# Patient Record
Sex: FEMALE | Race: WHITE | Employment: OTHER | URBAN - METROPOLITAN AREA
[De-identification: names, ages, dates, MRNs, and addresses within clinical notes are randomized per-mention and may not be internally consistent; named-entity substitution may affect disease eponyms.]

---

## 2017-01-03 RX ORDER — ESOMEPRAZOLE MAGNESIUM 40 MG/1
40 CAPSULE, DELAYED RELEASE ORAL DAILY
Qty: 90 CAPSULE | Refills: 1 | Status: SHIPPED | OUTPATIENT
Start: 2017-01-03 | End: 2017-08-01 | Stop reason: SDUPTHER

## 2017-01-04 ENCOUNTER — OFFICE VISIT (OUTPATIENT)
Dept: INTERNAL MEDICINE CLINIC | Age: 77
End: 2017-01-04

## 2017-01-04 VITALS
RESPIRATION RATE: 16 BRPM | BODY MASS INDEX: 20.18 KG/M2 | HEART RATE: 76 BPM | SYSTOLIC BLOOD PRESSURE: 145 MMHG | DIASTOLIC BLOOD PRESSURE: 86 MMHG | WEIGHT: 125 LBS

## 2017-01-04 DIAGNOSIS — J44.1 COPD EXACERBATION (HCC): ICD-10-CM

## 2017-01-04 DIAGNOSIS — F07.81 POST CONCUSSIVE SYNDROME: Primary | ICD-10-CM

## 2017-01-04 DIAGNOSIS — J44.9 COPD WITH ASTHMA (HCC): ICD-10-CM

## 2017-01-04 DIAGNOSIS — G89.29 CHRONIC LEFT-SIDED LOW BACK PAIN WITH LEFT-SIDED SCIATICA: ICD-10-CM

## 2017-01-04 DIAGNOSIS — M54.42 CHRONIC LEFT-SIDED LOW BACK PAIN WITH LEFT-SIDED SCIATICA: ICD-10-CM

## 2017-01-04 PROCEDURE — 99214 OFFICE O/P EST MOD 30 MIN: CPT | Performed by: INTERNAL MEDICINE

## 2017-01-12 RX ORDER — IPRATROPIUM BROMIDE 42 UG/1
SPRAY, METERED NASAL
Qty: 15 ML | Refills: 2 | Status: SHIPPED | OUTPATIENT
Start: 2017-01-12 | End: 2017-10-06 | Stop reason: DRUGHIGH

## 2017-01-23 RX ORDER — LEVOFLOXACIN 500 MG/1
500 TABLET, FILM COATED ORAL DAILY
Qty: 7 TABLET | Refills: 0 | Status: SHIPPED | OUTPATIENT
Start: 2017-01-23 | End: 2017-01-30

## 2017-01-24 ENCOUNTER — OFFICE VISIT (OUTPATIENT)
Dept: INTERNAL MEDICINE CLINIC | Age: 77
End: 2017-01-24

## 2017-01-24 VITALS
BODY MASS INDEX: 20.01 KG/M2 | RESPIRATION RATE: 16 BRPM | DIASTOLIC BLOOD PRESSURE: 75 MMHG | SYSTOLIC BLOOD PRESSURE: 146 MMHG | HEART RATE: 85 BPM | WEIGHT: 124 LBS

## 2017-01-24 DIAGNOSIS — J44.9 COPD WITH ASTHMA (HCC): ICD-10-CM

## 2017-01-24 DIAGNOSIS — J44.1 CHRONIC OBSTRUCTIVE PULMONARY DISEASE WITH ACUTE EXACERBATION (HCC): Primary | ICD-10-CM

## 2017-01-24 PROCEDURE — 99214 OFFICE O/P EST MOD 30 MIN: CPT | Performed by: INTERNAL MEDICINE

## 2017-01-24 RX ORDER — PREDNISONE 20 MG/1
TABLET ORAL
Qty: 5 TABLET | Refills: 0 | Status: SHIPPED | OUTPATIENT
Start: 2017-01-24 | End: 2017-02-23 | Stop reason: ALTCHOICE

## 2017-02-14 RX ORDER — AMLODIPINE BESYLATE 10 MG/1
10 TABLET ORAL DAILY
Qty: 90 TABLET | Refills: 1 | Status: SHIPPED | OUTPATIENT
Start: 2017-02-14 | End: 2017-07-20 | Stop reason: SDUPTHER

## 2017-02-23 ENCOUNTER — OFFICE VISIT (OUTPATIENT)
Dept: INTERNAL MEDICINE CLINIC | Age: 77
End: 2017-02-23

## 2017-02-23 VITALS
WEIGHT: 126.4 LBS | SYSTOLIC BLOOD PRESSURE: 122 MMHG | BODY MASS INDEX: 20.4 KG/M2 | DIASTOLIC BLOOD PRESSURE: 70 MMHG | RESPIRATION RATE: 16 BRPM | HEART RATE: 68 BPM

## 2017-02-23 DIAGNOSIS — M54.50 CHRONIC MIDLINE LOW BACK PAIN WITHOUT SCIATICA: Primary | ICD-10-CM

## 2017-02-23 DIAGNOSIS — E87.6 HYPOKALEMIA: ICD-10-CM

## 2017-02-23 DIAGNOSIS — I10 ESSENTIAL HYPERTENSION: ICD-10-CM

## 2017-02-23 DIAGNOSIS — G89.29 CHRONIC MIDLINE LOW BACK PAIN WITHOUT SCIATICA: Primary | ICD-10-CM

## 2017-02-23 PROCEDURE — 99213 OFFICE O/P EST LOW 20 MIN: CPT | Performed by: INTERNAL MEDICINE

## 2017-02-23 RX ORDER — OXYCODONE HYDROCHLORIDE AND ACETAMINOPHEN 5; 325 MG/1; MG/1
1 TABLET ORAL EVERY 4 HOURS PRN
Status: ON HOLD | COMMUNITY
End: 2017-05-15

## 2017-02-23 RX ORDER — LEFLUNOMIDE 20 MG/1
20 TABLET ORAL DAILY
Qty: 90 TABLET | Refills: 1 | Status: ON HOLD | OUTPATIENT
Start: 2017-02-23 | End: 2017-05-15

## 2017-02-27 RX ORDER — ATORVASTATIN CALCIUM 10 MG/1
10 TABLET, FILM COATED ORAL EVERY EVENING
Qty: 90 TABLET | Refills: 1 | Status: SHIPPED | OUTPATIENT
Start: 2017-02-27 | End: 2017-09-11 | Stop reason: SDUPTHER

## 2017-03-09 RX ORDER — TRIAMTERENE AND HYDROCHLOROTHIAZIDE 37.5; 25 MG/1; MG/1
TABLET ORAL
Qty: 90 TABLET | Refills: 1 | Status: ON HOLD | OUTPATIENT
Start: 2017-03-09 | End: 2017-06-11 | Stop reason: HOSPADM

## 2017-03-13 ENCOUNTER — OFFICE VISIT (OUTPATIENT)
Dept: PULMONOLOGY | Age: 77
End: 2017-03-13

## 2017-03-13 VITALS
DIASTOLIC BLOOD PRESSURE: 84 MMHG | BODY MASS INDEX: 20.25 KG/M2 | OXYGEN SATURATION: 93 % | HEART RATE: 95 BPM | SYSTOLIC BLOOD PRESSURE: 142 MMHG | WEIGHT: 126 LBS | RESPIRATION RATE: 20 BRPM | HEIGHT: 66 IN

## 2017-03-13 DIAGNOSIS — Z72.0 TOBACCO ABUSE: ICD-10-CM

## 2017-03-13 DIAGNOSIS — J42 CHRONIC BRONCHITIS, UNSPECIFIED CHRONIC BRONCHITIS TYPE (HCC): ICD-10-CM

## 2017-03-13 DIAGNOSIS — J44.1 ACUTE EXACERBATION OF CHRONIC OBSTRUCTIVE PULMONARY DISEASE (COPD) (HCC): Primary | ICD-10-CM

## 2017-03-13 DIAGNOSIS — J43.2 CENTRILOBULAR EMPHYSEMA (HCC): ICD-10-CM

## 2017-03-13 PROCEDURE — 99213 OFFICE O/P EST LOW 20 MIN: CPT | Performed by: INTERNAL MEDICINE

## 2017-03-13 RX ORDER — PREDNISONE 1 MG/1
TABLET ORAL
Qty: 38 TABLET | Refills: 0 | Status: SHIPPED | OUTPATIENT
Start: 2017-03-13 | End: 2017-03-24 | Stop reason: ALTCHOICE

## 2017-03-13 RX ORDER — CIPROFLOXACIN 500 MG/1
500 TABLET, FILM COATED ORAL 2 TIMES DAILY
Qty: 14 TABLET | Refills: 0 | Status: SHIPPED | OUTPATIENT
Start: 2017-03-13 | End: 2017-03-24 | Stop reason: ALTCHOICE

## 2017-03-24 ENCOUNTER — OFFICE VISIT (OUTPATIENT)
Dept: INTERNAL MEDICINE CLINIC | Age: 77
End: 2017-03-24

## 2017-03-24 VITALS
SYSTOLIC BLOOD PRESSURE: 140 MMHG | BODY MASS INDEX: 20.34 KG/M2 | WEIGHT: 126 LBS | DIASTOLIC BLOOD PRESSURE: 72 MMHG | HEART RATE: 84 BPM | RESPIRATION RATE: 16 BRPM

## 2017-03-24 DIAGNOSIS — M81.0 OSTEOPOROSIS: Primary | ICD-10-CM

## 2017-03-24 DIAGNOSIS — I10 ESSENTIAL HYPERTENSION: ICD-10-CM

## 2017-03-24 DIAGNOSIS — G89.4 CHRONIC PAIN SYNDROME: ICD-10-CM

## 2017-03-24 DIAGNOSIS — J44.1 COPD EXACERBATION (HCC): ICD-10-CM

## 2017-03-24 PROCEDURE — 99214 OFFICE O/P EST MOD 30 MIN: CPT | Performed by: INTERNAL MEDICINE

## 2017-03-24 RX ORDER — PREGABALIN 50 MG/1
50 CAPSULE ORAL 4 TIMES DAILY
Status: ON HOLD | COMMUNITY
End: 2017-05-15 | Stop reason: ALTCHOICE

## 2017-03-24 RX ORDER — ZOLEDRONIC ACID 5 MG/100ML
5 INJECTION, SOLUTION INTRAVENOUS ONCE
Qty: 100 ML | Refills: 0 | Status: SHIPPED | OUTPATIENT
Start: 2017-03-24 | End: 2017-05-12 | Stop reason: SDUPTHER

## 2017-03-27 ENCOUNTER — OFFICE VISIT (OUTPATIENT)
Dept: PULMONOLOGY | Age: 77
End: 2017-03-27

## 2017-03-27 VITALS
HEIGHT: 66 IN | OXYGEN SATURATION: 92 % | HEART RATE: 78 BPM | WEIGHT: 122 LBS | BODY MASS INDEX: 19.61 KG/M2 | RESPIRATION RATE: 18 BRPM | SYSTOLIC BLOOD PRESSURE: 130 MMHG | DIASTOLIC BLOOD PRESSURE: 68 MMHG

## 2017-03-27 DIAGNOSIS — J42 CHRONIC BRONCHITIS, UNSPECIFIED CHRONIC BRONCHITIS TYPE (HCC): Primary | ICD-10-CM

## 2017-03-27 PROCEDURE — 99213 OFFICE O/P EST LOW 20 MIN: CPT | Performed by: INTERNAL MEDICINE

## 2017-04-04 ENCOUNTER — HOSPITAL ENCOUNTER (OUTPATIENT)
Dept: GENERAL RADIOLOGY | Age: 77
Discharge: HOME | End: 2017-04-04
Attending: INTERNAL MEDICINE | Admitting: INTERNAL MEDICINE

## 2017-04-04 ENCOUNTER — OFFICE VISIT (OUTPATIENT)
Dept: PULMONOLOGY | Age: 77
End: 2017-04-04

## 2017-04-04 VITALS
DIASTOLIC BLOOD PRESSURE: 76 MMHG | BODY MASS INDEX: 20.09 KG/M2 | HEART RATE: 86 BPM | HEIGHT: 66 IN | WEIGHT: 125 LBS | OXYGEN SATURATION: 90 % | SYSTOLIC BLOOD PRESSURE: 136 MMHG | RESPIRATION RATE: 20 BRPM

## 2017-04-04 DIAGNOSIS — J42 CHRONIC BRONCHITIS, UNSPECIFIED CHRONIC BRONCHITIS TYPE (HCC): ICD-10-CM

## 2017-04-04 DIAGNOSIS — J44.1 ACUTE EXACERBATION OF CHRONIC OBSTRUCTIVE PULMONARY DISEASE (COPD) (HCC): Primary | ICD-10-CM

## 2017-04-04 DIAGNOSIS — J44.1 ACUTE EXACERBATION OF CHRONIC OBSTRUCTIVE PULMONARY DISEASE (COPD) (HCC): ICD-10-CM

## 2017-04-04 LAB
HCT VFR BLD CALC: 36.4 % (ref 37–47)
HEMOGLOBIN: 11.5 GM/DL (ref 12.5–16)
MCH RBC QN AUTO: 26.7 PG (ref 27–31)
MCHC RBC AUTO-ENTMCNC: 31.6 % (ref 32–36)
MCV RBC AUTO: 84.7 FL (ref 78–100)
PDW BLD-RTO: 17.1 % (ref 11.7–14.9)
PLATELET # BLD: 309 K/CU MM (ref 140–440)
PMV BLD AUTO: 10.7 FL (ref 7.5–11.1)
RAPID INFLUENZA  B AGN: NEGATIVE
RAPID INFLUENZA A AGN: NEGATIVE
RBC # BLD: 4.3 M/CU MM (ref 4.2–5.4)
WBC # BLD: 7.1 K/CU MM (ref 4–10.5)

## 2017-04-04 PROCEDURE — 99213 OFFICE O/P EST LOW 20 MIN: CPT | Performed by: INTERNAL MEDICINE

## 2017-04-04 RX ORDER — LEVOFLOXACIN 750 MG/1
750 TABLET ORAL DAILY
Qty: 5 TABLET | Refills: 0 | Status: SHIPPED | OUTPATIENT
Start: 2017-04-04 | End: 2017-04-09

## 2017-04-04 RX ORDER — PREDNISONE 1 MG/1
TABLET ORAL
Qty: 38 TABLET | Refills: 0 | Status: ON HOLD | OUTPATIENT
Start: 2017-04-04 | End: 2017-05-15

## 2017-04-07 ENCOUNTER — TELEPHONE (OUTPATIENT)
Dept: PULMONOLOGY | Age: 77
End: 2017-04-07

## 2017-04-08 LAB
MYCOPLASMA PNEUMONIAE IGG: 0.29
MYCOPLASMA PNEUMONIAE IGM: 0.06

## 2017-05-01 ENCOUNTER — NURSE ONLY (OUTPATIENT)
Dept: PULMONOLOGY | Age: 77
End: 2017-05-01

## 2017-05-01 DIAGNOSIS — J42 CHRONIC BRONCHITIS, UNSPECIFIED CHRONIC BRONCHITIS TYPE (HCC): ICD-10-CM

## 2017-05-01 LAB
DLCO %PRED: NORMAL
DLCO PRE: NORMAL
FEF 25-75%-POST: 0.38
FEF 25-75%-PRE: 0.33
FEV1-POST: 0.8
FEV1-PRE: 0.7
FEV1/FVC-POST: 55
FEV1/FVC-PRE: 56.2
FVC-POST: 1.46
FVC-PRE: 1.25
MEP: NORMAL
MIP: NORMAL
TLC %PRED: NORMAL
TLC PRE: NORMAL

## 2017-05-01 PROCEDURE — 94060 EVALUATION OF WHEEZING: CPT | Performed by: INTERNAL MEDICINE

## 2017-05-01 ASSESSMENT — PULMONARY FUNCTION TESTS
FEV1_POST: 0.80
FEV1_PRE: 0.70
FVC_POST: 1.46
FEV1/FVC_PRE: 56.2
FVC_PRE: 1.25
FEV1/FVC_POST: 55.0

## 2017-05-09 RX ORDER — POTASSIUM CHLORIDE 750 MG/1
10 TABLET, FILM COATED, EXTENDED RELEASE ORAL 2 TIMES DAILY
Qty: 60 TABLET | Refills: 5 | Status: SHIPPED | OUTPATIENT
Start: 2017-05-09 | End: 2017-08-14 | Stop reason: DRUGHIGH

## 2017-05-12 ENCOUNTER — TELEPHONE (OUTPATIENT)
Dept: INTERNAL MEDICINE CLINIC | Age: 77
End: 2017-05-12

## 2017-05-12 RX ORDER — ZOLEDRONIC ACID 5 MG/100ML
5 INJECTION, SOLUTION INTRAVENOUS ONCE
Qty: 100 ML | Refills: 0 | Status: ON HOLD | OUTPATIENT
Start: 2017-05-12 | End: 2017-05-30 | Stop reason: HOSPADM

## 2017-05-15 PROBLEM — I21.4 NSTEMI (NON-ST ELEVATED MYOCARDIAL INFARCTION) (HCC): Status: ACTIVE | Noted: 2017-05-15

## 2017-05-18 ENCOUNTER — TELEPHONE (OUTPATIENT)
Dept: PHARMACY | Facility: CLINIC | Age: 77
End: 2017-05-18

## 2017-05-18 ENCOUNTER — CARE COORDINATION (OUTPATIENT)
Dept: CASE MANAGEMENT | Age: 77
End: 2017-05-18

## 2017-05-18 DIAGNOSIS — I21.4 NSTEMI (NON-ST ELEVATED MYOCARDIAL INFARCTION) (HCC): Primary | ICD-10-CM

## 2017-05-25 ENCOUNTER — TELEPHONE (OUTPATIENT)
Dept: INTERNAL MEDICINE CLINIC | Age: 77
End: 2017-05-25

## 2017-05-25 ENCOUNTER — HOSPITAL ENCOUNTER (OUTPATIENT)
Dept: INFUSION THERAPY | Age: 77
Discharge: HOME | End: 2017-05-25
Attending: INTERNAL MEDICINE | Admitting: INTERNAL MEDICINE

## 2017-05-25 VITALS
SYSTOLIC BLOOD PRESSURE: 155 MMHG | HEART RATE: 78 BPM | BODY MASS INDEX: 20.09 KG/M2 | HEIGHT: 66 IN | RESPIRATION RATE: 16 BRPM | WEIGHT: 125 LBS | DIASTOLIC BLOOD PRESSURE: 66 MMHG | TEMPERATURE: 98.2 F

## 2017-05-25 RX ORDER — SODIUM CHLORIDE 0.9 % (FLUSH) 0.9 %
10 SYRINGE (ML) INJECTION PRN
Status: ACTIVE | OUTPATIENT
Start: 2017-05-25 | End: 2017-05-25

## 2017-05-25 RX ORDER — ZOLEDRONIC ACID 5 MG/100ML
5 INJECTION, SOLUTION INTRAVENOUS ONCE
Status: COMPLETED | OUTPATIENT
Start: 2017-05-25 | End: 2017-05-25

## 2017-05-25 RX ADMIN — ZOLEDRONIC ACID 5 MG: 5 INJECTION, SOLUTION INTRAVENOUS at 14:50

## 2017-05-25 RX ADMIN — Medication 10 ML: at 14:50

## 2017-05-25 RX ADMIN — Medication 10 ML: at 15:20

## 2017-05-26 ENCOUNTER — OFFICE VISIT (OUTPATIENT)
Dept: INTERNAL MEDICINE CLINIC | Age: 77
End: 2017-05-26

## 2017-05-26 VITALS
SYSTOLIC BLOOD PRESSURE: 130 MMHG | HEIGHT: 66 IN | HEART RATE: 78 BPM | DIASTOLIC BLOOD PRESSURE: 70 MMHG | RESPIRATION RATE: 16 BRPM | BODY MASS INDEX: 20.86 KG/M2 | WEIGHT: 129.8 LBS

## 2017-05-26 DIAGNOSIS — I51.81 TAKOTSUBO SYNDROME: ICD-10-CM

## 2017-05-26 DIAGNOSIS — M81.0 OSTEOPOROSIS: ICD-10-CM

## 2017-05-26 DIAGNOSIS — F17.200 SMOKER: ICD-10-CM

## 2017-05-26 DIAGNOSIS — I51.81 BROKEN HEART SYNDROME: Primary | ICD-10-CM

## 2017-05-26 DIAGNOSIS — F32.9 REACTIVE DEPRESSION: ICD-10-CM

## 2017-05-26 PROBLEM — Z72.0 TOBACCO ABUSE: Status: RESOLVED | Noted: 2017-03-13 | Resolved: 2017-05-26

## 2017-05-26 PROCEDURE — 99214 OFFICE O/P EST MOD 30 MIN: CPT | Performed by: INTERNAL MEDICINE

## 2017-05-26 RX ORDER — ARIPIPRAZOLE 2 MG/1
2 TABLET ORAL DAILY
Qty: 30 TABLET | Refills: 3 | Status: SHIPPED | OUTPATIENT
Start: 2017-05-26 | End: 2017-10-19 | Stop reason: SDUPTHER

## 2017-05-31 ENCOUNTER — TELEPHONE (OUTPATIENT)
Dept: PHARMACY | Facility: CLINIC | Age: 77
End: 2017-05-31

## 2017-05-31 ENCOUNTER — CARE COORDINATION (OUTPATIENT)
Dept: CASE MANAGEMENT | Age: 77
End: 2017-05-31

## 2017-05-31 DIAGNOSIS — F41.0 PANIC ATTACK: Primary | ICD-10-CM

## 2017-06-01 ENCOUNTER — HOSPITAL ENCOUNTER (OUTPATIENT)
Dept: OTHER | Age: 77
Discharge: HOME | End: 2017-06-01
Attending: FAMILY MEDICINE | Admitting: FAMILY MEDICINE

## 2017-06-05 ENCOUNTER — OFFICE VISIT (OUTPATIENT)
Dept: FAMILY MEDICINE CLINIC | Age: 77
End: 2017-06-05

## 2017-06-05 ENCOUNTER — TELEPHONE (OUTPATIENT)
Dept: INTERNAL MEDICINE CLINIC | Age: 77
End: 2017-06-05

## 2017-06-05 ENCOUNTER — TELEPHONE (OUTPATIENT)
Dept: FAMILY MEDICINE CLINIC | Age: 77
End: 2017-06-05

## 2017-06-05 VITALS
DIASTOLIC BLOOD PRESSURE: 70 MMHG | SYSTOLIC BLOOD PRESSURE: 104 MMHG | OXYGEN SATURATION: 98 % | BODY MASS INDEX: 20.11 KG/M2 | WEIGHT: 124.6 LBS | HEART RATE: 90 BPM

## 2017-06-05 DIAGNOSIS — F41.0 PANIC ATTACK: ICD-10-CM

## 2017-06-05 DIAGNOSIS — R60.9 PERIPHERAL EDEMA: ICD-10-CM

## 2017-06-05 DIAGNOSIS — Z91.81 AT HIGH RISK FOR FALLS: ICD-10-CM

## 2017-06-05 DIAGNOSIS — J42 CHRONIC BRONCHITIS, UNSPECIFIED CHRONIC BRONCHITIS TYPE (HCC): Primary | ICD-10-CM

## 2017-06-05 RX ORDER — FENTANYL 12 UG/H
PATCH TRANSDERMAL
Refills: 0 | Status: ON HOLD | COMMUNITY
Start: 2017-05-31 | End: 2017-06-11

## 2017-06-05 RX ORDER — FUROSEMIDE 20 MG/1
20 TABLET ORAL 2 TIMES DAILY
Qty: 60 TABLET | Refills: 0 | Status: ON HOLD | OUTPATIENT
Start: 2017-06-05 | End: 2017-06-11 | Stop reason: HOSPADM

## 2017-06-05 ASSESSMENT — PATIENT HEALTH QUESTIONNAIRE - PHQ9
1. LITTLE INTEREST OR PLEASURE IN DOING THINGS: 0
2. FEELING DOWN, DEPRESSED OR HOPELESS: 0
SUM OF ALL RESPONSES TO PHQ QUESTIONS 1-9: 0
SUM OF ALL RESPONSES TO PHQ9 QUESTIONS 1 & 2: 0

## 2017-06-12 ENCOUNTER — CARE COORDINATION (OUTPATIENT)
Dept: OTHER | Facility: CLINIC | Age: 77
End: 2017-06-12

## 2017-06-12 LAB
ALBUMIN SERPL-MCNC: 3 GM/DL (ref 3.4–5)
ALP BLD-CCNC: 94 IU/L (ref 40–128)
ALT SERPL-CCNC: 7 U/L (ref 10–40)
ANION GAP SERPL CALCULATED.3IONS-SCNC: 15 MMOL/L (ref 4–16)
AST SERPL-CCNC: 20 IU/L (ref 15–37)
BILIRUB SERPL-MCNC: 0.5 MG/DL (ref 0–1)
BUN BLDV-MCNC: 12 MG/DL (ref 6–23)
CALCIUM SERPL-MCNC: 8.3 MG/DL (ref 8.3–10.6)
CHLORIDE BLD-SCNC: 88 MMOL/L (ref 99–110)
CO2: 25 MMOL/L (ref 21–32)
CREAT SERPL-MCNC: 0.5 MG/DL (ref 0.6–1.1)
GFR AFRICAN AMERICAN: >60 ML/MIN/1.73M2
GFR NON-AFRICAN AMERICAN: >60 ML/MIN/1.73M2
GLUCOSE BLD-MCNC: 76 MG/DL (ref 70–140)
HCT VFR BLD CALC: 31.8 % (ref 37–47)
HEMOGLOBIN: 9.2 GM/DL (ref 12.5–16)
MAGNESIUM: 2.1 MG/DL (ref 1.8–2.4)
MCH RBC QN AUTO: 24.3 PG (ref 27–31)
MCHC RBC AUTO-ENTMCNC: 28.9 % (ref 32–36)
MCV RBC AUTO: 83.9 FL (ref 78–100)
PDW BLD-RTO: 18.6 % (ref 11.7–14.9)
PLATELET # BLD: 389 K/CU MM (ref 140–440)
PMV BLD AUTO: 9.3 FL (ref 7.5–11.1)
POTASSIUM SERPL-SCNC: 4.5 MMOL/L (ref 3.5–5.1)
RBC # BLD: 3.79 M/CU MM (ref 4.2–5.4)
SODIUM BLD-SCNC: 128 MMOL/L (ref 135–145)
TOTAL PROTEIN: 5.1 GM/DL (ref 6.4–8.2)
WBC # BLD: 11.3 K/CU MM (ref 4–10.5)

## 2017-06-15 LAB
ANION GAP SERPL CALCULATED.3IONS-SCNC: 12 MMOL/L (ref 4–16)
BUN BLDV-MCNC: 9 MG/DL (ref 6–23)
CALCIUM SERPL-MCNC: 8.3 MG/DL (ref 8.3–10.6)
CHLORIDE BLD-SCNC: 96 MMOL/L (ref 99–110)
CO2: 26 MMOL/L (ref 21–32)
CREAT SERPL-MCNC: 0.5 MG/DL (ref 0.6–1.1)
GFR AFRICAN AMERICAN: >60 ML/MIN/1.73M2
GFR NON-AFRICAN AMERICAN: >60 ML/MIN/1.73M2
GLUCOSE BLD-MCNC: 85 MG/DL (ref 70–140)
HCT VFR BLD CALC: 27.9 % (ref 37–47)
HEMOGLOBIN: 8.3 GM/DL (ref 12.5–16)
MCH RBC QN AUTO: 23.9 PG (ref 27–31)
MCHC RBC AUTO-ENTMCNC: 29.7 % (ref 32–36)
MCV RBC AUTO: 80.2 FL (ref 78–100)
PDW BLD-RTO: 18.4 % (ref 11.7–14.9)
PLATELET # BLD: 475 K/CU MM (ref 140–440)
PMV BLD AUTO: 9.5 FL (ref 7.5–11.1)
POTASSIUM SERPL-SCNC: 5 MMOL/L (ref 3.5–5.1)
RBC # BLD: 3.48 M/CU MM (ref 4.2–5.4)
SODIUM BLD-SCNC: 134 MMOL/L (ref 135–145)
WBC # BLD: 6.4 K/CU MM (ref 4–10.5)

## 2017-06-22 ENCOUNTER — TELEPHONE (OUTPATIENT)
Dept: INTERNAL MEDICINE CLINIC | Age: 77
End: 2017-06-22

## 2017-06-22 RX ORDER — METOPROLOL TARTRATE 50 MG/1
50 TABLET, FILM COATED ORAL 2 TIMES DAILY
Qty: 120 TABLET | Refills: 1 | Status: SHIPPED | OUTPATIENT
Start: 2017-06-22 | End: 2017-12-01 | Stop reason: SDUPTHER

## 2017-06-22 RX ORDER — METOPROLOL TARTRATE 50 MG/1
50 TABLET, FILM COATED ORAL 2 TIMES DAILY
COMMUNITY
End: 2017-06-22 | Stop reason: SDUPTHER

## 2017-06-23 ENCOUNTER — OFFICE VISIT (OUTPATIENT)
Dept: INTERNAL MEDICINE CLINIC | Age: 77
End: 2017-06-23

## 2017-06-23 VITALS
BODY MASS INDEX: 20.24 KG/M2 | HEART RATE: 80 BPM | DIASTOLIC BLOOD PRESSURE: 76 MMHG | SYSTOLIC BLOOD PRESSURE: 118 MMHG | WEIGHT: 125.4 LBS

## 2017-06-23 DIAGNOSIS — D50.0 BLOOD LOSS ANEMIA: ICD-10-CM

## 2017-06-23 DIAGNOSIS — Z12.11 COLON CANCER SCREENING: ICD-10-CM

## 2017-06-23 DIAGNOSIS — S22.42XA CLOSED FRACTURE OF MULTIPLE RIBS OF LEFT SIDE, INITIAL ENCOUNTER: ICD-10-CM

## 2017-06-23 DIAGNOSIS — E87.1 HYPONATREMIA: Primary | ICD-10-CM
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
## 2017-06-23 DIAGNOSIS — G89.4 CHRONIC PAIN SYNDROME: ICD-10-CM

## 2017-06-23 DIAGNOSIS — Z91.81 RISK FOR FALLS: ICD-10-CM

## 2017-06-23 PROBLEM — J44.1 ACUTE EXACERBATION OF CHRONIC OBSTRUCTIVE PULMONARY DISEASE (COPD) (HCC): Status: RESOLVED | Noted: 2017-03-13 | Resolved: 2017-06-23

## 2017-06-23 PROCEDURE — 99215 OFFICE O/P EST HI 40 MIN: CPT | Performed by: INTERNAL MEDICINE

## 2017-06-23 RX ORDER — FERROUS SULFATE 325(65) MG
325 TABLET ORAL
COMMUNITY
End: 2017-10-27 | Stop reason: ALTCHOICE

## 2017-06-23 RX ORDER — FUROSEMIDE 20 MG/1
20 TABLET ORAL 2 TIMES DAILY
COMMUNITY
End: 2017-07-17 | Stop reason: SDUPTHER

## 2017-06-23 ASSESSMENT — ENCOUNTER SYMPTOMS
BLOOD IN STOOL: 0
ABDOMINAL PAIN: 0
DOUBLE VISION: 0
SPUTUM PRODUCTION: 0
COUGH: 1
NAUSEA: 0
BACK PAIN: 1
SHORTNESS OF BREATH: 0
DIARRHEA: 0
VOMITING: 0
HEARTBURN: 0
SORE THROAT: 0
BLURRED VISION: 0

## 2017-06-26 ENCOUNTER — CARE COORDINATION (OUTPATIENT)
Dept: CASE MANAGEMENT | Age: 77
End: 2017-06-26

## 2017-06-27 ENCOUNTER — TELEPHONE (OUTPATIENT)
Dept: INTERNAL MEDICINE CLINIC | Age: 77
End: 2017-06-27

## 2017-06-28 PROCEDURE — 99496 TRANSJ CARE MGMT HIGH F2F 7D: CPT | Performed by: FAMILY MEDICINE

## 2017-06-30 ENCOUNTER — TELEPHONE (OUTPATIENT)
Dept: INTERNAL MEDICINE CLINIC | Age: 77
End: 2017-06-30

## 2017-06-30 DIAGNOSIS — Z12.11 COLON CANCER SCREENING: Primary | ICD-10-CM

## 2017-07-06 ENCOUNTER — TELEPHONE (OUTPATIENT)
Dept: INTERNAL MEDICINE CLINIC | Age: 77
End: 2017-07-06

## 2017-07-10 ENCOUNTER — CARE COORDINATION (OUTPATIENT)
Dept: CASE MANAGEMENT | Age: 77
End: 2017-07-10

## 2017-07-11 ENCOUNTER — TELEPHONE (OUTPATIENT)
Dept: INTERNAL MEDICINE CLINIC | Age: 77
End: 2017-07-11

## 2017-07-12 PROBLEM — E87.1 HYPONATREMIA: Status: ACTIVE | Noted: 2017-07-12

## 2017-07-14 ENCOUNTER — CARE COORDINATOR VISIT (OUTPATIENT)
Dept: CARE COORDINATION | Age: 77
End: 2017-07-14

## 2017-07-18 RX ORDER — FUROSEMIDE 20 MG/1
20 TABLET ORAL 2 TIMES DAILY
Qty: 60 TABLET | Refills: 5 | Status: SHIPPED | OUTPATIENT
Start: 2017-07-18 | End: 2017-08-14 | Stop reason: DRUGHIGH

## 2017-07-20 RX ORDER — AMLODIPINE BESYLATE 10 MG/1
10 TABLET ORAL DAILY
Qty: 90 TABLET | Refills: 1 | Status: SHIPPED | OUTPATIENT
Start: 2017-07-20 | End: 2017-08-01 | Stop reason: ALTCHOICE

## 2017-07-21 ENCOUNTER — TELEPHONE (OUTPATIENT)
Dept: INTERNAL MEDICINE CLINIC | Age: 77
End: 2017-07-21

## 2017-07-21 RX ORDER — LEVOFLOXACIN 500 MG/1
500 TABLET, FILM COATED ORAL DAILY
Qty: 10 TABLET | Refills: 0 | Status: SHIPPED | OUTPATIENT
Start: 2017-07-21 | End: 2017-07-31

## 2017-07-25 ENCOUNTER — TELEPHONE (OUTPATIENT)
Dept: FAMILY MEDICINE CLINIC | Age: 77
End: 2017-07-25

## 2017-07-26 ENCOUNTER — CARE COORDINATION (OUTPATIENT)
Dept: CARE COORDINATION | Age: 77
End: 2017-07-26

## 2017-08-01 ENCOUNTER — OFFICE VISIT (OUTPATIENT)
Dept: INTERNAL MEDICINE CLINIC | Age: 77
End: 2017-08-01

## 2017-08-01 ENCOUNTER — CARE COORDINATION (OUTPATIENT)
Dept: CARE COORDINATION | Age: 77
End: 2017-08-01

## 2017-08-01 VITALS
HEART RATE: 78 BPM | RESPIRATION RATE: 16 BRPM | BODY MASS INDEX: 20.56 KG/M2 | SYSTOLIC BLOOD PRESSURE: 140 MMHG | DIASTOLIC BLOOD PRESSURE: 88 MMHG | WEIGHT: 127.4 LBS

## 2017-08-01 DIAGNOSIS — I73.9 PAD (PERIPHERAL ARTERY DISEASE) (HCC): ICD-10-CM

## 2017-08-01 DIAGNOSIS — E87.8 ELECTROLYTE IMBALANCE: ICD-10-CM

## 2017-08-01 DIAGNOSIS — R60.0 BILATERAL EDEMA OF LOWER EXTREMITY: Primary | ICD-10-CM

## 2017-08-01 DIAGNOSIS — I10 ESSENTIAL HYPERTENSION: ICD-10-CM

## 2017-08-01 DIAGNOSIS — D50.0 BLOOD LOSS ANEMIA: ICD-10-CM

## 2017-08-01 DIAGNOSIS — D50.9 IRON DEFICIENCY ANEMIA, UNSPECIFIED IRON DEFICIENCY ANEMIA TYPE: ICD-10-CM

## 2017-08-01 DIAGNOSIS — I82.441 ACUTE DVT OF RIGHT TIBIAL VEIN (HCC): ICD-10-CM

## 2017-08-01 DIAGNOSIS — Z12.39 BREAST CANCER SCREENING: ICD-10-CM

## 2017-08-01 PROBLEM — E87.1 HYPONATREMIA: Status: RESOLVED | Noted: 2017-07-12 | Resolved: 2017-08-01

## 2017-08-01 PROBLEM — I21.4 NSTEMI (NON-ST ELEVATED MYOCARDIAL INFARCTION) (HCC): Status: RESOLVED | Noted: 2017-05-15 | Resolved: 2017-08-01

## 2017-08-01 PROCEDURE — 99215 OFFICE O/P EST HI 40 MIN: CPT | Performed by: INTERNAL MEDICINE

## 2017-08-01 RX ORDER — LOSARTAN POTASSIUM 50 MG/1
50 TABLET ORAL DAILY
Qty: 30 TABLET | Refills: 3 | Status: SHIPPED | OUTPATIENT
Start: 2017-08-01 | End: 2017-10-23 | Stop reason: SDUPTHER

## 2017-08-01 RX ORDER — ESOMEPRAZOLE MAGNESIUM 40 MG/1
40 CAPSULE, DELAYED RELEASE ORAL DAILY
Qty: 90 CAPSULE | Refills: 1 | Status: SHIPPED | OUTPATIENT
Start: 2017-08-01 | End: 2017-10-27 | Stop reason: DRUGHIGH

## 2017-08-01 ASSESSMENT — ENCOUNTER SYMPTOMS
BLURRED VISION: 0
NAUSEA: 0
SORE THROAT: 0
BACK PAIN: 1
SPUTUM PRODUCTION: 0
DIARRHEA: 0
VOMITING: 0
HEARTBURN: 0
SHORTNESS OF BREATH: 0
COUGH: 0
ABDOMINAL PAIN: 0
DOUBLE VISION: 0
WHEEZING: 0

## 2017-08-03 ENCOUNTER — HOSPITAL ENCOUNTER (OUTPATIENT)
Dept: GENERAL RADIOLOGY | Age: 77
Discharge: HOME | End: 2017-08-03
Attending: INTERNAL MEDICINE | Admitting: INTERNAL MEDICINE

## 2017-08-03 LAB
ANION GAP SERPL CALCULATED.3IONS-SCNC: 11 MMOL/L (ref 4–16)
BUN BLDV-MCNC: 11 MG/DL (ref 6–23)
CALCIUM SERPL-MCNC: 9 MG/DL (ref 8.3–10.6)
CHLORIDE BLD-SCNC: 97 MMOL/L (ref 99–110)
CO2: 27 MMOL/L (ref 21–32)
CREAT SERPL-MCNC: 0.6 MG/DL (ref 0.6–1.1)
GFR AFRICAN AMERICAN: >60 ML/MIN/1.73M2
GFR NON-AFRICAN AMERICAN: >60 ML/MIN/1.73M2
GLUCOSE BLD-MCNC: 97 MG/DL (ref 70–140)
HCT VFR BLD CALC: 29.1 % (ref 37–47)
HEMOGLOBIN: 8.4 GM/DL (ref 12.5–16)
MCH RBC QN AUTO: 23.5 PG (ref 27–31)
MCHC RBC AUTO-ENTMCNC: 28.9 % (ref 32–36)
MCV RBC AUTO: 81.3 FL (ref 78–100)
PDW BLD-RTO: 21.4 % (ref 11.7–14.9)
PLATELET # BLD: 343 K/CU MM (ref 140–440)
PMV BLD AUTO: 10.6 FL (ref 7.5–11.1)
POTASSIUM SERPL-SCNC: 4.1 MMOL/L (ref 3.5–5.1)
RBC # BLD: 3.58 M/CU MM (ref 4.2–5.4)
SODIUM BLD-SCNC: 135 MMOL/L (ref 135–145)
WBC # BLD: 6.4 K/CU MM (ref 4–10.5)

## 2017-08-09 ENCOUNTER — HOSPITAL ENCOUNTER (OUTPATIENT)
Dept: WOMENS IMAGING | Age: 77
Discharge: HOME | End: 2017-08-09
Attending: INTERNAL MEDICINE | Admitting: INTERNAL MEDICINE

## 2017-08-09 ENCOUNTER — CARE COORDINATION (OUTPATIENT)
Dept: CARE COORDINATION | Age: 77
End: 2017-08-09

## 2017-08-09 DIAGNOSIS — Z12.39 BREAST CANCER SCREENING: ICD-10-CM

## 2017-08-11 ENCOUNTER — HOSPITAL ENCOUNTER (OUTPATIENT)
Dept: GENERAL RADIOLOGY | Age: 77
Discharge: HOME | End: 2017-08-11
Attending: INTERNAL MEDICINE | Admitting: INTERNAL MEDICINE

## 2017-08-11 LAB
ANION GAP SERPL CALCULATED.3IONS-SCNC: 14 MMOL/L (ref 4–16)
BUN BLDV-MCNC: 14 MG/DL (ref 6–23)
CALCIUM SERPL-MCNC: 9 MG/DL (ref 8.3–10.6)
CHLORIDE BLD-SCNC: 96 MMOL/L (ref 99–110)
CO2: 23 MMOL/L (ref 21–32)
CREAT SERPL-MCNC: 0.6 MG/DL (ref 0.6–1.1)
GFR AFRICAN AMERICAN: >60 ML/MIN/1.73M2
GFR NON-AFRICAN AMERICAN: >60 ML/MIN/1.73M2
GLUCOSE BLD-MCNC: 106 MG/DL (ref 70–140)
HCT VFR BLD CALC: 32.7 % (ref 37–47)
HEMOGLOBIN: 9.4 GM/DL (ref 12.5–16)
MCH RBC QN AUTO: 25.3 PG (ref 27–31)
MCHC RBC AUTO-ENTMCNC: 28.7 % (ref 32–36)
MCV RBC AUTO: 87.9 FL (ref 78–100)
PDW BLD-RTO: 27.1 % (ref 11.7–14.9)
PLATELET # BLD: 331 K/CU MM (ref 140–440)
PMV BLD AUTO: 11.6 FL (ref 7.5–11.1)
POTASSIUM SERPL-SCNC: 3.9 MMOL/L (ref 3.5–5.1)
RBC # BLD: 3.72 M/CU MM (ref 4.2–5.4)
SODIUM BLD-SCNC: 133 MMOL/L (ref 135–145)
WBC # BLD: 7.1 K/CU MM (ref 4–10.5)

## 2017-08-14 ENCOUNTER — HOSPITAL ENCOUNTER (OUTPATIENT)
Dept: GENERAL RADIOLOGY | Age: 77
Discharge: HOME | End: 2017-08-14
Attending: INTERNAL MEDICINE | Admitting: INTERNAL MEDICINE

## 2017-08-14 ENCOUNTER — OFFICE VISIT (OUTPATIENT)
Dept: INTERNAL MEDICINE CLINIC | Age: 77
End: 2017-08-14

## 2017-08-14 VITALS
RESPIRATION RATE: 16 BRPM | SYSTOLIC BLOOD PRESSURE: 144 MMHG | WEIGHT: 134 LBS | DIASTOLIC BLOOD PRESSURE: 72 MMHG | HEART RATE: 80 BPM | BODY MASS INDEX: 21.63 KG/M2

## 2017-08-14 DIAGNOSIS — R60.0 BILATERAL EDEMA OF LOWER EXTREMITY: ICD-10-CM

## 2017-08-14 DIAGNOSIS — D50.0 BLOOD LOSS ANEMIA: ICD-10-CM

## 2017-08-14 DIAGNOSIS — I82.541 CHRONIC DEEP VEIN THROMBOSIS (DVT) OF TIBIAL VEIN OF RIGHT LOWER EXTREMITY (HCC): ICD-10-CM

## 2017-08-14 DIAGNOSIS — I50.31 ACUTE DIASTOLIC CONGESTIVE HEART FAILURE (HCC): Primary | ICD-10-CM

## 2017-08-14 DIAGNOSIS — R06.02 BREATH SHORTNESS: ICD-10-CM

## 2017-08-14 DIAGNOSIS — R06.02 SHORTNESS OF BREATH: ICD-10-CM

## 2017-08-14 PROCEDURE — 99215 OFFICE O/P EST HI 40 MIN: CPT | Performed by: INTERNAL MEDICINE

## 2017-08-14 RX ORDER — RANITIDINE 150 MG/1
150 TABLET ORAL NIGHTLY
COMMUNITY
End: 2017-09-19 | Stop reason: ALTCHOICE

## 2017-08-14 RX ORDER — POTASSIUM CHLORIDE 750 MG/1
10 TABLET, EXTENDED RELEASE ORAL 2 TIMES DAILY
COMMUNITY
End: 2017-12-01 | Stop reason: DRUGHIGH

## 2017-08-14 RX ORDER — FUROSEMIDE 20 MG/1
TABLET ORAL
COMMUNITY
End: 2017-08-18 | Stop reason: SDUPTHER

## 2017-08-14 ASSESSMENT — ENCOUNTER SYMPTOMS
BLOOD IN STOOL: 0
ORTHOPNEA: 1
HEARTBURN: 1
WHEEZING: 0
SHORTNESS OF BREATH: 1
BACK PAIN: 1
COUGH: 1
ABDOMINAL PAIN: 0
SORE THROAT: 0
BLURRED VISION: 0
DIARRHEA: 1
SPUTUM PRODUCTION: 0
VOMITING: 0
NAUSEA: 1
DOUBLE VISION: 0

## 2017-08-17 ENCOUNTER — TELEPHONE (OUTPATIENT)
Dept: INTERNAL MEDICINE CLINIC | Age: 77
End: 2017-08-17

## 2017-08-17 ENCOUNTER — TELEPHONE (OUTPATIENT)
Dept: PHARMACY | Facility: CLINIC | Age: 77
End: 2017-08-17

## 2017-08-17 ENCOUNTER — CARE COORDINATION (OUTPATIENT)
Dept: CASE MANAGEMENT | Age: 77
End: 2017-08-17

## 2017-08-18 RX ORDER — FUROSEMIDE 20 MG/1
20 TABLET ORAL 2 TIMES DAILY
Qty: 60 TABLET | Refills: 2 | Status: SHIPPED | OUTPATIENT
Start: 2017-08-18 | End: 2017-08-22 | Stop reason: SDUPTHER

## 2017-08-21 ENCOUNTER — HOSPITAL ENCOUNTER (OUTPATIENT)
Dept: GENERAL RADIOLOGY | Age: 77
Discharge: HOME | End: 2017-08-21
Attending: INTERNAL MEDICINE | Admitting: INTERNAL MEDICINE

## 2017-08-21 ENCOUNTER — CARE COORDINATION (OUTPATIENT)
Dept: CASE MANAGEMENT | Age: 77
End: 2017-08-21

## 2017-08-21 LAB
ANION GAP SERPL CALCULATED.3IONS-SCNC: 12 MMOL/L (ref 4–16)
BASOPHILS ABSOLUTE: 0 K/CU MM
BASOPHILS RELATIVE PERCENT: 0.5 % (ref 0–1)
BUN BLDV-MCNC: 16 MG/DL (ref 6–23)
CALCIUM SERPL-MCNC: 8.9 MG/DL (ref 8.3–10.6)
CHLORIDE BLD-SCNC: 93 MMOL/L (ref 99–110)
CO2: 28 MMOL/L (ref 21–32)
CREAT SERPL-MCNC: 0.7 MG/DL (ref 0.6–1.1)
DIFFERENTIAL TYPE: ABNORMAL
EOSINOPHILS ABSOLUTE: 0.2 K/CU MM
EOSINOPHILS RELATIVE PERCENT: 2.6 % (ref 0–3)
GFR AFRICAN AMERICAN: >60 ML/MIN/1.73M2
GFR NON-AFRICAN AMERICAN: >60 ML/MIN/1.73M2
GLUCOSE BLD-MCNC: 91 MG/DL (ref 70–140)
HCT VFR BLD CALC: 28.8 % (ref 37–47)
HEMOGLOBIN: 8.5 GM/DL (ref 12.5–16)
IMMATURE NEUTROPHIL %: 0.4 % (ref 0–0.43)
LYMPHOCYTES ABSOLUTE: 2.3 K/CU MM
LYMPHOCYTES RELATIVE PERCENT: 29.2 % (ref 24–44)
MCH RBC QN AUTO: 25.8 PG (ref 27–31)
MCHC RBC AUTO-ENTMCNC: 29.5 % (ref 32–36)
MCV RBC AUTO: 87.5 FL (ref 78–100)
MONOCYTES ABSOLUTE: 1.1 K/CU MM
MONOCYTES RELATIVE PERCENT: 14.4 % (ref 0–4)
NUCLEATED RBC %: 0 %
PDW BLD-RTO: 24.3 % (ref 11.7–14.9)
PLATELET # BLD: 374 K/CU MM (ref 140–440)
PMV BLD AUTO: 10.6 FL (ref 7.5–11.1)
POTASSIUM SERPL-SCNC: 3.7 MMOL/L (ref 3.5–5.1)
RBC # BLD: 3.29 M/CU MM (ref 4.2–5.4)
SEGMENTED NEUTROPHILS ABSOLUTE COUNT: 4.1 K/CU MM
SEGMENTED NEUTROPHILS RELATIVE PERCENT: 52.9 % (ref 36–66)
SODIUM BLD-SCNC: 133 MMOL/L (ref 135–145)
TOTAL IMMATURE NEUTOROPHIL: 0.03 K/CU MM
TOTAL NUCLEATED RBC: 0 K/CU MM
WBC # BLD: 7.7 K/CU MM (ref 4–10.5)

## 2017-08-22 ENCOUNTER — OFFICE VISIT (OUTPATIENT)
Dept: INTERNAL MEDICINE CLINIC | Age: 77
End: 2017-08-22

## 2017-08-22 ENCOUNTER — CARE COORDINATOR VISIT (OUTPATIENT)
Dept: CARE COORDINATION | Age: 77
End: 2017-08-22

## 2017-08-22 VITALS
WEIGHT: 128 LBS | BODY MASS INDEX: 20.66 KG/M2 | RESPIRATION RATE: 16 BRPM | HEART RATE: 80 BPM | DIASTOLIC BLOOD PRESSURE: 72 MMHG | SYSTOLIC BLOOD PRESSURE: 134 MMHG

## 2017-08-22 DIAGNOSIS — I50.32 CHRONIC DIASTOLIC CONGESTIVE HEART FAILURE (HCC): ICD-10-CM

## 2017-08-22 DIAGNOSIS — D50.0 BLOOD LOSS ANEMIA: ICD-10-CM

## 2017-08-22 DIAGNOSIS — F17.200 SMOKER: ICD-10-CM

## 2017-08-22 DIAGNOSIS — I82.4Z1 DVT, LOWER EXTREMITY, DISTAL, ACUTE, RIGHT (HCC): ICD-10-CM

## 2017-08-22 DIAGNOSIS — Z09 HOSPITAL DISCHARGE FOLLOW-UP: Primary | ICD-10-CM

## 2017-08-22 PROCEDURE — 99496 TRANSJ CARE MGMT HIGH F2F 7D: CPT | Performed by: INTERNAL MEDICINE

## 2017-08-22 RX ORDER — FUROSEMIDE 40 MG/1
40 TABLET ORAL 2 TIMES DAILY
Qty: 60 TABLET | Refills: 5 | Status: SHIPPED | OUTPATIENT
Start: 2017-08-22 | End: 2018-03-21 | Stop reason: SDUPTHER

## 2017-08-24 ENCOUNTER — HOSPITAL ENCOUNTER (OUTPATIENT)
Dept: GENERAL RADIOLOGY | Age: 77
Discharge: HOME | End: 2017-08-24
Attending: INTERNAL MEDICINE | Admitting: INTERNAL MEDICINE

## 2017-08-24 LAB
ANION GAP SERPL CALCULATED.3IONS-SCNC: 12 MMOL/L (ref 4–16)
BASOPHILS ABSOLUTE: 0.1 K/CU MM
BASOPHILS RELATIVE PERCENT: 0.9 % (ref 0–1)
BUN BLDV-MCNC: 17 MG/DL (ref 6–23)
CALCIUM SERPL-MCNC: 8.8 MG/DL (ref 8.3–10.6)
CHLORIDE BLD-SCNC: 95 MMOL/L (ref 99–110)
CO2: 28 MMOL/L (ref 21–32)
CREAT SERPL-MCNC: 0.8 MG/DL (ref 0.6–1.1)
DIFFERENTIAL TYPE: ABNORMAL
EOSINOPHILS ABSOLUTE: 0.2 K/CU MM
EOSINOPHILS RELATIVE PERCENT: 2.6 % (ref 0–3)
GFR AFRICAN AMERICAN: >60 ML/MIN/1.73M2
GFR NON-AFRICAN AMERICAN: >60 ML/MIN/1.73M2
GLUCOSE BLD-MCNC: 96 MG/DL (ref 70–140)
HCT VFR BLD CALC: 27.7 % (ref 37–47)
HEMOGLOBIN: 8.3 GM/DL (ref 12.5–16)
IMMATURE NEUTROPHIL %: 0.3 % (ref 0–0.43)
LYMPHOCYTES ABSOLUTE: 2.3 K/CU MM
LYMPHOCYTES RELATIVE PERCENT: 32.4 % (ref 24–44)
MCH RBC QN AUTO: 26.3 PG (ref 27–31)
MCHC RBC AUTO-ENTMCNC: 30 % (ref 32–36)
MCV RBC AUTO: 87.7 FL (ref 78–100)
MONOCYTES ABSOLUTE: 0.9 K/CU MM
MONOCYTES RELATIVE PERCENT: 13.2 % (ref 0–4)
NUCLEATED RBC %: 0 %
PDW BLD-RTO: 23.5 % (ref 11.7–14.9)
PLATELET # BLD: 395 K/CU MM (ref 140–440)
PMV BLD AUTO: 10 FL (ref 7.5–11.1)
POTASSIUM SERPL-SCNC: 4.2 MMOL/L (ref 3.5–5.1)
RBC # BLD: 3.16 M/CU MM (ref 4.2–5.4)
SEGMENTED NEUTROPHILS ABSOLUTE COUNT: 3.6 K/CU MM
SEGMENTED NEUTROPHILS RELATIVE PERCENT: 50.6 % (ref 36–66)
SODIUM BLD-SCNC: 135 MMOL/L (ref 135–145)
TOTAL IMMATURE NEUTOROPHIL: 0.02 K/CU MM
TOTAL NUCLEATED RBC: 0 K/CU MM
WBC # BLD: 7 K/CU MM (ref 4–10.5)

## 2017-08-28 ENCOUNTER — HOSPITAL ENCOUNTER (OUTPATIENT)
Dept: GENERAL RADIOLOGY | Age: 77
Discharge: HOME | End: 2017-08-28
Attending: INTERNAL MEDICINE | Admitting: INTERNAL MEDICINE

## 2017-08-28 LAB
ANION GAP SERPL CALCULATED.3IONS-SCNC: 14 MMOL/L (ref 4–16)
BUN BLDV-MCNC: 15 MG/DL (ref 6–23)
CALCIUM SERPL-MCNC: 8.9 MG/DL (ref 8.3–10.6)
CHLORIDE BLD-SCNC: 93 MMOL/L (ref 99–110)
CO2: 29 MMOL/L (ref 21–32)
CREAT SERPL-MCNC: 0.7 MG/DL (ref 0.6–1.1)
GFR AFRICAN AMERICAN: >60 ML/MIN/1.73M2
GFR NON-AFRICAN AMERICAN: >60 ML/MIN/1.73M2
GLUCOSE BLD-MCNC: 105 MG/DL (ref 70–140)
HCT VFR BLD CALC: 31.6 % (ref 37–47)
HEMOGLOBIN: 9.3 GM/DL (ref 12.5–16)
MCH RBC QN AUTO: 25.8 PG (ref 27–31)
MCHC RBC AUTO-ENTMCNC: 29.4 % (ref 32–36)
MCV RBC AUTO: 87.8 FL (ref 78–100)
PDW BLD-RTO: 22.7 % (ref 11.7–14.9)
PLATELET # BLD: 450 K/CU MM (ref 140–440)
PMV BLD AUTO: 10.8 FL (ref 7.5–11.1)
POTASSIUM SERPL-SCNC: 3.6 MMOL/L (ref 3.5–5.1)
RBC # BLD: 3.6 M/CU MM (ref 4.2–5.4)
SODIUM BLD-SCNC: 136 MMOL/L (ref 135–145)
WBC # BLD: 7.1 K/CU MM (ref 4–10.5)

## 2017-08-29 ENCOUNTER — HOSPITAL ENCOUNTER (OUTPATIENT)
Dept: OTHER | Age: 77
Discharge: HOME | End: 2017-08-29
Attending: INTERNAL MEDICINE | Admitting: INTERNAL MEDICINE

## 2017-08-29 ENCOUNTER — TELEPHONE (OUTPATIENT)
Dept: INTERNAL MEDICINE CLINIC | Age: 77
End: 2017-08-29

## 2017-08-29 LAB
ALBUMIN SERPL-MCNC: 3.6 GM/DL (ref 3.4–5)
ALP BLD-CCNC: 111 IU/L (ref 40–129)
ALT SERPL-CCNC: 25 U/L (ref 10–40)
ANION GAP SERPL CALCULATED.3IONS-SCNC: 12 MMOL/L (ref 4–16)
AST SERPL-CCNC: 45 IU/L (ref 15–37)
BILIRUB SERPL-MCNC: 0.2 MG/DL (ref 0–1)
BUN BLDV-MCNC: 17 MG/DL (ref 6–23)
CALCIUM SERPL-MCNC: 9.1 MG/DL (ref 8.3–10.6)
CHLORIDE BLD-SCNC: 95 MMOL/L (ref 99–110)
CO2: 28 MMOL/L (ref 21–32)
CREAT SERPL-MCNC: 0.7 MG/DL (ref 0.6–1.1)
FERRITIN: 38 NG/ML (ref 15–150)
GFR AFRICAN AMERICAN: >60 ML/MIN/1.73M2
GFR NON-AFRICAN AMERICAN: >60 ML/MIN/1.73M2
GLUCOSE BLD-MCNC: 89 MG/DL (ref 70–140)
IRON: 16 UG/DL (ref 37–145)
POTASSIUM SERPL-SCNC: 4.2 MMOL/L (ref 3.5–5.1)
SODIUM BLD-SCNC: 135 MMOL/L (ref 135–145)
TOTAL PROTEIN: 5.8 GM/DL (ref 6.4–8.2)

## 2017-08-30 ENCOUNTER — OFFICE VISIT (OUTPATIENT)
Dept: INTERNAL MEDICINE CLINIC | Age: 77
End: 2017-08-30

## 2017-08-30 VITALS
BODY MASS INDEX: 20.34 KG/M2 | RESPIRATION RATE: 16 BRPM | DIASTOLIC BLOOD PRESSURE: 58 MMHG | SYSTOLIC BLOOD PRESSURE: 132 MMHG | HEART RATE: 78 BPM | WEIGHT: 126 LBS

## 2017-08-30 DIAGNOSIS — F17.200 SMOKER: ICD-10-CM

## 2017-08-30 DIAGNOSIS — D50.0 IRON DEFICIENCY ANEMIA DUE TO CHRONIC BLOOD LOSS: Primary | ICD-10-CM

## 2017-08-30 DIAGNOSIS — J42 CHRONIC BRONCHITIS, UNSPECIFIED CHRONIC BRONCHITIS TYPE (HCC): ICD-10-CM

## 2017-08-30 DIAGNOSIS — M48.061 LUMBAR SPINAL STENOSIS: ICD-10-CM

## 2017-08-30 DIAGNOSIS — I51.81 TAKOTSUBO SYNDROME: ICD-10-CM

## 2017-08-30 DIAGNOSIS — F41.0 PANIC ATTACK: ICD-10-CM

## 2017-08-30 DIAGNOSIS — I82.441 ACUTE DVT OF RIGHT TIBIAL VEIN (HCC): ICD-10-CM

## 2017-08-30 LAB
IRON: 16 UG/DL (ref 37–145)
PCT TRANSFERRIN: 5 % (ref 10–44)
TOTAL IRON BINDING CAPACITY: 314 UG/DL (ref 250–450)
UNSATURATED IRON BINDING CAPACITY: 298 UG/DL (ref 110–370)

## 2017-08-30 PROCEDURE — 99214 OFFICE O/P EST MOD 30 MIN: CPT | Performed by: INTERNAL MEDICINE

## 2017-08-31 ENCOUNTER — HOSPITAL ENCOUNTER (OUTPATIENT)
Dept: GENERAL RADIOLOGY | Age: 77
Discharge: HOME | End: 2017-08-31
Attending: INTERNAL MEDICINE | Admitting: INTERNAL MEDICINE

## 2017-08-31 LAB
ANION GAP SERPL CALCULATED.3IONS-SCNC: 10 MMOL/L (ref 4–16)
BUN BLDV-MCNC: 16 MG/DL (ref 6–23)
CALCIUM SERPL-MCNC: 8.8 MG/DL (ref 8.3–10.6)
CHLORIDE BLD-SCNC: 92 MMOL/L (ref 99–110)
CO2: 31 MMOL/L (ref 21–32)
CREAT SERPL-MCNC: 0.7 MG/DL (ref 0.6–1.1)
GFR AFRICAN AMERICAN: >60 ML/MIN/1.73M2
GFR NON-AFRICAN AMERICAN: >60 ML/MIN/1.73M2
GLUCOSE BLD-MCNC: 95 MG/DL (ref 70–140)
HCT VFR BLD CALC: 28.4 % (ref 37–47)
HEMOGLOBIN: 8.4 GM/DL (ref 12.5–16)
MCH RBC QN AUTO: 26.1 PG (ref 27–31)
MCHC RBC AUTO-ENTMCNC: 29.6 % (ref 32–36)
MCV RBC AUTO: 88.2 FL (ref 78–100)
PDW BLD-RTO: 22.1 % (ref 11.7–14.9)
PLATELET # BLD: 411 K/CU MM (ref 140–440)
PMV BLD AUTO: 10.6 FL (ref 7.5–11.1)
POTASSIUM SERPL-SCNC: 3.7 MMOL/L (ref 3.5–5.1)
RBC # BLD: 3.22 M/CU MM (ref 4.2–5.4)
SODIUM BLD-SCNC: 133 MMOL/L (ref 135–145)
WBC # BLD: 8.3 K/CU MM (ref 4–10.5)

## 2017-09-05 ENCOUNTER — HOSPITAL ENCOUNTER (OUTPATIENT)
Dept: GENERAL RADIOLOGY | Age: 77
Discharge: HOME | End: 2017-09-05
Attending: INTERNAL MEDICINE | Admitting: INTERNAL MEDICINE

## 2017-09-05 ENCOUNTER — CARE COORDINATION (OUTPATIENT)
Dept: CASE MANAGEMENT | Age: 77
End: 2017-09-05

## 2017-09-05 LAB
HCT VFR BLD CALC: 31.2 % (ref 37–47)
HEMOGLOBIN: 9.3 GM/DL (ref 12.5–16)
MCH RBC QN AUTO: 26.6 PG (ref 27–31)
MCHC RBC AUTO-ENTMCNC: 29.8 % (ref 32–36)
MCV RBC AUTO: 89.1 FL (ref 78–100)
PDW BLD-RTO: 21.2 % (ref 11.7–14.9)
PLATELET # BLD: 437 K/CU MM (ref 140–440)
PMV BLD AUTO: 10.9 FL (ref 7.5–11.1)
RBC # BLD: 3.5 M/CU MM (ref 4.2–5.4)
WBC # BLD: 7.5 K/CU MM (ref 4–10.5)

## 2017-09-08 ENCOUNTER — HOSPITAL ENCOUNTER (OUTPATIENT)
Dept: PULMONOLOGY | Age: 77
Discharge: HOME | End: 2017-09-08
Attending: INTERNAL MEDICINE | Admitting: INTERNAL MEDICINE

## 2017-09-08 DIAGNOSIS — R06.02 SHORTNESS OF BREATH: ICD-10-CM

## 2017-09-11 ENCOUNTER — OFFICE VISIT (OUTPATIENT)
Dept: INTERNAL MEDICINE CLINIC | Age: 77
End: 2017-09-11

## 2017-09-11 VITALS
DIASTOLIC BLOOD PRESSURE: 70 MMHG | RESPIRATION RATE: 16 BRPM | SYSTOLIC BLOOD PRESSURE: 112 MMHG | HEART RATE: 80 BPM | WEIGHT: 125 LBS | BODY MASS INDEX: 20.18 KG/M2

## 2017-09-11 DIAGNOSIS — F33.41 RECURRENT MAJOR DEPRESSIVE DISORDER, IN PARTIAL REMISSION (HCC): ICD-10-CM

## 2017-09-11 DIAGNOSIS — D50.9 IRON DEFICIENCY ANEMIA, UNSPECIFIED IRON DEFICIENCY ANEMIA TYPE: ICD-10-CM

## 2017-09-11 DIAGNOSIS — F17.200 SMOKER: ICD-10-CM

## 2017-09-11 DIAGNOSIS — I82.441 ACUTE DVT OF RIGHT TIBIAL VEIN (HCC): Primary | ICD-10-CM

## 2017-09-11 DIAGNOSIS — R60.0 BILATERAL EDEMA OF LOWER EXTREMITY: ICD-10-CM

## 2017-09-11 PROCEDURE — 99214 OFFICE O/P EST MOD 30 MIN: CPT | Performed by: INTERNAL MEDICINE

## 2017-09-11 RX ORDER — ATORVASTATIN CALCIUM 10 MG/1
10 TABLET, FILM COATED ORAL EVERY EVENING
Qty: 90 TABLET | Refills: 1 | Status: SHIPPED | OUTPATIENT
Start: 2017-09-11 | End: 2018-04-09 | Stop reason: SDUPTHER

## 2017-09-14 ENCOUNTER — HOSPITAL ENCOUNTER (OUTPATIENT)
Dept: GENERAL RADIOLOGY | Age: 77
Discharge: HOME | End: 2017-09-14
Attending: INTERNAL MEDICINE | Admitting: INTERNAL MEDICINE

## 2017-09-14 LAB
ALBUMIN SERPL-MCNC: 3.5 GM/DL (ref 3.4–5)
ANION GAP SERPL CALCULATED.3IONS-SCNC: 11 MMOL/L (ref 4–16)
BACTERIA: NEGATIVE /HPF
BILIRUBIN URINE: NEGATIVE MG/DL
BLOOD, URINE: NEGATIVE
BUN BLDV-MCNC: 12 MG/DL (ref 6–23)
CALCIUM SERPL-MCNC: 9 MG/DL (ref 8.3–10.6)
CHLORIDE BLD-SCNC: 95 MMOL/L (ref 99–110)
CLARITY: CLEAR
CO2: 30 MMOL/L (ref 21–32)
COLOR: COLORLESS
CREAT SERPL-MCNC: 0.8 MG/DL (ref 0.6–1.1)
CREATININE URINE: 11.8 MG/DL (ref 28–217)
GFR AFRICAN AMERICAN: >60 ML/MIN/1.73M2
GFR NON-AFRICAN AMERICAN: >60 ML/MIN/1.73M2
GLUCOSE BLD-MCNC: 88 MG/DL (ref 70–140)
GLUCOSE, URINE: NEGATIVE MG/DL
KETONES, URINE: NEGATIVE MG/DL
LEUKOCYTE ESTERASE, URINE: NEGATIVE
MUCUS: ABNORMAL HPF
NITRITE URINE, QUANTITATIVE: NEGATIVE
PH, URINE: 8 (ref 5–8)
PHOSPHORUS: 3.5 MG/DL (ref 2.5–4.9)
POTASSIUM SERPL-SCNC: 4.1 MMOL/L (ref 3.5–5.1)
PROT/CREAT RATIO, UR: 0.3
PROTEIN UA: NEGATIVE MG/DL
RBC URINE: ABNORMAL /HPF (ref 0–6)
SODIUM BLD-SCNC: 136 MMOL/L (ref 135–145)
SPECIFIC GRAVITY UA: 1 (ref 1–1.03)
TRICHOMONAS: ABNORMAL /HPF
URINE TOTAL PROTEIN: 4 MG/DL
UROBILINOGEN, URINE: NORMAL MG/DL (ref 0.2–1)
WBC UA: <1 /HPF (ref 0–5)

## 2017-09-19 ENCOUNTER — CARE COORDINATOR VISIT (OUTPATIENT)
Dept: CARE COORDINATION | Age: 77
End: 2017-09-19

## 2017-09-19 ENCOUNTER — OFFICE VISIT (OUTPATIENT)
Dept: INTERNAL MEDICINE CLINIC | Age: 77
End: 2017-09-19

## 2017-09-19 VITALS — SYSTOLIC BLOOD PRESSURE: 140 MMHG | DIASTOLIC BLOOD PRESSURE: 82 MMHG | RESPIRATION RATE: 16 BRPM | HEART RATE: 79 BPM

## 2017-09-19 DIAGNOSIS — A09 DIARRHEA OF INFECTIOUS ORIGIN: ICD-10-CM

## 2017-09-19 DIAGNOSIS — R10.13 EPIGASTRIC PAIN: Primary | ICD-10-CM

## 2017-09-19 DIAGNOSIS — D50.0 IRON DEFICIENCY ANEMIA DUE TO CHRONIC BLOOD LOSS: ICD-10-CM

## 2017-09-19 DIAGNOSIS — F17.200 SMOKER: ICD-10-CM

## 2017-09-19 PROCEDURE — 99214 OFFICE O/P EST MOD 30 MIN: CPT | Performed by: INTERNAL MEDICINE

## 2017-09-19 RX ORDER — RANITIDINE 150 MG/1
150 TABLET ORAL NIGHTLY
COMMUNITY
End: 2017-10-05

## 2017-09-19 RX ORDER — SUCRALFATE ORAL 1 G/10ML
1 SUSPENSION ORAL 4 TIMES DAILY
Qty: 1200 ML | Refills: 3 | Status: SHIPPED | OUTPATIENT
Start: 2017-09-19 | End: 2017-10-06

## 2017-09-20 ENCOUNTER — HOSPITAL ENCOUNTER (OUTPATIENT)
Dept: GENERAL RADIOLOGY | Age: 77
Discharge: HOME | End: 2017-09-20
Attending: INTERNAL MEDICINE | Admitting: INTERNAL MEDICINE

## 2017-09-20 LAB
BASOPHILS ABSOLUTE: 0.1 K/CU MM
BASOPHILS RELATIVE PERCENT: 1 % (ref 0–1)
CLOSTRIDIUM DIFFICILE, PCR: NORMAL
DIFFERENTIAL TYPE: ABNORMAL
EOSINOPHILS ABSOLUTE: 0.3 K/CU MM
EOSINOPHILS RELATIVE PERCENT: 3.7 % (ref 0–3)
HCT VFR BLD CALC: 39.7 % (ref 37–47)
HEMOGLOBIN: 11.8 GM/DL (ref 12.5–16)
IMMATURE NEUTROPHIL %: 0.1 % (ref 0–0.43)
LYMPHOCYTES ABSOLUTE: 3 K/CU MM
LYMPHOCYTES RELATIVE PERCENT: 44 % (ref 24–44)
MCH RBC QN AUTO: 26.6 PG (ref 27–31)
MCHC RBC AUTO-ENTMCNC: 29.7 % (ref 32–36)
MCV RBC AUTO: 89.6 FL (ref 78–100)
MONOCYTES ABSOLUTE: 1.2 K/CU MM
MONOCYTES RELATIVE PERCENT: 18.4 % (ref 0–4)
NUCLEATED RBC %: 0 %
PDW BLD-RTO: 18 % (ref 11.7–14.9)
PLATELET # BLD: 386 K/CU MM (ref 140–440)
PMV BLD AUTO: 11.8 FL (ref 7.5–11.1)
RBC # BLD: 4.43 M/CU MM (ref 4.2–5.4)
SEGMENTED NEUTROPHILS ABSOLUTE COUNT: 2.2 K/CU MM
SEGMENTED NEUTROPHILS RELATIVE PERCENT: 32.8 % (ref 36–66)
TOTAL IMMATURE NEUTOROPHIL: 0.01 K/CU MM
TOTAL NUCLEATED RBC: 0 K/CU MM
WBC # BLD: 6.7 K/CU MM (ref 4–10.5)

## 2017-09-21 ENCOUNTER — TELEPHONE (OUTPATIENT)
Dept: INTERNAL MEDICINE CLINIC | Age: 77
End: 2017-09-21

## 2017-09-21 PROBLEM — A04.72 C. DIFFICILE COLITIS: Status: ACTIVE | Noted: 2017-09-21

## 2017-09-21 PROBLEM — E87.1 HYPONATREMIA: Status: ACTIVE | Noted: 2017-09-21

## 2017-09-21 RX ORDER — METRONIDAZOLE 500 MG/1
500 TABLET ORAL 3 TIMES DAILY
Qty: 30 TABLET | Refills: 0 | Status: ON HOLD | OUTPATIENT
Start: 2017-09-21 | End: 2017-10-01 | Stop reason: HOSPADM

## 2017-09-22 ENCOUNTER — CARE COORDINATOR VISIT (OUTPATIENT)
Dept: CARE COORDINATION | Age: 77
End: 2017-09-22

## 2017-09-22 ENCOUNTER — OFFICE VISIT (OUTPATIENT)
Dept: INTERNAL MEDICINE CLINIC | Age: 77
End: 2017-09-22

## 2017-09-22 VITALS
WEIGHT: 124 LBS | SYSTOLIC BLOOD PRESSURE: 152 MMHG | BODY MASS INDEX: 20.01 KG/M2 | RESPIRATION RATE: 16 BRPM | HEART RATE: 72 BPM | DIASTOLIC BLOOD PRESSURE: 79 MMHG

## 2017-09-22 DIAGNOSIS — A04.72 CLOSTRIDIUM DIFFICILE COLITIS: Primary | ICD-10-CM

## 2017-09-22 DIAGNOSIS — I82.441 ACUTE DVT OF RIGHT TIBIAL VEIN (HCC): ICD-10-CM

## 2017-09-22 DIAGNOSIS — D50.0 BLOOD LOSS ANEMIA: ICD-10-CM

## 2017-09-22 PROCEDURE — 99214 OFFICE O/P EST MOD 30 MIN: CPT | Performed by: INTERNAL MEDICINE

## 2017-09-22 RX ORDER — UREA 10 %
LOTION (ML) TOPICAL
Qty: 90 TABLET | Refills: 1 | Status: ON HOLD | OUTPATIENT
Start: 2017-09-22 | End: 2017-10-01 | Stop reason: HOSPADM

## 2017-09-23 LAB
CULTURE: NORMAL
REPORT STATUS: NORMAL
REQUEST PROBLEM: NORMAL
SPECIMEN: NORMAL

## 2017-09-28 PROBLEM — J44.1 COPD EXACERBATION (HCC): Status: ACTIVE | Noted: 2017-09-28

## 2017-09-28 PROBLEM — J96.01 ACUTE RESPIRATORY FAILURE WITH HYPOXIA AND HYPERCAPNIA (HCC): Status: ACTIVE | Noted: 2017-09-28

## 2017-09-28 PROBLEM — J96.02 ACUTE RESPIRATORY FAILURE WITH HYPOXIA AND HYPERCAPNIA (HCC): Status: ACTIVE | Noted: 2017-09-28

## 2017-10-02 ENCOUNTER — TELEPHONE (OUTPATIENT)
Dept: PHARMACY | Facility: CLINIC | Age: 77
End: 2017-10-02

## 2017-10-02 ENCOUNTER — CARE COORDINATION (OUTPATIENT)
Dept: CASE MANAGEMENT | Age: 77
End: 2017-10-02

## 2017-10-02 NOTE — CARE COORDINATION
[facility] Transitions Initial Follow Up Call    Call within 2 business days of discharge: Yes    Patient: Te Abdullahi Patient : 1940   MRN: [id number]  Reason for Admission:   Discharge Date: 10/1/17 RARS: [facility] Risk Score: 24.75     Spoke with: Deedee Cruz. States she is feeling much better today, was a little weak this morning but after being up awhile feels stronger. States she is using her walker in the house for safety. Complete med rec done, no discrepancies. States she is just getting over c-diff and wonders if she should keep taking the lactobaccillus. Encouraged to discuss with Dr. Jayden Farooq. Reviewed that she has an appt with Dr. Jayden Farooq on 10/23 but really needs to be seen sooner. Offered to make appt but states she needs to find out when her  can take her. States she will call today to get scheduled sooner. Has my contact information for future needs. CT will continue to follow. Facility: DEBBI Wheeler, RN  Care Transition Coordinator  [phone]  Dandy Davis, RN

## 2017-10-03 NOTE — TELEPHONE ENCOUNTER
CLINICAL PHARMACY NOTE  Post-Discharge Transitions of Care (PADMINI)    Non-face-to-face services provided:  Assessment and support for treatment adherence and medication management-medication reconciliation    - There are no outstanding medication issues at this time. Subjective/Objective:  Cassie Mendoza is a 68 y.o. female. Patient was discharged from [facility] on 10/1/17 with a diagnosis of COPD exacerbation. Patient outreach to review discharge medications and provide medication review and management. Spoke with patient    Allergies   Allergen Reactions    Ativan [Lorazepam] Other (See Comments)     Violent, thrashing and combative. She has lacerations and bruising, had to be tied down.  Etanercept Other (See Comments)     No response    Humira [Adalimumab]     Prochlorperazine Edisylate Other (See Comments)     \"Compazine\"   Involuntary Muscle Spasms     Remicade [Infliximab Injection]     Doxycycline Other (See Comments)     Stomach pains       Discharge Medications (as per discharging medication list found): There are NEW medications for you.  START taking them after you leave the hospital   ALPRAZolam (XANAX) 0.25 MG tablet Take 1 tablet by mouth every 6 hours as needed for Anxiety    fluticasone (FLONASE) 50 MCG/ACT nasal spray 1 spray by Nasal route daily    predniSONE (DELTASONE) 20 MG tablet Take 2 tablets by mouth daily for 4 days     These are medications you told us you were taking at home, CONTINUE taking them after you leave the hospital   Medication Sig    ranitidine (ZANTAC) 150 MG tablet Take 150 mg by mouth nightly    sucralfate (CARAFATE) 1 GM/10ML suspension Take 10 mLs by mouth 4 times daily    atorvastatin (LIPITOR) 10 MG tablet Take 1 tablet by mouth every evening    furosemide (LASIX) 40 MG tablet Take 1 tablet by mouth 2 times daily    potassium chloride (KLOR-CON M) 10 MEQ extended release tablet Take 10 mEq by mouth 2 times daily    esomeprazole (NEXIUM) 40 MG delayed release capsule Take 1 capsule by mouth daily    losartan (COZAAR) 50 MG tablet Take 1 tablet by mouth daily    ferrous sulfate 325 (65 Fe) MG tablet Take 325 mg by mouth daily (with breakfast)     metoprolol tartrate (LOPRESSOR) 50 MG tablet Take 1 tablet by mouth 2 times daily    oxyCODONE-acetaminophen (PERCOCET) 7.5-325 MG per tablet Take 1 tablet by mouth every 6 hours as needed for Pain .  busPIRone (BUSPAR) 10 MG tablet Take 1 tablet by mouth 3 times daily    ARIPiprazole (ABILIFY) 2 MG tablet Take 1 tablet by mouth daily    mometasone-formoterol (DULERA) 200-5 MCG/ACT inhaler  · Reviewed inhaler technique with patient. Inhale 2 puffs into the lungs every 12 hours    Umeclidinium Bromide (INCRUSE ELLIPTA) 62.5 MCG/INH AEPB Inhale 1 applicator into the lungs daily    ipratropium (ATROVENT) 0.06 % nasal spray INHALE 2 SPRAYS INTO EACH NOSTRIL THREE TIMES A DAY    PROAIR  (90 BASE) MCG/ACT inhaler INHALE 2 PUFFS BY ORAL INHALATION EVERY 4 TO 6 HOURS AS NEEDED    vitamin B-12 (CYANOCOBALAMIN) 1000 MCG tablet Take 1,000 mcg by mouth daily.  DULoxetine (CYMBALTA) 60 MG capsule Take 60 mg by mouth daily.  Calcium Citrate-Vitamin D (CITRACAL + D PO) Take 600 mg by mouth 2 times daily. These are the medications you have told us you were taking at home, STOP taking them after you leave the hospital   · Lactobacillus and metronidazole - patient has stopped these medications. Questioned resuming lactobacillus, but wishes to speak with PCP about restarting. Meds marked as reviewed. Meds to Beds:No    Estimated Creatinine Clearance: 64 mL/min (based on Cr of 0.7). Assessment/Plan:  - Medication reconciliation completed. Number of medications reviewed: 22    - Pt is taking medications as directed by discharging physician.    Number of discrepancies: 0.     - [facility] active medication list updated:  · Medications Added (0)  · Medications Removed (0)  · Medications Changed (0)    - Identified Potential Medication Interactions: No clinically significant interactions identified via [employer] Interaction Analysis as category D or higher.    - Renal Dosing: No renal adjustments necessary.    - Follow up appointment date (7 days for more severe illness, 14 days for others):    · Patient reminded of upcoming appointment with PCP on 10/6/17.      Thank you,    Katie Maria, PharmD  [address]  Phone: [phone]      CLINICAL PHARMACY NOTE   POST-DISCHARGE [address] [facility] Patient?: Yes  Total # of Interventions Recommended: 0  Total # Interventions Accepted: 0  Intervention Severity:   - Level 1 Intervention Present?: No   - Level 2 #: 0   - Level 3 #: 0  Outreach Status: Review Complete  Care Coordinator Outreach to Patient?: Yes  Provider Contacted?: No  Time Spent (min): 20    Additional Documentation:

## 2017-10-04 NOTE — ANESTHESIA PRE-OP
Department of Anesthesiology  Preprocedure Note       Name:  Fan Real   Age:  68 y.o.  :  1940                                          MRN:  [id number]         Date:  10/4/2017      Surgeon:    Procedure:    Medications prior to admission:   Prior to Admission medications    Medication Sig Start Date End Date Taking? Authorizing Provider   ALPRAZolam Alina Beckman) 0.25 MG tablet Take 1 tablet by mouth every 6 hours as needed for Anxiety 10/1/17 10/11/17 Yes Layton Lackey MD   fluticasone (FLONASE) 50 MCG/ACT nasal spray 1 spray by Nasal route daily 10/1/17  Yes Layton Lackey MD   predniSONE (DELTASONE) 20 MG tablet Take 2 tablets by mouth daily for 4 days 10/1/17 10/5/17 Yes Layton Lackey MD   sucralfate (CARAFATE) 1 GM/10ML suspension Take 10 mLs by mouth 4 times daily 17  Yes Kyara Escobedo MD   atorvastatin (LIPITOR) 10 MG tablet Take 1 tablet by mouth every evening 17  Yes Kyara Escobedo MD   furosemide (LASIX) 40 MG tablet Take 1 tablet by mouth 2 times daily 17  Yes Kyara Escobedo MD   potassium chloride (KLOR-CON M) 10 MEQ extended release tablet Take 10 mEq by mouth 2 times daily   Yes Historical Provider, MD   esomeprazole (NEXIUM) 40 MG delayed release capsule Take 1 capsule by mouth daily  Patient taking differently: Take 40 mg by mouth 2 times daily  17  Yes Kyara Escobedo MD   losartan (COZAAR) 50 MG tablet Take 1 tablet by mouth daily 17  Yes Kyara Escobedo MD   ferrous sulfate 325 (65 Fe) MG tablet Take 325 mg by mouth daily (with breakfast)    Yes Historical Provider, MD   metoprolol tartrate (LOPRESSOR) 50 MG tablet Take 1 tablet by mouth 2 times daily 17  Yes Kayra Escobedo MD   oxyCODONE-acetaminophen (PERCOCET) 7.5-325 MG per tablet Take 1 tablet by mouth every 6 hours as needed for Pain .  17  Yes Yasmeen Dave MD   busPIRone (BUSPAR) 10 MG tablet Take 1 tablet by mouth 3 times daily 17  Yes Reji Michaud MD   ARIPiprazole (ABILIFY) 2 MG 50 MG tablet Take 1 tablet by mouth daily 30 tablet 3    ferrous sulfate 325 (65 Fe) MG tablet Take 325 mg by mouth daily (with breakfast)       metoprolol tartrate (LOPRESSOR) 50 MG tablet Take 1 tablet by mouth 2 times daily 120 tablet 1    oxyCODONE-acetaminophen (PERCOCET) 7.5-325 MG per tablet Take 1 tablet by mouth every 6 hours as needed for Pain . 30 tablet 0    busPIRone (BUSPAR) 10 MG tablet Take 1 tablet by mouth 3 times daily 90 tablet 3    ARIPiprazole (ABILIFY) 2 MG tablet Take 1 tablet by mouth daily 30 tablet 3    mometasone-formoterol (DULERA) 200-5 MCG/ACT inhaler Inhale 2 puffs into the lungs every 12 hours 1 Inhaler 12    Umeclidinium Bromide (INCRUSE ELLIPTA) 62.5 MCG/INH AEPB Inhale 1 applicator into the lungs daily 1 each 11    ipratropium (ATROVENT) 0.06 % nasal spray INHALE 2 SPRAYS INTO EACH NOSTRIL THREE TIMES A DAY 15 mL 2    PROAIR  (90 BASE) MCG/ACT inhaler INHALE 2 PUFFS BY ORAL INHALATION EVERY 4 TO 6 HOURS AS NEEDED 8.5 g 10    vitamin B-12 (CYANOCOBALAMIN) 1000 MCG tablet Take 1,000 mcg by mouth daily.  DULoxetine (CYMBALTA) 60 MG capsule Take 60 mg by mouth daily.  Calcium Citrate-Vitamin D (CITRACAL + D PO) Take 600 mg by mouth 2 times daily.  ranitidine (ZANTAC) 150 MG tablet Take 150 mg by mouth nightly       No current facility-administered medications for this encounter. Allergies: Allergies   Allergen Reactions    Ativan [Lorazepam] Other (See Comments)     Violent, thrashing and combative. She has lacerations and bruising, had to be tied down.      Etanercept Other (See Comments)     No response    Humira [Adalimumab]     Prochlorperazine Edisylate Other (See Comments)     \"Compazine\"   Involuntary Muscle Spasms     Remicade [Infliximab Injection]     Doxycycline Other (See Comments)     Stomach pains       Problem List:    Patient Active Problem List   Diagnosis Code    Rheumatoid Arthritis M19.90    Hypertension I10    Hyperlipidemia E78.5    Fibromyalgia M79.7    Migraine G43.909    Chronic pain syndrome G89.4    Breast cancer screening Z12.39    Colon cancer screening Z12.11    Depression F32.9    COPD (chronic obstructive pulmonary disease) ([facility]) J44.9    Osteoporosis M81.0    Vitamin B12 deficiency E53.8    Lumbar spinal stenosis M48.06    Panic attack F41.0    Takotsubo syndrome I51.81    Smoker F17.200    Claudication ([facility]) I73.9    Rheumatoid arthritis involving multiple joints ([facility]) M06.9    Acute DVT of right tibial vein ([facility]) I82.441    Blood loss anemia D50.0    C. difficile colitis A04.72    Hyponatremia E87.1    Clostridium difficile colitis A04.72    COPD exacerbation ([facility]) J44.1    Acute respiratory failure with hypoxia and hypercapnia ([facility]) J96.01, J96.02       Past Medical History:        Diagnosis Date    Acute DVT of right tibial vein ([address] 07/2017    ER imaging: distal right tibial vein DVT; no anticoag for bleeding/ anemia    Acute exacerbation of chronic obstructive pulmonary disease (COPD) ([facility]) 3/13/2017    Chronic pain syndrome     Low back pain; lumbar disc disease    Clostridium difficile colitis 09/20/2017    COPD (chronic obstructive pulmonary disease) ([address] 8/2011    Moderate with mild decrease in Diffusing Capacity    Depression     Fibromyalgia     Former smoker     Herniated cervical disc 5-1998    Herpes zoster ophthalmicus 3/2012    Hyperlipidemia     Hypertension     L3 vertebral fracture ([address] 2010    vertebroplasty    Lumbar spinal stenosis 2015    moderately severe by MRI    Migraine     Osteoporosis 2010    Fragility Fx L3    Rheumatoid arthritis ([address] 1976    Dr Connor Griffin    S/P cardiac catheterization 05/2017    patent coronaries;  Takotsubo; humberto Bonilla    Smoker 03/13/2017    Takotsubo syndrome 05/2017    TMJ dysfunction     Vitamin B12 deficiency 12/2014    B12 level 199       Past Surgical History:        Procedure Laterality Date    neuromuscular disease:, headaches:, psychiatric history:   GI/Hepatic/Renal:   (+) bowel prep        Endo/Other:          Abdominal:                    Anesthesia Plan    ASA 3   (Chart review only)            Edil Gambino CRNA   10/4/2017

## 2017-10-05 ENCOUNTER — TELEPHONE (OUTPATIENT)
Dept: INTERNAL MEDICINE CLINIC | Age: 77
End: 2017-10-05

## 2017-10-05 ENCOUNTER — HOSPITAL ENCOUNTER (OUTPATIENT)
Dept: SURGERY | Age: 77
Discharge: HOME | End: 2017-10-05
Attending: INTERNAL MEDICINE | Admitting: INTERNAL MEDICINE

## 2017-10-05 VITALS
RESPIRATION RATE: 16 BRPM | WEIGHT: 135 LBS | HEIGHT: 66 IN | HEART RATE: 73 BPM | OXYGEN SATURATION: 98 % | DIASTOLIC BLOOD PRESSURE: 82 MMHG | TEMPERATURE: 98.1 F | SYSTOLIC BLOOD PRESSURE: 144 MMHG | BODY MASS INDEX: 21.69 KG/M2

## 2017-10-05 RX ORDER — IPRATROPIUM BROMIDE AND ALBUTEROL SULFATE 2.5; .5 MG/3ML; MG/3ML
1 SOLUTION RESPIRATORY (INHALATION)
Status: CANCELLED | OUTPATIENT
Start: 2017-10-05

## 2017-10-05 RX ORDER — SODIUM CHLORIDE, SODIUM LACTATE, POTASSIUM CHLORIDE, CALCIUM CHLORIDE 600; 310; 30; 20 MG/100ML; MG/100ML; MG/100ML; MG/100ML
INJECTION, SOLUTION INTRAVENOUS CONTINUOUS
Status: DISCONTINUED | OUTPATIENT
Start: 2017-10-05 | End: 2017-10-06 | Stop reason: HOSPADM

## 2017-10-05 RX ADMIN — SODIUM CHLORIDE, SODIUM LACTATE, POTASSIUM CHLORIDE, CALCIUM CHLORIDE: 600; 310; 30; 20 INJECTION, SOLUTION INTRAVENOUS at 08:47

## 2017-10-05 ASSESSMENT — PAIN SCALES - GENERAL
PAINLEVEL_OUTOF10: 0
PAINLEVEL_OUTOF10: 0

## 2017-10-05 ASSESSMENT — PAIN - FUNCTIONAL ASSESSMENT: PAIN_FUNCTIONAL_ASSESSMENT: 0-10

## 2017-10-05 NOTE — IP AVS SNAPSHOT
After Visit Summary  (Discharge Instructions)    Medication List for Home    Based on the information you provided to us as well as any changes during this visit, the following is your updated medication list.  Compare this with your prescription bottles at home. If you have any questions or concerns, contact your primary care physician's office. Daily Medication List (This medication list can be shared with any healthcare provider who is helping you manage your medications)      ASK your doctor about these medications if you have questions        Last Dose    Next Dose Due AM NOON PM NIGHT    ALPRAZolam 0.25 MG tablet   Commonly known as:  XANAX   Take 1 tablet by mouth every 6 hours as needed for Anxiety                  As needed                       ARIPiprazole 2 MG tablet   Commonly known as:  ABILIFY   Take 1 tablet by mouth daily                         10/5                   atorvastatin 10 MG tablet   Commonly known as:  LIPITOR   Take 1 tablet by mouth every evening                                 10/5           busPIRone 10 MG tablet   Commonly known as:  BUSPAR   Take 1 tablet by mouth 3 times daily                         10/5                   CITRACAL + D PO   Take 600 mg by mouth 2 times daily. 10/5                   DULoxetine 60 MG extended release capsule   Commonly known as:  CYMBALTA   Take 60 mg by mouth daily.                          10/5                   esomeprazole 40 MG delayed release capsule   Commonly known as:  NEXIUM   Take 1 capsule by mouth daily                         10/5                   ferrous sulfate 325 (65 Fe) MG tablet   Take 325 mg by mouth daily (with breakfast)                         10/5                   fluticasone 50 MCG/ACT nasal spray   Commonly known as:  FLONASE   1 spray by Nasal route daily                         10/6                   furosemide 40 MG tablet   Commonly known as:  LASIX Violent, thrashing and combative. She has lacerations and bruising, had to be tied down. Etanercept Other (See Comments)    No response    Humira [Adalimumab]     Prochlorperazine Edisylate Other (See Comments)    \"Compazine\"   Involuntary Muscle Spasms     Remicade [Infliximab Injection]     Doxycycline Other (See Comments)    Stomach pains      Immunizations as of 10/5/2017     Name Date Dose VIS Date Route    Influenza Virus Vaccine 10/12/2016 -- -- --    Influenza Virus Vaccine 10/14/2015 -- -- --    Influenza Virus Vaccine 10/15/2014 -- -- --    Pneumococcal 13-valent Conjugate (Eewjamj66) 2014 -- -- --    Pneumococcal Polysaccharide (Cyendsese49) 2008 -- -- --    Td 2016 0.5 mL 2015 Intramuscular      Last Vitals          Most Recent Value    Temp  97.4 °F (36.3 °C)    Pulse  74    Resp  16    BP  (!)  146/76         After Visit Summary    This summary was created for you. Thank you for entrusting your care to us. The following information includes details about your hospital/visit stay along with steps you should take to help with your recovery once you leave the hospital.  In this packet, you will find information about the topics listed below:    · Instructions about your medications including a list of your home medications  · A summary of your hospital visit  · Follow-up appointments once you have left the hospital  · Your care plan at home      You may receive a survey regarding the care you received during your stay. Your input is valuable to us. We encourage you to complete and return your survey in the envelope provided. We hope you will choose us in the future for your healthcare needs.           Patient Information     Patient Name MANJEET Ashton  1940      Care Provided at:     Name Address Phone       [address] [facility] [address] [facility] [address] [facility] [phone]            Your Visit · Do not sign legal documents or make major decisions for 24 hours. The anesthesia can make it hard for you to fully understand what you are agreeing to. · HANDOUTS GIVEN________________________________________________    Follow-up care is a key part of your treatment and safety. Be sure to make and go to all appointments, and call your doctor if you are having problems. It's also a good idea to know your test results and keep a list of the medicines you take. When should you call for help? [address] [facility] [address] [phone]  Call 911 anytime you think you may need emergency care. For example, call if:  · You passed out (lost consciousness). · You cough up blood. · You vomit blood or what looks like coffee grounds. · You pass maroon or very bloody stools. Call your doctor now or seek immediate medical care if:  · You have trouble swallowing. · You have belly pain. · Your stools are black and tarlike or have streaks of blood. · You are sick to your stomach or cannot keep fluids down. Watch closely for changes in your health, and be sure to contact your doctor if:  · Your throat still hurts after a day or two. · You do not get better as expected. Where can you learn more? Go to [email or web address] and sign in to your [employer] account. Enter H152 in the [facility] box to learn more about Upper GI Endoscopy: What to Expect at Home.     If you do not have an account, please click on the Sign Up Now link. © 3770-0647 Healthwise, Incorporated. Care instructions adapted under license by [facility] ([facility]). This care instruction is for use with your licensed healthcare professional. If you have questions about a medical condition or this instruction, always ask your healthcare professional. [address] any warranty or liability for your use of this information. Content Version: 24.2.879077; Current as of: November 20, 2015              Important information for a smoker       SMOKING: QUIT SMOKING. THIS IS THE MOST IMPORTANT ACTION YOU CAN TAKE TO IMPROVE YOUR CURRENT AND FUTURE HEALTH. Call the [address] [place] [address] at [facility] ([phone])    Smoking harms nonsmokers. When nonsmokers are around people who smoke, they absorb nicotine, carbon monoxide, and other ingredients of tobacco smoke. DO NOT SMOKE AROUND CHILDREN     Children exposed to secondhand smoke are at an increased risk of:  Sudden Infant Death Syndrome (SIDS), acute respiratory infections, inflammation of the middle ear, and severe asthma. Over a longer time, it causes heart disease and lung cancer. There is no safe level of exposure to secondhand smoke. [employer] Signup     Our records indicate that you have an active [employer] account. You can view your After Visit Summary by going to [email or web address] and logging in with your [employer] username and password. If you don't have a [employer] username and password but a parent or guardian has access to your record, the parent or guardian should login with their own [employer] username and password and access your record to view the After Visit Summary. Additional Information  If you have questions, please contact the physician practice where you receive care. Remember, [employer] is NOT to be used for urgent needs. For medical emergencies, dial 911. For questions regarding your [employer] account call [phone]. If you have a clinical question, please call your doctor's office. View your information online  ? Review your current list of  medications, immunization, and allergies. ? Review your future test results online . ?  Review your discharge instructions provided by your caregivers at discharge    Certain functionality such as prescription refills, scheduling appointments or sending messages to your provider are not activated if your provider does not use [facility] in his/her office    For questions regarding your MyChart account call [phone]. If you have a clinical question, please call your doctor's office. The information on all pages of the After Visit Summary has been reviewed with me, the patient and/or responsible adult, by my health care provider(s). I had the opportunity to ask questions regarding this information. I understand I should dispose of my armband safely at home to protect my health information. A complete copy of the After Visit Summary has been given to me, the patient and/or responsible adult.            Patient Signature/Responsible Adult:____________________    Clinician Signature:_____________________    Date:_____________________    Time:_____________________

## 2017-10-05 NOTE — PROGRESS NOTES
0919-To Pacu, sleepy - responds to verbal, denies any pain, nausea or SOB, family @ bedside, updated on plan of care, call light in reach.   Dr. Vanessa Pizano in to update on procedure, family @ bedside  0925-Increased awareness noted, repositioned to semi-fowlers, PO fluids given  0943-Tolerating fluids well, instructions reviewed with pt and family, understanding verbalized  0954-To car per w/c with written instructions,  present

## 2017-10-05 NOTE — ANESTHESIA PRE-OP
tablet Take 1 tablet by mouth daily 5/26/17  Yes Leon Regan MD   mometasone-formoterol [facility]) 200-5 MCG/ACT inhaler Inhale 2 puffs into the lungs every 12 hours 5/12/17  Yes Jossy Cárdenas MD   Umeclidinium Bromide (INCRUSE ELLIPTA) 62.5 MCG/INH AEPB Inhale 1 applicator into the lungs daily 5/12/17  Yes Jossy Cárdenas MD   ipratropium (ATROVENT) 0.06 % nasal spray INHALE 2 SPRAYS INTO EACH NOSTRIL THREE TIMES A DAY 1/12/17  Yes Leon Regan MD   PROAIR  (90 BASE) MCG/ACT inhaler INHALE 2 PUFFS BY ORAL INHALATION EVERY 4 TO 6 HOURS AS NEEDED 9/8/16  Yes Jossy Cárdenas MD   vitamin B-12 (CYANOCOBALAMIN) 1000 MCG tablet Take 1,000 mcg by mouth daily. Yes Historical Provider, MD   DULoxetine (CYMBALTA) 60 MG capsule Take 60 mg by mouth daily. Yes Historical Provider, MD   Calcium Citrate-Vitamin D (CITRACAL + D PO) Take 600 mg by mouth 2 times daily.    Yes Historical Provider, MD       Current medications:    Current Outpatient Prescriptions   Medication Sig Dispense Refill    ALPRAZolam (XANAX) 0.25 MG tablet Take 1 tablet by mouth every 6 hours as needed for Anxiety 40 tablet 0    fluticasone (FLONASE) 50 MCG/ACT nasal spray 1 spray by Nasal route daily 1 Bottle 3    predniSONE (DELTASONE) 20 MG tablet Take 2 tablets by mouth daily for 4 days 8 tablet 0    sucralfate (CARAFATE) 1 GM/10ML suspension Take 10 mLs by mouth 4 times daily 1200 mL 3    atorvastatin (LIPITOR) 10 MG tablet Take 1 tablet by mouth every evening 90 tablet 1    furosemide (LASIX) 40 MG tablet Take 1 tablet by mouth 2 times daily 60 tablet 5    potassium chloride (KLOR-CON M) 10 MEQ extended release tablet Take 10 mEq by mouth 2 times daily      esomeprazole (NEXIUM) 40 MG delayed release capsule Take 1 capsule by mouth daily (Patient taking differently: Take 40 mg by mouth 2 times daily ) 90 capsule 1    losartan (COZAAR) 50 MG tablet Take 1 tablet by mouth daily 30 tablet 3    ferrous sulfate 325 (65 Fe) MG

## 2017-10-05 NOTE — ANESTHESIA POST-OP
Anesthesia Post-op Note    Patient: Marcela Raphael  MRN: [id number]  YOB: 1940  Date of evaluation: 10/5/2017  Time:  11:16 AM     Procedure(s) Performed: EGD    Last Vitals: BP (!) 144/82  Pulse 73  Temp 98.1 °F (36.7 °C) (Temporal)   Resp 16  Ht 5' 6\" (1.676 m)  Wt 135 lb (61.2 kg)  SpO2 98%  BMI 21.79 kg/m2      Anesthesia Post Evaluation    Final anesthesia type: MAC  Patient location during evaluation: PACU  Level of consciousness: awake  Airway patency: patent  Nausea & Vomiting: no nausea and no vomiting  Complications: no  Cardiovascular status: hemodynamically stable  Respiratory status: acceptable  Hydration status: euvolemic        Roberto Avelar MD  11:16 AM

## 2017-10-05 NOTE — H&P
I have examined the patient immediately before the procedure and there is no change in the previous history or physical exam.There is no history of sleep apnea, snoring, or stridor. There has been no  previous adverse experience with sedation/anesthesia.   ASA Class: 3  AIRWAY Class: 3

## 2017-10-06 ENCOUNTER — CARE COORDINATOR VISIT (OUTPATIENT)
Dept: CARE COORDINATION | Age: 77
End: 2017-10-06

## 2017-10-06 ENCOUNTER — OFFICE VISIT (OUTPATIENT)
Dept: INTERNAL MEDICINE CLINIC | Age: 77
End: 2017-10-06

## 2017-10-06 VITALS
RESPIRATION RATE: 16 BRPM | WEIGHT: 134 LBS | HEART RATE: 68 BPM | BODY MASS INDEX: 21.63 KG/M2 | SYSTOLIC BLOOD PRESSURE: 128 MMHG | DIASTOLIC BLOOD PRESSURE: 70 MMHG

## 2017-10-06 DIAGNOSIS — A04.72 CLOSTRIDIUM DIFFICILE COLITIS: ICD-10-CM

## 2017-10-06 DIAGNOSIS — Z09 HOSPITAL DISCHARGE FOLLOW-UP: Primary | ICD-10-CM

## 2017-10-06 DIAGNOSIS — J44.9 CHRONIC OBSTRUCTIVE PULMONARY DISEASE, UNSPECIFIED COPD TYPE (HCC): ICD-10-CM

## 2017-10-06 DIAGNOSIS — I50.32 CHRONIC DIASTOLIC (CONGESTIVE) HEART FAILURE (HCC): ICD-10-CM

## 2017-10-06 DIAGNOSIS — D50.0 BLOOD LOSS ANEMIA: ICD-10-CM

## 2017-10-06 PROCEDURE — 99496 TRANSJ CARE MGMT HIGH F2F 7D: CPT | Performed by: INTERNAL MEDICINE

## 2017-10-06 ASSESSMENT — ENCOUNTER SYMPTOMS
BACK PAIN: 0
NAUSEA: 0
SPUTUM PRODUCTION: 0
BLURRED VISION: 0
HEARTBURN: 0
BLOOD IN STOOL: 0
VOMITING: 0
SHORTNESS OF BREATH: 0
DIARRHEA: 0
DOUBLE VISION: 0
WHEEZING: 0
COUGH: 0
SORE THROAT: 0
ABDOMINAL PAIN: 0

## 2017-10-06 NOTE — MR AVS SNAPSHOT
After Visit Summary             Devang Johns   10/6/2017 2:00 PM   Office Visit    Description:  Female : 1940   Provider:  Nevin Duke MD   Department:  [facility]              Your Follow-Up and Future Appointments         Below is a list of your follow-up and future appointments. This may not be a complete list as you may have made appointments directly with providers that we are not aware of or your providers may have made some for you. Please call your providers to confirm appointments. It is important to keep your appointments. Please bring your current insurance card, photo ID, co-pay, and all medication bottles to your appointment. If self-pay, payment is expected at the time of service. Your To-Do List     Future Appointments Provider Department Dept Phone    2017 11:00 AM Marylin Zheng MD [facility]  [phone]    Please arrive 15 minutes prior to appointment time, bring insurance card and photo ID. Future Orders Complete By Expires    CBC Auto Differential [[id number] Custom]  10/6/2017 (Approximate) 10/6/2018    Comprehensive Metabolic Panel [ILY27 Custom]  10/6/2017 (Approximate) 10/6/2018    Follow-Up    Return in about 3 weeks (around 10/27/2017). Information from Your Visit        Department     Name Address Phone Fax    [facility] [address] [phone]      You Were Seen for:         Toi 38 discharge follow-up   [[id number]]         Vital Signs     Blood Pressure Pulse Respirations Weight Body Mass Index Smoking Status    128/70 68 16 134 lb (60.8 kg) 21.63 kg/m2 Former Smoker         Today's Medication Changes          These changes are accurate as of: 10/6/17  2:32 PM.  If you have any questions, ask your nurse or doctor.                STOP taking these medications           sucralfate 1 GM/10ML suspension

## 2017-10-06 NOTE — CARE COORDINATION
Met with pt and spouse at pcp ov. Pt currently being followed by care transitions. Provided ed on daily wts and s/s of fluid retention/chf/copd exac. Pt is still smoke free, pt praised for this accomplishment. Benefits of smoking cessation discussed. Reviewed inhalers and usage, Is using spacer with dulera inhaler. Is alternating between cane and walker, encouraged to use ambulation assistance device and pt reports she \"always uses something\". Not receiving home care and denies needs, managing meds w/o difficulty per pt. Pt to have labs in 1-2 weeks and rtc in 3 wks.

## 2017-10-06 NOTE — PROGRESS NOTES
Low back pain; lumbar disc disease    Clostridium difficile colitis 09/20/2017    COPD (chronic obstructive pulmonary disease) [facility] 8/2011    Moderate with mild decrease in Diffusing Capacity    Depression     Fibromyalgia     Former smoker     Herniated cervical disc 5-1998    Herpes zoster ophthalmicus 3/2012    Hyperlipidemia     Hypertension     L3 vertebral fracture ([facility] 2010    vertebroplasty    Lumbar spinal stenosis 2015    moderately severe by MRI    Migraine     Osteoporosis 2010    Fragility Fx L3    Rheumatoid arthritis [facility]) 1976    Dr Ramin Lantigua S/P cardiac catheterization 05/2017    patent coronaries; Takotsubo; najeeb Ahmed    Smoker 03/13/2017    Takotsubo syndrome 05/2017    TMJ dysfunction     Vitamin B12 deficiency 12/2014    B12 level 199      Past Surgical History:   Procedure Laterality Date    APPENDECTOMY  1966    CARDIAC CATHETERIZATION  05/21/2017    CHOLECYSTECTOMY  1966    w/ appendectomy    ELBOW SURGERY Right     FOOT SURGERY  1986    PRAVEENA AND BSO  1991    TOE SURGERY Left 4/25/2013    Deboo;     VERTEBROPLASTY  10/2010    L3    WRIST FUSION Left 2000         Social History   Substance Use Topics    Smoking status: Former Smoker     Packs/day: 0.75     Years: 55.00     Types: Cigarettes     Start date: 5/26/1962     Quit date: 9/11/2017    Smokeless tobacco: Never Used      Comment: Stopped smoking for approx 20 years, restarted back up for a year    Alcohol use No      Review of Systems   Constitutional: Positive for malaise/fatigue. Negative for chills, fever and weight loss. HENT: Negative for congestion and sore throat. Eyes: Negative for blurred vision and double vision. Respiratory: Negative for cough, sputum production, shortness of breath and wheezing. Cardiovascular: Negative for chest pain, palpitations, leg swelling and PND.    Gastrointestinal: Negative for abdominal pain, blood in stool, diarrhea, heartburn, nausea and vomiting. Genitourinary: Negative for flank pain, frequency and urgency. Musculoskeletal: Negative for back pain, joint pain and neck pain. Skin: Negative for itching and rash. Neurological: Positive for weakness and headaches. Negative for dizziness, tremors, focal weakness and seizures. Psychiatric/Behavioral: Positive for depression. Negative for memory loss. The patient does not have insomnia. Objective:      /70  Pulse 68  Resp 16  Wt 134 lb (60.8 kg)  BMI 21.63 kg/m2   Physical Exam   Constitutional: She is oriented to person, place, and time. She appears well-developed and well-nourished. No distress. HENT:   Head: Normocephalic and atraumatic. Mouth/Throat: No oropharyngeal exudate. Eyes: Conjunctivae are normal. No scleral icterus. Neck: No JVD present. No thyromegaly present. Cardiovascular: Normal rate, regular rhythm and normal heart sounds. No murmur heard. Pulmonary/Chest: Effort normal and breath sounds normal. No respiratory distress. She has no wheezes. She has no rales. Abdominal: Soft. She exhibits no distension. There is no tenderness. Musculoskeletal: Normal range of motion. She exhibits no edema. Lymphadenopathy:     She has no cervical adenopathy. Neurological: She is alert and oriented to person, place, and time. No cranial nerve deficit. Coordination abnormal.   Skin: Skin is warm and dry. No rash noted. She is not diaphoretic. No erythema. Psychiatric: She has a normal mood and affect. Her behavior is normal.     rev'd CXR ; H and P, discharge summary; ER note, caridology consult and GI consult  rev'd inpatient lab    Has high risk of adverse events  Medical complexity high       Assessment / Plan:      1. Hospital discharge follow-up    2. Blood loss anemia    3. Chronic diastolic (congestive) heart failure ([address]    4.  Chronic obstructive pulmonary disease, unspecified COPD type ([address]    5. Clostridium difficile colitis            Plan

## 2017-10-09 ENCOUNTER — HOSPITAL ENCOUNTER (OUTPATIENT)
Dept: OTHER | Age: 77
Discharge: HOME | End: 2017-10-09
Attending: INTERNAL MEDICINE | Admitting: INTERNAL MEDICINE

## 2017-10-09 LAB — FERRITIN: 53 NG/ML (ref 15–150)

## 2017-10-10 ENCOUNTER — CARE COORDINATION (OUTPATIENT)
Dept: CASE MANAGEMENT | Age: 77
End: 2017-10-10

## 2017-10-10 NOTE — CARE COORDINATION
[facility] Transitions Follow Up Call    10/10/2017    Patient: Mayur Kumar  Patient : 1940   MRN: [id number]  Reason for Admission:   Discharge Date: 10/1/17 RARS: Risk Score: 24.75       CTC attempted to reach patient , left message with contact information requesting call back. Care Transition will continue to follow. Inpatient Assessment  Care Transitions Subsequent and Final Call    Subsequent and Final Calls  Care Transitions Interventions  Other Interventions:            Future Appointments  Date Time Provider Ijeoma Hannai   10/27/2017 3:00 PM Jeremie Dallas MD [address] [place] [address]   2017 11:00 AM Kaitlynn Small MD [facility] Elidia Leon, NURYSN, RN  Care Transition Coordinator  [phone]  Julio César Murray RN

## 2017-10-17 ENCOUNTER — CARE COORDINATION (OUTPATIENT)
Dept: CASE MANAGEMENT | Age: 77
End: 2017-10-17

## 2017-10-19 RX ORDER — BUSPIRONE HYDROCHLORIDE 10 MG/1
10 TABLET ORAL 3 TIMES DAILY
Qty: 90 TABLET | Refills: 3 | Status: SHIPPED | OUTPATIENT
Start: 2017-10-19 | End: 2017-12-01 | Stop reason: ALTCHOICE

## 2017-10-19 RX ORDER — ARIPIPRAZOLE 2 MG/1
2 TABLET ORAL DAILY
Qty: 30 TABLET | Refills: 3 | Status: SHIPPED | OUTPATIENT
Start: 2017-10-19 | End: 2017-12-01 | Stop reason: ALTCHOICE

## 2017-10-20 ENCOUNTER — CARE COORDINATION (OUTPATIENT)
Dept: CARE COORDINATION | Age: 77
End: 2017-10-20

## 2017-10-20 NOTE — CARE COORDINATION
Received message on voicemail from pt. Reports she \"is doing really well, getting around better, having less problems of any kind, and I been feeling pretty good\". \"Just to let you know I am achieving many of my things, doing laundry all by myself\" and sharing other chores with . States \"It works out really well. Wanted to let you know I am doing so much better and have no need for anything\". Denies any needs and declines need for cc to call back at this time. Will f/u with pt next week.

## 2017-10-23 ENCOUNTER — CARE COORDINATION (OUTPATIENT)
Dept: CARE COORDINATION | Age: 77
End: 2017-10-23

## 2017-10-23 RX ORDER — LOSARTAN POTASSIUM 50 MG/1
50 TABLET ORAL DAILY
Qty: 90 TABLET | Refills: 1 | Status: SHIPPED | OUTPATIENT
Start: 2017-10-23 | End: 2018-04-02 | Stop reason: DRUGHIGH

## 2017-10-24 ENCOUNTER — OFFICE VISIT (OUTPATIENT)
Dept: PULMONOLOGY | Age: 77
End: 2017-10-24

## 2017-10-24 VITALS
HEIGHT: 66 IN | BODY MASS INDEX: 20.89 KG/M2 | SYSTOLIC BLOOD PRESSURE: 150 MMHG | WEIGHT: 130 LBS | DIASTOLIC BLOOD PRESSURE: 86 MMHG | RESPIRATION RATE: 20 BRPM | OXYGEN SATURATION: 94 % | HEART RATE: 79 BPM

## 2017-10-24 DIAGNOSIS — J44.9 COPD, SEVERE (HCC): ICD-10-CM

## 2017-10-24 PROBLEM — J44.1 COPD EXACERBATION (HCC): Status: RESOLVED | Noted: 2017-09-28 | Resolved: 2017-10-24

## 2017-10-24 PROCEDURE — 99213 OFFICE O/P EST LOW 20 MIN: CPT | Performed by: INTERNAL MEDICINE

## 2017-10-24 NOTE — PROGRESS NOTES
SUBJECTIVE:   Chief complaintsevere COPD with recent repeated episodes of flash pulmonary edema and GERD with chest pain  Evelia Mendes is been hospitalized on several occasions since I last saw her. She is undergone a complete cardiac and GI workup. She was most recently at [facility] 2 days ago with chest pain and apparently improved after a GI cocktail with lidocaine and Maalox. She is undergone EGD and is scheduled back to see GI in the near future. It also sounds as though she's had several small exacerbations of her COPD but did have acute hypoxemia thought to be secondary to flash pulmonary edema. She does have a known history of cardiomyopathy. She states she quit smoking 6 weeks ago. She is not been expectorating purulent sputum or having hemoptysis. She also denies pleuritic chest pain and most of her chest discomfort has been in the lower chest and in the epigastric area. She continues on Dulera,Incruse and albuterol to control her severe COPD  She has had repeated CTA of the chest ×4 over the past several months all negative for pulmonary embolus  She does carry history of blood loss anemia, DVT in the foot and is not on anticoagulants  OBJECTIVE:  BP (!) 150/86   Pulse 79   Resp 20   Ht 5' 6\" (1.676 m)   Wt 130 lb (59 kg)   SpO2 94%   BMI 20.98 kg/m²      Physical Exam:  Constitutional:  She appears well developed and well-nourished but thin and chronically ill  Neck:  Supple, No palpable lymphadenopathy, No JVD  Cardiovascular:  S1, S2 Normal, Regular rhythm, no murmurs or gallops, No pericardial  rubs. Pulmonary:  Breath sounds are diminished throughout all areas with a few basilar rales. No wheezing is noted  Abdomen: No epigastric pain, No distention. No organomegaly   Extremities: no edema, No DVT  Neurologic:  Awake and Alert, No focal deficits    Radiology: CTA chest 10/23/17  Impression   1. No evidence of pulmonary embolism.    2. Pulmonary findings suggestive of early interstitial pulmonary edema.   3. Healing bilateral rib fractures.  Stable scarring in the right lower lobe     PFT: Office spirometry 5/1/17 demonstrated a moderate to severe obstructive defect with a significant response to bronchodilators in her small and large airways        ASSESSMENT:    1. COPD, severe ([address]          PLAN:  Certainly sounds like she's been having repeated episodes of esophagitis. I'm not going to change her current bronchodilator therapy. I will continue to follow her  Return in about 3 months (around 1/24/2018) for Recheck for COPD, Recheck for chest pain, Recheck for Shortness of Breath. This dictation was performed with a verbal recognition program and it was checked for errors. It is possible that there are still dictated errors within this office note. Any errors should be brought immediately to my attention for correction. All efforts were made to ensure that this office note is accurate.

## 2017-10-25 ENCOUNTER — HOSPITAL ENCOUNTER (OUTPATIENT)
Dept: GENERAL RADIOLOGY | Age: 77
Discharge: HOME | End: 2017-10-25
Attending: INTERNAL MEDICINE | Admitting: INTERNAL MEDICINE

## 2017-10-25 LAB
ALBUMIN SERPL-MCNC: 3.8 GM/DL (ref 3.4–5)
ALP BLD-CCNC: 105 IU/L (ref 40–128)
ALT SERPL-CCNC: 8 U/L (ref 10–40)
ANION GAP SERPL CALCULATED.3IONS-SCNC: 13 MMOL/L (ref 4–16)
ANISOCYTOSIS: ABNORMAL
AST SERPL-CCNC: 19 IU/L (ref 15–37)
ATYPICAL MONOCYTES: ABNORMAL
BASOPHILS ABSOLUTE: 0 K/CU MM
BASOPHILS RELATIVE PERCENT: 0.8 % (ref 0–1)
BILIRUB SERPL-MCNC: 0.2 MG/DL (ref 0–1)
BUN BLDV-MCNC: 12 MG/DL (ref 6–23)
CALCIUM SERPL-MCNC: 9.1 MG/DL (ref 8.3–10.6)
CHLORIDE BLD-SCNC: 97 MMOL/L (ref 99–110)
CO2: 28 MMOL/L (ref 21–32)
CREAT SERPL-MCNC: 0.7 MG/DL (ref 0.6–1.1)
DIFFERENTIAL TYPE: ABNORMAL
EOSINOPHILS ABSOLUTE: 0.3 K/CU MM
EOSINOPHILS RELATIVE PERCENT: 5.2 % (ref 0–3)
GFR AFRICAN AMERICAN: >60 ML/MIN/1.73M2
GFR NON-AFRICAN AMERICAN: >60 ML/MIN/1.73M2
GLUCOSE FASTING: 104 MG/DL (ref 70–99)
HCT VFR BLD CALC: 38.5 % (ref 37–47)
HEMOGLOBIN: 11.8 GM/DL (ref 12.5–16)
IMMATURE NEUTROPHIL %: 0.2 % (ref 0–0.43)
LYMPHOCYTES ABSOLUTE: 1.9 K/CU MM
LYMPHOCYTES RELATIVE PERCENT: 36.1 % (ref 24–44)
MCH RBC QN AUTO: 26.5 PG (ref 27–31)
MCHC RBC AUTO-ENTMCNC: 30.6 % (ref 32–36)
MCV RBC AUTO: 86.5 FL (ref 78–100)
MONOCYTES ABSOLUTE: 1.1 K/CU MM
MONOCYTES RELATIVE PERCENT: 20.3 % (ref 0–4)
PDW BLD-RTO: 16.4 % (ref 11.7–14.9)
PLATELET # BLD: 466 K/CU MM (ref 140–440)
PMV BLD AUTO: 10.8 FL (ref 7.5–11.1)
POTASSIUM SERPL-SCNC: 3.7 MMOL/L (ref 3.5–5.1)
RBC # BLD: 4.45 M/CU MM (ref 4.2–5.4)
SEGMENTED NEUTROPHILS ABSOLUTE COUNT: 1.9 K/CU MM
SEGMENTED NEUTROPHILS RELATIVE PERCENT: 37.4 % (ref 36–66)
SODIUM BLD-SCNC: 138 MMOL/L (ref 135–145)
TOTAL IMMATURE NEUTOROPHIL: 0.01 K/CU MM
TOTAL PROTEIN: 6.2 GM/DL (ref 6.4–8.2)
WBC # BLD: 5.2 K/CU MM (ref 4–10.5)

## 2017-10-26 ENCOUNTER — HOSPITAL ENCOUNTER (OUTPATIENT)
Dept: GENERAL RADIOLOGY | Age: 77
Discharge: HOME | End: 2017-10-26
Attending: INTERNAL MEDICINE | Admitting: INTERNAL MEDICINE

## 2017-10-26 ENCOUNTER — TELEPHONE (OUTPATIENT)
Dept: INTERNAL MEDICINE CLINIC | Age: 77
End: 2017-10-26

## 2017-10-26 DIAGNOSIS — R19.7 DIARRHEA, UNSPECIFIED TYPE: Primary | ICD-10-CM

## 2017-10-26 LAB
ANION GAP SERPL CALCULATED.3IONS-SCNC: 14 MMOL/L (ref 4–16)
BUN BLDV-MCNC: 14 MG/DL (ref 6–23)
CALCIUM SERPL-MCNC: 9.4 MG/DL (ref 8.3–10.6)
CHLORIDE BLD-SCNC: 94 MMOL/L (ref 99–110)
CO2: 28 MMOL/L (ref 21–32)
CREAT SERPL-MCNC: 0.7 MG/DL (ref 0.6–1.1)
GFR AFRICAN AMERICAN: >60 ML/MIN/1.73M2
GFR NON-AFRICAN AMERICAN: >60 ML/MIN/1.73M2
GLUCOSE BLD-MCNC: 73 MG/DL (ref 70–140)
HCT VFR BLD CALC: 40.2 % (ref 37–47)
HEMOGLOBIN: 12.4 GM/DL (ref 12.5–16)
MCH RBC QN AUTO: 26.8 PG (ref 27–31)
MCHC RBC AUTO-ENTMCNC: 30.8 % (ref 32–36)
MCV RBC AUTO: 86.8 FL (ref 78–100)
PDW BLD-RTO: 16.4 % (ref 11.7–14.9)
PLATELET # BLD: 473 K/CU MM (ref 140–440)
PMV BLD AUTO: 10.5 FL (ref 7.5–11.1)
POTASSIUM SERPL-SCNC: 4 MMOL/L (ref 3.5–5.1)
RBC # BLD: 4.63 M/CU MM (ref 4.2–5.4)
SODIUM BLD-SCNC: 136 MMOL/L (ref 135–145)
WBC # BLD: 5.7 K/CU MM (ref 4–10.5)

## 2017-10-26 NOTE — TELEPHONE ENCOUNTER
Patient called states that she still having diarrhea 2-3 times a day with upper abdominal pain. Patient would like to check for c diff. Please advise.

## 2017-10-27 ENCOUNTER — OFFICE VISIT (OUTPATIENT)
Dept: INTERNAL MEDICINE CLINIC | Age: 77
End: 2017-10-27

## 2017-10-27 VITALS
RESPIRATION RATE: 16 BRPM | BODY MASS INDEX: 20.66 KG/M2 | DIASTOLIC BLOOD PRESSURE: 88 MMHG | SYSTOLIC BLOOD PRESSURE: 138 MMHG | HEART RATE: 60 BPM | WEIGHT: 128 LBS

## 2017-10-27 DIAGNOSIS — R10.13 DYSPEPSIA: Primary | ICD-10-CM

## 2017-10-27 DIAGNOSIS — J44.9 COPD, SEVERE (HCC): ICD-10-CM

## 2017-10-27 DIAGNOSIS — R10.13 EPIGASTRIC PAIN: ICD-10-CM

## 2017-10-27 DIAGNOSIS — Z86.2 HISTORY OF ANEMIA: ICD-10-CM

## 2017-10-27 PROCEDURE — 99215 OFFICE O/P EST HI 40 MIN: CPT | Performed by: INTERNAL MEDICINE

## 2017-10-27 RX ORDER — ESOMEPRAZOLE MAGNESIUM 40 MG/1
40 CAPSULE, DELAYED RELEASE ORAL
COMMUNITY
End: 2019-03-11 | Stop reason: DRUGHIGH

## 2017-10-27 RX ORDER — OXYCODONE AND ACETAMINOPHEN 7.5; 325 MG/1; MG/1
TABLET ORAL
COMMUNITY
End: 2018-02-01 | Stop reason: DRUGHIGH

## 2017-10-27 RX ORDER — RANITIDINE 300 MG/1
300 TABLET ORAL NIGHTLY
Qty: 30 TABLET | Refills: 3 | Status: SHIPPED | OUTPATIENT
Start: 2017-10-27 | End: 2018-10-01

## 2017-10-27 RX ORDER — SUCRALFATE ORAL 1 G/10ML
1 SUSPENSION ORAL 3 TIMES DAILY
COMMUNITY
End: 2017-11-13 | Stop reason: ALTCHOICE

## 2017-10-27 NOTE — PATIENT INSTRUCTIONS
STOP IRON    CUT PERCOCET TO ONE HALF THREE TIMES DAIILY IF ABLE    ZANTAC AT NIGHT    CARAFATE 3 TIMES/D

## 2017-10-27 NOTE — PROGRESS NOTES
esomeprazole (NEXIUM) 40 MG delayed release capsule Take 40 mg by mouth every morning (before breakfast)      oxyCODONE-acetaminophen (PERCOCET) 7.5-325 MG per tablet One half tablet three times daily prn .  sucralfate (CARAFATE) 1 GM/10ML suspension Take 1 g by mouth 3 times daily      ranitidine (ZANTAC) 300 MG tablet Take 1 tablet by mouth nightly 30 tablet 3    losartan (COZAAR) 50 MG tablet Take 1 tablet by mouth daily 90 tablet 1    ARIPiprazole (ABILIFY) 2 MG tablet Take 1 tablet by mouth daily 30 tablet 3    busPIRone (BUSPAR) 10 MG tablet Take 1 tablet by mouth 3 times daily 90 tablet 3    fluticasone (FLONASE) 50 MCG/ACT nasal spray 1 spray by Nasal route daily 1 Bottle 3    atorvastatin (LIPITOR) 10 MG tablet Take 1 tablet by mouth every evening 90 tablet 1    furosemide (LASIX) 40 MG tablet Take 1 tablet by mouth 2 times daily 60 tablet 5    potassium chloride (KLOR-CON M) 10 MEQ extended release tablet Take 10 mEq by mouth 2 times daily      metoprolol tartrate (LOPRESSOR) 50 MG tablet Take 1 tablet by mouth 2 times daily 120 tablet 1    mometasone-formoterol (DULERA) 200-5 MCG/ACT inhaler Inhale 2 puffs into the lungs every 12 hours 1 Inhaler 12    Umeclidinium Bromide (INCRUSE ELLIPTA) 62.5 MCG/INH AEPB Inhale 1 applicator into the lungs daily 1 each 11    vitamin B-12 (CYANOCOBALAMIN) 1000 MCG tablet Take 1,000 mcg by mouth daily.  DULoxetine (CYMBALTA) 60 MG capsule Take 60 mg by mouth daily.  Calcium Citrate-Vitamin D (CITRACAL + D PO) Take 600 mg by mouth 2 times daily.  PROAIR  (90 Base) MCG/ACT inhaler INHALE 2 PUFFS BY ORAL INHALATION EVERY 4 TO 6 HOURS AS NEEDED 8.5 g 10     No current facility-administered medications for this visit.         Past Medical History:   Diagnosis Date    Acute DVT of right tibial vein ([address] 07/2017    ER imaging: distal right tibial vein DVT; no anticoag for bleeding/ anemia    Chronic pain syndrome     Low back pain; frequency and urgency. Musculoskeletal: Positive for back pain, joint pain and neck pain. Skin: Negative for itching and rash. Neurological: Positive for weakness and headaches. Negative for dizziness, focal weakness, seizures and loss of consciousness. Psychiatric/Behavioral: Positive for depression. Negative for memory loss. The patient is nervous/anxious. The patient does not have insomnia. Objective:      /88   Pulse 60   Resp 16   Wt 128 lb (58.1 kg)   BMI 20.66 kg/m²    Physical Exam   Constitutional: She is oriented to person, place, and time. She appears well-developed and well-nourished. No distress. HENT:   Head: Normocephalic and atraumatic. Mouth/Throat: Oropharynx is clear and moist. No oropharyngeal exudate. Eyes: Conjunctivae are normal. No scleral icterus. Neck: No JVD present. No thyromegaly present. Cardiovascular: Normal rate, regular rhythm and normal heart sounds. Pulmonary/Chest: Effort normal. No respiratory distress. She has no wheezes. She has no rales. Prolonged exp phase with some decreased ventilation bilaterally   Abdominal: Soft. Bowel sounds are normal. She exhibits no distension and no mass. There is tenderness. There is no rebound and no guarding. Mild epigastric tenderness     Musculoskeletal: Normal range of motion. She exhibits no edema. Lymphadenopathy:     She has no cervical adenopathy. Neurological: She is alert and oriented to person, place, and time. No cranial nerve deficit. She exhibits normal muscle tone. Coordination normal.   Skin: Skin is warm and dry. No rash noted. She is not diaphoretic. No erythema. No pallor. Psychiatric: Her behavior is normal. Thought content normal.   Appears worried , anxious, depressed. ER notes and labs and imaging rev'd  Prior notes rev'd  Called micro and C diff testing not  Done for formed stool submitted      hgb 12.4 on 10/26    Assessment / Plan:      1. Dyspepsia    2.  Epigastric pain    3. COPD, severe ([address]    4.  History of anemia            Plan    I feel that her dyspepsia is due to meds, since EGD and CT and lab are normal:    STOP IRON    CUT PERCOCET TO ONE HALF THREE TIMES DAIILY IF ABLE    ZANTAC AT NIGHT    CARAFATE 3 TIMES/D    F/u DOC pain mgt Monday and Mady next week  RTC here 2 wk

## 2017-10-27 NOTE — LETTER
[address] [facility]  [address] [place] [address]  [place], [address]  Phone: [phone]  Fax [phone]    10/27/17    Patient name: Karol Hernandez  : 1940    To Whom It May Concern:    Karol Hernandez would benefit from a handicapped parking permit for two years due to effects of medical condition. If you have any question or concerns, please don't hesitate to call.       Sincerely:        Dr. Jassi Blanchard MD.

## 2017-10-29 ASSESSMENT — ENCOUNTER SYMPTOMS
VOMITING: 0
SHORTNESS OF BREATH: 1
NAUSEA: 1
DIARRHEA: 1
COUGH: 1
BLURRED VISION: 0
HEARTBURN: 1
BLOOD IN STOOL: 0
PHOTOPHOBIA: 0
SORE THROAT: 0
DOUBLE VISION: 0
SPUTUM PRODUCTION: 0
ABDOMINAL PAIN: 1
BACK PAIN: 1

## 2017-11-13 ENCOUNTER — OFFICE VISIT (OUTPATIENT)
Dept: INTERNAL MEDICINE CLINIC | Age: 77
End: 2017-11-13

## 2017-11-13 ENCOUNTER — CARE COORDINATOR VISIT (OUTPATIENT)
Dept: CARE COORDINATION | Age: 77
End: 2017-11-13

## 2017-11-13 VITALS
HEART RATE: 80 BPM | WEIGHT: 130 LBS | DIASTOLIC BLOOD PRESSURE: 80 MMHG | SYSTOLIC BLOOD PRESSURE: 142 MMHG | BODY MASS INDEX: 20.98 KG/M2 | RESPIRATION RATE: 16 BRPM

## 2017-11-13 DIAGNOSIS — J42 CHRONIC BRONCHITIS, UNSPECIFIED CHRONIC BRONCHITIS TYPE (HCC): Chronic | ICD-10-CM

## 2017-11-13 DIAGNOSIS — I10 ESSENTIAL HYPERTENSION: Chronic | ICD-10-CM

## 2017-11-13 DIAGNOSIS — Z86.2 HISTORY OF ANEMIA: ICD-10-CM

## 2017-11-13 DIAGNOSIS — K29.70 GASTRITIS WITHOUT BLEEDING, UNSPECIFIED CHRONICITY, UNSPECIFIED GASTRITIS TYPE: Primary | ICD-10-CM

## 2017-11-13 PROCEDURE — 99214 OFFICE O/P EST MOD 30 MIN: CPT | Performed by: INTERNAL MEDICINE

## 2017-11-13 NOTE — PROGRESS NOTES
increased phlegm, wheezing or hemoptysis. bp fine    Current Outpatient Prescriptions   Medication Sig Dispense Refill    PROAIR  (90 Base) MCG/ACT inhaler INHALE 2 PUFFS BY ORAL INHALATION EVERY 4 TO 6 HOURS AS NEEDED 8.5 g 10    esomeprazole (NEXIUM) 40 MG delayed release capsule Take 40 mg by mouth every morning (before breakfast)      oxyCODONE-acetaminophen (PERCOCET) 7.5-325 MG per tablet One half tablet three times daily prn .  ranitidine (ZANTAC) 300 MG tablet Take 1 tablet by mouth nightly 30 tablet 3    losartan (COZAAR) 50 MG tablet Take 1 tablet by mouth daily 90 tablet 1    ARIPiprazole (ABILIFY) 2 MG tablet Take 1 tablet by mouth daily 30 tablet 3    busPIRone (BUSPAR) 10 MG tablet Take 1 tablet by mouth 3 times daily 90 tablet 3    fluticasone (FLONASE) 50 MCG/ACT nasal spray 1 spray by Nasal route daily 1 Bottle 3    atorvastatin (LIPITOR) 10 MG tablet Take 1 tablet by mouth every evening 90 tablet 1    furosemide (LASIX) 40 MG tablet Take 1 tablet by mouth 2 times daily 60 tablet 5    potassium chloride (KLOR-CON M) 10 MEQ extended release tablet Take 10 mEq by mouth 2 times daily      metoprolol tartrate (LOPRESSOR) 50 MG tablet Take 1 tablet by mouth 2 times daily 120 tablet 1    mometasone-formoterol (DULERA) 200-5 MCG/ACT inhaler Inhale 2 puffs into the lungs every 12 hours 1 Inhaler 12    Umeclidinium Bromide (INCRUSE ELLIPTA) 62.5 MCG/INH AEPB Inhale 1 applicator into the lungs daily 1 each 11    vitamin B-12 (CYANOCOBALAMIN) 1000 MCG tablet Take 1,000 mcg by mouth daily.  DULoxetine (CYMBALTA) 60 MG capsule Take 60 mg by mouth daily.  Calcium Citrate-Vitamin D (CITRACAL + D PO) Take 600 mg by mouth 2 times daily. No current facility-administered medications for this visit.         Past Medical History:   Diagnosis Date    Acute DVT of right tibial vein ([address] 07/2017    ER imaging: distal right tibial vein DVT; no anticoag for bleeding/ anemia    Chronic pain syndrome     Low back pain; lumbar disc disease    Clostridium difficile colitis 09/20/2017    COPD, severe ([address] 10/24/2017    Depression     Fibromyalgia     Former smoker     Herniated cervical disc 5-1998    Herpes zoster ophthalmicus 3/2012    Hyperlipidemia     Hypertension     L3 vertebral fracture ([address] 2010    vertebroplasty    Lumbar spinal stenosis 2015    moderately severe by MRI    Migraine     Osteoporosis 2010    Fragility Fx L3    Rheumatoid arthritis(714.0) 1976    Dr Marilyn Jimenez S/P cardiac catheterization 05/2017    patent coronaries; Takotsubo; najeeb Ahmed    Smoker 03/13/2017    Takotsubo syndrome 05/2017    TMJ dysfunction     Vitamin B12 deficiency 12/2014    B12 level 199        Social History   Substance Use Topics    Smoking status: Former Smoker     Packs/day: 0.75     Years: 55.00     Types: Cigarettes     Start date: 5/26/1962     Quit date: 9/11/2017    Smokeless tobacco: Never Used      Comment: Stopped smoking for approx 20 years, restarted back up for a year    Alcohol use No        ROS: The patient has had no headache, sore throat, fever or chills, cough, dyspnea, chest pain, nausea, vomiting or diarrhea, or edema. Objective:      BP (!) 142/80   Pulse 80   Resp 16   Wt 130 lb (59 kg)   BMI 20.98 kg/m²    General: in no apparent distress   The patient's neck is free of nodes. Lungs are clear. Heart is normal in rate and regular in rhythm. Legs are free of edema. No rash or erythema. Oct lab rev'd  Assessment / Plan:      1. Gastritis without bleeding, unspecified chronicity, unspecified gastritis type    2. History of anemia    3. Chronic bronchitis, unspecified chronic bronchitis type ([address]    4.  Essential hypertension            Plan   Stop carafate when able  Stay off iron   Monitor cbcb  F/u Jefferson, arthur, pain mgt  RTC 4 wk, lab first  Orders Placed This Encounter   Procedures    CBC Auto Differential    Basic

## 2017-11-22 ENCOUNTER — TELEPHONE (OUTPATIENT)
Dept: INTERNAL MEDICINE CLINIC | Age: 77
End: 2017-11-22

## 2017-11-22 NOTE — TELEPHONE ENCOUNTER
Patient LM stating that she is not able to sleep and having panic attacks.  She is asking for the anxiety medication that she was given while in the hospital. Called patient back and let her know that Dr. Nichols Brought out of the office for the holiday and referred her to the [facility]

## 2017-11-22 NOTE — TELEPHONE ENCOUNTER
I think it is appropriate for her to see Dr. Ferrel Cowden as scheduled. She could also be set up with Dr. Juan M Cooper for an earlier appt.

## 2017-11-22 NOTE — TELEPHONE ENCOUNTER
Patient called the office back and was insistent that she be seen. States she spoke with her PCP's office and said she was told that they wanted her to be seen by our office. Patient kept stating that she was in pain, she couldn't sleep and she needed to be seen for \"think it's panic attacks. \" Patient stated that they told her that the medication that she was requesting (Xanax) would be an additional medication. I advised the patient that it is at the provider's discretion what is written. She then asked if it would be something better than what she is already taking. I told her again that it was up to the provider what she would prescribe. Patient was scheduled for an appointment on Friday 11/24/2017 with Dr. Val Travis.

## 2017-11-24 ENCOUNTER — OFFICE VISIT (OUTPATIENT)
Dept: FAMILY MEDICINE CLINIC | Age: 77
End: 2017-11-24

## 2017-11-24 ENCOUNTER — HOSPITAL ENCOUNTER (OUTPATIENT)
Dept: GENERAL RADIOLOGY | Age: 77
Discharge: HOME | End: 2017-11-24
Attending: INTERNAL MEDICINE | Admitting: INTERNAL MEDICINE

## 2017-11-24 VITALS
SYSTOLIC BLOOD PRESSURE: 124 MMHG | DIASTOLIC BLOOD PRESSURE: 76 MMHG | HEART RATE: 76 BPM | TEMPERATURE: 97.9 F | HEIGHT: 66 IN | OXYGEN SATURATION: 94 % | BODY MASS INDEX: 20.9 KG/M2 | WEIGHT: 130.07 LBS

## 2017-11-24 DIAGNOSIS — F41.0 PANIC ATTACK: Primary | ICD-10-CM

## 2017-11-24 DIAGNOSIS — F51.05 INSOMNIA DUE TO OTHER MENTAL DISORDER: ICD-10-CM

## 2017-11-24 DIAGNOSIS — F99 INSOMNIA DUE TO OTHER MENTAL DISORDER: ICD-10-CM

## 2017-11-24 LAB
ALBUMIN SERPL-MCNC: 4.1 GM/DL (ref 3.4–5)
ANION GAP SERPL CALCULATED.3IONS-SCNC: 12 MMOL/L (ref 4–16)
BACTERIA: NEGATIVE /HPF
BASOPHILS ABSOLUTE: 0.1 K/CU MM
BASOPHILS RELATIVE PERCENT: 0.7 % (ref 0–1)
BILIRUBIN URINE: NEGATIVE MG/DL
BLOOD, URINE: NEGATIVE
BUN BLDV-MCNC: 15 MG/DL (ref 6–23)
CALCIUM SERPL-MCNC: 9.4 MG/DL (ref 8.3–10.6)
CHLORIDE BLD-SCNC: 95 MMOL/L (ref 99–110)
CLARITY: CLEAR
CO2: 29 MMOL/L (ref 21–32)
COLOR: COLORLESS
CREAT SERPL-MCNC: 0.8 MG/DL (ref 0.6–1.1)
CREATININE URINE: 6.7 MG/DL (ref 28–217)
DIFFERENTIAL TYPE: ABNORMAL
EOSINOPHILS ABSOLUTE: 0.2 K/CU MM
EOSINOPHILS RELATIVE PERCENT: 2.8 % (ref 0–3)
GFR AFRICAN AMERICAN: >60 ML/MIN/1.73M2
GFR NON-AFRICAN AMERICAN: >60 ML/MIN/1.73M2
GLUCOSE BLD-MCNC: 97 MG/DL (ref 70–99)
GLUCOSE, URINE: NEGATIVE MG/DL
HCT VFR BLD CALC: 38.8 % (ref 37–47)
HEMOGLOBIN: 11.6 GM/DL (ref 12.5–16)
IMMATURE NEUTROPHIL %: 0.1 % (ref 0–0.43)
KETONES, URINE: NEGATIVE MG/DL
LEUKOCYTE ESTERASE, URINE: NEGATIVE
LYMPHOCYTES ABSOLUTE: 1.9 K/CU MM
LYMPHOCYTES RELATIVE PERCENT: 22.9 % (ref 24–44)
MCH RBC QN AUTO: 24.8 PG (ref 27–31)
MCHC RBC AUTO-ENTMCNC: 29.9 % (ref 32–36)
MCV RBC AUTO: 82.9 FL (ref 78–100)
MONOCYTES ABSOLUTE: 1.3 K/CU MM
MONOCYTES RELATIVE PERCENT: 15.6 % (ref 0–4)
NITRITE URINE, QUANTITATIVE: NEGATIVE
NUCLEATED RBC %: 0 %
PDW BLD-RTO: 15.2 % (ref 11.7–14.9)
PH, URINE: 6 (ref 5–8)
PHOSPHORUS: 4.2 MG/DL (ref 2.5–4.9)
PLATELET # BLD: 327 K/CU MM (ref 140–440)
PMV BLD AUTO: 11.5 FL (ref 7.5–11.1)
POTASSIUM SERPL-SCNC: 3.4 MMOL/L (ref 3.5–5.1)
PROT/CREAT RATIO, UR: 0.6
PROTEIN UA: NEGATIVE MG/DL
RBC # BLD: 4.68 M/CU MM (ref 4.2–5.4)
RBC URINE: ABNORMAL /HPF (ref 0–6)
SEGMENTED NEUTROPHILS ABSOLUTE COUNT: 4.7 K/CU MM
SEGMENTED NEUTROPHILS RELATIVE PERCENT: 57.9 % (ref 36–66)
SODIUM BLD-SCNC: 136 MMOL/L (ref 135–145)
SPECIFIC GRAVITY UA: 1 (ref 1–1.03)
TOTAL IMMATURE NEUTOROPHIL: 0.01 K/CU MM
TOTAL NUCLEATED RBC: 0 K/CU MM
TRICHOMONAS: ABNORMAL /HPF
URINE TOTAL PROTEIN: 4 MG/DL
UROBILINOGEN, URINE: NORMAL MG/DL (ref 0.2–1)
WBC # BLD: 8.2 K/CU MM (ref 4–10.5)
WBC UA: <1 /HPF (ref 0–5)

## 2017-11-24 PROCEDURE — 99214 OFFICE O/P EST MOD 30 MIN: CPT | Performed by: FAMILY MEDICINE

## 2017-11-24 RX ORDER — TRAZODONE HYDROCHLORIDE 50 MG/1
50 TABLET ORAL NIGHTLY
Qty: 30 TABLET | Refills: 0 | Status: SHIPPED | OUTPATIENT
Start: 2017-11-24 | End: 2017-12-21 | Stop reason: SDUPTHER

## 2017-11-24 ASSESSMENT — ENCOUNTER SYMPTOMS
EYE DISCHARGE: 0
SINUS PRESSURE: 0
SORE THROAT: 0
SHORTNESS OF BREATH: 0
ABDOMINAL PAIN: 1
DIARRHEA: 1
BACK PAIN: 0
COUGH: 0
NAUSEA: 0
VOMITING: 0

## 2017-11-24 NOTE — PATIENT INSTRUCTIONS
do anything that might cause an accident if you are not fully alert. Never play a relaxation tape while driving a car. When should you call for help? Call 911 anytime you think you may need emergency care. For example, call if:  · You feel you cannot stop from hurting yourself or someone else. Watch closely for changes in your health, and be sure to contact your doctor if:  · Your panic attacks get worse. · You have new or different anxiety. · You are not getting better as expected. Where can you learn more? Go to https://[employer]. org and sign in to your [employer] account. Enter H601 in the [employer] box to learn more about \"Panic Attacks: Care Instructions. \"     If you do not have an account, please click on the \"Sign Up Now\" link. Current as of: July 26, 2016  Content Version: 11.3  © 6244-6286 [employer], [employer]. Care instructions adapted under license by [facility] ([facility]). If you have questions about a medical condition or this instruction, always ask your healthcare professional. [address] any warranty or liability for your use of this information. We are committed to providing you the best care possible. If you receive a survey after visiting one of our offices, please take time to share your experience concerning your physician office visit. These surveys are confidential and no health information about you is shared. We are eager to improve for you and we are counting on your feedback to help make that happen.

## 2017-11-24 NOTE — PROGRESS NOTES
behavior is normal. Judgment and thought content normal.         ASSESSMENT/ PLAN:    1. Panic attack  Patient reports feeling better today since getting through the holiday. Will continue with Abilify and Buspar for now. 2. Insomnia due to other mental disorder  Patient had been on Trazodone in the past and it has worked well. Will follow up with Dr. Ally Fairbanks in a couple of weeks. Will call early next week to see how she is doing.   - traZODone (DESYREL) 50 MG tablet; Take 1 tablet by mouth nightly  Dispense: 30 tablet; Refill: 0        No orders of the defined types were placed in this encounter. No Follow-up on file.

## 2017-11-28 PROBLEM — R60.1 GENERALIZED EDEMA: Status: ACTIVE | Noted: 2017-11-28

## 2017-12-01 ENCOUNTER — OFFICE VISIT (OUTPATIENT)
Dept: INTERNAL MEDICINE CLINIC | Age: 77
End: 2017-12-01

## 2017-12-01 ENCOUNTER — CARE COORDINATOR VISIT (OUTPATIENT)
Dept: CARE COORDINATION | Age: 77
End: 2017-12-01

## 2017-12-01 VITALS
WEIGHT: 139.2 LBS | RESPIRATION RATE: 16 BRPM | SYSTOLIC BLOOD PRESSURE: 122 MMHG | TEMPERATURE: 98 F | HEART RATE: 78 BPM | DIASTOLIC BLOOD PRESSURE: 62 MMHG | BODY MASS INDEX: 23.16 KG/M2

## 2017-12-01 DIAGNOSIS — F41.9 ANXIETY AND DEPRESSION: ICD-10-CM

## 2017-12-01 DIAGNOSIS — E87.6 HYPOKALEMIA: ICD-10-CM

## 2017-12-01 DIAGNOSIS — G89.4 CHRONIC PAIN SYNDROME: ICD-10-CM

## 2017-12-01 DIAGNOSIS — J44.1 COPD EXACERBATION (HCC): Primary | ICD-10-CM

## 2017-12-01 DIAGNOSIS — F32.A ANXIETY AND DEPRESSION: ICD-10-CM

## 2017-12-01 PROCEDURE — 99214 OFFICE O/P EST MOD 30 MIN: CPT | Performed by: INTERNAL MEDICINE

## 2017-12-01 RX ORDER — POTASSIUM CHLORIDE 750 MG/1
TABLET, EXTENDED RELEASE ORAL
Qty: 90 TABLET | Refills: 3 | Status: SHIPPED | OUTPATIENT
Start: 2017-12-01 | End: 2018-04-10 | Stop reason: SDUPTHER

## 2017-12-01 RX ORDER — POTASSIUM CHLORIDE 750 MG/1
TABLET, EXTENDED RELEASE ORAL
Qty: 10 TABLET | Refills: 3 | Status: SHIPPED | OUTPATIENT
Start: 2017-12-01 | End: 2017-12-01 | Stop reason: SDUPTHER

## 2017-12-01 RX ORDER — AZITHROMYCIN 250 MG/1
TABLET, FILM COATED ORAL
Qty: 1 PACKET | Refills: 0 | Status: SHIPPED | OUTPATIENT
Start: 2017-12-01 | End: 2017-12-11

## 2017-12-01 RX ORDER — METOPROLOL TARTRATE 50 MG/1
50 TABLET, FILM COATED ORAL 2 TIMES DAILY
Qty: 120 TABLET | Refills: 1 | Status: SHIPPED | OUTPATIENT
Start: 2017-12-01 | End: 2018-04-02 | Stop reason: SDUPTHER

## 2017-12-01 NOTE — CARE COORDINATION
Ambulatory Care Coordination Note  CM Risk Score: 15  Sandoval Mortality Risk Score: 21.62    ACC: Deniz Hyman RN    Summary Note:   Met with pt and spouse at pcp ov. Pt has had mild sore throat and is \"coighing up yellow suff\" and made apptmt to be seen by pcp. Sleeping a lot (pt reports 16 hrs/day), denies weakness. Pt continues to be smoke free. Will continue to follow to support and educate. Care Coordination Interventions    Program Enrollment:  Complex Care  Referral from Primary Care Provider:  No  Suggested Interventions and Community Resources  Fall Risk Prevention: In Process  Smoking Cessation: In Process  Zone Management Tools: In Process           Prior to Admission medications    Medication Sig Start Date End Date Taking? Authorizing Provider   metoprolol tartrate (LOPRESSOR) 50 MG tablet Take 1 tablet by mouth 2 times daily 12/1/17   Viktoria Holland MD   potassium chloride (KLOR-CON M) 10 MEQ extended release tablet 2 tablets in AM and one In PM 12/1/17   Viktoria Holland MD   traZODone (DESYREL) 50 MG tablet Take 1 tablet by mouth nightly 11/24/17   Triny Morton MD   PROAIR  (90 Base) MCG/ACT inhaler INHALE 2 PUFFS BY ORAL INHALATION EVERY 4 TO 6 HOURS AS NEEDED 10/27/17   Sherry Ramirez MD   esomeprazole (NEXIUM) 40 MG delayed release capsule Take 40 mg by mouth every morning (before breakfast)    Historical Provider, MD   oxyCODONE-acetaminophen (PERCOCET) 7.5-325 MG per tablet One half tablet three times daily prn .     Historical Provider, MD   ranitidine (ZANTAC) 300 MG tablet Take 1 tablet by mouth nightly 10/27/17   Viktoria Holland MD   losartan (COZAAR) 50 MG tablet Take 1 tablet by mouth daily 10/23/17   Viktoria Holland MD   fluticasone [facility]) 50 MCG/ACT nasal spray 1 spray by Nasal route daily 10/1/17   Rocio Faustin MD   atorvastatin (LIPITOR) 10 MG tablet Take 1 tablet by mouth every evening 9/11/17   Viktoria Holland MD   furosemide (LASIX) 40 MG tablet Take 1 tablet by mouth 2 times daily 8/22/17   Patsy Powers MD   mometasone-formoterol [facility]) 200-5 MCG/ACT inhaler Inhale 2 puffs into the lungs every 12 hours 5/12/17   Jovany Lacy MD   Umeclidinium Bromide (INCRUSE ELLIPTA) 62.5 MCG/INH AEPB Inhale 1 applicator into the lungs daily 5/12/17   Jovany Lacy MD   vitamin B-12 (CYANOCOBALAMIN) 1000 MCG tablet Take 1,000 mcg by mouth daily. Historical Provider, MD   DULoxetine (CYMBALTA) 60 MG capsule Take 60 mg by mouth daily. Historical Provider, MD   Calcium Citrate-Vitamin D (CITRACAL + D PO) Take 600 mg by mouth 2 times daily.     Historical Provider, MD       Future Appointments  Date Time Provider Ijeoma Suzette   1/30/2018 1:00 PM Jovany Lacy MD [facility]   2/1/2018 1:00 PM [facility] Powers [facility]

## 2017-12-01 NOTE — PROGRESS NOTES
INHALE 2 PUFFS BY ORAL INHALATION EVERY 4 TO 6 HOURS AS NEEDED 8.5 g 10    esomeprazole (NEXIUM) 40 MG delayed release capsule Take 40 mg by mouth every morning (before breakfast)      oxyCODONE-acetaminophen (PERCOCET) 7.5-325 MG per tablet One half tablet three times daily prn .  ranitidine (ZANTAC) 300 MG tablet Take 1 tablet by mouth nightly 30 tablet 3    losartan (COZAAR) 50 MG tablet Take 1 tablet by mouth daily 90 tablet 1    fluticasone (FLONASE) 50 MCG/ACT nasal spray 1 spray by Nasal route daily 1 Bottle 3    atorvastatin (LIPITOR) 10 MG tablet Take 1 tablet by mouth every evening 90 tablet 1    furosemide (LASIX) 40 MG tablet Take 1 tablet by mouth 2 times daily 60 tablet 5    metoprolol tartrate (LOPRESSOR) 50 MG tablet Take 1 tablet by mouth 2 times daily 120 tablet 1    mometasone-formoterol (DULERA) 200-5 MCG/ACT inhaler Inhale 2 puffs into the lungs every 12 hours 1 Inhaler 12    Umeclidinium Bromide (INCRUSE ELLIPTA) 62.5 MCG/INH AEPB Inhale 1 applicator into the lungs daily 1 each 11    vitamin B-12 (CYANOCOBALAMIN) 1000 MCG tablet Take 1,000 mcg by mouth daily.  DULoxetine (CYMBALTA) 60 MG capsule Take 60 mg by mouth daily.  Calcium Citrate-Vitamin D (CITRACAL + D PO) Take 600 mg by mouth 2 times daily. No current facility-administered medications for this visit.         Past Medical History:   Diagnosis Date    Acute DVT of right tibial vein ([address] 07/2017    ER imaging: distal right tibial vein DVT; no anticoag for bleeding/ anemia    Chronic pain syndrome     Low back pain; lumbar disc disease    Clostridium difficile colitis 09/20/2017    COPD, severe ([address] 10/24/2017    Depression     Fibromyalgia     Former smoker     Herniated cervical disc 5-1998    Herpes zoster ophthalmicus 3/2012    Hyperlipidemia     Hypertension     L3 vertebral fracture ([address] 2010    vertebroplasty    Lumbar spinal stenosis 2015    moderately severe by MRI    Migraine     Osteoporosis 2010    Fragility Fx L3    Rheumatoid arthritis(714.0) 1976    Dr Elan Figueroa S/P cardiac catheterization 05/2017    patent coronaries; Takotsubo; humberto [facility]    Smoker 03/13/2017    Takotsubo syndrome 05/2017    TMJ dysfunction     Vitamin B12 deficiency 12/2014    B12 level 199        Social History   Substance Use Topics    Smoking status: Former Smoker     Packs/day: 0.75     Years: 55.00     Types: Cigarettes     Start date: 5/26/1962     Quit date: 9/11/2017    Smokeless tobacco: Never Used      Comment: Stopped smoking for approx 20 years, restarted back up for a year    Alcohol use No        ROS: The patient has had no headache, sore throat, fever or chills, dysuria, sensory change, dyspnea, chest pain, nausea, vomiting or diarrhea, or edema. Objective:      /62   Pulse 78   Temp 98 °F (36.7 °C)   Resp 16   Wt 139 lb 3.2 oz (63.1 kg)   BMI 23.16 kg/m²    General: in no apparent distress   The patient's neck is free of nodes. Lungs : mild variable exp rhonchi; .  Heart is normal in rate and regular in rhythm. Legs are free of edema. No rash or erythema. Labs rev'd     Assessment / Plan:      1. COPD exacerbation ([address]    2. Hypokalemia    3. Anxiety and depression    4.  Chronic pain syndrome            Plan   zpak   increas kdur to 3/d  Stop abilify and tapere and stop buspar over a month  F/u with DOC pain t, sandhya, et al  RTC 2 mo    She remains free of cigs since sept

## 2017-12-06 ENCOUNTER — CARE COORDINATION (OUTPATIENT)
Dept: CARE COORDINATION | Age: 77
End: 2017-12-06

## 2017-12-21 DIAGNOSIS — F99 INSOMNIA DUE TO OTHER MENTAL DISORDER: ICD-10-CM

## 2017-12-21 DIAGNOSIS — F51.05 INSOMNIA DUE TO OTHER MENTAL DISORDER: ICD-10-CM

## 2017-12-21 RX ORDER — TRAZODONE HYDROCHLORIDE 50 MG/1
50 TABLET ORAL NIGHTLY
Qty: 30 TABLET | Refills: 0 | Status: SHIPPED | OUTPATIENT
Start: 2017-12-21 | End: 2018-01-18 | Stop reason: SDUPTHER

## 2018-01-18 DIAGNOSIS — F51.05 INSOMNIA DUE TO OTHER MENTAL DISORDER: ICD-10-CM

## 2018-01-18 DIAGNOSIS — F99 INSOMNIA DUE TO OTHER MENTAL DISORDER: ICD-10-CM

## 2018-01-18 RX ORDER — TRAZODONE HYDROCHLORIDE 50 MG/1
50 TABLET ORAL NIGHTLY
Qty: 30 TABLET | Refills: 0 | Status: SHIPPED | OUTPATIENT
Start: 2018-01-18 | End: 2018-02-21 | Stop reason: SDUPTHER

## 2018-01-29 ENCOUNTER — HOSPITAL ENCOUNTER (OUTPATIENT)
Dept: GENERAL RADIOLOGY | Age: 78
Discharge: HOME | End: 2018-01-29
Attending: INTERNAL MEDICINE | Admitting: INTERNAL MEDICINE

## 2018-01-29 LAB
ANION GAP SERPL CALCULATED.3IONS-SCNC: 16 MMOL/L (ref 4–16)
BASOPHILS ABSOLUTE: 0.1 K/CU MM
BASOPHILS RELATIVE PERCENT: 0.8 % (ref 0–1)
BUN BLDV-MCNC: 11 MG/DL (ref 6–23)
CALCIUM SERPL-MCNC: 9.4 MG/DL (ref 8.3–10.6)
CHLORIDE BLD-SCNC: 92 MMOL/L (ref 99–110)
CO2: 29 MMOL/L (ref 21–32)
CREAT SERPL-MCNC: 0.7 MG/DL (ref 0.6–1.1)
DIFFERENTIAL TYPE: ABNORMAL
EOSINOPHILS ABSOLUTE: 0.2 K/CU MM
EOSINOPHILS RELATIVE PERCENT: 2.5 % (ref 0–3)
GFR AFRICAN AMERICAN: >60 ML/MIN/1.73M2
GFR NON-AFRICAN AMERICAN: >60 ML/MIN/1.73M2
GLUCOSE BLD-MCNC: 90 MG/DL (ref 70–99)
HCT VFR BLD CALC: 36.6 % (ref 37–47)
HEMOGLOBIN: 11.2 GM/DL (ref 12.5–16)
IMMATURE NEUTROPHIL %: 0.3 % (ref 0–0.43)
LYMPHOCYTES ABSOLUTE: 2.2 K/CU MM
LYMPHOCYTES RELATIVE PERCENT: 27 % (ref 24–44)
MCH RBC QN AUTO: 24.3 PG (ref 27–31)
MCHC RBC AUTO-ENTMCNC: 30.6 % (ref 32–36)
MCV RBC AUTO: 79.6 FL (ref 78–100)
MONOCYTES ABSOLUTE: 1 K/CU MM
MONOCYTES RELATIVE PERCENT: 12.3 % (ref 0–4)
NUCLEATED RBC %: 0 %
PDW BLD-RTO: 17 % (ref 11.7–14.9)
PLATELET # BLD: 337 K/CU MM (ref 140–440)
PMV BLD AUTO: 11.6 FL (ref 7.5–11.1)
POTASSIUM SERPL-SCNC: 4.6 MMOL/L (ref 3.5–5.1)
RBC # BLD: 4.6 M/CU MM (ref 4.2–5.4)
SEGMENTED NEUTROPHILS ABSOLUTE COUNT: 4.6 K/CU MM
SEGMENTED NEUTROPHILS RELATIVE PERCENT: 57.1 % (ref 36–66)
SODIUM BLD-SCNC: 137 MMOL/L (ref 135–145)
TOTAL IMMATURE NEUTOROPHIL: 0.02 K/CU MM
TOTAL NUCLEATED RBC: 0 K/CU MM
WBC # BLD: 8 K/CU MM (ref 4–10.5)

## 2018-02-01 ENCOUNTER — OFFICE VISIT (OUTPATIENT)
Dept: INTERNAL MEDICINE CLINIC | Age: 78
End: 2018-02-01

## 2018-02-01 VITALS
WEIGHT: 138 LBS | DIASTOLIC BLOOD PRESSURE: 80 MMHG | RESPIRATION RATE: 16 BRPM | HEART RATE: 60 BPM | BODY MASS INDEX: 22.96 KG/M2 | SYSTOLIC BLOOD PRESSURE: 142 MMHG

## 2018-02-01 DIAGNOSIS — J42 CHRONIC BRONCHITIS, UNSPECIFIED CHRONIC BRONCHITIS TYPE (HCC): Chronic | ICD-10-CM

## 2018-02-01 DIAGNOSIS — F33.42 RECURRENT MAJOR DEPRESSIVE DISORDER, IN FULL REMISSION (HCC): Primary | ICD-10-CM

## 2018-02-01 DIAGNOSIS — M06.9 RHEUMATOID ARTHRITIS INVOLVING MULTIPLE JOINTS (HCC): ICD-10-CM

## 2018-02-01 DIAGNOSIS — M79.7 FIBROMYALGIA: ICD-10-CM

## 2018-02-01 PROBLEM — E87.1 HYPONATREMIA: Status: RESOLVED | Noted: 2017-09-21 | Resolved: 2018-02-01

## 2018-02-01 PROBLEM — A04.72 CLOSTRIDIUM DIFFICILE COLITIS: Status: RESOLVED | Noted: 2017-09-20 | Resolved: 2018-02-01

## 2018-02-01 PROBLEM — Z87.891 FORMER SMOKER: Status: ACTIVE | Noted: 2017-03-13

## 2018-02-01 PROBLEM — J96.02 ACUTE RESPIRATORY FAILURE WITH HYPOXIA AND HYPERCAPNIA (HCC): Status: RESOLVED | Noted: 2017-09-28 | Resolved: 2018-02-01

## 2018-02-01 PROBLEM — J96.01 ACUTE RESPIRATORY FAILURE WITH HYPOXIA AND HYPERCAPNIA (HCC): Status: RESOLVED | Noted: 2017-09-28 | Resolved: 2018-02-01

## 2018-02-01 PROBLEM — R60.1 GENERALIZED EDEMA: Status: RESOLVED | Noted: 2017-11-28 | Resolved: 2018-02-01

## 2018-02-01 PROBLEM — A04.72 C. DIFFICILE COLITIS: Status: RESOLVED | Noted: 2017-09-21 | Resolved: 2018-02-01

## 2018-02-01 PROCEDURE — 99214 OFFICE O/P EST MOD 30 MIN: CPT | Performed by: INTERNAL MEDICINE

## 2018-02-01 RX ORDER — OXYCODONE AND ACETAMINOPHEN 7.5; 325 MG/1; MG/1
1 TABLET ORAL 4 TIMES DAILY
COMMUNITY
End: 2018-06-18 | Stop reason: DRUGHIGH

## 2018-02-01 RX ORDER — LEFLUNOMIDE 20 MG/1
20 TABLET ORAL DAILY
COMMUNITY
End: 2018-06-18 | Stop reason: ALTCHOICE

## 2018-02-01 NOTE — PROGRESS NOTES
Fragility Fx L3    Rheumatoid arthritis(714.0) 1976    Dr John Mcgill S/P cardiac catheterization 05/2017    patent coronaries; Takotsubo; humberto Ahmed    Smoker 03/13/2017    Takotsubo syndrome 05/2017    TMJ dysfunction     Vitamin B12 deficiency 12/2014    B12 level 199        Social History   Substance Use Topics    Smoking status: Former Smoker     Packs/day: 0.75     Years: 55.00     Types: Cigarettes     Start date: 5/26/1962     Quit date: 9/11/2017    Smokeless tobacco: Never Used      Comment: Stopped smoking for approx 20 years, restarted back up for a year    Alcohol use No        ROS: The patient has had no headache, sore throat, fever or chills, cough, dyspnea, chest pain, nausea, vomiting or diarrhea, or edema. Objective:      BP (!) 142/80   Pulse 60   Resp 16   Wt 138 lb (62.6 kg)   BMI 22.96 kg/m²    General: in no apparent distress   The patient's neck is free of nodes. Lungs are clear. Heart is normal in rate and regular in rhythm. Legs are free of edema. No rash or erythema. Odin lab is satisfacotory       Assessment / Plan:      1. Recurrent major depressive disorder, in full remission ([address]    2. Rheumatoid arthritis involving multiple joints (HCC)    3. Chronic bronchitis, unspecified chronic bronchitis type ([address]    4.  Fibromyalgia            Plan   F/u Monjot and pain mgt and Radha Crockett  Try voltaren gel bid to hands  RTC 2 mo  Due for reclast in May    More bp med if bp up

## 2018-02-06 ENCOUNTER — OFFICE VISIT (OUTPATIENT)
Dept: PULMONOLOGY | Age: 78
End: 2018-02-06

## 2018-02-06 VITALS
RESPIRATION RATE: 20 BRPM | OXYGEN SATURATION: 96 % | HEART RATE: 74 BPM | BODY MASS INDEX: 22.02 KG/M2 | HEIGHT: 66 IN | WEIGHT: 137 LBS | SYSTOLIC BLOOD PRESSURE: 132 MMHG | DIASTOLIC BLOOD PRESSURE: 80 MMHG

## 2018-02-06 DIAGNOSIS — J44.9 COPD, SEVERE (HCC): Primary | ICD-10-CM

## 2018-02-06 PROCEDURE — 99213 OFFICE O/P EST LOW 20 MIN: CPT | Performed by: INTERNAL MEDICINE

## 2018-02-06 PROCEDURE — G8399 PT W/DXA RESULTS DOCUMENT: HCPCS | Performed by: INTERNAL MEDICINE

## 2018-02-06 PROCEDURE — G8420 CALC BMI NORM PARAMETERS: HCPCS | Performed by: INTERNAL MEDICINE

## 2018-02-06 PROCEDURE — 1090F PRES/ABSN URINE INCON ASSESS: CPT | Performed by: INTERNAL MEDICINE

## 2018-02-06 PROCEDURE — 4040F PNEUMOC VAC/ADMIN/RCVD: CPT | Performed by: INTERNAL MEDICINE

## 2018-02-06 PROCEDURE — 1123F ACP DISCUSS/DSCN MKR DOCD: CPT | Performed by: INTERNAL MEDICINE

## 2018-02-06 PROCEDURE — 3023F SPIROM DOC REV: CPT | Performed by: INTERNAL MEDICINE

## 2018-02-06 PROCEDURE — G8427 DOCREV CUR MEDS BY ELIG CLIN: HCPCS | Performed by: INTERNAL MEDICINE

## 2018-02-06 PROCEDURE — 1036F TOBACCO NON-USER: CPT | Performed by: INTERNAL MEDICINE

## 2018-02-06 PROCEDURE — G8926 SPIRO NO PERF OR DOC: HCPCS | Performed by: INTERNAL MEDICINE

## 2018-02-06 PROCEDURE — G8482 FLU IMMUNIZE ORDER/ADMIN: HCPCS | Performed by: INTERNAL MEDICINE

## 2018-02-06 RX ORDER — FLUTICASONE FUROATE AND VILANTEROL 100; 25 UG/1; UG/1
1 POWDER RESPIRATORY (INHALATION) DAILY
Qty: 1 EACH | Refills: 11 | Status: SHIPPED | OUTPATIENT
Start: 2018-02-06 | End: 2019-02-02 | Stop reason: SDUPTHER

## 2018-02-09 ENCOUNTER — CARE COORDINATION (OUTPATIENT)
Dept: INTERNAL MEDICINE CLINIC | Age: 78
End: 2018-02-09

## 2018-02-15 ENCOUNTER — CARE COORDINATION (OUTPATIENT)
Dept: CARE COORDINATION | Age: 78
End: 2018-02-15

## 2018-02-21 DIAGNOSIS — F99 INSOMNIA DUE TO OTHER MENTAL DISORDER: ICD-10-CM

## 2018-02-21 DIAGNOSIS — F51.05 INSOMNIA DUE TO OTHER MENTAL DISORDER: ICD-10-CM

## 2018-02-21 RX ORDER — TRAZODONE HYDROCHLORIDE 50 MG/1
50 TABLET ORAL NIGHTLY
Qty: 30 TABLET | Refills: 5 | Status: SHIPPED | OUTPATIENT
Start: 2018-02-21 | End: 2018-08-16 | Stop reason: SDUPTHER

## 2018-03-21 RX ORDER — FUROSEMIDE 40 MG/1
40 TABLET ORAL 2 TIMES DAILY
Qty: 60 TABLET | Refills: 5 | Status: SHIPPED | OUTPATIENT
Start: 2018-03-21 | End: 2018-09-19 | Stop reason: SDUPTHER

## 2018-04-02 ENCOUNTER — OFFICE VISIT (OUTPATIENT)
Dept: INTERNAL MEDICINE CLINIC | Age: 78
End: 2018-04-02

## 2018-04-02 VITALS
RESPIRATION RATE: 16 BRPM | WEIGHT: 140 LBS | HEART RATE: 60 BPM | BODY MASS INDEX: 22.6 KG/M2 | SYSTOLIC BLOOD PRESSURE: 142 MMHG | DIASTOLIC BLOOD PRESSURE: 80 MMHG

## 2018-04-02 DIAGNOSIS — M54.16 CHRONIC LUMBAR RADICULOPATHY: ICD-10-CM

## 2018-04-02 DIAGNOSIS — M06.9 RHEUMATOID ARTHRITIS, INVOLVING UNSPECIFIED SITE, UNSPECIFIED RHEUMATOID FACTOR PRESENCE: ICD-10-CM

## 2018-04-02 DIAGNOSIS — M79.7 FIBROMYALGIA: ICD-10-CM

## 2018-04-02 DIAGNOSIS — I10 ESSENTIAL HYPERTENSION: Primary | ICD-10-CM

## 2018-04-02 PROCEDURE — G8427 DOCREV CUR MEDS BY ELIG CLIN: HCPCS | Performed by: INTERNAL MEDICINE

## 2018-04-02 PROCEDURE — 1036F TOBACCO NON-USER: CPT | Performed by: INTERNAL MEDICINE

## 2018-04-02 PROCEDURE — G8399 PT W/DXA RESULTS DOCUMENT: HCPCS | Performed by: INTERNAL MEDICINE

## 2018-04-02 PROCEDURE — G8420 CALC BMI NORM PARAMETERS: HCPCS | Performed by: INTERNAL MEDICINE

## 2018-04-02 PROCEDURE — 1123F ACP DISCUSS/DSCN MKR DOCD: CPT | Performed by: INTERNAL MEDICINE

## 2018-04-02 PROCEDURE — 1090F PRES/ABSN URINE INCON ASSESS: CPT | Performed by: INTERNAL MEDICINE

## 2018-04-02 PROCEDURE — 4040F PNEUMOC VAC/ADMIN/RCVD: CPT | Performed by: INTERNAL MEDICINE

## 2018-04-02 PROCEDURE — 99213 OFFICE O/P EST LOW 20 MIN: CPT | Performed by: INTERNAL MEDICINE

## 2018-04-02 RX ORDER — METOPROLOL TARTRATE 50 MG/1
50 TABLET, FILM COATED ORAL 2 TIMES DAILY
Qty: 120 TABLET | Refills: 11 | Status: SHIPPED | OUTPATIENT
Start: 2018-04-02 | End: 2019-05-28 | Stop reason: SDUPTHER

## 2018-04-02 RX ORDER — LOSARTAN POTASSIUM 100 MG/1
100 TABLET ORAL DAILY
Qty: 30 TABLET | Refills: 5 | Status: SHIPPED | OUTPATIENT
Start: 2018-04-02 | End: 2018-09-19 | Stop reason: SDUPTHER

## 2018-04-09 RX ORDER — ATORVASTATIN CALCIUM 10 MG/1
10 TABLET, FILM COATED ORAL EVERY EVENING
Qty: 90 TABLET | Refills: 1 | Status: SHIPPED | OUTPATIENT
Start: 2018-04-09 | End: 2018-07-09 | Stop reason: SDUPTHER

## 2018-04-10 RX ORDER — POTASSIUM CHLORIDE 750 MG/1
TABLET, FILM COATED, EXTENDED RELEASE ORAL
Qty: 90 TABLET | Refills: 3 | Status: SHIPPED | OUTPATIENT
Start: 2018-04-10 | End: 2018-07-09 | Stop reason: SDUPTHER

## 2018-04-13 ENCOUNTER — CARE COORDINATION (OUTPATIENT)
Dept: CARE COORDINATION | Age: 78
End: 2018-04-13

## 2018-05-15 ENCOUNTER — NURSE ONLY (OUTPATIENT)
Dept: PULMONOLOGY | Age: 78
End: 2018-05-15

## 2018-05-15 DIAGNOSIS — J44.9 COPD, SEVERE (HCC): ICD-10-CM

## 2018-05-15 LAB
DLCO %PRED: NORMAL
DLCO PRE: NORMAL
FEF 25-75%-POST: 0.62
FEF 25-75%-PRE: 0.55
FEV1-POST: 1.23
FEV1-PRE: 1.23
FEV1/FVC-POST: 65.9
FEV1/FVC-PRE: 63
FVC-POST: 1.86
FVC-PRE: 1.96
MEP: NORMAL
MIP: NORMAL
TLC %PRED: NORMAL
TLC PRE: NORMAL

## 2018-05-15 PROCEDURE — 94060 EVALUATION OF WHEEZING: CPT | Performed by: INTERNAL MEDICINE

## 2018-05-15 ASSESSMENT — PULMONARY FUNCTION TESTS
FVC_POST: 1.86
FEV1/FVC_PRE: 63.0
FEV1_PRE: 1.23
FEV1/FVC_POST: 65.9
FVC_PRE: 1.96
FEV1_POST: 1.23

## 2018-06-05 RX ORDER — UMECLIDINIUM 62.5 UG/1
AEROSOL, POWDER ORAL
Qty: 1 EACH | Refills: 11 | Status: ON HOLD | OUTPATIENT
Start: 2018-06-05 | End: 2021-01-06

## 2018-06-11 ENCOUNTER — OFFICE VISIT (OUTPATIENT)
Dept: PULMONOLOGY | Age: 78
End: 2018-06-11

## 2018-06-11 VITALS
WEIGHT: 145.8 LBS | BODY MASS INDEX: 23.43 KG/M2 | DIASTOLIC BLOOD PRESSURE: 66 MMHG | HEART RATE: 78 BPM | SYSTOLIC BLOOD PRESSURE: 124 MMHG | HEIGHT: 66 IN | OXYGEN SATURATION: 96 %

## 2018-06-11 DIAGNOSIS — Z87.891 FORMER SMOKER: ICD-10-CM

## 2018-06-11 DIAGNOSIS — J42 CHRONIC BRONCHITIS, UNSPECIFIED CHRONIC BRONCHITIS TYPE (HCC): Primary | Chronic | ICD-10-CM

## 2018-06-11 PROCEDURE — 3023F SPIROM DOC REV: CPT | Performed by: INTERNAL MEDICINE

## 2018-06-11 PROCEDURE — 99213 OFFICE O/P EST LOW 20 MIN: CPT | Performed by: INTERNAL MEDICINE

## 2018-06-11 PROCEDURE — G8427 DOCREV CUR MEDS BY ELIG CLIN: HCPCS | Performed by: INTERNAL MEDICINE

## 2018-06-11 PROCEDURE — 4040F PNEUMOC VAC/ADMIN/RCVD: CPT | Performed by: INTERNAL MEDICINE

## 2018-06-11 PROCEDURE — G8420 CALC BMI NORM PARAMETERS: HCPCS | Performed by: INTERNAL MEDICINE

## 2018-06-11 PROCEDURE — G8926 SPIRO NO PERF OR DOC: HCPCS | Performed by: INTERNAL MEDICINE

## 2018-06-11 PROCEDURE — 1123F ACP DISCUSS/DSCN MKR DOCD: CPT | Performed by: INTERNAL MEDICINE

## 2018-06-11 PROCEDURE — G8399 PT W/DXA RESULTS DOCUMENT: HCPCS | Performed by: INTERNAL MEDICINE

## 2018-06-11 PROCEDURE — 1090F PRES/ABSN URINE INCON ASSESS: CPT | Performed by: INTERNAL MEDICINE

## 2018-06-11 PROCEDURE — 1036F TOBACCO NON-USER: CPT | Performed by: INTERNAL MEDICINE

## 2018-06-12 ENCOUNTER — HOSPITAL ENCOUNTER (OUTPATIENT)
Dept: GENERAL RADIOLOGY | Age: 78
Discharge: HOME | End: 2018-06-12
Attending: INTERNAL MEDICINE | Admitting: INTERNAL MEDICINE

## 2018-06-12 LAB
ANION GAP SERPL CALCULATED.3IONS-SCNC: 12 MMOL/L (ref 4–16)
BUN BLDV-MCNC: 13 MG/DL (ref 6–23)
CALCIUM SERPL-MCNC: 9.5 MG/DL (ref 8.3–10.6)
CHLORIDE BLD-SCNC: 92 MMOL/L (ref 99–110)
CO2: 32 MMOL/L (ref 21–32)
CREAT SERPL-MCNC: 0.9 MG/DL (ref 0.6–1.1)
GFR AFRICAN AMERICAN: >60 ML/MIN/1.73M2
GFR NON-AFRICAN AMERICAN: >60 ML/MIN/1.73M2
GLUCOSE BLD-MCNC: 121 MG/DL (ref 70–99)
POTASSIUM SERPL-SCNC: 4.2 MMOL/L (ref 3.5–5.1)
SODIUM BLD-SCNC: 136 MMOL/L (ref 135–145)

## 2018-06-15 ENCOUNTER — CARE COORDINATION (OUTPATIENT)
Dept: CARE COORDINATION | Age: 78
End: 2018-06-15

## 2018-06-18 ENCOUNTER — OFFICE VISIT (OUTPATIENT)
Dept: INTERNAL MEDICINE CLINIC | Age: 78
End: 2018-06-18

## 2018-06-18 VITALS
WEIGHT: 145 LBS | HEART RATE: 60 BPM | DIASTOLIC BLOOD PRESSURE: 70 MMHG | RESPIRATION RATE: 16 BRPM | BODY MASS INDEX: 23.41 KG/M2 | SYSTOLIC BLOOD PRESSURE: 128 MMHG

## 2018-06-18 DIAGNOSIS — Z86.2 HISTORY OF ANEMIA: ICD-10-CM

## 2018-06-18 DIAGNOSIS — Z87.891 FORMER SMOKER: ICD-10-CM

## 2018-06-18 DIAGNOSIS — M81.0 OSTEOPOROSIS, UNSPECIFIED OSTEOPOROSIS TYPE, UNSPECIFIED PATHOLOGICAL FRACTURE PRESENCE: ICD-10-CM

## 2018-06-18 DIAGNOSIS — I10 ESSENTIAL HYPERTENSION: ICD-10-CM

## 2018-06-18 DIAGNOSIS — E78.2 MIXED HYPERLIPIDEMIA: ICD-10-CM

## 2018-06-18 DIAGNOSIS — J44.9 COPD, SEVERE (HCC): Primary | ICD-10-CM

## 2018-06-18 DIAGNOSIS — E53.8 VITAMIN B12 DEFICIENCY: ICD-10-CM

## 2018-06-18 PROCEDURE — 1123F ACP DISCUSS/DSCN MKR DOCD: CPT | Performed by: INTERNAL MEDICINE

## 2018-06-18 PROCEDURE — G8427 DOCREV CUR MEDS BY ELIG CLIN: HCPCS | Performed by: INTERNAL MEDICINE

## 2018-06-18 PROCEDURE — 1090F PRES/ABSN URINE INCON ASSESS: CPT | Performed by: INTERNAL MEDICINE

## 2018-06-18 PROCEDURE — 1036F TOBACCO NON-USER: CPT | Performed by: INTERNAL MEDICINE

## 2018-06-18 PROCEDURE — G8420 CALC BMI NORM PARAMETERS: HCPCS | Performed by: INTERNAL MEDICINE

## 2018-06-18 PROCEDURE — 3023F SPIROM DOC REV: CPT | Performed by: INTERNAL MEDICINE

## 2018-06-18 PROCEDURE — 4040F PNEUMOC VAC/ADMIN/RCVD: CPT | Performed by: INTERNAL MEDICINE

## 2018-06-18 PROCEDURE — 99214 OFFICE O/P EST MOD 30 MIN: CPT | Performed by: INTERNAL MEDICINE

## 2018-06-18 PROCEDURE — G8399 PT W/DXA RESULTS DOCUMENT: HCPCS | Performed by: INTERNAL MEDICINE

## 2018-06-18 PROCEDURE — G8926 SPIRO NO PERF OR DOC: HCPCS | Performed by: INTERNAL MEDICINE

## 2018-06-18 RX ORDER — PREDNISONE 10 MG/1
10 TABLET ORAL DAILY
COMMUNITY
End: 2019-04-01

## 2018-06-18 RX ORDER — OXYCODONE AND ACETAMINOPHEN 7.5; 325 MG/1; MG/1
1 TABLET ORAL 2 TIMES DAILY
COMMUNITY
End: 2018-09-17 | Stop reason: DRUGHIGH

## 2018-06-20 ENCOUNTER — CARE COORDINATION (OUTPATIENT)
Dept: CARE COORDINATION | Age: 78
End: 2018-06-20

## 2018-06-22 ENCOUNTER — HOSPITAL ENCOUNTER (OUTPATIENT)
Dept: OTHER | Age: 78
Discharge: HOME | End: 2018-08-25
Attending: INTERNAL MEDICINE | Admitting: INTERNAL MEDICINE

## 2018-07-09 RX ORDER — ATORVASTATIN CALCIUM 10 MG/1
10 TABLET, FILM COATED ORAL EVERY EVENING
Qty: 90 TABLET | Refills: 1 | Status: SHIPPED | OUTPATIENT
Start: 2018-07-09 | End: 2019-02-08 | Stop reason: SDUPTHER

## 2018-07-09 RX ORDER — POTASSIUM CHLORIDE 750 MG/1
TABLET, FILM COATED, EXTENDED RELEASE ORAL
Qty: 90 TABLET | Refills: 3 | Status: SHIPPED | OUTPATIENT
Start: 2018-07-09 | End: 2018-12-11 | Stop reason: SDUPTHER

## 2018-07-20 ENCOUNTER — CARE COORDINATION (OUTPATIENT)
Dept: CARE COORDINATION | Age: 78
End: 2018-07-20

## 2018-07-20 NOTE — CARE COORDINATION
ACC called pt and introduced self and role. ACC inquired if appointment with Pulmonary rehab and pt reports that they have been in contact, however pt has requested to wait to start the rehab for 6 to 8 weeks. Pt states that her spouse has been recently diagnosed w/ Liver CA and he is Going to start treatment in [place] so she wants to wait to see how he is doing. Pt states her breathing is at her baseline and she has no complaints at this time. Informed pt ACC will call pt to check on her in 1 to 2 weeks and pt is agreeable.

## 2018-08-02 ENCOUNTER — TELEPHONE (OUTPATIENT)
Dept: INTERNAL MEDICINE CLINIC | Age: 78
End: 2018-08-02

## 2018-08-02 DIAGNOSIS — Z12.39 BREAST CANCER SCREENING: ICD-10-CM

## 2018-08-02 DIAGNOSIS — M81.0 AGE-RELATED OSTEOPOROSIS WITHOUT CURRENT PATHOLOGICAL FRACTURE: Primary | ICD-10-CM

## 2018-08-02 RX ORDER — ZOLEDRONIC ACID 5 MG/100ML
5 INJECTION, SOLUTION INTRAVENOUS ONCE
Qty: 100 ML | Refills: 0 | Status: ON HOLD | OUTPATIENT
Start: 2018-08-02 | End: 2019-04-13 | Stop reason: HOSPADM

## 2018-08-02 RX ORDER — ZOLEDRONIC ACID 5 MG/100ML
5 INJECTION, SOLUTION INTRAVENOUS ONCE
Qty: 100 ML | Refills: 0 | Status: SHIPPED | OUTPATIENT
Start: 2018-08-02 | End: 2018-08-02 | Stop reason: SDUPTHER

## 2018-08-13 ENCOUNTER — HOSPITAL ENCOUNTER (OUTPATIENT)
Dept: WOMENS IMAGING | Age: 78
Discharge: HOME | End: 2018-08-13
Attending: INTERNAL MEDICINE | Admitting: INTERNAL MEDICINE

## 2018-08-13 DIAGNOSIS — Z12.39 BREAST CANCER SCREENING: ICD-10-CM

## 2018-08-15 ENCOUNTER — HOSPITAL ENCOUNTER (OUTPATIENT)
Dept: INFUSION THERAPY | Age: 78
Discharge: HOME | End: 2018-08-15
Attending: INTERNAL MEDICINE | Admitting: INTERNAL MEDICINE

## 2018-08-15 VITALS
WEIGHT: 145 LBS | DIASTOLIC BLOOD PRESSURE: 67 MMHG | HEIGHT: 65 IN | TEMPERATURE: 98.8 F | SYSTOLIC BLOOD PRESSURE: 149 MMHG | RESPIRATION RATE: 18 BRPM | OXYGEN SATURATION: 98 % | HEART RATE: 78 BPM | BODY MASS INDEX: 24.16 KG/M2

## 2018-08-15 RX ORDER — SODIUM CHLORIDE 0.9 % (FLUSH) 0.9 %
10 SYRINGE (ML) INJECTION PRN
Status: ACTIVE | OUTPATIENT
Start: 2018-08-15 | End: 2018-08-15

## 2018-08-15 RX ORDER — ZOLEDRONIC ACID 5 MG/100ML
5 INJECTION, SOLUTION INTRAVENOUS ONCE
Status: DISCONTINUED | OUTPATIENT
Start: 2018-08-15 | End: 2018-08-15

## 2018-08-15 RX ORDER — ZOLEDRONIC ACID 5 MG/100ML
5 INJECTION, SOLUTION INTRAVENOUS ONCE
Status: COMPLETED | OUTPATIENT
Start: 2018-08-15 | End: 2018-08-15

## 2018-08-15 RX ADMIN — ZOLEDRONIC ACID 5 MG: 5 INJECTION, SOLUTION INTRAVENOUS at 11:19

## 2018-08-15 ASSESSMENT — PAIN SCALES - GENERAL: PAINLEVEL_OUTOF10: 0

## 2018-08-16 DIAGNOSIS — F51.05 INSOMNIA DUE TO OTHER MENTAL DISORDER: ICD-10-CM

## 2018-08-16 DIAGNOSIS — F99 INSOMNIA DUE TO OTHER MENTAL DISORDER: ICD-10-CM

## 2018-08-16 RX ORDER — TRAZODONE HYDROCHLORIDE 50 MG/1
50 TABLET ORAL NIGHTLY
Qty: 30 TABLET | Refills: 6 | Status: SHIPPED | OUTPATIENT
Start: 2018-08-16 | End: 2019-03-15 | Stop reason: SDUPTHER

## 2018-09-06 ENCOUNTER — CARE COORDINATION (OUTPATIENT)
Dept: CARE COORDINATION | Age: 78
End: 2018-09-06

## 2018-09-11 ENCOUNTER — TELEPHONE (OUTPATIENT)
Dept: INTERNAL MEDICINE CLINIC | Age: 78
End: 2018-09-11

## 2018-09-11 NOTE — TELEPHONE ENCOUNTER
Patient called in stating that a nurse called her about possible colon cancer and she would like a call back to know what the next step needs to be please.

## 2018-09-11 NOTE — TELEPHONE ENCOUNTER
LILY for patient letting her know that she will be referred to Dr. Villalobos Reveal with the test results and their office should be calling her.

## 2018-09-12 ENCOUNTER — HOSPITAL ENCOUNTER (OUTPATIENT)
Dept: GENERAL RADIOLOGY | Age: 78
Discharge: HOME | End: 2018-09-12
Attending: INTERNAL MEDICINE | Admitting: INTERNAL MEDICINE

## 2018-09-12 LAB
ALBUMIN SERPL-MCNC: 4.2 GM/DL (ref 3.4–5)
ALP BLD-CCNC: 60 IU/L (ref 40–128)
ALT SERPL-CCNC: 10 U/L (ref 10–40)
ANION GAP SERPL CALCULATED.3IONS-SCNC: 15 MMOL/L (ref 4–16)
AST SERPL-CCNC: 17 IU/L (ref 15–37)
BASOPHILS ABSOLUTE: 0.1 K/CU MM
BASOPHILS RELATIVE PERCENT: 0.6 % (ref 0–1)
BILIRUB SERPL-MCNC: 0.5 MG/DL (ref 0–1)
BUN BLDV-MCNC: 10 MG/DL (ref 6–23)
CALCIUM SERPL-MCNC: 9.9 MG/DL (ref 8.3–10.6)
CHLORIDE BLD-SCNC: 93 MMOL/L (ref 99–110)
CHOLESTEROL: 216 MG/DL
CO2: 30 MMOL/L (ref 21–32)
CREAT SERPL-MCNC: 1 MG/DL (ref 0.6–1.1)
DIFFERENTIAL TYPE: ABNORMAL
DIFFERENTIAL TYPE: ABNORMAL
EOSINOPHILS ABSOLUTE: 0 K/CU MM
EOSINOPHILS RELATIVE PERCENT: 0.4 % (ref 0–3)
GFR AFRICAN AMERICAN: >60 ML/MIN/1.73M2
GFR NON-AFRICAN AMERICAN: 54 ML/MIN/1.73M2
GLUCOSE FASTING: 108 MG/DL (ref 70–99)
HCT VFR BLD CALC: 46.3 % (ref 37–47)
HDLC SERPL-MCNC: 47 MG/DL
HEMOGLOBIN: 13.1 GM/DL (ref 12.5–16)
IMMATURE NEUTROPHIL %: 0.4 % (ref 0–0.43)
LDL CHOLESTEROL DIRECT: 79 MG/DL
LYMPHOCYTES ABSOLUTE: 1.2 K/CU MM
LYMPHOCYTES RELATIVE PERCENT: 12.2 % (ref 24–44)
MCH RBC QN AUTO: 26.1 PG (ref 27–31)
MCHC RBC AUTO-ENTMCNC: 28.3 % (ref 32–36)
MCV RBC AUTO: 92.4 FL (ref 78–100)
MONOCYTES ABSOLUTE: 0.4 K/CU MM
MONOCYTES RELATIVE PERCENT: 4.1 % (ref 0–4)
NUCLEATED RBC %: 0 %
PLATELET # BLD: 325 K/CU MM (ref 140–440)
PMV BLD AUTO: 11.1 FL (ref 7.5–11.1)
POTASSIUM SERPL-SCNC: 3.7 MMOL/L (ref 3.5–5.1)
RBC # BLD: 5.01 M/CU MM (ref 4.2–5.4)
RBC # BLD: SLIGHT 10*6/UL
SEGMENTED NEUTROPHILS ABSOLUTE COUNT: 8.5 K/CU MM
SEGMENTED NEUTROPHILS ABSOLUTE COUNT: ABNORMAL
SEGMENTED NEUTROPHILS RELATIVE PERCENT: 82.3 % (ref 36–66)
SODIUM BLD-SCNC: 138 MMOL/L (ref 135–145)
TOTAL IMMATURE NEUTOROPHIL: 0.04 K/CU MM
TOTAL NUCLEATED RBC: 0 K/CU MM
TOTAL PROTEIN: 6.2 GM/DL (ref 6.4–8.2)
TRIGL SERPL-MCNC: 635 MG/DL
VITAMIN B-12: 866.2 PG/ML (ref 211–911)
VITAMIN D 25-HYDROXY: 33.63 NG/ML
WBC # BLD: 10.2 K/CU MM (ref 4–10.5)

## 2018-09-17 ENCOUNTER — OFFICE VISIT (OUTPATIENT)
Dept: INTERNAL MEDICINE CLINIC | Age: 78
End: 2018-09-17

## 2018-09-17 ENCOUNTER — CARE COORDINATION (OUTPATIENT)
Dept: CARE COORDINATION | Age: 78
End: 2018-09-17

## 2018-09-17 VITALS
RESPIRATION RATE: 16 BRPM | OXYGEN SATURATION: 95 % | SYSTOLIC BLOOD PRESSURE: 128 MMHG | WEIGHT: 150 LBS | BODY MASS INDEX: 24.96 KG/M2 | HEART RATE: 75 BPM | DIASTOLIC BLOOD PRESSURE: 76 MMHG

## 2018-09-17 DIAGNOSIS — I10 ESSENTIAL HYPERTENSION: Chronic | ICD-10-CM

## 2018-09-17 DIAGNOSIS — Z23 NEED FOR INFLUENZA VACCINATION: ICD-10-CM

## 2018-09-17 DIAGNOSIS — M81.0 AGE-RELATED OSTEOPOROSIS WITHOUT CURRENT PATHOLOGICAL FRACTURE: ICD-10-CM

## 2018-09-17 DIAGNOSIS — F17.200 SMOKER: ICD-10-CM

## 2018-09-17 DIAGNOSIS — M06.9 RHEUMATOID ARTHRITIS INVOLVING MULTIPLE JOINTS (HCC): ICD-10-CM

## 2018-09-17 DIAGNOSIS — J44.9 COPD, SEVERE (HCC): Primary | ICD-10-CM

## 2018-09-17 DIAGNOSIS — Z86.2 HISTORY OF IRON DEFICIENCY ANEMIA: ICD-10-CM

## 2018-09-17 PROCEDURE — 1123F ACP DISCUSS/DSCN MKR DOCD: CPT | Performed by: INTERNAL MEDICINE

## 2018-09-17 PROCEDURE — 1101F PT FALLS ASSESS-DOCD LE1/YR: CPT | Performed by: INTERNAL MEDICINE

## 2018-09-17 PROCEDURE — G8420 CALC BMI NORM PARAMETERS: HCPCS | Performed by: INTERNAL MEDICINE

## 2018-09-17 PROCEDURE — 90662 IIV NO PRSV INCREASED AG IM: CPT | Performed by: INTERNAL MEDICINE

## 2018-09-17 PROCEDURE — G0008 ADMIN INFLUENZA VIRUS VAC: HCPCS | Performed by: INTERNAL MEDICINE

## 2018-09-17 PROCEDURE — G8427 DOCREV CUR MEDS BY ELIG CLIN: HCPCS | Performed by: INTERNAL MEDICINE

## 2018-09-17 PROCEDURE — 4040F PNEUMOC VAC/ADMIN/RCVD: CPT | Performed by: INTERNAL MEDICINE

## 2018-09-17 PROCEDURE — G8399 PT W/DXA RESULTS DOCUMENT: HCPCS | Performed by: INTERNAL MEDICINE

## 2018-09-17 PROCEDURE — 1036F TOBACCO NON-USER: CPT | Performed by: INTERNAL MEDICINE

## 2018-09-17 PROCEDURE — G8926 SPIRO NO PERF OR DOC: HCPCS | Performed by: INTERNAL MEDICINE

## 2018-09-17 PROCEDURE — 3023F SPIROM DOC REV: CPT | Performed by: INTERNAL MEDICINE

## 2018-09-17 PROCEDURE — 99214 OFFICE O/P EST MOD 30 MIN: CPT | Performed by: INTERNAL MEDICINE

## 2018-09-17 PROCEDURE — 1090F PRES/ABSN URINE INCON ASSESS: CPT | Performed by: INTERNAL MEDICINE

## 2018-09-17 RX ORDER — OXYCODONE AND ACETAMINOPHEN 7.5; 325 MG/1; MG/1
1 TABLET ORAL 4 TIMES DAILY
Status: ON HOLD | COMMUNITY
End: 2020-02-16 | Stop reason: HOSPADM

## 2018-09-19 RX ORDER — FUROSEMIDE 40 MG/1
TABLET ORAL
Qty: 60 TABLET | Refills: 5 | Status: SHIPPED | OUTPATIENT
Start: 2018-09-19 | End: 2019-02-08 | Stop reason: SDUPTHER

## 2018-09-19 RX ORDER — LOSARTAN POTASSIUM 100 MG/1
TABLET ORAL
Qty: 30 TABLET | Refills: 5 | Status: SHIPPED | OUTPATIENT
Start: 2018-09-19 | End: 2019-03-13 | Stop reason: SDUPTHER

## 2018-10-01 ENCOUNTER — ANESTHESIA EVENT (OUTPATIENT)
Dept: OPERATING ROOM | Age: 78
End: 2018-10-01
Payer: MEDICARE

## 2018-10-01 ASSESSMENT — COPD QUESTIONNAIRES: CAT_SEVERITY: SEVERE

## 2018-10-01 ASSESSMENT — LIFESTYLE VARIABLES: SMOKING_STATUS: 1

## 2018-10-02 ENCOUNTER — ANESTHESIA (OUTPATIENT)
Dept: OPERATING ROOM | Age: 78
End: 2018-10-02
Payer: MEDICARE

## 2018-10-02 ENCOUNTER — HOSPITAL ENCOUNTER (OUTPATIENT)
Age: 78
Setting detail: OUTPATIENT SURGERY
Discharge: HOME | End: 2018-10-02
Attending: INTERNAL MEDICINE | Admitting: INTERNAL MEDICINE
Payer: MEDICARE

## 2018-10-02 VITALS
BODY MASS INDEX: 24.11 KG/M2 | SYSTOLIC BLOOD PRESSURE: 152 MMHG | WEIGHT: 150 LBS | TEMPERATURE: 96.9 F | OXYGEN SATURATION: 98 % | HEIGHT: 66 IN | RESPIRATION RATE: 16 BRPM | HEART RATE: 65 BPM | DIASTOLIC BLOOD PRESSURE: 64 MMHG

## 2018-10-02 VITALS
OXYGEN SATURATION: 97 % | DIASTOLIC BLOOD PRESSURE: 39 MMHG | RESPIRATION RATE: 20 BRPM | SYSTOLIC BLOOD PRESSURE: 128 MMHG

## 2018-10-02 PROCEDURE — 3700000000 HC ANESTHESIA ATTENDED CARE

## 2018-10-02 PROCEDURE — 3700000001 HC ADD 15 MINUTES (ANESTHESIA)

## 2018-10-02 PROCEDURE — 7100000011 HC PHASE II RECOVERY - ADDTL 15 MIN

## 2018-10-02 PROCEDURE — 3609009500 HC COLONOSCOPY DIAGNOSTIC OR SCREENING

## 2018-10-02 PROCEDURE — 2580000003 HC RX 258: Performed by: ANESTHESIOLOGY

## 2018-10-02 PROCEDURE — 6360000002 HC RX W HCPCS: Performed by: NURSE ANESTHETIST, CERTIFIED REGISTERED

## 2018-10-02 PROCEDURE — 7100000010 HC PHASE II RECOVERY - FIRST 15 MIN

## 2018-10-02 PROCEDURE — 2500000003 HC RX 250 WO HCPCS: Performed by: NURSE ANESTHETIST, CERTIFIED REGISTERED

## 2018-10-02 RX ORDER — SODIUM CHLORIDE, SODIUM LACTATE, POTASSIUM CHLORIDE, CALCIUM CHLORIDE 600; 310; 30; 20 MG/100ML; MG/100ML; MG/100ML; MG/100ML
INJECTION, SOLUTION INTRAVENOUS CONTINUOUS
Status: DISCONTINUED | OUTPATIENT
Start: 2018-10-02 | End: 2018-10-02 | Stop reason: HOSPADM

## 2018-10-02 RX ORDER — SODIUM CHLORIDE 0.9 % (FLUSH) 0.9 %
10 SYRINGE (ML) INJECTION PRN
Status: DISCONTINUED | OUTPATIENT
Start: 2018-10-02 | End: 2018-10-02 | Stop reason: HOSPADM

## 2018-10-02 RX ORDER — PROPOFOL 10 MG/ML
INJECTION, EMULSION INTRAVENOUS PRN
Status: DISCONTINUED | OUTPATIENT
Start: 2018-10-02 | End: 2018-10-02 | Stop reason: SDUPTHER

## 2018-10-02 RX ORDER — LIDOCAINE HYDROCHLORIDE 20 MG/ML
INJECTION, SOLUTION EPIDURAL; INFILTRATION; INTRACAUDAL; PERINEURAL PRN
Status: DISCONTINUED | OUTPATIENT
Start: 2018-10-02 | End: 2018-10-02 | Stop reason: SDUPTHER

## 2018-10-02 RX ORDER — SODIUM CHLORIDE 0.9 % (FLUSH) 0.9 %
10 SYRINGE (ML) INJECTION EVERY 12 HOURS SCHEDULED
Status: DISCONTINUED | OUTPATIENT
Start: 2018-10-02 | End: 2018-10-02 | Stop reason: HOSPADM

## 2018-10-02 RX ADMIN — PROPOFOL 60 MG: 10 INJECTION, EMULSION INTRAVENOUS at 08:52

## 2018-10-02 RX ADMIN — LIDOCAINE HYDROCHLORIDE 100 MG: 20 INJECTION, SOLUTION EPIDURAL; INFILTRATION; INTRACAUDAL; PERINEURAL at 08:52

## 2018-10-02 RX ADMIN — PROPOFOL 40 MG: 10 INJECTION, EMULSION INTRAVENOUS at 08:54

## 2018-10-02 RX ADMIN — PROPOFOL 25 MG: 10 INJECTION, EMULSION INTRAVENOUS at 09:07

## 2018-10-02 RX ADMIN — SODIUM CHLORIDE, POTASSIUM CHLORIDE, SODIUM LACTATE AND CALCIUM CHLORIDE: 600; 310; 30; 20 INJECTION, SOLUTION INTRAVENOUS at 08:05

## 2018-10-02 RX ADMIN — PROPOFOL 25 MG: 10 INJECTION, EMULSION INTRAVENOUS at 09:05

## 2018-10-02 RX ADMIN — PROPOFOL 25 MG: 10 INJECTION, EMULSION INTRAVENOUS at 09:04

## 2018-10-02 RX ADMIN — PROPOFOL 25 MG: 10 INJECTION, EMULSION INTRAVENOUS at 09:00

## 2018-10-02 RX ADMIN — PROPOFOL 30 MG: 10 INJECTION, EMULSION INTRAVENOUS at 09:03

## 2018-10-02 RX ADMIN — PROPOFOL 25 MG: 10 INJECTION, EMULSION INTRAVENOUS at 08:58

## 2018-10-02 RX ADMIN — PROPOFOL 25 MG: 10 INJECTION, EMULSION INTRAVENOUS at 08:56

## 2018-10-02 ASSESSMENT — PAIN - FUNCTIONAL ASSESSMENT: PAIN_FUNCTIONAL_ASSESSMENT: 0-10

## 2018-10-02 ASSESSMENT — PAIN SCALES - GENERAL
PAINLEVEL_OUTOF10: 7
PAINLEVEL_OUTOF10: 7

## 2018-11-16 ENCOUNTER — CARE COORDINATION (OUTPATIENT)
Dept: CARE COORDINATION | Age: 78
End: 2018-11-16

## 2018-11-18 ENCOUNTER — HOSPITAL ENCOUNTER (EMERGENCY)
Age: 78
Discharge: HOME | End: 2018-11-19
Attending: EMERGENCY MEDICINE
Payer: MEDICARE

## 2018-11-18 ENCOUNTER — APPOINTMENT (OUTPATIENT)
Dept: GENERAL RADIOLOGY | Age: 78
End: 2018-11-18
Payer: MEDICARE

## 2018-11-18 DIAGNOSIS — J44.1 COPD EXACERBATION (HCC): Primary | ICD-10-CM

## 2018-11-18 PROCEDURE — 80053 COMPREHEN METABOLIC PANEL: CPT

## 2018-11-18 PROCEDURE — 84484 ASSAY OF TROPONIN QUANT: CPT

## 2018-11-18 PROCEDURE — 99285 EMERGENCY DEPT VISIT HI MDM: CPT

## 2018-11-18 PROCEDURE — 71046 X-RAY EXAM CHEST 2 VIEWS: CPT

## 2018-11-18 PROCEDURE — 83880 ASSAY OF NATRIURETIC PEPTIDE: CPT

## 2018-11-18 PROCEDURE — 82805 BLOOD GASES W/O2 SATURATION: CPT

## 2018-11-18 PROCEDURE — 85025 COMPLETE CBC W/AUTO DIFF WBC: CPT

## 2018-11-18 ASSESSMENT — PAIN SCALES - GENERAL: PAINLEVEL_OUTOF10: 6

## 2018-11-18 ASSESSMENT — PAIN DESCRIPTION - LOCATION: LOCATION: HEAD;NECK

## 2018-11-18 ASSESSMENT — PAIN DESCRIPTION - PAIN TYPE: TYPE: ACUTE PAIN

## 2018-11-19 VITALS
HEART RATE: 85 BPM | TEMPERATURE: 98 F | HEIGHT: 66 IN | OXYGEN SATURATION: 94 % | RESPIRATION RATE: 18 BRPM | DIASTOLIC BLOOD PRESSURE: 88 MMHG | SYSTOLIC BLOOD PRESSURE: 165 MMHG | WEIGHT: 150 LBS | BODY MASS INDEX: 24.11 KG/M2

## 2018-11-19 LAB
ALBUMIN SERPL-MCNC: 3.8 GM/DL (ref 3.4–5)
ALP BLD-CCNC: 45 IU/L (ref 40–128)
ALT SERPL-CCNC: 6 U/L (ref 10–40)
ANION GAP SERPL CALCULATED.3IONS-SCNC: 13 MMOL/L (ref 4–16)
AST SERPL-CCNC: 15 IU/L (ref 15–37)
BASE EXCESS MIXED: 6.4 (ref 0–2.3)
BASOPHILS ABSOLUTE: 0 K/CU MM
BASOPHILS RELATIVE PERCENT: 0.1 % (ref 0–1)
BILIRUB SERPL-MCNC: 0.3 MG/DL (ref 0–1)
BUN BLDV-MCNC: 13 MG/DL (ref 6–23)
CALCIUM SERPL-MCNC: 9.3 MG/DL (ref 8.3–10.6)
CHLORIDE BLD-SCNC: 96 MMOL/L (ref 99–110)
CO2: 26 MMOL/L (ref 21–32)
CREAT SERPL-MCNC: 0.7 MG/DL (ref 0.6–1.1)
DIFFERENTIAL TYPE: ABNORMAL
EOSINOPHILS ABSOLUTE: 0 K/CU MM
EOSINOPHILS RELATIVE PERCENT: 0 % (ref 0–3)
GFR AFRICAN AMERICAN: >60 ML/MIN/1.73M2
GFR NON-AFRICAN AMERICAN: >60 ML/MIN/1.73M2
GLUCOSE BLD-MCNC: 111 MG/DL (ref 70–99)
HCO3 VENOUS: 31.3 MMOL/L (ref 19–25)
HCT VFR BLD CALC: 43.3 % (ref 37–47)
HEMOGLOBIN: 13.1 GM/DL (ref 12.5–16)
IMMATURE NEUTROPHIL %: 0.1 % (ref 0–0.43)
LYMPHOCYTES ABSOLUTE: 0.7 K/CU MM
LYMPHOCYTES RELATIVE PERCENT: 10.4 % (ref 24–44)
MCH RBC QN AUTO: 27.6 PG (ref 27–31)
MCHC RBC AUTO-ENTMCNC: 30.3 % (ref 32–36)
MCV RBC AUTO: 91.2 FL (ref 78–100)
MONOCYTES ABSOLUTE: 0.6 K/CU MM
MONOCYTES RELATIVE PERCENT: 9 % (ref 0–4)
NUCLEATED RBC %: 0 %
O2 SAT, VEN: 90.7 % (ref 50–70)
PCO2, VEN: 45 MMHG (ref 38–52)
PDW BLD-RTO: 16.5 % (ref 11.7–14.9)
PH VENOUS: 7.45 (ref 7.32–7.42)
PLATELET # BLD: 269 K/CU MM (ref 140–440)
PMV BLD AUTO: 10.2 FL (ref 7.5–11.1)
PO2, VEN: 68 MMHG (ref 28–48)
POTASSIUM SERPL-SCNC: 4.1 MMOL/L (ref 3.5–5.1)
PRO-BNP: 5306 PG/ML
RAPID INFLUENZA  B AGN: NEGATIVE
RAPID INFLUENZA A AGN: NEGATIVE
RBC # BLD: 4.75 M/CU MM (ref 4.2–5.4)
SEGMENTED NEUTROPHILS ABSOLUTE COUNT: 5.4 K/CU MM
SEGMENTED NEUTROPHILS RELATIVE PERCENT: 80.4 % (ref 36–66)
SODIUM BLD-SCNC: 135 MMOL/L (ref 135–145)
TOTAL IMMATURE NEUTOROPHIL: 0.01 K/CU MM
TOTAL NUCLEATED RBC: 0 K/CU MM
TOTAL PROTEIN: 6.7 GM/DL (ref 6.4–8.2)
TROPONIN T: <0.01 NG/ML
WBC # BLD: 6.8 K/CU MM (ref 4–10.5)

## 2018-11-19 PROCEDURE — 94761 N-INVAS EAR/PLS OXIMETRY MLT: CPT

## 2018-11-19 PROCEDURE — 94640 AIRWAY INHALATION TREATMENT: CPT

## 2018-11-19 PROCEDURE — 93005 ELECTROCARDIOGRAM TRACING: CPT | Performed by: EMERGENCY MEDICINE

## 2018-11-19 PROCEDURE — 87804 INFLUENZA ASSAY W/OPTIC: CPT

## 2018-11-19 PROCEDURE — 6370000000 HC RX 637 (ALT 250 FOR IP): Performed by: EMERGENCY MEDICINE

## 2018-11-19 PROCEDURE — 94664 DEMO&/EVAL PT USE INHALER: CPT

## 2018-11-19 RX ORDER — ACETAMINOPHEN 500 MG
1000 TABLET ORAL ONCE
Status: DISCONTINUED | OUTPATIENT
Start: 2018-11-19 | End: 2018-11-19 | Stop reason: HOSPADM

## 2018-11-19 RX ORDER — IPRATROPIUM BROMIDE AND ALBUTEROL SULFATE 2.5; .5 MG/3ML; MG/3ML
1 SOLUTION RESPIRATORY (INHALATION) ONCE
Status: COMPLETED | OUTPATIENT
Start: 2018-11-19 | End: 2018-11-19

## 2018-11-19 RX ORDER — CODEINE PHOSPHATE AND GUAIFENESIN 10; 100 MG/5ML; MG/5ML
5 SOLUTION ORAL EVERY 4 HOURS PRN
Status: DISCONTINUED | OUTPATIENT
Start: 2018-11-19 | End: 2018-11-19 | Stop reason: HOSPADM

## 2018-11-19 RX ORDER — PREDNISONE 10 MG/1
20 TABLET ORAL DAILY
Qty: 8 TABLET | Refills: 0 | Status: SHIPPED | OUTPATIENT
Start: 2018-11-19 | End: 2018-11-23

## 2018-11-19 RX ORDER — GUAIFENESIN AND CODEINE PHOSPHATE 100; 10 MG/5ML; MG/5ML
5 SOLUTION ORAL 3 TIMES DAILY PRN
Qty: 1 BOTTLE | Refills: 0 | Status: SHIPPED | OUTPATIENT
Start: 2018-11-19 | End: 2018-11-26

## 2018-11-19 RX ORDER — AZITHROMYCIN 250 MG/1
TABLET, FILM COATED ORAL
Qty: 1 PACKET | Refills: 0 | Status: SHIPPED | OUTPATIENT
Start: 2018-11-19 | End: 2018-11-23

## 2018-11-19 RX ORDER — AZITHROMYCIN 250 MG/1
500 TABLET, FILM COATED ORAL ONCE
Status: COMPLETED | OUTPATIENT
Start: 2018-11-19 | End: 2018-11-19

## 2018-11-19 RX ADMIN — IPRATROPIUM BROMIDE AND ALBUTEROL SULFATE 1 AMPULE: .5; 3 SOLUTION RESPIRATORY (INHALATION) at 00:13

## 2018-11-19 RX ADMIN — PREDNISONE 30 MG: 20 TABLET ORAL at 00:40

## 2018-11-19 RX ADMIN — AZITHROMYCIN 500 MG: 250 TABLET, FILM COATED ORAL at 02:06

## 2018-11-19 RX ADMIN — GUAIFENESIN AND CODEINE PHOSPHATE 5 ML: 10; 100 LIQUID ORAL at 00:40

## 2018-11-19 NOTE — ED PROVIDER NOTES
Triage Chief Complaint:   Shortness of Breath; Cough; and Headache    [place]:  Lopez Mcgovern is a 66 y.o. female that presents with cough, shortness of breath and headache. Patient reports that she still smoking and thinks she is getting sick. Increased wheezing and shortness of breath over the last several days. No fevers. Patient is on steroids already for her rheumatoid arthritis of 10 mg 3 times a day. Cough is productive of a phlegm occasionally. No chest pain. Additionally, patient with a headache. Headache does radiate from the neck and patient has chronic neck pain secondary to her rheumatoid arthritis. Additionally, patient with a headache disorder/migraine disorder. Headache is not worse headache of her life. No head trauma. No numbness or weakness. No vision changes.         ROS:  General:  No fevers, no chills, no weakness  Eyes:  No recent vison changes, no discharge  ENT:  No sore throat, no nasal congestion, no hearing changes  Cardiovascular:  No chest pain, no palpitations  Respiratory:  + shortness of breath, + cough, + wheezing  Gastrointestinal:  No pain, no nausea, no vomiting, no diarrhea  Musculoskeletal:  No muscle pain, no joint pain  Skin:  No rash, no pruritis, no easy bruising  Neurologic:  No speech problems, + headache, no extremity numbness, no extremity tingling, no extremity weakness  Psychiatric:  No anxiety  Genitourinary:  No dysuria, no hematuria  Endocrine:  No unexpected weight gain, no unexpected weight loss  Extremities:  no edema, no pain    Past Medical History:   Diagnosis Date    Acute DVT of right tibial vein ([address] 07/2017    ER imaging: distal right tibial vein DVT; no anticoag for bleeding/ anemia    Arthritis     Chronic pain syndrome     Low back pain; lumbar disc disease    Clostridium difficile colitis 09/20/2017    COPD, severe ([address] 10/24/2017    Depression     Fibromyalgia     Herniated cervical disc 5-1998    Herpes zoster ophthalmicus 3/2012    Hx of blood clots     Hyperlipidemia     Hypertension     L3 vertebral fracture ([address]     vertebroplasty    Lumbar spinal stenosis     moderately severe by MRI    Migraine     Osteoporosis 2010    Fragility Fx L3    Rheumatoid arthritis(714.0)     Dr Jaimie Jacinto S/P cardiac catheterization 2017    patent coronaries;  Takotsubo; najeeb Ahmed    Smoker     Takotsubo syndrome 2017    TMJ dysfunction     Vitamin B12 deficiency 2014    B12 level 199     Past Surgical History:   Procedure Laterality Date    APPENDECTOMY  1966    CARDIAC CATHETERIZATION  2017    CHOLECYSTECTOMY  1966    w/ appendectomy    ELBOW SURGERY Right     FOOT SURGERY      AR COLONOSCOPY FLX DX W/COLLJ SPEC WHEN PFRMD N/A 10/2/2018    COLONOSCOPY DIAGNOSTIC OR SCREENING performed by Ranjit Cai MD at [address] [place] [address] TOE SURGERY Left 2013    Deboo;     VERTEBROPLASTY  10/2010    L3    WRIST FUSION Left      Family History   Problem Relation Age of Onset    Heart Disease Father          FROM [place]   [place] Bence Emphysema Father     Arthritis Mother     Stroke Mother     Heart Disease Other         family history    Cancer Other         family history -- larynx    Kidney Disease Son      Social History     Social History    Marital status:      Spouse name: Jose Francisco Moore Number of children: 2    Years of education: 15     Occupational History    retired      Social History Main Topics    Smoking status: Current Every Day Smoker     Packs/day: 0.75     Years: 55.00     Types: Cigarettes     Start date: 1962     Last attempt to quit: 2017    Smokeless tobacco: Never Used      Comment: Stopped smoking for approx 20 years, restarted back up for a year    Alcohol use No    Drug use: No    Sexual activity: Not on file     Other Topics Concern    Not on file     Social History Narrative    No narrative on file     Current Facility-Administered bilaterally. Moving good air. Speaking clearly in full sentences. No respiratory distress. Abdominal:  Normal bowel sounds. Soft. Nontender. Non distended. Back:  No CVA tenderness to palpation     Neurological:  Alert and oriented times 3. No focal neuro deficits.              Psychiatric:  Appropriate    I have reviewed and interpreted all of the currently available lab results from this visit (if applicable):  Results for orders placed or performed during the hospital encounter of 11/18/18   Rapid Flu Swab   Result Value Ref Range    Rapid Influenza A Ag NEGATIVE NEG    Rapid Influenza B Ag NEGATIVE NEG   CBC Auto Differential   Result Value Ref Range    WBC 6.8 4.0 - 10.5 K/CU MM    RBC 4.75 4.2 - 5.4 M/CU MM    Hemoglobin 13.1 12.5 - 16.0 GM/DL    Hematocrit 43.3 37 - 47 %    MCV 91.2 78 - 100 FL    MCH 27.6 27 - 31 PG    MCHC 30.3 (L) 32.0 - 36.0 %    RDW 16.5 (H) 11.7 - 14.9 %    Platelets 649 974 - 281 K/CU MM    MPV 10.2 7.5 - 11.1 FL    Differential Type AUTOMATED DIFFERENTIAL     Segs Relative 80.4 (H) 36 - 66 %    Lymphocytes % 10.4 (L) 24 - 44 %    Monocytes % 9.0 (H) 0 - 4 %    Eosinophils % 0.0 0 - 3 %    Basophils % 0.1 0 - 1 %    Segs Absolute 5.4 K/CU MM    Lymphocytes # 0.7 K/CU MM    Monocytes # 0.6 K/CU MM    Eosinophils # 0.0 K/CU MM    Basophils # 0.0 K/CU MM    Nucleated RBC % 0.0 %    Total Nucleated RBC 0.0 K/CU MM    Total Immature Neutrophil 0.01 K/CU MM    Immature Neutrophil % 0.1 0 - 0.43 %   Comprehensive Metabolic Panel   Result Value Ref Range    Sodium 135 135 - 145 MMOL/L    Potassium 4.1 3.5 - 5.1 MMOL/L    Chloride 96 (L) 99 - 110 mMol/L    CO2 26 21 - 32 MMOL/L    BUN 13 6 - 23 MG/DL    CREATININE 0.7 0.6 - 1.1 MG/DL    Glucose 111 (H) 70 - 99 MG/DL    Calcium 9.3 8.3 - 10.6 MG/DL    Alb 3.8 3.4 - 5.0 GM/DL    Total Protein 6.7 6.4 - 8.2 GM/DL    Total Bilirubin 0.3 0.0 - 1.0 MG/DL    ALT 6 (L) 10 - 40 U/L    AST 15 15 - 37 IU/L    Alkaline Phosphatase 45 40 - 128 IU/L GFR Non-African American >60 >60 mL/min/1.73m2    GFR African American >60 >60 mL/min/1.73m2    Anion Gap 13 4 - 16   Blood Gas, Venous   Result Value Ref Range    pH, [place] 7.45 (H) 7.32 - 7.42    pCO2, [place] 45 38 - 52 mmHG    pO2, [place] 68 (H) 28 - 48 mmHG    Base Exc, Mixed 6.4 (H) 0 - 2.3    HCO3, Venous 31.3 (H) 19 - 25 MMOL/L    O2 Sat, [place] 90.7 (H) 50 - 70 %   Brain Natriuretic Peptide   Result Value Ref Range    Pro-BNP 5,306 (H) <300 PG/ML   Troponin   Result Value Ref Range    Troponin T <0.010 <0.01 NG/ML   EKG 12 Lead   Result Value Ref Range    Ventricular Rate 80 BPM    Atrial Rate 80 BPM    P-R Interval 162 ms    QRS Duration 142 ms    Q-T Interval 434 ms    QTc Calculation (Bazett) 500 ms    P Axis 70 degrees    R Axis 119 degrees    T Axis -12 degrees    Diagnosis       Normal sinus rhythm  Possible Left atrial enlargement  Nonspecific intraventricular block  T wave abnormality, consider inferior ischemia  Abnormal ECG  When compared with ECG of 22-OCT-2017 21:00,  Inverted T waves have replaced nonspecific T wave abnormality in Inferior leads  Nonspecific T wave abnormality has replaced inverted T waves in Anterior leads        Radiographs (if obtained):  [] The following radiograph was interpreted by myself in the absence of a radiologist:   [x] Radiologist's Report Reviewed:  XR CHEST STANDARD (2 VW)   Final Result   1. No active pulmonary disease. EKG (if obtained): (All EKG's are interpreted by myself in the absence of a cardiologist)  12 lead EKG per my interpretation:  Normal Sinus Rhythm at 80  [place] is   Right axis deviation  QTc is  slightly prolonged  There is specific T wave changes appreciated; TWI in inferior leads. There is no specific ST wave changes appreciated. No STEMI    Prior EKG to compare with was available and no clinically significant change in overall morphology.       Chart review shows recent radiographs:  Xr Chest Standard (2 Vw)    Result Date: 11/18/2018  EXAMINATION: TWO VIEWS OF THE CHEST 11/18/2018 11:03 pm COMPARISON: 10/22/2017. HISTORY: ORDERING SYSTEM PROVIDED HISTORY: shortness of breath TECHNOLOGIST PROVIDED HISTORY: Reason for exam:->shortness of breath Ordering Physician Provided Reason for Exam: cough Acuity: Acute Type of Exam: Initial FINDINGS: The heart size is within normal limits. The pulmonary vasculature is also within normal limits. No acute infiltrates are seen. The costophrenic angles are sharp bilaterally. No pneumothoraces are noted. There are calcified granulomas noted. There are old healed left rib fractures. 1. No active pulmonary disease. MDM:  Pt presents as above. Emergent conditions considered. Presentation prompted initial labs, EKG and imaging. EKG with normal sinus rhythm as above. Chest x-ray negative for acute cardio bony processes. CBC and CMP without clinically significant derangement. Troponin negative. BNP is at baseline. VBG without significant acidemia. Patient treated symptomatically with Tylenol, DuoNeb times, cough syrup and prednisone. Additionally azithromycin given. On reevaluation, patient is resting comfortably in bedside chair. Patient is requesting discharge home. Respirations are even and unlabored with some improvement of wheezing. Patient was placed on a short burst of prednisone in addition to her chronic steroids. Additionally, azithromycin course and symptomatically treatment for home. Patient already has access to a albuterol inhaler at home as well as her reticulocyte inhalers. Patient with following up with her pulmonologist and primary care provider. Discussed specific signs and symptoms with patient and  on when to return to the emergency department. Discharged home. Questions sought and answered with the patient and . They voice understanding and agree with plan. Instructed to return for any worsening or worrisome concerns. Clinical Impression:  1.

## 2018-11-20 LAB
EKG ATRIAL RATE: 80 BPM
EKG DIAGNOSIS: NORMAL
EKG P AXIS: 70 DEGREES
EKG P-R INTERVAL: 162 MS
EKG Q-T INTERVAL: 434 MS
EKG QRS DURATION: 142 MS
EKG QTC CALCULATION (BAZETT): 500 MS
EKG R AXIS: 119 DEGREES
EKG T AXIS: -12 DEGREES
EKG VENTRICULAR RATE: 80 BPM

## 2018-12-11 RX ORDER — POTASSIUM CHLORIDE 750 MG/1
TABLET, FILM COATED, EXTENDED RELEASE ORAL
Qty: 90 TABLET | Refills: 3 | Status: SHIPPED | OUTPATIENT
Start: 2018-12-11 | End: 2019-08-14 | Stop reason: SDUPTHER

## 2018-12-13 ENCOUNTER — OFFICE VISIT (OUTPATIENT)
Dept: PULMONOLOGY | Age: 78
End: 2018-12-13
Payer: MEDICARE

## 2018-12-13 VITALS
WEIGHT: 155 LBS | OXYGEN SATURATION: 94 % | HEART RATE: 84 BPM | BODY MASS INDEX: 24.91 KG/M2 | DIASTOLIC BLOOD PRESSURE: 60 MMHG | SYSTOLIC BLOOD PRESSURE: 112 MMHG | HEIGHT: 66 IN

## 2018-12-13 DIAGNOSIS — J44.9 COPD, SEVERE (HCC): ICD-10-CM

## 2018-12-13 DIAGNOSIS — J44.1 ACUTE EXACERBATION OF CHRONIC OBSTRUCTIVE PULMONARY DISEASE (COPD) (HCC): Primary | ICD-10-CM

## 2018-12-13 DIAGNOSIS — Z72.0 TOBACCO ABUSE: ICD-10-CM

## 2018-12-13 PROCEDURE — 1123F ACP DISCUSS/DSCN MKR DOCD: CPT | Performed by: INTERNAL MEDICINE

## 2018-12-13 PROCEDURE — 3023F SPIROM DOC REV: CPT | Performed by: INTERNAL MEDICINE

## 2018-12-13 PROCEDURE — G8482 FLU IMMUNIZE ORDER/ADMIN: HCPCS | Performed by: INTERNAL MEDICINE

## 2018-12-13 PROCEDURE — 1101F PT FALLS ASSESS-DOCD LE1/YR: CPT | Performed by: INTERNAL MEDICINE

## 2018-12-13 PROCEDURE — 4040F PNEUMOC VAC/ADMIN/RCVD: CPT | Performed by: INTERNAL MEDICINE

## 2018-12-13 PROCEDURE — G8399 PT W/DXA RESULTS DOCUMENT: HCPCS | Performed by: INTERNAL MEDICINE

## 2018-12-13 PROCEDURE — G8419 CALC BMI OUT NRM PARAM NOF/U: HCPCS | Performed by: INTERNAL MEDICINE

## 2018-12-13 PROCEDURE — G8427 DOCREV CUR MEDS BY ELIG CLIN: HCPCS | Performed by: INTERNAL MEDICINE

## 2018-12-13 PROCEDURE — 99213 OFFICE O/P EST LOW 20 MIN: CPT | Performed by: INTERNAL MEDICINE

## 2018-12-13 PROCEDURE — G8926 SPIRO NO PERF OR DOC: HCPCS | Performed by: INTERNAL MEDICINE

## 2018-12-13 PROCEDURE — 4004F PT TOBACCO SCREEN RCVD TLK: CPT | Performed by: INTERNAL MEDICINE

## 2018-12-13 PROCEDURE — 1090F PRES/ABSN URINE INCON ASSESS: CPT | Performed by: INTERNAL MEDICINE

## 2018-12-13 RX ORDER — LEVOFLOXACIN 500 MG/1
500 TABLET, FILM COATED ORAL DAILY
Qty: 5 TABLET | Refills: 0 | Status: SHIPPED | OUTPATIENT
Start: 2018-12-13 | End: 2018-12-18

## 2018-12-13 RX ORDER — PREDNISONE 1 MG/1
TABLET ORAL
Qty: 38 TABLET | Refills: 0 | Status: SHIPPED | OUTPATIENT
Start: 2018-12-13 | End: 2019-03-05 | Stop reason: ALTCHOICE

## 2018-12-17 ENCOUNTER — CARE COORDINATION (OUTPATIENT)
Dept: CARE COORDINATION | Age: 78
End: 2018-12-17

## 2018-12-17 ENCOUNTER — OFFICE VISIT (OUTPATIENT)
Dept: INTERNAL MEDICINE CLINIC | Age: 78
End: 2018-12-17
Payer: MEDICARE

## 2018-12-17 VITALS
BODY MASS INDEX: 24.4 KG/M2 | WEIGHT: 151.2 LBS | SYSTOLIC BLOOD PRESSURE: 118 MMHG | DIASTOLIC BLOOD PRESSURE: 76 MMHG | HEART RATE: 79 BPM | OXYGEN SATURATION: 94 %

## 2018-12-17 DIAGNOSIS — I10 ESSENTIAL HYPERTENSION: Chronic | ICD-10-CM

## 2018-12-17 DIAGNOSIS — F17.200 SMOKER: Primary | ICD-10-CM

## 2018-12-17 DIAGNOSIS — G89.4 CHRONIC PAIN SYNDROME: ICD-10-CM

## 2018-12-17 DIAGNOSIS — J44.9 COPD, SEVERE (HCC): ICD-10-CM

## 2018-12-17 PROBLEM — J44.1 ACUTE EXACERBATION OF CHRONIC OBSTRUCTIVE PULMONARY DISEASE (COPD) (HCC): Status: RESOLVED | Noted: 2018-12-13 | Resolved: 2018-12-17

## 2018-12-17 PROCEDURE — 1090F PRES/ABSN URINE INCON ASSESS: CPT | Performed by: INTERNAL MEDICINE

## 2018-12-17 PROCEDURE — G8420 CALC BMI NORM PARAMETERS: HCPCS | Performed by: INTERNAL MEDICINE

## 2018-12-17 PROCEDURE — G8399 PT W/DXA RESULTS DOCUMENT: HCPCS | Performed by: INTERNAL MEDICINE

## 2018-12-17 PROCEDURE — G8427 DOCREV CUR MEDS BY ELIG CLIN: HCPCS | Performed by: INTERNAL MEDICINE

## 2018-12-17 PROCEDURE — 99213 OFFICE O/P EST LOW 20 MIN: CPT | Performed by: INTERNAL MEDICINE

## 2018-12-17 PROCEDURE — G8482 FLU IMMUNIZE ORDER/ADMIN: HCPCS | Performed by: INTERNAL MEDICINE

## 2018-12-17 PROCEDURE — G8926 SPIRO NO PERF OR DOC: HCPCS | Performed by: INTERNAL MEDICINE

## 2018-12-17 PROCEDURE — 4004F PT TOBACCO SCREEN RCVD TLK: CPT | Performed by: INTERNAL MEDICINE

## 2018-12-17 PROCEDURE — 4040F PNEUMOC VAC/ADMIN/RCVD: CPT | Performed by: INTERNAL MEDICINE

## 2018-12-17 PROCEDURE — 1123F ACP DISCUSS/DSCN MKR DOCD: CPT | Performed by: INTERNAL MEDICINE

## 2018-12-17 PROCEDURE — 3023F SPIROM DOC REV: CPT | Performed by: INTERNAL MEDICINE

## 2018-12-17 PROCEDURE — 1101F PT FALLS ASSESS-DOCD LE1/YR: CPT | Performed by: INTERNAL MEDICINE

## 2018-12-17 RX ORDER — VARENICLINE TARTRATE 0.5 MG/1
.5-1 TABLET, FILM COATED ORAL SEE ADMIN INSTRUCTIONS
Qty: 57 TABLET | Refills: 3 | Status: SHIPPED | OUTPATIENT
Start: 2018-12-17 | End: 2019-03-11 | Stop reason: ALTCHOICE

## 2018-12-17 ASSESSMENT — ENCOUNTER SYMPTOMS: DYSPNEA ASSOCIATED WITH: MINIMAL EXERTION

## 2018-12-22 ENCOUNTER — HOSPITAL ENCOUNTER (EMERGENCY)
Age: 78
Discharge: HOME | End: 2018-12-23
Attending: EMERGENCY MEDICINE
Payer: MEDICARE

## 2018-12-22 DIAGNOSIS — G89.29 CHRONIC RIGHT SHOULDER PAIN: Primary | ICD-10-CM

## 2018-12-22 DIAGNOSIS — M25.511 CHRONIC RIGHT SHOULDER PAIN: Primary | ICD-10-CM

## 2018-12-22 PROCEDURE — 99284 EMERGENCY DEPT VISIT MOD MDM: CPT

## 2018-12-23 ENCOUNTER — APPOINTMENT (OUTPATIENT)
Dept: GENERAL RADIOLOGY | Age: 78
End: 2018-12-23
Payer: MEDICARE

## 2018-12-23 VITALS
BODY MASS INDEX: 24.27 KG/M2 | TEMPERATURE: 98.1 F | DIASTOLIC BLOOD PRESSURE: 65 MMHG | RESPIRATION RATE: 18 BRPM | WEIGHT: 151 LBS | HEART RATE: 71 BPM | SYSTOLIC BLOOD PRESSURE: 158 MMHG | HEIGHT: 66 IN

## 2018-12-23 LAB
ALBUMIN SERPL-MCNC: 3.4 GM/DL (ref 3.4–5)
ALP BLD-CCNC: 70 IU/L (ref 40–128)
ALT SERPL-CCNC: 11 U/L (ref 10–40)
ANION GAP SERPL CALCULATED.3IONS-SCNC: 11 MMOL/L (ref 4–16)
AST SERPL-CCNC: 12 IU/L (ref 15–37)
BASOPHILS ABSOLUTE: 0 K/CU MM
BASOPHILS RELATIVE PERCENT: 0.1 % (ref 0–1)
BILIRUB SERPL-MCNC: 0.3 MG/DL (ref 0–1)
BUN BLDV-MCNC: 18 MG/DL (ref 6–23)
CALCIUM SERPL-MCNC: 9.3 MG/DL (ref 8.3–10.6)
CHLORIDE BLD-SCNC: 92 MMOL/L (ref 99–110)
CO2: 31 MMOL/L (ref 21–32)
CREAT SERPL-MCNC: 0.8 MG/DL (ref 0.6–1.1)
DIFFERENTIAL TYPE: ABNORMAL
EOSINOPHILS ABSOLUTE: 0 K/CU MM
EOSINOPHILS RELATIVE PERCENT: 0.1 % (ref 0–3)
GFR AFRICAN AMERICAN: >60 ML/MIN/1.73M2
GFR NON-AFRICAN AMERICAN: >60 ML/MIN/1.73M2
GLUCOSE BLD-MCNC: 129 MG/DL (ref 70–99)
HCT VFR BLD CALC: 43 % (ref 37–47)
HEMOGLOBIN: 13.2 GM/DL (ref 12.5–16)
IMMATURE NEUTROPHIL %: 1 % (ref 0–0.43)
LYMPHOCYTES ABSOLUTE: 1 K/CU MM
LYMPHOCYTES RELATIVE PERCENT: 5.7 % (ref 24–44)
MCH RBC QN AUTO: 27.2 PG (ref 27–31)
MCHC RBC AUTO-ENTMCNC: 30.7 % (ref 32–36)
MCV RBC AUTO: 88.5 FL (ref 78–100)
MONOCYTES ABSOLUTE: 1.3 K/CU MM
MONOCYTES RELATIVE PERCENT: 7.3 % (ref 0–4)
NUCLEATED RBC %: 0 %
PDW BLD-RTO: 15.9 % (ref 11.7–14.9)
PLATELET # BLD: 410 K/CU MM (ref 140–440)
PMV BLD AUTO: 10.6 FL (ref 7.5–11.1)
POTASSIUM SERPL-SCNC: 3.7 MMOL/L (ref 3.5–5.1)
RBC # BLD: 4.86 M/CU MM (ref 4.2–5.4)
SEGMENTED NEUTROPHILS ABSOLUTE COUNT: 15.2 K/CU MM
SEGMENTED NEUTROPHILS RELATIVE PERCENT: 85.8 % (ref 36–66)
SODIUM BLD-SCNC: 134 MMOL/L (ref 135–145)
TOTAL IMMATURE NEUTOROPHIL: 0.17 K/CU MM
TOTAL NUCLEATED RBC: 0 K/CU MM
TOTAL PROTEIN: 6.4 GM/DL (ref 6.4–8.2)
TROPONIN T: <0.01 NG/ML
WBC # BLD: 17.8 K/CU MM (ref 4–10.5)

## 2018-12-23 PROCEDURE — 93010 ELECTROCARDIOGRAM REPORT: CPT | Performed by: INTERNAL MEDICINE

## 2018-12-23 PROCEDURE — 6370000000 HC RX 637 (ALT 250 FOR IP): Performed by: EMERGENCY MEDICINE

## 2018-12-23 PROCEDURE — 6360000002 HC RX W HCPCS: Performed by: EMERGENCY MEDICINE

## 2018-12-23 PROCEDURE — 96374 THER/PROPH/DIAG INJ IV PUSH: CPT

## 2018-12-23 PROCEDURE — 73030 X-RAY EXAM OF SHOULDER: CPT

## 2018-12-23 PROCEDURE — 80053 COMPREHEN METABOLIC PANEL: CPT

## 2018-12-23 PROCEDURE — 85025 COMPLETE CBC W/AUTO DIFF WBC: CPT

## 2018-12-23 PROCEDURE — 96376 TX/PRO/DX INJ SAME DRUG ADON: CPT

## 2018-12-23 PROCEDURE — 93005 ELECTROCARDIOGRAM TRACING: CPT | Performed by: EMERGENCY MEDICINE

## 2018-12-23 PROCEDURE — 84484 ASSAY OF TROPONIN QUANT: CPT

## 2018-12-23 RX ORDER — LIDOCAINE 4 G/G
1 PATCH TOPICAL ONCE
Status: DISCONTINUED | OUTPATIENT
Start: 2018-12-23 | End: 2018-12-23 | Stop reason: HOSPADM

## 2018-12-23 RX ORDER — HYDROMORPHONE HCL 110MG/55ML
0.5 PATIENT CONTROLLED ANALGESIA SYRINGE INTRAVENOUS ONCE
Status: COMPLETED | OUTPATIENT
Start: 2018-12-23 | End: 2018-12-23

## 2018-12-23 RX ORDER — METHOCARBAMOL 500 MG/1
1000 TABLET, FILM COATED ORAL ONCE
Status: COMPLETED | OUTPATIENT
Start: 2018-12-23 | End: 2018-12-23

## 2018-12-23 RX ADMIN — HYDROMORPHONE HYDROCHLORIDE 0.5 MG: 2 INJECTION INTRAMUSCULAR; INTRAVENOUS; SUBCUTANEOUS at 00:39

## 2018-12-23 RX ADMIN — HYDROMORPHONE HYDROCHLORIDE 0.5 MG: 2 INJECTION INTRAMUSCULAR; INTRAVENOUS; SUBCUTANEOUS at 01:57

## 2018-12-23 RX ADMIN — METHOCARBAMOL TABLETS 1000 MG: 500 TABLET, COATED ORAL at 00:39

## 2018-12-23 ASSESSMENT — PAIN DESCRIPTION - ORIENTATION: ORIENTATION: RIGHT

## 2018-12-23 ASSESSMENT — PAIN SCALES - GENERAL
PAINLEVEL_OUTOF10: 10
PAINLEVEL_OUTOF10: 5
PAINLEVEL_OUTOF10: 8

## 2018-12-23 ASSESSMENT — PAIN DESCRIPTION - PAIN TYPE: TYPE: CHRONIC PAIN

## 2018-12-23 ASSESSMENT — PAIN DESCRIPTION - LOCATION: LOCATION: SHOULDER

## 2018-12-28 LAB
EKG ATRIAL RATE: 68 BPM
EKG DIAGNOSIS: NORMAL
EKG P AXIS: 65 DEGREES
EKG P-R INTERVAL: 150 MS
EKG Q-T INTERVAL: 484 MS
EKG QRS DURATION: 140 MS
EKG QTC CALCULATION (BAZETT): 514 MS
EKG R AXIS: 109 DEGREES
EKG T AXIS: 56 DEGREES
EKG VENTRICULAR RATE: 68 BPM

## 2019-02-04 RX ORDER — FLUTICASONE FUROATE AND VILANTEROL TRIFENATATE 100; 25 UG/1; UG/1
1 POWDER RESPIRATORY (INHALATION) DAILY
Qty: 1 EACH | Refills: 11 | Status: ON HOLD | OUTPATIENT
Start: 2019-02-04 | End: 2021-01-06 | Stop reason: ALTCHOICE

## 2019-02-08 RX ORDER — FUROSEMIDE 40 MG/1
TABLET ORAL
Qty: 60 TABLET | Refills: 5 | Status: SHIPPED | OUTPATIENT
Start: 2019-02-08 | End: 2019-04-26 | Stop reason: SDUPTHER

## 2019-02-08 RX ORDER — ATORVASTATIN CALCIUM 10 MG/1
10 TABLET, FILM COATED ORAL EVERY EVENING
Qty: 90 TABLET | Refills: 1 | Status: SHIPPED | OUTPATIENT
Start: 2019-02-08 | End: 2019-11-04 | Stop reason: SDUPTHER

## 2019-02-13 ENCOUNTER — CARE COORDINATION (OUTPATIENT)
Dept: CARE COORDINATION | Age: 79
End: 2019-02-13

## 2019-02-13 ASSESSMENT — ENCOUNTER SYMPTOMS: DYSPNEA ASSOCIATED WITH: MINIMAL EXERTION

## 2019-03-05 ENCOUNTER — OFFICE VISIT (OUTPATIENT)
Dept: INTERNAL MEDICINE CLINIC | Age: 79
End: 2019-03-05
Payer: MEDICARE

## 2019-03-05 VITALS
HEART RATE: 64 BPM | RESPIRATION RATE: 15 BRPM | SYSTOLIC BLOOD PRESSURE: 154 MMHG | WEIGHT: 149 LBS | DIASTOLIC BLOOD PRESSURE: 82 MMHG | BODY MASS INDEX: 24.05 KG/M2

## 2019-03-05 DIAGNOSIS — B35.3 TINEA PEDIS OF LEFT FOOT: Primary | ICD-10-CM

## 2019-03-05 DIAGNOSIS — Z87.891 FORMER SMOKER: ICD-10-CM

## 2019-03-05 DIAGNOSIS — L03.032 CELLULITIS OF TOE OF LEFT FOOT: ICD-10-CM

## 2019-03-05 PROCEDURE — 1101F PT FALLS ASSESS-DOCD LE1/YR: CPT | Performed by: INTERNAL MEDICINE

## 2019-03-05 PROCEDURE — G8427 DOCREV CUR MEDS BY ELIG CLIN: HCPCS | Performed by: INTERNAL MEDICINE

## 2019-03-05 PROCEDURE — G8399 PT W/DXA RESULTS DOCUMENT: HCPCS | Performed by: INTERNAL MEDICINE

## 2019-03-05 PROCEDURE — 1036F TOBACCO NON-USER: CPT | Performed by: INTERNAL MEDICINE

## 2019-03-05 PROCEDURE — 99214 OFFICE O/P EST MOD 30 MIN: CPT | Performed by: INTERNAL MEDICINE

## 2019-03-05 PROCEDURE — 1090F PRES/ABSN URINE INCON ASSESS: CPT | Performed by: INTERNAL MEDICINE

## 2019-03-05 PROCEDURE — G8420 CALC BMI NORM PARAMETERS: HCPCS | Performed by: INTERNAL MEDICINE

## 2019-03-05 PROCEDURE — G8482 FLU IMMUNIZE ORDER/ADMIN: HCPCS | Performed by: INTERNAL MEDICINE

## 2019-03-05 PROCEDURE — 4040F PNEUMOC VAC/ADMIN/RCVD: CPT | Performed by: INTERNAL MEDICINE

## 2019-03-05 PROCEDURE — 1123F ACP DISCUSS/DSCN MKR DOCD: CPT | Performed by: INTERNAL MEDICINE

## 2019-03-05 RX ORDER — CEPHALEXIN 500 MG/1
500 CAPSULE ORAL 3 TIMES DAILY
Qty: 21 CAPSULE | Refills: 0 | Status: SHIPPED | OUTPATIENT
Start: 2019-03-05 | End: 2019-03-27 | Stop reason: ALTCHOICE

## 2019-03-11 ENCOUNTER — OFFICE VISIT (OUTPATIENT)
Dept: INTERNAL MEDICINE CLINIC | Age: 79
End: 2019-03-11
Payer: MEDICARE

## 2019-03-11 ENCOUNTER — CARE COORDINATION (OUTPATIENT)
Dept: CARE COORDINATION | Age: 79
End: 2019-03-11
Payer: MEDICARE

## 2019-03-11 VITALS
WEIGHT: 149.4 LBS | SYSTOLIC BLOOD PRESSURE: 142 MMHG | HEART RATE: 75 BPM | BODY MASS INDEX: 24.11 KG/M2 | RESPIRATION RATE: 14 BRPM | DIASTOLIC BLOOD PRESSURE: 84 MMHG | OXYGEN SATURATION: 98 %

## 2019-03-11 DIAGNOSIS — K30 INDIGESTION: ICD-10-CM

## 2019-03-11 DIAGNOSIS — B35.3 TINEA PEDIS OF LEFT FOOT: Primary | ICD-10-CM

## 2019-03-11 PROCEDURE — G0444 DEPRESSION SCREEN ANNUAL: HCPCS | Performed by: INTERNAL MEDICINE

## 2019-03-11 PROCEDURE — G8427 DOCREV CUR MEDS BY ELIG CLIN: HCPCS | Performed by: INTERNAL MEDICINE

## 2019-03-11 PROCEDURE — 1036F TOBACCO NON-USER: CPT | Performed by: INTERNAL MEDICINE

## 2019-03-11 PROCEDURE — 1090F PRES/ABSN URINE INCON ASSESS: CPT | Performed by: INTERNAL MEDICINE

## 2019-03-11 PROCEDURE — 4040F PNEUMOC VAC/ADMIN/RCVD: CPT | Performed by: INTERNAL MEDICINE

## 2019-03-11 PROCEDURE — 1123F ACP DISCUSS/DSCN MKR DOCD: CPT | Performed by: INTERNAL MEDICINE

## 2019-03-11 PROCEDURE — 1101F PT FALLS ASSESS-DOCD LE1/YR: CPT | Performed by: INTERNAL MEDICINE

## 2019-03-11 PROCEDURE — G8482 FLU IMMUNIZE ORDER/ADMIN: HCPCS | Performed by: INTERNAL MEDICINE

## 2019-03-11 PROCEDURE — G8420 CALC BMI NORM PARAMETERS: HCPCS | Performed by: INTERNAL MEDICINE

## 2019-03-11 PROCEDURE — G8399 PT W/DXA RESULTS DOCUMENT: HCPCS | Performed by: INTERNAL MEDICINE

## 2019-03-11 PROCEDURE — 99213 OFFICE O/P EST LOW 20 MIN: CPT | Performed by: INTERNAL MEDICINE

## 2019-03-11 RX ORDER — ESOMEPRAZOLE MAGNESIUM 40 MG/1
40 CAPSULE, DELAYED RELEASE ORAL
Status: ON HOLD | COMMUNITY
End: 2019-04-09 | Stop reason: ALTCHOICE

## 2019-03-11 ASSESSMENT — PATIENT HEALTH QUESTIONNAIRE - PHQ9: SUM OF ALL RESPONSES TO PHQ QUESTIONS 1-9: 2

## 2019-03-13 RX ORDER — LOSARTAN POTASSIUM 100 MG/1
TABLET ORAL
Qty: 90 TABLET | Refills: 1 | Status: SHIPPED | OUTPATIENT
Start: 2019-03-13 | End: 2019-09-23 | Stop reason: SDUPTHER

## 2019-03-15 DIAGNOSIS — F99 INSOMNIA DUE TO OTHER MENTAL DISORDER: ICD-10-CM

## 2019-03-15 DIAGNOSIS — F51.05 INSOMNIA DUE TO OTHER MENTAL DISORDER: ICD-10-CM

## 2019-03-15 RX ORDER — TRAZODONE HYDROCHLORIDE 50 MG/1
50 TABLET ORAL NIGHTLY
Qty: 30 TABLET | Refills: 6 | Status: SHIPPED | OUTPATIENT
Start: 2019-03-15 | End: 2019-10-22 | Stop reason: SDUPTHER

## 2019-03-23 ENCOUNTER — HOSPITAL ENCOUNTER (EMERGENCY)
Age: 79
Discharge: HOME | End: 2019-03-23
Attending: EMERGENCY MEDICINE
Payer: MEDICARE

## 2019-03-23 ENCOUNTER — APPOINTMENT (OUTPATIENT)
Dept: GENERAL RADIOLOGY | Age: 79
End: 2019-03-23
Payer: MEDICARE

## 2019-03-23 ENCOUNTER — APPOINTMENT (OUTPATIENT)
Dept: CT IMAGING | Age: 79
End: 2019-03-23
Payer: MEDICARE

## 2019-03-23 VITALS
OXYGEN SATURATION: 96 % | HEIGHT: 66 IN | RESPIRATION RATE: 18 BRPM | BODY MASS INDEX: 24.27 KG/M2 | SYSTOLIC BLOOD PRESSURE: 154 MMHG | WEIGHT: 151 LBS | HEART RATE: 80 BPM | DIASTOLIC BLOOD PRESSURE: 76 MMHG | TEMPERATURE: 98.7 F

## 2019-03-23 DIAGNOSIS — R07.81 PLEURITIC CHEST PAIN: ICD-10-CM

## 2019-03-23 DIAGNOSIS — J20.9 ACUTE BRONCHITIS, UNSPECIFIED ORGANISM: Primary | ICD-10-CM

## 2019-03-23 LAB
ALBUMIN SERPL-MCNC: 3.5 GM/DL (ref 3.4–5)
ALP BLD-CCNC: 63 IU/L (ref 40–129)
ALT SERPL-CCNC: 10 U/L (ref 10–40)
ANION GAP SERPL CALCULATED.3IONS-SCNC: 11 MMOL/L (ref 4–16)
AST SERPL-CCNC: 19 IU/L (ref 15–37)
BASOPHILS ABSOLUTE: 0 K/CU MM
BASOPHILS RELATIVE PERCENT: 0.6 % (ref 0–1)
BILIRUB SERPL-MCNC: 0.3 MG/DL (ref 0–1)
BUN BLDV-MCNC: 13 MG/DL (ref 6–23)
CALCIUM SERPL-MCNC: 8.5 MG/DL (ref 8.3–10.6)
CHLORIDE BLD-SCNC: 97 MMOL/L (ref 99–110)
CO2: 28 MMOL/L (ref 21–32)
CREAT SERPL-MCNC: 0.8 MG/DL (ref 0.6–1.1)
D DIMER: 809 NG/ML(DDU)
DIFFERENTIAL TYPE: ABNORMAL
EOSINOPHILS ABSOLUTE: 0.1 K/CU MM
EOSINOPHILS RELATIVE PERCENT: 1 % (ref 0–3)
GFR AFRICAN AMERICAN: >60 ML/MIN/1.73M2
GFR NON-AFRICAN AMERICAN: >60 ML/MIN/1.73M2
GLUCOSE BLD-MCNC: 98 MG/DL (ref 70–99)
HCT VFR BLD CALC: 38.5 % (ref 37–47)
HEMOGLOBIN: 11.7 GM/DL (ref 12.5–16)
IMMATURE NEUTROPHIL %: 0.4 % (ref 0–0.43)
LYMPHOCYTES ABSOLUTE: 1.3 K/CU MM
LYMPHOCYTES RELATIVE PERCENT: 25.5 % (ref 24–44)
MCH RBC QN AUTO: 26.1 PG (ref 27–31)
MCHC RBC AUTO-ENTMCNC: 30.4 % (ref 32–36)
MCV RBC AUTO: 85.9 FL (ref 78–100)
MONOCYTES ABSOLUTE: 1.1 K/CU MM
MONOCYTES RELATIVE PERCENT: 21.3 % (ref 0–4)
NUCLEATED RBC %: 0 %
PDW BLD-RTO: 15.1 % (ref 11.7–14.9)
PLATELET # BLD: 255 K/CU MM (ref 140–440)
PMV BLD AUTO: 10.3 FL (ref 7.5–11.1)
POTASSIUM SERPL-SCNC: 3.8 MMOL/L (ref 3.5–5.1)
RAPID INFLUENZA  B AGN: NEGATIVE
RAPID INFLUENZA A AGN: NEGATIVE
RBC # BLD: 4.48 M/CU MM (ref 4.2–5.4)
SEGMENTED NEUTROPHILS ABSOLUTE COUNT: 2.7 K/CU MM
SEGMENTED NEUTROPHILS RELATIVE PERCENT: 51.2 % (ref 36–66)
SODIUM BLD-SCNC: 136 MMOL/L (ref 135–145)
TOTAL IMMATURE NEUTOROPHIL: 0.02 K/CU MM
TOTAL NUCLEATED RBC: 0 K/CU MM
TOTAL PROTEIN: 6.6 GM/DL (ref 6.4–8.2)
TROPONIN T: <0.01 NG/ML
TROPONIN T: <0.01 NG/ML
WBC # BLD: 5.2 K/CU MM (ref 4–10.5)

## 2019-03-23 PROCEDURE — 87804 INFLUENZA ASSAY W/OPTIC: CPT

## 2019-03-23 PROCEDURE — 80053 COMPREHEN METABOLIC PANEL: CPT

## 2019-03-23 PROCEDURE — 71046 X-RAY EXAM CHEST 2 VIEWS: CPT

## 2019-03-23 PROCEDURE — 85025 COMPLETE CBC W/AUTO DIFF WBC: CPT

## 2019-03-23 PROCEDURE — 2580000003 HC RX 258: Performed by: EMERGENCY MEDICINE

## 2019-03-23 PROCEDURE — 6370000000 HC RX 637 (ALT 250 FOR IP): Performed by: EMERGENCY MEDICINE

## 2019-03-23 PROCEDURE — 93005 ELECTROCARDIOGRAM TRACING: CPT | Performed by: EMERGENCY MEDICINE

## 2019-03-23 PROCEDURE — 84484 ASSAY OF TROPONIN QUANT: CPT

## 2019-03-23 PROCEDURE — 36415 COLL VENOUS BLD VENIPUNCTURE: CPT

## 2019-03-23 PROCEDURE — 85379 FIBRIN DEGRADATION QUANT: CPT

## 2019-03-23 PROCEDURE — 6360000004 HC RX CONTRAST MEDICATION: Performed by: EMERGENCY MEDICINE

## 2019-03-23 PROCEDURE — 99284 EMERGENCY DEPT VISIT MOD MDM: CPT

## 2019-03-23 PROCEDURE — 71275 CT ANGIOGRAPHY CHEST: CPT

## 2019-03-23 RX ORDER — ASPIRIN 81 MG/1
324 TABLET, CHEWABLE ORAL ONCE
Status: COMPLETED | OUTPATIENT
Start: 2019-03-23 | End: 2019-03-23

## 2019-03-23 RX ORDER — OXYCODONE HYDROCHLORIDE 5 MG/1
10 TABLET ORAL ONCE
Status: COMPLETED | OUTPATIENT
Start: 2019-03-23 | End: 2019-03-23

## 2019-03-23 RX ORDER — 0.9 % SODIUM CHLORIDE 0.9 %
10 VIAL (ML) INJECTION
Status: COMPLETED | OUTPATIENT
Start: 2019-03-23 | End: 2019-03-23

## 2019-03-23 RX ORDER — PREDNISONE 10 MG/1
TABLET ORAL
Qty: 45 TABLET | Refills: 0 | Status: ON HOLD | OUTPATIENT
Start: 2019-03-23 | End: 2019-04-09 | Stop reason: ALTCHOICE

## 2019-03-23 RX ORDER — PREDNISONE 20 MG/1
40 TABLET ORAL ONCE
Status: COMPLETED | OUTPATIENT
Start: 2019-03-23 | End: 2019-03-23

## 2019-03-23 RX ADMIN — OXYCODONE HYDROCHLORIDE 10 MG: 5 TABLET ORAL at 19:11

## 2019-03-23 RX ADMIN — IOPAMIDOL 60 ML: 755 INJECTION, SOLUTION INTRAVENOUS at 18:50

## 2019-03-23 RX ADMIN — SODIUM CHLORIDE 10 ML: 9 INJECTION, SOLUTION INTRAMUSCULAR; INTRAVENOUS; SUBCUTANEOUS at 18:50

## 2019-03-23 RX ADMIN — PREDNISONE 40 MG: 20 TABLET ORAL at 21:43

## 2019-03-23 RX ADMIN — ASPIRIN 81 MG 324 MG: 81 TABLET ORAL at 16:05

## 2019-03-23 ASSESSMENT — PAIN SCALES - GENERAL: PAINLEVEL_OUTOF10: 8

## 2019-03-23 ASSESSMENT — PAIN DESCRIPTION - ORIENTATION: ORIENTATION: RIGHT

## 2019-03-23 ASSESSMENT — PAIN DESCRIPTION - LOCATION: LOCATION: CHEST

## 2019-03-23 ASSESSMENT — PAIN DESCRIPTION - PAIN TYPE: TYPE: ACUTE PAIN

## 2019-03-23 ASSESSMENT — PAIN DESCRIPTION - DESCRIPTORS: DESCRIPTORS: SHARP

## 2019-03-24 PROCEDURE — 93010 ELECTROCARDIOGRAM REPORT: CPT | Performed by: INTERNAL MEDICINE

## 2019-03-25 ENCOUNTER — CARE COORDINATION (OUTPATIENT)
Dept: CARE COORDINATION | Age: 79
End: 2019-03-25

## 2019-03-25 LAB
EKG ATRIAL RATE: 76 BPM
EKG DIAGNOSIS: NORMAL
EKG P AXIS: 69 DEGREES
EKG P-R INTERVAL: 176 MS
EKG Q-T INTERVAL: 438 MS
EKG QRS DURATION: 136 MS
EKG QTC CALCULATION (BAZETT): 492 MS
EKG R AXIS: 115 DEGREES
EKG T AXIS: 33 DEGREES
EKG VENTRICULAR RATE: 76 BPM

## 2019-03-25 ASSESSMENT — ENCOUNTER SYMPTOMS: DYSPNEA ASSOCIATED WITH: MINIMAL EXERTION

## 2019-03-27 ENCOUNTER — OFFICE VISIT (OUTPATIENT)
Dept: INTERNAL MEDICINE CLINIC | Age: 79
End: 2019-03-27
Payer: MEDICARE

## 2019-03-27 VITALS
OXYGEN SATURATION: 93 % | TEMPERATURE: 96.9 F | HEART RATE: 60 BPM | DIASTOLIC BLOOD PRESSURE: 60 MMHG | WEIGHT: 150.4 LBS | BODY MASS INDEX: 24.28 KG/M2 | SYSTOLIC BLOOD PRESSURE: 128 MMHG

## 2019-03-27 DIAGNOSIS — J44.1 COPD EXACERBATION (HCC): Primary | ICD-10-CM

## 2019-03-27 DIAGNOSIS — I73.9 CLAUDICATION (HCC): ICD-10-CM

## 2019-03-27 DIAGNOSIS — J44.9 COPD, SEVERE (HCC): ICD-10-CM

## 2019-03-27 PROCEDURE — 1036F TOBACCO NON-USER: CPT | Performed by: INTERNAL MEDICINE

## 2019-03-27 PROCEDURE — G8420 CALC BMI NORM PARAMETERS: HCPCS | Performed by: INTERNAL MEDICINE

## 2019-03-27 PROCEDURE — 4040F PNEUMOC VAC/ADMIN/RCVD: CPT | Performed by: INTERNAL MEDICINE

## 2019-03-27 PROCEDURE — 3023F SPIROM DOC REV: CPT | Performed by: INTERNAL MEDICINE

## 2019-03-27 PROCEDURE — 99214 OFFICE O/P EST MOD 30 MIN: CPT | Performed by: INTERNAL MEDICINE

## 2019-03-27 PROCEDURE — 1090F PRES/ABSN URINE INCON ASSESS: CPT | Performed by: INTERNAL MEDICINE

## 2019-03-27 PROCEDURE — G8427 DOCREV CUR MEDS BY ELIG CLIN: HCPCS | Performed by: INTERNAL MEDICINE

## 2019-03-27 PROCEDURE — 1123F ACP DISCUSS/DSCN MKR DOCD: CPT | Performed by: INTERNAL MEDICINE

## 2019-03-27 PROCEDURE — G8482 FLU IMMUNIZE ORDER/ADMIN: HCPCS | Performed by: INTERNAL MEDICINE

## 2019-03-27 PROCEDURE — G8926 SPIRO NO PERF OR DOC: HCPCS | Performed by: INTERNAL MEDICINE

## 2019-03-27 PROCEDURE — G8399 PT W/DXA RESULTS DOCUMENT: HCPCS | Performed by: INTERNAL MEDICINE

## 2019-04-01 ENCOUNTER — HOSPITAL ENCOUNTER (OUTPATIENT)
Dept: PULMONOLOGY | Age: 79
Discharge: HOME | End: 2019-04-01
Payer: MEDICARE

## 2019-04-01 ENCOUNTER — OFFICE VISIT (OUTPATIENT)
Dept: INTERNAL MEDICINE CLINIC | Age: 79
End: 2019-04-01
Payer: MEDICARE

## 2019-04-01 VITALS
DIASTOLIC BLOOD PRESSURE: 80 MMHG | HEART RATE: 67 BPM | RESPIRATION RATE: 16 BRPM | OXYGEN SATURATION: 95 % | SYSTOLIC BLOOD PRESSURE: 138 MMHG | WEIGHT: 148 LBS | BODY MASS INDEX: 23.89 KG/M2

## 2019-04-01 DIAGNOSIS — J44.1 COPD EXACERBATION (HCC): Primary | ICD-10-CM

## 2019-04-01 DIAGNOSIS — R10.13 DYSPEPSIA: ICD-10-CM

## 2019-04-01 PROCEDURE — 1090F PRES/ABSN URINE INCON ASSESS: CPT | Performed by: INTERNAL MEDICINE

## 2019-04-01 PROCEDURE — G8427 DOCREV CUR MEDS BY ELIG CLIN: HCPCS | Performed by: INTERNAL MEDICINE

## 2019-04-01 PROCEDURE — G8399 PT W/DXA RESULTS DOCUMENT: HCPCS | Performed by: INTERNAL MEDICINE

## 2019-04-01 PROCEDURE — G8420 CALC BMI NORM PARAMETERS: HCPCS | Performed by: INTERNAL MEDICINE

## 2019-04-01 PROCEDURE — 94680 O2 UPTK RST&XERS DIR SIMPLE: CPT

## 2019-04-01 PROCEDURE — 3023F SPIROM DOC REV: CPT | Performed by: INTERNAL MEDICINE

## 2019-04-01 PROCEDURE — 99213 OFFICE O/P EST LOW 20 MIN: CPT | Performed by: INTERNAL MEDICINE

## 2019-04-01 PROCEDURE — 1036F TOBACCO NON-USER: CPT | Performed by: INTERNAL MEDICINE

## 2019-04-01 PROCEDURE — 1123F ACP DISCUSS/DSCN MKR DOCD: CPT | Performed by: INTERNAL MEDICINE

## 2019-04-01 PROCEDURE — G8926 SPIRO NO PERF OR DOC: HCPCS | Performed by: INTERNAL MEDICINE

## 2019-04-01 PROCEDURE — 94762 N-INVAS EAR/PLS OXIMTRY CONT: CPT

## 2019-04-01 PROCEDURE — 94761 N-INVAS EAR/PLS OXIMETRY MLT: CPT

## 2019-04-01 PROCEDURE — 4040F PNEUMOC VAC/ADMIN/RCVD: CPT | Performed by: INTERNAL MEDICINE

## 2019-04-01 NOTE — PROGRESS NOTES
Subjective:      Lavon Choi is a 66 y.o. female who presents today for follow up on her chronic medical conditions as noted below. Patient Active Problem List:     Rheumatoid Arthritis     Hypertension     Hyperlipidemia     Fibromyalgia     Migraine     Chronic pain syndrome     Breast cancer screening     Colon cancer screening     Depression     Osteoporosis     Vitamin B12 deficiency     Lumbar spinal stenosis     Panic attack     Takotsubo syndrome     Rheumatoid arthritis involving multiple joints (HCC)     Acute DVT of right tibial vein (HCC)     Blood loss anemia     COPD, severe (HCC)     Former smoker     She was here last week with [facility] cop[facility] and was given levaquin and steroid taper  levaquin completed and taper has a little to go    She c/o dyspepsia  Has seen Mady and is taking nexium bid  She declined carafate and will add mylanta  Should improve when steroids complete    No emesis or diarrhea    I was concerned about PAD and tinea left foot  Using loprox  Her ANGELIC is scheduled next Monday and oxygen eval is this afternoon    Cough is better ; using inhaler 3-4 x /d    She is still taking percocet up to 4 times/ d and this likely aggravates gastritis sx    Current Outpatient Medications   Medication Sig Dispense Refill    PROAIR  (90 Base) MCG/ACT inhaler INHALE 2 PUFFS BY ORAL INHALATION EVERY 4 TO 6 HOURS AS NEEDED 8.5 g 10    predniSONE (DELTASONE) 10 MG tablet Take 4 tablets PO Qday X 3, then 3 tablets PO Qday X 3, then 2 tablets PO Qday X 3, then 1 tablets PO Qday X 3, then stop. 45 tablet 0    traZODone (DESYREL) 50 MG tablet Take 1 tablet by mouth nightly 30 tablet 6    losartan (COZAAR) 100 MG tablet TAKE ONE TABLET BY MOUTH DAILY 90 tablet 1    esomeprazole (NEXIUM) 40 MG delayed release capsule Take 40 mg by mouth 2 times daily (before meals)      ciclopirox (LOPROX) 0.77 % cream Apply topically 2 times daily.  1 Tube 1    furosemide (LASIX) 40 MG tablet TAKE 1 TABLET BY MOUTH TWICE A DAY 60 tablet 5    atorvastatin (LIPITOR) 10 MG tablet TAKE 1 TABLET BY MOUTH EVERY EVENING 90 tablet 1    BREO ELLIPTA 100-25 MCG/INH AEPB inhaler INHALE 1 PUFF INTO THE LUNGS DAILY AFTER INHALATION THEN GARGLE 1 each 11    potassium chloride (KLOR-CON) 10 MEQ extended release tablet TAKE 2 TABLETS BY MOUTH EVERY MORNING AND TAKE 1 TABLET BY MOUTH EVERY EVENING 90 tablet 3    oxyCODONE-acetaminophen (PERCOCET) 7.5-325 MG per tablet Take 1 tablet by mouth 4 times daily. Amalia Colon Adalimumab (HUMIRA) 40 MG/0.4ML PSKT Inject into the skin      INCRUSE ELLIPTA 62.5 MCG/INH AEPB INHALE 1 PUFF VIA ORAL INHALATION ONCE DAILY 1 each 11    metoprolol tartrate (LOPRESSOR) 50 MG tablet TAKE 1 TABLET BY MOUTH 2 TIMES DAILY 120 tablet 11    diclofenac sodium (VOLTAREN) 1 % GEL Apply 2 g topically 2 times daily 1 Tube 3    vitamin B-12 (CYANOCOBALAMIN) 1000 MCG tablet Take 1,000 mcg by mouth daily.  DULoxetine (CYMBALTA) 60 MG capsule Take 60 mg by mouth daily.  Calcium Citrate-Vitamin D (CITRACAL + D PO) Take 600 mg by mouth daily       zoledronic acid (RECLAST) 5 MG/100ML SOLN Infuse 100 mLs intravenously once for 1 dose 100 mL 0     No current facility-administered medications for this visit.             Past Medical History:   Diagnosis Date    Acute DVT of right tibial vein ([address] 07/2017    ER imaging: distal right tibial vein DVT; no anticoag for bleeding/ anemia    Acute exacerbation of chronic obstructive pulmonary disease (COPD) ([address] 12/13/2018    Arthritis     Chronic pain syndrome     Low back pain; lumbar disc disease    Clostridium difficile colitis 09/20/2017    COPD, severe ([address] 10/24/2017    Depression     Fibromyalgia     Former smoker     Quit 1/2019    Herniated cervical disc 5-1998    Herpes zoster ophthalmicus 3/2012    Hx of blood clots     Hyperlipidemia     Hypertension     L3 vertebral fracture ([address] 2010    vertebroplasty    Lumbar spinal stenosis 2015 moderately severe by MRI    Migraine     Osteoporosis 2010    Fragility Fx L3    Rheumatoid arthritis(714.0)     Dr Ewelina Rodriguez S/P cardiac catheterization 2017    patent coronaries; Takotsubo; humberto [facility]    Takotsubo syndrome 2017    TMJ dysfunction     Tobacco abuse 2018    Vitamin B12 deficiency 2014    B12 level 199        Social History     Tobacco Use    Smoking status: Former Smoker     Packs/day: 0.75     Years: 55.00     Pack years: 41.25     Types: Cigarettes     Start date: 1962     Last attempt to quit: 2019     Years since quittin.2    Smokeless tobacco: Never Used    Tobacco comment: 19- Pt reports that her quit date was 19   Substance Use Topics    Alcohol use: No     Alcohol/week: 0.0 oz        ROS: The patient has had no headache, sore throat, fever or chills, cough, dyspnea, chest pain, nausea, vomiting or diarrhea, or edema. Objective:      /80 (Site: Right Upper Arm, Position: Sitting, Cuff Size: Medium Adult)   Pulse 67   Resp 16   Wt 148 lb (67.1 kg)   SpO2 95%   BMI 23.89 kg/m²    General: in no apparent distress   The patient's neck is free of nodes. Lungs are clear. Heart is normal in rate and regular in rhythm. Legs are free of edema. No rash or erythema. Abd: minimal epigastric tenderness    Labs rev'd from march     Assessment / Plan:      1. COPD exacerbation ([address]    2.  Dyspepsia            Plan   Complete steroids  mylanta prn  Continue rescue tid or so  Get home oxygen eval and ANGELIC in 7 d  RTC

## 2019-04-01 NOTE — PROGRESS NOTES
4/1/2019 1:18 PM  Patient Room #: Room/bed info not found  Patient Name: Sonya Coyne    (Step 1 Done by RN if possible otherwise call Pulmonary Diagnostics)  1. Place patient on room air at rest for at least 30 minutes. If patient falls below 88% before 30 minutes then you can record the level and stop. Record room air saturation level _93_ %. If patient is at 88% or below, they will qualify for home oxygen and you can stop. If level does not fall below 88%, fill in level above. If indicated continue to Step 2. Signature:Semaj Borjas  RRT Date: _04/01/2019  (Step 2&3 Done by [facility])  2. Ambulate patient on room air until saturation falls below 89%. Record level of room air saturation with ambulation_93_ %. Next, place patient back on ______lpm oxygen and ambulate, record level _____%. (Note:  this level must show improvement from room air level done with ambulation.)  If patients saturation on room air with ambulation is 88% or below AND patient shows improvement with oxygen during ambulation, they will qualify for home oxygen and you can stop. If patient does not drop below 89%, then patient should have an overnight oximetry trending on room air to see if level falls below 88%. Complete level in Step 3 below. 3. Room air overnight oximetry level 88 % for_1:26:50_  cumulative minutes. If patients room air oxygen level is < 89% for at least 5 cumulative minutes, patient will qualify for home oxygen and you can stop. (Attach Night Trending Report)  Complete order below: Diagnosis:______COPD_______________  Home oxygen at:  Length of Need: X Lifetime ?  3 Months     __2__lpm or _____%   via  [x] nasal cannula  []mask  [] other:________________         []continuous []  with activity  [x]  Nocturnal   [x] Portable Tanks [x]  Concentrator        Therapist Signature:  Kymberly Rasheed RRT     Date:  _04/02/2019    Physician Signature:  ___________________________    Date: ________________    Physician Printed Name:  __________________________   NPI # ________________    [x] Patient Qualifies      [] Patient Does NOT qualify

## 2019-04-08 ENCOUNTER — PROCEDURE VISIT (OUTPATIENT)
Dept: CARDIOLOGY CLINIC | Age: 79
End: 2019-04-08
Payer: MEDICARE

## 2019-04-08 DIAGNOSIS — I73.9 CLAUDICATION (HCC): ICD-10-CM

## 2019-04-08 PROCEDURE — 93922 UPR/L XTREMITY ART 2 LEVELS: CPT | Performed by: INTERNAL MEDICINE

## 2019-04-09 ENCOUNTER — HOSPITAL ENCOUNTER (INPATIENT)
Age: 79
LOS: 4 days | Discharge: HOME | DRG: 193 | End: 2019-04-13
Attending: EMERGENCY MEDICINE | Admitting: FAMILY MEDICINE
Payer: MEDICARE

## 2019-04-09 ENCOUNTER — APPOINTMENT (OUTPATIENT)
Dept: GENERAL RADIOLOGY | Age: 79
DRG: 193 | End: 2019-04-09
Payer: MEDICARE

## 2019-04-09 DIAGNOSIS — R09.02 HYPOXIA: ICD-10-CM

## 2019-04-09 DIAGNOSIS — J44.1 COPD EXACERBATION (HCC): Primary | ICD-10-CM

## 2019-04-09 DIAGNOSIS — L50.9 URTICARIA: ICD-10-CM

## 2019-04-09 LAB
ALBUMIN SERPL-MCNC: 3 GM/DL (ref 3.4–5)
ALP BLD-CCNC: 56 IU/L (ref 40–129)
ALT SERPL-CCNC: 6 U/L (ref 10–40)
ANION GAP SERPL CALCULATED.3IONS-SCNC: 9 MMOL/L (ref 4–16)
AST SERPL-CCNC: 14 IU/L (ref 15–37)
BASOPHILS ABSOLUTE: 0 K/CU MM
BASOPHILS RELATIVE PERCENT: 0.2 % (ref 0–1)
BILIRUB SERPL-MCNC: 0.7 MG/DL (ref 0–1)
BUN BLDV-MCNC: 12 MG/DL (ref 6–23)
CALCIUM SERPL-MCNC: 9.2 MG/DL (ref 8.3–10.6)
CHLORIDE BLD-SCNC: 90 MMOL/L (ref 99–110)
CO2: 33 MMOL/L (ref 21–32)
CREAT SERPL-MCNC: 0.9 MG/DL (ref 0.6–1.1)
DIFFERENTIAL TYPE: ABNORMAL
EOSINOPHILS ABSOLUTE: 0 K/CU MM
EOSINOPHILS RELATIVE PERCENT: 0.2 % (ref 0–3)
GFR AFRICAN AMERICAN: >60 ML/MIN/1.73M2
GFR NON-AFRICAN AMERICAN: >60 ML/MIN/1.73M2
GLUCOSE BLD-MCNC: 117 MG/DL (ref 70–99)
HCT VFR BLD CALC: 40.9 % (ref 37–47)
HEMOGLOBIN: 12.2 GM/DL (ref 12.5–16)
IMMATURE NEUTROPHIL %: 0.5 % (ref 0–0.43)
LYMPHOCYTES ABSOLUTE: 1.8 K/CU MM
LYMPHOCYTES RELATIVE PERCENT: 18.2 % (ref 24–44)
MCH RBC QN AUTO: 25.8 PG (ref 27–31)
MCHC RBC AUTO-ENTMCNC: 29.8 % (ref 32–36)
MCV RBC AUTO: 86.7 FL (ref 78–100)
MONOCYTES ABSOLUTE: 1 K/CU MM
MONOCYTES RELATIVE PERCENT: 10 % (ref 0–4)
NUCLEATED RBC %: 0 %
PDW BLD-RTO: 15.9 % (ref 11.7–14.9)
PLATELET # BLD: 268 K/CU MM (ref 140–440)
PMV BLD AUTO: 10.7 FL (ref 7.5–11.1)
POTASSIUM SERPL-SCNC: 3.5 MMOL/L (ref 3.5–5.1)
PRO-BNP: 1217 PG/ML
RBC # BLD: 4.72 M/CU MM (ref 4.2–5.4)
SEGMENTED NEUTROPHILS ABSOLUTE COUNT: 7.2 K/CU MM
SEGMENTED NEUTROPHILS RELATIVE PERCENT: 70.9 % (ref 36–66)
SODIUM BLD-SCNC: 132 MMOL/L (ref 135–145)
TOTAL IMMATURE NEUTOROPHIL: 0.05 K/CU MM
TOTAL NUCLEATED RBC: 0 K/CU MM
TOTAL PROTEIN: 6.3 GM/DL (ref 6.4–8.2)
WBC # BLD: 10.1 K/CU MM (ref 4–10.5)

## 2019-04-09 PROCEDURE — 93005 ELECTROCARDIOGRAM TRACING: CPT | Performed by: PHYSICIAN ASSISTANT

## 2019-04-09 PROCEDURE — 6360000002 HC RX W HCPCS: Performed by: PHYSICIAN ASSISTANT

## 2019-04-09 PROCEDURE — 1200000000 HC SEMI PRIVATE

## 2019-04-09 PROCEDURE — 96374 THER/PROPH/DIAG INJ IV PUSH: CPT

## 2019-04-09 PROCEDURE — 85025 COMPLETE CBC W/AUTO DIFF WBC: CPT

## 2019-04-09 PROCEDURE — 6370000000 HC RX 637 (ALT 250 FOR IP): Performed by: PHYSICIAN ASSISTANT

## 2019-04-09 PROCEDURE — 94640 AIRWAY INHALATION TREATMENT: CPT

## 2019-04-09 PROCEDURE — 71045 X-RAY EXAM CHEST 1 VIEW: CPT

## 2019-04-09 PROCEDURE — 87449 NOS EACH ORGANISM AG IA: CPT

## 2019-04-09 PROCEDURE — 6370000000 HC RX 637 (ALT 250 FOR IP): Performed by: FAMILY MEDICINE

## 2019-04-09 PROCEDURE — 87804 INFLUENZA ASSAY W/OPTIC: CPT

## 2019-04-09 PROCEDURE — 2500000003 HC RX 250 WO HCPCS: Performed by: PHYSICIAN ASSISTANT

## 2019-04-09 PROCEDURE — 36415 COLL VENOUS BLD VENIPUNCTURE: CPT

## 2019-04-09 PROCEDURE — 87899 AGENT NOS ASSAY W/OPTIC: CPT

## 2019-04-09 PROCEDURE — 80053 COMPREHEN METABOLIC PANEL: CPT

## 2019-04-09 PROCEDURE — 99285 EMERGENCY DEPT VISIT HI MDM: CPT

## 2019-04-09 PROCEDURE — 83880 ASSAY OF NATRIURETIC PEPTIDE: CPT

## 2019-04-09 PROCEDURE — 96375 TX/PRO/DX INJ NEW DRUG ADDON: CPT

## 2019-04-09 RX ORDER — PROMETHAZINE HYDROCHLORIDE 25 MG/1
12.5 TABLET ORAL EVERY 6 HOURS PRN
Status: DISCONTINUED | OUTPATIENT
Start: 2019-04-09 | End: 2019-04-13 | Stop reason: HOSPADM

## 2019-04-09 RX ORDER — POLYETHYLENE GLYCOL 3350 17 G/17G
17 POWDER, FOR SOLUTION ORAL DAILY PRN
Status: DISCONTINUED | OUTPATIENT
Start: 2019-04-09 | End: 2019-04-13 | Stop reason: HOSPADM

## 2019-04-09 RX ORDER — PANTOPRAZOLE SODIUM 40 MG/1
40 TABLET, DELAYED RELEASE ORAL
Status: DISCONTINUED | OUTPATIENT
Start: 2019-04-10 | End: 2019-04-13 | Stop reason: HOSPADM

## 2019-04-09 RX ORDER — ONDANSETRON 4 MG/1
4 TABLET, ORALLY DISINTEGRATING ORAL PRN
Status: ON HOLD | COMMUNITY
End: 2020-02-16 | Stop reason: HOSPADM

## 2019-04-09 RX ORDER — DIPHENHYDRAMINE HCL 25 MG
25 TABLET ORAL ONCE
Status: COMPLETED | OUTPATIENT
Start: 2019-04-09 | End: 2019-04-09

## 2019-04-09 RX ORDER — ACETAMINOPHEN 325 MG/1
650 TABLET ORAL EVERY 4 HOURS PRN
Status: DISCONTINUED | OUTPATIENT
Start: 2019-04-09 | End: 2019-04-13 | Stop reason: HOSPADM

## 2019-04-09 RX ORDER — ALBUTEROL SULFATE 2.5 MG/3ML
2.5 SOLUTION RESPIRATORY (INHALATION)
Status: DISCONTINUED | OUTPATIENT
Start: 2019-04-10 | End: 2019-04-10

## 2019-04-09 RX ORDER — IPRATROPIUM BROMIDE AND ALBUTEROL SULFATE 2.5; .5 MG/3ML; MG/3ML
1 SOLUTION RESPIRATORY (INHALATION)
Status: DISCONTINUED | OUTPATIENT
Start: 2019-04-10 | End: 2019-04-13 | Stop reason: HOSPADM

## 2019-04-09 RX ORDER — GUAIFENESIN 600 MG/1
600 TABLET, EXTENDED RELEASE ORAL 2 TIMES DAILY
Status: DISCONTINUED | OUTPATIENT
Start: 2019-04-10 | End: 2019-04-13 | Stop reason: HOSPADM

## 2019-04-09 RX ORDER — LOSARTAN POTASSIUM 100 MG/1
100 TABLET ORAL DAILY
Status: DISCONTINUED | OUTPATIENT
Start: 2019-04-10 | End: 2019-04-13 | Stop reason: HOSPADM

## 2019-04-09 RX ORDER — CALCIUM CARBONATE 200(500)MG
500 TABLET,CHEWABLE ORAL 3 TIMES DAILY PRN
Status: DISCONTINUED | OUTPATIENT
Start: 2019-04-09 | End: 2019-04-13 | Stop reason: HOSPADM

## 2019-04-09 RX ORDER — TRAZODONE HYDROCHLORIDE 50 MG/1
50 TABLET ORAL NIGHTLY
Status: DISCONTINUED | OUTPATIENT
Start: 2019-04-10 | End: 2019-04-13 | Stop reason: HOSPADM

## 2019-04-09 RX ORDER — OXYCODONE AND ACETAMINOPHEN 7.5; 325 MG/1; MG/1
1 TABLET ORAL 4 TIMES DAILY
Status: DISCONTINUED | OUTPATIENT
Start: 2019-04-10 | End: 2019-04-11

## 2019-04-09 RX ORDER — PREDNISONE 20 MG/1
40 TABLET ORAL DAILY
Status: DISCONTINUED | OUTPATIENT
Start: 2019-04-12 | End: 2019-04-10

## 2019-04-09 RX ORDER — IPRATROPIUM BROMIDE AND ALBUTEROL SULFATE 2.5; .5 MG/3ML; MG/3ML
1 SOLUTION RESPIRATORY (INHALATION) ONCE
Status: COMPLETED | OUTPATIENT
Start: 2019-04-09 | End: 2019-04-09

## 2019-04-09 RX ORDER — FUROSEMIDE 40 MG/1
40 TABLET ORAL 2 TIMES DAILY
Status: DISCONTINUED | OUTPATIENT
Start: 2019-04-10 | End: 2019-04-13 | Stop reason: HOSPADM

## 2019-04-09 RX ORDER — DULOXETIN HYDROCHLORIDE 30 MG/1
60 CAPSULE, DELAYED RELEASE ORAL DAILY
Status: DISCONTINUED | OUTPATIENT
Start: 2019-04-10 | End: 2019-04-13 | Stop reason: HOSPADM

## 2019-04-09 RX ORDER — SODIUM CHLORIDE 0.9 % (FLUSH) 0.9 %
10 SYRINGE (ML) INJECTION EVERY 12 HOURS SCHEDULED
Status: DISCONTINUED | OUTPATIENT
Start: 2019-04-10 | End: 2019-04-13 | Stop reason: HOSPADM

## 2019-04-09 RX ORDER — METHYLPREDNISOLONE SODIUM SUCCINATE 40 MG/ML
40 INJECTION, POWDER, LYOPHILIZED, FOR SOLUTION INTRAMUSCULAR; INTRAVENOUS EVERY 12 HOURS
Status: DISCONTINUED | OUTPATIENT
Start: 2019-04-09 | End: 2019-04-10

## 2019-04-09 RX ORDER — ALBUTEROL SULFATE 90 UG/1
2 AEROSOL, METERED RESPIRATORY (INHALATION) EVERY 6 HOURS PRN
Status: DISCONTINUED | OUTPATIENT
Start: 2019-04-09 | End: 2019-04-13 | Stop reason: HOSPADM

## 2019-04-09 RX ORDER — METOPROLOL TARTRATE 50 MG/1
50 TABLET, FILM COATED ORAL 2 TIMES DAILY
Status: DISCONTINUED | OUTPATIENT
Start: 2019-04-10 | End: 2019-04-13 | Stop reason: HOSPADM

## 2019-04-09 RX ORDER — SODIUM CHLORIDE 0.9 % (FLUSH) 0.9 %
10 SYRINGE (ML) INJECTION PRN
Status: DISCONTINUED | OUTPATIENT
Start: 2019-04-09 | End: 2019-04-13 | Stop reason: HOSPADM

## 2019-04-09 RX ORDER — FAMOTIDINE 20 MG/1
20 TABLET, FILM COATED ORAL 2 TIMES DAILY
Status: CANCELLED | OUTPATIENT
Start: 2019-04-09

## 2019-04-09 RX ORDER — FLUTICASONE FUROATE AND VILANTEROL 100; 25 UG/1; UG/1
1 POWDER RESPIRATORY (INHALATION) DAILY
Status: DISCONTINUED | OUTPATIENT
Start: 2019-04-10 | End: 2019-04-10 | Stop reason: CLARIF

## 2019-04-09 RX ORDER — METHYLPREDNISOLONE SODIUM SUCCINATE 125 MG/2ML
125 INJECTION, POWDER, LYOPHILIZED, FOR SOLUTION INTRAMUSCULAR; INTRAVENOUS ONCE
Status: COMPLETED | OUTPATIENT
Start: 2019-04-09 | End: 2019-04-09

## 2019-04-09 RX ORDER — DIPHENHYDRAMINE HCL 25 MG
25 TABLET ORAL EVERY 4 HOURS PRN
Status: DISCONTINUED | OUTPATIENT
Start: 2019-04-09 | End: 2019-04-13 | Stop reason: HOSPADM

## 2019-04-09 RX ADMIN — IPRATROPIUM BROMIDE AND ALBUTEROL SULFATE 1 AMPULE: .5; 3 SOLUTION RESPIRATORY (INHALATION) at 19:30

## 2019-04-09 RX ADMIN — DIPHENHYDRAMINE HCL 25 MG: 25 TABLET ORAL at 22:49

## 2019-04-09 RX ADMIN — FAMOTIDINE 20 MG: 10 INJECTION, SOLUTION INTRAVENOUS at 20:38

## 2019-04-09 RX ADMIN — METHYLPREDNISOLONE SODIUM SUCCINATE 125 MG: 125 INJECTION, POWDER, LYOPHILIZED, FOR SOLUTION INTRAMUSCULAR; INTRAVENOUS at 20:38

## 2019-04-09 RX ADMIN — DIPHENHYDRAMINE HCL 25 MG: 25 TABLET ORAL at 20:38

## 2019-04-09 ASSESSMENT — PAIN SCALES - GENERAL
PAINLEVEL_OUTOF10: 2
PAINLEVEL_OUTOF10: 6

## 2019-04-09 ASSESSMENT — PAIN DESCRIPTION - ONSET: ONSET: ON-GOING

## 2019-04-09 ASSESSMENT — PAIN DESCRIPTION - PAIN TYPE
TYPE: ACUTE PAIN
TYPE: CHRONIC PAIN

## 2019-04-09 ASSESSMENT — PAIN DESCRIPTION - DESCRIPTORS: DESCRIPTORS: ACHING

## 2019-04-09 ASSESSMENT — PAIN DESCRIPTION - LOCATION: LOCATION: GENERALIZED

## 2019-04-09 ASSESSMENT — PAIN DESCRIPTION - FREQUENCY: FREQUENCY: CONTINUOUS

## 2019-04-09 ASSESSMENT — PAIN - FUNCTIONAL ASSESSMENT: PAIN_FUNCTIONAL_ASSESSMENT: PREVENTS OR INTERFERES SOME ACTIVE ACTIVITIES AND ADLS

## 2019-04-09 NOTE — ED NOTES
Pt placed on 3L NC at this time. Pt improves to 94%. Pt reports recent dx of bronchitis. States finished her ATB.       Hai Luong RN  04/09/19 [id number]

## 2019-04-09 NOTE — ED PROVIDER NOTES
eMERGENCY dEPARTMENT eNCOUnter      PCP: Ynes Rueda MD    [address] [facility]    Chief Complaint   Patient presents with    Urticaria     chest, back, bilateral legs; denies swelling of throat       HPI    Mihaela Mathis is a 66 y.o. female who presents with 2 complaints. Patient reports that she woke up with hives this morning and gradually progressed with a course of the day today. Areas involved include her perineal region, but ox, legs and back. She states that she langley pains but has worn these before without rash. She has no other new foods medications exposures. She denies any sensation of tongue or lip or throat swelling. She denies chest pain or fevers. Patient does have history of COPD and recently been following with her PCP for bronchitis. She did finish a course of antibiotics several days ago. She also had outpatient respiratory testing done to determine need for oxygen. There is a note from a respiratory therapist stating that patient is a candidate for oxygen. Patient reports that she has had worsening breathing over the last couple days and worsening cough today. Again denies chest pain fevers. REVIEW OF SYSTEMS    Constitutional:  Denies fever, chills, weight loss or weakness   HENT:  Denies sore throat or ear pain   Cardiovascular:  Denies chest pain, palpitations   Respiratory:  See hpi   GI:  Denies abdominal pain, nausea, vomiting, or diarrhea  :  Denies any urinary symptoms.    Musculoskeletal:  Denies back pain,   Skin:  urticaria  Neurologic:  Denies headache, focal weakness or sensory changes   Endocrine:  Denies polyuria or polydypsia   Lymphatic:  Denies swollen glands     All other review of systems are negative  See HPI and nursing notes for additional information     PAST MEDICAL AND SURGICAL HISTORY    Past Medical History:   Diagnosis Date    Acute DVT of right tibial vein ([facility] [address] 07/2017    ER imaging: distal right tibial vein DVT; no anticoag for bleeding/ anemia    Acute exacerbation of chronic obstructive pulmonary disease (COPD) ([facility] [address] 12/13/2018    Arthritis     Chronic pain syndrome     Low back pain; lumbar disc disease    Clostridium difficile colitis 09/20/2017    COPD, severe ([facility] [address] 10/24/2017    Depression     Fibromyalgia     Former smoker     Quit 1/2019    Herniated cervical disc 5-1998    Herpes zoster ophthalmicus 3/2012    Hx of blood clots     Hyperlipidemia     Hypertension     L3 vertebral fracture ([facility] [address] 2010    vertebroplasty    Lumbar spinal stenosis 2015    moderately severe by MRI    Migraine     Osteoporosis 2010    Fragility Fx L3    Rheumatoid arthritis(714.0) 1976    Dr Angie Bland S/P cardiac catheterization 05/2017    patent coronaries; Takotsubo; najeeb Ahmed    Takotsubo syndrome 05/2017    TMJ dysfunction     Tobacco abuse 12/13/2018    Vitamin B12 deficiency 12/2014    B12 level 199     Past Surgical History:   Procedure Laterality Date    APPENDECTOMY  1966    CARDIAC CATHETERIZATION  05/21/2017    CHOLECYSTECTOMY  1966    w/ appendectomy    ELBOW SURGERY Right     FOOT SURGERY  1986    IL COLONOSCOPY FLX DX W/COLLJ SPEC WHEN PFRMD N/A 10/2/2018    COLONOSCOPY DIAGNOSTIC OR SCREENING performed by Haider Pablo MD at [address] [place] [address]    TOE SURGERY Left 4/25/2013    Deboo;     VERTEBROPLASTY  10/2010    L3    WRIST FUSION Left 2000       CURRENT MEDICATIONS    Current Outpatient Rx   Medication Sig Dispense Refill    PROAIR  (90 Base) MCG/ACT inhaler INHALE 2 PUFFS BY ORAL INHALATION EVERY 4 TO 6 HOURS AS NEEDED 8.5 g 10    predniSONE (DELTASONE) 10 MG tablet Take 4 tablets PO Qday X 3, then 3 tablets PO Qday X 3, then 2 tablets PO Qday X 3, then 1 tablets PO Qday X 3, then stop.  45 tablet 0    traZODone (DESYREL) 50 MG tablet Take 1 tablet by mouth nightly 30 tablet 6    losartan (COZAAR) 100 MG tablet TAKE ONE TABLET BY MOUTH DAILY 90 tablet 1    esomeprazole (NEXIUM) 40 MG delayed release capsule Take 40 mg by mouth 2 times daily (before meals)      ciclopirox (LOPROX) 0.77 % cream Apply topically 2 times daily. 1 Tube 1    furosemide (LASIX) 40 MG tablet TAKE 1 TABLET BY MOUTH TWICE A DAY 60 tablet 5    atorvastatin (LIPITOR) 10 MG tablet TAKE 1 TABLET BY MOUTH EVERY EVENING 90 tablet 1    BREO ELLIPTA 100-25 MCG/INH AEPB inhaler INHALE 1 PUFF INTO THE LUNGS DAILY AFTER INHALATION THEN GARGLE 1 each 11    potassium chloride (KLOR-CON) 10 MEQ extended release tablet TAKE 2 TABLETS BY MOUTH EVERY MORNING AND TAKE 1 TABLET BY MOUTH EVERY EVENING 90 tablet 3    oxyCODONE-acetaminophen (PERCOCET) 7.5-325 MG per tablet Take 1 tablet by mouth 4 times daily. Saumya Justin Adalimumab (HUMIRA) 40 MG/0.4ML PSKT Inject into the skin      zoledronic acid (RECLAST) 5 MG/100ML SOLN Infuse 100 mLs intravenously once for 1 dose 100 mL 0    INCRUSE ELLIPTA 62.5 MCG/INH AEPB INHALE 1 PUFF VIA ORAL INHALATION ONCE DAILY 1 each 11    metoprolol tartrate (LOPRESSOR) 50 MG tablet TAKE 1 TABLET BY MOUTH 2 TIMES DAILY 120 tablet 11    diclofenac sodium (VOLTAREN) 1 % GEL Apply 2 g topically 2 times daily 1 Tube 3    vitamin B-12 (CYANOCOBALAMIN) 1000 MCG tablet Take 1,000 mcg by mouth daily.  DULoxetine (CYMBALTA) 60 MG capsule Take 60 mg by mouth daily.  Calcium Citrate-Vitamin D (CITRACAL + D PO) Take 600 mg by mouth daily          ALLERGIES    Allergies   Allergen Reactions    Ativan [Lorazepam] Other (See Comments)     Violent, thrashing and combative. She has lacerations and bruising, had to be tied down.      Etanercept Other (See Comments)     No response    Humira [Adalimumab]     Prochlorperazine Edisylate Other (See Comments)     \"Compazine\"   Involuntary Muscle Spasms     Remicade [Infliximab Injection]     Doxycycline Other (See Comments)     Stomach pains       SOCIAL AND FAMILY HISTORY    Social History     Socioeconomic History    Marital status:  Spouse name: Shanti Ricketts Number of children: 2    Years of education: 15    Highest education level: None   Occupational History    Occupation: retired   Social Needs    Financial resource strain: None    Food insecurity:     Worry: None     Inability: None    Transportation needs:     Medical: None     Non-medical: None   Tobacco Use    Smoking status: Former Smoker     Packs/day: 0.75     Years: 55.00     Pack years: 41.25     Types: Cigarettes     Start date: 1962     Last attempt to quit: 2019     Years since quittin.2    Smokeless tobacco: Never Used    Tobacco comment: 19- Pt reports that her quit date was 19   Substance and Sexual Activity    Alcohol use: No     Alcohol/week: 0.0 oz    Drug use: No    Sexual activity: None   Lifestyle    Physical activity:     Days per week: None     Minutes per session: None    Stress: None   Relationships    Social connections:     Talks on phone: None     Gets together: None     Attends [employer] service: None     Active member of club or organization: None     Attends meetings of clubs or organizations: None     Relationship status: None    Intimate partner violence:     Fear of current or ex partner: None     Emotionally abused: None     Physically abused: None     Forced sexual activity: None   Other Topics Concern    None   Social History Narrative    None     Family History   Problem Relation Age of Onset    Heart Disease Father          FROM MI    Emphysema Father     Arthritis Mother     Stroke Mother     Heart Disease Other         family history    Cancer Other         family history -- larynx    Kidney Disease Son          PHYSICAL EXAM    VITAL SIGNS: BP (!) 111/55   Pulse 98   Temp 98.5 °F (36.9 °C) (Oral)   Resp 18   Ht 5' 7\" (1.702 m)   Wt 144 lb (65.3 kg)   SpO2 (!) 88%   BMI 22.55 kg/m²    Constitutional:  Well developed, Well nourished  HENT:  Normocephalic, Atraumatic, PERRL. EOMI.   Sclera clear.Conjunctiva normal, No discharge. Neck/Lymphatics: supple, no JVD, no swollen nodes  Cardiovascular:  Normal heart rate, Normal rhythm, No murmurs  Respiratory:  Nonlabored breathing. Decreased breath sounds to bases with minimal expiratory wheezes  Abdomen: Bowel sounds normal, Soft, No tenderness, no masses. Musculoskeletal: No edema, No tenderness, No cyanosis  Integument:  Warm, Dry  Multiple raised erythematous wheels to back, perineal region. Various sizes ranges. No focal fluid collections or crepitus. Neurologic:  Alert & oriented , No focal deficits noted. Cranial nerves II through XII grossly intact. Psychiatric:  Affect normal, Mood normal.       Labs:  Results for orders placed or performed during the hospital encounter of 04/09/19   Legionella antigen, urine   Result Value Ref Range    Legionella Urinary Ag NEGATIVE NEGATIVE   Strep Pneumoniae Antigen   Result Value Ref Range    Strep pneumo Ag URINE NEGATIVE  Presumptive negative for        Strep pneumo Ag pneumococcal pneumonia,     Strep pneumo Ag suggesting no current or recent     Strep pneumo Ag pneumococcal infection. Infection     Strep pneumo Ag due to S pneumonia cannot be ruled     Strep pneumo Ag out if the antigen present in the     Strep pneumo Ag sample is below the detection     Strep pneumo Ag limit of the test.     Strep pneumo Ag (NOTE)  REFERENCE RANGE: NEGATIVE      Sputum culture   Result Value Ref Range    Specimen SPUTUM     Special Requests NONE     Gram Smear MODERATE  NECROTIC POLYS       Gram Smear RARE  EPITHELIAL CELLS NOTED       Gram Smear RARE  GRAM POSITIVE COCCI       Gram Smear FEW  HAEMOPHILUS-LIKE GRAM NEGATIVE BACILLI       Gram Smear MODERATE  MUCOUS       Gram Smear       (NOTE)  [facility] CALL[facility] N1:NAZ BALDERRAMA 4/11 1055      Culture CULTURE IN PROGRESS    RESP Disease Panel PCR   Result Value Ref Range    Adenovirus Detection by PCR NOT DETECTED NOT DETECTED    Coronavirus 229E PCR NOT 23 MG/DL    CREATININE 0.9 0.6 - 1.1 MG/DL    Glucose 117 (H) 70 - 99 MG/DL    Calcium 9.2 8.3 - 10.6 MG/DL    Alb 3.0 (L) 3.4 - 5.0 GM/DL    Total Protein 6.3 (L) 6.4 - 8.2 GM/DL    Total Bilirubin 0.7 0.0 - 1.0 MG/DL    ALT 6 (L) 10 - 40 U/L    AST 14 (L) 15 - 37 IU/L    Alkaline Phosphatase 56 40 - 129 IU/L    GFR Non-African American >60 >60 mL/min/1.73m2    GFR African American >60 >60 mL/min/1.73m2    Anion Gap 9 4 - 16   Brain Natriuretic Peptide   Result Value Ref Range    Pro-BNP 1,217 (H) <300 PG/ML   Basic Metabolic Panel w/ Reflex to MG   Result Value Ref Range    Sodium 132 (L) 135 - 145 MMOL/L    Potassium 4.1 3.5 - 5.1 MMOL/L    Chloride 91 (L) 99 - 110 mMol/L    CO2 29 21 - 32 MMOL/L    Anion Gap 12 4 - 16    BUN 13 6 - 23 MG/DL    CREATININE 0.7 0.6 - 1.1 MG/DL    Glucose 135 (H) 70 - 99 MG/DL    Calcium 10.2 8.3 - 10.6 MG/DL    GFR Non-African American >60 >60 mL/min/1.73m2    GFR African American >60 >60 mL/min/1.73m2   CBC auto differential   Result Value Ref Range    WBC 10.3 4.0 - 10.5 K/CU MM    RBC 5.05 4.2 - 5.4 M/CU MM    Hemoglobin 13.3 12.5 - 16.0 GM/DL    Hematocrit 44.4 37 - 47 %    MCV 87.9 78 - 100 FL    MCH 26.3 (L) 27 - 31 PG    MCHC 30.0 (L) 32.0 - 36.0 %    RDW 15.9 (H) 11.7 - 14.9 %    Platelets 494 107 - 815 K/CU MM    MPV 11.5 (H) 7.5 - 11.1 FL    Differential Type AUTOMATED DIFFERENTIAL     Segs Relative 86.5 (H) 36 - 66 %    Lymphocytes % 10.0 (L) 24 - 44 %    Monocytes % 2.9 0 - 4 %    Eosinophils % 0.0 0 - 3 %    Basophils % 0.1 0 - 1 %    Segs Absolute 8.9 K/CU MM    Lymphocytes # 1.0 K/CU MM    Monocytes # 0.3 K/CU MM    Eosinophils # 0.0 K/CU MM    Basophils # 0.0 K/CU MM    Nucleated RBC % 0.0 %    Total Nucleated RBC 0.0 K/CU MM    Total Immature Neutrophil 0.05 K/CU MM    Immature Neutrophil % 0.5 (H) 0 - 0.43 %   Magnesium   Result Value Ref Range    Magnesium 1.8 1.8 - 2.4 mg/dl   Phosphorus   Result Value Ref Range    Phosphorus 4.5 2.5 - 4.9 MG/DL   Troponin Result Value Ref Range    Troponin T <0.010 <0.01 NG/ML   EKG 12 Lead   Result Value Ref Range    Ventricular Rate 85 BPM    Atrial Rate 85 BPM    P-R Interval 164 ms    QRS Duration 142 ms    Q-T Interval 434 ms    QTc Calculation (Bazett) 516 ms    P Axis 68 degrees    R Axis 114 degrees    T Axis -9 degrees    Diagnosis       Normal sinus rhythm  Possible Left atrial enlargement  Nonspecific intraventricular block  T wave abnormality, consider inferior ischemia  Abnormal ECG  When compared with ECG of 23-MAR-2019 15:42,  Nonspecific intraventricular block has replaced Right bundle branch block  Confirmed by [facility] Quique SHEIKH ([id number]) on 4/10/2019 12:25:20 PM             EKG    See attending note    RADIOLOGY         XR CHEST PORTABLE (Final result)   Result time 04/09/19 20:12:36   Final result by Loree Ordonez MD (04/09/19 20:12:36)                Impression:    No acute cardiopulmonary abnormality. Narrative:    EXAMINATION:  SINGLE XRAY VIEW OF THE CHEST    4/9/2019 7:34 pm    COMPARISON:  03/23/2019    HISTORY:  ORDERING SYSTEM PROVIDED HISTORY: sob  TECHNOLOGIST PROVIDED HISTORY:  Reason for exam:->sob  Ordering Physician Provided Reason for Exam: sob  Acuity: Acute  Type of Exam: Initial  Additional signs and symptoms: na  Relevant Medical/Surgical History: copd, hypertension    FINDINGS:  The lungs are clear.  The cardiac and mediastinal contours are normal.  There  is no pleural effusion or pneumothorax. No acute osseous abnormality is  identified. Chronic posttraumatic deformity and sclerosis of the right  anterior rib is re- demonstrated.  There are multiple old healed left rib  fracture deformities. ED COURSE & MEDICAL DECISION MAKING       Vital signs and nursing notes reviewed during ED course. Patient care and presentation staffed with supervising MD.  Patient seen by supervising MD today. All pertinent Lab data and radiographic results reviewed with patient at bedside. The patient and/or the family were informed of the results of any tests/labs/imaging, the treatment plan, and time was allotted to answer questions. Clinical  IMPRESSION    1. COPD exacerbation ([address]    2. Hypoxia    3. Urticaria        Triage nurse did alert me the patient's oxygen saturation on room air was 86-88%. Patient was placed on 3 L. As soon as I walk into the room to perform my evaluation I did turn off of oxygen. I was in the room for around 3-5 minutes and her oxygen saturation dropped to 84%. Patient placed back on 2 L. Will treat urticaria here and initiate workup for sob. Pt without cp. No ekg changes. Has notes from outpatient oxygen studies stating she would benefit from oxygen. Plan to admit for management COPD and to arrange home o2. Pt in agreement. Spoke to hospitalist service who graciously agreed to admit. Comment: Please note this report has been produced using speech recognition software and may contain errors related to that system including errors in grammar, punctuation, and spelling, as well as words and phrases that may be inappropriate. If there are any questions or concerns please feel free to contact the dictating provider for clarification.         Artie Grimes PA-C  04/12/19 [address]unaway PA-C  04/12/19 [id number]

## 2019-04-10 ENCOUNTER — APPOINTMENT (OUTPATIENT)
Dept: CT IMAGING | Age: 79
DRG: 193 | End: 2019-04-10
Payer: MEDICARE

## 2019-04-10 LAB
ADENOVIRUS DETECTION BY PCR: NOT DETECTED
ANION GAP SERPL CALCULATED.3IONS-SCNC: 12 MMOL/L (ref 4–16)
BASOPHILS ABSOLUTE: 0 K/CU MM
BASOPHILS RELATIVE PERCENT: 0.1 % (ref 0–1)
BORDETELLA PERTUSSIS PCR: NOT DETECTED
BUN BLDV-MCNC: 13 MG/DL (ref 6–23)
CALCIUM SERPL-MCNC: 10.2 MG/DL (ref 8.3–10.6)
CHLAMYDOPHILA PNEUMONIA PCR: NOT DETECTED
CHLORIDE BLD-SCNC: 91 MMOL/L (ref 99–110)
CO2: 29 MMOL/L (ref 21–32)
CORONAVIRUS 229E PCR: NOT DETECTED
CORONAVIRUS HKU1 PCR: NOT DETECTED
CORONAVIRUS NL63 PCR: NOT DETECTED
CORONAVIRUS OC43 PCR: NOT DETECTED
CREAT SERPL-MCNC: 0.7 MG/DL (ref 0.6–1.1)
DIFFERENTIAL TYPE: ABNORMAL
EOSINOPHILS ABSOLUTE: 0 K/CU MM
EOSINOPHILS RELATIVE PERCENT: 0 % (ref 0–3)
GFR AFRICAN AMERICAN: >60 ML/MIN/1.73M2
GFR NON-AFRICAN AMERICAN: >60 ML/MIN/1.73M2
GLUCOSE BLD-MCNC: 135 MG/DL (ref 70–99)
HCT VFR BLD CALC: 44.4 % (ref 37–47)
HEMOGLOBIN: 13.3 GM/DL (ref 12.5–16)
HUMAN METAPNEUMOVIRUS PCR: NOT DETECTED
IMMATURE NEUTROPHIL %: 0.5 % (ref 0–0.43)
INFLUENZA A BY PCR: NOT DETECTED
INFLUENZA A H1 (2009) PCR: NOT DETECTED
INFLUENZA A H1 PANDEMIC PCR: NOT DETECTED
INFLUENZA A H3 PCR: NOT DETECTED
INFLUENZA B BY PCR: NOT DETECTED
LEGIONELLA URINARY AG: NEGATIVE
LYMPHOCYTES ABSOLUTE: 1 K/CU MM
LYMPHOCYTES RELATIVE PERCENT: 10 % (ref 24–44)
MAGNESIUM: 1.8 MG/DL (ref 1.8–2.4)
MCH RBC QN AUTO: 26.3 PG (ref 27–31)
MCHC RBC AUTO-ENTMCNC: 30 % (ref 32–36)
MCV RBC AUTO: 87.9 FL (ref 78–100)
MONOCYTES ABSOLUTE: 0.3 K/CU MM
MONOCYTES RELATIVE PERCENT: 2.9 % (ref 0–4)
MYCOPLASMA PNEUMONIAE PCR: NOT DETECTED
NUCLEATED RBC %: 0 %
PARAINFLUENZA 1 PCR: NOT DETECTED
PARAINFLUENZA 2 PCR: NOT DETECTED
PARAINFLUENZA 3 PCR: NOT DETECTED
PARAINFLUENZA 4 PCR: NOT DETECTED
PDW BLD-RTO: 15.9 % (ref 11.7–14.9)
PHOSPHORUS: 4.5 MG/DL (ref 2.5–4.9)
PLATELET # BLD: 313 K/CU MM (ref 140–440)
PMV BLD AUTO: 11.5 FL (ref 7.5–11.1)
POTASSIUM SERPL-SCNC: 4.1 MMOL/L (ref 3.5–5.1)
RBC # BLD: 5.05 M/CU MM (ref 4.2–5.4)
RHINOVIRUS ENTEROVIRUS PCR: NOT DETECTED
RSV PCR: NOT DETECTED
SEGMENTED NEUTROPHILS ABSOLUTE COUNT: 8.9 K/CU MM
SEGMENTED NEUTROPHILS RELATIVE PERCENT: 86.5 % (ref 36–66)
SODIUM BLD-SCNC: 132 MMOL/L (ref 135–145)
STREP PNEUMONIAE ANTIGEN: NORMAL
TOTAL IMMATURE NEUTOROPHIL: 0.05 K/CU MM
TOTAL NUCLEATED RBC: 0 K/CU MM
TROPONIN T: <0.01 NG/ML
WBC # BLD: 10.3 K/CU MM (ref 4–10.5)

## 2019-04-10 PROCEDURE — 85025 COMPLETE CBC W/AUTO DIFF WBC: CPT

## 2019-04-10 PROCEDURE — 93010 ELECTROCARDIOGRAM REPORT: CPT | Performed by: INTERNAL MEDICINE

## 2019-04-10 PROCEDURE — 71250 CT THORAX DX C-: CPT

## 2019-04-10 PROCEDURE — 94760 N-INVAS EAR/PLS OXIMETRY 1: CPT

## 2019-04-10 PROCEDURE — 87185 SC STD ENZYME DETCJ PER NZM: CPT

## 2019-04-10 PROCEDURE — 87070 CULTURE OTHR SPECIMN AEROBIC: CPT

## 2019-04-10 PROCEDURE — 6370000000 HC RX 637 (ALT 250 FOR IP): Performed by: FAMILY MEDICINE

## 2019-04-10 PROCEDURE — 6360000002 HC RX W HCPCS: Performed by: INTERNAL MEDICINE

## 2019-04-10 PROCEDURE — 2700000000 HC OXYGEN THERAPY PER DAY

## 2019-04-10 PROCEDURE — 80048 BASIC METABOLIC PNL TOTAL CA: CPT

## 2019-04-10 PROCEDURE — 87581 M.PNEUMON DNA AMP PROBE: CPT

## 2019-04-10 PROCEDURE — 84484 ASSAY OF TROPONIN QUANT: CPT

## 2019-04-10 PROCEDURE — 87798 DETECT AGENT NOS DNA AMP: CPT

## 2019-04-10 PROCEDURE — 1200000000 HC SEMI PRIVATE

## 2019-04-10 PROCEDURE — 2580000003 HC RX 258: Performed by: INTERNAL MEDICINE

## 2019-04-10 PROCEDURE — 6360000002 HC RX W HCPCS: Performed by: FAMILY MEDICINE

## 2019-04-10 PROCEDURE — 87040 BLOOD CULTURE FOR BACTERIA: CPT

## 2019-04-10 PROCEDURE — 83735 ASSAY OF MAGNESIUM: CPT

## 2019-04-10 PROCEDURE — 84100 ASSAY OF PHOSPHORUS: CPT

## 2019-04-10 PROCEDURE — 36415 COLL VENOUS BLD VENIPUNCTURE: CPT

## 2019-04-10 PROCEDURE — 94640 AIRWAY INHALATION TREATMENT: CPT

## 2019-04-10 PROCEDURE — 87205 SMEAR GRAM STAIN: CPT

## 2019-04-10 PROCEDURE — 99223 1ST HOSP IP/OBS HIGH 75: CPT | Performed by: INTERNAL MEDICINE

## 2019-04-10 PROCEDURE — 87486 CHLMYD PNEUM DNA AMP PROBE: CPT

## 2019-04-10 PROCEDURE — 2580000003 HC RX 258: Performed by: FAMILY MEDICINE

## 2019-04-10 RX ORDER — LEFLUNOMIDE 10 MG/1
TABLET ORAL
Refills: 2 | Status: ON HOLD | COMMUNITY
Start: 2019-03-27 | End: 2019-04-10

## 2019-04-10 RX ORDER — METHYLPREDNISOLONE SODIUM SUCCINATE 40 MG/ML
40 INJECTION, POWDER, LYOPHILIZED, FOR SOLUTION INTRAMUSCULAR; INTRAVENOUS EVERY 12 HOURS
Status: DISCONTINUED | OUTPATIENT
Start: 2019-04-10 | End: 2019-04-10

## 2019-04-10 RX ORDER — METHYLPREDNISOLONE SODIUM SUCCINATE 40 MG/ML
40 INJECTION, POWDER, LYOPHILIZED, FOR SOLUTION INTRAMUSCULAR; INTRAVENOUS EVERY 8 HOURS
Status: DISCONTINUED | OUTPATIENT
Start: 2019-04-10 | End: 2019-04-11

## 2019-04-10 RX ORDER — PREDNISONE 20 MG/1
40 TABLET ORAL DAILY
Status: DISCONTINUED | OUTPATIENT
Start: 2019-04-12 | End: 2019-04-10

## 2019-04-10 RX ADMIN — Medication 2 PUFF: at 20:49

## 2019-04-10 RX ADMIN — GUAIFENESIN 600 MG: 600 TABLET, EXTENDED RELEASE ORAL at 10:07

## 2019-04-10 RX ADMIN — METHYLPREDNISOLONE SODIUM SUCCINATE 40 MG: 40 INJECTION, POWDER, LYOPHILIZED, FOR SOLUTION INTRAMUSCULAR; INTRAVENOUS at 10:07

## 2019-04-10 RX ADMIN — IPRATROPIUM BROMIDE AND ALBUTEROL SULFATE 1 AMPULE: .5; 3 SOLUTION RESPIRATORY (INHALATION) at 20:49

## 2019-04-10 RX ADMIN — AZITHROMYCIN MONOHYDRATE 500 MG: 500 INJECTION, POWDER, LYOPHILIZED, FOR SOLUTION INTRAVENOUS at 10:06

## 2019-04-10 RX ADMIN — SODIUM CHLORIDE, PRESERVATIVE FREE 10 ML: 5 INJECTION INTRAVENOUS at 23:36

## 2019-04-10 RX ADMIN — LOSARTAN POTASSIUM 100 MG: 100 TABLET, FILM COATED ORAL at 10:07

## 2019-04-10 RX ADMIN — SODIUM CHLORIDE, PRESERVATIVE FREE 10 ML: 5 INJECTION INTRAVENOUS at 10:06

## 2019-04-10 RX ADMIN — IPRATROPIUM BROMIDE AND ALBUTEROL SULFATE 1 AMPULE: .5; 3 SOLUTION RESPIRATORY (INHALATION) at 15:23

## 2019-04-10 RX ADMIN — FUROSEMIDE 40 MG: 40 TABLET ORAL at 20:15

## 2019-04-10 RX ADMIN — TRAZODONE HYDROCHLORIDE 50 MG: 50 TABLET ORAL at 20:15

## 2019-04-10 RX ADMIN — OXYCODONE HYDROCHLORIDE AND ACETAMINOPHEN 1 TABLET: 7.5; 325 TABLET ORAL at 16:56

## 2019-04-10 RX ADMIN — GUAIFENESIN 600 MG: 600 TABLET, EXTENDED RELEASE ORAL at 00:41

## 2019-04-10 RX ADMIN — METOPROLOL TARTRATE 50 MG: 50 TABLET ORAL at 20:16

## 2019-04-10 RX ADMIN — IPRATROPIUM BROMIDE AND ALBUTEROL SULFATE 1 AMPULE: .5; 3 SOLUTION RESPIRATORY (INHALATION) at 07:40

## 2019-04-10 RX ADMIN — GUAIFENESIN 600 MG: 600 TABLET, EXTENDED RELEASE ORAL at 20:16

## 2019-04-10 RX ADMIN — FUROSEMIDE 40 MG: 40 TABLET ORAL at 10:07

## 2019-04-10 RX ADMIN — ENOXAPARIN SODIUM 40 MG: 40 INJECTION SUBCUTANEOUS at 10:06

## 2019-04-10 RX ADMIN — CEFTRIAXONE 1 G: 1 INJECTION, POWDER, FOR SOLUTION INTRAMUSCULAR; INTRAVENOUS at 00:41

## 2019-04-10 RX ADMIN — DULOXETINE HYDROCHLORIDE 60 MG: 30 CAPSULE, DELAYED RELEASE ORAL at 10:07

## 2019-04-10 RX ADMIN — PANTOPRAZOLE SODIUM 40 MG: 40 TABLET, DELAYED RELEASE ORAL at 06:45

## 2019-04-10 RX ADMIN — OXYCODONE HYDROCHLORIDE AND ACETAMINOPHEN 1 TABLET: 7.5; 325 TABLET ORAL at 10:07

## 2019-04-10 RX ADMIN — CEFTRIAXONE 1 G: 1 INJECTION, POWDER, FOR SOLUTION INTRAMUSCULAR; INTRAVENOUS at 23:35

## 2019-04-10 RX ADMIN — METHYLPREDNISOLONE SODIUM SUCCINATE 40 MG: 40 INJECTION, POWDER, LYOPHILIZED, FOR SOLUTION INTRAMUSCULAR; INTRAVENOUS at 16:56

## 2019-04-10 RX ADMIN — METOPROLOL TARTRATE 50 MG: 50 TABLET ORAL at 10:07

## 2019-04-10 RX ADMIN — OXYCODONE HYDROCHLORIDE AND ACETAMINOPHEN 1 TABLET: 7.5; 325 TABLET ORAL at 21:48

## 2019-04-10 ASSESSMENT — PAIN DESCRIPTION - LOCATION: LOCATION: BACK

## 2019-04-10 ASSESSMENT — PAIN SCALES - GENERAL
PAINLEVEL_OUTOF10: 0
PAINLEVEL_OUTOF10: 4
PAINLEVEL_OUTOF10: 0
PAINLEVEL_OUTOF10: 0
PAINLEVEL_OUTOF10: 2
PAINLEVEL_OUTOF10: 1
PAINLEVEL_OUTOF10: 2
PAINLEVEL_OUTOF10: 3

## 2019-04-10 ASSESSMENT — PAIN DESCRIPTION - PAIN TYPE: TYPE: CHRONIC PAIN

## 2019-04-10 ASSESSMENT — PAIN DESCRIPTION - FREQUENCY: FREQUENCY: CONTINUOUS

## 2019-04-10 ASSESSMENT — PAIN DESCRIPTION - DESCRIPTORS: DESCRIPTORS: ACHING

## 2019-04-10 ASSESSMENT — PAIN - FUNCTIONAL ASSESSMENT: PAIN_FUNCTIONAL_ASSESSMENT: PREVENTS OR INTERFERES SOME ACTIVE ACTIVITIES AND ADLS

## 2019-04-10 ASSESSMENT — PAIN DESCRIPTION - ONSET: ONSET: ON-GOING

## 2019-04-10 NOTE — PROGRESS NOTES
Demetra Unger is a 66 y.o. female patient shortness of breath better    Current Facility-Administered Medications   Medication Dose Route Frequency Provider Last Rate Last Dose    mometasone-formoterol (DULERA) 200-5 MCG/ACT inhaler 2 puff  2 puff Inhalation BID Renetta Ram MD        methylPREDNISolone sodium (SOLU-MEDROL) injection 40 mg  40 mg Intravenous Q12H Renetta Ram MD        Followed by   Ede Ceja ON 4/12/2019] predniSONE (DELTASONE) tablet 40 mg  40 mg Oral Daily Renetta Ram MD        DULoxetine (CYMBALTA) extended release capsule 60 mg  60 mg Oral Daily Renetta Ram MD        pantoprazole (PROTONIX) tablet 40 mg  40 mg Oral QAM AC Renetta Ram MD   40 mg at 04/10/19 0645    furosemide (LASIX) tablet 40 mg  40 mg Oral BID Renetta Ram MD        losartan (COZAAR) tablet 100 mg  100 mg Oral Daily Renetta Ram MD        metoprolol tartrate (LOPRESSOR) tablet 50 mg  50 mg Oral BID Renetta Ram MD        oxyCODONE-acetaminophen (PERCOCET) 7.5-325 MG per tablet 1 tablet  1 tablet Oral 4x Daily Renetta Ram MD        traZODone (DESYREL) tablet 50 mg  50 mg Oral Nightly Renetta Ram MD        sodium chloride flush 0.9 % injection 10 mL  10 mL Intravenous 2 times per day Renetta Ram MD        sodium chloride flush 0.9 % injection 10 mL  10 mL Intravenous PRN Renetta Ram MD        magnesium hydroxide (MILK OF MAGNESIA) 400 MG/5ML suspension 30 mL  30 mL Oral Daily PRN Renetta Ram MD        enoxaparin (LOVENOX) injection 40 mg  40 mg Subcutaneous Daily Renetta Ram MD        polyethylene glycol (GLYCOLAX) packet 17 g  17 g Oral Daily PRN Renetta Ram MD        ipratropium-albuterol (DUONEB) nebulizer solution 1 ampule  1 ampule Inhalation Q4H WA Renetta Ram MD   1 ampule at 04/10/19 0740    acetaminophen (TYLENOL) tablet 650 mg  650 mg Oral Q4H PRN Renetta Ram MD        cefTRIAXone (ROCEPHIN) 1 g in dextrose 5 % 50 mL IVPB  1 g Intravenous Q24H Jaycee Engle MD   Stopped at 04/10/19 0118    diphenhydrAMINE (BENADRYL) tablet 25 mg  25 mg Oral Q4H PRN Jaycee Engle MD        albuterol sulfate  (90 Base) MCG/ACT inhaler 2 puff  2 puff Inhalation Q6H PRN Jaycee Engle MD        calcium carbonate (TUMS) chewable tablet 500 mg  500 mg Oral TID PRN Jaycee Engle MD        promethazine (PHENERGAN) tablet 12.5 mg  12.5 mg Oral Q6H PRN Jaycee Engle MD        guaiFENesin [facility] [place]) extended release tablet 600 mg  600 mg Oral BID Jaycee Engle MD   600 mg at 04/10/19 0041     Allergies   Allergen Reactions    Ativan [Lorazepam] Other (See Comments)     Violent, thrashing and combative. She has lacerations and bruising, had to be tied down.  Etanercept Other (See Comments)     No response    Humira [Adalimumab]     Prochlorperazine Edisylate Other (See Comments)     \"Compazine\"   Involuntary Muscle Spasms     Remicade [Infliximab Injection]     Doxycycline Other (See Comments)     Stomach pains     Active Problems:    COPD exacerbation (HCC)  Resolved Problems:    * No resolved hospital problems. *    Blood pressure 125/64, pulse 72, temperature 97.9 °F (36.6 °C), temperature source Oral, resp. rate 18, height 5' 7\" (1.702 m), weight 143 lb 11.2 oz (65.2 kg), SpO2 93 %, not currently breastfeeding. Subjective:  Symptoms:  Stable. Diet:  Adequate intake. Pain:  She reports no pain. Objective:  General Appearance:  Comfortable. Vital signs: (most recent): Blood pressure 128/61, pulse 93, temperature 98.3 °F (36.8 °C), temperature source Oral, resp. rate 18, height 5' 7\" (1.702 m), weight 143 lb 11.2 oz (65.2 kg), SpO2 93 %, not currently breastfeeding. Vital signs are normal.    HEENT: Normal HEENT exam.    Lungs:  Normal effort. There are wheezes. Heart: Normal rate. Abdomen: Abdomen is soft. Bowel sounds are normal.     Extremities: Normal range of motion. Neurological: Patient is alert.       Assessment & Plan  Acute hypoxic resp failure with COPD exacerbation  -CXR neg  -bld cx, resp PCR neg, strep neg, legionella neg, sputum cx  -CT chest pending  -zithro and rocephin  -solumedrol, duoneb  RA  -humear  HTN  -ARB, BB  Urticaria  -due to zofran  -benadryl prn  Chronic pain syndrom  -percocet  Hyponatremia  -on lasix   -repeat BMP  Mod PCM  DVT prophylaxis  -lovenox      Awaiting CT chest. Discharge tomorrow ? If stable and as per pulmonary recs.    Kaylen Johnston MD  4/10/2019

## 2019-04-10 NOTE — H&P
History and Physical      Name:  Lavon Choi /Age/Sex: 1940  (66 y.o. female)   MRN & CSN:  [id number] & [id number] Admission Date/Time: 2019  7:04 PM   Location:  ED16/ED-16 PCP: Katarina Mcgee MD         CC: SOB   Lavon Choi is a 66 y.o.  female  who presents with to the ED with a rash and  SOB . She states that  she has had SOB since  and it is associated with a productive cough . Today she reported that she has had  chills. She was seen in the ED on  3/23 due to cough and SOB and was given prednisone . She also was placed on Levaquin for 7 days by her PCP on 3/24 . She states reports that she has finished  the course of antibiotics. She reports SOB on exertion . Patient is currently being evaluated for oxygen therapy at home . Patient also has a rash that is located on pelvic area in the folds as well  as on her back and buttocks. She states that she  took Zofran last night and  woke up with a rash . She states that she has had not had a reaction to Zofran in the past . She denies swelling of tongue or throat. In the ED patient was hypoxic with oxygen level of 86 % in triage. She was placed on 2L of oxygen and it increased to >90% . Upon removal of oxygen within 3 min patient  Was at 84 % . In the ED CXR revealed no pneumonia, Trop was neg and BNP was 1217          Assessment and Plan:       1. Acute Hypoxic Respiratory failure : 2/2 to COPD exacerbation . In ED oxygen  was 86 % . Patient failed outpatient therapy with Levaquin Admit inpatient Blood cul , resp panel  IV Solumedrol Flu swab Placed on IV Ceftriaxone  . Patient is currently being evaluated for oxygen therapy . Consulted her pulmonologist Dr. Phil Marvin . Oxygen therapy  >88% . Will likely need home oxygen on discharge    2. Elevated BNP : age appropriate , elevation due to COPD exacerbation       2. Rheumatoid Arthritis : Humira     3. Primary Hypertension :Losartan 100 mg daily , Lopressor 50 mg twice daily     4. Urticaria : Patient has hives located in pelvic area back and buttocks. Patient states that's he took Zofran the night before and woke thsi am to see rash. Zofran eros be a likely cause. Benadryl PRN influenza ED she received  Solumedrol Benadryl and Pepcid      5. Chronic Pain syndrome : Percocet 7.5/325 4 times daily     Chronic  Conditions ; Takotsubo syndrome   Fibromyalgia       Diet DIET GENERAL;   DVT Prophylaxis [x] Lovenox, []  Heparin, [] SCDs, [] Ambulation   GI Prophylaxis [x] PPI,  [] H2 Blocker,  [] Carafate,  [] Diet/Tube Feeds   Code Status Full Code   Disposition Patient requires continued admission due to need for further evaluation and management of COPD and hypoxia    MDM [] Low, [x] Moderate,[]  High  Patient's risk as above due to  complexity of medical data reviewed and treatment options available                    Medications:   Medications:    diphenhydrAMINE  25 mg Oral Once      Infusions:   PRN Meds:      Current Facility-Administered Medications:     diphenhydrAMINE (BENADRYL) tablet 25 mg, 25 mg, Oral, Once, Moe Bahena PA-C    Current Outpatient Medications:     PROAIR  (90 Base) MCG/ACT inhaler, INHALE 2 PUFFS BY ORAL INHALATION EVERY 4 TO 6 HOURS AS NEEDED, Disp: 8.5 g, Rfl: 10    predniSONE (DELTASONE) 10 MG tablet, Take 4 tablets PO Qday X 3, then 3 tablets PO Qday X 3, then 2 tablets PO Qday X 3, then 1 tablets PO Qday X 3, then stop., Disp: 45 tablet, Rfl: 0    traZODone (DESYREL) 50 MG tablet, Take 1 tablet by mouth nightly, Disp: 30 tablet, Rfl: 6    losartan (COZAAR) 100 MG tablet, TAKE ONE TABLET BY MOUTH DAILY, Disp: 90 tablet, Rfl: 1    esomeprazole (NEXIUM) 40 MG delayed release capsule, Take 40 mg by mouth 2 times daily (before meals), Disp: , Rfl:     ciclopirox (LOPROX) 0.77 % cream, Apply topically 2 times daily. , Disp: 1 Tube, Rfl: 1    furosemide (LASIX) 40 MG tablet, TAKE 1 TABLET BY MOUTH TWICE A DAY, Disp: 60 tablet, Rfl: 5   therapy at home . Patient also has a rash that is located on pelvic area in the folds as well  As on her back and buttocks. She states that she  took Zofran last night and  woke up with a rash . She states that she has had not had a reaction to Zofran in the past . She denies swelling of tongue or throat. In the ED patient was hypoxic with oxygen level of 86 % in triage. She was placed on 2L of oxygen and it increased to >90% . Upon removal of oxygen within 3 min patient  Was at 84 % . In the ED CXR revealed no pneumonia, Trop was neg and BNP was 1217                 Review of Systems   Ten point ROS reviewed and negative, unless as noted below. GENERAL:  Denies fever,+++ chills, night sweats, or changes in weight. EYES:  Denies recent visual changes. ENT:  Denies ear pain, hearing loss or tinnitus  RESP:  ++++ cough, ++++dyspnea, +++ wheezing. CV:  Denies any chest pain with exertion or at rest, palpitations, syncope, or edema. GI:  Denies any dysphagia, nausea, vomiting, abdominal pain, heartburn, changes in bowel habit, melena or rectal bleeding  MUSCULOSKELETAL:  Denies any joint swelling, joint pain, or loss of range of motion. NEURO:  Denies any headaches, tremors, dizziness, vertigo, memory loss, confusion, weakness, numbness or tingling. PSYCH:  Denies any sleeping problems, history of abuse, marital discord. HEME/LYMPHATIC/IMMUNO:  Denies , bruising, bleeding abnormalities   ENDO:  Denies any heat or cold intolerance, panemiaolyuria or polydipsia.   Skin ; +++rash       Objective:   No intake or output data in the 24 hours ending 04/09/19 2229   Vitals:   Vitals:    04/09/19 2130   BP: (!) 143/65   Pulse: 77   Resp: 18   Temp:    SpO2: 96%     Physical Exam:   Gen:  awake, alert, cooperative, no apparent distress  EYES:Lids and lashes normal, pupils equal, round ,extra ocular muscles intact, sclera clear, conjunctiva normal  ENT:  Normocephalic, oral pharynx with moist mucus membranes  NECK: when pfrmd (N/A, 10/2/2018). Allergies: Allergies   Allergen Reactions    Ativan [Lorazepam] Other (See Comments)     Violent, thrashing and combative. She has lacerations and bruising, had to be tied down.  Etanercept Other (See Comments)     No response    Humira [Adalimumab]     Prochlorperazine Edisylate Other (See Comments)     \"Compazine\"   Involuntary Muscle Spasms     Remicade [Infliximab Injection]     Doxycycline Other (See Comments)     Stomach pains       FAM HX: family history includes Arthritis in her mother; Cancer in an other family member; Emphysema in her father; Heart Disease in her father and another family member; Kidney Disease in her son; Stroke in her mother. Family history reviewed and is essentially negative unless as stated above.      Soc HX:   Social History     Socioeconomic History    Marital status:      Spouse name: Victoriano Toussaint Number of children: 2    Years of education: 15    Highest education level: None   Occupational History    Occupation: retired   Social Needs    Financial resource strain: None    Food insecurity:     Worry: None     Inability: None    Transportation needs:     Medical: None     Non-medical: None   Tobacco Use    Smoking status: Former Smoker     Packs/day: 0.75     Years: 55.00     Pack years: 41.25     Types: Cigarettes     Start date: 1962     Last attempt to quit: 2019     Years since quittin.2    Smokeless tobacco: Never Used    Tobacco comment: 19- Pt reports that her quit date was 19   Substance and Sexual Activity    Alcohol use: No     Alcohol/week: 0.0 oz    Drug use: No    Sexual activity: None   Lifestyle    Physical activity:     Days per week: None     Minutes per session: None    Stress: None   Relationships    Social connections:     Talks on phone: None     Gets together: None     Attends Druze service: None     Active member of club or organization: None     Attends meetings of clubs or organizations: None     Relationship status: None    Intimate partner violence:     Fear of current or ex partner: None     Emotionally abused: None     Physically abused: None     Forced sexual activity: None   Other Topics Concern    None   Social History Narrative    None   Results for Abigail Dexter (MRN [id number]) as of 4/9/2019 22:32   Ref. Range 4/9/2019 20:12   Sodium Latest Ref Range: 135 - 145 MMOL/L 132 (L)   Potassium Latest Ref Range: 3.5 - 5.1 MMOL/L 3.5   Chloride Latest Ref Range: 99 - 110 mMol/L 90 (L)   CO2 Latest Ref Range: 21 - 32 MMOL/L 33 (H)   BUN Latest Ref Range: 6 - 23 MG/DL 12   Creatinine Latest Ref Range: 0.6 - 1.1 MG/DL 0.9   Anion Gap Latest Ref Range: 4 - 16  9   GFR Non- Latest Ref Range: >60 mL/min/1.73m2 >60   GFR African American Latest Ref Range: >60 mL/min/1.73m2 >60   Glucose Latest Ref Range: 70 - 99 MG/ (H)   Calcium Latest Ref Range: 8.3 - 10.6 MG/DL 9.2   Total Protein Latest Ref Range: 6.4 - 8.2 GM/DL 6.3 (L)   Pro-BNP Latest Ref Range: <300 PG/ML 1,217 (H)     Results for Abigail Dexter (MRN [id number]) as of 4/9/2019 22:32   Ref. Range 3/23/2019 19:50   Troponin T Latest Ref Range: <0.01 NG/ML <0.010   Results for Abigail Dexter (MRN [id number]) as of 4/9/2019 22:32   Ref. Range 4/9/2019 20:12   Albumin Latest Ref Range: 3.4 - 5.0 GM/DL 3.0 (L)   Alk Phos Latest Ref Range: 40 - 129 IU/L 56   ALT Latest Ref Range: 10 - 40 U/L 6 (L)   AST Latest Ref Range: 15 - 37 IU/L 14 (L)   Bilirubin Latest Ref Range: 0.0 - 1.0 MG/DL 0.7   Total Protein Latest Ref Range: 6.4 - 8.2 GM/DL 6.3 (L)   Glucose Latest Ref Range: 70 - 99 MG/ (H)   Results for Abigail Dexter (MRN [id number]) as of 4/9/2019 22:32   Ref.  Range 4/9/2019 20:12   WBC Latest Ref Range: 4.0 - 10.5 K/CU MM 10.1   RBC Latest Ref Range: 4.2 - 5.4 M/CU MM 4.72   Hemoglobin Quant Latest Ref Range: 12.5 - 16.0 GM/DL 12.2 (L)   Hematocrit Latest Ref Range: 37 - 47 % 40.9   MCV

## 2019-04-10 NOTE — ED NOTES
21:57 Dr Emily Reyna returned call -- parked call @ [phone] -- notified Josh PA Marylene Slot [facility]  04/09/19 8034

## 2019-04-10 NOTE — CONSULTS
[address], [facility]                                  CONSULTATION    PATIENT NAME: Daniela Jeffries                     :        1940  MED REC NO:   [id number]                          ROOM:       6573  ACCOUNT NO:   [de-identified]                           ADMIT DATE: 2019  PROVIDER:     Rodrigue Caicedo MD    CONSULT DATE:  2019    This is a prior patient of Dr. Mamta Gentile. CHIEF COMPLAINT:  Shortness of breath with chest congestion, urticarial  rash. HISTORY OF PRESENT ILLNESS:  The patient is a 66-year-old female who  presents to emergency room complaining of worsening shortness of breath  and chest congestion along with chills. She denies fever. She also has  developed a diffuse rash over her chest, back and bilateral legs  following the use of Zofran. She states that almost a month ago she  noted the onset of increasing chest and cough and was seen as an  outpatient and given a course of antibiotics and oral steroids using  Levaquin. She failed to improve and symptoms persisted. She denied any  fever but states that she was having chills. She states that she has  had some productive cough but recently it has been mostly nonproductive. She denies hemoptysis or pleuritic chest pain. She does carry a long  history of rheumatoid arthritis, but has not been diagnosed with  rheumatoid lung. She also carries a long history of COPD and was a  smoker up until 2019. PAST MEDICAL HISTORY:  Includes a long history of fibromyalgia,  depression and rheumatoid arthritis. She has had DVT in the past,  chronic heart disease and colitis. SOCIAL HISTORY:  She was less than a pack a day smoker for 55 years and  just recently quit as mentioned. She does not use alcohol or illegal  drugs.     FAMILY HISTORY:  Does include a history of arthritis in her mother,  emphysema in her father, heart disease in [place] x[place]. No focal neurologic deficits noted. LABORATORY DATA:  Serum sodium 132, potassium 3.5, creatinine is 0.9  with a BUN of 12. ProBNP is 1217. White blood count is 10,100 with  hemoglobin of 12.2 gm. There is a left shift in differential.  Blood  and sputum cultures are pending. Viral antigen profile is negative. Chest x-ray on admission shows no acute cardiopulmonary abnormality. IMPRESSION:  1. Acute hypoxemic respiratory failure. 2.  Acute exacerbation of COPD. 3.  Moderate COPD. 4.  Rheumatoid arthritis. 5.  Former smoker. PLAN:  She will be continued on aerosolized bronchodilators, IV  antibiotics, corticosteroids and aggressive bronchopulmonary toilet. I  will continue to follow her.         Janice Rocha MD    D: 04/10/2019 8:53:52       T: 04/10/2019 8:56:04     [email or web address]  Job#: [id number]     Doc#: [id number]    CC:  Elbert Liu MD

## 2019-04-10 NOTE — FLOWSHEET NOTE
Lab called re: venous blood gas ordered from the ED that has not resulted. Lab person also states it was not done and she will add it on to the morning labs.

## 2019-04-10 NOTE — ED PROVIDER NOTES
I independently examined and evaluated Brodie Barba. In brief, pt presents with a copd exacerbation and urticaria. Patient states that she woke up this morning and was having itching in her low back bottom and lower abdomen and noticed an urticaria rash. No fevers or chills. No new lotions or creams. She never had anything like this before. She didn't try putting hydrocortisone cream on it with no improvement in her symptoms. Patient is also been having cough and shortness of breath consistent with her COPD. No chest pain. Focused exam revealed diffuse urticarial rash over her lower abdomen mons pubis and bilateral gluteus and upper back. Patient has no mucosal involvement in her rectum or her vagina. No blisters or ulcerations, negative the close sign. ED course: Brodie Barba is a 66 y.o. female presents with a COPD exacerbation and allergic reaction to an unknown agent. Patient was treated for both of these with steroids and given Benadryl for the allergic reaction. No evidence of dangerous causes for her rash at this time and no evidence of anaphylaxis. Plan for admission for her new oxygen requirement with her COPD and management of her allergic reaction. All diagnostic, treatment, and disposition decisions were made by myself in conjunction with the advanced practice provider. For all further details of the patient's emergency department visit, please see the advanced practice provider's documentation. Comment: Please note this report has been produced using speech recognition software and may contain errors related to that system including errors in grammar, punctuation, and spelling, as well as words and phrases that may be inappropriate. If there are any questions or concerns please feel free to contact the dictating provider for clarification.      12 lead EKG per my interpretation:  Normal Sinus Rhythm 85  Axis is   Normal  QTc is  516  There is specific T wave changes appreciated. There is specific ST wave changes appreciated.   ST depression into 3 aVF, QT prolongation, unchanged from previous  Prior EKG to compare with was available        Gilma Uribe MD  04/09/19 4207

## 2019-04-11 PROCEDURE — 1200000000 HC SEMI PRIVATE

## 2019-04-11 PROCEDURE — 6370000000 HC RX 637 (ALT 250 FOR IP): Performed by: FAMILY MEDICINE

## 2019-04-11 PROCEDURE — 94761 N-INVAS EAR/PLS OXIMETRY MLT: CPT

## 2019-04-11 PROCEDURE — 99232 SBSQ HOSP IP/OBS MODERATE 35: CPT | Performed by: INTERNAL MEDICINE

## 2019-04-11 PROCEDURE — 94640 AIRWAY INHALATION TREATMENT: CPT

## 2019-04-11 PROCEDURE — 2700000000 HC OXYGEN THERAPY PER DAY

## 2019-04-11 PROCEDURE — 94762 N-INVAS EAR/PLS OXIMTRY CONT: CPT

## 2019-04-11 PROCEDURE — 6360000002 HC RX W HCPCS: Performed by: INTERNAL MEDICINE

## 2019-04-11 PROCEDURE — 6360000002 HC RX W HCPCS: Performed by: FAMILY MEDICINE

## 2019-04-11 PROCEDURE — 6370000000 HC RX 637 (ALT 250 FOR IP): Performed by: INTERNAL MEDICINE

## 2019-04-11 PROCEDURE — 2580000003 HC RX 258: Performed by: INTERNAL MEDICINE

## 2019-04-11 PROCEDURE — 2580000003 HC RX 258: Performed by: FAMILY MEDICINE

## 2019-04-11 PROCEDURE — 94680 O2 UPTK RST&XERS DIR SIMPLE: CPT

## 2019-04-11 RX ORDER — OXYCODONE AND ACETAMINOPHEN 7.5; 325 MG/1; MG/1
1 TABLET ORAL EVERY 6 HOURS
Status: DISCONTINUED | OUTPATIENT
Start: 2019-04-11 | End: 2019-04-13 | Stop reason: HOSPADM

## 2019-04-11 RX ORDER — METHYLPREDNISOLONE SODIUM SUCCINATE 40 MG/ML
40 INJECTION, POWDER, LYOPHILIZED, FOR SOLUTION INTRAMUSCULAR; INTRAVENOUS EVERY 12 HOURS
Status: DISCONTINUED | OUTPATIENT
Start: 2019-04-11 | End: 2019-04-13 | Stop reason: HOSPADM

## 2019-04-11 RX ADMIN — LOSARTAN POTASSIUM 100 MG: 100 TABLET, FILM COATED ORAL at 10:48

## 2019-04-11 RX ADMIN — GUAIFENESIN 600 MG: 600 TABLET, EXTENDED RELEASE ORAL at 20:54

## 2019-04-11 RX ADMIN — Medication 2 PUFF: at 20:24

## 2019-04-11 RX ADMIN — METOPROLOL TARTRATE 50 MG: 50 TABLET ORAL at 20:54

## 2019-04-11 RX ADMIN — SODIUM CHLORIDE, PRESERVATIVE FREE 10 ML: 5 INJECTION INTRAVENOUS at 20:54

## 2019-04-11 RX ADMIN — Medication 2 PUFF: at 07:59

## 2019-04-11 RX ADMIN — TRAZODONE HYDROCHLORIDE 50 MG: 50 TABLET ORAL at 20:53

## 2019-04-11 RX ADMIN — PANTOPRAZOLE SODIUM 40 MG: 40 TABLET, DELAYED RELEASE ORAL at 05:48

## 2019-04-11 RX ADMIN — IPRATROPIUM BROMIDE AND ALBUTEROL SULFATE 1 AMPULE: .5; 3 SOLUTION RESPIRATORY (INHALATION) at 20:23

## 2019-04-11 RX ADMIN — DULOXETINE HYDROCHLORIDE 60 MG: 30 CAPSULE, DELAYED RELEASE ORAL at 10:48

## 2019-04-11 RX ADMIN — IPRATROPIUM BROMIDE AND ALBUTEROL SULFATE 1 AMPULE: .5; 3 SOLUTION RESPIRATORY (INHALATION) at 07:59

## 2019-04-11 RX ADMIN — SODIUM CHLORIDE, PRESERVATIVE FREE 10 ML: 5 INJECTION INTRAVENOUS at 10:48

## 2019-04-11 RX ADMIN — OXYCODONE HYDROCHLORIDE AND ACETAMINOPHEN 1 TABLET: 7.5; 325 TABLET ORAL at 18:04

## 2019-04-11 RX ADMIN — METOPROLOL TARTRATE 50 MG: 50 TABLET ORAL at 10:48

## 2019-04-11 RX ADMIN — METHYLPREDNISOLONE SODIUM SUCCINATE 40 MG: 125 INJECTION, POWDER, LYOPHILIZED, FOR SOLUTION INTRAMUSCULAR; INTRAVENOUS at 13:19

## 2019-04-11 RX ADMIN — AZITHROMYCIN MONOHYDRATE 250 MG: 500 INJECTION, POWDER, LYOPHILIZED, FOR SOLUTION INTRAVENOUS at 12:11

## 2019-04-11 RX ADMIN — GUAIFENESIN 600 MG: 600 TABLET, EXTENDED RELEASE ORAL at 10:48

## 2019-04-11 RX ADMIN — IPRATROPIUM BROMIDE AND ALBUTEROL SULFATE 1 AMPULE: .5; 3 SOLUTION RESPIRATORY (INHALATION) at 11:41

## 2019-04-11 RX ADMIN — IPRATROPIUM BROMIDE AND ALBUTEROL SULFATE 1 AMPULE: .5; 3 SOLUTION RESPIRATORY (INHALATION) at 15:43

## 2019-04-11 RX ADMIN — FUROSEMIDE 40 MG: 40 TABLET ORAL at 10:48

## 2019-04-11 RX ADMIN — OXYCODONE HYDROCHLORIDE AND ACETAMINOPHEN 1 TABLET: 7.5; 325 TABLET ORAL at 12:11

## 2019-04-11 RX ADMIN — OXYCODONE HYDROCHLORIDE AND ACETAMINOPHEN 1 TABLET: 7.5; 325 TABLET ORAL at 06:17

## 2019-04-11 RX ADMIN — FUROSEMIDE 40 MG: 40 TABLET ORAL at 20:54

## 2019-04-11 RX ADMIN — METHYLPREDNISOLONE SODIUM SUCCINATE 40 MG: 40 INJECTION, POWDER, LYOPHILIZED, FOR SOLUTION INTRAMUSCULAR; INTRAVENOUS at 01:35

## 2019-04-11 RX ADMIN — ENOXAPARIN SODIUM 40 MG: 40 INJECTION SUBCUTANEOUS at 10:47

## 2019-04-11 ASSESSMENT — PAIN DESCRIPTION - FREQUENCY
FREQUENCY: CONTINUOUS
FREQUENCY: CONTINUOUS

## 2019-04-11 ASSESSMENT — PAIN DESCRIPTION - ONSET
ONSET: ON-GOING
ONSET: ON-GOING

## 2019-04-11 ASSESSMENT — PAIN SCALES - GENERAL
PAINLEVEL_OUTOF10: 0
PAINLEVEL_OUTOF10: 1
PAINLEVEL_OUTOF10: 1
PAINLEVEL_OUTOF10: 4
PAINLEVEL_OUTOF10: 0
PAINLEVEL_OUTOF10: 2

## 2019-04-11 ASSESSMENT — PAIN DESCRIPTION - LOCATION
LOCATION: GENERALIZED

## 2019-04-11 ASSESSMENT — PAIN DESCRIPTION - PAIN TYPE
TYPE: CHRONIC PAIN

## 2019-04-11 ASSESSMENT — ENCOUNTER SYMPTOMS
COUGH: 1
SHORTNESS OF BREATH: 1

## 2019-04-11 ASSESSMENT — PAIN DESCRIPTION - DESCRIPTORS: DESCRIPTORS: ACHING

## 2019-04-11 NOTE — PROGRESS NOTES
adjacent to bone hypertrophy related to an old fracture of the   lateral right 5th rib. Assessment:     Patient Active Problem List   Diagnosis    Rheumatoid Arthritis    Hypertension    Hyperlipidemia    Fibromyalgia    Migraine    Chronic pain syndrome    Depression    Osteoporosis    Vitamin B12 deficiency    Lumbar spinal stenosis    Panic attack    Takotsubo syndrome    Rheumatoid arthritis involving multiple joints (HCC)    Acute DVT of right tibial vein (HCC)    Blood loss anemia    COPD, severe ([address]    Former smoker    COPD exacerbation ([address]       Plan:   1. continue present treatment . 2. Home oxygen evaluation. 3. Lower nasal oxygen to 2 L/m  4. Taper IV steroids. 5. Continue aggressive bronchopulmonary toilet and consider discharge tomorrow if stable.  Line will need repeat CT chest in 3-6 months    Reuben Katz MD  4/11/2019  9:28 AM

## 2019-04-11 NOTE — CARE COORDINATION
Aury was discharged. She did not qualify for home O2. She did get a DME order for a nebulizer machine. Zeinab Dickey 04/17/19    Went to talk to Aury. She was sleeping. Will follow.   Lor Adame Gtnssc35/11/19    COPD CHECKLIST    Was the COPD Order set used      Yes  Pneumonia/COPD [employer] Education   No  Blood Cultures drawn before 1st antibiotic given   Yes  Antibiotic given within 24 hours    Yes  O2 Saturation on admission     86% RA    Consults:             Smoking Cessation      N/A Quit 1-1-19  Pulmonary       Yes  Med Assist Consult      No  Home Health Care Consult     No  Palliative Care Consult     No  Respiratory Consult      Yes  (including bedside instructions on nebulizers, inhalers, and assessment of oxygen and equipment needs at home)    Discharge:             Pneumococcal Vaccine given before discharge  UTD  Flu Vaccine given before discharge    UTD  Inhalers       Yes  Spacer        No  Steroids       Yes  Follow-up appointment scheduled within 5-7 days  Yes  Cardio/Pulmonary Wellness program consult  No  Home Oxygen       No  Nebulizer, bi-pap, c-pap at home    Yes  Home air quality assessment ([facility]) No    Where do you live     Primary Physician:               [x] Home     [x] Dr. Jessica Clnacy               [] [facility] [address]               [] Assisted Living    [] [facility]               [] [address]       [] Homeless/Homeless Shelter              Pulmonary Physician:    Consulted:  [] Niko Borne     [x] Yes   [x] [facility]     [] No  [] [facility]  [] Jaky Alvarez  [] Other:     Pharmacy:      Meds to Beds:  [] [facility]                               [] Yes   [x] [facility]       [x] No   [] [facility]   [] [employer]      [] [facility]    [] [facility]   [] [facility]   [] [facility]   [x] Other: [address]:       SNF:  [] Yes, Name:                  [] Yes, Name:  [x] No                     [x] No     Do you have DME Company:  [] Home O2      [] [facility]    [] BIPAP/CPAP          [] [employer]  [] Nebulizer      [] [place]  [x] None of the above     [] Other    Home COPD Medications:  Inhalers:      yes              Nebulizers:     Yes-new               Other: N/A    Do you have all your home medications? Can you pay for medication?   [x] Yes         [x] Yes   []  No [] No     What time do you prefer your appointments:  Do you have transportation:    [] AM                                                                          [x] Yes  [] PM                                                                          [] No  [] [employer]

## 2019-04-11 NOTE — PROGRESS NOTES
Cira Hawthorne is a 66 y.o. female patient shortness of breath better    Current Facility-Administered Medications   Medication Dose Route Frequency Provider Last Rate Last Dose    oxyCODONE-acetaminophen (PERCOCET) 7.5-325 MG per tablet 1 tablet  1 tablet Oral Q6H Marina Chiu MD   1 tablet at 04/11/19 1211    methylPREDNISolone sodium (SOLU-MEDROL) injection 40 mg  40 mg Intravenous Q12H Ana Amaya MD   40 mg at 04/11/19 1319    azithromycin (ZITHROMAX) 250 mg in dextrose 5 % 250 mL IVPB  250 mg Intravenous Q24H Ana Amaya MD   Stopped at 04/11/19 1319    mometasone-formoterol (DULERA) 200-5 MCG/ACT inhaler 2 puff  2 puff Inhalation BID Mariana Fox MD   2 puff at 04/11/19 0759    DULoxetine (CYMBALTA) extended release capsule 60 mg  60 mg Oral Daily Mariana Fox MD   60 mg at 04/11/19 1048    pantoprazole (PROTONIX) tablet 40 mg  40 mg Oral QAM AC Mariana Fox MD   40 mg at 04/11/19 0548    furosemide (LASIX) tablet 40 mg  40 mg Oral BID Mariana Fox MD   40 mg at 04/11/19 1048    losartan (COZAAR) tablet 100 mg  100 mg Oral Daily Mariana Fox MD   100 mg at 04/11/19 1048    metoprolol tartrate (LOPRESSOR) tablet 50 mg  50 mg Oral BID Mariana Fox MD   50 mg at 04/11/19 1048    traZODone (DESYREL) tablet 50 mg  50 mg Oral Nightly Mariana Fox MD   50 mg at 04/10/19 2015    sodium chloride flush 0.9 % injection 10 mL  10 mL Intravenous 2 times per day Mariana Fox MD   10 mL at 04/11/19 1048    sodium chloride flush 0.9 % injection 10 mL  10 mL Intravenous PRN Mariana Fox MD        magnesium hydroxide (MILK OF MAGNESIA) 400 MG/5ML suspension 30 mL  30 mL Oral Daily PRN Mariana Fox MD        enoxaparin (LOVENOX) injection 40 mg  40 mg Subcutaneous Daily Mariana Fox MD   40 mg at 04/11/19 1047    polyethylene glycol (GLYCOLAX) packet 17 g  17 g Oral Daily PRN Mariana Fox MD        ipratropium-albuterol (DUONEB) nebulizer solution 1 ampule  1 ampule Inhalation Q4H WA Emeka Escobar MD   1 ampule at 04/11/19 1543    acetaminophen (TYLENOL) tablet 650 mg  650 mg Oral Q4H PRN Emeka Escobar MD        cefTRIAXone (ROCEPHIN) 1 g in dextrose 5 % 50 mL IVPB  1 g Intravenous Q24H Emeka Escobar MD   Stopped at 04/11/19 0010    diphenhydrAMINE (BENADRYL) tablet 25 mg  25 mg Oral Q4H PRN Emeka Escobar MD        albuterol sulfate  (90 Base) MCG/ACT inhaler 2 puff  2 puff Inhalation Q6H PRN Emeka Escobar MD        calcium carbonate (TUMS) chewable tablet 500 mg  500 mg Oral TID PRN Emeka Escobar MD        promethazine (PHENERGAN) tablet 12.5 mg  12.5 mg Oral Q6H PRN Emeka Escobar MD        guaiFENesin [facility]) extended release tablet 600 mg  600 mg Oral BID Emeka Escobar MD   600 mg at 04/11/19 1048     Allergies   Allergen Reactions    Ativan [Lorazepam] Other (See Comments)     Violent, thrashing and combative. She has lacerations and bruising, had to be tied down.  Etanercept Other (See Comments)     No response    Humira [Adalimumab]     Prochlorperazine Edisylate Other (See Comments)     \"Compazine\"   Involuntary Muscle Spasms     Remicade [Infliximab Injection]     Doxycycline Other (See Comments)     Stomach pains     Active Problems:    COPD exacerbation (HCC)  Resolved Problems:    * No resolved hospital problems. *    Blood pressure (!) 124/58, pulse 92, temperature 97.9 °F (36.6 °C), temperature source Oral, resp. rate 18, height 5' 7\" (1.702 m), weight 143 lb 11.2 oz (65.2 kg), SpO2 90 %, not currently breastfeeding. Subjective:  Symptoms:  Stable. She reports shortness of breath and cough. Diet:  Adequate intake. Pain:  She reports no pain. Objective:  General Appearance:  Comfortable. Vital signs: (most recent): Blood pressure (!) 124/58, pulse 92, temperature 97.9 °F (36.6 °C), temperature source Oral, resp.  rate 18, height 5' 7\" (1.702 m), weight 143 lb 11.2 oz (65.2 kg), SpO2 90 %, not currently breastfeeding. Vital signs are normal.    HEENT: Normal HEENT exam.    Lungs:  Normal effort. She is not in respiratory distress. There are wheezes. Heart: Normal rate. Abdomen: Abdomen is soft. Bowel sounds are normal.     Extremities: Normal range of motion. Neurological: Patient is alert. Labs reviewed. Lab Results   Component Value Date    WBC 10.3 04/10/2019    HGB 13.3 04/10/2019    HCT 44.4 04/10/2019    MCV 87.9 04/10/2019     04/10/2019     Lab Results   Component Value Date     04/10/2019    K 4.1 04/10/2019    CL 91 04/10/2019    CO2 29 04/10/2019    BUN 13 04/10/2019    CREATININE 0.7 04/10/2019    GLUCOSE 135 04/10/2019    CALCIUM 10.2 04/10/2019          Imagings below were Personally reviewed. Ct Chest Wo Contrast    Result Date: 4/10/2019  EXAMINATION: CT OF THE CHEST WITHOUT CONTRAST 4/10/2019 11:24 am TECHNIQUE: CT of the chest was performed without the administration of intravenous contrast. Multiplanar reformatted images are provided for review. Dose modulation, iterative reconstruction, and/or weight based adjustment of the mA/kV was utilized to reduce the radiation dose to as low as reasonably achievable. COMPARISON: Chest x-ray 4/9/2019. CTA chest 3/23/2019. HISTORY: ORDERING SYSTEM PROVIDED HISTORY: ACUTE RESP ILLNESS, >36YEARS OLD TECHNOLOGIST PROVIDED HISTORY: Ordering Physician Provided Reason for Exam: PRODUCTIVE COUGH   PT WAS ON ANTIBIOTICS SINCE APRIL 1 Acuity: Acute Type of Exam: Initial FINDINGS: Mediastinum: Heart is upper limits of normal in size. No pericardial effusion. Atherosclerotic changes to the thoracic aorta. No evidence for thoracic aortic aneurysm. No mediastinal or hilar lymphadenopathy noted on this noncontrast exam.  Calcified mediastinal and right hilar lymph nodes likely relate to prior granulomatous disease.  Lungs/pleura: Tree-in-bud type opacities to bilateral lower lobes and to a lesser extent right middle lobe and lingula of left upper lobe. Superimposed mild linear likely scarring versus atelectasis to the bilateral lower lobes. There also stable chronic pleural/parenchymal opacity and possible bronchiectatic change laterally to right lower lobe in close approximation to chronic right 5th rib deformity, stable from more remote exams as far back as at least 5/12/2015, felt to be compatible with benign etiology, possibly on the basis of posttraumatic change or chronic postinflammatory change. No pleural effusions or pneumothoraces. No endobronchial lesions. Upper Abdomen: Limited images to the upper abdomen demonstrate no acute or suspicious abnormalities. Soft Tissues/Bones: No acute or suspicious bony or soft tissue abnormalities. Stable chronic right rib deformities which may be on the basis of prior remote injury. Mild tree-in-bud type opacities to bilateral lower lobes and to a lesser extent right middle lobe and lingula of left upper lobe, which may reflect infectious or inflammatory process. Suggest continued follow-up with repeat exam in 2-3 months. Xr Chest Portable    Result Date: 4/9/2019  EXAMINATION: SINGLE XRAY VIEW OF THE CHEST 4/9/2019 7:34 pm COMPARISON: 03/23/2019 HISTORY: ORDERING SYSTEM PROVIDED HISTORY: sob TECHNOLOGIST PROVIDED HISTORY: Reason for exam:->sob Ordering Physician Provided Reason for Exam: sob Acuity: Acute Type of Exam: Initial Additional signs and symptoms: na Relevant Medical/Surgical History: copd, hypertension FINDINGS: The lungs are clear. The cardiac and mediastinal contours are normal.  There is no pleural effusion or pneumothorax. No acute osseous abnormality is identified. Chronic posttraumatic deformity and sclerosis of the right anterior rib is re- demonstrated. There are multiple old healed left rib fracture deformities. No acute cardiopulmonary abnormality.          Assessment & Plan  Acute hypoxic resp failure with COPD exacerbation  Pneumonia  -CXR neg but CT showed nodular infiltrate.    -bld cx, resp PCR neg, strep neg, legionella neg, sputum cx  -zithro and rocephin  -solumedrol, duoneb  -pulm consult note reviewed. Potential d/c tomorrow. RA  -humira at home.      HTN  -ARB, BB    Urticaria  -due to zofran  -benadryl prn    Chronic pain syndrom  -percocet    Hyponatremia  -on lasix   -repeat BMP    Mod PCM    DVT prophylaxis  -lovenox    Froy Bonds MD  4/11/2019

## 2019-04-12 ENCOUNTER — APPOINTMENT (OUTPATIENT)
Dept: GENERAL RADIOLOGY | Age: 79
DRG: 193 | End: 2019-04-12
Payer: MEDICARE

## 2019-04-12 LAB
CULTURE: ABNORMAL
GRAM SMEAR: ABNORMAL
Lab: ABNORMAL
SPECIMEN: ABNORMAL

## 2019-04-12 PROCEDURE — 2580000003 HC RX 258: Performed by: INTERNAL MEDICINE

## 2019-04-12 PROCEDURE — 2580000003 HC RX 258: Performed by: FAMILY MEDICINE

## 2019-04-12 PROCEDURE — 6360000002 HC RX W HCPCS: Performed by: FAMILY MEDICINE

## 2019-04-12 PROCEDURE — 6370000000 HC RX 637 (ALT 250 FOR IP): Performed by: FAMILY MEDICINE

## 2019-04-12 PROCEDURE — 94640 AIRWAY INHALATION TREATMENT: CPT

## 2019-04-12 PROCEDURE — 94761 N-INVAS EAR/PLS OXIMETRY MLT: CPT

## 2019-04-12 PROCEDURE — 6370000000 HC RX 637 (ALT 250 FOR IP): Performed by: INTERNAL MEDICINE

## 2019-04-12 PROCEDURE — 6360000002 HC RX W HCPCS: Performed by: INTERNAL MEDICINE

## 2019-04-12 PROCEDURE — 71046 X-RAY EXAM CHEST 2 VIEWS: CPT

## 2019-04-12 PROCEDURE — 99232 SBSQ HOSP IP/OBS MODERATE 35: CPT | Performed by: INTERNAL MEDICINE

## 2019-04-12 PROCEDURE — 1200000000 HC SEMI PRIVATE

## 2019-04-12 RX ADMIN — IPRATROPIUM BROMIDE AND ALBUTEROL SULFATE 1 AMPULE: .5; 3 SOLUTION RESPIRATORY (INHALATION) at 11:55

## 2019-04-12 RX ADMIN — DULOXETINE HYDROCHLORIDE 60 MG: 30 CAPSULE, DELAYED RELEASE ORAL at 08:14

## 2019-04-12 RX ADMIN — METHYLPREDNISOLONE SODIUM SUCCINATE 40 MG: 125 INJECTION, POWDER, LYOPHILIZED, FOR SOLUTION INTRAMUSCULAR; INTRAVENOUS at 12:10

## 2019-04-12 RX ADMIN — IPRATROPIUM BROMIDE AND ALBUTEROL SULFATE 1 AMPULE: .5; 3 SOLUTION RESPIRATORY (INHALATION) at 07:32

## 2019-04-12 RX ADMIN — SODIUM CHLORIDE, PRESERVATIVE FREE 10 ML: 5 INJECTION INTRAVENOUS at 21:01

## 2019-04-12 RX ADMIN — METOPROLOL TARTRATE 50 MG: 50 TABLET ORAL at 21:00

## 2019-04-12 RX ADMIN — PANTOPRAZOLE SODIUM 40 MG: 40 TABLET, DELAYED RELEASE ORAL at 06:13

## 2019-04-12 RX ADMIN — FUROSEMIDE 40 MG: 40 TABLET ORAL at 21:00

## 2019-04-12 RX ADMIN — OXYCODONE HYDROCHLORIDE AND ACETAMINOPHEN 1 TABLET: 7.5; 325 TABLET ORAL at 12:10

## 2019-04-12 RX ADMIN — CEFTRIAXONE 1 G: 1 INJECTION, POWDER, FOR SOLUTION INTRAMUSCULAR; INTRAVENOUS at 00:45

## 2019-04-12 RX ADMIN — TRAZODONE HYDROCHLORIDE 50 MG: 50 TABLET ORAL at 21:00

## 2019-04-12 RX ADMIN — Medication 2 PUFF: at 20:22

## 2019-04-12 RX ADMIN — GUAIFENESIN 600 MG: 600 TABLET, EXTENDED RELEASE ORAL at 08:14

## 2019-04-12 RX ADMIN — SODIUM CHLORIDE, PRESERVATIVE FREE 10 ML: 5 INJECTION INTRAVENOUS at 23:54

## 2019-04-12 RX ADMIN — OXYCODONE HYDROCHLORIDE AND ACETAMINOPHEN 1 TABLET: 7.5; 325 TABLET ORAL at 06:13

## 2019-04-12 RX ADMIN — Medication 2 PUFF: at 07:33

## 2019-04-12 RX ADMIN — GUAIFENESIN 600 MG: 600 TABLET, EXTENDED RELEASE ORAL at 21:01

## 2019-04-12 RX ADMIN — AZITHROMYCIN MONOHYDRATE 250 MG: 500 INJECTION, POWDER, LYOPHILIZED, FOR SOLUTION INTRAVENOUS at 12:10

## 2019-04-12 RX ADMIN — CEFTRIAXONE 1 G: 1 INJECTION, POWDER, FOR SOLUTION INTRAMUSCULAR; INTRAVENOUS at 23:54

## 2019-04-12 RX ADMIN — OXYCODONE HYDROCHLORIDE AND ACETAMINOPHEN 1 TABLET: 7.5; 325 TABLET ORAL at 23:54

## 2019-04-12 RX ADMIN — LOSARTAN POTASSIUM 100 MG: 100 TABLET, FILM COATED ORAL at 08:14

## 2019-04-12 RX ADMIN — METOPROLOL TARTRATE 50 MG: 50 TABLET ORAL at 08:14

## 2019-04-12 RX ADMIN — FUROSEMIDE 40 MG: 40 TABLET ORAL at 08:14

## 2019-04-12 RX ADMIN — IPRATROPIUM BROMIDE AND ALBUTEROL SULFATE 1 AMPULE: .5; 3 SOLUTION RESPIRATORY (INHALATION) at 20:13

## 2019-04-12 RX ADMIN — ENOXAPARIN SODIUM 40 MG: 40 INJECTION SUBCUTANEOUS at 08:14

## 2019-04-12 RX ADMIN — IPRATROPIUM BROMIDE AND ALBUTEROL SULFATE 1 AMPULE: .5; 3 SOLUTION RESPIRATORY (INHALATION) at 15:03

## 2019-04-12 RX ADMIN — METHYLPREDNISOLONE SODIUM SUCCINATE 40 MG: 125 INJECTION, POWDER, LYOPHILIZED, FOR SOLUTION INTRAMUSCULAR; INTRAVENOUS at 02:49

## 2019-04-12 RX ADMIN — SODIUM CHLORIDE, PRESERVATIVE FREE 10 ML: 5 INJECTION INTRAVENOUS at 08:16

## 2019-04-12 RX ADMIN — OXYCODONE HYDROCHLORIDE AND ACETAMINOPHEN 1 TABLET: 7.5; 325 TABLET ORAL at 17:10

## 2019-04-12 RX ADMIN — OXYCODONE HYDROCHLORIDE AND ACETAMINOPHEN 1 TABLET: 7.5; 325 TABLET ORAL at 00:45

## 2019-04-12 ASSESSMENT — PAIN DESCRIPTION - LOCATION
LOCATION: GENERALIZED
LOCATION: BACK
LOCATION: GENERALIZED

## 2019-04-12 ASSESSMENT — PAIN DESCRIPTION - PAIN TYPE
TYPE: CHRONIC PAIN

## 2019-04-12 ASSESSMENT — PAIN SCALES - GENERAL
PAINLEVEL_OUTOF10: 1
PAINLEVEL_OUTOF10: 0
PAINLEVEL_OUTOF10: 2
PAINLEVEL_OUTOF10: 3
PAINLEVEL_OUTOF10: 7

## 2019-04-12 ASSESSMENT — PAIN DESCRIPTION - FREQUENCY
FREQUENCY: CONTINUOUS
FREQUENCY: CONTINUOUS

## 2019-04-12 ASSESSMENT — PAIN DESCRIPTION - ONSET: ONSET: ON-GOING

## 2019-04-12 ASSESSMENT — ENCOUNTER SYMPTOMS
COUGH: 1
SHORTNESS OF BREATH: 1

## 2019-04-12 NOTE — PROGRESS NOTES
Night trending report:  Time with spo2<88:  0:08:28,  2.0%  Pt qualifies for home O2 at this time.  Copy faxed to pulmonary diagnostics

## 2019-04-12 NOTE — PROGRESS NOTES
Noel Patino is a 66 y.o. female patient shortness of breath better    Current Facility-Administered Medications   Medication Dose Route Frequency Provider Last Rate Last Dose    oxyCODONE-acetaminophen (PERCOCET) 7.5-325 MG per tablet 1 tablet  1 tablet Oral Q6H Gabby Dickinson MD   1 tablet at 04/12/19 1210    methylPREDNISolone sodium (SOLU-MEDROL) injection 40 mg  40 mg Intravenous Q12H Reuben Katz MD   40 mg at 04/12/19 1210    azithromycin (ZITHROMAX) 250 mg in dextrose 5 % 250 mL IVPB  250 mg Intravenous Q24H Reuben Katz MD   Stopped at 04/12/19 1315    mometasone-formoterol (DULERA) 200-5 MCG/ACT inhaler 2 puff  2 puff Inhalation BID Johnjacqui Hermosillo MD   2 puff at 04/12/19 0733    DULoxetine (CYMBALTA) extended release capsule 60 mg  60 mg Oral Daily John MD Bay   60 mg at 04/12/19 0814    pantoprazole (PROTONIX) tablet 40 mg  40 mg Oral QAM AC John MD Bay   40 mg at 04/12/19 4642    furosemide (LASIX) tablet 40 mg  40 mg Oral BID John MD [place]   40 mg at 04/12/19 7308    losartan (COZAAR) tablet 100 mg  100 mg Oral Daily John Carrier, MD   100 mg at 04/12/19 0814    metoprolol tartrate (LOPRESSOR) tablet 50 mg  50 mg Oral BID John MD Bay   50 mg at 04/12/19 0814    traZODone (DESYREL) tablet 50 mg  50 mg Oral Nightly John Carrier, MD   50 mg at 04/11/19 2053    sodium chloride flush 0.9 % injection 10 mL  10 mL Intravenous 2 times per day John MD Bay   10 mL at 04/12/19 0816    sodium chloride flush 0.9 % injection 10 mL  10 mL Intravenous PRN John MD [place]        magnesium hydroxide (MILK OF MAGNESIA) 400 MG/5ML suspension 30 mL  30 mL Oral Daily PRN John MD Bay        enoxaparin (LOVENOX) injection 40 mg  40 mg Subcutaneous Daily John Hermosillo MD   40 mg at 04/12/19 0814    polyethylene glycol (GLYCOLAX) packet 17 g  17 g Oral Daily PRN John Carrier, MD        ipratropium-albuterol (DUONEB) nebulizer solution 1 ampule  1 ampule Inhalation Q4H WA Sophie Kim MD   1 ampule at 04/12/19 1503    acetaminophen (TYLENOL) tablet 650 mg  650 mg Oral Q4H PRN Sophie Kim MD        cefTRIAXone (ROCEPHIN) 1 g in dextrose 5 % 50 mL IVPB  1 g Intravenous Q24H Sophie Kim MD   Stopped at 04/12/19 0115    diphenhydrAMINE (BENADRYL) tablet 25 mg  25 mg Oral Q4H PRN Sophie Kim MD        albuterol sulfate  (90 Base) MCG/ACT inhaler 2 puff  2 puff Inhalation Q6H PRN Sophie Kim MD        calcium carbonate (TUMS) chewable tablet 500 mg  500 mg Oral TID PRN Sophie Kim MD        promethazine (PHENERGAN) tablet 12.5 mg  12.5 mg Oral Q6H PRN Sophie Kim MD        guaiFENesin [facility]) extended release tablet 600 mg  600 mg Oral BID Sophie Kim MD   600 mg at 04/12/19 5504     Allergies   Allergen Reactions    Ativan [Lorazepam] Other (See Comments)     Violent, thrashing and combative. She has lacerations and bruising, had to be tied down.  Etanercept Other (See Comments)     No response    Humira [Adalimumab]     Prochlorperazine Edisylate Other (See Comments)     \"Compazine\"   Involuntary Muscle Spasms     Remicade [Infliximab Injection]     Doxycycline Other (See Comments)     Stomach pains     Active Problems:    COPD exacerbation (HCC)  Resolved Problems:    * No resolved hospital problems. *    Blood pressure (!) 149/78, pulse 82, temperature 97.9 °F (36.6 °C), temperature source Oral, resp. rate 16, height 5' 7\" (1.702 m), weight 151 lb 9.6 oz (68.8 kg), SpO2 96 %, not currently breastfeeding. Subjective:  Symptoms:  Stable. She reports shortness of breath and cough. Diet:  Adequate intake. Pain:  She reports no pain. Patient states she still feels quite congested in her chest although she does not require oxygen at this time. She said that  Dr. Loc Law, pulmonary  Recommended her to stay another day for treatment. Objective:  General Appearance:  Comfortable.     Vital signs: (most recent): Blood pressure (!) 149/78, pulse 82, temperature 97.9 °F (36.6 °C), temperature source Oral, resp. rate 16, height 5' 7\" (1.702 m), weight 151 lb 9.6 oz (68.8 kg), SpO2 96 %, not currently breastfeeding. Vital signs are normal.    HEENT: Normal HEENT exam.    Lungs:  Normal effort. She is not in respiratory distress. There are wheezes and rhonchi. Heart: Normal rate. Abdomen: Abdomen is soft. Bowel sounds are normal.     Extremities: Normal range of motion. Neurological: Patient is alert. Labs reviewed. Lab Results   Component Value Date    WBC 10.3 04/10/2019    HGB 13.3 04/10/2019    HCT 44.4 04/10/2019    MCV 87.9 04/10/2019     04/10/2019     Lab Results   Component Value Date     04/10/2019    K 4.1 04/10/2019    CL 91 04/10/2019    CO2 29 04/10/2019    BUN 13 04/10/2019    CREATININE 0.7 04/10/2019    GLUCOSE 135 04/10/2019    CALCIUM 10.2 04/10/2019          Imagings below were Personally reviewed. Ct Chest Wo Contrast    Result Date: 4/10/2019  EXAMINATION: CT OF THE CHEST WITHOUT CONTRAST 4/10/2019 11:24 am TECHNIQUE: CT of the chest was performed without the administration of intravenous contrast. Multiplanar reformatted images are provided for review. Dose modulation, iterative reconstruction, and/or weight based adjustment of the mA/kV was utilized to reduce the radiation dose to as low as reasonably achievable. COMPARISON: Chest x-ray 4/9/2019. CTA chest 3/23/2019. HISTORY: ORDERING SYSTEM PROVIDED HISTORY: ACUTE RESP ILLNESS, >36YEARS OLD TECHNOLOGIST PROVIDED HISTORY: Ordering Physician Provided Reason for Exam: PRODUCTIVE COUGH   PT WAS ON ANTIBIOTICS SINCE APRIL 1 Acuity: Acute Type of Exam: Initial FINDINGS: Mediastinum: Heart is upper limits of normal in size. No pericardial effusion. Atherosclerotic changes to the thoracic aorta. No evidence for thoracic aortic aneurysm.   No mediastinal or hilar lymphadenopathy noted on this noncontrast exam. There are multiple old healed left rib fracture deformities. No acute cardiopulmonary abnormality. Assessment & Plan  Acute hypoxic resp failure with COPD exacerbation  Pneumonia  -CXR neg but CT showed nodular infiltrate.    -bld cx, resp PCR neg, strep neg, legionella neg, sputum cx  -zithro and rocephin  -solumedrol, duoneb  -pulm consult note reviewed. Continue to treat in hospital for another 24 hours. RA  -humira at home.      HTN  -ARB, BB    Urticaria  -due to zofran  -benadryl prn    Chronic pain syndrom  -percocet    Hyponatremia  -on lasix   -repeat BMP    Mod PCM    DVT prophylaxis  -lovenox    Ana Coronel MD  4/12/2019

## 2019-04-12 NOTE — PROGRESS NOTES
Pulmonary and Critical Care  Progress Note      VITALS:  /63   Pulse 74   Temp 97.7 °F (36.5 °C) (Oral)   Resp 17   Ht 5' 7\" (1.702 m)   Wt 151 lb 9.6 oz (68.8 kg)   SpO2 95%   BMI 23.74 kg/m²     Subjective:   Chief complaint:  Acute hypoxemic respiratory failure, acute exacerbation COPD, history of moderate to severe COPD, bibasilar nodular infiltrate suggesting bronchopneumonia  Mild resp distress-slightly worse overnight. Chest congestion with cough and mildly productive sputum  Patient awake and alert  Qualified for nighttime oxygen-did not qualify with ambulation or for daytime use  Objective:   PHYSICAL EXAM:    LUNGS: Scattered rhonchi and congested wheezes bilaterally which are more prominent than yesterday's exam  No new lab    DATA:    CBC:  Recent Labs     04/09/19  2012 04/10/19  0816   WBC 10.1 10.3   RBC 4.72 5.05   HGB 12.2* 13.3   HCT 40.9 44.4    313   MCV 86.7 87.9   MCH 25.8* 26.3*   MCHC 29.8* 30.0*   RDW 15.9* 15.9*   SEGSPCT 70.9* 86.5*      BMP:  Recent Labs     04/09/19  2012 04/10/19  0816   * 132*   K 3.5 4.1   CL 90* 91*   CO2 33* 29   BUN 12 13   CREATININE 0.9 0.7   CALCIUM 9.2 10.2   GLUCOSE 117* 135*      ABG:  No results for input(s): PH, PO2ART, XAA9FWE, HCO3, BEART, O2SAT in the last 72 hours. BNP  Lab Results   Component Value Date    BNP 54 11/25/2012      D-Dimer:  Lab Results   Component Value Date    DDIMER 809 (H) 03/23/2019      1. Radiology: No chest x-ray this a.m.     Assessment:     Patient Active Problem List   Diagnosis    Rheumatoid Arthritis    Hypertension    Hyperlipidemia    Fibromyalgia    Migraine    Chronic pain syndrome    Depression    Osteoporosis    Vitamin B12 deficiency    Lumbar spinal stenosis    Panic attack    Takotsubo syndrome    Rheumatoid arthritis involving multiple joints (HCC)    Acute DVT of right tibial vein (HCC)    Blood loss anemia    COPD, severe ([address]    Former smoker    COPD exacerbation [facility])       Plan:   1. continue present treatment . 2. Repeat PA and lateral chest x-ray. 3. Would recommend at least 24 more hours of aggressive bronchopulmonary toilet, IV antibiotics and IV steroids with aerosolized bronchodilators.   4. Patient will benefit from nocturnal oxygen therapy for treatment of her severe COPD    Day Lees MD  4/12/2019  8:26 AM

## 2019-04-13 VITALS
TEMPERATURE: 97.9 F | BODY MASS INDEX: 22.94 KG/M2 | HEART RATE: 86 BPM | HEIGHT: 67 IN | WEIGHT: 146.19 LBS | RESPIRATION RATE: 20 BRPM | SYSTOLIC BLOOD PRESSURE: 164 MMHG | DIASTOLIC BLOOD PRESSURE: 75 MMHG | OXYGEN SATURATION: 95 %

## 2019-04-13 PROCEDURE — 6360000002 HC RX W HCPCS: Performed by: INTERNAL MEDICINE

## 2019-04-13 PROCEDURE — 6360000002 HC RX W HCPCS: Performed by: FAMILY MEDICINE

## 2019-04-13 PROCEDURE — 6370000000 HC RX 637 (ALT 250 FOR IP): Performed by: FAMILY MEDICINE

## 2019-04-13 PROCEDURE — 6370000000 HC RX 637 (ALT 250 FOR IP): Performed by: INTERNAL MEDICINE

## 2019-04-13 PROCEDURE — 2580000003 HC RX 258: Performed by: INTERNAL MEDICINE

## 2019-04-13 PROCEDURE — 94761 N-INVAS EAR/PLS OXIMETRY MLT: CPT

## 2019-04-13 PROCEDURE — 2580000003 HC RX 258: Performed by: FAMILY MEDICINE

## 2019-04-13 PROCEDURE — 94640 AIRWAY INHALATION TREATMENT: CPT

## 2019-04-13 RX ORDER — IPRATROPIUM BROMIDE AND ALBUTEROL SULFATE 2.5; .5 MG/3ML; MG/3ML
3 SOLUTION RESPIRATORY (INHALATION)
Qty: 360 ML | Refills: 2 | Status: ON HOLD | OUTPATIENT
Start: 2019-04-13 | End: 2020-01-04 | Stop reason: SDUPTHER

## 2019-04-13 RX ORDER — GUAIFENESIN 600 MG/1
600 TABLET, EXTENDED RELEASE ORAL 2 TIMES DAILY
Qty: 20 TABLET | Refills: 0 | Status: SHIPPED | OUTPATIENT
Start: 2019-04-13 | End: 2019-06-07

## 2019-04-13 RX ORDER — AMOXICILLIN AND CLAVULANATE POTASSIUM 875; 125 MG/1; MG/1
1 TABLET, FILM COATED ORAL 2 TIMES DAILY
Qty: 6 TABLET | Refills: 0 | Status: SHIPPED | OUTPATIENT
Start: 2019-04-14 | End: 2019-04-17

## 2019-04-13 RX ORDER — PREDNISONE 10 MG/1
TABLET ORAL
Qty: 20 TABLET | Refills: 0 | Status: SHIPPED | OUTPATIENT
Start: 2019-04-13 | End: 2019-04-25 | Stop reason: ALTCHOICE

## 2019-04-13 RX ADMIN — LOSARTAN POTASSIUM 100 MG: 100 TABLET, FILM COATED ORAL at 09:30

## 2019-04-13 RX ADMIN — DULOXETINE HYDROCHLORIDE 60 MG: 30 CAPSULE, DELAYED RELEASE ORAL at 09:29

## 2019-04-13 RX ADMIN — IPRATROPIUM BROMIDE AND ALBUTEROL SULFATE 1 AMPULE: .5; 3 SOLUTION RESPIRATORY (INHALATION) at 15:09

## 2019-04-13 RX ADMIN — IPRATROPIUM BROMIDE AND ALBUTEROL SULFATE 1 AMPULE: .5; 3 SOLUTION RESPIRATORY (INHALATION) at 07:17

## 2019-04-13 RX ADMIN — OXYCODONE HYDROCHLORIDE AND ACETAMINOPHEN 1 TABLET: 7.5; 325 TABLET ORAL at 11:59

## 2019-04-13 RX ADMIN — GUAIFENESIN 600 MG: 600 TABLET, EXTENDED RELEASE ORAL at 09:30

## 2019-04-13 RX ADMIN — PANTOPRAZOLE SODIUM 40 MG: 40 TABLET, DELAYED RELEASE ORAL at 06:43

## 2019-04-13 RX ADMIN — METOPROLOL TARTRATE 50 MG: 50 TABLET ORAL at 09:29

## 2019-04-13 RX ADMIN — SODIUM CHLORIDE, PRESERVATIVE FREE 10 ML: 5 INJECTION INTRAVENOUS at 09:30

## 2019-04-13 RX ADMIN — FUROSEMIDE 40 MG: 40 TABLET ORAL at 09:29

## 2019-04-13 RX ADMIN — SODIUM CHLORIDE, PRESERVATIVE FREE 10 ML: 5 INJECTION INTRAVENOUS at 03:12

## 2019-04-13 RX ADMIN — OXYCODONE HYDROCHLORIDE AND ACETAMINOPHEN 1 TABLET: 7.5; 325 TABLET ORAL at 06:43

## 2019-04-13 RX ADMIN — ENOXAPARIN SODIUM 40 MG: 40 INJECTION SUBCUTANEOUS at 09:30

## 2019-04-13 RX ADMIN — AZITHROMYCIN MONOHYDRATE 250 MG: 500 INJECTION, POWDER, LYOPHILIZED, FOR SOLUTION INTRAVENOUS at 11:17

## 2019-04-13 RX ADMIN — METHYLPREDNISOLONE SODIUM SUCCINATE 40 MG: 125 INJECTION, POWDER, LYOPHILIZED, FOR SOLUTION INTRAMUSCULAR; INTRAVENOUS at 03:11

## 2019-04-13 RX ADMIN — METHYLPREDNISOLONE SODIUM SUCCINATE 40 MG: 125 INJECTION, POWDER, LYOPHILIZED, FOR SOLUTION INTRAMUSCULAR; INTRAVENOUS at 14:03

## 2019-04-13 RX ADMIN — IPRATROPIUM BROMIDE AND ALBUTEROL SULFATE 1 AMPULE: .5; 3 SOLUTION RESPIRATORY (INHALATION) at 11:07

## 2019-04-13 RX ADMIN — Medication 2 PUFF: at 07:18

## 2019-04-13 ASSESSMENT — PAIN SCALES - GENERAL
PAINLEVEL_OUTOF10: 6
PAINLEVEL_OUTOF10: 5

## 2019-04-13 NOTE — PROGRESS NOTES
Neftali Saranya was evaluated today and a DME order was entered for a nebulizer compressor in order to administer Duoneb due to the diagnosis of COPD. The need for this equipment and treatment was discussed with the patient and she understands and is in agreement.

## 2019-04-13 NOTE — CARE COORDINATION
Received call from Staff RN for assistance with Med Neb machine. Contacted [address]; they have [employer] available and will fill script. Contacted RN with instructions to send DME statement and script with patient to take to [facility] [address] upon discharge.  Bereket Scott RN

## 2019-04-13 NOTE — DISCHARGE INSTR - DIET

## 2019-04-14 NOTE — DISCHARGE SUMMARY
posttraumatic change or chronic postinflammatory change. No pleural effusions or pneumothoraces. No endobronchial lesions. Upper Abdomen: Limited images to the upper abdomen demonstrate no acute or suspicious abnormalities. Soft Tissues/Bones: No acute or suspicious bony or soft tissue abnormalities. Stable chronic right rib deformities which may be on the basis of prior remote injury. Mild tree-in-bud type opacities to bilateral lower lobes and to a lesser extent right middle lobe and lingula of left upper lobe, which may reflect infectious or inflammatory process. Suggest continued follow-up with repeat exam in 2-3 months. Xr Chest Portable    Result Date: 4/9/2019  EXAMINATION: SINGLE XRAY VIEW OF THE CHEST 4/9/2019 7:34 pm COMPARISON: 03/23/2019 HISTORY: ORDERING SYSTEM PROVIDED HISTORY: sob TECHNOLOGIST PROVIDED HISTORY: Reason for exam:->sob Ordering Physician Provided Reason for Exam: sob Acuity: Acute Type of Exam: Initial Additional signs and symptoms: na Relevant Medical/Surgical History: copd, hypertension FINDINGS: The lungs are clear. The cardiac and mediastinal contours are normal.  There is no pleural effusion or pneumothorax. No acute osseous abnormality is identified. Chronic posttraumatic deformity and sclerosis of the right anterior rib is re- demonstrated. There are multiple old healed left rib fracture deformities. No acute cardiopulmonary abnormality.      Vl Christie Bilateral Limited 1-2 Levels    Result Date: 4/8/2019  Vascular Lower Arterial Plethysmography Procedure Demographics   Patient Name       [address] [place] [id number]     Date of study       04/08/2019   Date of Birth      1940         Gender              Female   Age                66                 Race                   Patient Number     [id number]           Room Number   Visit Number       [id number]          Height   Corporate ID       [id number]           Weight   Accession Number   [id number]   Ordering Physician MD Milton Ramsey,                                                            T                                         Interpreting        Erik Cox                                        Physician           MD  Procedure Type of Study:   Extremities Arteries:Lower Arterial Plethysmography, VL ANKLE ART BRACHIAL  INDICES EXTREMITY BILATERAL. Indications for Study:Claudication. Conclusions   Summary   Normal ankle brachial index of bilateral lower extremities. Triphasic flow in bilateral PTA and Dorsalis pedis , suggestive of normal  bilateral lower extremity flow   Signature   ------------------------------------------------------------------  Electronically signed by Erik Cox MD (Interpreting  physician) on 04/08/2019 at 01:45 PM  ------------------------------------------------------------------  Impressions Right Impression Triphasic flow in the right distal PTA and Dorsalis Pedis arteries. No significant plaquing noted. Normal ANGELIC of the right leg. Left Impression Triphasic flow in the left distal PTA and Dorsalis Pedis arteries. No significant plaquing noted. Normal ANGELIC of the left leg. Study Location:[facility]. Technical Quality:Adequate visualization. Velocities are measured in cm/s ; Diameters are measured in cm Pressures +--------++--------+-----+----+--------+-----+ ! ! !Right   ! ! Left!        !     ! +--------++--------+-----+----+--------+-----+ ! Location! !Pressure! Ratio! !Pressure! Ratio! +--------++--------+-----+----+--------+-----+ ! Ankle PT!!150     !1.39 !    !125     ! 1.16 ! +--------++--------+-----+----+--------+-----+ ! Ankle AT!!130     !1.2  ! !128     !1.19 ! +--------++--------+-----+----+--------+-----+   - Brachial Pressure:Right: 105. Left:108.   - ANGELIC:Right: 1.39. Left: 1.19.     Cta Chest W Contrast    Result Date: 3/23/2019  EXAMINATION: CTA OF THE CHEST 3/23/2019 6:32 pm TECHNIQUE: CTA of the chest was performed after the administration of intravenous contrast.  Multiplanar reformatted images are provided for review. MIP images are provided for review. Dose modulation, iterative reconstruction, and/or weight based adjustment of the mA/kV was utilized to reduce the radiation dose to as low as reasonably achievable. COMPARISON: 10/23/2017 HISTORY: ORDERING SYSTEM PROVIDED HISTORY: CHEST PAIN, ACUTE, PULMONARY EMBOLISM SUSPECTED TECHNOLOGIST PROVIDED HISTORY: Ordering Physician Provided Reason for Exam: Chest pain, acute, pulmonary embolism suspected Acuity: Acute Type of Exam: Initial Additional signs and symptoms: rt side chest pain, cough Relevant Medical/Surgical History: Hx Cardiac cath, Ladan / Amy [place] [id number] FINDINGS: Pulmonary Arteries: Pulmonary arteries are adequately opacified for evaluation. No evidence of intraluminal filling defect to suggest pulmonary embolism. Main pulmonary artery is normal in caliber. Study is mildly motion degraded. Mediastinum: No evidence of mediastinal lymphadenopathy. The heart and pericardium demonstrate no acute abnormality. There is no acute abnormality of the thoracic aorta. Lungs/pleura: Parenchymal disease adjacent to an old right rib fracture is unchanged. No acute infiltrate, pneumothorax or pleural effusion. Upper Abdomen: Limited images of the upper abdomen are unremarkable. Soft Tissues/Bones: There is a mild pectus excavatum. No acute bone finding. There are old bilateral rib fractures. There is an ununited fracture of the posterior left 10th rib. No evidence of pulmonary embolism or acute pulmonary abnormality. There are old bilateral rib fractures. There is chronic right lower lobe disease adjacent to bone hypertrophy related to an old fracture of the lateral right 5th rib.        Time Spent on discharge is 40 minutes discussing plan of care and discharge medications with patient and nursing staff.     Please send a copy of this discharge summary to Raúl Vital MD and all consultants above    Electronically signed by Vasiliy De Los Santos MD on 4/14/2019 at 4:43 PM

## 2019-04-15 ENCOUNTER — TELEPHONE (OUTPATIENT)
Dept: PULMONOLOGY | Age: 79
End: 2019-04-15

## 2019-04-15 NOTE — TELEPHONE ENCOUNTER
Pt called stating the hospital made her go out and buy a nebulizer, she isn't sure if you want her to continue using it. Also read your discharge note stating she could benefit from nocturnal O2, was that ordered?   Thanks

## 2019-04-16 LAB
EKG ATRIAL RATE: 85 BPM
EKG DIAGNOSIS: NORMAL
EKG P AXIS: 68 DEGREES
EKG P-R INTERVAL: 164 MS
EKG Q-T INTERVAL: 434 MS
EKG QRS DURATION: 142 MS
EKG QTC CALCULATION (BAZETT): 516 MS
EKG R AXIS: 114 DEGREES
EKG T AXIS: -9 DEGREES
EKG VENTRICULAR RATE: 85 BPM

## 2019-04-19 ENCOUNTER — HOSPITAL ENCOUNTER (OUTPATIENT)
Dept: GENERAL RADIOLOGY | Age: 79
Discharge: HOME | End: 2019-04-19
Payer: MEDICARE

## 2019-04-19 ENCOUNTER — OFFICE VISIT (OUTPATIENT)
Dept: INTERNAL MEDICINE CLINIC | Age: 79
End: 2019-04-19
Payer: MEDICARE

## 2019-04-19 ENCOUNTER — HOSPITAL ENCOUNTER (OUTPATIENT)
Age: 79
Discharge: HOME | End: 2019-04-19
Payer: MEDICARE

## 2019-04-19 VITALS
RESPIRATION RATE: 16 BRPM | OXYGEN SATURATION: 98 % | SYSTOLIC BLOOD PRESSURE: 138 MMHG | WEIGHT: 152 LBS | BODY MASS INDEX: 24.43 KG/M2 | HEART RATE: 74 BPM | DIASTOLIC BLOOD PRESSURE: 86 MMHG | HEIGHT: 66 IN

## 2019-04-19 DIAGNOSIS — R15.2 INCONTINENCE OF FECES WITH FECAL URGENCY: ICD-10-CM

## 2019-04-19 DIAGNOSIS — R15.9 INCONTINENCE OF FECES WITH FECAL URGENCY: ICD-10-CM

## 2019-04-19 DIAGNOSIS — J44.1 COPD EXACERBATION (HCC): ICD-10-CM

## 2019-04-19 DIAGNOSIS — M54.40 ACUTE MIDLINE LOW BACK PAIN WITH SCIATICA, SCIATICA LATERALITY UNSPECIFIED: ICD-10-CM

## 2019-04-19 DIAGNOSIS — Z09 HOSPITAL DISCHARGE FOLLOW-UP: Primary | ICD-10-CM

## 2019-04-19 DIAGNOSIS — J18.9 PNEUMONIA DUE TO INFECTIOUS ORGANISM, UNSPECIFIED LATERALITY, UNSPECIFIED PART OF LUNG: ICD-10-CM

## 2019-04-19 PROCEDURE — 1111F DSCHRG MED/CURRENT MED MERGE: CPT | Performed by: INTERNAL MEDICINE

## 2019-04-19 PROCEDURE — 72100 X-RAY EXAM L-S SPINE 2/3 VWS: CPT

## 2019-04-19 PROCEDURE — 99496 TRANSJ CARE MGMT HIGH F2F 7D: CPT | Performed by: INTERNAL MEDICINE

## 2019-04-19 NOTE — PROGRESS NOTES
Post-Discharge Transitional Care Management Services or Hospital Follow Up      Paresh Lopez   YOB: 1940    Date of Office Visit:  4/19/2019  Date of Hospital Admission: 4/9/19  Date of Hospital Discharge: 4/13/19  Risk of hospital readmission (high >=14%. Medium >=10%) :Readmission Risk Score: 17      Care management risk score Rising risk (score 2-5) and Complex Care (Scores >=6): 3     Non face to face  following discharge, date last encounter closed (first attempt may have been earlier): *No documented post hospital discharge outreach found in the last 14 days    Call initiated 2 business days of discharge: *No response recorded in the last 14 days    Patient Active Problem List   Diagnosis    Rheumatoid Arthritis    Hypertension    Hyperlipidemia    Fibromyalgia    Migraine    Chronic pain syndrome    Depression    Osteoporosis    Vitamin B12 deficiency    Lumbar spinal stenosis    Panic attack    Takotsubo syndrome    Rheumatoid arthritis involving multiple joints (HCC)    Acute DVT of right tibial vein (HCC)    Blood loss anemia    COPD, severe ([address]    Former smoker    COPD exacerbation (HCC)       Allergies   Allergen Reactions    Ativan [Lorazepam] Other (See Comments)     Violent, thrashing and combative. She has lacerations and bruising, had to be tied down.      Etanercept Other (See Comments)     No response    Humira [Adalimumab]     Prochlorperazine Edisylate Other (See Comments)     \"Compazine\"   Involuntary Muscle Spasms     Remicade [Infliximab Injection]     Doxycycline Other (See Comments)     Stomach pains       Medications listed as ordered at the time of discharge from hospital   [facility] Medication Instructions DANA:    Printed on:04/19/19 8865   Medication Information                      Adalimumab (HUMIRA) 40 MG/0.4ML PSKT  Inject into the skin             atorvastatin (LIPITOR) 10 MG tablet  TAKE 1 TABLET BY MOUTH EVERY EVENING (DUONEB) 0.5-2.5 (3) MG/3ML SOLN nebulizer solution Inhale 3 mLs into the lungs every 4 hours (while awake) 360 mL 2    guaiFENesin (MUCINEX) 600 MG extended release tablet Take 1 tablet by mouth 2 times daily 20 tablet 0    predniSONE (DELTASONE) 10 MG tablet Take 4 tablets daily for 2 days, then take 3 tablets daily for 2 days, then take 2 tablet for 2 days, then take 1 tablet for 2 days, then stop. 20 tablet 0    ondansetron (ZOFRAN-ODT) 4 MG disintegrating tablet Take 4 mg by mouth as needed for Nausea or Vomiting      PROAIR  (90 Base) MCG/ACT inhaler INHALE 2 PUFFS BY ORAL INHALATION EVERY 4 TO 6 HOURS AS NEEDED 8.5 g 10    traZODone (DESYREL) 50 MG tablet Take 1 tablet by mouth nightly 30 tablet 6    losartan (COZAAR) 100 MG tablet TAKE ONE TABLET BY MOUTH DAILY 90 tablet 1    furosemide (LASIX) 40 MG tablet TAKE 1 TABLET BY MOUTH TWICE A DAY 60 tablet 5    atorvastatin (LIPITOR) 10 MG tablet TAKE 1 TABLET BY MOUTH EVERY EVENING 90 tablet 1    BREO ELLIPTA 100-25 MCG/INH AEPB inhaler INHALE 1 PUFF INTO THE LUNGS DAILY AFTER INHALATION THEN GARGLE 1 each 11    potassium chloride (KLOR-CON) 10 MEQ extended release tablet TAKE 2 TABLETS BY MOUTH EVERY MORNING AND TAKE 1 TABLET BY MOUTH EVERY EVENING 90 tablet 3    oxyCODONE-acetaminophen (PERCOCET) 7.5-325 MG per tablet Take 1 tablet by mouth 4 times daily. Rosemarie Martineza Adalimumab (HUMIRA) 40 MG/0.4ML PSKT Inject into the skin      INCRUSE ELLIPTA 62.5 MCG/INH AEPB INHALE 1 PUFF VIA ORAL INHALATION ONCE DAILY 1 each 11    metoprolol tartrate (LOPRESSOR) 50 MG tablet TAKE 1 TABLET BY MOUTH 2 TIMES DAILY 120 tablet 11    vitamin B-12 (CYANOCOBALAMIN) 1000 MCG tablet Take 1,000 mcg by mouth daily.  DULoxetine (CYMBALTA) 60 MG capsule Take 60 mg by mouth daily.       Calcium Citrate-Vitamin D (CITRACAL + D PO) Take 600 mg by mouth daily           Medications patient taking as of now reconciled against medications ordered at time of hospital discharge: Yes    Chief Complaint   Patient presents with    Follow-Up from Hospital    Back Pain     all over       History of Present illness - Follow up of Hospital diagnosis(es):     Diagnosis / Hospital Course: The medical problems addressed during this hospitalization include following. Acute hypoxic respiratory failure with COPD exacerbation  community-acquiredPneumonia  Rheumatoid arthritis  Hypertension  Urticaria due to Zofran       The patient received treatment of COPD with Solu-Medrol, DuoNeb, antibiotics with improvement. She was given Rocephin and azithromycin as community-acquired pneumonia since her chest CT showed small nodular infiltrates. The patient did not have fever or chills. Her sputum culture did not show pathogenic over his symptoms that seem nondiagnostic study. Respiratory PCR was negative for respiratory virus. Upon discharge, she is still have wheezing and rhonchi but she does not have respiratory distress and she has been off oxygen for several days. Patient was given oral prednisone tapering dose. Patient was given nebulizer kit and  DuoNeb solution.  The patient will be followed by pulmonologist, Dr. Lawrence Mackay as outpatient    She is using home nebulizer and likes it  Will use QID  She qualified for home oxygen ,but it wasn't arranged at discharge    I had qualified her for oxygen prior to [address] [place] [address] and I gave her a copy to get the oxygen on way home today    Her arterial testing of legs was satisfactory    The antibiotics are completed following hosp and steroids almost done    Hives were present upon admission and have not returned  She had not rec'd any new meds prior to hives    She has chronic loose BMs : 1-2 /d  This has been a problem of long standing and unchanged from prior to hosp  Sometimes no warning and soils herself  Will use imodium for now    She has chronic low back pain and sees Pain Mgt doc regularly   In hosp , she developed a sudden new mid lumbar pain  ? vert comp fx; she was standing at the time  Will get xray    Inpatient course: Discharge summary reviewed- see chart. Interval history/Current status: fair    A comprehensive review of systems was negative except for what was noted in the HPI. Vitals:    04/19/19 1047   BP: 138/86   Site: Right Upper Arm   Position: Sitting   Cuff Size: Medium Adult   Pulse: 74   Resp: 16   SpO2: 98%   Weight: 152 lb (68.9 kg)   Height: 5' 6\" (1.676 m)     Body mass index is 24.53 kg/m².    Wt Readings from Last 3 Encounters:   04/19/19 152 lb (68.9 kg)   04/13/19 146 lb 3 oz (66.3 kg)   04/01/19 148 lb (67.1 kg)     BP Readings from Last 3 Encounters:   04/19/19 138/86   04/13/19 (!) 164/75   04/01/19 138/80        Physical Exam:  General Appearance: alert and oriented to person, place and time, well developed and well- nourished, appears uncomfortable ; breathing easily ; but coughs  Skin: warm and dry, no rash or erythema  Head: normocephalic and atraumatic  Eyes: pupils equal, round, and reactive to light, extraocular eye movements intact, conjunctivae normal  ENT: tympanic membrane, external ear and ear canal normal bilaterally, nose without deformity, nasal mucosa and turbinates normal without polyps  Neck: supple and non-tender without mass, no thyromegaly or thyroid nodules, no cervical lymphadenopathy  Pulmonary/Chest: she has exp rhonchi and mild scattered wheezes; no distress  Cardiovascular: normal rate, regular rhythm, normal S1 and S2, no murmurs, rubs, clicks, or gallops, distal pulses intact, no carotid bruits  Abdomen: soft, non-tender, non-distended, normal bowel sounds, no masses or organomegaly  Extremities: no cyanosis, clubbing or edema  Musculoskeletal: normal range of motion, no joint swelling, deformity or tenderness  Neurologic: reflexes normal and symmetric, no cranial nerve deficit, gait, coordination and speech normal    Rev/d labs and ANGELIC testing and CT chest    Encounter Diagnoses   Name Primary?    [facility] discharge follow-up Yes    Acute midline low back pain with sciatica, sciatica laterality unspecified     COPD exacerbation (HCC)     Pneumonia due to infectious organism, unspecified laterality, unspecified part of lung     Incontinence of feces with fecal urgency      Get xray lumbar spine; r/o fx  F/u pain mgt and monjot , etc  Get home oxygen ; gave her testing and my prescription to stop and get on way home  Use nebulizer  Imodium prn fecal urgency  Call for 4 or more bm/d for C diff testing    Medical Decision Making: high complexity

## 2019-04-22 DIAGNOSIS — S32.040A CLOSED COMPRESSION FRACTURE OF FOURTH LUMBAR VERTEBRA, INITIAL ENCOUNTER: Primary | ICD-10-CM

## 2019-04-25 ENCOUNTER — OFFICE VISIT (OUTPATIENT)
Dept: PULMONOLOGY | Age: 79
End: 2019-04-25
Payer: MEDICARE

## 2019-04-25 VITALS
SYSTOLIC BLOOD PRESSURE: 132 MMHG | BODY MASS INDEX: 24.75 KG/M2 | HEART RATE: 72 BPM | WEIGHT: 154 LBS | DIASTOLIC BLOOD PRESSURE: 82 MMHG | OXYGEN SATURATION: 96 % | HEIGHT: 66 IN | RESPIRATION RATE: 18 BRPM

## 2019-04-25 DIAGNOSIS — Z87.891 FORMER SMOKER: ICD-10-CM

## 2019-04-25 DIAGNOSIS — R06.02 SHORTNESS OF BREATH: ICD-10-CM

## 2019-04-25 DIAGNOSIS — J44.9 COPD, SEVERE (HCC): Primary | ICD-10-CM

## 2019-04-25 DIAGNOSIS — M06.9 RHEUMATOID ARTHRITIS INVOLVING MULTIPLE JOINTS (HCC): ICD-10-CM

## 2019-04-25 PROCEDURE — 4040F PNEUMOC VAC/ADMIN/RCVD: CPT | Performed by: INTERNAL MEDICINE

## 2019-04-25 PROCEDURE — G8427 DOCREV CUR MEDS BY ELIG CLIN: HCPCS | Performed by: INTERNAL MEDICINE

## 2019-04-25 PROCEDURE — 1123F ACP DISCUSS/DSCN MKR DOCD: CPT | Performed by: INTERNAL MEDICINE

## 2019-04-25 PROCEDURE — G8399 PT W/DXA RESULTS DOCUMENT: HCPCS | Performed by: INTERNAL MEDICINE

## 2019-04-25 PROCEDURE — G8926 SPIRO NO PERF OR DOC: HCPCS | Performed by: INTERNAL MEDICINE

## 2019-04-25 PROCEDURE — G8420 CALC BMI NORM PARAMETERS: HCPCS | Performed by: INTERNAL MEDICINE

## 2019-04-25 PROCEDURE — 1111F DSCHRG MED/CURRENT MED MERGE: CPT | Performed by: INTERNAL MEDICINE

## 2019-04-25 PROCEDURE — 99213 OFFICE O/P EST LOW 20 MIN: CPT | Performed by: INTERNAL MEDICINE

## 2019-04-25 PROCEDURE — 1036F TOBACCO NON-USER: CPT | Performed by: INTERNAL MEDICINE

## 2019-04-25 PROCEDURE — 3023F SPIROM DOC REV: CPT | Performed by: INTERNAL MEDICINE

## 2019-04-25 PROCEDURE — 1090F PRES/ABSN URINE INCON ASSESS: CPT | Performed by: INTERNAL MEDICINE

## 2019-04-25 NOTE — PROGRESS NOTES
SUBJECTIVE:  Chief Complaint: Severe COPD, rheumatoid arthritis, former smoker, acute hypoxemic respiratory failure secondary to acute exacerbation COPD, vertebral fracture  Cinthya Maldonado was recently discharged from [facility] after being treated for acute exacerbation of COPD and acute respiratory failure. She is feeling much better and notices minimal cough and sputum expectoration. She continues note some shortness of breath. She developed severe back pain while hospitalized and was determined to have a vertebral fracture and is going to have kyphoplasty in the near future. She has been on long-term oral steroids but is not using them now. She also states that she quit smoking 4 months ago. She continues on Breo, Incruseand albuterol rescue. She is not on home oxygen  OBJECTIVE:  /82   Pulse 72   Resp 18   Ht 5' 6\" (1.676 m)   Wt 154 lb (69.9 kg)   SpO2 96%   BMI 24.86 kg/m²      Physical Exam:  Constitutional:  She appears well developed and well-nourished. Neck:  Supple, No palpable lymphadenopathy, No JVD  Cardiovascular:  S1, S2 Normal, Regular rhythm, no murmurs or gallops, No pericardial  rubs. Pulmonary: Moderately diminished breath sounds throughout all lung areas with a few scattered basilar rhonchi. No wheezing is noted  Abdomen: Not examined  Extremities: no edema, No DVT  Neurologic:  Awake and Alert, No focal deficits    Radiology: Chest x-ray on 4/12/19 showed no acute cardiopulmonary disease with tree-in-bud nodules described on previous CT not apparent on this chest x-ray   CT chest on 4/10/19 showed mild tree-in-bud type opacities to the bilateral lower lobes and to a lesser extent in the right middle lobe and lingula of the left upper lobe        PFT: office spirometry last done on 5/15/18 demonstrated a moderate obstructive defect with no significant response to bronchodilators  ASSESSMENT:    1. COPD, severe ([address]    2. Rheumatoid arthritis involving multiple joints (HCC)    3.  Former smoker    4. Shortness of breath          PLAN:    I will make no change in her current bronchodilator therapy. I will obtain a nighttime oxygen saturation study to see if she qualifies for home oxygen. I will continue to follow her  Return in about 3 months (around 7/25/2019) for PFT testing, Recheck for COPD, Recheck for Shortness of Breath. This dictation was performed with a verbal recognition program and it was checked for errors. It is possible that there are still dictated errors within this office note. Any errors should be brought immediately to my attention for correction. All efforts were made to ensure that this office note is accurate.

## 2019-04-26 ENCOUNTER — TELEPHONE (OUTPATIENT)
Dept: INTERNAL MEDICINE CLINIC | Age: 79
End: 2019-04-26

## 2019-04-26 DIAGNOSIS — R60.1 GENERALIZED EDEMA: Primary | ICD-10-CM

## 2019-04-26 DIAGNOSIS — I50.32 CHRONIC DIASTOLIC (CONGESTIVE) HEART FAILURE (HCC): ICD-10-CM

## 2019-04-26 DIAGNOSIS — I10 ESSENTIAL HYPERTENSION: ICD-10-CM

## 2019-04-26 RX ORDER — FUROSEMIDE 40 MG/1
TABLET ORAL
Qty: 60 TABLET | Refills: 5 | Status: SHIPPED | OUTPATIENT
Start: 2019-04-26 | End: 2019-06-07 | Stop reason: ALTCHOICE

## 2019-04-26 NOTE — TELEPHONE ENCOUNTER
Go to bed and elevated legs for 36 hr; can be up for ADLs    Increase lasix to 40 mg tabs:  2 tab each AM and 1 each PM    Same potassium    Get BMP and magnesium on Mon or Tues     Refer to heart failure clinic located in [address] [place] [address]    (there should be a referral built in to epic for this referral)    Let me know of problem

## 2019-04-26 NOTE — TELEPHONE ENCOUNTER
Pt left message stating she was told to notify PCP if she started to gain too much weight. She said she takes her Lasix twice a day and she has gained 10 lbs in less than 2 weeks and that her ankles are very swollen.  Please advise

## 2019-05-10 ENCOUNTER — OFFICE VISIT (OUTPATIENT)
Dept: INTERNAL MEDICINE CLINIC | Age: 79
End: 2019-05-10
Payer: MEDICARE

## 2019-05-10 VITALS
SYSTOLIC BLOOD PRESSURE: 140 MMHG | HEART RATE: 66 BPM | DIASTOLIC BLOOD PRESSURE: 70 MMHG | WEIGHT: 146.8 LBS | RESPIRATION RATE: 16 BRPM | OXYGEN SATURATION: 95 % | BODY MASS INDEX: 23.69 KG/M2

## 2019-05-10 DIAGNOSIS — J44.9 COPD, SEVERE (HCC): Primary | ICD-10-CM

## 2019-05-10 DIAGNOSIS — S32.040G CLOSED COMPRESSION FRACTURE OF L4 LUMBAR VERTEBRA WITH DELAYED HEALING, SUBSEQUENT ENCOUNTER: ICD-10-CM

## 2019-05-10 DIAGNOSIS — F33.41 RECURRENT MAJOR DEPRESSIVE DISORDER, IN PARTIAL REMISSION (HCC): ICD-10-CM

## 2019-05-10 PROCEDURE — G8399 PT W/DXA RESULTS DOCUMENT: HCPCS | Performed by: INTERNAL MEDICINE

## 2019-05-10 PROCEDURE — 99214 OFFICE O/P EST MOD 30 MIN: CPT | Performed by: INTERNAL MEDICINE

## 2019-05-10 PROCEDURE — G8926 SPIRO NO PERF OR DOC: HCPCS | Performed by: INTERNAL MEDICINE

## 2019-05-10 PROCEDURE — 1036F TOBACCO NON-USER: CPT | Performed by: INTERNAL MEDICINE

## 2019-05-10 PROCEDURE — 3023F SPIROM DOC REV: CPT | Performed by: INTERNAL MEDICINE

## 2019-05-10 PROCEDURE — G8420 CALC BMI NORM PARAMETERS: HCPCS | Performed by: INTERNAL MEDICINE

## 2019-05-10 PROCEDURE — 1123F ACP DISCUSS/DSCN MKR DOCD: CPT | Performed by: INTERNAL MEDICINE

## 2019-05-10 PROCEDURE — 1090F PRES/ABSN URINE INCON ASSESS: CPT | Performed by: INTERNAL MEDICINE

## 2019-05-10 PROCEDURE — 4040F PNEUMOC VAC/ADMIN/RCVD: CPT | Performed by: INTERNAL MEDICINE

## 2019-05-10 PROCEDURE — 1111F DSCHRG MED/CURRENT MED MERGE: CPT | Performed by: INTERNAL MEDICINE

## 2019-05-10 PROCEDURE — G8427 DOCREV CUR MEDS BY ELIG CLIN: HCPCS | Performed by: INTERNAL MEDICINE

## 2019-05-10 RX ORDER — ALENDRONATE SODIUM 70 MG/1
TABLET ORAL
Refills: 11 | COMMUNITY
Start: 2019-05-08 | End: 2019-08-12 | Stop reason: ALTCHOICE

## 2019-05-10 NOTE — PROGRESS NOTES
Subjective:      Neftali Beaver is a 66 y.o. female who presents today for follow up on her chronic medical conditions as noted below. Patient Active Problem List:     Rheumatoid Arthritis     Hypertension     Hyperlipidemia     Fibromyalgia     Migraine     Chronic pain syndrome     Depression     Osteoporosis     Vitamin B12 deficiency     Lumbar spinal stenosis     Panic attack     Takotsubo syndrome     Rheumatoid arthritis involving multiple joints (HCC)     Acute DVT of right tibial vein (HCC)     Blood loss anemia     COPD, severe (HCC)     Former smoker     COPD exacerbation (HCC)     Shortness of breath     She was last seen on 4/19 after hosp discharge for copd exac  I arranged home oxygen last visit  It still hasn't been obtained, and she is going to get tested again    She saw Shelby on 4/25  He is arranging oxygen now    She had a lot of low back pain and xray lumbar spine showed progressive fx:          Impression   Progression of L4 vertebral body fracture, age indeterminate.  Stable L3   fracture status post vertebral augmentation.  No other acute osseous   abnormality.       Straightened lumbar lordosis.  Multilevel degenerative disc disease and facet   arthropathy. She goes to [address] [place] [address] on [place] for pain mgt  She has appt with [facility] in 3 d in [place]    Pain is very bad  Ambulatory with walker    COPD severe,  But stable    She was taking reclast and [place] Never has given her fosamax temporarily wihile she gets approval for forteo    Mood is only fair ; dealing with pain and her husbands CA liver  Can't change meds now with all the percoet she is taking   Will recheck in a month    Current Outpatient Medications   Medication Sig Dispense Refill    alendronate (FOSAMAX) 70 MG tablet TAKE 1 TABLET ONCE A WEEK 30 MINUTES BEFORE FOOD, DRINK OR MEDS. STAY UPRIGHT.   11    furosemide (LASIX) 40 MG tablet TAKE 1 TABLET BY MOUTH TWICE A DAY 60 tablet 5    ipratropium-albuterol (DUONEB) 0.5-2.5 (3) MG/3ML SOLN nebulizer solution Inhale 3 mLs into the lungs every 4 hours (while awake) 360 mL 2    guaiFENesin (MUCINEX) 600 MG extended release tablet Take 1 tablet by mouth 2 times daily 20 tablet 0    ondansetron (ZOFRAN-ODT) 4 MG disintegrating tablet Take 4 mg by mouth as needed for Nausea or Vomiting      PROAIR  (90 Base) MCG/ACT inhaler INHALE 2 PUFFS BY ORAL INHALATION EVERY 4 TO 6 HOURS AS NEEDED 8.5 g 10    traZODone (DESYREL) 50 MG tablet Take 1 tablet by mouth nightly 30 tablet 6    losartan (COZAAR) 100 MG tablet TAKE ONE TABLET BY MOUTH DAILY 90 tablet 1    atorvastatin (LIPITOR) 10 MG tablet TAKE 1 TABLET BY MOUTH EVERY EVENING 90 tablet 1    BREO ELLIPTA 100-25 MCG/INH AEPB inhaler INHALE 1 PUFF INTO THE LUNGS DAILY AFTER INHALATION THEN GARGLE 1 each 11    potassium chloride (KLOR-CON) 10 MEQ extended release tablet TAKE 2 TABLETS BY MOUTH EVERY MORNING AND TAKE 1 TABLET BY MOUTH EVERY EVENING 90 tablet 3    oxyCODONE-acetaminophen (PERCOCET) 7.5-325 MG per tablet Take 1 tablet by mouth 4 times daily. [place] Saul Adalimumab (HUMIRA) 40 MG/0.4ML PSKT Inject into the skin      INCRUSE ELLIPTA 62.5 MCG/INH AEPB INHALE 1 PUFF VIA ORAL INHALATION ONCE DAILY 1 each 11    metoprolol tartrate (LOPRESSOR) 50 MG tablet TAKE 1 TABLET BY MOUTH 2 TIMES DAILY 120 tablet 11    vitamin B-12 (CYANOCOBALAMIN) 1000 MCG tablet Take 1,000 mcg by mouth daily.  DULoxetine (CYMBALTA) 60 MG capsule Take 60 mg by mouth daily.  Calcium Citrate-Vitamin D (CITRACAL + D PO) Take 600 mg by mouth daily        No current facility-administered medications for this visit.         Past Medical History:   Diagnosis Date    Acute DVT of right tibial vein ([address] 07/2017    ER imaging: distal right tibial vein DVT; no anticoag for bleeding/ anemia    Acute exacerbation of chronic obstructive pulmonary disease (COPD) ([facility]) 12/13/2018    Arthritis     Chronic pain syndrome     Low back pain; lumbar disc disease    Clostridium difficile colitis 2017    COPD[facility] 10/24/2017    Depression     Fibromyalgia     Former smoker     Quit 2019    Herniated cervical disc     Herpes zoster ophthalmicus 3/2012    Hx of blood clots     Hyperlipidemia     Hypertension     L3 vertebral fracture ([facility]     vertebroplasty    Lumbar spinal stenosis 2015    moderately severe by MRI    Migraine     Osteoporosis     Fragility Fx L3    Rheumatoid arthritis(714.0) 1976    Dr Sveta Rizo S/P cardiac catheterization 2017    patent coronaries; Takotsubo; najeeb Ahmed    Shortness of breath 2019    Takotsubo syndrome 2017    TMJ dysfunction     Tobacco abuse 2018    Vitamin B12 deficiency 2014    B12 level 199        Social History     Tobacco Use    Smoking status: Former Smoker     Packs/day: 0.75     Years: 55.00     Pack years: 41.25     Types: Cigarettes     Start date: 1962     Last attempt to quit: 2019     Years since quittin.3    Smokeless tobacco: Never Used    Tobacco comment: 19- Pt reports that her quit date was 19   Substance Use Topics    Alcohol use: No     Alcohol/week: 0.0 oz        ROS: The patient has had no headache, sore throat, fever or chills, cough, dyspnea, chest pain, nausea, vomiting or diarrhea, or edema. Objective:      BP (!) 140/70   Pulse 66   Resp 16   Wt 146 lb 12.8 oz (66.6 kg)   SpO2 95%   BMI 23.69 kg/m²      Physical Exam   Constitutional: She is oriented to person, place, and time. She appears well-developed and well-nourished. No distress. HENT:   Head: Normocephalic and atraumatic. Mouth/Throat: Oropharynx is clear and moist.   Eyes: Conjunctivae are normal. No scleral icterus. Neck: Neck supple. No JVD present. Cardiovascular: Normal rate, regular rhythm and normal heart sounds. No murmur heard.   Pulmonary/Chest: Effort normal and breath sounds normal. No respiratory distress. She has no wheezes. She has no rales. Abdominal: Soft. She exhibits no distension. There is no tenderness. Musculoskeletal: Normal range of motion. She exhibits no edema. Lymphadenopathy:     She has no cervical adenopathy. Neurological: She is alert and oriented to person, place, and time. Coordination normal.   Skin: Skin is warm and dry. No rash noted. No erythema. Psychiatric: She has a normal mood and affect. Her behavior is normal.   Vitals reviewed. Labs, monjot note, xray l/s spine rev'd     Assessment / Plan:      1. COPD, severe ([address]    2. Closed compression fracture of L4 lumbar vertebra with delayed healing, subsequent encounter    3.  Recurrent major depressive disorder, in partial remission ([address]            Plan   See Eric Pat  Get oxygen  RTC 4 wk  See if she can cut percoet from 6 or 8 /d  Will consider depression med change next appt     discussed forteo; will get it thru Kymberly Searing and then stop fosamax

## 2019-05-17 ENCOUNTER — HOSPITAL ENCOUNTER (OUTPATIENT)
Dept: GENERAL RADIOLOGY | Age: 79
Discharge: HOME | End: 2019-05-17
Payer: MEDICARE

## 2019-05-17 ENCOUNTER — OFFICE VISIT (OUTPATIENT)
Dept: FAMILY MEDICINE CLINIC | Age: 79
End: 2019-05-17
Payer: MEDICARE

## 2019-05-17 ENCOUNTER — HOSPITAL ENCOUNTER (OUTPATIENT)
Age: 79
Discharge: HOME | End: 2019-05-17
Payer: MEDICARE

## 2019-05-17 VITALS
BODY MASS INDEX: 24.36 KG/M2 | WEIGHT: 151.6 LBS | HEIGHT: 66 IN | DIASTOLIC BLOOD PRESSURE: 64 MMHG | TEMPERATURE: 97.6 F | HEART RATE: 79 BPM | SYSTOLIC BLOOD PRESSURE: 130 MMHG

## 2019-05-17 DIAGNOSIS — J40 BRONCHITIS: ICD-10-CM

## 2019-05-17 DIAGNOSIS — J40 BRONCHITIS: Primary | ICD-10-CM

## 2019-05-17 DIAGNOSIS — J44.9 CHRONIC OBSTRUCTIVE PULMONARY DISEASE, UNSPECIFIED COPD TYPE (HCC): ICD-10-CM

## 2019-05-17 PROCEDURE — 4040F PNEUMOC VAC/ADMIN/RCVD: CPT | Performed by: NURSE PRACTITIONER

## 2019-05-17 PROCEDURE — 1036F TOBACCO NON-USER: CPT | Performed by: NURSE PRACTITIONER

## 2019-05-17 PROCEDURE — 71046 X-RAY EXAM CHEST 2 VIEWS: CPT

## 2019-05-17 PROCEDURE — 3023F SPIROM DOC REV: CPT | Performed by: NURSE PRACTITIONER

## 2019-05-17 PROCEDURE — G8926 SPIRO NO PERF OR DOC: HCPCS | Performed by: NURSE PRACTITIONER

## 2019-05-17 PROCEDURE — 99213 OFFICE O/P EST LOW 20 MIN: CPT | Performed by: NURSE PRACTITIONER

## 2019-05-17 PROCEDURE — G8399 PT W/DXA RESULTS DOCUMENT: HCPCS | Performed by: NURSE PRACTITIONER

## 2019-05-17 PROCEDURE — G8420 CALC BMI NORM PARAMETERS: HCPCS | Performed by: NURSE PRACTITIONER

## 2019-05-17 PROCEDURE — 1123F ACP DISCUSS/DSCN MKR DOCD: CPT | Performed by: NURSE PRACTITIONER

## 2019-05-17 PROCEDURE — G8427 DOCREV CUR MEDS BY ELIG CLIN: HCPCS | Performed by: NURSE PRACTITIONER

## 2019-05-17 PROCEDURE — 1090F PRES/ABSN URINE INCON ASSESS: CPT | Performed by: NURSE PRACTITIONER

## 2019-05-17 RX ORDER — METHYLPREDNISOLONE 4 MG/1
TABLET ORAL
Qty: 1 KIT | Refills: 0 | Status: SHIPPED | OUTPATIENT
Start: 2019-05-17 | End: 2019-06-07

## 2019-05-17 RX ORDER — BENZONATATE 100 MG/1
100 CAPSULE ORAL 3 TIMES DAILY PRN
Qty: 20 CAPSULE | Refills: 0 | Status: SHIPPED | OUTPATIENT
Start: 2019-05-17 | End: 2019-06-07

## 2019-05-17 RX ORDER — AZITHROMYCIN 250 MG/1
250 TABLET, FILM COATED ORAL SEE ADMIN INSTRUCTIONS
Qty: 6 TABLET | Refills: 0 | Status: SHIPPED | OUTPATIENT
Start: 2019-05-17 | End: 2019-05-22

## 2019-05-17 RX ORDER — MORPHINE SULFATE 15 MG/1
1 TABLET, FILM COATED, EXTENDED RELEASE ORAL EVERY 12 HOURS PRN
Status: ON HOLD | COMMUNITY
End: 2019-06-13 | Stop reason: HOSPADM

## 2019-05-17 ASSESSMENT — ENCOUNTER SYMPTOMS
ABDOMINAL PAIN: 0
COUGH: 1
RHINORRHEA: 1
NAUSEA: 0
DIARRHEA: 0
EYE PAIN: 0
ABDOMINAL DISTENTION: 0
CHEST TIGHTNESS: 0
TROUBLE SWALLOWING: 0
WHEEZING: 0
SHORTNESS OF BREATH: 1

## 2019-05-17 ASSESSMENT — COPD QUESTIONNAIRES: COPD: 1

## 2019-05-17 NOTE — PROGRESS NOTES
1 tablet by mouth See Admin Instructions for 5 days 500mg on day 1 followed by 250mg on days 2 - 5 6 tablet 0    methylPREDNISolone (MEDROL, ROBERT,) 4 MG tablet Take by mouth. 1 kit 0    benzonatate (TESSALON PERLES) 100 MG capsule Take 1 capsule by mouth 3 times daily as needed for Cough 20 capsule 0    alendronate (FOSAMAX) 70 MG tablet TAKE 1 TABLET ONCE A WEEK 30 MINUTES BEFORE FOOD, DRINK OR MEDS. STAY UPRIGHT. 11    furosemide (LASIX) 40 MG tablet TAKE 1 TABLET BY MOUTH TWICE A DAY (Patient taking differently: TAKE 1 TABLET BY MOUTH THREE A DAY) 60 tablet 5    guaiFENesin (MUCINEX) 600 MG extended release tablet Take 1 tablet by mouth 2 times daily 20 tablet 0    traZODone (DESYREL) 50 MG tablet Take 1 tablet by mouth nightly 30 tablet 6    losartan (COZAAR) 100 MG tablet TAKE ONE TABLET BY MOUTH DAILY 90 tablet 1    atorvastatin (LIPITOR) 10 MG tablet TAKE 1 TABLET BY MOUTH EVERY EVENING 90 tablet 1    BREO ELLIPTA 100-25 MCG/INH AEPB inhaler INHALE 1 PUFF INTO THE LUNGS DAILY AFTER INHALATION THEN GARGLE 1 each 11    potassium chloride (KLOR-CON) 10 MEQ extended release tablet TAKE 2 TABLETS BY MOUTH EVERY MORNING AND TAKE 1 TABLET BY MOUTH EVERY EVENING 90 tablet 3    oxyCODONE-acetaminophen (PERCOCET) 7.5-325 MG per tablet Take 1 tablet by mouth 4 times daily. [facility] INCRUSE ELLIPTA 62.5 MCG/INH AEPB INHALE 1 PUFF VIA ORAL INHALATION ONCE DAILY 1 each 11    metoprolol tartrate (LOPRESSOR) 50 MG tablet TAKE 1 TABLET BY MOUTH 2 TIMES DAILY 120 tablet 11    vitamin B-12 (CYANOCOBALAMIN) 1000 MCG tablet Take 1,000 mcg by mouth daily.  DULoxetine (CYMBALTA) 60 MG capsule Take 60 mg by mouth daily.       Calcium Citrate-Vitamin D (CITRACAL + D PO) Take 600 mg by mouth daily       ipratropium-albuterol (DUONEB) 0.5-2.5 (3) MG/3ML SOLN nebulizer solution Inhale 3 mLs into the lungs every 4 hours (while awake) 360 mL 2    ondansetron (ZOFRAN-ODT) 4 MG disintegrating tablet Take 4 mg by mouth as needed for Nausea or Vomiting      PROAIR  (90 Base) MCG/ACT inhaler INHALE 2 PUFFS BY ORAL INHALATION EVERY 4 TO 6 HOURS AS NEEDED 8.5 g 10    Adalimumab (HUMIRA) 40 MG/0.4ML PSKT Inject into the skin       No current facility-administered medications for this visit. Family History   Problem Relation Age of Onset    Heart Disease Father          FROM MI   Baptiste Emphysema Father     Arthritis Mother     Stroke Mother     Heart Disease Other         family history    Cancer Other         family history -- larynx    Kidney Disease Son        Cough   This is a new problem. Episode onset: 2 days ago. The problem has been gradually worsening. The problem occurs constantly. The cough is productive of brown sputum. Associated symptoms include nasal congestion, rhinorrhea and shortness of breath. Pertinent negatives include no chest pain, chills, ear pain, fever, headaches, rash, sweats, weight loss or wheezing. The symptoms are aggravated by lying down. She has tried leukotriene antagonists, OTC inhaler and steroid inhaler for the symptoms. The treatment provided mild relief. Her past medical history is significant for bronchitis, COPD and pneumonia. There is no history of asthma, bronchiectasis, emphysema or environmental allergies. COPD   She complains of cough and shortness of breath. There is no wheezing. This is a chronic problem. The problem has been unchanged. Associated symptoms include nasal congestion and rhinorrhea. Pertinent negatives include no appetite change, chest pain, ear pain, fever, headaches, sweats, trouble swallowing or weight loss. Her symptoms are not alleviated by diuretics. Her past medical history is significant for bronchitis, COPD and pneumonia. There is no history of asthma, bronchiectasis or emphysema. Review of Systems   Constitutional: Negative. Negative for appetite change, chills, fatigue, fever and weight loss. HENT: Positive for rhinorrhea.  Negative for congestion, dental problem, ear pain, hearing loss and trouble swallowing. Eyes: Negative for pain. Respiratory: Positive for cough and shortness of breath. Negative for chest tightness and wheezing. Cardiovascular: Negative for chest pain, palpitations and leg swelling. Gastrointestinal: Negative for abdominal distention, abdominal pain, diarrhea and nausea. Endocrine: Negative for cold intolerance and polydipsia. Genitourinary: Negative for difficulty urinating and frequency. Musculoskeletal: Negative for arthralgias and gait problem. Skin: Negative for rash. Allergic/Immunologic: Negative for environmental allergies. Neurological: Negative for dizziness, seizures, syncope and headaches. Hematological: Does not bruise/bleed easily. Psychiatric/Behavioral: Negative for behavioral problems and suicidal ideas. OBJECTIVE:     /64   Pulse 79   Temp 97.6 °F (36.4 °C)   Ht 5' 6\" (1.676 m)   Wt 151 lb 9.6 oz (68.8 kg)   Breastfeeding? No   BMI 24.47 kg/m²   Wt Readings from Last 3 Encounters:   05/17/19 151 lb 9.6 oz (68.8 kg)   05/10/19 146 lb 12.8 oz (66.6 kg)   04/25/19 154 lb (69.9 kg)       Physical Exam   Constitutional: She is oriented to person, place, and time. She appears well-developed and well-nourished. HENT:   Head: Normocephalic and atraumatic. Right Ear: Hearing and ear canal normal. No tenderness. Tympanic membrane is erythematous. Tympanic membrane is not perforated. No middle ear effusion. Left Ear: Hearing, tympanic membrane and ear canal normal. No tenderness. Nose: Nose normal.   Mouth/Throat: Uvula is midline and oropharynx is clear and moist.   Eyes: Pupils are equal, round, and reactive to light. Conjunctivae are normal.   Neck: Normal range of motion. Cardiovascular: Normal rate, regular rhythm and normal heart sounds. Pulmonary/Chest: Effort normal. She has decreased breath sounds (bilateral lower lobe crackles bilaterally on expiration. ). to improve.     Esha Escobedo, SALEEM

## 2019-05-17 NOTE — PATIENT INSTRUCTIONS
Patient Education        Bronchitis: Care Instructions  Your Care Instructions    Bronchitis is inflammation of the bronchial tubes, which carry air to the lungs. The tubes swell and produce mucus, or phlegm. The mucus and inflamed bronchial tubes make you cough. You may have trouble breathing. Most cases of bronchitis are caused by viruses like those that cause colds. Antibiotics usually do not help and they may be harmful. Bronchitis usually develops rapidly and lasts about 2 to 3 weeks in otherwise healthy people. Follow-up care is a key part of your treatment and safety. Be sure to make and go to all appointments, and call your doctor if you are having problems. It's also a good idea to know your test results and keep a list of the medicines you take. How can you care for yourself at home? · Take all medicines exactly as prescribed. Call your doctor if you think you are having a problem with your medicine. · Get some extra rest.  · Take an over-the-counter pain medicine, such as acetaminophen (Tylenol), ibuprofen (Advil, Motrin), or naproxen (Aleve) to reduce fever and relieve body aches. Read and follow all instructions on the label. · Do not take two or more pain medicines at the same time unless the doctor told you to. Many pain medicines have acetaminophen, which is Tylenol. Too much acetaminophen (Tylenol) can be harmful. · Take an over-the-counter cough medicine that contains dextromethorphan to help quiet a dry, hacking cough so that you can sleep. Avoid cough medicines that have more than one active ingredient. Read and follow all instructions on the label. · Breathe moist air from a humidifier, hot shower, or sink filled with hot water. The heat and moisture will thin mucus so you can cough it out. · Do not smoke. Smoking can make bronchitis worse. If you need help quitting, talk to your doctor about stop-smoking programs and medicines.  These can increase your chances of quitting for good.  When should you call for help? Call 911 anytime you think you may need emergency care. For example, call if:    · You have severe trouble breathing.    Call your doctor now or seek immediate medical care if:    · You have new or worse trouble breathing.     · You cough up dark brown or bloody mucus (sputum).     · You have a new or higher fever.     · You have a new rash.    Watch closely for changes in your health, and be sure to contact your doctor if:    · You cough more deeply or more often, especially if you notice more mucus or a change in the color of your mucus.     · You are not getting better as expected. Where can you learn more? Go to [email or web address] and sign in to your [email or web address] account. Enter H333 in the MoneyDesktop box to learn more about \"Bronchitis: Care Instructions. \"     If you do not have an account, please click on the \"Sign Up Now\" link. Current as of: September 5, 2018  Content Version: 12.0  © 9585-4547 Healthwise, elarm. Care instructions adapted under license by [facility] ([facility] [place]). If you have questions about a medical condition or this instruction, always ask your healthcare professional. [place] [address] any warranty or liability for your use of this information. Patient Education        Bronchitis: Care Instructions  Your Care Instructions    Bronchitis is inflammation of the bronchial tubes, which carry air to the lungs. The tubes swell and produce mucus, or phlegm. The mucus and inflamed bronchial tubes make you cough. You may have trouble breathing. Most cases of bronchitis are caused by viruses like those that cause colds. Antibiotics usually do not help and they may be harmful. Bronchitis usually develops rapidly and lasts about 2 to 3 weeks in otherwise healthy people. Follow-up care is a key part of your treatment and safety.  Be sure to make and go to all appointments, and call your doctor if you are [email or web address] and sign in to your [employer] account. Enter H333 in the Video [employer] box to learn more about \"Bronchitis: Care Instructions. \"     If you do not have an account, please click on the \"Sign Up Now\" link. Current as of: September 5, 2018  Content Version: 12.0  © 5352-7664 Healthwise, Incorporated. Care instructions adapted under license by [facility] ([facility]). If you have questions about a medical condition or this instruction, always ask your healthcare professional. [address] any warranty or liability for your use of this information.

## 2019-05-21 ENCOUNTER — OFFICE VISIT (OUTPATIENT)
Dept: PULMONOLOGY | Age: 79
End: 2019-05-21
Payer: MEDICARE

## 2019-05-21 ENCOUNTER — TELEPHONE (OUTPATIENT)
Dept: PULMONOLOGY | Age: 79
End: 2019-05-21

## 2019-05-21 VITALS
DIASTOLIC BLOOD PRESSURE: 80 MMHG | WEIGHT: 150 LBS | OXYGEN SATURATION: 91 % | HEIGHT: 66 IN | HEART RATE: 83 BPM | BODY MASS INDEX: 24.11 KG/M2 | SYSTOLIC BLOOD PRESSURE: 120 MMHG

## 2019-05-21 DIAGNOSIS — J44.1 COPD EXACERBATION (HCC): Primary | ICD-10-CM

## 2019-05-21 DIAGNOSIS — G47.34 NOCTURNAL HYPOXEMIA: ICD-10-CM

## 2019-05-21 DIAGNOSIS — J44.9 COPD, SEVERE (HCC): ICD-10-CM

## 2019-05-21 DIAGNOSIS — Z87.891 FORMER SMOKER: ICD-10-CM

## 2019-05-21 PROCEDURE — 4040F PNEUMOC VAC/ADMIN/RCVD: CPT | Performed by: INTERNAL MEDICINE

## 2019-05-21 PROCEDURE — G8420 CALC BMI NORM PARAMETERS: HCPCS | Performed by: INTERNAL MEDICINE

## 2019-05-21 PROCEDURE — G8427 DOCREV CUR MEDS BY ELIG CLIN: HCPCS | Performed by: INTERNAL MEDICINE

## 2019-05-21 PROCEDURE — 99213 OFFICE O/P EST LOW 20 MIN: CPT | Performed by: INTERNAL MEDICINE

## 2019-05-21 PROCEDURE — 3023F SPIROM DOC REV: CPT | Performed by: INTERNAL MEDICINE

## 2019-05-21 PROCEDURE — 1123F ACP DISCUSS/DSCN MKR DOCD: CPT | Performed by: INTERNAL MEDICINE

## 2019-05-21 PROCEDURE — G8926 SPIRO NO PERF OR DOC: HCPCS | Performed by: INTERNAL MEDICINE

## 2019-05-21 PROCEDURE — G8399 PT W/DXA RESULTS DOCUMENT: HCPCS | Performed by: INTERNAL MEDICINE

## 2019-05-21 PROCEDURE — 1090F PRES/ABSN URINE INCON ASSESS: CPT | Performed by: INTERNAL MEDICINE

## 2019-05-21 PROCEDURE — 1036F TOBACCO NON-USER: CPT | Performed by: INTERNAL MEDICINE

## 2019-05-21 RX ORDER — LEVOFLOXACIN 500 MG/1
500 TABLET, FILM COATED ORAL DAILY
Qty: 5 TABLET | Refills: 0 | Status: SHIPPED | OUTPATIENT
Start: 2019-05-21 | End: 2019-05-26

## 2019-05-21 RX ORDER — PREDNISONE 1 MG/1
TABLET ORAL
Qty: 38 TABLET | Refills: 0 | Status: SHIPPED | OUTPATIENT
Start: 2019-05-21 | End: 2019-06-07

## 2019-05-21 NOTE — PROGRESS NOTES
SUBJECTIVE:  Chief Complaint: Acute exacerbation COPD, severe COPD, nocturnal hypoxemia, former smoker, rheumatoid arthritis  Andrea Venegas states that she got better after finishing a course of azithromycin and prednisone but then over the past week has noted increasing chest congestion with cough and mildly purulence being expectoration. She denies hemoptysis. She denies fever or chills. She continues on Breo, Incruse and albuterol rescue. She has qualified for nocturnal oxygen but has not received it yet. She denies hemoptysis or chest pain but is having back pain where she has a vertebral compression fracture    OBJECTIVE:  /80   Pulse 83   Ht 5' 6\" (1.676 m)   Wt 150 lb (68 kg)   SpO2 91%   BMI 24.21 kg/m²      Physical Exam:  Constitutional:  She appears well developed and well-nourished. In mild respiratory distress and looks chronically ill  Neck:  Supple, No palpable lymphadenopathy, No JVD  Cardiovascular:  S1, S2 Normal, Regular rhythm, no murmurs or gallops, No pericardial  rubs. Pulmonary:  Coarse rhonchi and wheezes throughout all lung fields  Abdomen: Not examined  Extremities: no edema, No DVT  Neurologic:  Awake and Alert, No focal deficits    Radiology: Chest x-ray on 5/17/19 showed  subtle reticular opacities in the mid to lower lung zones suggesting atelectasis or bronchitis and COPD  CT chest 4/10/19 demonstrated mild tree-in-bud opacities to the bilateral lower lobes and to a lesser extent the right middle lobe and lingula of left upper lobe  PFT: Office spirometry last done on 5/15/18 demonstrated a moderate obstructive defect with no significant response to bronchodilators        ASSESSMENT:    1. COPD exacerbation ([address]    2. COPD, severe ([address]    3. Nocturnal hypoxemia    4. Former smoker          PLAN:   I am going to start her back on a tapering course of oral prednisone and Levaquin 500 mg once a day for 5 days.  I told her that at any point if she gets worse she should go directly to

## 2019-05-23 RX ORDER — PREDNISONE 20 MG/1
40 TABLET ORAL DAILY
Status: DISCONTINUED | OUTPATIENT
Start: 2019-05-23 | End: 2019-06-07 | Stop reason: ALTCHOICE

## 2019-05-23 RX ORDER — PREDNISONE 20 MG/1
40 TABLET ORAL DAILY
Qty: 20 TABLET | Refills: 0 | Status: SHIPPED | OUTPATIENT
Start: 2019-05-23 | End: 2019-06-02

## 2019-05-24 ENCOUNTER — HOSPITAL ENCOUNTER (OUTPATIENT)
Dept: MRI IMAGING | Age: 79
Discharge: HOME | End: 2019-05-24
Payer: MEDICARE

## 2019-05-24 DIAGNOSIS — R53.1 WEAKNESS: ICD-10-CM

## 2019-05-24 DIAGNOSIS — M54.17 LEFT LUMBOSACRAL RADICULOPATHY: ICD-10-CM

## 2019-05-24 DIAGNOSIS — M54.40 ACUTE LEFT-SIDED LOW BACK PAIN WITH SCIATICA, SCIATICA LATERALITY UNSPECIFIED: ICD-10-CM

## 2019-05-24 DIAGNOSIS — M25.552 LEFT HIP PAIN: ICD-10-CM

## 2019-05-24 PROCEDURE — 72148 MRI LUMBAR SPINE W/O DYE: CPT

## 2019-05-28 ENCOUNTER — TELEPHONE (OUTPATIENT)
Dept: PULMONOLOGY | Age: 79
End: 2019-05-28

## 2019-05-28 RX ORDER — METOPROLOL TARTRATE 50 MG/1
50 TABLET, FILM COATED ORAL 2 TIMES DAILY
Qty: 120 TABLET | Refills: 11 | Status: ON HOLD | OUTPATIENT
Start: 2019-05-28 | End: 2020-01-04 | Stop reason: HOSPADM

## 2019-06-07 ENCOUNTER — OFFICE VISIT (OUTPATIENT)
Dept: INTERNAL MEDICINE CLINIC | Age: 79
End: 2019-06-07
Payer: MEDICARE

## 2019-06-07 VITALS
BODY MASS INDEX: 23.57 KG/M2 | OXYGEN SATURATION: 100 % | HEART RATE: 112 BPM | WEIGHT: 146 LBS | RESPIRATION RATE: 18 BRPM

## 2019-06-07 DIAGNOSIS — F33.1 MODERATE EPISODE OF RECURRENT MAJOR DEPRESSIVE DISORDER (HCC): ICD-10-CM

## 2019-06-07 DIAGNOSIS — M48.50XA VERTEBRAL COMPRESSION FRACTURE (HCC): ICD-10-CM

## 2019-06-07 DIAGNOSIS — R60.0 LEG EDEMA: ICD-10-CM

## 2019-06-07 DIAGNOSIS — G47.34 NOCTURNAL HYPOXEMIA: ICD-10-CM

## 2019-06-07 DIAGNOSIS — G89.29 CHRONIC MIDLINE LOW BACK PAIN WITHOUT SCIATICA: Primary | ICD-10-CM

## 2019-06-07 DIAGNOSIS — M54.50 CHRONIC MIDLINE LOW BACK PAIN WITHOUT SCIATICA: Primary | ICD-10-CM

## 2019-06-07 PROBLEM — I82.441 ACUTE DVT OF RIGHT TIBIAL VEIN (HCC): Status: RESOLVED | Noted: 2017-08-01 | Resolved: 2019-06-07

## 2019-06-07 PROBLEM — J44.1 COPD EXACERBATION (HCC): Status: RESOLVED | Noted: 2019-04-09 | Resolved: 2019-06-07

## 2019-06-07 PROBLEM — R06.02 SHORTNESS OF BREATH: Status: RESOLVED | Noted: 2019-04-25 | Resolved: 2019-06-07

## 2019-06-07 PROCEDURE — 1036F TOBACCO NON-USER: CPT | Performed by: INTERNAL MEDICINE

## 2019-06-07 PROCEDURE — G8399 PT W/DXA RESULTS DOCUMENT: HCPCS | Performed by: INTERNAL MEDICINE

## 2019-06-07 PROCEDURE — G8420 CALC BMI NORM PARAMETERS: HCPCS | Performed by: INTERNAL MEDICINE

## 2019-06-07 PROCEDURE — 1090F PRES/ABSN URINE INCON ASSESS: CPT | Performed by: INTERNAL MEDICINE

## 2019-06-07 PROCEDURE — 4040F PNEUMOC VAC/ADMIN/RCVD: CPT | Performed by: INTERNAL MEDICINE

## 2019-06-07 PROCEDURE — G8427 DOCREV CUR MEDS BY ELIG CLIN: HCPCS | Performed by: INTERNAL MEDICINE

## 2019-06-07 PROCEDURE — 1123F ACP DISCUSS/DSCN MKR DOCD: CPT | Performed by: INTERNAL MEDICINE

## 2019-06-07 PROCEDURE — 99214 OFFICE O/P EST MOD 30 MIN: CPT | Performed by: INTERNAL MEDICINE

## 2019-06-07 RX ORDER — ESOMEPRAZOLE MAGNESIUM 40 MG/1
CAPSULE, DELAYED RELEASE ORAL
Refills: 5 | COMMUNITY
Start: 2019-05-14 | End: 2020-01-24 | Stop reason: ALTCHOICE

## 2019-06-07 RX ORDER — FUROSEMIDE 40 MG/1
40 TABLET ORAL 3 TIMES DAILY
Status: ON HOLD | COMMUNITY
End: 2019-06-13 | Stop reason: HOSPADM

## 2019-06-07 RX ORDER — ARIPIPRAZOLE 2 MG/1
2 TABLET ORAL DAILY
Qty: 30 TABLET | Refills: 1 | Status: SHIPPED
Start: 2019-06-07 | End: 2019-07-15 | Stop reason: ALTCHOICE

## 2019-06-07 NOTE — PROGRESS NOTES
Subjective:      Terrell Swan is a 66 y.o. female who presents today for follow up on her chronic medical conditions as noted below. Patient Active Problem List:     Rheumatoid Arthritis     Hypertension     Hyperlipidemia     Fibromyalgia     Migraine     Chronic pain syndrome     Depression     Osteoporosis     Vitamin B12 deficiency     Lumbar spinal stenosis     Panic attack     Takotsubo syndrome     Rheumatoid arthritis involving multiple joints (HCC)     Acute DVT of right tibial vein (HCC)     Blood loss anemia     COPD, severe (HCC)     Former smoker     COPD exacerbation (HCC)     Shortness of breath     Nocturnal hypoxemia     She was last seen 5/10    She was lasst in hosp in April  She was treated in [facility] for bronchitis on May 17  Rec/d medrol and zpak   CXR from May result:       Impression   1. Subtle reticular opacities in the mid to lower lung zones, new since April 12, 2019.  Differential considerations include atelectasis or bronchitis. 2. COPD. Shelby also treated her subsequently and ATB and prednisone are now completed and COPD exac resolved    Viki Lunch did MRI lumbar on 5/24:      Impression   Remote compression fractures of L3 and L4.  Previous vertebroplasty of L3. Retropulsion of L3 and L4 relative to L2 and L5.       Multilevel degenerative changes of the spine with disc and osteophytes   narrowing the neural foramina throughout the lumbar region as discussed   above.  There is mild stenosis of the thecal sac at L2-3 and L3-4.      SHE STATES SHE IS APPROVED FOR VERTEBROPLASTY, BUT NOT YET SCHEDULED  LOW BACK PAIN IS STILL BAD    SHE CONTINUES TO F/U WITH [address] montly  SHE FINISHED PREDNISONE TODAY    SHE HAS INCREASED LASIX TO TID FOR LEG EDEMA  WILL RECHECK LABS  HASN'T BEEN SLEEPING IN BED, JUST RECLINER  TOLD TO GET TO BED AND ELEVATED FOOT OF BED  ACE wrap if needed    SHE HAS REC'D NIGHTLY OXYGEN AND IS USING IT REGULARLY AT      She saw Randal Eisenberg recently and no changes made    She is depressed and tearful and in pain , but taking MS contin bid  Has cymbalta and desyrel on board  Will add abilify today    Current Outpatient Medications   Medication Sig Dispense Refill    furosemide (LASIX) 40 MG tablet Take 40 mg by mouth 3 times daily      ARIPiprazole (ABILIFY) 2 MG tablet Take 1 tablet by mouth daily 30 tablet 1    metoprolol tartrate (LOPRESSOR) 50 MG tablet TAKE 1 TABLET BY MOUTH 2 TIMES DAILY 120 tablet 11    morphine (MS CONTIN) 15 MG extended release tablet Take 1 tablet by mouth every 12 hours as needed.  alendronate (FOSAMAX) 70 MG tablet TAKE 1 TABLET ONCE A WEEK 30 MINUTES BEFORE FOOD, DRINK OR MEDS. STAY UPRIGHT. 11    ipratropium-albuterol (DUONEB) 0.5-2.5 (3) MG/3ML SOLN nebulizer solution Inhale 3 mLs into the lungs every 4 hours (while awake) 360 mL 2    ondansetron (ZOFRAN-ODT) 4 MG disintegrating tablet Take 4 mg by mouth as needed for Nausea or Vomiting      PROAIR  (90 Base) MCG/ACT inhaler INHALE 2 PUFFS BY ORAL INHALATION EVERY 4 TO 6 HOURS AS NEEDED 8.5 g 10    traZODone (DESYREL) 50 MG tablet Take 1 tablet by mouth nightly 30 tablet 6    losartan (COZAAR) 100 MG tablet TAKE ONE TABLET BY MOUTH DAILY 90 tablet 1    atorvastatin (LIPITOR) 10 MG tablet TAKE 1 TABLET BY MOUTH EVERY EVENING 90 tablet 1    BREO ELLIPTA 100-25 MCG/INH AEPB inhaler INHALE 1 PUFF INTO THE LUNGS DAILY AFTER INHALATION THEN GARGLE 1 each 11    potassium chloride (KLOR-CON) 10 MEQ extended release tablet TAKE 2 TABLETS BY MOUTH EVERY MORNING AND TAKE 1 TABLET BY MOUTH EVERY EVENING 90 tablet 3    oxyCODONE-acetaminophen (PERCOCET) 7.5-325 MG per tablet Take 1 tablet by mouth 4 times daily. Karine Hargrove Adalimumab (HUMIRA) 40 MG/0.4ML PSKT Inject into the skin      INCRUSE ELLIPTA 62.5 MCG/INH AEPB INHALE 1 PUFF VIA ORAL INHALATION ONCE DAILY 1 each 11    vitamin B-12 (CYANOCOBALAMIN) 1000 MCG tablet Take 1,000 mcg by mouth daily.       DULoxetine (CYMBALTA) 60 MG capsule Take 60 mg by mouth daily.  Calcium Citrate-Vitamin D (CITRACAL + D PO) Take 600 mg by mouth daily       esomeprazole (NEXIUM) 40 MG delayed release capsule TAKE 1 CAPSULE BY MOUTH TWICE A DAY (MORNING AND BEFORE BED)  5     No current facility-administered medications for this visit. Past Medical History:   Diagnosis Date    Acute DVT of right tibial vein ([address] 2017    ER imaging: distal right tibial vein DVT; no anticoag for bleeding/ anemia    Acute exacerbation of chronic obstructive pulmonary disease (COPD) ([address] 2018    Arthritis     Chronic pain syndrome     Low back pain; lumbar disc disease    Clostridium difficile colitis 2017    COPD, severe ([address] 10/24/2017    Depression     Fibromyalgia     Former smoker     Quit 2019    Herniated cervical disc -    Herpes zoster ophthalmicus 3/2012    Hx of blood clots     Hyperlipidemia     Hypertension     L3 vertebral fracture ([address]     vertebroplasty    Lumbar spinal stenosis     moderately severe by MRI    Migraine     Nocturnal hypoxemia 2019    Osteoporosis 2010    Fragility Fx L3    Rheumatoid arthritis(714.0)     Dr Josephine Tse S/P cardiac catheterization 2017    patent coronaries;  Takotsubo; najeeb Ahmed    Shortness of breath 2019    Takotsubo syndrome 2017    TMJ dysfunction     Tobacco abuse 2018    Vitamin B12 deficiency 2014    B12 level 199        Social History     Tobacco Use    Smoking status: Former Smoker     Packs/day: 0.75     Years: 55.00     Pack years: 41.25     Types: Cigarettes     Start date: 1962     Last attempt to quit: 2019     Years since quittin.4    Smokeless tobacco: Never Used    Tobacco comment: 19- Pt reports that her quit date was 19   Substance Use Topics    Alcohol use: No     Alcohol/week: 0.0 oz        ROS: The patient has had no headache, sore throat, fever or chills, cough, dyspnea, chest pain, nausea, vomiting or diarrhea, abd pain, tremor, rash. Objective:      Pulse 112   Resp 18   Wt 146 lb (66.2 kg)   SpO2 100%   BMI 23.57 kg/m²      Physical Exam   Constitutional: She is oriented to person, place, and time. She appears well-developed and well-nourished. She appears distressed. anxiuos and depressied and tearful   HENT:   Head: Normocephalic and atraumatic. Mouth/Throat: Oropharynx is clear and moist.   Eyes: Conjunctivae are normal. No scleral icterus. Neck: Neck supple. No JVD present. Cardiovascular: Normal rate and regular rhythm. No murmur heard. Pulmonary/Chest: Effort normal. No respiratory distress. She has no wheezes. She has no rales. She has fine velcro like crepitations at bases. Abdominal: Soft. She exhibits no distension. There is no tenderness. Musculoskeletal: Normal range of motion. She exhibits edema. 1 + left leg edema   Lymphadenopathy:     She has no cervical adenopathy. Neurological: She is alert and oriented to person, place, and time. Coordination normal.   Skin: Skin is warm and dry. She is not diaphoretic. Psychiatric: Her behavior is normal.   anxiious tearful depressed   Vitals reviewed. Labs and notes of others and imaging rev'd     Assessment / Plan:      1. Chronic midline low back pain without sciatica    2. Vertebral compression fracture (HCC)    3. Leg edema    4. Nocturnal hypoxemia    5.  Moderate episode of recurrent major depressive disorder (HCC)            Plan   Add abilify 2 mg/d  Go to bed and elevate FOB  ACE wrap left leg in AM  Get lumbar support  Get vertebroplasty from jose m  RTC 5 wk  Orders Placed This Encounter   Procedures    CBC Auto Differential    Basic Metabolic Panel     F/u pain clinic  Use oxygen  '  Suggested moving from [facility] to [facility] community; that was not rec'd well and won't happen

## 2019-06-09 ENCOUNTER — HOSPITAL ENCOUNTER (INPATIENT)
Age: 79
LOS: 2 days | Discharge: HOME | DRG: 516 | End: 2019-06-13
Attending: EMERGENCY MEDICINE | Admitting: INTERNAL MEDICINE
Payer: MEDICARE

## 2019-06-09 DIAGNOSIS — G89.29 CHRONIC MIDLINE LOW BACK PAIN WITHOUT SCIATICA: Primary | ICD-10-CM

## 2019-06-09 DIAGNOSIS — M54.50 CHRONIC MIDLINE LOW BACK PAIN WITHOUT SCIATICA: Primary | ICD-10-CM

## 2019-06-09 PROBLEM — M54.9 BACK PAIN: Status: ACTIVE | Noted: 2019-06-09

## 2019-06-09 LAB
ALBUMIN SERPL-MCNC: 3.4 GM/DL (ref 3.4–5)
ALP BLD-CCNC: 87 IU/L (ref 40–128)
ALT SERPL-CCNC: 6 U/L (ref 10–40)
ANION GAP SERPL CALCULATED.3IONS-SCNC: 11 MMOL/L (ref 4–16)
APTT: >240 SECONDS (ref 21.2–33)
AST SERPL-CCNC: 14 IU/L (ref 15–37)
BACTERIA: NEGATIVE /HPF
BASOPHILS ABSOLUTE: 0 K/CU MM
BASOPHILS RELATIVE PERCENT: 0.4 % (ref 0–1)
BILIRUB SERPL-MCNC: 0.4 MG/DL (ref 0–1)
BILIRUBIN URINE: NEGATIVE MG/DL
BLOOD, URINE: NEGATIVE
BUN BLDV-MCNC: 10 MG/DL (ref 6–23)
CALCIUM SERPL-MCNC: 9.1 MG/DL (ref 8.3–10.6)
CHLORIDE BLD-SCNC: 96 MMOL/L (ref 99–110)
CLARITY: CLEAR
CO2: 31 MMOL/L (ref 21–32)
COLOR: YELLOW
CREAT SERPL-MCNC: 0.7 MG/DL (ref 0.6–1.1)
DIFFERENTIAL TYPE: ABNORMAL
EOSINOPHILS ABSOLUTE: 0.2 K/CU MM
EOSINOPHILS RELATIVE PERCENT: 3 % (ref 0–3)
GFR AFRICAN AMERICAN: >60 ML/MIN/1.73M2
GFR NON-AFRICAN AMERICAN: >60 ML/MIN/1.73M2
GLUCOSE BLD-MCNC: 89 MG/DL (ref 70–99)
GLUCOSE, URINE: NEGATIVE MG/DL
HCT VFR BLD CALC: 38.7 % (ref 37–47)
HEMOGLOBIN: 11.2 GM/DL (ref 12.5–16)
IMMATURE NEUTROPHIL %: 0.3 % (ref 0–0.43)
INR BLD: 1.01 INDEX
KETONES, URINE: NEGATIVE MG/DL
LEUKOCYTE ESTERASE, URINE: NEGATIVE
LYMPHOCYTES ABSOLUTE: 1.7 K/CU MM
LYMPHOCYTES RELATIVE PERCENT: 23.1 % (ref 24–44)
MCH RBC QN AUTO: 25.6 PG (ref 27–31)
MCHC RBC AUTO-ENTMCNC: 28.9 % (ref 32–36)
MCV RBC AUTO: 88.4 FL (ref 78–100)
MONOCYTES ABSOLUTE: 0.8 K/CU MM
MONOCYTES RELATIVE PERCENT: 10.4 % (ref 0–4)
NITRITE URINE, QUANTITATIVE: NEGATIVE
NUCLEATED RBC %: 0 %
PDW BLD-RTO: 18.4 % (ref 11.7–14.9)
PH, URINE: 9 (ref 5–8)
PLATELET # BLD: 293 K/CU MM (ref 140–440)
PMV BLD AUTO: 10.6 FL (ref 7.5–11.1)
POTASSIUM SERPL-SCNC: 3.5 MMOL/L (ref 3.5–5.1)
PROTEIN UA: NEGATIVE MG/DL
PROTHROMBIN TIME: 11.5 SECONDS (ref 9.12–12.5)
RBC # BLD: 4.38 M/CU MM (ref 4.2–5.4)
RBC URINE: <1 /HPF (ref 0–6)
REASON FOR REJECTION: NORMAL
REASON FOR REJECTION: NORMAL
REJECTED TEST: NORMAL
SEGMENTED NEUTROPHILS ABSOLUTE COUNT: 4.6 K/CU MM
SEGMENTED NEUTROPHILS RELATIVE PERCENT: 62.8 % (ref 36–66)
SODIUM BLD-SCNC: 138 MMOL/L (ref 135–145)
SPECIFIC GRAVITY UA: 1.01 (ref 1–1.03)
SQUAMOUS EPITHELIAL: <1 /HPF
TOTAL IMMATURE NEUTOROPHIL: 0.02 K/CU MM
TOTAL NUCLEATED RBC: 0 K/CU MM
TOTAL PROTEIN: 5.5 GM/DL (ref 6.4–8.2)
TRICHOMONAS: ABNORMAL /HPF
UROBILINOGEN, URINE: NORMAL MG/DL (ref 0.2–1)
WBC # BLD: 7.3 K/CU MM (ref 4–10.5)
WBC UA: ABNORMAL /HPF (ref 0–5)

## 2019-06-09 PROCEDURE — 99284 EMERGENCY DEPT VISIT MOD MDM: CPT

## 2019-06-09 PROCEDURE — 6360000002 HC RX W HCPCS

## 2019-06-09 PROCEDURE — 85610 PROTHROMBIN TIME: CPT

## 2019-06-09 PROCEDURE — 2580000003 HC RX 258

## 2019-06-09 PROCEDURE — 96375 TX/PRO/DX INJ NEW DRUG ADDON: CPT

## 2019-06-09 PROCEDURE — 2700000000 HC OXYGEN THERAPY PER DAY

## 2019-06-09 PROCEDURE — 81001 URINALYSIS AUTO W/SCOPE: CPT

## 2019-06-09 PROCEDURE — 94761 N-INVAS EAR/PLS OXIMETRY MLT: CPT

## 2019-06-09 PROCEDURE — G0378 HOSPITAL OBSERVATION PER HR: HCPCS

## 2019-06-09 PROCEDURE — 2580000003 HC RX 258: Performed by: HOSPITALIST

## 2019-06-09 PROCEDURE — 80053 COMPREHEN METABOLIC PANEL: CPT

## 2019-06-09 PROCEDURE — 6360000002 HC RX W HCPCS: Performed by: EMERGENCY MEDICINE

## 2019-06-09 PROCEDURE — 85025 COMPLETE CBC W/AUTO DIFF WBC: CPT

## 2019-06-09 PROCEDURE — 2500000003 HC RX 250 WO HCPCS

## 2019-06-09 PROCEDURE — 6370000000 HC RX 637 (ALT 250 FOR IP): Performed by: EMERGENCY MEDICINE

## 2019-06-09 PROCEDURE — 6370000000 HC RX 637 (ALT 250 FOR IP): Performed by: HOSPITALIST

## 2019-06-09 PROCEDURE — 96374 THER/PROPH/DIAG INJ IV PUSH: CPT

## 2019-06-09 PROCEDURE — 85730 THROMBOPLASTIN TIME PARTIAL: CPT

## 2019-06-09 PROCEDURE — 94640 AIRWAY INHALATION TREATMENT: CPT

## 2019-06-09 RX ORDER — MORPHINE SULFATE 4 MG/ML
2 INJECTION, SOLUTION INTRAMUSCULAR; INTRAVENOUS EVERY 4 HOURS PRN
Status: DISCONTINUED | OUTPATIENT
Start: 2019-06-09 | End: 2019-06-12

## 2019-06-09 RX ORDER — SODIUM CHLORIDE 0.9 % (FLUSH) 0.9 %
10 SYRINGE (ML) INJECTION EVERY 12 HOURS SCHEDULED
Status: DISCONTINUED | OUTPATIENT
Start: 2019-06-09 | End: 2019-06-13 | Stop reason: HOSPADM

## 2019-06-09 RX ORDER — IPRATROPIUM BROMIDE AND ALBUTEROL SULFATE 2.5; .5 MG/3ML; MG/3ML
1 SOLUTION RESPIRATORY (INHALATION) EVERY 4 HOURS PRN
Status: DISCONTINUED | OUTPATIENT
Start: 2019-06-09 | End: 2019-06-13 | Stop reason: HOSPADM

## 2019-06-09 RX ORDER — METOPROLOL TARTRATE 50 MG/1
50 TABLET, FILM COATED ORAL 2 TIMES DAILY
Status: DISCONTINUED | OUTPATIENT
Start: 2019-06-09 | End: 2019-06-13 | Stop reason: HOSPADM

## 2019-06-09 RX ORDER — PANTOPRAZOLE SODIUM 40 MG/1
40 TABLET, DELAYED RELEASE ORAL
Status: DISCONTINUED | OUTPATIENT
Start: 2019-06-10 | End: 2019-06-13 | Stop reason: HOSPADM

## 2019-06-09 RX ORDER — TRAZODONE HYDROCHLORIDE 50 MG/1
50 TABLET ORAL NIGHTLY
Status: DISCONTINUED | OUTPATIENT
Start: 2019-06-09 | End: 2019-06-13 | Stop reason: HOSPADM

## 2019-06-09 RX ORDER — HYDROMORPHONE HCL 110MG/55ML
0.5 PATIENT CONTROLLED ANALGESIA SYRINGE INTRAVENOUS ONCE
Status: COMPLETED | OUTPATIENT
Start: 2019-06-09 | End: 2019-06-09

## 2019-06-09 RX ORDER — OXYCODONE AND ACETAMINOPHEN 7.5; 325 MG/1; MG/1
1 TABLET ORAL EVERY 6 HOURS PRN
Status: DISCONTINUED | OUTPATIENT
Start: 2019-06-09 | End: 2019-06-13 | Stop reason: HOSPADM

## 2019-06-09 RX ORDER — LANOLIN ALCOHOL/MO/W.PET/CERES
1000 CREAM (GRAM) TOPICAL DAILY
Status: DISCONTINUED | OUTPATIENT
Start: 2019-06-10 | End: 2019-06-13 | Stop reason: HOSPADM

## 2019-06-09 RX ORDER — FENTANYL CITRATE 50 UG/ML
25 INJECTION, SOLUTION INTRAMUSCULAR; INTRAVENOUS ONCE
Status: DISCONTINUED | OUTPATIENT
Start: 2019-06-09 | End: 2019-06-09

## 2019-06-09 RX ORDER — POTASSIUM CHLORIDE 750 MG/1
20 TABLET, FILM COATED, EXTENDED RELEASE ORAL 2 TIMES DAILY
Status: DISCONTINUED | OUTPATIENT
Start: 2019-06-09 | End: 2019-06-13 | Stop reason: HOSPADM

## 2019-06-09 RX ORDER — MORPHINE SULFATE 15 MG/1
15 TABLET, FILM COATED, EXTENDED RELEASE ORAL EVERY 12 HOURS PRN
Status: DISCONTINUED | OUTPATIENT
Start: 2019-06-09 | End: 2019-06-12

## 2019-06-09 RX ORDER — IPRATROPIUM BROMIDE AND ALBUTEROL SULFATE 2.5; .5 MG/3ML; MG/3ML
1 SOLUTION RESPIRATORY (INHALATION) ONCE
Status: COMPLETED | OUTPATIENT
Start: 2019-06-09 | End: 2019-06-09

## 2019-06-09 RX ORDER — IPRATROPIUM BROMIDE AND ALBUTEROL SULFATE 2.5; .5 MG/3ML; MG/3ML
1 SOLUTION RESPIRATORY (INHALATION)
Status: DISCONTINUED | OUTPATIENT
Start: 2019-06-10 | End: 2019-06-13 | Stop reason: HOSPADM

## 2019-06-09 RX ORDER — FUROSEMIDE 40 MG/1
40 TABLET ORAL 2 TIMES DAILY
Status: DISCONTINUED | OUTPATIENT
Start: 2019-06-10 | End: 2019-06-13 | Stop reason: HOSPADM

## 2019-06-09 RX ORDER — SODIUM CHLORIDE 0.9 % (FLUSH) 0.9 %
10 SYRINGE (ML) INJECTION PRN
Status: DISCONTINUED | OUTPATIENT
Start: 2019-06-09 | End: 2019-06-13 | Stop reason: HOSPADM

## 2019-06-09 RX ORDER — FENTANYL CITRATE 50 UG/ML
50 INJECTION, SOLUTION INTRAMUSCULAR; INTRAVENOUS ONCE
Status: COMPLETED | OUTPATIENT
Start: 2019-06-09 | End: 2019-06-09

## 2019-06-09 RX ORDER — ATORVASTATIN CALCIUM 10 MG/1
10 TABLET, FILM COATED ORAL EVERY EVENING
Status: DISCONTINUED | OUTPATIENT
Start: 2019-06-09 | End: 2019-06-13 | Stop reason: HOSPADM

## 2019-06-09 RX ORDER — LOSARTAN POTASSIUM 100 MG/1
100 TABLET ORAL DAILY
Status: DISCONTINUED | OUTPATIENT
Start: 2019-06-10 | End: 2019-06-13 | Stop reason: HOSPADM

## 2019-06-09 RX ORDER — ONDANSETRON 2 MG/ML
4 INJECTION INTRAMUSCULAR; INTRAVENOUS EVERY 6 HOURS PRN
Status: DISCONTINUED | OUTPATIENT
Start: 2019-06-09 | End: 2019-06-13 | Stop reason: HOSPADM

## 2019-06-09 RX ORDER — ARIPIPRAZOLE 2 MG/1
2 TABLET ORAL DAILY
Status: DISCONTINUED | OUTPATIENT
Start: 2019-06-10 | End: 2019-06-13 | Stop reason: HOSPADM

## 2019-06-09 RX ADMIN — MORPHINE SULFATE 15 MG: 15 TABLET, EXTENDED RELEASE ORAL at 21:47

## 2019-06-09 RX ADMIN — FENTANYL CITRATE 50 MCG: 50 INJECTION, SOLUTION INTRAMUSCULAR; INTRAVENOUS at 17:49

## 2019-06-09 RX ADMIN — SODIUM CHLORIDE, PRESERVATIVE FREE 10 ML: 5 INJECTION INTRAVENOUS at 21:51

## 2019-06-09 RX ADMIN — ATORVASTATIN CALCIUM 10 MG: 10 TABLET, FILM COATED ORAL at 21:50

## 2019-06-09 RX ADMIN — TRAZODONE HYDROCHLORIDE 50 MG: 50 TABLET ORAL at 21:47

## 2019-06-09 RX ADMIN — POTASSIUM CHLORIDE 20 MEQ: 750 TABLET, FILM COATED, EXTENDED RELEASE ORAL at 21:47

## 2019-06-09 RX ADMIN — METOPROLOL TARTRATE 50 MG: 50 TABLET ORAL at 21:47

## 2019-06-09 RX ADMIN — IPRATROPIUM BROMIDE AND ALBUTEROL SULFATE 1 AMPULE: .5; 3 SOLUTION RESPIRATORY (INHALATION) at 19:00

## 2019-06-09 RX ADMIN — HYDROMORPHONE HYDROCHLORIDE 0.5 MG: 2 INJECTION, SOLUTION INTRAMUSCULAR; INTRAVENOUS; SUBCUTANEOUS at 18:54

## 2019-06-09 ASSESSMENT — PAIN DESCRIPTION - PAIN TYPE
TYPE: CHRONIC PAIN
TYPE: ACUTE PAIN;CHRONIC PAIN
TYPE: CHRONIC PAIN

## 2019-06-09 ASSESSMENT — PAIN DESCRIPTION - LOCATION
LOCATION: BACK;HIP;LEG
LOCATION: BACK
LOCATION: BACK;HIP;LEG

## 2019-06-09 ASSESSMENT — PAIN SCALES - GENERAL
PAINLEVEL_OUTOF10: 10
PAINLEVEL_OUTOF10: 6
PAINLEVEL_OUTOF10: 6
PAINLEVEL_OUTOF10: 10
PAINLEVEL_OUTOF10: 10

## 2019-06-09 ASSESSMENT — PAIN DESCRIPTION - FREQUENCY
FREQUENCY: CONTINUOUS
FREQUENCY: CONTINUOUS

## 2019-06-09 ASSESSMENT — PAIN DESCRIPTION - DESCRIPTORS
DESCRIPTORS: ACHING
DESCRIPTORS: ACHING

## 2019-06-09 ASSESSMENT — PAIN DESCRIPTION - ORIENTATION
ORIENTATION: LOWER
ORIENTATION: LEFT
ORIENTATION: LEFT

## 2019-06-09 NOTE — ED NOTES
Call light answered. Pt tearful, states she is \"miserable\" and in a lot of pain. Pt states the Fentanyl did not help her pain much at all and she feels worse now than she did earlier. Pt just got back to bed after being assisted to bathroom by EDT. This RN will notify MD for orders.      Marquita Brewer RN  06/09/19 1954

## 2019-06-09 NOTE — ED NOTES
Bedside report given to ERNIE Mckeon   Pt updated on plan of care and plan for admission. Pt resting in comfortable position at this time with side rails up X2. Pt states she is feeling better after being medicated.      Agatha Zimmerman RN  06/09/19 [place], RN  06/09/19 0023

## 2019-06-09 NOTE — ED PROVIDER NOTES
eMERGENCY dEPARTMENT eNCOUnter      CHIEF COMPLAINT:   Back Pain    HPI: Terrell Swan is a 66 y.o. female who presents to the emergency Department complaining of acute on chronic back pain. The patient has a history of chronic back pain for years. She states that she had increased back pain starting a few months ago and had an outpatient MRI that showed L3 and L4 compression fractures. She states that she has been approved for kyphoplasty, but that she does not have this procedure scheduled yet. She has had increased pain over the past several days. She sees pain management and is taking Percocet and oral morphine. She states that these medicines are not controlling her pain. The pain is achy in nature and constant. It is worse with movement and better with rest. She denies any recent trauma. She denies loss of bowel or bladder function. She denies saddle anesthesia. She denies any associated numbness, tingling or weakness in the legs. She denies any associated abdominal pain. She denies fevers, chills, chest pain, shortness of breath, nausea, vomiting or any other complaints. REVIEW OF SYSTEMS:   Constitutional:  Denies fever or chills  Eyes:  Denies change in visual acuity  HENT:  Denies nasal congestion or sore throat  Respiratory:  Denies cough or shortness of breath  Cardiovascular:  Denies chest pain or edema  GI:  Denies abdominal pain, nausea, vomiting, bloody stools or diarrhea  :  Denies dysuria  Musculoskeletal:  Complains of back pain  Integument:  Denies rash  Neurologic:  Denies headache, focal weakness or sensory changes  \"Remaining review of systems reviewed and negative. I have reviewed the nursing triage documentation and agree unless otherwise noted below. \"      PAST MEDICAL HISTORY:   Past Medical History:   Diagnosis Date    Acute DVT of right tibial vein ([facility] [address] 07/2017    ER imaging: distal right tibial vein DVT; no anticoag for bleeding/ anemia    Acute exacerbation of chronic obstructive pulmonary disease (COPD) ([facility] [address] 2018    Arthritis     Chronic pain syndrome     Low back pain; lumbar disc disease    Clostridium difficile colitis 2017    COPD, severe ([address] 10/24/2017    Depression     Fibromyalgia     Former smoker     Quit 2019    Herniated cervical disc -    Herpes zoster ophthalmicus 3/2012    Hx of blood clots     Hyperlipidemia     Hypertension     L3 vertebral fracture ([address]     vertebroplasty    Lumbar spinal stenosis     moderately severe by MRI    Migraine     Nocturnal hypoxemia 2019    Osteoporosis 2010    Fragility Fx L3    Rheumatoid arthritis(714.0)     Dr Toya Abdi S/P cardiac catheterization 2017    patent coronaries; Takotsubo; najeeb Ahmed    Shortness of breath 2019    Takotsubo syndrome 2017    TMJ dysfunction     Tobacco abuse 2018    Vitamin B12 deficiency 2014    B12 level 199       CURRENT MEDICATIONS:   Home medications reviewed.     SURGICAL HISTORY:   Past Surgical History:   Procedure Laterality Date    APPENDECTOMY  1966    CARDIAC CATHETERIZATION  2017    CHOLECYSTECTOMY  1966    w/ appendectomy    ELBOW SURGERY Right     FOOT SURGERY  1986    ME COLONOSCOPY FLX DX W/COLLJ SPEC WHEN PFRMD N/A 10/2/2018    COLONOSCOPY DIAGNOSTIC OR SCREENING performed by Bronson Coronado MD at [address] [facility]    TOE SURGERY Left 2013    Deboo;     VERTEBROPLASTY  10/2010    L3    WRIST FUSION Left        FAMILY HISTORY:   Family History   Problem Relation Age of Onset    Heart Disease Father          FROM MI   Daniela Branch Emphysema Father     Arthritis Mother     Stroke Mother     Heart Disease Other         family history    Cancer Other         family history -- larynx    Kidney Disease Son        SOCIAL HISTORY:   Social History     Socioeconomic History    Marital status:      Spouse name: Manuel Peters Number of children: 2    Years of education: 15    Highest education level: Not on file   Occupational History    Occupation: retired   Social Needs    Financial resource strain: Not on file    Food insecurity:     Worry: Not on file     Inability: Not on file   [employer] needs:     Medical: Not on file     Non-medical: Not on file   Tobacco Use    Smoking status: Former Smoker     Packs/day: 0.75     Years: 55.00     Pack years: 41.25     Types: Cigarettes     Start date: 1962     Last attempt to quit: 2019     Years since quittin.4    Smokeless tobacco: Never Used    Tobacco comment: 19- Pt reports that her quit date was 19   Substance and Sexual Activity    Alcohol use: No     Alcohol/week: 0.0 oz    Drug use: No    Sexual activity: Not Currently   Lifestyle    Physical activity:     Days per week: Not on file     Minutes per session: Not on file    Stress: Not on file   Relationships    Social connections:     Talks on phone: Not on file     Gets together: Not on file     Attends Voodoo service: Not on file     Active member of club or organization: Not on file     Attends meetings of clubs or organizations: Not on file     Relationship status: Not on file    Intimate partner violence:     Fear of current or ex partner: Not on file     Emotionally abused: Not on file     Physically abused: Not on file     Forced sexual activity: Not on file   Other Topics Concern    Not on file   Social History Narrative    Not on file       ALLERGIES: Ativan [lorazepam]; Etanercept; Humira [adalimumab];  Prochlorperazine edisylate; Remicade [infliximab injection]; and Doxycycline    PHYSICAL EXAM:  VITAL SIGNS:   ED Triage Vitals [19 1445]   Enc Vitals Group      BP (!) 159/83      Pulse 84      Resp 16      Temp 98.2 °F (36.8 °C)      Temp Source Oral      SpO2 96 %      Weight 150 lb (68 kg)      Height 5' 6\" (1.676 m)      Head Circumference       Peak Flow       Pain Score       Pain Loc       Pain Edu?       Excl. in [address]? Constitutional:  Non-toxic appearance  HENT: Normocephalic, Atraumatic, Bilateral external ears normal, Oropharynx moist, No oral exudates, Nose normal.  Eyes:  PERRL, EOMI, Conjunctiva normal, No discharge. Neck: Normal range of motion, No tenderness, Supple, No stridor, No lymphadenopathy  Cardiovascular:  Normal heart rate, Normal rhythm  Pulmonary/Chest:  Normal breath sounds, No respiratory distress, No wheezing  Abdomen: Bowel sounds normal, Soft, No tenderness, No masses, No pulsatile masses  Back:  No CVA tenderness to palpation, midline and paraspinal tenderness to palpation of the lumbar region  Extremities:  Normal range of motion, Intact distal pulses, No edema, No tenderness  Neurologic: Alert & oriented x 3, Speech clear, CN 2-12 intact, Normal motor function, Sensation intact to light touch throughout, Normal deep tendon reflexes, No focal deficits  Skin:  Warm, Dry, No erythema, No rash      EKG Interpretation  None    Radiology / Procedures:  Labs Reviewed   CBC WITH AUTO DIFFERENTIAL - Abnormal; Notable for the following components:       Result Value    Hemoglobin 11.2 (*)     MCH 25.6 (*)     MCHC 28.9 (*)     RDW 18.4 (*)     Lymphocytes % 23.1 (*)     Monocytes % 10.4 (*)     All other components within normal limits   URINALYSIS WITH MICROSCOPIC - Abnormal; Notable for the following components:    pH, Urine 9.0 (*)     All other components within normal limits   PROTIME/INR & PTT - Abnormal; Notable for the following components:    aPTT >240.0 (*)     All other components within normal limits   COMPREHENSIVE METABOLIC PANEL - Abnormal; Notable for the following components:    Chloride 96 (*)     Total Protein 5.5 (*)     ALT 6 (*)     AST 14 (*)     All other components within normal limits   SPECIMEN REJECTION       ED COURSE & MEDICAL DECISION MAKING:  Pertinent Labs & Imaging studies reviewed.  (See chart for details)  On exam, the patient is afebrile and

## 2019-06-09 NOTE — ED NOTES
Pt reports she took 1/2 tab of Vicodin 7.5mg at approximately 1600 today     Laverne Kat RN  06/09/19 4713

## 2019-06-10 ENCOUNTER — APPOINTMENT (OUTPATIENT)
Dept: ULTRASOUND IMAGING | Age: 79
DRG: 516 | End: 2019-06-10
Payer: MEDICARE

## 2019-06-10 PROCEDURE — G0378 HOSPITAL OBSERVATION PER HR: HCPCS

## 2019-06-10 PROCEDURE — 6370000000 HC RX 637 (ALT 250 FOR IP): Performed by: INTERNAL MEDICINE

## 2019-06-10 PROCEDURE — 94640 AIRWAY INHALATION TREATMENT: CPT

## 2019-06-10 PROCEDURE — 6360000002 HC RX W HCPCS: Performed by: HOSPITALIST

## 2019-06-10 PROCEDURE — 96372 THER/PROPH/DIAG INJ SC/IM: CPT

## 2019-06-10 PROCEDURE — 6370000000 HC RX 637 (ALT 250 FOR IP): Performed by: HOSPITALIST

## 2019-06-10 PROCEDURE — 96376 TX/PRO/DX INJ SAME DRUG ADON: CPT

## 2019-06-10 PROCEDURE — 93970 EXTREMITY STUDY: CPT

## 2019-06-10 PROCEDURE — 2580000003 HC RX 258: Performed by: HOSPITALIST

## 2019-06-10 PROCEDURE — 94761 N-INVAS EAR/PLS OXIMETRY MLT: CPT

## 2019-06-10 PROCEDURE — 2700000000 HC OXYGEN THERAPY PER DAY

## 2019-06-10 PROCEDURE — 96375 TX/PRO/DX INJ NEW DRUG ADDON: CPT

## 2019-06-10 RX ORDER — LOPERAMIDE HYDROCHLORIDE 2 MG/1
2 CAPSULE ORAL 4 TIMES DAILY PRN
Status: DISCONTINUED | OUTPATIENT
Start: 2019-06-10 | End: 2019-06-13 | Stop reason: HOSPADM

## 2019-06-10 RX ADMIN — METOPROLOL TARTRATE 50 MG: 50 TABLET ORAL at 08:26

## 2019-06-10 RX ADMIN — CYANOCOBALAMIN TAB 1000 MCG 1000 MCG: 1000 TAB at 08:26

## 2019-06-10 RX ADMIN — ENOXAPARIN SODIUM 40 MG: 100 INJECTION SUBCUTANEOUS at 08:27

## 2019-06-10 RX ADMIN — SODIUM CHLORIDE, PRESERVATIVE FREE 10 ML: 5 INJECTION INTRAVENOUS at 20:25

## 2019-06-10 RX ADMIN — POTASSIUM CHLORIDE 20 MEQ: 750 TABLET, FILM COATED, EXTENDED RELEASE ORAL at 08:27

## 2019-06-10 RX ADMIN — METOPROLOL TARTRATE 50 MG: 50 TABLET ORAL at 20:25

## 2019-06-10 RX ADMIN — POTASSIUM CHLORIDE 20 MEQ: 750 TABLET, FILM COATED, EXTENDED RELEASE ORAL at 20:25

## 2019-06-10 RX ADMIN — MORPHINE SULFATE 15 MG: 15 TABLET, EXTENDED RELEASE ORAL at 14:01

## 2019-06-10 RX ADMIN — PANTOPRAZOLE SODIUM 40 MG: 40 TABLET, DELAYED RELEASE ORAL at 05:58

## 2019-06-10 RX ADMIN — IPRATROPIUM BROMIDE AND ALBUTEROL SULFATE 1 AMPULE: .5; 3 SOLUTION RESPIRATORY (INHALATION) at 11:22

## 2019-06-10 RX ADMIN — ARIPIPRAZOLE 2 MG: 2 TABLET ORAL at 08:27

## 2019-06-10 RX ADMIN — FUROSEMIDE 40 MG: 40 TABLET ORAL at 08:26

## 2019-06-10 RX ADMIN — OXYCODONE HYDROCHLORIDE AND ACETAMINOPHEN 1 TABLET: 7.5; 325 TABLET ORAL at 10:37

## 2019-06-10 RX ADMIN — LOPERAMIDE HYDROCHLORIDE 2 MG: 2 CAPSULE ORAL at 17:21

## 2019-06-10 RX ADMIN — OXYCODONE HYDROCHLORIDE AND ACETAMINOPHEN 1 TABLET: 7.5; 325 TABLET ORAL at 04:20

## 2019-06-10 RX ADMIN — OXYCODONE HYDROCHLORIDE AND ACETAMINOPHEN 1 TABLET: 7.5; 325 TABLET ORAL at 17:48

## 2019-06-10 RX ADMIN — FUROSEMIDE 40 MG: 40 TABLET ORAL at 17:22

## 2019-06-10 RX ADMIN — MORPHINE SULFATE 2 MG: 4 INJECTION INTRAVENOUS at 20:35

## 2019-06-10 RX ADMIN — IPRATROPIUM BROMIDE AND ALBUTEROL SULFATE 1 AMPULE: .5; 3 SOLUTION RESPIRATORY (INHALATION) at 21:38

## 2019-06-10 RX ADMIN — MORPHINE SULFATE 2 MG: 4 INJECTION INTRAVENOUS at 02:14

## 2019-06-10 RX ADMIN — MORPHINE SULFATE 2 MG: 4 INJECTION INTRAVENOUS at 08:36

## 2019-06-10 RX ADMIN — IPRATROPIUM BROMIDE AND ALBUTEROL SULFATE 1 AMPULE: .5; 3 SOLUTION RESPIRATORY (INHALATION) at 07:52

## 2019-06-10 RX ADMIN — ATORVASTATIN CALCIUM 10 MG: 10 TABLET, FILM COATED ORAL at 17:22

## 2019-06-10 RX ADMIN — TRAZODONE HYDROCHLORIDE 50 MG: 50 TABLET ORAL at 20:25

## 2019-06-10 RX ADMIN — IPRATROPIUM BROMIDE AND ALBUTEROL SULFATE 1 AMPULE: .5; 3 SOLUTION RESPIRATORY (INHALATION) at 15:32

## 2019-06-10 RX ADMIN — LOSARTAN POTASSIUM 100 MG: 100 TABLET, FILM COATED ORAL at 08:26

## 2019-06-10 ASSESSMENT — PAIN DESCRIPTION - LOCATION
LOCATION: BACK

## 2019-06-10 ASSESSMENT — PAIN SCALES - GENERAL
PAINLEVEL_OUTOF10: 7
PAINLEVEL_OUTOF10: 5
PAINLEVEL_OUTOF10: 7
PAINLEVEL_OUTOF10: 6
PAINLEVEL_OUTOF10: 5
PAINLEVEL_OUTOF10: 6
PAINLEVEL_OUTOF10: 7
PAINLEVEL_OUTOF10: 6
PAINLEVEL_OUTOF10: 4
PAINLEVEL_OUTOF10: 5

## 2019-06-10 ASSESSMENT — PAIN DESCRIPTION - PROGRESSION
CLINICAL_PROGRESSION: NOT CHANGED
CLINICAL_PROGRESSION: NOT CHANGED

## 2019-06-10 ASSESSMENT — PAIN - FUNCTIONAL ASSESSMENT
PAIN_FUNCTIONAL_ASSESSMENT: PREVENTS OR INTERFERES SOME ACTIVE ACTIVITIES AND ADLS
PAIN_FUNCTIONAL_ASSESSMENT: PREVENTS OR INTERFERES SOME ACTIVE ACTIVITIES AND ADLS

## 2019-06-10 ASSESSMENT — PAIN DESCRIPTION - DESCRIPTORS
DESCRIPTORS: BURNING;DISCOMFORT;NAGGING
DESCRIPTORS: ACHING
DESCRIPTORS: CONSTANT;ACHING
DESCRIPTORS: ACHING;DISCOMFORT;NAGGING

## 2019-06-10 ASSESSMENT — PAIN DESCRIPTION - FREQUENCY
FREQUENCY: CONTINUOUS
FREQUENCY: INTERMITTENT
FREQUENCY: CONTINUOUS
FREQUENCY: CONTINUOUS

## 2019-06-10 ASSESSMENT — PAIN DESCRIPTION - PAIN TYPE
TYPE: CHRONIC PAIN

## 2019-06-10 ASSESSMENT — PAIN DESCRIPTION - ONSET
ONSET: ON-GOING
ONSET: ON-GOING

## 2019-06-10 ASSESSMENT — PAIN DESCRIPTION - ORIENTATION
ORIENTATION: LOWER
ORIENTATION: LOWER

## 2019-06-10 NOTE — PROGRESS NOTES
Patient known to me. Seen at [place] [id number] clinic on 5/29/19. Will plan to perform Kyphoplasty on Wed 6/12/19 (had orginally been sched with anesthesiology on 6/28/19 as outpt). Discussed with Dr. Che Martinez.

## 2019-06-10 NOTE — CARE COORDINATION
Met c pt to initiate discharge planning. Pt lives at home with spouse who is willing and able to provide assistance at home. Pt also has help from two sons and dghtr in law. Pt has cane/ walker/ RW/ wheelchair, shower chair, pcp/ active insurance. Pt is on Home 02 through [facility].  Pt states she disagrees with decision made by IR (Dr. Thierno Castrejon) to not do Kyphoplasty. She states her L4 fracture is NOT old but new. She is hopeful a final decision will be made that she will have the procedure. If she does not pt is worried as she cannot function with current pain level. [facility]- at this time pt is declining both as she remains hopeful after she is treated she will be able to return home at her baseline. Will wait final decision regarding treatment plan as well as therapy evals to finalize plan.

## 2019-06-10 NOTE — PLAN OF CARE
Problem: Falls - Risk of:  Goal: Will remain free from falls  Description  Will remain free from falls  6/10/2019 1135 by Anastasiia Mercado RN  Outcome: Ongoing  6/10/2019 0541 by Raquel Matthews RN  Outcome: Ongoing  Goal: Absence of physical injury  Description  Absence of physical injury  6/10/2019 1135 by Anastasiia Mercado RN  Outcome: Ongoing  6/10/2019 0541 by Raquel Matthews RN  Outcome: Ongoing     Problem: Pain:  Goal: Pain level will decrease  Description  Pain level will decrease  6/10/2019 1135 by Anastasiia Mercado RN  Outcome: Ongoing  6/10/2019 0541 by Raquel Matthews RN  Outcome: Ongoing  Goal: Control of acute pain  Description  Control of acute pain  6/10/2019 1135 by Anastasiia Mercado RN  Outcome: Ongoing  6/10/2019 0541 by Raquel Matthews RN  Outcome: Ongoing  Goal: Control of chronic pain  Description  Control of chronic pain  6/10/2019 1135 by Anastasiia Mercado RN  Outcome: Ongoing  6/10/2019 0541 by Raquel Matthews RN  Outcome: Ongoing

## 2019-06-10 NOTE — CONSULTS
IR Consult  Patients MRI reviewed and compression fractures are old and not suitable for kyphoplasty.

## 2019-06-10 NOTE — PROGRESS NOTES
Hospitalist Progress Note      Name:  Rick Jenkins /Age/Sex: 1940  (66 y.o. female)   MRN & CSN:  [id number] & [id number] Admission Date/Time: 2019  2:44 PM   [facility] PCP: Robbi Upton MD         Hospital Day: 2    Assessment and Plan:   Rick Jenkins is a 66 y.o.  female  who presents with <principal problem not specified>      Acute on chronic Back pain 2/2 compression fracture  -pain management  -morphine ER, percocet,  IV morphine  -consult IR (Dr Bing Walker)  MRI: Remote compression fractures of L3 and L4.  Previous vertebroplasty of L3. Retropulsion of L3 and L4 relative to L2 and L5  -PT/OT       LE edema  -on lasix as per PCP    No evidence of DVT in either lower extremity. RA  -on humira    COPD with chronic resp failure   -duonebs    HTN  -ARB, BB       Chronic treatment continued per home medications unless  contraindicated by above plan and assessment. The above assessment/plan has been explained to the patient, who indicated understanding. Diet DIET GENERAL;   DVT Prophylaxis [x] Lovenox, []  Heparin, [] SCDs, []No VTE prophylaxis, patient ambulating   GI Prophylaxis [x] PPI, [] H2 Blocker, [] No GI prophylaxis, patient is receiving diet/Tube Feeds   Code Status Full Code             History of Present Illness:     Chief Complaint:     The patient is a 66 y.o. female with significant past medical history of COPD on 2L at night, HTN, RA and chronic LE edema and hx of right DVT. She has chronic back pain and follows with pain clinic and had compression fractures noted by imaging ordered by her PCP. She has been  approved for verterobplasty but not scheduled and will get with Dr Landon Khan. Despite this her back pain is still causing too much pain and came to the ER for pain control. No incontinence or numbness of her legs. Has left hip pain which limits movement of her left leg. Patient was seen and examined at bedside today.  Patient sitting up comfortably in bed in NAD. Ten point ROS reviewed negative, unless as noted above    Objective: Intake/Output Summary (Last 24 hours) at 6/10/2019 0918  Last data filed at 6/10/2019 0750  Gross per 24 hour   Intake 460 ml   Output 250 ml   Net 210 ml      Vitals:   Vitals:    06/10/19 0826   BP: (!) 151/67   Pulse: 84   Resp:    Temp:    SpO2:      Physical Exam:      GEN Awake female, sitting upright in bed in no apparent distress. Appears given age. EYES Pupils are equally round. No scleral erythema, discharge, or conjunctivitis. HENT Mucous membranes are moist.   RESP Clear to auscultation, no wheezes, rales or rhonchi. CARDIO/VASC S1/S2 auscultated. No murmurs  GI Abdomen is soft without significant tenderness, masses, or guarding. MSK Mild lumbar tenderness  SKIN Normal coloration, warm, dry.   NEURO Grossly normal  PSYCH Awake, alert, oriented     Medications:   Medications:    ARIPiprazole  2 mg Oral Daily    atorvastatin  10 mg Oral QPM    ipratropium-albuterol  1 ampule Inhalation Q4H WA    vitamin B-12  1,000 mcg Oral Daily    traZODone  50 mg Oral Nightly    metoprolol tartrate  50 mg Oral BID    losartan  100 mg Oral Daily    furosemide  40 mg Oral BID    pantoprazole  40 mg Oral QAM AC    sodium chloride flush  10 mL Intravenous 2 times per day    enoxaparin  40 mg Subcutaneous Daily    potassium chloride  20 mEq Oral BID      Infusions:   PRN Meds:   ipratropium-albuterol 1 ampule Q4H PRN   morphine 15 mg Q12H PRN   sodium chloride flush 10 mL PRN   magnesium hydroxide 30 mL Daily PRN   ondansetron 4 mg Q6H PRN   morphine 2 mg Q4H PRN   oxyCODONE-acetaminophen 1 tablet Q6H PRN         Electronically signed by Aida Ramos MD on 6/10/2019 at 9:18 AM

## 2019-06-10 NOTE — H&P
Department of Internal Medicine  Hospitalist  Attending History and Physical      CHIEF COMPLAINT:  Back pain    Reason for Admission:  As above    History Obtained From:  patient    HISTORY OF PRESENT ILLNESS:      The patient is a 66 y.o. female with significant past medical history of COPD on 2L at night, HTN, RA and chronic LE edema and hx of right DVT. She has chronic back pain and follows with pain clinic and had compression fractures noted by imaging ordered by her PCP. She has been  approved for verterobplasty but not scheduled and will get with Dr José Elliott. Despite this her back pain is still causing too much pain and came to the ER for pain control. No incontinence or numbness of her legs. Has left hip pain which limits movement of her left leg. Past Medical History:        Diagnosis Date    Acute DVT of right tibial vein ([address] 07/2017    ER imaging: distal right tibial vein DVT; no anticoag for bleeding/ anemia    Acute exacerbation of chronic obstructive pulmonary disease (COPD) ([address] 12/13/2018    Arthritis     Chronic pain syndrome     Low back pain; lumbar disc disease    Clostridium difficile colitis 09/20/2017    COPD, severe ([address] 10/24/2017    Depression     Fibromyalgia     Former smoker     Quit 1/2019    Herniated cervical disc 5-1998    Herpes zoster ophthalmicus 3/2012    Hx of blood clots     Hyperlipidemia     Hypertension     L3 vertebral fracture ([address] 2010    vertebroplasty    Lumbar spinal stenosis 2015    moderately severe by MRI    Migraine     Nocturnal hypoxemia 5/21/2019    Osteoporosis 2010    Fragility Fx L3    Rheumatoid arthritis(714.0) 1976    Dr Alayna Aquino S/P cardiac catheterization 05/2017    patent coronaries;  Takotsubo; najeeb Ahmed    Shortness of breath 4/25/2019    Takotsubo syndrome 05/2017    TMJ dysfunction     Tobacco abuse 12/13/2018    Vitamin B12 deficiency 12/2014    B12 level 199     Past Surgical History:        Procedure LUNGS DAILY AFTER INHALATION THEN GARGLE 1 each 11    potassium chloride (KLOR-CON) 10 MEQ extended release tablet TAKE 2 TABLETS BY MOUTH EVERY MORNING AND TAKE 1 TABLET BY MOUTH EVERY EVENING 90 tablet 3    oxyCODONE-acetaminophen (PERCOCET) 7.5-325 MG per tablet Take 1 tablet by mouth 4 times daily. Umair Joyner Adalimumab (HUMIRA) 40 MG/0.4ML PSKT Inject into the skin      INCRUSE ELLIPTA 62.5 MCG/INH AEPB INHALE 1 PUFF VIA ORAL INHALATION ONCE DAILY 1 each 11    vitamin B-12 (CYANOCOBALAMIN) 1000 MCG tablet Take 1,000 mcg by mouth daily.  DULoxetine (CYMBALTA) 60 MG capsule Take 60 mg by mouth daily.  Calcium Citrate-Vitamin D (CITRACAL + D PO) Take 600 mg by mouth daily            Allergies:  Ativan [lorazepam]; Etanercept; Humira [adalimumab];  Prochlorperazine edisylate; Remicade [infliximab injection]; and Doxycycline    Social History:   Social History     Socioeconomic History    Marital status:      Spouse name: Dolores Cloud Number of children: 2    Years of education: 15    Highest education level: Not on file   Occupational History    Occupation: retired   Social Needs    Financial resource strain: Not on file    Food insecurity:     Worry: Not on file     Inability: Not on file   [employer] needs:     Medical: Not on file     Non-medical: Not on file   Tobacco Use    Smoking status: Former Smoker     Packs/day: 0.75     Years: 55.00     Pack years: 41.25     Types: Cigarettes     Start date: 1962     Last attempt to quit: 2019     Years since quittin.4    Smokeless tobacco: Never Used    Tobacco comment: 19- Pt reports that her quit date was 19   Substance and Sexual Activity    Alcohol use: No     Alcohol/week: 0.0 oz    Drug use: No    Sexual activity: Not Currently   Lifestyle    Physical activity:     Days per week: Not on file     Minutes per session: Not on file    Stress: Not on file   Relationships    Social connections:     Talks on phone: Not on file     Gets together: Not on file     Attends Buddhism service: Not on file     Active member of club or organization: Not on file     Attends meetings of clubs or organizations: Not on file     Relationship status: Not on file    Intimate partner violence:     Fear of current or ex partner: Not on file     Emotionally abused: Not on file     Physically abused: Not on file     Forced sexual activity: Not on file   Other Topics Concern    Not on file   Social History Narrative    Not on file         Family History:   Family History   Problem Relation Age of Onset    Heart Disease Father          FROM MI   Loran Mink Emphysema Father     Arthritis Mother     Stroke Mother     Heart Disease Other         family history    Cancer Other         family history -- larynx    Kidney Disease Son        REVIEW OF SYSTEMS:  CONSTITUTIONAL:  negative  EYES:  negative  HEENT:  negative  RESPIRATORY:  negative  CARDIOVASCULAR:  negative  GASTROINTESTINAL:  negative  GENITOURINARY:  negative  ENDOCRINE:  negative  MUSCULOSKELETAL:  positive for  stiff joints, decreased range of motion and bone pain  NEUROLOGICAL:  negative  BEHAVIOR/PSYCH:  negative  PHYSICAL EXAM:    Vitals:  BP (!) 165/86   Pulse 84   Temp 98.2 °F (36.8 °C) (Oral)   Resp 16   Ht 5' 6\" (1.676 m)   Wt 150 lb (68 kg)   SpO2 98%   BMI 24.21 kg/m²     CONSTITUTIONAL:  awake  EYES:  Lids and lashes normal, pupils equal, round and reactive to light, extra ocular muscles intact, sclera clear, conjunctiva normal  ENT:  Normocephalic, without obvious abnormality, atraumatic, sinuses nontender on palpation, external ears without lesions, oral pharynx with moist mucus membranes, tonsils without erythema or exudates, gums normal and good dentition.   NECK:  Supple, symmetrical, trachea midline, no adenopathy, thyroid symmetric, not enlarged and no tenderness, skin normal  LUNGS:  Decreased with no wheezing  CARDIOVASCULAR:  Normal apical impulse, regular rate and rhythm, normal S1 and S2, no S3 or S4, and no murmur noted  ABDOMEN:  No scars, normal bowel sounds, soft, non-distended, non-tender, no masses palpated, no hepatosplenomegally  MUSCULOSKELETAL:  Bilateral LE edema but left >right and mildly erythemtous  NEUROLOGIC:  Awake, alert, oriented to name, place and time. Cranial nerves II-XII are grossly intact.   Motor is 5 out of 5 bilaterally except left leg due to pain  SKIN:  no bruising or bleeding    DATA:    ASSESSMENT AND PLAN:    Acute on chronic Back pain 2/2 compression fracture  -pain management  -morphine ER, percocet,  IV morphine  -consult IR(Dr Carine Low)  -PT/OT   LE edema  -on lasix as per PCP   -check Doppler left leg aserythematous as has hx of DVT  RA  -on humira  COPD with chronic resp failure(2l)  -duonebs  HTN  -ARB, BB  DVT prophylaxiws  -lovenox

## 2019-06-11 PROBLEM — M54.59 INTRACTABLE LOW BACK PAIN: Status: ACTIVE | Noted: 2019-06-11

## 2019-06-11 LAB
APTT: 40.8 SECONDS (ref 21.2–33)
HCT VFR BLD CALC: 36.3 % (ref 37–47)
HEMOGLOBIN: 10.5 GM/DL (ref 12.5–16)
INR BLD: 1.01 INDEX
MCH RBC QN AUTO: 26 PG (ref 27–31)
MCHC RBC AUTO-ENTMCNC: 28.9 % (ref 32–36)
MCV RBC AUTO: 89.9 FL (ref 78–100)
PDW BLD-RTO: 18.2 % (ref 11.7–14.9)
PLATELET # BLD: 258 K/CU MM (ref 140–440)
PMV BLD AUTO: 10.8 FL (ref 7.5–11.1)
PROTHROMBIN TIME: 11.5 SECONDS (ref 9.12–12.5)
RBC # BLD: 4.04 M/CU MM (ref 4.2–5.4)
WBC # BLD: 6.1 K/CU MM (ref 4–10.5)

## 2019-06-11 PROCEDURE — 96376 TX/PRO/DX INJ SAME DRUG ADON: CPT

## 2019-06-11 PROCEDURE — G0378 HOSPITAL OBSERVATION PER HR: HCPCS

## 2019-06-11 PROCEDURE — 6360000002 HC RX W HCPCS: Performed by: HOSPITALIST

## 2019-06-11 PROCEDURE — 94640 AIRWAY INHALATION TREATMENT: CPT

## 2019-06-11 PROCEDURE — 94761 N-INVAS EAR/PLS OXIMETRY MLT: CPT

## 2019-06-11 PROCEDURE — 6370000000 HC RX 637 (ALT 250 FOR IP): Performed by: HOSPITALIST

## 2019-06-11 PROCEDURE — 36415 COLL VENOUS BLD VENIPUNCTURE: CPT

## 2019-06-11 PROCEDURE — 85027 COMPLETE CBC AUTOMATED: CPT

## 2019-06-11 PROCEDURE — 85610 PROTHROMBIN TIME: CPT

## 2019-06-11 PROCEDURE — 96372 THER/PROPH/DIAG INJ SC/IM: CPT

## 2019-06-11 PROCEDURE — 2700000000 HC OXYGEN THERAPY PER DAY

## 2019-06-11 PROCEDURE — 2580000003 HC RX 258: Performed by: HOSPITALIST

## 2019-06-11 PROCEDURE — 85730 THROMBOPLASTIN TIME PARTIAL: CPT

## 2019-06-11 PROCEDURE — 1200000000 HC SEMI PRIVATE

## 2019-06-11 RX ADMIN — FUROSEMIDE 40 MG: 40 TABLET ORAL at 08:08

## 2019-06-11 RX ADMIN — OXYCODONE HYDROCHLORIDE AND ACETAMINOPHEN 1 TABLET: 7.5; 325 TABLET ORAL at 01:35

## 2019-06-11 RX ADMIN — MORPHINE SULFATE 2 MG: 4 INJECTION INTRAVENOUS at 08:08

## 2019-06-11 RX ADMIN — TRAZODONE HYDROCHLORIDE 50 MG: 50 TABLET ORAL at 20:52

## 2019-06-11 RX ADMIN — MORPHINE SULFATE 2 MG: 4 INJECTION INTRAVENOUS at 13:13

## 2019-06-11 RX ADMIN — SODIUM CHLORIDE, PRESERVATIVE FREE 10 ML: 5 INJECTION INTRAVENOUS at 08:09

## 2019-06-11 RX ADMIN — POTASSIUM CHLORIDE 20 MEQ: 750 TABLET, FILM COATED, EXTENDED RELEASE ORAL at 08:08

## 2019-06-11 RX ADMIN — ARIPIPRAZOLE 2 MG: 2 TABLET ORAL at 08:15

## 2019-06-11 RX ADMIN — FUROSEMIDE 40 MG: 40 TABLET ORAL at 17:44

## 2019-06-11 RX ADMIN — IPRATROPIUM BROMIDE AND ALBUTEROL SULFATE 1 AMPULE: .5; 3 SOLUTION RESPIRATORY (INHALATION) at 20:26

## 2019-06-11 RX ADMIN — ATORVASTATIN CALCIUM 10 MG: 10 TABLET, FILM COATED ORAL at 17:44

## 2019-06-11 RX ADMIN — IPRATROPIUM BROMIDE AND ALBUTEROL SULFATE 1 AMPULE: .5; 3 SOLUTION RESPIRATORY (INHALATION) at 10:49

## 2019-06-11 RX ADMIN — MORPHINE SULFATE 15 MG: 15 TABLET, EXTENDED RELEASE ORAL at 19:48

## 2019-06-11 RX ADMIN — OXYCODONE HYDROCHLORIDE AND ACETAMINOPHEN 1 TABLET: 7.5; 325 TABLET ORAL at 22:58

## 2019-06-11 RX ADMIN — LOSARTAN POTASSIUM 100 MG: 100 TABLET, FILM COATED ORAL at 08:07

## 2019-06-11 RX ADMIN — OXYCODONE HYDROCHLORIDE AND ACETAMINOPHEN 1 TABLET: 7.5; 325 TABLET ORAL at 17:01

## 2019-06-11 RX ADMIN — METOPROLOL TARTRATE 50 MG: 50 TABLET ORAL at 20:52

## 2019-06-11 RX ADMIN — POTASSIUM CHLORIDE 20 MEQ: 750 TABLET, FILM COATED, EXTENDED RELEASE ORAL at 20:52

## 2019-06-11 RX ADMIN — IPRATROPIUM BROMIDE AND ALBUTEROL SULFATE 1 AMPULE: .5; 3 SOLUTION RESPIRATORY (INHALATION) at 15:43

## 2019-06-11 RX ADMIN — OXYCODONE HYDROCHLORIDE AND ACETAMINOPHEN 1 TABLET: 7.5; 325 TABLET ORAL at 10:38

## 2019-06-11 RX ADMIN — PANTOPRAZOLE SODIUM 40 MG: 40 TABLET, DELAYED RELEASE ORAL at 06:19

## 2019-06-11 RX ADMIN — CYANOCOBALAMIN TAB 1000 MCG 1000 MCG: 1000 TAB at 08:08

## 2019-06-11 RX ADMIN — METOPROLOL TARTRATE 50 MG: 50 TABLET ORAL at 08:08

## 2019-06-11 RX ADMIN — ENOXAPARIN SODIUM 40 MG: 100 INJECTION SUBCUTANEOUS at 08:09

## 2019-06-11 RX ADMIN — MORPHINE SULFATE 15 MG: 15 TABLET, EXTENDED RELEASE ORAL at 04:41

## 2019-06-11 RX ADMIN — SODIUM CHLORIDE, PRESERVATIVE FREE 10 ML: 5 INJECTION INTRAVENOUS at 20:52

## 2019-06-11 ASSESSMENT — PAIN SCALES - GENERAL
PAINLEVEL_OUTOF10: 8
PAINLEVEL_OUTOF10: 4
PAINLEVEL_OUTOF10: 7
PAINLEVEL_OUTOF10: 5
PAINLEVEL_OUTOF10: 4
PAINLEVEL_OUTOF10: 6
PAINLEVEL_OUTOF10: 2
PAINLEVEL_OUTOF10: 5
PAINLEVEL_OUTOF10: 6

## 2019-06-11 ASSESSMENT — PAIN DESCRIPTION - LOCATION: LOCATION: BACK

## 2019-06-11 ASSESSMENT — PAIN DESCRIPTION - ORIENTATION: ORIENTATION: LOWER

## 2019-06-11 ASSESSMENT — PAIN DESCRIPTION - PAIN TYPE: TYPE: CHRONIC PAIN

## 2019-06-11 ASSESSMENT — PAIN DESCRIPTION - DESCRIPTORS: DESCRIPTORS: ACHING

## 2019-06-11 NOTE — PROGRESS NOTES
Hospitalist Progress Note      Name:  Sarah Shah /Age/Sex: 1940  (66 y.o. female)   MRN & CSN:  [id number] & [id number] Admission Date/Time: 2019  2:44 PM   Location:  [address] [place] [address]- PCP: Jocelyn Long MD         Hospital Day: 3    Assessment and Plan:   Sarah Shah is a 66 y.o.  female  who presents with <principal problem not specified>        Vertebroplasty tomorrow       Acute on chronic Back pain 2/2 compression fracture  -pain management  -morphine ER, percocet,  IV morphine  -consult IR (Dr Uday Segura Pasquale)---> vertebroplasy   MRI: Remote compression fractures of L3 and L4.  Previous vertebroplasty of L3. Retropulsion of L3 and L4 relative to L2 and L5  -PT/OT       LE edema  -on lasix as per PCP    No evidence of DVT in either lower extremity. RA  -on humira    COPD with chronic resp failure   -duonebs    HTN  -ARB, BB       Chronic treatment continued per home medications unless  contraindicated by above plan and assessment. The above assessment/plan has been explained to the patient, who indicated understanding. Diet DIET GENERAL;  Diet NPO, After Midnight   DVT Prophylaxis [x] Lovenox, []  Heparin, [] SCDs, []No VTE prophylaxis, patient ambulating   GI Prophylaxis [x] PPI, [] H2 Blocker, [] No GI prophylaxis, patient is receiving diet/Tube Feeds   Code Status Full Code             History of Present Illness:     Chief Complaint:     The patient is a 66 y.o. female with significant past medical history of COPD on 2L at night, HTN, RA and chronic LE edema and hx of right DVT. She has chronic back pain and follows with pain clinic and had compression fractures noted by imaging ordered by her PCP. She has been  approved for verterobplasty but not scheduled and will get with Dr Jolie Wall. Despite this her back pain is still causing too much pain and came to the ER for pain control. No incontinence or numbness of her legs. Has left hip pain which limits movement of her left leg. Patient was seen and examined at bedside today. Patient sitting up comfortably in bed in NAD. Ten point ROS reviewed negative, unless as noted above    Objective: Intake/Output Summary (Last 24 hours) at 6/11/2019 0937  Last data filed at 6/11/2019 0435  Gross per 24 hour   Intake 240 ml   Output 2450 ml   Net -2210 ml      Vitals:   Vitals:    06/11/19 0435   BP:    Pulse: 80   Resp: 18   Temp: 98.4 °F (36.9 °C)   SpO2: 92%     Physical Exam:      GEN Awake female, sitting upright in bed in no apparent distress. Appears given age. EYES Pupils are equally round. No scleral erythema, discharge, or conjunctivitis. HENT Mucous membranes are moist.   RESP Clear to auscultation, no wheezes, rales or rhonchi. CARDIO/VASC S1/S2 auscultated. No murmurs  GI Abdomen is soft without significant tenderness, masses, or guarding. MSK Mild lumbar tenderness  SKIN Normal coloration, warm, dry.   NEURO Grossly normal  PSYCH Awake, alert, oriented     Medications:   Medications:    ARIPiprazole  2 mg Oral Daily    atorvastatin  10 mg Oral QPM    ipratropium-albuterol  1 ampule Inhalation Q4H WA    vitamin B-12  1,000 mcg Oral Daily    traZODone  50 mg Oral Nightly    metoprolol tartrate  50 mg Oral BID    losartan  100 mg Oral Daily    furosemide  40 mg Oral BID    pantoprazole  40 mg Oral QAM AC    sodium chloride flush  10 mL Intravenous 2 times per day    enoxaparin  40 mg Subcutaneous Daily    potassium chloride  20 mEq Oral BID      Infusions:   PRN Meds:     loperamide 2 mg 4x Daily PRN   ipratropium-albuterol 1 ampule Q4H PRN   morphine 15 mg Q12H PRN   sodium chloride flush 10 mL PRN   magnesium hydroxide 30 mL Daily PRN   ondansetron 4 mg Q6H PRN   morphine 2 mg Q4H PRN   oxyCODONE-acetaminophen 1 tablet Q6H PRN         Electronically signed by Lacy Joe MD on 6/11/2019 at 9:37 AM

## 2019-06-12 ENCOUNTER — ANESTHESIA (OUTPATIENT)
Dept: INTERVENTIONAL RADIOLOGY/VASCULAR | Age: 79
DRG: 516 | End: 2019-06-12
Payer: MEDICARE

## 2019-06-12 ENCOUNTER — APPOINTMENT (OUTPATIENT)
Dept: INTERVENTIONAL RADIOLOGY/VASCULAR | Age: 79
DRG: 516 | End: 2019-06-12
Payer: MEDICARE

## 2019-06-12 ENCOUNTER — ANESTHESIA EVENT (OUTPATIENT)
Dept: INTERVENTIONAL RADIOLOGY/VASCULAR | Age: 79
DRG: 516 | End: 2019-06-12
Payer: MEDICARE

## 2019-06-12 VITALS
SYSTOLIC BLOOD PRESSURE: 179 MMHG | DIASTOLIC BLOOD PRESSURE: 78 MMHG | OXYGEN SATURATION: 100 % | RESPIRATION RATE: 2 BRPM | TEMPERATURE: 97.7 F

## 2019-06-12 LAB
ANION GAP SERPL CALCULATED.3IONS-SCNC: 10 MMOL/L (ref 4–16)
BUN BLDV-MCNC: 12 MG/DL (ref 6–23)
CALCIUM SERPL-MCNC: 9.1 MG/DL (ref 8.3–10.6)
CHLORIDE BLD-SCNC: 98 MMOL/L (ref 99–110)
CO2: 33 MMOL/L (ref 21–32)
CREAT SERPL-MCNC: 0.7 MG/DL (ref 0.6–1.1)
GFR AFRICAN AMERICAN: >60 ML/MIN/1.73M2
GFR NON-AFRICAN AMERICAN: >60 ML/MIN/1.73M2
GLUCOSE BLD-MCNC: 102 MG/DL (ref 70–99)
HCT VFR BLD CALC: 36.1 % (ref 37–47)
HEMOGLOBIN: 10.4 GM/DL (ref 12.5–16)
MCH RBC QN AUTO: 25.7 PG (ref 27–31)
MCHC RBC AUTO-ENTMCNC: 28.8 % (ref 32–36)
MCV RBC AUTO: 89.1 FL (ref 78–100)
PDW BLD-RTO: 17.9 % (ref 11.7–14.9)
PLATELET # BLD: 244 K/CU MM (ref 140–440)
PMV BLD AUTO: 10.9 FL (ref 7.5–11.1)
POTASSIUM SERPL-SCNC: 4 MMOL/L (ref 3.5–5.1)
RBC # BLD: 4.05 M/CU MM (ref 4.2–5.4)
SODIUM BLD-SCNC: 141 MMOL/L (ref 135–145)
WBC # BLD: 6.6 K/CU MM (ref 4–10.5)

## 2019-06-12 PROCEDURE — 2580000003 HC RX 258: Performed by: NURSE ANESTHETIST, CERTIFIED REGISTERED

## 2019-06-12 PROCEDURE — 6360000002 HC RX W HCPCS: Performed by: HOSPITALIST

## 2019-06-12 PROCEDURE — 3700000001 HC ADD 15 MINUTES (ANESTHESIA)

## 2019-06-12 PROCEDURE — 2709999900 HC NON-CHARGEABLE SUPPLY

## 2019-06-12 PROCEDURE — 97162 PT EVAL MOD COMPLEX 30 MIN: CPT

## 2019-06-12 PROCEDURE — 6370000000 HC RX 637 (ALT 250 FOR IP): Performed by: HOSPITALIST

## 2019-06-12 PROCEDURE — 1200000000 HC SEMI PRIVATE

## 2019-06-12 PROCEDURE — 2500000003 HC RX 250 WO HCPCS

## 2019-06-12 PROCEDURE — 85027 COMPLETE CBC AUTOMATED: CPT

## 2019-06-12 PROCEDURE — 97535 SELF CARE MNGMENT TRAINING: CPT

## 2019-06-12 PROCEDURE — 6360000002 HC RX W HCPCS: Performed by: NURSE ANESTHETIST, CERTIFIED REGISTERED

## 2019-06-12 PROCEDURE — 97166 OT EVAL MOD COMPLEX 45 MIN: CPT

## 2019-06-12 PROCEDURE — 0QU03JZ SUPPLEMENT LUMBAR VERTEBRA WITH SYNTHETIC SUBSTITUTE, PERCUTANEOUS APPROACH: ICD-10-PCS | Performed by: RADIOLOGY

## 2019-06-12 PROCEDURE — 2700000000 HC OXYGEN THERAPY PER DAY

## 2019-06-12 PROCEDURE — 6360000002 HC RX W HCPCS

## 2019-06-12 PROCEDURE — 6370000000 HC RX 637 (ALT 250 FOR IP): Performed by: NURSE ANESTHETIST, CERTIFIED REGISTERED

## 2019-06-12 PROCEDURE — 7100000000 HC PACU RECOVERY - FIRST 15 MIN

## 2019-06-12 PROCEDURE — C1713 ANCHOR/SCREW BN/BN,TIS/BN: HCPCS

## 2019-06-12 PROCEDURE — 80048 BASIC METABOLIC PNL TOTAL CA: CPT

## 2019-06-12 PROCEDURE — 2500000003 HC RX 250 WO HCPCS: Performed by: NURSE ANESTHETIST, CERTIFIED REGISTERED

## 2019-06-12 PROCEDURE — 3700000000 HC ANESTHESIA ATTENDED CARE

## 2019-06-12 PROCEDURE — 22514 PERQ VERTEBRAL AUGMENTATION: CPT

## 2019-06-12 PROCEDURE — 36415 COLL VENOUS BLD VENIPUNCTURE: CPT

## 2019-06-12 PROCEDURE — 94761 N-INVAS EAR/PLS OXIMETRY MLT: CPT

## 2019-06-12 PROCEDURE — 2580000003 HC RX 258: Performed by: HOSPITALIST

## 2019-06-12 PROCEDURE — 94640 AIRWAY INHALATION TREATMENT: CPT

## 2019-06-12 PROCEDURE — 7100000001 HC PACU RECOVERY - ADDTL 15 MIN

## 2019-06-12 PROCEDURE — 0QS03ZZ REPOSITION LUMBAR VERTEBRA, PERCUTANEOUS APPROACH: ICD-10-PCS | Performed by: RADIOLOGY

## 2019-06-12 PROCEDURE — 97530 THERAPEUTIC ACTIVITIES: CPT

## 2019-06-12 PROCEDURE — 6360000002 HC RX W HCPCS: Performed by: PAIN MEDICINE

## 2019-06-12 PROCEDURE — C1726 CATH, BAL DIL, NON-VASCULAR: HCPCS

## 2019-06-12 RX ORDER — ONDANSETRON 2 MG/ML
4 INJECTION INTRAMUSCULAR; INTRAVENOUS
Status: ACTIVE | OUTPATIENT
Start: 2019-06-12 | End: 2019-06-12

## 2019-06-12 RX ORDER — EPHEDRINE SULFATE 50 MG/ML
INJECTION INTRAVENOUS PRN
Status: DISCONTINUED | OUTPATIENT
Start: 2019-06-12 | End: 2019-06-12 | Stop reason: SDUPTHER

## 2019-06-12 RX ORDER — KETAMINE HYDROCHLORIDE 10 MG/ML
INJECTION, SOLUTION INTRAMUSCULAR; INTRAVENOUS PRN
Status: DISCONTINUED | OUTPATIENT
Start: 2019-06-12 | End: 2019-06-12 | Stop reason: SDUPTHER

## 2019-06-12 RX ORDER — DEXAMETHASONE SODIUM PHOSPHATE 4 MG/ML
INJECTION, SOLUTION INTRA-ARTICULAR; INTRALESIONAL; INTRAMUSCULAR; INTRAVENOUS; SOFT TISSUE PRN
Status: DISCONTINUED | OUTPATIENT
Start: 2019-06-12 | End: 2019-06-12 | Stop reason: SDUPTHER

## 2019-06-12 RX ORDER — ONDANSETRON 2 MG/ML
INJECTION INTRAMUSCULAR; INTRAVENOUS PRN
Status: DISCONTINUED | OUTPATIENT
Start: 2019-06-12 | End: 2019-06-12 | Stop reason: SDUPTHER

## 2019-06-12 RX ORDER — FENTANYL CITRATE 50 UG/ML
INJECTION, SOLUTION INTRAMUSCULAR; INTRAVENOUS PRN
Status: DISCONTINUED | OUTPATIENT
Start: 2019-06-12 | End: 2019-06-12 | Stop reason: SDUPTHER

## 2019-06-12 RX ORDER — MORPHINE SULFATE 4 MG/ML
1 INJECTION, SOLUTION INTRAMUSCULAR; INTRAVENOUS EVERY 4 HOURS PRN
Status: DISCONTINUED | OUTPATIENT
Start: 2019-06-12 | End: 2019-06-13 | Stop reason: HOSPADM

## 2019-06-12 RX ORDER — HYDROMORPHONE HCL 110MG/55ML
0.5 PATIENT CONTROLLED ANALGESIA SYRINGE INTRAVENOUS EVERY 5 MIN PRN
Status: DISCONTINUED | OUTPATIENT
Start: 2019-06-12 | End: 2019-06-13 | Stop reason: HOSPADM

## 2019-06-12 RX ORDER — FENTANYL CITRATE 50 UG/ML
25 INJECTION, SOLUTION INTRAMUSCULAR; INTRAVENOUS EVERY 5 MIN PRN
Status: DISCONTINUED | OUTPATIENT
Start: 2019-06-12 | End: 2019-06-13 | Stop reason: HOSPADM

## 2019-06-12 RX ORDER — PROPOFOL 10 MG/ML
INJECTION, EMULSION INTRAVENOUS PRN
Status: DISCONTINUED | OUTPATIENT
Start: 2019-06-12 | End: 2019-06-12 | Stop reason: SDUPTHER

## 2019-06-12 RX ORDER — ALBUTEROL SULFATE 90 UG/1
AEROSOL, METERED RESPIRATORY (INHALATION) PRN
Status: DISCONTINUED | OUTPATIENT
Start: 2019-06-12 | End: 2019-06-12 | Stop reason: SDUPTHER

## 2019-06-12 RX ORDER — LIDOCAINE HYDROCHLORIDE 20 MG/ML
INJECTION, SOLUTION INFILTRATION; PERINEURAL PRN
Status: DISCONTINUED | OUTPATIENT
Start: 2019-06-12 | End: 2019-06-12 | Stop reason: SDUPTHER

## 2019-06-12 RX ORDER — SODIUM CHLORIDE 9 MG/ML
INJECTION, SOLUTION INTRAVENOUS CONTINUOUS PRN
Status: DISCONTINUED | OUTPATIENT
Start: 2019-06-12 | End: 2019-06-12 | Stop reason: SDUPTHER

## 2019-06-12 RX ORDER — CEFAZOLIN SODIUM 1 G/50ML
INJECTION, SOLUTION INTRAVENOUS PRN
Status: DISCONTINUED | OUTPATIENT
Start: 2019-06-12 | End: 2019-06-12 | Stop reason: SDUPTHER

## 2019-06-12 RX ORDER — HYDRALAZINE HYDROCHLORIDE 20 MG/ML
5 INJECTION INTRAMUSCULAR; INTRAVENOUS EVERY 10 MIN PRN
Status: DISCONTINUED | OUTPATIENT
Start: 2019-06-12 | End: 2019-06-13 | Stop reason: HOSPADM

## 2019-06-12 RX ORDER — LABETALOL HYDROCHLORIDE 5 MG/ML
5 INJECTION, SOLUTION INTRAVENOUS EVERY 10 MIN PRN
Status: DISCONTINUED | OUTPATIENT
Start: 2019-06-12 | End: 2019-06-13 | Stop reason: HOSPADM

## 2019-06-12 RX ADMIN — IPRATROPIUM BROMIDE AND ALBUTEROL SULFATE 1 AMPULE: .5; 3 SOLUTION RESPIRATORY (INHALATION) at 16:02

## 2019-06-12 RX ADMIN — PROPOFOL 100 MG: 10 INJECTION, EMULSION INTRAVENOUS at 10:25

## 2019-06-12 RX ADMIN — EPHEDRINE SULFATE 5 MG: 50 INJECTION, SOLUTION INTRAVENOUS at 11:13

## 2019-06-12 RX ADMIN — EPHEDRINE SULFATE 5 MG: 50 INJECTION, SOLUTION INTRAVENOUS at 11:03

## 2019-06-12 RX ADMIN — OXYCODONE HYDROCHLORIDE AND ACETAMINOPHEN 1 TABLET: 7.5; 325 TABLET ORAL at 13:24

## 2019-06-12 RX ADMIN — TRAZODONE HYDROCHLORIDE 50 MG: 50 TABLET ORAL at 20:16

## 2019-06-12 RX ADMIN — OXYCODONE HYDROCHLORIDE AND ACETAMINOPHEN 1 TABLET: 7.5; 325 TABLET ORAL at 05:07

## 2019-06-12 RX ADMIN — EPHEDRINE SULFATE 5 MG: 50 INJECTION, SOLUTION INTRAVENOUS at 11:23

## 2019-06-12 RX ADMIN — PANTOPRAZOLE SODIUM 40 MG: 40 TABLET, DELAYED RELEASE ORAL at 06:00

## 2019-06-12 RX ADMIN — KETAMINE HYDROCHLORIDE 15 MG: 10 INJECTION INTRAMUSCULAR; INTRAVENOUS at 10:27

## 2019-06-12 RX ADMIN — DEXAMETHASONE SODIUM PHOSPHATE 4 MG: 4 INJECTION, SOLUTION INTRAMUSCULAR; INTRAVENOUS at 10:55

## 2019-06-12 RX ADMIN — EPHEDRINE SULFATE 10 MG: 50 INJECTION, SOLUTION INTRAVENOUS at 10:57

## 2019-06-12 RX ADMIN — KETAMINE HYDROCHLORIDE 5 MG: 10 INJECTION INTRAMUSCULAR; INTRAVENOUS at 11:00

## 2019-06-12 RX ADMIN — OXYCODONE HYDROCHLORIDE AND ACETAMINOPHEN 1 TABLET: 7.5; 325 TABLET ORAL at 19:39

## 2019-06-12 RX ADMIN — PHENYLEPHRINE HYDROCHLORIDE 100 MCG: 10 INJECTION INTRAVENOUS at 11:13

## 2019-06-12 RX ADMIN — MORPHINE SULFATE 2 MG: 4 INJECTION INTRAVENOUS at 02:35

## 2019-06-12 RX ADMIN — IPRATROPIUM BROMIDE AND ALBUTEROL SULFATE 1 AMPULE: .5; 3 SOLUTION RESPIRATORY (INHALATION) at 21:07

## 2019-06-12 RX ADMIN — EPHEDRINE SULFATE 5 MG: 50 INJECTION, SOLUTION INTRAVENOUS at 11:06

## 2019-06-12 RX ADMIN — IPRATROPIUM BROMIDE AND ALBUTEROL SULFATE 1 AMPULE: .5; 3 SOLUTION RESPIRATORY (INHALATION) at 08:38

## 2019-06-12 RX ADMIN — POTASSIUM CHLORIDE 20 MEQ: 750 TABLET, FILM COATED, EXTENDED RELEASE ORAL at 20:15

## 2019-06-12 RX ADMIN — SODIUM CHLORIDE: 9 INJECTION, SOLUTION INTRAVENOUS at 10:21

## 2019-06-12 RX ADMIN — PHENYLEPHRINE HYDROCHLORIDE 100 MCG: 10 INJECTION INTRAVENOUS at 11:23

## 2019-06-12 RX ADMIN — MORPHINE SULFATE 1 MG: 4 INJECTION INTRAVENOUS at 23:50

## 2019-06-12 RX ADMIN — EPHEDRINE SULFATE 10 MG: 50 INJECTION, SOLUTION INTRAVENOUS at 10:48

## 2019-06-12 RX ADMIN — MORPHINE SULFATE 2 MG: 4 INJECTION INTRAVENOUS at 09:46

## 2019-06-12 RX ADMIN — LIDOCAINE HYDROCHLORIDE 100 MG: 20 INJECTION, SOLUTION INFILTRATION; PERINEURAL at 10:25

## 2019-06-12 RX ADMIN — ATORVASTATIN CALCIUM 10 MG: 10 TABLET, FILM COATED ORAL at 16:50

## 2019-06-12 RX ADMIN — EPHEDRINE SULFATE 10 MG: 50 INJECTION, SOLUTION INTRAVENOUS at 10:51

## 2019-06-12 RX ADMIN — SODIUM CHLORIDE, PRESERVATIVE FREE 10 ML: 5 INJECTION INTRAVENOUS at 09:46

## 2019-06-12 RX ADMIN — ALBUTEROL SULFATE 2 PUFF: 90 AEROSOL, METERED RESPIRATORY (INHALATION) at 11:33

## 2019-06-12 RX ADMIN — METOPROLOL TARTRATE 50 MG: 50 TABLET ORAL at 20:16

## 2019-06-12 RX ADMIN — ONDANSETRON 4 MG: 2 INJECTION INTRAMUSCULAR; INTRAVENOUS at 11:21

## 2019-06-12 RX ADMIN — PHENYLEPHRINE HYDROCHLORIDE 100 MCG: 10 INJECTION INTRAVENOUS at 10:51

## 2019-06-12 RX ADMIN — PHENYLEPHRINE HYDROCHLORIDE 100 MCG: 10 INJECTION INTRAVENOUS at 10:48

## 2019-06-12 RX ADMIN — SODIUM CHLORIDE, PRESERVATIVE FREE 10 ML: 5 INJECTION INTRAVENOUS at 20:16

## 2019-06-12 RX ADMIN — FENTANYL CITRATE 50 MCG: 50 INJECTION INTRAMUSCULAR; INTRAVENOUS at 10:25

## 2019-06-12 RX ADMIN — MORPHINE SULFATE 1 MG: 4 INJECTION INTRAVENOUS at 18:02

## 2019-06-12 RX ADMIN — FUROSEMIDE 40 MG: 40 TABLET ORAL at 16:50

## 2019-06-12 RX ADMIN — CEFAZOLIN SODIUM 1 G: 1 INJECTION, SOLUTION INTRAVENOUS at 10:41

## 2019-06-12 RX ADMIN — PHENYLEPHRINE HYDROCHLORIDE 100 MCG: 10 INJECTION INTRAVENOUS at 10:57

## 2019-06-12 ASSESSMENT — PULMONARY FUNCTION TESTS
PIF_VALUE: 1
PIF_VALUE: 5
PIF_VALUE: 16
PIF_VALUE: 16
PIF_VALUE: 17
PIF_VALUE: 17
PIF_VALUE: 6
PIF_VALUE: 0
PIF_VALUE: 4
PIF_VALUE: 16
PIF_VALUE: 4
PIF_VALUE: 16
PIF_VALUE: 16
PIF_VALUE: 1
PIF_VALUE: 16
PIF_VALUE: 17
PIF_VALUE: 17
PIF_VALUE: 33
PIF_VALUE: 24
PIF_VALUE: 16
PIF_VALUE: 16
PIF_VALUE: 2
PIF_VALUE: 16
PIF_VALUE: 18
PIF_VALUE: 21
PIF_VALUE: 16
PIF_VALUE: 14
PIF_VALUE: 2
PIF_VALUE: 2
PIF_VALUE: 16
PIF_VALUE: 18
PIF_VALUE: 16
PIF_VALUE: 15
PIF_VALUE: 2
PIF_VALUE: 26
PIF_VALUE: 16
PIF_VALUE: 17
PIF_VALUE: 17
PIF_VALUE: 1
PIF_VALUE: 16
PIF_VALUE: 17
PIF_VALUE: 15
PIF_VALUE: 2
PIF_VALUE: 45
PIF_VALUE: 16
PIF_VALUE: 16
PIF_VALUE: 1
PIF_VALUE: 25
PIF_VALUE: 16
PIF_VALUE: 16
PIF_VALUE: 15
PIF_VALUE: 16
PIF_VALUE: 2
PIF_VALUE: 4
PIF_VALUE: 16
PIF_VALUE: 16
PIF_VALUE: 1
PIF_VALUE: 16
PIF_VALUE: 4
PIF_VALUE: 16
PIF_VALUE: 17
PIF_VALUE: 19
PIF_VALUE: 16

## 2019-06-12 ASSESSMENT — PAIN DESCRIPTION - FREQUENCY
FREQUENCY: CONTINUOUS
FREQUENCY: CONTINUOUS

## 2019-06-12 ASSESSMENT — PAIN SCALES - GENERAL
PAINLEVEL_OUTOF10: 5
PAINLEVEL_OUTOF10: 5
PAINLEVEL_OUTOF10: 6
PAINLEVEL_OUTOF10: 5
PAINLEVEL_OUTOF10: 0
PAINLEVEL_OUTOF10: 8
PAINLEVEL_OUTOF10: 6
PAINLEVEL_OUTOF10: 6
PAINLEVEL_OUTOF10: 0
PAINLEVEL_OUTOF10: 3
PAINLEVEL_OUTOF10: 3

## 2019-06-12 ASSESSMENT — PAIN DESCRIPTION - ONSET
ONSET: ON-GOING
ONSET: ON-GOING

## 2019-06-12 ASSESSMENT — ENCOUNTER SYMPTOMS: SHORTNESS OF BREATH: 1

## 2019-06-12 ASSESSMENT — PAIN DESCRIPTION - ORIENTATION
ORIENTATION: LOWER

## 2019-06-12 ASSESSMENT — PAIN DESCRIPTION - DESCRIPTORS
DESCRIPTORS: ACHING

## 2019-06-12 ASSESSMENT — PAIN DESCRIPTION - LOCATION
LOCATION: BACK

## 2019-06-12 ASSESSMENT — PAIN DESCRIPTION - PAIN TYPE
TYPE: CHRONIC PAIN
TYPE: CHRONIC PAIN;SURGICAL PAIN
TYPE: CHRONIC PAIN;SURGICAL PAIN
TYPE: CHRONIC PAIN

## 2019-06-12 ASSESSMENT — PAIN DESCRIPTION - PROGRESSION
CLINICAL_PROGRESSION: NOT CHANGED
CLINICAL_PROGRESSION: NOT CHANGED

## 2019-06-12 NOTE — PROGRESS NOTES
1100 time out completed w/Dr. Remi Biggs and procedure started. 1120 called the floor to inform family, spoke to [address] [place] [address]   1128 called and spoke to PACU and gave small report and pt being moved to bed.

## 2019-06-12 NOTE — ANESTHESIA POSTPROCEDURE EVALUATION
Department of Anesthesiology  Postprocedure Note    Patient: Brodie Barba  MRN: [id number]  YOB: 1940  Date of evaluation: 6/12/2019  Time:  11:44 AM     Procedure Summary     Date:  06/12/19 Room / Location:  [facility]    Anesthesia Start:  8345 Anesthesia Stop:      Procedure:  IR KYPHOPLASTY LUMBAR FIRST LEVEL Diagnosis:  (fx l-4)    Scheduled Providers:  Pinky Orozco Radiologist Responsible Provider:  Modesta Landin MD    Anesthesia Type:  general ASA Status:  3          Anesthesia Type: general    Last vitals: Reviewed and per EMR flowsheets.        Anesthesia Post Evaluation    Patient location during evaluation: PACU  Level of consciousness: awake  Airway patency: patent  Nausea & Vomiting: no nausea and no vomiting  Complications: no  Cardiovascular status: hemodynamically stable  Respiratory status: acceptable

## 2019-06-12 NOTE — ANESTHESIA PRE PROCEDURE
Department of Anesthesiology  Preprocedure Note       Name:  Terrell Swan   Age:  66 y.o.  :  1940                                          MRN:  [id number]         Date:  2019      Surgeon: * Surgery not found *    Procedure: IR KYPHOPLASTY LUMBAR FIRST LEVEL    Medications prior to admission:   Prior to Admission medications    Medication Sig Start Date End Date Taking? Authorizing Provider   esomeprazole (NEXIUM) 40 MG delayed release capsule TAKE 1 CAPSULE BY MOUTH TWICE A DAY (MORNING AND BEFORE BED) 19  Yes Historical Provider, MD   furosemide (LASIX) 40 MG tablet Take 40 mg by mouth 3 times daily   Yes Historical Provider, MD   ARIPiprazole (ABILIFY) 2 MG tablet Take 1 tablet by mouth daily 19  Yes General Casi MD   metoprolol tartrate (LOPRESSOR) 50 MG tablet TAKE 1 TABLET BY MOUTH 2 TIMES DAILY 19  Yes General Casi MD   morphine (MS CONTIN) 15 MG extended release tablet Take 1 tablet by mouth every 12 hours as needed. Yes Historical Provider, MD   alendronate (FOSAMAX) 70 MG tablet TAKE 1 TABLET ONCE A WEEK 30 MINUTES BEFORE FOOD, DRINK OR MEDS.  STAY UPRIGHT. 19  Yes Historical Provider, MD   ipratropium-albuterol (DUONEB) 0.5-2.5 (3) MG/3ML SOLN nebulizer solution Inhale 3 mLs into the lungs every 4 hours (while awake) 19  Yes Meredith Hooper MD   ondansetron (ZOFRAN-ODT) 4 MG disintegrating tablet Take 4 mg by mouth as needed for Nausea or Vomiting   Yes Historical Provider, MD   PROAIR  (90 Base) MCG/ACT inhaler INHALE 2 PUFFS BY ORAL INHALATION EVERY 4 TO 6 HOURS AS NEEDED 3/28/19  Yes Swathi Sales MD   traZODone (DESYREL) 50 MG tablet Take 1 tablet by mouth nightly 3/15/19  Yes General Casi MD   losartan (COZAAR) 100 MG tablet TAKE ONE TABLET BY MOUTH DAILY 3/13/19  Yes General Casi MD   atorvastatin (LIPITOR) 10 MG tablet TAKE 1 TABLET BY MOUTH EVERY EVENING 19  Yes General Casi MD   BREO ELLIPTA 100-25 MCG/INH AEPB inhaler INHALE 1 PUFF INTO THE LUNGS DAILY AFTER INHALATION THEN GARGLE 2/4/19  Yes Ana Amaya MD   potassium chloride (KLOR-CON) 10 MEQ extended release tablet TAKE 2 TABLETS BY MOUTH EVERY MORNING AND TAKE 1 TABLET BY MOUTH EVERY EVENING 12/11/18  Yes Blanca Zamora MD   oxyCODONE-acetaminophen (PERCOCET) 7.5-325 MG per tablet Take 1 tablet by mouth 4 times daily. .   Yes Historical Provider, MD   INCRUSE ELLIPTA 62.5 MCG/INH AEPB INHALE 1 PUFF VIA ORAL INHALATION ONCE DAILY 6/5/18  Yes Ana Amaya MD   vitamin B-12 (CYANOCOBALAMIN) 1000 MCG tablet Take 1,000 mcg by mouth daily. Yes Historical Provider, MD   DULoxetine (CYMBALTA) 60 MG capsule Take 60 mg by mouth daily.    Yes Historical Provider, MD   Calcium Citrate-Vitamin D (CITRACAL + D PO) Take 600 mg by mouth daily    Yes Historical Provider, MD   Adalimumab (HUMIRA) 40 MG/0.4ML PSKT Inject into the skin    Historical Provider, MD       Current medications:    Current Facility-Administered Medications   Medication Dose Route Frequency Provider Last Rate Last Dose    loperamide (IMODIUM) capsule 2 mg  2 mg Oral 4x Daily PRN Onedevi Daniel MD   2 mg at 06/10/19 1721    ARIPiprazole (ABILIFY) tablet 2 mg  2 mg Oral Daily Tod ReMD janeen   2 mg at 06/11/19 0815    atorvastatin (LIPITOR) tablet 10 mg  10 mg Oral QPM Tod MD Ryan   10 mg at 06/11/19 1744    ipratropium-albuterol (DUONEB) nebulizer solution 1 ampule  1 ampule Inhalation Q4H Mauri Cee MD   1 ampule at 06/12/19 0838    vitamin B-12 (CYANOCOBALAMIN) tablet 1,000 mcg  1,000 mcg Oral Daily Togricelda Davis MD   1,000 mcg at 06/11/19 0808    traZODone (DESYREL) tablet 50 mg  50 mg Oral Nightly Tod Reusing, MD   50 mg at 06/11/19 2052    ipratropium-albuterol (DUONEB) nebulizer solution 1 ampule  1 ampule Inhalation Q4H PRN Srinivas Davis MD   1 ampule at 06/10/19 0752    morphine (MS CONTIN) extended release tablet 15 mg  15 mg Oral Q12H PRN Srinivas Davis MD   15 mg at 06/11/19 1608    metoprolol tartrate (LOPRESSOR) tablet 50 mg  50 mg Oral BID John Solis MD   50 mg at 06/11/19 2052    losartan (COZAAR) tablet 100 mg  100 mg Oral Daily John Solis MD   100 mg at 06/11/19 0807    furosemide (LASIX) tablet 40 mg  40 mg Oral BID John Solis MD   40 mg at 06/11/19 1744    pantoprazole (PROTONIX) tablet 40 mg  40 mg Oral QAM AC John Solis MD   40 mg at 06/12/19 0600    sodium chloride flush 0.9 % injection 10 mL  10 mL Intravenous 2 times per day John Solis MD   10 mL at 06/12/19 0946    sodium chloride flush 0.9 % injection 10 mL  10 mL Intravenous PRN John Solis MD        magnesium hydroxide (MILK OF MAGNESIA) 400 MG/5ML suspension 30 mL  30 mL Oral Daily PRN John Solis MD        ondansetron (ZOFRAN) injection 4 mg  4 mg Intravenous Q6H PRN John Solis MD        enoxaparin (LOVENOX) injection 40 mg  40 mg Subcutaneous Daily John Solis MD   40 mg at 06/11/19 0809    morphine sulfate (PF) injection 2 mg  2 mg Intravenous Q4H PRN John Solis MD   2 mg at 06/12/19 0946    potassium chloride (KLOR-CON) extended release tablet 20 mEq  20 mEq Oral BID John Solis MD   20 mEq at 06/11/19 2052    oxyCODONE-acetaminophen (PERCOCET) 7.5-325 MG per tablet 1 tablet  1 tablet Oral Q6H PRN John Solis MD   1 tablet at 06/12/19 0507       Allergies: Allergies   Allergen Reactions    Ativan [Lorazepam] Other (See Comments)     Violent, thrashing and combative. She has lacerations and bruising, had to be tied down.      Etanercept Other (See Comments)     No response    Humira [Adalimumab]     Prochlorperazine Edisylate Other (See Comments)     \"Compazine\"   Involuntary Muscle Spasms     Remicade [Infliximab Injection]     Doxycycline Other (See Comments)     Stomach pains       Problem List:    Patient Active Problem List   Diagnosis Code    Rheumatoid Arthritis M19.90    Hypertension I10    Hyperlipidemia E78.5    Fibromyalgia M79.7    Migraine G43.909    Chronic pain syndrome G89.4    Depression F32.9    Osteoporosis M81.0    Vitamin B12 deficiency E53.8    Lumbar spinal stenosis M48.061    Panic attack F41.0    Takotsubo syndrome I51.81    Blood loss anemia D50.0    COPD, severe (HCC) J44.9    Former smoker Z87.891    Nocturnal hypoxemia G47.34    Back pain M54.9    Intractable low back pain M54.5       Past Medical History:        Diagnosis Date    Acute DVT of right tibial vein ([address] 07/2017    ER imaging: distal right tibial vein DVT; no anticoag for bleeding/ anemia    Acute exacerbation of chronic obstructive pulmonary disease (COPD) ([address] 12/13/2018    Arthritis     Chronic pain syndrome     Low back pain; lumbar disc disease    Clostridium difficile colitis 09/20/2017    COPD, severe ([address] 10/24/2017    Depression     Fibromyalgia     Former smoker     Quit 1/2019    Herniated cervical disc 5-1998    Herpes zoster ophthalmicus 3/2012    Hx of blood clots     Hyperlipidemia     Hypertension     L3 vertebral fracture ([address] 2010    vertebroplasty    Lumbar spinal stenosis 2015    moderately severe by MRI    Migraine     Nocturnal hypoxemia 5/21/2019    Osteoporosis 2010    Fragility Fx L3    Rheumatoid arthritis(714.0) 1976    Dr Germán Goode S/P cardiac catheterization 05/2017    patent coronaries;  Takotsubo; najeeb Ahmed    Shortness of breath 4/25/2019    Takotsubo syndrome 05/2017    TMJ dysfunction     Tobacco abuse 12/13/2018    Vitamin B12 deficiency 12/2014    B12 level 199       Past Surgical History:        Procedure Laterality Date    APPENDECTOMY  1966    CARDIAC CATHETERIZATION  05/21/2017    CHOLECYSTECTOMY  1966    w/ appendectomy    ELBOW SURGERY Right     FOOT SURGERY  1986    TX COLONOSCOPY FLX DX W/COLLJ SPEC WHEN PFRMD N/A 10/2/2018    COLONOSCOPY DIAGNOSTIC OR SCREENING performed by Sandy Barton MD at [address] [place] [address] TOE SURGERY Left 4/25/2013    Deboo;    Errol Carry VERTEBROPLASTY 10/2010    L3    WRIST FUSION Left        Social History:    Social History     Tobacco Use    Smoking status: Former Smoker     Packs/day: 0.75     Years: 55.00     Pack years: 41.25     Types: Cigarettes     Start date: 1962     Last attempt to quit: 2019     Years since quittin.4    Smokeless tobacco: Never Used    Tobacco comment: 19- Pt reports that her quit date was 19   Substance Use Topics    Alcohol use: No     Alcohol/week: 0.0 oz                                Counseling given: Not Answered  Comment: 19- Pt reports that her quit date was 19      Vital Signs (Current):   Vitals:    19 1543 19 2342 19 0431 19 0839   BP:  (!) 158/79 137/69    Pulse:  75 79    Resp:    Temp:  98.6 °F (37 °C) 98.3 °F (36.8 °C)    TempSrc:  Oral Oral    SpO2: 97% 96% 95% 95%   Weight:       Height:                                                  BP Readings from Last 3 Encounters:   19 137/69   19 120/80   19 130/64       NPO Status:                                                                                 BMI:   Wt Readings from Last 3 Encounters:   06/10/19 151 lb (68.5 kg)   19 146 lb (66.2 kg)   19 150 lb (68 kg)     Body mass index is 24.37 kg/m².     CBC:   Lab Results   Component Value Date    WBC 6.1 2019    RBC 4.04 2019    HGB 10.5 2019    HCT 36.3 2019    MCV 89.9 2019    RDW 18.2 2019     2019       CMP:   Lab Results   Component Value Date     2019    K 4.0 2019    CL 98 2019    CO2 33 2019    BUN 12 2019    CREATININE 0.7 2019    GFRAA >60 2019    GFRAA >60 2012    AGRATIO 1.8 2014    LABGLOM >60 2019    LABGLOM 91 2014    GLUCOSE 102 2019    PROT 5.5 2019    PROT 7.6 2013    CALCIUM 9.1 2019    BILITOT 0.4 2019    ALKPHOS 87 2019    AST 14 2019

## 2019-06-12 NOTE — ANESTHESIA PRE PROCEDURE
Department of Anesthesiology  Preprocedure Note       Name:  Sonya Coyne   Age:  66 y.o.  :  1940                                          MRN:  [id number]         Date:  2019      Surgeon: * Surgery not found *    Procedure: IR KYPHOPLASTY LUMBAR FIRST LEVEL    Medications prior to admission:   Prior to Admission medications    Medication Sig Start Date End Date Taking? Authorizing Provider   esomeprazole (NEXIUM) 40 MG delayed release capsule TAKE 1 CAPSULE BY MOUTH TWICE A DAY (MORNING AND BEFORE BED) 19  Yes Historical Provider, MD   furosemide (LASIX) 40 MG tablet Take 40 mg by mouth 3 times daily   Yes Historical Provider, MD   ARIPiprazole (ABILIFY) 2 MG tablet Take 1 tablet by mouth daily 19  Yes Zach Black MD   metoprolol tartrate (LOPRESSOR) 50 MG tablet TAKE 1 TABLET BY MOUTH 2 TIMES DAILY 19  Yes Zach Black MD   morphine (MS CONTIN) 15 MG extended release tablet Take 1 tablet by mouth every 12 hours as needed. Yes Historical Provider, MD   alendronate (FOSAMAX) 70 MG tablet TAKE 1 TABLET ONCE A WEEK 30 MINUTES BEFORE FOOD, DRINK OR MEDS.  STAY UPRIGHT. 19  Yes Historical Provider, MD   ipratropium-albuterol (DUONEB) 0.5-2.5 (3) MG/3ML SOLN nebulizer solution Inhale 3 mLs into the lungs every 4 hours (while awake) 19  Yes Blanca Arevalo MD   ondansetron (ZOFRAN-ODT) 4 MG disintegrating tablet Take 4 mg by mouth as needed for Nausea or Vomiting   Yes Historical Provider, MD   PROAIR  (90 Base) MCG/ACT inhaler INHALE 2 PUFFS BY ORAL INHALATION EVERY 4 TO 6 HOURS AS NEEDED 3/28/19  Yes Lauro Brown MD   traZODone (DESYREL) 50 MG tablet Take 1 tablet by mouth nightly 3/15/19  Yes Zach Black MD   losartan (COZAAR) 100 MG tablet TAKE ONE TABLET BY MOUTH DAILY 3/13/19  Yes Zach Black MD   atorvastatin (LIPITOR) 10 MG tablet TAKE 1 TABLET BY MOUTH EVERY EVENING 19  Yes Zach Black MD   BREO ELLIPTA 100-25 MCG/INH AEPB inhaler metoprolol tartrate (LOPRESSOR) tablet 50 mg  50 mg Oral BID Chauncey Fox MD   50 mg at 06/11/19 2052    losartan (COZAAR) tablet 100 mg  100 mg Oral Daily Chauncey Fox MD   100 mg at 06/11/19 0807    furosemide (LASIX) tablet 40 mg  40 mg Oral BID Chauncey Fox MD   40 mg at 06/11/19 1744    pantoprazole (PROTONIX) tablet 40 mg  40 mg Oral QAM AC Chauncey Fox MD   40 mg at 06/12/19 0600    sodium chloride flush 0.9 % injection 10 mL  10 mL Intravenous 2 times per day Chauncey Fox MD   10 mL at 06/11/19 2052    sodium chloride flush 0.9 % injection 10 mL  10 mL Intravenous PRN Chauncey Fox MD        magnesium hydroxide (MILK OF MAGNESIA) 400 MG/5ML suspension 30 mL  30 mL Oral Daily PRN Chauncey Fox MD        ondansetron (ZOFRAN) injection 4 mg  4 mg Intravenous Q6H PRN Chauncey Fox MD        enoxaparin (LOVENOX) injection 40 mg  40 mg Subcutaneous Daily Chauncey Fox MD   40 mg at 06/11/19 0809    morphine sulfate (PF) injection 2 mg  2 mg Intravenous Q4H PRN Chauncey Fox MD   2 mg at 06/12/19 0235    potassium chloride (KLOR-CON) extended release tablet 20 mEq  20 mEq Oral BID Chauncey Fox MD   20 mEq at 06/11/19 2052    oxyCODONE-acetaminophen (PERCOCET) 7.5-325 MG per tablet 1 tablet  1 tablet Oral Q6H PRN Chauncey Fox MD   1 tablet at 06/12/19 0507       Allergies: Allergies   Allergen Reactions    Ativan [Lorazepam] Other (See Comments)     Violent, thrashing and combative. She has lacerations and bruising, had to be tied down.      Etanercept Other (See Comments)     No response    Humira [Adalimumab]     Prochlorperazine Edisylate Other (See Comments)     \"Compazine\"   Involuntary Muscle Spasms     Remicade [Infliximab Injection]     Doxycycline Other (See Comments)     Stomach pains       Problem List:    Patient Active Problem List   Diagnosis Code    Rheumatoid Arthritis M19.90    Hypertension I10    Hyperlipidemia E78.5    Fibromyalgia M79.7    Migraine G43.909    Chronic pain syndrome G89.4    Depression F32.9    Osteoporosis M81.0    Vitamin B12 deficiency E53.8    Lumbar spinal stenosis M48.061    Panic attack F41.0    Takotsubo syndrome I51.81    Blood loss anemia D50.0    COPD, severe (HCC) J44.9    Former smoker Z87.891    Nocturnal hypoxemia G47.34    Back pain M54.9    Intractable low back pain M54.5       Past Medical History:        Diagnosis Date    Acute DVT of right tibial vein ([address] 07/2017    ER imaging: distal right tibial vein DVT; no anticoag for bleeding/ anemia    Acute exacerbation of chronic obstructive pulmonary disease (COPD) ([address] 12/13/2018    Arthritis     Chronic pain syndrome     Low back pain; lumbar disc disease    Clostridium difficile colitis 09/20/2017    COPD, severe ([address] 10/24/2017    Depression     Fibromyalgia     Former smoker     Quit 1/2019    Herniated cervical disc 5-1998    Herpes zoster ophthalmicus 3/2012    Hx of blood clots     Hyperlipidemia     Hypertension     L3 vertebral fracture ([address] 2010    vertebroplasty    Lumbar spinal stenosis 2015    moderately severe by MRI    Migraine     Nocturnal hypoxemia 5/21/2019    Osteoporosis 2010    Fragility Fx L3    Rheumatoid arthritis(714.0) 1976    Dr Josephine Tse S/P cardiac catheterization 05/2017    patent coronaries;  Takotsubo; najeeb Ahmed    Shortness of breath 4/25/2019    Takotsubo syndrome 05/2017    TMJ dysfunction     Tobacco abuse 12/13/2018    Vitamin B12 deficiency 12/2014    B12 level 199       Past Surgical History:        Procedure Laterality Date    APPENDECTOMY  1966    CARDIAC CATHETERIZATION  05/21/2017    CHOLECYSTECTOMY  1966    w/ appendectomy    ELBOW SURGERY Right     FOOT SURGERY  1986    NM COLONOSCOPY FLX DX W/COLLJ SPEC WHEN PFRMD N/A 10/2/2018    COLONOSCOPY DIAGNOSTIC OR SCREENING performed by Crys Murry MD at [address] [place] [address] TOE SURGERY Left 4/25/2013    Deb;    [facility] VERTEBROPLASTY 10/2010    L3    WRIST FUSION Left        Social History:    Social History     Tobacco Use    Smoking status: Former Smoker     Packs/day: 0.75     Years: 55.00     Pack years: 41.25     Types: Cigarettes     Start date: 1962     Last attempt to quit: 2019     Years since quittin.4    Smokeless tobacco: Never Used    Tobacco comment: 19- Pt reports that her quit date was 19   Substance Use Topics    Alcohol use: No     Alcohol/week: 0.0 oz                                Counseling given: Not Answered  Comment: 19- Pt reports that her quit date was 19      Vital Signs (Current):   Vitals:    19 1543 19 2342 19 0431 19 0839   BP:  (!) 158/79 137/69    Pulse:  75 79    Resp:    Temp:  37 °C (98.6 °F) 36.8 °C (98.3 °F)    TempSrc:  Oral Oral    SpO2: 97% 96% 95% 95%   Weight:       Height:                                                  BP Readings from Last 3 Encounters:   19 137/69   19 120/80   19 130/64       NPO Status:                                                                                 BMI:   Wt Readings from Last 3 Encounters:   06/10/19 151 lb (68.5 kg)   19 146 lb (66.2 kg)   19 150 lb (68 kg)     Body mass index is 24.37 kg/m².     CBC:   Lab Results   Component Value Date    WBC 6.1 2019    RBC 4.04 2019    HGB 10.5 2019    HCT 36.3 2019    MCV 89.9 2019    RDW 18.2 2019     2019       CMP:   Lab Results   Component Value Date     2019    K 4.0 2019    CL 98 2019    CO2 33 2019    BUN 12 2019    CREATININE 0.7 2019    GFRAA >60 2019    GFRAA >60 2012    AGRATIO 1.8 2014    LABGLOM >60 2019    LABGLOM 91 2014    GLUCOSE 102 2019    PROT 5.5 2019    PROT 7.6 2013    CALCIUM 9.1 2019    BILITOT 0.4 2019    ALKPHOS 87 2019    AST 14 2019 ALT 6 06/09/2019       POC Tests: No results for input(s): POCGLU, POCNA, POCK, POCCL, POCBUN, POCHEMO, POCHCT in the last 72 hours. Coags:   Lab Results   Component Value Date    PROTIME 11.5 06/11/2019    PROTIME 13.1 11/26/2010    INR 1.01 06/11/2019    APTT 40.8 06/11/2019       HCG (If Applicable): No results found for: PREGTESTUR, PREGSERUM, HCG, HCGQUANT     ABGs:   Lab Results   Component Value Date    PO2ART 75 11/18/2016    SFJ0OSK 39.0 11/18/2016    [id number] 27.7 11/18/2016        Type & Screen (If Applicable):  No results found for: LABABO, LABRH    Anesthesia Evaluation    Airway: Mallampati: II  TM distance: <3 FB   Neck ROM: full  Mouth opening: > = 3 FB Dental:    (+) edentulous      Pulmonary: breath sounds clear to auscultation  (+) COPD:  shortness of breath:                             Cardiovascular:  Exercise tolerance: poor (<4 METS),   (+) hypertension:, past MI: > 6 months and no interval change,         Rhythm: regular                   ROS comment: Echo 2017    Summary   This is a limited echo to assess EF and RVSP.   Left ventricular function is normal , EF is estimated at 50-55 %.   Right ventricular systolic pressure is 21 mm Hg.   Mild-to-moderate aortic regurgitation is noted with a pressure half time of   456 msec   Moderate mitral regurgitation is present.   Mild tricuspid regurgitation. 5/2017    IMPRESSION:  1. Left main is patent. 2.  left anterior descending, circumflex artery, ramus and right coronary  artery have mild coronary artery disease present with DAKOTAH 3 flow present. 3.  Ejection fraction is around 50% with Takotsubo apical ballooning present. 4.  End-diastolic pressure was normal, 10 mmHg present. 5.  The bilateral common iliac, internal iliac, external iliac and common  femoris are patent. Distal abdominal aorta is patent. Bilateral renal  arteries are patent.     The patient tolerated the procedure well.   We will treat the patient for  non-STEMI and

## 2019-06-12 NOTE — PROGRESS NOTES
[address] [facility] ACUTE CARE OCCUPATIONAL THERAPY EVALUATION    [facility], 1940, 4005/4005-A, 6/12/2019    Discharge Recommendation: Home with initial 24 hour supervision/assistance, Home with [facility] services      History:  [place]:  The encounter diagnosis was Chronic midline low back pain without sciatica. Subjective:  Patient states: \"I'm supposed to be going home tomorrow. \"  Pain: Pt denied pain this date at rest  Communication with other providers: Discussed with ERNIE Steven, Co-evaluation with PT Padmini Pacheco  Restrictions: General Precautions, Fall Risk, Spinal Precautions (No bending, lifting, twisting), Bed/chair alarm    Home Setup/Prior level of function:  Social/Functional History  Lives With: Spouse (who is in good health and driving)  Type of Home: Apartment (Condo)  Home Layout: One level  Home Access: Stairs to enter without rails  Entrance Stairs - Number of Steps: 1 (walker will fit on step)  Bathroom Shower/Tub: Tub/Shower unit  Bathroom Toilet: Standard ('s toilet is elevated; vanity is within reach of pt's toilet)  Bathroom Equipment: Shower chair, Grab bars in shower, Grab bars around toilet  Bathroom Accessibility: [place]: 4 wheeled walker, Cane, [address] Help From: Family  ADL Assistance: Independent  Homemaking Assistance: Independent (spouse cooks, pt does laundry and manages finances)  Homemaking Responsibilities: Yes  Ambulation Assistance: Independent (mod I with 4ww in house for past 2 months)  Transfer Assistance: Independent  Active : No (\"couple times a year\"-spouse is primary )  Occupation: Retired  Leisure & Hobbies: Watching TV, [address], Household chores    Examination:  · Observation: Supine in bed upon arrival. Pleasant and agreeable to evaluation.   · Vision: WFL (Glasses)  · Hearing: [facility]  · Vitals:  Stable vitals throughout session    Body Systems and functions:  · ROM R/L: WFL all joints in BL UEs  · Strength R/L: WFL all major muscle groups BL UEs  · Sensation: WFL (denies numbness/tingling)  · Tone: Normal  · Coordination: WFL  · Perception: WNL    Activities of Daily Living (ADLs):  · Feeding: Independent  · Grooming: SBA (hand hygiene in standing at sink)  · UB bathing: SBA  · LB bathing: CGA (reaching buttocks, able to cross legs into \"figure 4 position\" to reach distal LEs)  · UB dressing: SBA (donning clean robe EOB)  · LB dressing: CGA (light dynamic standing balance with mgmt of brief to hips, able to don BL socks EOB SBA with setup in \"figure 4 position\")  · Toileting: CGA (pt urinated in regular toilet, able to manage brief both directions with CGA for balance and complete seated landon care without assist; min cues to avoid excessive bending when pulling brief up)    Cognitive and Psychosocial Functioning:  · Overall cognitive status: WFL (mildly decreased safety awareness)  · Affect: Normal     Balance:   · Sitting: Supervision/SBA in unsupported sitting EOB  · Standing: SBA with RW and with hand hygiene at sink    Functional Mobility:  · Bed Mobility: Supervision supine to sitting EOB using log roll technique  · Transfers: CGA sit to stand from bed, chair, and toilet (min cues for scooting hips forward and pushing through arms), CGA stand to sit to chair (max cues for feeling legs against surfaces and reaching back with arms)  · Ambulation: SBA/CGA with  ft (See PT note for full gait assessment)      AM-PAC 6 click short form for inpatient daily activity:   How much help from another person does the patient currently need. .. Unable  Dep A Lot  Max A A Lot   Mod A A Little  Min A A Little   CGA  SBA None   Mod I  Indep  Sup   1. Putting on and taking off regular lower body clothing? [] 1    [] 2   [] 2   [] 3   [x] 3   [] 4      2. Bathing (including washing, rinsing, drying)? [] 1   [] 2   [] 2 [] 3 [x] 3 [] 4   3.  Toileting, which includes using toilet, bedpan, or urinal? [] 1    [] 2   [] 2   [] 3   [x] 3   [] 4     4. Putting on and taking off regular upper body clothing? [] 1   [] 2   [] 2   [] 3   [] 3    [x] 4      5. Taking care of personal grooming such as brushing teeth? [] 1   [] 2    [] 2 [] 3    [] 3   [x] 4      6. Eating meals? [] 1   [] 2   [] 2   [] 3   [] 3   [x] 4      Raw Score:  21  [24=0% impaired(CH), 23=1-19%(CI), 20-22=20-39%(CJ), 15-19=40-59%(CK), 10-14=60-79%(CL), 7-9=80-99%(CM), 6=100%(CN)]     Treatment:  Self Care Training:   Cues were given for safety, sequence, UE/LE placement, visual cues, and balance. Activities performed today included UB/LB dressing tasks, toileting, hand hygiene at sink, education on spinal precautions and modifying LB ADLs      Safety Measures: Gait belt used, Left in Bed, Alarm in place    Assessment:  Pt is a 66year old female with a past medical history of Acute DVT of right tibial vein (HCC), Acute exacerbation of chronic obstructive pulmonary disease (COPD) ([address] Arthritis, Chronic pain syndrome, Clostridium difficile colitis, COPD, severe ([address] Depression, Fibromyalgia, Former smoker, Herniated cervical disc, Herpes zoster ophthalmicus, Hx of blood clots, Hyperlipidemia, Hypertension, L3 vertebral fracture ([address] Lumbar spinal stenosis, Migraine, Nocturnal hypoxemia, Osteoporosis, Rheumatoid arthritis(714.0), S/P cardiac catheterization, Shortness of breath, Takotsubo syndrome, TMJ dysfunction, Tobacco abuse, and Vitamin B12 deficiency. Pt admitted with low back pain and diagnosed with L3 and L4 compression fractures. Pt underwent vertebroplasty on 6-12 in the AM. Pt lives at home with her spouse and at baseline is independent with ADLs, most household IADLs, and ambulates mod I with a 4ww. Pt performed well this date, and from a self-care and mobility standpoint, is safe to return home at discharge with initial 24 hour supervision and [facility] OT services recommended.       Complexity: Moderate  Prognosis: Good  Plan: Eval and discharge; safe for return home with 24 hour supervision         Time:   Time in: 1410  Time out: 1435  Timed treatment minutes: 10  Total time: 25        Electronically signed by:    Kacy Haynes OT/L, [place], [id number]

## 2019-06-12 NOTE — PROGRESS NOTES
Patient arrived to pacu from IR; placed on monitor. Vital signs stable; appropriately responsive. Bedside report taken from SANAM wynne.

## 2019-06-12 NOTE — PROGRESS NOTES
Hospitalist Progress Note      Name:  Neftali Beaver /Age/Sex: 1940  (66 y.o. female)   MRN & CSN:  [id number] & [id number] Admission Date/Time: 2019  2:44 PM   [facility]  [facility] PCP: Raúl Vital MD         Hospital Day: 4    Assessment and Plan:   Neftali Beaver is a 66 y.o.  female  who presents with <principal problem not specified>        Vertebroplasty today       Acute on chronic Back pain 2/2 compression fracture  -pain management  -morphine ER, percocet,  IV morphine  -consult IR (Dr Harriet Hermosillo Pasquale)---> vertebroplasy   MRI: Remote compression fractures of L3 and L4.  Previous vertebroplasty of L3. Retropulsion of L3 and L4 relative to L2 and L5  -PT/OT       LE edema  -on lasix as per PCP    No evidence of DVT in either lower extremity. RA  -on humira    COPD with chronic resp failure   -duonebs    HTN  -ARB, BB       Chronic treatment continued per home medications unless  contraindicated by above plan and assessment. The above assessment/plan has been explained to the patient, who indicated understanding. Diet Diet NPO, After Midnight   DVT Prophylaxis [x] Lovenox, []  Heparin, [] SCDs, []No VTE prophylaxis, patient ambulating   GI Prophylaxis [x] PPI, [] H2 Blocker, [] No GI prophylaxis, patient is receiving diet/Tube Feeds   Code Status Full Code             History of Present Illness:     Chief Complaint:     The patient is a 66 y.o. female with significant past medical history of COPD on 2L at night, HTN, RA and chronic LE edema and hx of right DVT. She has chronic back pain and follows with pain clinic and had compression fractures noted by imaging ordered by her PCP. She has been  approved for verterobplasty but not scheduled and will get with Dr Sang Baumann. Despite this her back pain is still causing too much pain and came to the ER for pain control. No incontinence or numbness of her legs. Has left hip pain which limits movement of her left leg.          Patient was seen and examined at bedside today. Patient sitting up comfortably in bed in NAD. Ten point ROS reviewed negative, unless as noted above    Objective: Intake/Output Summary (Last 24 hours) at 6/12/2019 0851  Last data filed at 6/12/2019 0533  Gross per 24 hour   Intake 1200 ml   Output 2550 ml   Net -1350 ml      Vitals:   Vitals:    06/12/19 0839   BP:    Pulse:    Resp: 16   Temp:    SpO2: 95%     Physical Exam:      GEN Awake female, sitting upright in bed in no apparent distress. Appears given age. EYES Pupils are equally round. No scleral erythema, discharge, or conjunctivitis. HENT Mucous membranes are moist.   RESP Clear to auscultation, no wheezes, rales or rhonchi. CARDIO/VASC S1/S2 auscultated. No murmurs  GI Abdomen is soft without significant tenderness, masses, or guarding. MSK Mild lumbar tenderness  SKIN Normal coloration, warm, dry.   NEURO Grossly normal  PSYCH Awake, alert, oriented     Medications:   Medications:    ARIPiprazole  2 mg Oral Daily    atorvastatin  10 mg Oral QPM    ipratropium-albuterol  1 ampule Inhalation Q4H WA    vitamin B-12  1,000 mcg Oral Daily    traZODone  50 mg Oral Nightly    metoprolol tartrate  50 mg Oral BID    losartan  100 mg Oral Daily    furosemide  40 mg Oral BID    pantoprazole  40 mg Oral QAM AC    sodium chloride flush  10 mL Intravenous 2 times per day    enoxaparin  40 mg Subcutaneous Daily    potassium chloride  20 mEq Oral BID      Infusions:   PRN Meds:     loperamide 2 mg 4x Daily PRN   ipratropium-albuterol 1 ampule Q4H PRN   morphine 15 mg Q12H PRN   sodium chloride flush 10 mL PRN   magnesium hydroxide 30 mL Daily PRN   ondansetron 4 mg Q6H PRN   morphine 2 mg Q4H PRN   oxyCODONE-acetaminophen 1 tablet Q6H PRN         Electronically signed by Lacy Joe MD on 6/12/2019 at 8:51 AM

## 2019-06-12 NOTE — PROGRESS NOTES
Patient brought into room, by IR staff, Dr Moshe Alvarez at bedside discussing anesthesia support. Consent signed by patient for Kyphoplasty L4.

## 2019-06-12 NOTE — CONSULTS
[address], 1940, 4005/4005-A, 6/12/2019    Discharge Recommendation: HH PT, family support    Equipment: none    History  [place]:  The encounter diagnosis was Chronic midline low back pain without sciatica. Subjective:  Patient states:  Agreeable to PT eval. Reports no major change in status since procedure. Pain:  Does not rate, well controlled. Communication with other providers: OT  Restrictions: low fall risk    Home Setup/Prior level of function  Social/Functional History  Lives With: Spouse(who is in good health and driving)  Type of Home: Apartment(Condo)  Home Layout: One level  Home Access: Stairs to enter without rails  Entrance Stairs - Number of Steps: 1 (walker will fit on step)  Bathroom Shower/Tub: Tub/Shower unit  Bathroom Toilet: Standard('s toilet is elevated; vanity is within reach of pt's toilet)  Bathroom Equipment: Shower chair, Grab bars in shower, Grab bars around toilet  Bathroom Accessibility: [facility]: 4 wheeled walker, Cane, Standard walker  Receives Help From: Family  ADL Assistance: Independent  Homemaking Assistance: Independent(spouse cooks, pt does laundry and manages finances)  Homemaking Responsibilities: Yes  Ambulation Assistance: Independent(mod I with 4ww in house for past 2 months)  Transfer Assistance: Independent  Active : No(\"couple times a years\"-spouse is primary )  Occupation: Retired  Leisure & Hobbies: Watching TV, [address], Household chores    Examination of body systems (includes body structures/functions, activity/participation limitations):  · Observation:  Supine in bed upon arrival  · [facility] [place]  · [facility]  [facility] [place]  · Cardiopulmonary:  WFL  · Cognition: WFL, see OT/SLP note for further evaluation. Body Structures/Function  · ROM R/L:  WFL.  Some minor LE ROM deficits d/t degenerative changes associated with chronic pain    · Strength R/L:  5/5, mild weakness observed in function. · Neuro:  [facility]       Mobility:  · Rolling L/R: mod I   · Supine to sit:  Mod I   · Transfers: CGA-- pt has adequate strength and power, but has one instance of poor safety awareness-- does not position device, sits prematurely and reduced eccentric control. Extensive education on importance of slow transfers to reduce risk of compression fx   · Sitting balance: mod I .    · Standing balance:  SBA-- pt able to stand unsupported for standing ADLs but requires device for dynamic . · Gait: 100 ft with RW and multiple turns SBA. Slightly fwd flexed, cues for posture. No LOB, functional gait speed. [facility] 6 Clicks Inpatient Mobility:   Current: 20/24  PLOF: 24/24    Treatment:  Gait Training:  Cues were given for safety, sequence, device management, balance, posture, awareness, path. Therapeutic Activity Training:   Therapeutic activity training was instructed today. Cues were given for safety, sequence, UE/LE placement, awareness, and balance. Activities performed today included bed mobility training, sup-sit, sit-stand, SPT. Reviewed activity modification for BLT adherence  Safety:  patient left in chair,  call light within reach, RN notified, gait belt used. Assessment:  Pt is a 67 yo female who presents to [facility] with compression fx, had kyphoplasty L4 today. She is doing well, pleasant and cooperative. Mobilizing at [facility]. She has occasional lapses in safety awareness that compromise BLT precaution adherence. Pt was thoroughly educated on activity modification to prevent further compression fxs. She will benefit from skilled PT for remainder of admission and HH PT at d/c for safe transition home. Activity Tolerance: good  Complexity: Moderate  Prognosis: Good, no significant barriers to participation at this time. Plan  days/week: 3+/ 1 week         Goals:  1.pt will transfer consistently at mod I with good eccentric lowering  2.  Pt will ambulate 150 ft with RW at mod I   3.  Pt will adhere to BLT precautions with all ADLs and transfers throughout session       Treatment plan:  Bed mobility, transfers, balance, gait, TA, TX, device training, safety education, stairs    Recommendations for NURSING mobility: .    Time:   Time in: 1410  Time out: 1440  Timed treatment minutes: 15  Total time: 30    Electronically signed by:    Lily Newell PT  6/12/2019, 3:09 PM

## 2019-06-12 NOTE — PROGRESS NOTES
Patient transported to [address] [facility]. Vital signs stable; pain controlled; no nausea; awake and appropriate; tolerating po ice chips. Telephone report called to zena cooney.  RN and family in room.

## 2019-06-13 VITALS
DIASTOLIC BLOOD PRESSURE: 66 MMHG | SYSTOLIC BLOOD PRESSURE: 157 MMHG | HEIGHT: 66 IN | OXYGEN SATURATION: 100 % | TEMPERATURE: 97.8 F | HEART RATE: 84 BPM | WEIGHT: 151 LBS | BODY MASS INDEX: 24.27 KG/M2 | RESPIRATION RATE: 18 BRPM

## 2019-06-13 PROCEDURE — 6370000000 HC RX 637 (ALT 250 FOR IP): Performed by: HOSPITALIST

## 2019-06-13 PROCEDURE — 2700000000 HC OXYGEN THERAPY PER DAY

## 2019-06-13 PROCEDURE — 6360000002 HC RX W HCPCS: Performed by: HOSPITALIST

## 2019-06-13 PROCEDURE — 94640 AIRWAY INHALATION TREATMENT: CPT

## 2019-06-13 PROCEDURE — 2580000003 HC RX 258: Performed by: HOSPITALIST

## 2019-06-13 PROCEDURE — 94761 N-INVAS EAR/PLS OXIMETRY MLT: CPT

## 2019-06-13 RX ORDER — FUROSEMIDE 40 MG/1
40 TABLET ORAL 2 TIMES DAILY
Qty: 60 TABLET | Refills: 3 | Status: SHIPPED | OUTPATIENT
Start: 2019-06-13 | End: 2019-11-08 | Stop reason: SDUPTHER

## 2019-06-13 RX ADMIN — SODIUM CHLORIDE, PRESERVATIVE FREE 10 ML: 5 INJECTION INTRAVENOUS at 08:16

## 2019-06-13 RX ADMIN — METOPROLOL TARTRATE 50 MG: 50 TABLET ORAL at 08:15

## 2019-06-13 RX ADMIN — CYANOCOBALAMIN TAB 1000 MCG 1000 MCG: 1000 TAB at 08:15

## 2019-06-13 RX ADMIN — ENOXAPARIN SODIUM 40 MG: 100 INJECTION SUBCUTANEOUS at 08:15

## 2019-06-13 RX ADMIN — FUROSEMIDE 40 MG: 40 TABLET ORAL at 08:15

## 2019-06-13 RX ADMIN — OXYCODONE HYDROCHLORIDE AND ACETAMINOPHEN 1 TABLET: 7.5; 325 TABLET ORAL at 08:15

## 2019-06-13 RX ADMIN — OXYCODONE HYDROCHLORIDE AND ACETAMINOPHEN 1 TABLET: 7.5; 325 TABLET ORAL at 02:16

## 2019-06-13 RX ADMIN — ARIPIPRAZOLE 2 MG: 2 TABLET ORAL at 08:15

## 2019-06-13 RX ADMIN — POTASSIUM CHLORIDE 20 MEQ: 750 TABLET, FILM COATED, EXTENDED RELEASE ORAL at 08:15

## 2019-06-13 RX ADMIN — LOSARTAN POTASSIUM 100 MG: 100 TABLET, FILM COATED ORAL at 08:15

## 2019-06-13 RX ADMIN — PANTOPRAZOLE SODIUM 40 MG: 40 TABLET, DELAYED RELEASE ORAL at 06:10

## 2019-06-13 RX ADMIN — IPRATROPIUM BROMIDE AND ALBUTEROL SULFATE 1 AMPULE: .5; 3 SOLUTION RESPIRATORY (INHALATION) at 08:38

## 2019-06-13 ASSESSMENT — PAIN DESCRIPTION - LOCATION: LOCATION: BACK

## 2019-06-13 ASSESSMENT — PAIN SCALES - GENERAL
PAINLEVEL_OUTOF10: 6
PAINLEVEL_OUTOF10: 6
PAINLEVEL_OUTOF10: 3

## 2019-06-13 ASSESSMENT — PAIN DESCRIPTION - ORIENTATION: ORIENTATION: LOWER

## 2019-06-13 ASSESSMENT — PAIN DESCRIPTION - DESCRIPTORS: DESCRIPTORS: ACHING

## 2019-06-13 ASSESSMENT — PAIN DESCRIPTION - FREQUENCY: FREQUENCY: CONTINUOUS

## 2019-06-13 ASSESSMENT — PAIN DESCRIPTION - PROGRESSION: CLINICAL_PROGRESSION: GRADUALLY IMPROVING

## 2019-06-13 ASSESSMENT — PAIN DESCRIPTION - ONSET: ONSET: ON-GOING

## 2019-06-13 ASSESSMENT — PAIN DESCRIPTION - PAIN TYPE: TYPE: CHRONIC PAIN;SURGICAL PAIN

## 2019-06-13 ASSESSMENT — PAIN - FUNCTIONAL ASSESSMENT: PAIN_FUNCTIONAL_ASSESSMENT: PREVENTS OR INTERFERES SOME ACTIVE ACTIVITIES AND ADLS

## 2019-06-13 NOTE — DISCHARGE SUMMARY
removed.  A [facility]  curved Nitinol needle was then directed  just past the midline into the right  side of the vertebral body.  At this point a mixture of methylmethacrylate  and sterile barium sulfate was applied with filling of the right and left  sides of the vertebral body. The patient tolerated the entire procedure well without immediate  complications. A total of 2.5 ml of PMMA was utilized.  9.1 minutes of  fluoroscopic time was utilized during the procedure.  AK 1039 mGy.  1g of  Ancef was given intravenously as antibiotic prophylaxis. COMPARISON:  MRI of the lumbar spine without IV contrast 05/24/2019    HISTORY:  Severe thoracic back pain.  L4 vertebral body compression fracture.  Severe  back pain    ORDERING SYSTEM PROVIDED HISTORY: fx l-4  TECHNOLOGIST PROVIDED HISTORY:  Reason for exam:->fx l-4    PHYSICIANS:  Levi Quick MD    PREOPERATIVE DIAGNOSIS:  L4 vertebral body compression fracture. SEDATION:  General endotracheal intubation performed by the anesthesiology department    POSTOPERATIVE DIAGNOSIS:  Same. BLOOD LOSS:  Minimal     Impression:       Successful kyphoplasty of L4 with fluoroscopic guidance.     IR VERTEBROPLASTY LUMBOSACRAL [[id number]]      Order Status: Canceled      VL DUP LOWER EXTREMITY VENOUS BILATERAL [[id number]] Collected: 06/10/19 0722     Order Status: Completed Updated: 06/10/19 0726     Narrative:       EXAMINATION:  DUPLEX VENOUS ULTRASOUND OF THE BILATERAL LOWER EXTREMITIES, 6/10/2019 6:06 am    TECHNIQUE:  Duplex ultrasound and Doppler images were obtained of the bilateral lower  extremities    COMPARISON:  None. HISTORY:  ORDERING SYSTEM PROVIDED HISTORY: LE edema. Hx of DVT  TECHNOLOGIST PROVIDED HISTORY:  Reason for exam:->LE edema.  Hx of DVT  Ordering Physician Provided Reason for Exam: Leg pain  Acuity: Chronic  Type of Exam: Ongoing  Additional signs and symptoms: Pt states prev arterial and venous    FINDINGS:  The visualized veins of the bilateral

## 2019-06-13 NOTE — CARE COORDINATION
Met c pt (prior to her discharge today) to discuss discharge disposition. Therapy had rec'd HHC but pt declined. Pt feels back to baseline and ready to go home/ has good support from spouse. No CM needs id'd.

## 2019-06-13 NOTE — CONSULTS
[address] [place] [address], [address] [facility]                                  CONSULTATION    PATIENT NAME: Maciel Figueroa                     :        1940  MED REC NO:   [id number]                          ROOM:       [id number]  ACCOUNT NO:   [de-identified]                           ADMIT DATE: 2019  PROVIDER:     Grace Ma MD    CONSULT DATE:  2019    HISTORY OF PRESENT ILLNESS:  This is a 60-year-old white female who was  admitted with exacerbation of her low back pain dull, aching, and  constant, occasionally radiating to the lower extremities. The patient  was found to have vertebral fracture of L3 and underwent vertebroplasty  this morning. She has been able to ambulate after the procedure and  stated that the pain is much improved, although she still has some  soreness from the procedure. REVIEW OF SYSTEMS:  Positive for spinal stenosis of lumbar and cervical,  low back pain, rheumatoid arthritis as a reason for her chronic pain  syndrome. PHYSICAL EXAMINATION:  HEENT:  Neck is supple. No lymph node palpated. CHEST:  Unlabored breathing. CARDIOVASCULAR:  No cyanosis, no edema. ABDOMEN:  Soft. NEUROLOGIC:  Cranial nerves are grossly intact. No focal deficit. No  ataxic gait. No incontinence of urine or stools. Motor and sensory  functions are within normal in both lower and upper extremities. Tenderness of paravertebral muscles in the lumbar area. IMPRESSION:  Chronic pain syndrome, rheumatoid arthritis, fractured L3,  spinal stenosis lumbar. PLAN:  The patient is seen in the pain clinic where she gets MS Contin  15 mg b.i.d. and Percocet 7.5 mg b.i.d. This was just started a month  ago, prior to that she was on Percocet 7.5 mg q.i.d. The patient states  that the morphine did not help much and that the Percocet was working  better. Currently, the patient is on the MS Contin 15 mg one p.o.  b.i.d. which we will discontinue. We will continue her Percocet 7.5 mg  q.6 h. p.r.n. We will change her morphine IV from 2 mg IV q.4 h. p.r.n.  to 1 mg IV q.4 h. p.r.n. with the anticipation to discontinue it  tomorrow morning. We will continue to monitor the patient with you.         Karina Goodman MD    D: 06/12/2019 17:54:34       T: 06/12/2019 19:43:04     CE_JOSE [email or web address]_[email or web address]  Job#: [id number]     Doc#: [id number]    CC:  <>

## 2019-06-14 ENCOUNTER — OFFICE VISIT (OUTPATIENT)
Dept: INTERNAL MEDICINE CLINIC | Age: 79
End: 2019-06-14
Payer: MEDICARE

## 2019-06-14 VITALS
HEART RATE: 84 BPM | SYSTOLIC BLOOD PRESSURE: 138 MMHG | WEIGHT: 147 LBS | BODY MASS INDEX: 23.73 KG/M2 | RESPIRATION RATE: 14 BRPM | DIASTOLIC BLOOD PRESSURE: 82 MMHG | OXYGEN SATURATION: 92 %

## 2019-06-14 DIAGNOSIS — Z09 HOSPITAL DISCHARGE FOLLOW-UP: Primary | ICD-10-CM

## 2019-06-14 DIAGNOSIS — M80.00XD OSTEOPOROSIS WITH CURRENT PATHOLOGICAL FRACTURE WITH ROUTINE HEALING, UNSPECIFIED OSTEOPOROSIS TYPE, SUBSEQUENT ENCOUNTER: ICD-10-CM

## 2019-06-14 PROCEDURE — 1111F DSCHRG MED/CURRENT MED MERGE: CPT | Performed by: INTERNAL MEDICINE

## 2019-06-14 PROCEDURE — 99496 TRANSJ CARE MGMT HIGH F2F 7D: CPT | Performed by: INTERNAL MEDICINE

## 2019-06-14 NOTE — PROGRESS NOTES
Post-Discharge Transitional Care Management Services or Hospital Follow Up      Paramjit Walden   YOB: 1940    Date of Office Visit:  6/14/2019  Date of Hospital Admission: 6/9/19  Date of Hospital Discharge: 6/13/19  Risk of hospital readmission (high >=14%. Medium >=10%) :Readmission Risk Score: 20      Care management risk score Rising risk (score 2-5) and Complex Care (Scores >=6): 3     Non face to face  following discharge, date last encounter closed (first attempt may have been earlier): *No documented post hospital discharge outreach found in the last 14 days    Call initiated 2 business days of discharge: *No response recorded in the last 14 days    Patient Active Problem List   Diagnosis    Rheumatoid Arthritis    Hypertension    Hyperlipidemia    Fibromyalgia    Migraine    Chronic pain syndrome    Depression    Osteoporosis    Vitamin B12 deficiency    Lumbar spinal stenosis    Panic attack    Takotsubo syndrome    Blood loss anemia    COPD, severe ([address]    Former smoker    Nocturnal hypoxemia    Back pain    Intractable low back pain       Allergies   Allergen Reactions    Ativan [Lorazepam] Other (See Comments)     Violent, thrashing and combative. She has lacerations and bruising, had to be tied down.  Etanercept Other (See Comments)     No response    Humira [Adalimumab]     Prochlorperazine Edisylate Other (See Comments)     \"Compazine\"   Involuntary Muscle Spasms     Remicade [Infliximab Injection]     Doxycycline Other (See Comments)     Stomach pains       Medications listed as ordered at the time of discharge from hospital   [facility] Pires   [employer] Medication Instructions DANA:    Printed on:06/14/19 8701   Medication Information                      Adalimumab (HUMIRA) 40 MG/0.4ML PSKT  Inject into the skin             alendronate (FOSAMAX) 70 MG tablet  TAKE 1 TABLET ONCE A WEEK 30 MINUTES BEFORE FOOD, DRINK OR MEDS. STAY UPRIGHT. 1 tablet by mouth 2 times daily 60 tablet 3    esomeprazole (NEXIUM) 40 MG delayed release capsule TAKE 1 CAPSULE BY MOUTH TWICE A DAY (MORNING AND BEFORE BED)  5    ARIPiprazole (ABILIFY) 2 MG tablet Take 1 tablet by mouth daily 30 tablet 1    metoprolol tartrate (LOPRESSOR) 50 MG tablet TAKE 1 TABLET BY MOUTH 2 TIMES DAILY 120 tablet 11    alendronate (FOSAMAX) 70 MG tablet TAKE 1 TABLET ONCE A WEEK 30 MINUTES BEFORE FOOD, DRINK OR MEDS. STAY UPRIGHT. 11    ipratropium-albuterol (DUONEB) 0.5-2.5 (3) MG/3ML SOLN nebulizer solution Inhale 3 mLs into the lungs every 4 hours (while awake) 360 mL 2    ondansetron (ZOFRAN-ODT) 4 MG disintegrating tablet Take 4 mg by mouth as needed for Nausea or Vomiting      PROAIR  (90 Base) MCG/ACT inhaler INHALE 2 PUFFS BY ORAL INHALATION EVERY 4 TO 6 HOURS AS NEEDED 8.5 g 10    traZODone (DESYREL) 50 MG tablet Take 1 tablet by mouth nightly 30 tablet 6    losartan (COZAAR) 100 MG tablet TAKE ONE TABLET BY MOUTH DAILY 90 tablet 1    atorvastatin (LIPITOR) 10 MG tablet TAKE 1 TABLET BY MOUTH EVERY EVENING 90 tablet 1    BREO ELLIPTA 100-25 MCG/INH AEPB inhaler INHALE 1 PUFF INTO THE LUNGS DAILY AFTER INHALATION THEN GARGLE 1 each 11    potassium chloride (KLOR-CON) 10 MEQ extended release tablet TAKE 2 TABLETS BY MOUTH EVERY MORNING AND TAKE 1 TABLET BY MOUTH EVERY EVENING 90 tablet 3    oxyCODONE-acetaminophen (PERCOCET) 7.5-325 MG per tablet Take 1 tablet by mouth 4 times daily. Agatha Mata Adalimumab (HUMIRA) 40 MG/0.4ML PSKT Inject into the skin      INCRUSE ELLIPTA 62.5 MCG/INH AEPB INHALE 1 PUFF VIA ORAL INHALATION ONCE DAILY 1 each 11    vitamin B-12 (CYANOCOBALAMIN) 1000 MCG tablet Take 1,000 mcg by mouth daily.  DULoxetine (CYMBALTA) 60 MG capsule Take 60 mg by mouth daily.       Calcium Citrate-Vitamin D (CITRACAL + D PO) Take 600 mg by mouth daily           Medications patient taking as of now reconciled against medications ordered at time of hospital discharge: Yes    Chief Complaint   Patient presents with   [address] from Hospital       History of Present illness - Follow up of Hospital diagnosis(es): Vert fx L4;and back pain     Acute on chronic Back pain 2/2 compression fracture  S/P vertebroplasty 06/12/2019 by Dr Reynold Clemons  MRI: Remote compression fractures of L3 and L4.  Previous vertebroplasty of L3. Retropulsion of L3 and L4 relative to L2 and L5  Outpatient follow up with pain management      LE edema  -on lasix as per PCP    No evidence of DVT in either lower extremity.     RA  -on humira     COPD with chronic resp failure   -duonebs     HTN      Inpatient course: Discharge summary reviewed- see chart. She admitted thru ER for back pain;   Ultimately had eval by IR and had kyphoplasty Wed 6/12 by Jolie Wall  She had PT eval   Assessment:  Pt is a 65 yo female who presents to [address] [place] [address] with compression fx, had kyphoplasty L4 today. She is doing well, pleasant and cooperative. Mobilizing at [facility]. She has occasional lapses in safety awareness that compromise BLT precaution adherence. Pt was thoroughly educated on activity modification to prevent further compression fxs. She will benefit from skilled PT for remainder of admission and HH PT at d/c for safe transition home. Activity Tolerance: good  Complexity: Moderate  Prognosis: Good, no significant barriers to participation at this time. Plan  days/week: 3+/ 1 week        Goals:  1.pt will transfer consistently at mod I with good eccentric lowering  2. Pt will ambulate 150 ft with RW at mod I   3.  Pt will adhere to BLT precautions with all ADLs and transfers throughout session         Treatment plan:  Bed mobility, transfers, balance, gait, TA, TX, device training, safety education, stairs     She was seen in consult by Dr Mary Julian who will f/u as OP  Taking percocet as before  Ambulatory with walker    Her back pain is MUCH improved and she is back to baseline as she was prior to fall    Is comfortable      Interval history/Current status: satisfactory    A comprehensive review of systems was negative except for what was noted in the HPI. Vitals:    06/14/19 1426   BP: 138/82   Pulse: 84   Resp: 14   SpO2: 92%   Weight: 147 lb (66.7 kg)     Body mass index is 23.73 kg/m². Wt Readings from Last 3 Encounters:   06/14/19 147 lb (66.7 kg)   06/10/19 151 lb (68.5 kg)   06/07/19 146 lb (66.2 kg)     BP Readings from Last 3 Encounters:   06/14/19 138/82   06/13/19 (!) 157/66   06/12/19 (!) 179/78        Physical Exam:  General Appearance: alert and oriented to person, place and time, well developed and well- nourished, in no acute distress  Skin: warm and dry, no rash or erythema  Head: normocephalic and atraumatic  Eyes: pupils equal, round, and reactive to light, extraocular eye movements intact, conjunctivae normal  ENT: tympanic membrane, external ear and ear canal normal bilaterally, nose without deformity, nasal mucosa and turbinates normal without polyps  Neck: supple and non-tender without mass, no thyromegaly or thyroid nodules, no cervical lymphadenopathy  Pulmonary/Chest: clear to auscultation bilaterally- no wheezes, rales or rhonchi, normal air movement, no respiratory distress  Cardiovascular: normal rate, regular rhythm, normal S1 and S2, no murmurs, rubs, clicks, or gallops, distal pulses intact, no carotid bruits  Abdomen: soft, non-tender, non-distended, normal bowel sounds, no masses or organomegaly  Extremities: no cyanosis, clubbing or edema  Musculoskeletal: normal range of motion, no joint swelling, deformity or tenderness  Neurologic: reflexes normal and symmetric, no cranial nerve deficit, gait, coordination and speech normal    Assessment/Plan:  1. Hospital discharge follow-up  Will continue PT as OP    - UT DISCHARGE MEDS RECONCILED W/ CURRENT OUTPATIENT MED LIST    2.  Osteoporosis with current pathological fracture with routine healing, unspecified osteoporosis type, subsequent encounter  Needs to switch from Fosamax to Forteo    She has discussed this in past with Dr Shelby Sheppard who handles her RA  She will price it and RTC 4 wk and I will start Forteo then as it will be 3 mo before she sees Shelby Sheppard again    [address] NEXT VISIT  F/U [facility]        Medical Decision Making: high complexity

## 2019-06-25 RX ORDER — UMECLIDINIUM 62.5 UG/1
AEROSOL, POWDER ORAL
Qty: 1 EACH | Refills: 11 | Status: SHIPPED | OUTPATIENT
Start: 2019-06-25 | End: 2019-07-08 | Stop reason: SDUPTHER

## 2019-06-26 ENCOUNTER — HOSPITAL ENCOUNTER (OUTPATIENT)
Age: 79
Discharge: HOME | End: 2019-06-26
Payer: MEDICARE

## 2019-06-26 DIAGNOSIS — R60.0 LEG EDEMA: ICD-10-CM

## 2019-06-26 LAB
ANION GAP SERPL CALCULATED.3IONS-SCNC: 10 MMOL/L (ref 4–16)
BASOPHILS ABSOLUTE: 0.1 K/CU MM
BASOPHILS RELATIVE PERCENT: 0.6 % (ref 0–1)
BUN BLDV-MCNC: 8 MG/DL (ref 6–23)
CALCIUM SERPL-MCNC: 8.9 MG/DL (ref 8.3–10.6)
CHLORIDE BLD-SCNC: 96 MMOL/L (ref 99–110)
CO2: 31 MMOL/L (ref 21–32)
CREAT SERPL-MCNC: 0.7 MG/DL (ref 0.6–1.1)
DIFFERENTIAL TYPE: ABNORMAL
EOSINOPHILS ABSOLUTE: 0.1 K/CU MM
EOSINOPHILS RELATIVE PERCENT: 1.1 % (ref 0–3)
GFR AFRICAN AMERICAN: >60 ML/MIN/1.73M2
GFR NON-AFRICAN AMERICAN: >60 ML/MIN/1.73M2
GLUCOSE BLD-MCNC: 87 MG/DL (ref 70–99)
HCT VFR BLD CALC: 37.8 % (ref 37–47)
HEMOGLOBIN: 10.7 GM/DL (ref 12.5–16)
IMMATURE NEUTROPHIL %: 0.2 % (ref 0–0.43)
LYMPHOCYTES ABSOLUTE: 2.4 K/CU MM
LYMPHOCYTES RELATIVE PERCENT: 29.9 % (ref 24–44)
MCH RBC QN AUTO: 25.2 PG (ref 27–31)
MCHC RBC AUTO-ENTMCNC: 28.3 % (ref 32–36)
MCV RBC AUTO: 88.9 FL (ref 78–100)
MONOCYTES ABSOLUTE: 1.2 K/CU MM
MONOCYTES RELATIVE PERCENT: 15 % (ref 0–4)
NUCLEATED RBC %: 0 %
PDW BLD-RTO: 17 % (ref 11.7–14.9)
PLATELET # BLD: 392 K/CU MM (ref 140–440)
PMV BLD AUTO: 11 FL (ref 7.5–11.1)
POTASSIUM SERPL-SCNC: 4 MMOL/L (ref 3.5–5.1)
RBC # BLD: 4.25 M/CU MM (ref 4.2–5.4)
SEGMENTED NEUTROPHILS ABSOLUTE COUNT: 4.3 K/CU MM
SEGMENTED NEUTROPHILS RELATIVE PERCENT: 53.2 % (ref 36–66)
SODIUM BLD-SCNC: 137 MMOL/L (ref 135–145)
TOTAL IMMATURE NEUTOROPHIL: 0.02 K/CU MM
TOTAL NUCLEATED RBC: 0 K/CU MM
WBC # BLD: 8 K/CU MM (ref 4–10.5)

## 2019-06-26 PROCEDURE — 85025 COMPLETE CBC W/AUTO DIFF WBC: CPT

## 2019-06-26 PROCEDURE — 36415 COLL VENOUS BLD VENIPUNCTURE: CPT

## 2019-06-26 PROCEDURE — 80048 BASIC METABOLIC PNL TOTAL CA: CPT

## 2019-07-08 ENCOUNTER — OFFICE VISIT (OUTPATIENT)
Dept: CARDIOLOGY CLINIC | Age: 79
End: 2019-07-08
Payer: MEDICARE

## 2019-07-08 VITALS
SYSTOLIC BLOOD PRESSURE: 154 MMHG | BODY MASS INDEX: 24.08 KG/M2 | OXYGEN SATURATION: 94 % | DIASTOLIC BLOOD PRESSURE: 68 MMHG | HEART RATE: 69 BPM | HEIGHT: 66 IN | RESPIRATION RATE: 14 BRPM | WEIGHT: 149.8 LBS

## 2019-07-08 DIAGNOSIS — R53.82 CHRONIC FATIGUE: Primary | ICD-10-CM

## 2019-07-08 PROCEDURE — 99202 OFFICE O/P NEW SF 15 MIN: CPT | Performed by: NURSE PRACTITIONER

## 2019-07-08 NOTE — PROGRESS NOTES
[address]      Visit Date: 7/8/2019  Cardiologist:  Dr. Marixa Marquez  Primary Care Physician: Dr. Radha Farooq MD    Karine Hong is a 66 y.o. female who presents today for:  Chief Complaint   Patient presents with    Congestive Heart Failure       HPI:   Karine Hong is a 66 y.o. female who presents to the office as new patient visit in the heart failure clinic. Patient c/o shortness of breath on exertion and lower leg edema. Patient states that she is unaware of the diagnosis of Congestive Heart Failure.      Patient has:  Last hospital admission related to Heart Failure:  6/9/19  Chest Pain: No  Worsening SOB/orthopnea/PND: denies  Edema: none  Any extra diuretic use: no  Weight gain: no  Compliant checking home weight: yes  Fatigue: yes  Abdominal bloating: yes  Appetite: decreased  Difficulty sleeping: no  ISAIAH: no, nighttime oxygen  Cough: yes  Compliant checking blood pressure: yes  Compliant with medication regimen: yes  Any refills on CHF medications needed at this time: No  Vaccinations:  flu Yes  Vaccinations : pneumonia Yes    Past Medical History:   Diagnosis Date    Acute DVT of right tibial vein ([address] 07/2017    ER imaging: distal right tibial vein DVT; no anticoag for bleeding/ anemia    Acute exacerbation of chronic obstructive pulmonary disease (COPD) ([address] 12/13/2018    Arthritis     Chronic pain syndrome     Low back pain; lumbar disc disease    Clostridium difficile colitis 09/20/2017    COPD, severe ([address] 10/24/2017    Depression     Fibromyalgia     Former smoker     Quit 1/2019    Herniated cervical disc 5-1998    Herpes zoster ophthalmicus 3/2012    Hx of blood clots     Hyperlipidemia     Hypertension     L3 vertebral fracture ([address] 2010    vertebroplasty    Lumbar spinal stenosis 2015    moderately severe by MRI    Migraine     Nocturnal hypoxemia 5/21/2019    Osteoporosis 2010    Fragility Fx L3    Rheumatoid arthritis(714.0) 1976     Navin Cruz    S/P cardiac catheterization 2017    patent coronaries;  Takotsubo; humberto Bonilla    Shortness of breath 2019    Takotsubo syndrome 2017    TMJ dysfunction     Tobacco abuse 2018    Vitamin B12 deficiency 2014    B12 level 199     Past Surgical History:   Procedure Laterality Date    APPENDECTOMY  1966    CARDIAC CATHETERIZATION  2017    CHOLECYSTECTOMY  1966    w/ appendectomy    ELBOW SURGERY Right     FIXATION KYPHOPLASTY  2019    L4 kyphoplasty ; John Folk    FOOT SURGERY  1986    IL COLONOSCOPY FLX DX W/COLLJ SPEC WHEN PFRMD N/A 10/2/2018    COLONOSCOPY DIAGNOSTIC OR SCREENING performed by Beverley Hinkle MD at [facility]    TOE SURGERY Left 2013    Deboo;     VERTEBROPLASTY  10/2010    L3    WRIST FUSION Left      Family History   Problem Relation Age of Onset    Heart Disease Father          FROM MI   Alcraazcalvin Mendez Emphysema Father     Arthritis Mother     Stroke Mother     Heart Disease Other         family history    Cancer Other         family history -- larynx    Kidney Disease Son      Social History     Tobacco Use    Smoking status: Former Smoker     Packs/day: 0.75     Years: 55.00     Pack years: 41.25     Types: Cigarettes     Start date: 1962     Last attempt to quit: 2019     Years since quittin.5    Smokeless tobacco: Never Used    Tobacco comment: 19- Pt reports that her quit date was 19   Substance Use Topics    Alcohol use: No     Alcohol/week: 0.0 oz     Current Outpatient Medications   Medication Sig Dispense Refill    furosemide (LASIX) 40 MG tablet Take 1 tablet by mouth 2 times daily 60 tablet 3    esomeprazole (NEXIUM) 40 MG delayed release capsule TAKE 1 CAPSULE BY MOUTH TWICE A DAY (MORNING AND BEFORE BED)  5    ARIPiprazole (ABILIFY) 2 MG tablet Take 1 tablet by mouth daily (Patient taking differently: Take 2 mg by mouth as needed ) 30 tablet 1    metoprolol

## 2019-07-12 LAB
AO ROOT DIAM: NORMAL CM
AV MG: NORMAL MMHG
AV V1 MAX: NORMAL CM/S
AV V2 MAX: NORMAL CM/S
AVA DOPPLER: NORMAL CM2
ECHO AV PEAK GRADIENT: NORMAL MMHG
ECHO AV VTI: NORMAL CM
ECHO LV INTERNAL DIMENSION DIASTOLIC: NORMAL CM
ECHO LV INTERNAL DIMENSION SYSTOLIC: NORMAL CM
ECHO LV POSTERIOR WALL DIASTOLIC: NORMAL CM
ECHO LVOT DIAM: NORMAL CM
ECHO MV A VELOCITY: NORMAL CM/S
ECHO MV AREA PHT: NORMAL CM2
ECHO MV E VELOCITY: NORMAL CM/S
ECHO MV E/A RATIO: NORMAL
ECHO MV MEAN GRADIENT: NORMAL MMHG
ECHO PVEIN S/D RATIO: NORMAL
ECHO RV INTERNAL DIMENSION: NORMAL CM
IVC-%COLLAPSE: NORMAL %
IVC: NORMAL CM
IVSD (M-MODE): NORMAL CM
IVSD: NORMAL CM
LAD 2D: NORMAL MM
LAD M-MODE: NORMAL MM
LEFT VENTRICULAR EJECTION FRACTION MODE: NORMAL
LV EF: NORMAL %
LV MASS (M-MODE): NORMAL G
LV MASS/BSA: NORMAL G/M2
LVIDD (M-MODE): NORMAL CM
LVIDS (M-MODE): NORMAL CM
LVPWD (M-MODE): NORMAL CM
LVPWD 2D: NORMAL CM
LVSV: NORMAL ML
MR VELOCITY: NORMAL CM/S
MR VTI: NORMAL CM
MV A POINT: NORMAL CM/S
MV D: NORMAL CM
MV DT: NORMAL MS
MV EPSS: NORMAL MM
MV PG: NORMAL MMHG
MV PHT: NORMAL MS
MV VTI  V2: NORMAL CM
MVA: NORMAL CM2
PA DIAST PRESS: NORMAL MMHG
PISA MR VOL: NORMAL ML
PISA MV REG ALIAS VEL: NORMAL CM/S
RAP: NORMAL MMHG
RVIDD M-MODE: NORMAL CM
RVSP ESTIMATE: NORMAL MMHG
TV RPFV: NORMAL CM/S

## 2019-07-15 ENCOUNTER — CARE COORDINATION (OUTPATIENT)
Dept: CARE COORDINATION | Age: 79
End: 2019-07-15

## 2019-07-15 ENCOUNTER — OFFICE VISIT (OUTPATIENT)
Dept: INTERNAL MEDICINE CLINIC | Age: 79
End: 2019-07-15
Payer: MEDICARE

## 2019-07-15 VITALS
BODY MASS INDEX: 23.53 KG/M2 | WEIGHT: 145.8 LBS | HEART RATE: 83 BPM | OXYGEN SATURATION: 93 % | DIASTOLIC BLOOD PRESSURE: 76 MMHG | SYSTOLIC BLOOD PRESSURE: 134 MMHG | RESPIRATION RATE: 14 BRPM

## 2019-07-15 DIAGNOSIS — E78.2 MIXED HYPERLIPIDEMIA: ICD-10-CM

## 2019-07-15 DIAGNOSIS — R60.0 LEG EDEMA: ICD-10-CM

## 2019-07-15 DIAGNOSIS — N18.9 ANEMIA DUE TO CHRONIC KIDNEY DISEASE, UNSPECIFIED CKD STAGE: Primary | ICD-10-CM

## 2019-07-15 DIAGNOSIS — D63.1 ANEMIA DUE TO CHRONIC KIDNEY DISEASE, UNSPECIFIED CKD STAGE: Primary | ICD-10-CM

## 2019-07-15 PROCEDURE — G8420 CALC BMI NORM PARAMETERS: HCPCS | Performed by: INTERNAL MEDICINE

## 2019-07-15 PROCEDURE — 1090F PRES/ABSN URINE INCON ASSESS: CPT | Performed by: INTERNAL MEDICINE

## 2019-07-15 PROCEDURE — 99214 OFFICE O/P EST MOD 30 MIN: CPT | Performed by: INTERNAL MEDICINE

## 2019-07-15 PROCEDURE — 1036F TOBACCO NON-USER: CPT | Performed by: INTERNAL MEDICINE

## 2019-07-15 PROCEDURE — G8427 DOCREV CUR MEDS BY ELIG CLIN: HCPCS | Performed by: INTERNAL MEDICINE

## 2019-07-15 PROCEDURE — G8399 PT W/DXA RESULTS DOCUMENT: HCPCS | Performed by: INTERNAL MEDICINE

## 2019-07-15 PROCEDURE — 4040F PNEUMOC VAC/ADMIN/RCVD: CPT | Performed by: INTERNAL MEDICINE

## 2019-07-15 PROCEDURE — 1123F ACP DISCUSS/DSCN MKR DOCD: CPT | Performed by: INTERNAL MEDICINE

## 2019-07-15 RX ORDER — FERROUS SULFATE 325(65) MG
325 TABLET ORAL 2 TIMES DAILY WITH MEALS
Status: ON HOLD | COMMUNITY
End: 2019-12-31 | Stop reason: SINTOL

## 2019-07-15 NOTE — PROGRESS NOTES
120 tablet 11    alendronate (FOSAMAX) 70 MG tablet TAKE 1 TABLET ONCE A WEEK 30 MINUTES BEFORE FOOD, DRINK OR MEDS. STAY UPRIGHT. 11    ipratropium-albuterol (DUONEB) 0.5-2.5 (3) MG/3ML SOLN nebulizer solution Inhale 3 mLs into the lungs every 4 hours (while awake) 360 mL 2    ondansetron (ZOFRAN-ODT) 4 MG disintegrating tablet Take 4 mg by mouth as needed for Nausea or Vomiting      PROAIR  (90 Base) MCG/ACT inhaler INHALE 2 PUFFS BY ORAL INHALATION EVERY 4 TO 6 HOURS AS NEEDED 8.5 g 10    traZODone (DESYREL) 50 MG tablet Take 1 tablet by mouth nightly 30 tablet 6    losartan (COZAAR) 100 MG tablet TAKE ONE TABLET BY MOUTH DAILY 90 tablet 1    atorvastatin (LIPITOR) 10 MG tablet TAKE 1 TABLET BY MOUTH EVERY EVENING 90 tablet 1    BREO ELLIPTA 100-25 MCG/INH AEPB inhaler INHALE 1 PUFF INTO THE LUNGS DAILY AFTER INHALATION THEN GARGLE 1 each 11    potassium chloride (KLOR-CON) 10 MEQ extended release tablet TAKE 2 TABLETS BY MOUTH EVERY MORNING AND TAKE 1 TABLET BY MOUTH EVERY EVENING 90 tablet 3    oxyCODONE-acetaminophen (PERCOCET) 7.5-325 MG per tablet Take 1 tablet by mouth 4 times daily. Denyce Fester INCRUSE ELLIPTA 62.5 MCG/INH AEPB INHALE 1 PUFF VIA ORAL INHALATION ONCE DAILY 1 each 11    vitamin B-12 (CYANOCOBALAMIN) 1000 MCG tablet Take 1,000 mcg by mouth daily.  DULoxetine (CYMBALTA) 60 MG capsule Take 60 mg by mouth daily.  Calcium Citrate-Vitamin D (CITRACAL + D PO) Take 600 mg by mouth daily        No current facility-administered medications for this visit.           Past Medical History:   Diagnosis Date    Acute DVT of right tibial vein ([facility] 07/2017    ER imaging: distal right tibial vein DVT; no anticoag for bleeding/ anemia    Acute exacerbation of chronic obstructive pulmonary disease (COPD) ([facility] 12/13/2018    Arthritis     Chronic pain syndrome     Low back pain; lumbar disc disease    Clostridium difficile colitis 09/20/2017    COPD, severe ([facility] 10/24/2017    Musculoskeletal: Normal range of motion. Neurological: She is alert and oriented to person, place, and time. Coordination normal.   Skin: Skin is warm and dry. Psychiatric: She has a normal mood and affect. Her behavior is normal.   Vitals reviewed. June lab on chart and rev'd ; unremarkable  hgb 10.7     echo and myoview rev;d and copy provided to her    Assessment / Plan:      1. Anemia due to chronic kidney disease, unspecified CKD stage    2. Leg edema    3.  Mixed hyperlipidemia            Plan   Iron bid  F/u CHF clinic  RTC 4 wk, lab first  Orders Placed This Encounter   Procedures    CBC Auto Differential    Comprehensive Metabolic Panel    Lipid Panel     AT THAT TIME BEGIN FORTEO FOR FX

## 2019-07-29 ENCOUNTER — NURSE ONLY (OUTPATIENT)
Dept: PULMONOLOGY | Age: 79
End: 2019-07-29

## 2019-07-29 DIAGNOSIS — J44.9 COPD, MODERATE (HCC): Primary | ICD-10-CM

## 2019-07-29 PROCEDURE — 99999 PR OFFICE/OUTPT VISIT,PROCEDURE ONLY: CPT | Performed by: INTERNAL MEDICINE

## 2019-07-30 DIAGNOSIS — J44.9 COPD, SEVERE (HCC): ICD-10-CM

## 2019-07-30 LAB
EXPIRATORY TIME-POST: NORMAL SEC
EXPIRATORY TIME: NORMAL SEC
FEF 25-75% %CHNG: NORMAL
FEF 25-75% %PRED-POST: NORMAL %
FEF 25-75% %PRED-PRE: NORMAL L/SEC
FEF 25-75% PRED: NORMAL L/SEC
FEF 25-75%-POST: NORMAL L/SEC
FEF 25-75%-PRE: NORMAL L/SEC
FEV1 %PRED-POST: 48 %
FEV1 %PRED-PRE: 51 %
FEV1 PRED: 2.18 L
FEV1-POST: 1.04 L
FEV1-PRE: 1.11 L
FEV1/FVC %PRED-POST: 85 %
FEV1/FVC %PRED-PRE: 85 %
FEV1/FVC PRED: 74.2 %
FEV1/FVC-POST: 63 %
FEV1/FVC-PRE: 63.1 %
FVC %PRED-POST: 57 L
FVC %PRED-PRE: 61 %
FVC PRED: 2.91 L
FVC-POST: 1.65 L
FVC-PRE: 1.76 L
PEF %PRED-POST: NORMAL %
PEF %PRED-PRE: NORMAL L/SEC
PEF PRED: NORMAL L/SEC
PEF%CHNG: NORMAL
PEF-POST: NORMAL L/SEC
PEF-PRE: NORMAL L/SEC

## 2019-07-30 PROCEDURE — 94060 EVALUATION OF WHEEZING: CPT | Performed by: INTERNAL MEDICINE

## 2019-07-30 ASSESSMENT — PULMONARY FUNCTION TESTS
FVC_PRE: 1.76
FVC_POST: 1.65
FEV1/FVC_PERCENT_PREDICTED_PRE: 85
FEV1_PERCENT_PREDICTED_PRE: 51
FEV1_PREDICTED: 2.18
FVC_PREDICTED: 2.91
FEV1/FVC_PREDICTED: 74.2
FEV1_PERCENT_PREDICTED_POST: 48
FVC_PERCENT_PREDICTED_POST: 57
FVC_PERCENT_PREDICTED_PRE: 61
FEV1_POST: 1.04
FEV1/FVC_POST: 63.0
FEV1/FVC_PERCENT_PREDICTED_POST: 85
FEV1_PRE: 1.11
FEV1/FVC_PRE: 63.1

## 2019-08-06 ENCOUNTER — HOSPITAL ENCOUNTER (OUTPATIENT)
Dept: GENERAL RADIOLOGY | Age: 79
Discharge: HOME | End: 2019-08-06
Payer: MEDICARE

## 2019-08-06 ENCOUNTER — HOSPITAL ENCOUNTER (OUTPATIENT)
Age: 79
Discharge: HOME | End: 2019-08-06
Payer: MEDICARE

## 2019-08-06 DIAGNOSIS — M16.12 UNILATERAL PRIMARY OSTEOARTHRITIS, LEFT HIP: ICD-10-CM

## 2019-08-06 LAB
ALBUMIN SERPL-MCNC: 4 GM/DL (ref 3.4–5)
ALP BLD-CCNC: 88 IU/L (ref 40–128)
ALT SERPL-CCNC: 7 U/L (ref 10–40)
ANION GAP SERPL CALCULATED.3IONS-SCNC: 12 MMOL/L (ref 4–16)
AST SERPL-CCNC: 15 IU/L (ref 15–37)
BASOPHILS ABSOLUTE: 0.1 K/CU MM
BASOPHILS RELATIVE PERCENT: 0.6 % (ref 0–1)
BILIRUB SERPL-MCNC: 0.3 MG/DL (ref 0–1)
BUN BLDV-MCNC: 14 MG/DL (ref 6–23)
CALCIUM SERPL-MCNC: 10 MG/DL (ref 8.3–10.6)
CHLORIDE BLD-SCNC: 90 MMOL/L (ref 99–110)
CHOLESTEROL: 146 MG/DL
CO2: 28 MMOL/L (ref 21–32)
CREAT SERPL-MCNC: 0.8 MG/DL (ref 0.6–1.1)
DIFFERENTIAL TYPE: ABNORMAL
EOSINOPHILS ABSOLUTE: 0.1 K/CU MM
EOSINOPHILS RELATIVE PERCENT: 1.6 % (ref 0–3)
GFR AFRICAN AMERICAN: >60 ML/MIN/1.73M2
GFR NON-AFRICAN AMERICAN: >60 ML/MIN/1.73M2
GLUCOSE BLD-MCNC: 98 MG/DL (ref 70–99)
HCT VFR BLD CALC: 41.5 % (ref 37–47)
HDLC SERPL-MCNC: 27 MG/DL
HEMOGLOBIN: 12.4 GM/DL (ref 12.5–16)
IMMATURE NEUTROPHIL %: 0.2 % (ref 0–0.43)
LDL CHOLESTEROL DIRECT: 60 MG/DL
LYMPHOCYTES ABSOLUTE: 2.9 K/CU MM
LYMPHOCYTES RELATIVE PERCENT: 34.9 % (ref 24–44)
MCH RBC QN AUTO: 24.8 PG (ref 27–31)
MCHC RBC AUTO-ENTMCNC: 29.9 % (ref 32–36)
MCV RBC AUTO: 83.2 FL (ref 78–100)
MONOCYTES ABSOLUTE: 1.2 K/CU MM
MONOCYTES RELATIVE PERCENT: 14.1 % (ref 0–4)
NUCLEATED RBC %: 0 %
PDW BLD-RTO: 18 % (ref 11.7–14.9)
PLATELET # BLD: 357 K/CU MM (ref 140–440)
PMV BLD AUTO: 11.4 FL (ref 7.5–11.1)
POTASSIUM SERPL-SCNC: 4.8 MMOL/L (ref 3.5–5.1)
RBC # BLD: 4.99 M/CU MM (ref 4.2–5.4)
SEGMENTED NEUTROPHILS ABSOLUTE COUNT: 4 K/CU MM
SEGMENTED NEUTROPHILS RELATIVE PERCENT: 48.6 % (ref 36–66)
SODIUM BLD-SCNC: 130 MMOL/L (ref 135–145)
TOTAL IMMATURE NEUTOROPHIL: 0.02 K/CU MM
TOTAL NUCLEATED RBC: 0 K/CU MM
TOTAL PROTEIN: 6.4 GM/DL (ref 6.4–8.2)
TRIGL SERPL-MCNC: 392 MG/DL
WBC # BLD: 8.2 K/CU MM (ref 4–10.5)

## 2019-08-06 PROCEDURE — 83721 ASSAY OF BLOOD LIPOPROTEIN: CPT

## 2019-08-06 PROCEDURE — 80061 LIPID PANEL: CPT

## 2019-08-06 PROCEDURE — 80053 COMPREHEN METABOLIC PANEL: CPT

## 2019-08-06 PROCEDURE — 85025 COMPLETE CBC W/AUTO DIFF WBC: CPT

## 2019-08-06 PROCEDURE — 36415 COLL VENOUS BLD VENIPUNCTURE: CPT

## 2019-08-06 PROCEDURE — 73501 X-RAY EXAM HIP UNI 1 VIEW: CPT

## 2019-08-12 ENCOUNTER — OFFICE VISIT (OUTPATIENT)
Dept: INTERNAL MEDICINE CLINIC | Age: 79
End: 2019-08-12
Payer: MEDICARE

## 2019-08-12 VITALS
WEIGHT: 139 LBS | OXYGEN SATURATION: 96 % | BODY MASS INDEX: 22.44 KG/M2 | DIASTOLIC BLOOD PRESSURE: 76 MMHG | HEART RATE: 70 BPM | RESPIRATION RATE: 16 BRPM | SYSTOLIC BLOOD PRESSURE: 134 MMHG

## 2019-08-12 DIAGNOSIS — M54.50 CHRONIC MIDLINE LOW BACK PAIN WITHOUT SCIATICA: ICD-10-CM

## 2019-08-12 DIAGNOSIS — G89.29 CHRONIC MIDLINE LOW BACK PAIN WITHOUT SCIATICA: ICD-10-CM

## 2019-08-12 DIAGNOSIS — Z87.81 HISTORY OF VERTEBRAL FRACTURE: ICD-10-CM

## 2019-08-12 DIAGNOSIS — D50.0 BLOOD LOSS ANEMIA: ICD-10-CM

## 2019-08-12 DIAGNOSIS — J44.9 COPD, SEVERE (HCC): ICD-10-CM

## 2019-08-12 DIAGNOSIS — M81.0 OSTEOPOROSIS, UNSPECIFIED OSTEOPOROSIS TYPE, UNSPECIFIED PATHOLOGICAL FRACTURE PRESENCE: Primary | ICD-10-CM

## 2019-08-12 PROCEDURE — G8399 PT W/DXA RESULTS DOCUMENT: HCPCS | Performed by: INTERNAL MEDICINE

## 2019-08-12 PROCEDURE — 3023F SPIROM DOC REV: CPT | Performed by: INTERNAL MEDICINE

## 2019-08-12 PROCEDURE — 99214 OFFICE O/P EST MOD 30 MIN: CPT | Performed by: INTERNAL MEDICINE

## 2019-08-12 PROCEDURE — G8427 DOCREV CUR MEDS BY ELIG CLIN: HCPCS | Performed by: INTERNAL MEDICINE

## 2019-08-12 PROCEDURE — 1036F TOBACCO NON-USER: CPT | Performed by: INTERNAL MEDICINE

## 2019-08-12 PROCEDURE — 1090F PRES/ABSN URINE INCON ASSESS: CPT | Performed by: INTERNAL MEDICINE

## 2019-08-12 PROCEDURE — 4040F PNEUMOC VAC/ADMIN/RCVD: CPT | Performed by: INTERNAL MEDICINE

## 2019-08-12 PROCEDURE — G8926 SPIRO NO PERF OR DOC: HCPCS | Performed by: INTERNAL MEDICINE

## 2019-08-12 PROCEDURE — G8420 CALC BMI NORM PARAMETERS: HCPCS | Performed by: INTERNAL MEDICINE

## 2019-08-12 PROCEDURE — 1123F ACP DISCUSS/DSCN MKR DOCD: CPT | Performed by: INTERNAL MEDICINE

## 2019-08-12 RX ORDER — MULTIVIT-MIN/IRON/FOLIC ACID/K 18-600-40
1 CAPSULE ORAL DAILY
COMMUNITY

## 2019-08-12 RX ORDER — SPIRONOLACTONE 50 MG/1
TABLET, FILM COATED ORAL
Refills: 11 | Status: ON HOLD | COMMUNITY
Start: 2019-07-17 | End: 2020-02-16 | Stop reason: HOSPADM

## 2019-08-12 NOTE — PROGRESS NOTES
Subjective:      Haley Figueredo is a 66 y.o. female who presents today for follow up on her chronic medical conditions as noted below. Patient Active Problem List:     Rheumatoid Arthritis     Hypertension     Hyperlipidemia     Fibromyalgia     Migraine     Chronic pain syndrome     Depression     Osteoporosis     Vitamin B12 deficiency     Lumbar spinal stenosis     Panic attack     Takotsubo syndrome     Blood loss anemia     COPD, severe (HCC)     Former smoker     Nocturnal hypoxemia     Back pain     Intractable low back pain     Chronic fatigue     She was last here a month ago following hospitalization for back pain and kyphoplasty     She had 2 trigger point injections in Hossein's office after the kypphoplasty; in her back      one shot in left hip also  Shae Serra will \"freeze\" nerve in back in a few days    She needs forteo now; 20 mcg daily    Will give 3 ml syringe: 600 mcg/ 2.4 m. Emotionally fine with desyrel and cymbalta  No more abilify    Irene added aldactone recently for elevated bp    Svetlana Stover started fosamax a couple months ago  No hip fx , but vertibral fx history  She took reclast 5 yr and last injection ws 8/2018    I had her watch video on Forteo admin and [place]  She will stop fosamax , take caltrate and vit d 2000 units/ d and begin forteo daily    She has seen Monjot and COPD is mod to severe and stable  The patient denies cough, chest pain, dyspnea, wheezing or hemoptysis.     Current Outpatient Medications   Medication Sig Dispense Refill    spironolactone (ALDACTONE) 50 MG tablet TAKE 1 TABLET BY ORAL ROUTE EVERY DAY  11    teriparatide, recombinant, (FORTEO) 600 MCG/2.4ML SOLN injection Inject 0.08 mLs into the skin daily 2.4 mL 5    Cholecalciferol (VITAMIN D) 2000 units CAPS capsule Take 1 capsule by mouth daily      ferrous sulfate 325 (65 Fe) MG tablet Take 325 mg by mouth 2 times daily (with meals)      furosemide (LASIX) 40 MG tablet Take 1 tablet by mouth 2 times daily 60 tablet 3    esomeprazole (NEXIUM) 40 MG delayed release capsule TAKE 1 CAPSULE BY MOUTH TWICE A DAY (MORNING AND BEFORE BED)  5    metoprolol tartrate (LOPRESSOR) 50 MG tablet TAKE 1 TABLET BY MOUTH 2 TIMES DAILY 120 tablet 11    ipratropium-albuterol (DUONEB) 0.5-2.5 (3) MG/3ML SOLN nebulizer solution Inhale 3 mLs into the lungs every 4 hours (while awake) 360 mL 2    ondansetron (ZOFRAN-ODT) 4 MG disintegrating tablet Take 4 mg by mouth as needed for Nausea or Vomiting      PROAIR  (90 Base) MCG/ACT inhaler INHALE 2 PUFFS BY ORAL INHALATION EVERY 4 TO 6 HOURS AS NEEDED 8.5 g 10    traZODone (DESYREL) 50 MG tablet Take 1 tablet by mouth nightly 30 tablet 6    losartan (COZAAR) 100 MG tablet TAKE ONE TABLET BY MOUTH DAILY 90 tablet 1    atorvastatin (LIPITOR) 10 MG tablet TAKE 1 TABLET BY MOUTH EVERY EVENING 90 tablet 1    BREO ELLIPTA 100-25 MCG/INH AEPB inhaler INHALE 1 PUFF INTO THE LUNGS DAILY AFTER INHALATION THEN GARGLE 1 each 11    potassium chloride (KLOR-CON) 10 MEQ extended release tablet TAKE 2 TABLETS BY MOUTH EVERY MORNING AND TAKE 1 TABLET BY MOUTH EVERY EVENING 90 tablet 3    oxyCODONE-acetaminophen (PERCOCET) 7.5-325 MG per tablet Take 1 tablet by mouth 4 times daily. Seb Cutting INCRUSE ELLIPTA 62.5 MCG/INH AEPB INHALE 1 PUFF VIA ORAL INHALATION ONCE DAILY 1 each 11    vitamin B-12 (CYANOCOBALAMIN) 1000 MCG tablet Take 1,000 mcg by mouth daily.  DULoxetine (CYMBALTA) 60 MG capsule Take 60 mg by mouth daily.  Calcium Citrate-Vitamin D (CITRACAL + D PO) Take 600 mg by mouth daily        No current facility-administered medications for this visit.           Past Medical History:   Diagnosis Date    Acute DVT of right tibial vein ([address] 07/2017    ER imaging: distal right tibial vein DVT; no anticoag for bleeding/ anemia    Acute exacerbation [facility] chronic obstructive pulmonary disease (COPD) [facility] 12/13/2018    Arthritis     Chronic pain syndrome     Low back pain; lumbar disc disease    Clostridium difficile colitis 2017    COPD, severe ([address] 10/24/2017    Depression     Fibromyalgia     Former smoker     Quit 2019    Herniated cervical disc     Herpes zoster ophthalmicus 3/2012    Hx of blood clots     Hyperlipidemia     Hypertension     L3 vertebral fracture ([address]     vertebroplasty    Lumbar spinal stenosis     moderately severe by MRI    Migraine     Nocturnal hypoxemia 2019    Osteoporosis 2010    Fragility Fx L3    Rheumatoid arthritis(714.0)     Dr Nico Benites S/P cardiac catheterization 2017    patent coronaries; Takotsubo; najeeb Ahmed    Shortness of breath 2019    Takotsubo syndrome 2017    TMJ dysfunction     Tobacco abuse 2018    Vitamin B12 deficiency 2014    B12 level 199        Social History     Tobacco Use    Smoking status: Former Smoker     Packs/day: 0.75     Years: 55.00     Pack years: 41.25     Types: Cigarettes     Start date: 1962     Last attempt to quit: 2019     Years since quittin.6    Smokeless tobacco: Never Used    Tobacco comment: 19- Pt reports that her quit date was 19   Substance Use Topics    Alcohol use: No     Alcohol/week: 0.0 standard drinks        ROS: The patient has had no headache, sore throat, fever or chills, cough, dyspnea, chest pain, nausea, vomiting or diarrhea, or edema. Objective:      /76   Pulse 70   Resp 16   Wt 139 lb (63 kg)   SpO2 96%   BMI 22.44 kg/m²      Physical Exam   Constitutional: She is oriented to person, place, and time. She appears well-developed and well-nourished. No distress. HENT:   Head: Normocephalic and atraumatic. Mouth/Throat: Oropharynx is clear and moist.   Eyes: Conjunctivae are normal. No scleral icterus. Neck: Neck supple. Cardiovascular: Normal rate and regular rhythm. No murmur heard. Pulmonary/Chest: Effort normal. No respiratory distress.  She has no wheezes. She has no rales. Abdominal: Soft. She exhibits no distension. There is no tenderness. Musculoskeletal: Normal range of motion. Neurological: She is alert and oriented to person, place, and time. Coordination normal.   Skin: Skin is warm and dry. Psychiatric: She has a normal mood and affect. Her behavior is normal.   Vitals reviewed. Aug lab rev'd    hgb is up from 10 in June to 12.4 now in Aug  Continue iron     Assessment / Plan:      1. Osteoporosis, unspecified osteoporosis type, unspecified pathological fracture presence    2. Blood loss anemia    3. COPD, severe ([address]    4. History of vertebral fracture    5.  Chronic midline low back pain without sciatica            Plan   Stop fosmax   Begin forteo 20 mcg /d  caltrate and vit d daily  F/u with Xiomara Beltran  Get nerve \"frozen\" this week  RTC 6 wk

## 2019-08-12 NOTE — PATIENT INSTRUCTIONS
throw it away). Keep this container out of the reach of children and pets. Read all patient information, medication guides, and instruction sheets provided to you. Ask your doctor or pharmacist if you have any questions. Teriparatide may be only part of a complete program of treatment that also includes diet changes, exercise, taking vitamin or mineral supplements, and changing certain behaviors. Follow your doctor's instructions very closely. Store your teriparatide injection pen in the refrigerator when not in use. Take the pen out of the refrigerator only long enough to use it. After use, remove the needle, recap the pen, and return it to the refrigerator. Do not freeze teriparatide, and throw away the medicine if it has become frozen. Your doctor will determine how long to treat you with this medicine. Teriparatide is often given for only 2 years. What happens if I miss a dose? Use the missed dose on the same day you remember it. Use your next dose at the regular time and stay on your once-daily schedule. Do not use 2 doses in one day. What happens if I overdose? Seek emergency medical attention or call the Poison Help line at 1-769.888.1422. Overdose symptoms may include nausea, vomiting, dizziness, headache, feeling light-headed, or fainting. What should I avoid while using teriparatide? Teriparatide can cause side effects that may impair your thinking or reactions. Be careful if you drive or do anything that requires you to be awake and alert. Avoid smoking, or try to quit. Smoking can reduce your bone mineral density, making fractures more likely. Avoid drinking large amounts of alcohol. Heavy drinking can also cause bone loss. What are the possible side effects of teriparatide? Get emergency medical help if you have signs of an allergic reaction: hives; difficult breathing; swelling of your face, lips, tongue, or throat.   Call your doctor at once if you have:  · a light-headed feeling, like you might pass out (may occur within 4 hours after injection);  · pounding heartbeats or fluttering in your chest after using an injection; or  · high levels of calcium in your blood --nausea, vomiting, constipation, muscle weakness, lack of energy, or tired feeling. Common side effects may include:  · nausea;  · joint pain; or  · pain anywhere in your body. This is not a complete list of side effects and others may occur. Call your doctor for medical advice about side effects. You may report side effects to FDA at 1-134-FDA-8810. What other drugs will affect teriparatide? Tell your doctor about all your current medicines and any you start or stop using, especially:  · digoxin, digitalis. This list is not complete. Other drugs may interact with teriparatide, including prescription and over-the-counter medicines, vitamins, and herbal products. Not all possible interactions are listed in this medication guide. Where can I get more information? Your pharmacist can provide more information about teriparatide. Remember, keep this and all other medicines out of the reach of children, never share your medicines with others, and use this medication only for the indication prescribed. Every effort has been made to ensure that the information provided by Nancy Morales [address] is accurate, up-to-date, and complete, but no guarantee is made to that effect. Drug information contained herein may be time sensitive. [employer] information has been compiled for use by healthcare practitioners and consumers in the [place] and therefore [employer] does not warrant that uses outside of the [place] are appropriate, unless specifically indicated otherwise. [facility]'s drug information does not endorse drugs, diagnose patients or recommend therapy.  [employer]'s drug information is an informational resource designed to assist licensed healthcare practitioners in caring for their patients and/or to serve consumers viewing

## 2019-08-15 RX ORDER — POTASSIUM CHLORIDE 750 MG/1
TABLET, FILM COATED, EXTENDED RELEASE ORAL
Qty: 90 TABLET | Refills: 3 | Status: SHIPPED | OUTPATIENT
Start: 2019-08-15 | End: 2020-01-15 | Stop reason: DRUGHIGH

## 2019-08-15 RX ORDER — VARENICLINE TARTRATE 0.5 MG/1
.5-1 TABLET, FILM COATED ORAL SEE ADMIN INSTRUCTIONS
Qty: 57 TABLET | Refills: 3 | Status: SHIPPED | OUTPATIENT
Start: 2019-08-15 | End: 2019-11-20 | Stop reason: ALTCHOICE

## 2019-08-16 ENCOUNTER — HOSPITAL ENCOUNTER (OUTPATIENT)
Dept: WOMENS IMAGING | Age: 79
Discharge: HOME | End: 2019-08-16
Payer: MEDICARE

## 2019-08-16 DIAGNOSIS — Z12.39 BREAST CANCER SCREENING: Primary | ICD-10-CM

## 2019-08-16 DIAGNOSIS — Z12.39 BREAST CANCER SCREENING: ICD-10-CM

## 2019-08-16 PROCEDURE — 77067 SCR MAMMO BI INCL CAD: CPT

## 2019-08-26 ENCOUNTER — OFFICE VISIT (OUTPATIENT)
Dept: PULMONOLOGY | Age: 79
End: 2019-08-26
Payer: MEDICARE

## 2019-08-26 VITALS
BODY MASS INDEX: 21.69 KG/M2 | HEART RATE: 64 BPM | SYSTOLIC BLOOD PRESSURE: 108 MMHG | DIASTOLIC BLOOD PRESSURE: 62 MMHG | WEIGHT: 135 LBS | OXYGEN SATURATION: 95 % | HEIGHT: 66 IN

## 2019-08-26 DIAGNOSIS — R06.02 SHORTNESS OF BREATH: ICD-10-CM

## 2019-08-26 DIAGNOSIS — J44.9 COPD, SEVERE (HCC): Primary | ICD-10-CM

## 2019-08-26 DIAGNOSIS — G47.34 NOCTURNAL HYPOXEMIA: ICD-10-CM

## 2019-08-26 DIAGNOSIS — Z87.891 FORMER SMOKER: ICD-10-CM

## 2019-08-26 PROCEDURE — 1090F PRES/ABSN URINE INCON ASSESS: CPT | Performed by: INTERNAL MEDICINE

## 2019-08-26 PROCEDURE — G8420 CALC BMI NORM PARAMETERS: HCPCS | Performed by: INTERNAL MEDICINE

## 2019-08-26 PROCEDURE — 1123F ACP DISCUSS/DSCN MKR DOCD: CPT | Performed by: INTERNAL MEDICINE

## 2019-08-26 PROCEDURE — G8399 PT W/DXA RESULTS DOCUMENT: HCPCS | Performed by: INTERNAL MEDICINE

## 2019-08-26 PROCEDURE — 99213 OFFICE O/P EST LOW 20 MIN: CPT | Performed by: INTERNAL MEDICINE

## 2019-08-26 PROCEDURE — 4004F PT TOBACCO SCREEN RCVD TLK: CPT | Performed by: INTERNAL MEDICINE

## 2019-08-26 PROCEDURE — 3023F SPIROM DOC REV: CPT | Performed by: INTERNAL MEDICINE

## 2019-08-26 PROCEDURE — 4040F PNEUMOC VAC/ADMIN/RCVD: CPT | Performed by: INTERNAL MEDICINE

## 2019-08-26 PROCEDURE — G8427 DOCREV CUR MEDS BY ELIG CLIN: HCPCS | Performed by: INTERNAL MEDICINE

## 2019-08-26 PROCEDURE — G8926 SPIRO NO PERF OR DOC: HCPCS | Performed by: INTERNAL MEDICINE

## 2019-09-18 RX ORDER — LOSARTAN POTASSIUM 100 MG/1
TABLET ORAL
Qty: 30 TABLET | Refills: 5 | Status: ON HOLD
Start: 2019-09-18 | End: 2020-02-16 | Stop reason: HOSPADM

## 2019-09-23 ENCOUNTER — OFFICE VISIT (OUTPATIENT)
Dept: INTERNAL MEDICINE CLINIC | Age: 79
End: 2019-09-23
Payer: MEDICARE

## 2019-09-23 VITALS
DIASTOLIC BLOOD PRESSURE: 76 MMHG | BODY MASS INDEX: 22.47 KG/M2 | WEIGHT: 139.2 LBS | SYSTOLIC BLOOD PRESSURE: 130 MMHG | HEART RATE: 63 BPM | OXYGEN SATURATION: 98 % | RESPIRATION RATE: 16 BRPM

## 2019-09-23 DIAGNOSIS — M81.0 OSTEOPOROSIS, UNSPECIFIED OSTEOPOROSIS TYPE, UNSPECIFIED PATHOLOGICAL FRACTURE PRESENCE: ICD-10-CM

## 2019-09-23 DIAGNOSIS — K57.92 DIVERTICULITIS: Primary | ICD-10-CM

## 2019-09-23 DIAGNOSIS — I10 ESSENTIAL HYPERTENSION: ICD-10-CM

## 2019-09-23 PROCEDURE — 99214 OFFICE O/P EST MOD 30 MIN: CPT | Performed by: INTERNAL MEDICINE

## 2019-09-23 PROCEDURE — 4040F PNEUMOC VAC/ADMIN/RCVD: CPT | Performed by: INTERNAL MEDICINE

## 2019-09-23 PROCEDURE — G8399 PT W/DXA RESULTS DOCUMENT: HCPCS | Performed by: INTERNAL MEDICINE

## 2019-09-23 PROCEDURE — 4004F PT TOBACCO SCREEN RCVD TLK: CPT | Performed by: INTERNAL MEDICINE

## 2019-09-23 PROCEDURE — G8420 CALC BMI NORM PARAMETERS: HCPCS | Performed by: INTERNAL MEDICINE

## 2019-09-23 PROCEDURE — G8427 DOCREV CUR MEDS BY ELIG CLIN: HCPCS | Performed by: INTERNAL MEDICINE

## 2019-09-23 PROCEDURE — 1123F ACP DISCUSS/DSCN MKR DOCD: CPT | Performed by: INTERNAL MEDICINE

## 2019-09-23 PROCEDURE — 1090F PRES/ABSN URINE INCON ASSESS: CPT | Performed by: INTERNAL MEDICINE

## 2019-09-23 RX ORDER — PREDNISONE 10 MG/1
10 TABLET ORAL DAILY
Status: ON HOLD | COMMUNITY
End: 2020-01-04 | Stop reason: HOSPADM

## 2019-09-23 RX ORDER — METRONIDAZOLE 500 MG/1
500 TABLET ORAL 3 TIMES DAILY
Qty: 30 TABLET | Refills: 0 | Status: SHIPPED | OUTPATIENT
Start: 2019-09-23 | End: 2019-10-03

## 2019-09-23 RX ORDER — CIPROFLOXACIN 500 MG/1
500 TABLET, FILM COATED ORAL 2 TIMES DAILY
Qty: 20 TABLET | Refills: 0 | Status: SHIPPED | OUTPATIENT
Start: 2019-09-23 | End: 2019-10-03

## 2019-09-23 NOTE — PROGRESS NOTES
medications for this visit. Past Medical History:   Diagnosis Date    Acute DVT of right tibial vein ([address] 07/2017    ER imaging: distal right tibial vein DVT; no anticoag for bleeding/ anemia    Acute exacerbation of chronic obstructive pulmonary disease (COPD) ([address] 12/13/2018    Arthritis     Chronic pain syndrome     Low back pain; lumbar disc disease    Clostridium difficile colitis 09/20/2017    COPD, severe ([address] 10/24/2017    Depression     Fibromyalgia     Former smoker     Quit 1/2019    Herniated cervical disc 5-1998    Herpes zoster ophthalmicus 3/2012    Hx of blood clots     Hyperlipidemia     Hypertension     L3 vertebral fracture ([address] 2010    vertebroplasty    Lumbar spinal stenosis 2015    moderately severe by MRI    Migraine     Nocturnal hypoxemia 5/21/2019    Osteoporosis 2010    Fragility Fx L3    Rheumatoid arthritis(714.0) 1976    Dr Janet Carreon S/P cardiac catheterization 05/2017    patent coronaries; Takotsubo; najeeb Ahmed    Shortness of breath 4/25/2019    Takotsubo syndrome 05/2017    TMJ dysfunction     Tobacco abuse 12/13/2018    Vitamin B12 deficiency 12/2014    B12 level 199        Social History     Tobacco Use    Smoking status: Current Every Day Smoker     Packs/day: 0.75     Years: 55.00     Pack years: 41.25     Types: Cigarettes     Start date: 5/26/1962    Smokeless tobacco: Never Used   Substance Use Topics    Alcohol use: No     Alcohol/week: 0.0 standard drinks        ROS: The patient has had no headache, sore throat, fever or chills, cough, dyspnea, chest pain, nausea, vomiting or diarrhea, or edema. Objective:      /76   Pulse 63   Resp 16   Wt 139 lb 3.2 oz (63.1 kg)   SpO2 98%   BMI 22.47 kg/m²      Physical Exam   Constitutional: She is oriented to person, place, and time. She appears well-developed and well-nourished. No distress. HENT:   Head: Normocephalic and atraumatic.    Mouth/Throat: Oropharynx is

## 2019-10-04 ENCOUNTER — OFFICE VISIT (OUTPATIENT)
Dept: INTERNAL MEDICINE CLINIC | Age: 79
End: 2019-10-04
Payer: MEDICARE

## 2019-10-04 VITALS
BODY MASS INDEX: 22.47 KG/M2 | HEIGHT: 66 IN | WEIGHT: 139.8 LBS | SYSTOLIC BLOOD PRESSURE: 120 MMHG | OXYGEN SATURATION: 98 % | DIASTOLIC BLOOD PRESSURE: 66 MMHG | HEART RATE: 71 BPM

## 2019-10-04 DIAGNOSIS — M81.0 OSTEOPOROSIS, UNSPECIFIED OSTEOPOROSIS TYPE, UNSPECIFIED PATHOLOGICAL FRACTURE PRESENCE: ICD-10-CM

## 2019-10-04 DIAGNOSIS — I10 ESSENTIAL HYPERTENSION: ICD-10-CM

## 2019-10-04 DIAGNOSIS — J44.9 COPD, SEVERE (HCC): Primary | ICD-10-CM

## 2019-10-04 DIAGNOSIS — F17.200 SMOKER: ICD-10-CM

## 2019-10-04 PROCEDURE — G8420 CALC BMI NORM PARAMETERS: HCPCS | Performed by: INTERNAL MEDICINE

## 2019-10-04 PROCEDURE — 3023F SPIROM DOC REV: CPT | Performed by: INTERNAL MEDICINE

## 2019-10-04 PROCEDURE — G8484 FLU IMMUNIZE NO ADMIN: HCPCS | Performed by: INTERNAL MEDICINE

## 2019-10-04 PROCEDURE — 1123F ACP DISCUSS/DSCN MKR DOCD: CPT | Performed by: INTERNAL MEDICINE

## 2019-10-04 PROCEDURE — 4040F PNEUMOC VAC/ADMIN/RCVD: CPT | Performed by: INTERNAL MEDICINE

## 2019-10-04 PROCEDURE — 90653 IIV ADJUVANT VACCINE IM: CPT | Performed by: INTERNAL MEDICINE

## 2019-10-04 PROCEDURE — G0008 ADMIN INFLUENZA VIRUS VAC: HCPCS | Performed by: INTERNAL MEDICINE

## 2019-10-04 PROCEDURE — 99213 OFFICE O/P EST LOW 20 MIN: CPT | Performed by: INTERNAL MEDICINE

## 2019-10-04 PROCEDURE — G8427 DOCREV CUR MEDS BY ELIG CLIN: HCPCS | Performed by: INTERNAL MEDICINE

## 2019-10-04 PROCEDURE — 1090F PRES/ABSN URINE INCON ASSESS: CPT | Performed by: INTERNAL MEDICINE

## 2019-10-04 PROCEDURE — G8926 SPIRO NO PERF OR DOC: HCPCS | Performed by: INTERNAL MEDICINE

## 2019-10-04 PROCEDURE — 4004F PT TOBACCO SCREEN RCVD TLK: CPT | Performed by: INTERNAL MEDICINE

## 2019-10-04 PROCEDURE — G8399 PT W/DXA RESULTS DOCUMENT: HCPCS | Performed by: INTERNAL MEDICINE

## 2019-10-04 RX ORDER — TRIAMCINOLONE ACETONIDE 55 UG/1
2 SPRAY, METERED NASAL DAILY
Qty: 1 INHALER | Refills: 3 | Status: ON HOLD | OUTPATIENT
Start: 2019-10-04 | End: 2021-01-07 | Stop reason: ALTCHOICE

## 2019-11-04 RX ORDER — ATORVASTATIN CALCIUM 10 MG/1
10 TABLET, FILM COATED ORAL EVERY EVENING
Qty: 90 TABLET | Refills: 4 | Status: SHIPPED | OUTPATIENT
Start: 2019-11-04

## 2019-11-08 RX ORDER — FUROSEMIDE 40 MG/1
40 TABLET ORAL 2 TIMES DAILY
Qty: 60 TABLET | Refills: 3 | Status: ON HOLD
Start: 2019-11-08 | End: 2020-03-27 | Stop reason: HOSPADM

## 2019-11-11 ENCOUNTER — OFFICE VISIT (OUTPATIENT)
Dept: PULMONOLOGY | Age: 79
End: 2019-11-11
Payer: MEDICARE

## 2019-11-11 VITALS
SYSTOLIC BLOOD PRESSURE: 130 MMHG | WEIGHT: 137 LBS | HEART RATE: 73 BPM | BODY MASS INDEX: 22.02 KG/M2 | OXYGEN SATURATION: 94 % | HEIGHT: 66 IN | DIASTOLIC BLOOD PRESSURE: 76 MMHG

## 2019-11-11 DIAGNOSIS — J44.1 COPD EXACERBATION (HCC): Primary | ICD-10-CM

## 2019-11-11 PROCEDURE — G8926 SPIRO NO PERF OR DOC: HCPCS | Performed by: NURSE PRACTITIONER

## 2019-11-11 PROCEDURE — G8420 CALC BMI NORM PARAMETERS: HCPCS | Performed by: NURSE PRACTITIONER

## 2019-11-11 PROCEDURE — 4040F PNEUMOC VAC/ADMIN/RCVD: CPT | Performed by: NURSE PRACTITIONER

## 2019-11-11 PROCEDURE — 1123F ACP DISCUSS/DSCN MKR DOCD: CPT | Performed by: NURSE PRACTITIONER

## 2019-11-11 PROCEDURE — 3023F SPIROM DOC REV: CPT | Performed by: NURSE PRACTITIONER

## 2019-11-11 PROCEDURE — 1036F TOBACCO NON-USER: CPT | Performed by: NURSE PRACTITIONER

## 2019-11-11 PROCEDURE — G8427 DOCREV CUR MEDS BY ELIG CLIN: HCPCS | Performed by: NURSE PRACTITIONER

## 2019-11-11 PROCEDURE — 1090F PRES/ABSN URINE INCON ASSESS: CPT | Performed by: NURSE PRACTITIONER

## 2019-11-11 PROCEDURE — 99213 OFFICE O/P EST LOW 20 MIN: CPT | Performed by: NURSE PRACTITIONER

## 2019-11-11 PROCEDURE — G8399 PT W/DXA RESULTS DOCUMENT: HCPCS | Performed by: NURSE PRACTITIONER

## 2019-11-11 PROCEDURE — G8482 FLU IMMUNIZE ORDER/ADMIN: HCPCS | Performed by: NURSE PRACTITIONER

## 2019-11-11 RX ORDER — LEVOFLOXACIN 750 MG/1
750 TABLET ORAL DAILY
Qty: 10 TABLET | Refills: 0 | Status: SHIPPED | OUTPATIENT
Start: 2019-11-11 | End: 2019-11-20 | Stop reason: ALTCHOICE

## 2019-11-11 RX ORDER — PREDNISONE 10 MG/1
TABLET ORAL
Qty: 30 TABLET | Refills: 0 | Status: SHIPPED | OUTPATIENT
Start: 2019-11-11 | End: 2019-11-20

## 2019-11-20 ENCOUNTER — OFFICE VISIT (OUTPATIENT)
Dept: INTERNAL MEDICINE CLINIC | Age: 79
End: 2019-11-20
Payer: MEDICARE

## 2019-11-20 VITALS
DIASTOLIC BLOOD PRESSURE: 72 MMHG | BODY MASS INDEX: 23.53 KG/M2 | HEART RATE: 88 BPM | SYSTOLIC BLOOD PRESSURE: 120 MMHG | WEIGHT: 145.8 LBS

## 2019-11-20 DIAGNOSIS — B37.0 THRUSH: Primary | ICD-10-CM

## 2019-11-20 DIAGNOSIS — Z87.81 HISTORY OF VERTEBRAL FRACTURE: ICD-10-CM

## 2019-11-20 DIAGNOSIS — J44.1 COPD EXACERBATION (HCC): ICD-10-CM

## 2019-11-20 DIAGNOSIS — I10 ESSENTIAL HYPERTENSION: ICD-10-CM

## 2019-11-20 DIAGNOSIS — R61 DIAPHORESIS: ICD-10-CM

## 2019-11-20 DIAGNOSIS — G89.4 PAIN SYNDROME, CHRONIC: ICD-10-CM

## 2019-11-20 DIAGNOSIS — M81.0 OSTEOPOROSIS, UNSPECIFIED OSTEOPOROSIS TYPE, UNSPECIFIED PATHOLOGICAL FRACTURE PRESENCE: ICD-10-CM

## 2019-11-20 PROCEDURE — G8399 PT W/DXA RESULTS DOCUMENT: HCPCS | Performed by: INTERNAL MEDICINE

## 2019-11-20 PROCEDURE — G8420 CALC BMI NORM PARAMETERS: HCPCS | Performed by: INTERNAL MEDICINE

## 2019-11-20 PROCEDURE — 3023F SPIROM DOC REV: CPT | Performed by: INTERNAL MEDICINE

## 2019-11-20 PROCEDURE — 99214 OFFICE O/P EST MOD 30 MIN: CPT | Performed by: INTERNAL MEDICINE

## 2019-11-20 PROCEDURE — G8926 SPIRO NO PERF OR DOC: HCPCS | Performed by: INTERNAL MEDICINE

## 2019-11-20 PROCEDURE — G8427 DOCREV CUR MEDS BY ELIG CLIN: HCPCS | Performed by: INTERNAL MEDICINE

## 2019-11-20 PROCEDURE — 4004F PT TOBACCO SCREEN RCVD TLK: CPT | Performed by: INTERNAL MEDICINE

## 2019-11-20 PROCEDURE — 1123F ACP DISCUSS/DSCN MKR DOCD: CPT | Performed by: INTERNAL MEDICINE

## 2019-11-20 PROCEDURE — 1090F PRES/ABSN URINE INCON ASSESS: CPT | Performed by: INTERNAL MEDICINE

## 2019-11-20 PROCEDURE — 4040F PNEUMOC VAC/ADMIN/RCVD: CPT | Performed by: INTERNAL MEDICINE

## 2019-11-20 PROCEDURE — G8482 FLU IMMUNIZE ORDER/ADMIN: HCPCS | Performed by: INTERNAL MEDICINE

## 2019-11-20 RX ORDER — CLOTRIMAZOLE 10 MG/1
10 LOZENGE ORAL; TOPICAL
Qty: 50 TABLET | Refills: 0 | Status: SHIPPED | OUTPATIENT
Start: 2019-11-20 | End: 2019-11-30

## 2019-12-30 ENCOUNTER — HOSPITAL ENCOUNTER (INPATIENT)
Age: 79
LOS: 5 days | Discharge: HOME | DRG: 871 | End: 2020-01-04
Attending: EMERGENCY MEDICINE | Admitting: HOSPITALIST
Payer: MEDICARE

## 2019-12-30 ENCOUNTER — OFFICE VISIT (OUTPATIENT)
Dept: PULMONOLOGY | Age: 79
End: 2019-12-30
Payer: MEDICARE

## 2019-12-30 ENCOUNTER — APPOINTMENT (OUTPATIENT)
Dept: GENERAL RADIOLOGY | Age: 79
DRG: 871 | End: 2019-12-30
Payer: MEDICARE

## 2019-12-30 VITALS
BODY MASS INDEX: 23.3 KG/M2 | SYSTOLIC BLOOD PRESSURE: 80 MMHG | DIASTOLIC BLOOD PRESSURE: 40 MMHG | WEIGHT: 145 LBS | HEART RATE: 104 BPM | HEIGHT: 66 IN | OXYGEN SATURATION: 86 %

## 2019-12-30 DIAGNOSIS — Z87.891 FORMER SMOKER: ICD-10-CM

## 2019-12-30 DIAGNOSIS — J44.1 ACUTE EXACERBATION OF CHRONIC OBSTRUCTIVE PULMONARY DISEASE (COPD) (HCC): Primary | ICD-10-CM

## 2019-12-30 DIAGNOSIS — R06.02 SHORTNESS OF BREATH: ICD-10-CM

## 2019-12-30 DIAGNOSIS — J44.9 COPD, SEVERE (HCC): ICD-10-CM

## 2019-12-30 DIAGNOSIS — G47.34 NOCTURNAL HYPOXEMIA: ICD-10-CM

## 2019-12-30 PROBLEM — J96.21 ACUTE ON CHRONIC RESPIRATORY FAILURE WITH HYPOXIA (HCC): Status: ACTIVE | Noted: 2019-12-30

## 2019-12-30 LAB
ALBUMIN SERPL-MCNC: 3 GM/DL (ref 3.4–5)
ALP BLD-CCNC: 72 IU/L (ref 40–128)
ALT SERPL-CCNC: 7 U/L (ref 10–40)
ANION GAP SERPL CALCULATED.3IONS-SCNC: 14 MMOL/L (ref 4–16)
AST SERPL-CCNC: 17 IU/L (ref 15–37)
BASOPHILS ABSOLUTE: 0.1 K/CU MM
BASOPHILS RELATIVE PERCENT: 0.5 % (ref 0–1)
BILIRUB SERPL-MCNC: 0.5 MG/DL (ref 0–1)
BUN BLDV-MCNC: 30 MG/DL (ref 6–23)
CALCIUM SERPL-MCNC: 9.3 MG/DL (ref 8.3–10.6)
CHLORIDE BLD-SCNC: 88 MMOL/L (ref 99–110)
CO2: 27 MMOL/L (ref 21–32)
CREAT SERPL-MCNC: 1.4 MG/DL (ref 0.6–1.1)
DIFFERENTIAL TYPE: ABNORMAL
EOSINOPHILS ABSOLUTE: 0 K/CU MM
EOSINOPHILS RELATIVE PERCENT: 0.1 % (ref 0–3)
GFR AFRICAN AMERICAN: 44 ML/MIN/1.73M2
GFR NON-AFRICAN AMERICAN: 36 ML/MIN/1.73M2
GLUCOSE BLD-MCNC: 120 MG/DL (ref 70–99)
HCT VFR BLD CALC: 40.7 % (ref 37–47)
HEMOGLOBIN: 12.6 GM/DL (ref 12.5–16)
IMMATURE NEUTROPHIL %: 1.7 % (ref 0–0.43)
LACTATE: 1.9 MMOL/L (ref 0.4–2)
LYMPHOCYTES ABSOLUTE: 1.8 K/CU MM
LYMPHOCYTES RELATIVE PERCENT: 9.9 % (ref 24–44)
MCH RBC QN AUTO: 29.8 PG (ref 27–31)
MCHC RBC AUTO-ENTMCNC: 31 % (ref 32–36)
MCV RBC AUTO: 96.2 FL (ref 78–100)
MONOCYTES ABSOLUTE: 1.6 K/CU MM
MONOCYTES RELATIVE PERCENT: 9.1 % (ref 0–4)
NUCLEATED RBC %: 0 %
PDW BLD-RTO: 15.5 % (ref 11.7–14.9)
PLATELET # BLD: 322 K/CU MM (ref 140–440)
PMV BLD AUTO: 10.5 FL (ref 7.5–11.1)
POTASSIUM SERPL-SCNC: 4.1 MMOL/L (ref 3.5–5.1)
PRO-BNP: 1694 PG/ML
RAPID INFLUENZA  B AGN: NEGATIVE
RAPID INFLUENZA A AGN: NEGATIVE
RBC # BLD: 4.23 M/CU MM (ref 4.2–5.4)
SEGMENTED NEUTROPHILS ABSOLUTE COUNT: 14.3 K/CU MM
SEGMENTED NEUTROPHILS RELATIVE PERCENT: 78.7 % (ref 36–66)
SODIUM BLD-SCNC: 129 MMOL/L (ref 135–145)
TOTAL IMMATURE NEUTOROPHIL: 0.31 K/CU MM
TOTAL NUCLEATED RBC: 0 K/CU MM
TOTAL PROTEIN: 6.6 GM/DL (ref 6.4–8.2)
TROPONIN T: 0.01 NG/ML
WBC # BLD: 18.1 K/CU MM (ref 4–10.5)

## 2019-12-30 PROCEDURE — 83605 ASSAY OF LACTIC ACID: CPT

## 2019-12-30 PROCEDURE — 6370000000 HC RX 637 (ALT 250 FOR IP): Performed by: EMERGENCY MEDICINE

## 2019-12-30 PROCEDURE — G8399 PT W/DXA RESULTS DOCUMENT: HCPCS | Performed by: INTERNAL MEDICINE

## 2019-12-30 PROCEDURE — 6360000002 HC RX W HCPCS: Performed by: EMERGENCY MEDICINE

## 2019-12-30 PROCEDURE — G8482 FLU IMMUNIZE ORDER/ADMIN: HCPCS | Performed by: INTERNAL MEDICINE

## 2019-12-30 PROCEDURE — 96375 TX/PRO/DX INJ NEW DRUG ADDON: CPT

## 2019-12-30 PROCEDURE — 83880 ASSAY OF NATRIURETIC PEPTIDE: CPT

## 2019-12-30 PROCEDURE — 4040F PNEUMOC VAC/ADMIN/RCVD: CPT | Performed by: INTERNAL MEDICINE

## 2019-12-30 PROCEDURE — 71046 X-RAY EXAM CHEST 2 VIEWS: CPT

## 2019-12-30 PROCEDURE — 1200000000 HC SEMI PRIVATE

## 2019-12-30 PROCEDURE — G8427 DOCREV CUR MEDS BY ELIG CLIN: HCPCS | Performed by: INTERNAL MEDICINE

## 2019-12-30 PROCEDURE — 94640 AIRWAY INHALATION TREATMENT: CPT

## 2019-12-30 PROCEDURE — 87804 INFLUENZA ASSAY W/OPTIC: CPT

## 2019-12-30 PROCEDURE — 1036F TOBACCO NON-USER: CPT | Performed by: INTERNAL MEDICINE

## 2019-12-30 PROCEDURE — 3023F SPIROM DOC REV: CPT | Performed by: INTERNAL MEDICINE

## 2019-12-30 PROCEDURE — 2580000003 HC RX 258: Performed by: EMERGENCY MEDICINE

## 2019-12-30 PROCEDURE — 84484 ASSAY OF TROPONIN QUANT: CPT

## 2019-12-30 PROCEDURE — 80053 COMPREHEN METABOLIC PANEL: CPT

## 2019-12-30 PROCEDURE — 93005 ELECTROCARDIOGRAM TRACING: CPT | Performed by: PHYSICIAN ASSISTANT

## 2019-12-30 PROCEDURE — 99285 EMERGENCY DEPT VISIT HI MDM: CPT

## 2019-12-30 PROCEDURE — 36415 COLL VENOUS BLD VENIPUNCTURE: CPT

## 2019-12-30 PROCEDURE — 87040 BLOOD CULTURE FOR BACTERIA: CPT

## 2019-12-30 PROCEDURE — 96361 HYDRATE IV INFUSION ADD-ON: CPT

## 2019-12-30 PROCEDURE — 96365 THER/PROPH/DIAG IV INF INIT: CPT

## 2019-12-30 PROCEDURE — 85025 COMPLETE CBC W/AUTO DIFF WBC: CPT

## 2019-12-30 PROCEDURE — 99213 OFFICE O/P EST LOW 20 MIN: CPT | Performed by: INTERNAL MEDICINE

## 2019-12-30 PROCEDURE — G8420 CALC BMI NORM PARAMETERS: HCPCS | Performed by: INTERNAL MEDICINE

## 2019-12-30 PROCEDURE — 1123F ACP DISCUSS/DSCN MKR DOCD: CPT | Performed by: INTERNAL MEDICINE

## 2019-12-30 PROCEDURE — G8926 SPIRO NO PERF OR DOC: HCPCS | Performed by: INTERNAL MEDICINE

## 2019-12-30 PROCEDURE — 1090F PRES/ABSN URINE INCON ASSESS: CPT | Performed by: INTERNAL MEDICINE

## 2019-12-30 RX ORDER — OXYCODONE HYDROCHLORIDE AND ACETAMINOPHEN 5; 325 MG/1; MG/1
1 TABLET ORAL ONCE
Status: COMPLETED | OUTPATIENT
Start: 2019-12-30 | End: 2019-12-30

## 2019-12-30 RX ORDER — IPRATROPIUM BROMIDE AND ALBUTEROL SULFATE 2.5; .5 MG/3ML; MG/3ML
2 SOLUTION RESPIRATORY (INHALATION) ONCE
Status: COMPLETED | OUTPATIENT
Start: 2019-12-30 | End: 2019-12-30

## 2019-12-30 RX ORDER — 0.9 % SODIUM CHLORIDE 0.9 %
1000 INTRAVENOUS SOLUTION INTRAVENOUS ONCE
Status: COMPLETED | OUTPATIENT
Start: 2019-12-30 | End: 2019-12-30

## 2019-12-30 RX ORDER — AZITHROMYCIN 250 MG/1
500 TABLET, FILM COATED ORAL ONCE
Status: COMPLETED | OUTPATIENT
Start: 2019-12-30 | End: 2019-12-30

## 2019-12-30 RX ORDER — 0.9 % SODIUM CHLORIDE 0.9 %
500 INTRAVENOUS SOLUTION INTRAVENOUS ONCE
Status: DISCONTINUED | OUTPATIENT
Start: 2019-12-30 | End: 2019-12-30

## 2019-12-30 RX ORDER — PREDNISONE 20 MG/1
60 TABLET ORAL ONCE
Status: COMPLETED | OUTPATIENT
Start: 2019-12-30 | End: 2019-12-30

## 2019-12-30 RX ADMIN — OXYCODONE HYDROCHLORIDE AND ACETAMINOPHEN 1 TABLET: 5; 325 TABLET ORAL at 20:43

## 2019-12-30 RX ADMIN — CEFTRIAXONE SODIUM 1 G: 1 INJECTION, POWDER, FOR SOLUTION INTRAMUSCULAR; INTRAVENOUS at 20:48

## 2019-12-30 RX ADMIN — AZITHROMYCIN 500 MG: 250 TABLET, FILM COATED ORAL at 20:43

## 2019-12-30 RX ADMIN — PREDNISONE 60 MG: 20 TABLET ORAL at 20:42

## 2019-12-30 RX ADMIN — IPRATROPIUM BROMIDE AND ALBUTEROL SULFATE 2 AMPULE: .5; 3 SOLUTION RESPIRATORY (INHALATION) at 18:49

## 2019-12-30 RX ADMIN — SODIUM CHLORIDE 1000 ML: 9 INJECTION, SOLUTION INTRAVENOUS at 20:45

## 2019-12-30 ASSESSMENT — PAIN SCALES - GENERAL
PAINLEVEL_OUTOF10: 4
PAINLEVEL_OUTOF10: 7

## 2019-12-30 NOTE — ED TRIAGE NOTES
Pt presents to the ED today with c/o shortness of breath that started a week ago accompanied by a cough. Pt states that she was seen by Dr Elaina [place] today and sent here for evaluation.

## 2019-12-30 NOTE — ED PROVIDER NOTES
[facility]  eMERGENCYdEPARTMENT eNCOUnter      Pt Name: Ida Crane  MRN: [id number]  [place] 1940  Date of evaluation: 2019  Provider:Nicholas Elease Apgar, MD    CHIEF COMPLAINT       Chief Complaint   Patient presents with    Shortness of Breath     states that she has been sick for a week; hx of COPD, saw Dr Svetlana Martell today and sent for low blood pressure and low oxygen saturation    Cough         HISTORY OF PRESENT ILLNESS    Ida Crane is a 78 y.o. female who presents to the emergency department with shortness of breath. Patient says that she is been having gradually worsening shortness of breath over the past week. She has not been able to use all of her medications, has been trying to use her rescue albuterol. 4 out of 10 pain with coughing. She was seen by her pulmonologist today who felt that she needed to be admitted to the hospital.  She uses oxygen only at night. Nursing Notes were reviewed. REVIEW OF SYSTEMS       Review of Systems    10 point review of systems was performed and was negative exceptas specifically noted in the HPI.       PAST MEDICAL HISTORY     Past Medical History:   Diagnosis Date    Acute DVT of right tibial vein ([address] 2017    ER imaging: distal right tibial vein DVT; no anticoag for bleeding/ anemia    Acute exacerbation of chronic obstructive pulmonary disease (COPD) ([address] 2018    Arthritis     Chronic pain syndrome     Low back pain; lumbar disc disease    Clostridium difficile colitis 2017    COPD, severe ([address] 10/24/2017    Depression     Fibromyalgia     Former smoker     Quit 2019    Herniated cervical disc -    Herpes zoster ophthalmicus 3/2012    Hx of blood clots     Hyperlipidemia     Hypertension     L3 vertebral fracture ([address] 2010    vertebroplasty    Lumbar spinal stenosis     moderately severe by MRI    Migraine     Nocturnal hypoxemia 100 MG TABLET    TAKE ONE TABLET BY MOUTH DAILY    METOPROLOL TARTRATE (LOPRESSOR) 50 MG TABLET    TAKE 1 TABLET BY MOUTH 2 TIMES DAILY    ONDANSETRON (ZOFRAN-ODT) 4 MG DISINTEGRATING TABLET    Take 4 mg by mouth as needed for Nausea or Vomiting    OXYCODONE-ACETAMINOPHEN (PERCOCET) 7.5-325 MG PER TABLET    Take 1 tablet by mouth 4 times daily. Conklin Pander POTASSIUM CHLORIDE (KLOR-CON) 10 MEQ EXTENDED RELEASE TABLET    TAKE 2 TABLETS BY MOUTH EVERY MORNING AND TAKE 1 TABLET IN THE EVENING    PREDNISONE (DELTASONE) 10 MG TABLET    Take 10 mg by mouth daily    PROAIR  (90 BASE) MCG/ACT INHALER    INHALE 2 PUFFS BY ORAL INHALATION EVERY 4 TO 6 HOURS AS NEEDED    SPIRONOLACTONE (ALDACTONE) 50 MG TABLET    TAKE 1 TABLET BY ORAL ROUTE EVERY DAY    TERIPARATIDE, RECOMBINANT, (FORTEO) 600 MCG/2.4ML SOLN INJECTION    Inject 0.08 mLs into the skin daily    TRAZODONE (DESYREL) 50 MG TABLET    TAKE 1 TABLET BY MOUTH NIGHTLY    TRIAMCINOLONE (NASACORT ALLERGY 24HR) 55 MCG/ACT NASAL INHALER    2 sprays by Each Nostril route daily    ULTICARE SHORT PEN NEEDLES 31G X 8 MM MISC    USE AS DIRECTED WITH FORTEO    VITAMIN B-12 (CYANOCOBALAMIN) 1000 MCG TABLET    Take 1,000 mcg by mouth daily. ALLERGIES     Ativan [lorazepam]; Etanercept; Humira [adalimumab];  Prochlorperazine edisylate; Remicade [infliximab injection]; and Doxycycline    FAMILY HISTORY       Family History   Problem Relation Age of Onset    Heart Disease Father          FROM MI   [employer] Emphysema Father     Arthritis Mother     Stroke Mother     Heart Disease Other         family history    Cancer Other         family history -- larynx    Kidney Disease Son           SOCIAL HISTORY       Social History     Socioeconomic History    Marital status:      Spouse name: Asif Martin Number of children: 2    Years of education: 15    Highest education level: None   Occupational History    Occupation: retired   Social Needs    Financial resource strain: EKG: RADIOLOGY:   Non-plain film images such as CT, Ultrasound and MRI are read by the radiologist.Plain radiographic images are visualized and preliminarily interpreted by the emergency physician with the below findings:    RUL pneumonia    Interpretation per the Radiologist below, if available at the time of this note:    XR CHEST STANDARD (2 VW)   Final Result   Right upper lobe pneumonia. Mild right basilar disease may represent   pneumonia and or atelectasis. Underlying findings of COPD. Follow-up to   complete clearing recommended. EDBEDSIDE ULTRASOUND:   Performed by Christophe Conner - rosio    LABS:  Labs Reviewed   CBC WITH AUTO DIFFERENTIAL - Abnormal; Notable for the following components:       Result Value    WBC 18.1 (*)     MCHC 31.0 (*)     RDW 15.5 (*)     Segs Relative 78.7 (*)     Lymphocytes % 9.9 (*)     Monocytes % 9.1 (*)     Immature Neutrophil % 1.7 (*)     All other components within normal limits   COMPREHENSIVE METABOLIC PANEL - Abnormal; Notable for the following components:    Sodium 129 (*)     Chloride 88 (*)     BUN 30 (*)     CREATININE 1.4 (*)     Glucose 120 (*)     Alb 3.0 (*)     ALT 7 (*)     GFR Non- 36 (*)     GFR  44 (*)     All other components within normal limits   TROPONIN - Abnormal; Notable for the following components:    Troponin T 0.012 (*)     All other components within normal limits   BRAIN NATRIURETIC PEPTIDE - Abnormal; Notable for the following components:    Pro-BNP 1,694 (*)     All other components within normal limits   RAPID INFLUENZA A/B ANTIGENS   CULTURE BLOOD #1   CULTURE BLOOD #2   LACTIC ACID, PLASMA   URINALYSIS       All other labs were within normal range or not returned as of this dictation.     EMERGENCY DEPARTMENT COURSE and DIFFERENTIAL DIAGNOSIS/MDM:   Vitals:    Vitals:    12/30/19 1718 12/30/19 1815 12/30/19 1916   BP: 101/65 117/70    Pulse: 111 96    Resp: 16 20    Temp: 99 °F (37.2 °C) mis-transcribed.)    Kristen Chopra MD (electronically signed)  Attending Emergency Physician            Kristen Chopra MD  12/30/19 0733

## 2019-12-31 ENCOUNTER — APPOINTMENT (OUTPATIENT)
Dept: CT IMAGING | Age: 79
DRG: 871 | End: 2019-12-31
Payer: MEDICARE

## 2019-12-31 ENCOUNTER — APPOINTMENT (OUTPATIENT)
Dept: NUCLEAR MEDICINE | Age: 79
DRG: 871 | End: 2019-12-31
Payer: MEDICARE

## 2019-12-31 LAB
ADENOVIRUS DETECTION BY PCR: NOT DETECTED
ALBUMIN SERPL-MCNC: 2.6 GM/DL (ref 3.4–5)
ALP BLD-CCNC: 75 IU/L (ref 40–129)
ALT SERPL-CCNC: 6 U/L (ref 10–40)
ANION GAP SERPL CALCULATED.3IONS-SCNC: 12 MMOL/L (ref 4–16)
APTT: 43.9 SECONDS (ref 25.1–37.1)
AST SERPL-CCNC: 15 IU/L (ref 15–37)
BACTERIA: NEGATIVE /HPF
BASE EXCESS MIXED: ABNORMAL (ref 0–2.3)
BILIRUB SERPL-MCNC: 0.3 MG/DL (ref 0–1)
BILIRUBIN URINE: NEGATIVE MG/DL
BLOOD, URINE: NEGATIVE
BORDETELLA PERTUSSIS PCR: NOT DETECTED
BUN BLDV-MCNC: 32 MG/DL (ref 6–23)
CALCIUM SERPL-MCNC: 8.4 MG/DL (ref 8.3–10.6)
CHLAMYDOPHILA PNEUMONIA PCR: NOT DETECTED
CHLORIDE BLD-SCNC: 91 MMOL/L (ref 99–110)
CHLORIDE URINE RANDOM: 10 MMOL/L (ref 43–210)
CLARITY: ABNORMAL
CO2: 24 MMOL/L (ref 21–32)
COLOR: YELLOW
COMMENT: ABNORMAL
CORONAVIRUS 229E PCR: NOT DETECTED
CORONAVIRUS HKU1 PCR: NOT DETECTED
CORONAVIRUS NL63 PCR: NOT DETECTED
CORONAVIRUS OC43 PCR: NOT DETECTED
CREAT SERPL-MCNC: 1.2 MG/DL (ref 0.6–1.1)
CREATININE URINE: 136.7 MG/DL (ref 28–217)
GFR AFRICAN AMERICAN: 52 ML/MIN/1.73M2
GFR NON-AFRICAN AMERICAN: 43 ML/MIN/1.73M2
GLUCOSE BLD-MCNC: 141 MG/DL (ref 70–99)
GLUCOSE, URINE: NEGATIVE MG/DL
HCO3 VENOUS: 26 MMOL/L (ref 19–25)
HCT VFR BLD CALC: 36.4 % (ref 37–47)
HEMOGLOBIN: 11.3 GM/DL (ref 12.5–16)
HUMAN METAPNEUMOVIRUS PCR: NOT DETECTED
INFLUENZA A BY PCR: NOT DETECTED
INFLUENZA A H1 (2009) PCR: NOT DETECTED
INFLUENZA A H1 PANDEMIC PCR: NOT DETECTED
INFLUENZA A H3 PCR: NOT DETECTED
INFLUENZA B BY PCR: NOT DETECTED
INR BLD: 0.97 INDEX
KETONES, URINE: NEGATIVE MG/DL
LEUKOCYTE ESTERASE, URINE: NEGATIVE
MAGNESIUM: 1.3 MG/DL (ref 1.8–2.4)
MCH RBC QN AUTO: 29.5 PG (ref 27–31)
MCHC RBC AUTO-ENTMCNC: 31 % (ref 32–36)
MCV RBC AUTO: 95 FL (ref 78–100)
MUCUS: ABNORMAL HPF
MYCOPLASMA PNEUMONIAE PCR: NOT DETECTED
NITRITE URINE, QUANTITATIVE: NEGATIVE
O2 SAT, VEN: 93.9 % (ref 50–70)
OSMOLALITY URINE: 445 MOS/L (ref 292–1090)
PARAINFLUENZA 1 PCR: NOT DETECTED
PARAINFLUENZA 2 PCR: NOT DETECTED
PARAINFLUENZA 3 PCR: NOT DETECTED
PARAINFLUENZA 4 PCR: NOT DETECTED
PCO2, VEN: 46 MMHG (ref 38–52)
PDW BLD-RTO: 15.4 % (ref 11.7–14.9)
PH VENOUS: 7.36 (ref 7.32–7.42)
PH, URINE: 5 (ref 5–8)
PLATELET # BLD: 300 K/CU MM (ref 140–440)
PMV BLD AUTO: 10.1 FL (ref 7.5–11.1)
PO2, VEN: 75 MMHG (ref 28–48)
POTASSIUM SERPL-SCNC: 3.8 MMOL/L (ref 3.5–5.1)
POTASSIUM, UR: 43.5 MMOL/L (ref 22–119)
PROCALCITONIN: 4.55
PROT/CREAT RATIO, UR: ABNORMAL
PROTEIN UA: NEGATIVE MG/DL
PROTHROMBIN TIME: 11.7 SECONDS (ref 11.7–14.5)
RBC # BLD: 3.83 M/CU MM (ref 4.2–5.4)
RBC URINE: ABNORMAL /HPF (ref 0–6)
RHINOVIRUS ENTEROVIRUS PCR: NOT DETECTED
RSV PCR: NOT DETECTED
SODIUM BLD-SCNC: 127 MMOL/L (ref 135–145)
SODIUM URINE: 11 MMOL/L (ref 35–167)
SODIUM URINE: 12 MMOL/L (ref 35–167)
SPECIFIC GRAVITY UA: 1.02 (ref 1–1.03)
SQUAMOUS EPITHELIAL: 1 /HPF
T4 FREE: 1.08 NG/DL (ref 0.9–1.8)
TOTAL PROTEIN: 4.8 GM/DL (ref 6.4–8.2)
TRANSITIONAL EPITHELIAL: <1 /HPF
TRICHOMONAS: ABNORMAL /HPF
TROPONIN T: <0.01 NG/ML
TSH HIGH SENSITIVITY: 0.6 UIU/ML (ref 0.27–4.2)
URINE TOTAL PROTEIN: 24.1 MG/DL
UROBILINOGEN, URINE: NORMAL MG/DL (ref 0.2–1)
WBC # BLD: 14 K/CU MM (ref 4–10.5)
WBC UA: 1 /HPF (ref 0–5)

## 2019-12-31 PROCEDURE — 6370000000 HC RX 637 (ALT 250 FOR IP): Performed by: INTERNAL MEDICINE

## 2019-12-31 PROCEDURE — 94640 AIRWAY INHALATION TREATMENT: CPT

## 2019-12-31 PROCEDURE — 94760 N-INVAS EAR/PLS OXIMETRY 1: CPT

## 2019-12-31 PROCEDURE — 87633 RESP VIRUS 12-25 TARGETS: CPT

## 2019-12-31 PROCEDURE — 81001 URINALYSIS AUTO W/SCOPE: CPT

## 2019-12-31 PROCEDURE — 83735 ASSAY OF MAGNESIUM: CPT

## 2019-12-31 PROCEDURE — 84133 ASSAY OF URINE POTASSIUM: CPT

## 2019-12-31 PROCEDURE — 6360000002 HC RX W HCPCS: Performed by: NURSE PRACTITIONER

## 2019-12-31 PROCEDURE — 94761 N-INVAS EAR/PLS OXIMETRY MLT: CPT

## 2019-12-31 PROCEDURE — 3430000000 HC RX DIAGNOSTIC RADIOPHARMACEUTICAL: Performed by: NURSE PRACTITIONER

## 2019-12-31 PROCEDURE — 6360000002 HC RX W HCPCS: Performed by: INTERNAL MEDICINE

## 2019-12-31 PROCEDURE — 87581 M.PNEUMON DNA AMP PROBE: CPT

## 2019-12-31 PROCEDURE — 1200000000 HC SEMI PRIVATE

## 2019-12-31 PROCEDURE — 84156 ASSAY OF PROTEIN URINE: CPT

## 2019-12-31 PROCEDURE — A9540 TC99M MAA: HCPCS | Performed by: NURSE PRACTITIONER

## 2019-12-31 PROCEDURE — 6370000000 HC RX 637 (ALT 250 FOR IP): Performed by: NURSE PRACTITIONER

## 2019-12-31 PROCEDURE — 36415 COLL VENOUS BLD VENIPUNCTURE: CPT

## 2019-12-31 PROCEDURE — 93010 ELECTROCARDIOGRAM REPORT: CPT | Performed by: INTERNAL MEDICINE

## 2019-12-31 PROCEDURE — 85027 COMPLETE CBC AUTOMATED: CPT

## 2019-12-31 PROCEDURE — 99222 1ST HOSP IP/OBS MODERATE 55: CPT | Performed by: INTERNAL MEDICINE

## 2019-12-31 PROCEDURE — 2580000003 HC RX 258: Performed by: NURSE PRACTITIONER

## 2019-12-31 PROCEDURE — A9539 TC99M PENTETATE: HCPCS | Performed by: NURSE PRACTITIONER

## 2019-12-31 PROCEDURE — 83935 ASSAY OF URINE OSMOLALITY: CPT

## 2019-12-31 PROCEDURE — 85610 PROTHROMBIN TIME: CPT

## 2019-12-31 PROCEDURE — 85730 THROMBOPLASTIN TIME PARTIAL: CPT

## 2019-12-31 PROCEDURE — 80053 COMPREHEN METABOLIC PANEL: CPT

## 2019-12-31 PROCEDURE — 84145 PROCALCITONIN (PCT): CPT

## 2019-12-31 PROCEDURE — 84443 ASSAY THYROID STIM HORMONE: CPT

## 2019-12-31 PROCEDURE — 82805 BLOOD GASES W/O2 SATURATION: CPT

## 2019-12-31 PROCEDURE — 82436 ASSAY OF URINE CHLORIDE: CPT

## 2019-12-31 PROCEDURE — 84300 ASSAY OF URINE SODIUM: CPT

## 2019-12-31 PROCEDURE — 71250 CT THORAX DX C-: CPT

## 2019-12-31 PROCEDURE — 87899 AGENT NOS ASSAY W/OPTIC: CPT

## 2019-12-31 PROCEDURE — 93308 TTE F-UP OR LMTD: CPT

## 2019-12-31 PROCEDURE — 82570 ASSAY OF URINE CREATININE: CPT

## 2019-12-31 PROCEDURE — 84484 ASSAY OF TROPONIN QUANT: CPT

## 2019-12-31 PROCEDURE — 2700000000 HC OXYGEN THERAPY PER DAY

## 2019-12-31 PROCEDURE — 78582 LUNG VENTILAT&PERFUS IMAGING: CPT

## 2019-12-31 PROCEDURE — 84439 ASSAY OF FREE THYROXINE: CPT

## 2019-12-31 PROCEDURE — 87486 CHLMYD PNEUM DNA AMP PROBE: CPT

## 2019-12-31 PROCEDURE — 87798 DETECT AGENT NOS DNA AMP: CPT

## 2019-12-31 PROCEDURE — 87449 NOS EACH ORGANISM AG IA: CPT

## 2019-12-31 RX ORDER — SODIUM CHLORIDE 0.9 % (FLUSH) 0.9 %
10 SYRINGE (ML) INJECTION PRN
Status: DISCONTINUED | OUTPATIENT
Start: 2019-12-31 | End: 2020-01-04 | Stop reason: HOSPADM

## 2019-12-31 RX ORDER — ONDANSETRON 2 MG/ML
4 INJECTION INTRAMUSCULAR; INTRAVENOUS EVERY 6 HOURS PRN
Status: DISCONTINUED | OUTPATIENT
Start: 2019-12-31 | End: 2020-01-04 | Stop reason: HOSPADM

## 2019-12-31 RX ORDER — SODIUM CHLORIDE 9 MG/ML
INJECTION, SOLUTION INTRAVENOUS CONTINUOUS
Status: DISCONTINUED | OUTPATIENT
Start: 2019-12-31 | End: 2020-01-02

## 2019-12-31 RX ORDER — SENNA PLUS 8.6 MG/1
1 TABLET ORAL DAILY PRN
Status: DISCONTINUED | OUTPATIENT
Start: 2019-12-31 | End: 2020-01-04 | Stop reason: HOSPADM

## 2019-12-31 RX ORDER — FAMOTIDINE 20 MG/1
20 TABLET, FILM COATED ORAL 2 TIMES DAILY
Status: DISCONTINUED | OUTPATIENT
Start: 2019-12-31 | End: 2019-12-31 | Stop reason: DRUGHIGH

## 2019-12-31 RX ORDER — DULOXETIN HYDROCHLORIDE 30 MG/1
60 CAPSULE, DELAYED RELEASE ORAL DAILY
Status: DISCONTINUED | OUTPATIENT
Start: 2019-12-31 | End: 2020-01-04 | Stop reason: HOSPADM

## 2019-12-31 RX ORDER — ACETAMINOPHEN 325 MG/1
650 TABLET ORAL EVERY 4 HOURS PRN
Status: DISCONTINUED | OUTPATIENT
Start: 2019-12-31 | End: 2020-01-04 | Stop reason: HOSPADM

## 2019-12-31 RX ORDER — OXYCODONE AND ACETAMINOPHEN 7.5; 325 MG/1; MG/1
1 TABLET ORAL 4 TIMES DAILY
Status: DISCONTINUED | OUTPATIENT
Start: 2019-12-31 | End: 2020-01-04 | Stop reason: HOSPADM

## 2019-12-31 RX ORDER — MAGNESIUM SULFATE IN WATER 40 MG/ML
2 INJECTION, SOLUTION INTRAVENOUS ONCE
Status: COMPLETED | OUTPATIENT
Start: 2019-12-31 | End: 2019-12-31

## 2019-12-31 RX ORDER — IPRATROPIUM BROMIDE AND ALBUTEROL SULFATE 2.5; .5 MG/3ML; MG/3ML
1 SOLUTION RESPIRATORY (INHALATION)
Status: DISCONTINUED | OUTPATIENT
Start: 2019-12-31 | End: 2020-01-04 | Stop reason: HOSPADM

## 2019-12-31 RX ORDER — KIT FOR THE PREPARATION OF TECHNETIUM TC 99M PENTETATE 20 MG/1
3 INJECTION, POWDER, LYOPHILIZED, FOR SOLUTION INTRAVENOUS; RESPIRATORY (INHALATION)
Status: COMPLETED | OUTPATIENT
Start: 2019-12-31 | End: 2019-12-31

## 2019-12-31 RX ORDER — METHYLPREDNISOLONE SODIUM SUCCINATE 40 MG/ML
40 INJECTION, POWDER, LYOPHILIZED, FOR SOLUTION INTRAMUSCULAR; INTRAVENOUS EVERY 6 HOURS
Status: DISCONTINUED | OUTPATIENT
Start: 2019-12-31 | End: 2020-01-01

## 2019-12-31 RX ORDER — GUAIFENESIN 600 MG/1
600 TABLET, EXTENDED RELEASE ORAL 2 TIMES DAILY
Status: DISCONTINUED | OUTPATIENT
Start: 2019-12-31 | End: 2020-01-04 | Stop reason: HOSPADM

## 2019-12-31 RX ORDER — PREDNISONE 20 MG/1
40 TABLET ORAL DAILY
Status: DISCONTINUED | OUTPATIENT
Start: 2020-01-02 | End: 2020-01-01

## 2019-12-31 RX ORDER — SODIUM CHLORIDE 0.9 % (FLUSH) 0.9 %
10 SYRINGE (ML) INJECTION EVERY 12 HOURS SCHEDULED
Status: DISCONTINUED | OUTPATIENT
Start: 2019-12-31 | End: 2020-01-04 | Stop reason: HOSPADM

## 2019-12-31 RX ORDER — FAMOTIDINE 20 MG/1
20 TABLET, FILM COATED ORAL DAILY
Status: DISCONTINUED | OUTPATIENT
Start: 2019-12-31 | End: 2020-01-04 | Stop reason: HOSPADM

## 2019-12-31 RX ORDER — FLUTICASONE PROPIONATE 50 MCG
2 SPRAY, SUSPENSION (ML) NASAL DAILY
Status: DISCONTINUED | OUTPATIENT
Start: 2019-12-31 | End: 2020-01-04 | Stop reason: HOSPADM

## 2019-12-31 RX ADMIN — SODIUM CHLORIDE: 9 INJECTION, SOLUTION INTRAVENOUS at 01:01

## 2019-12-31 RX ADMIN — MAGNESIUM SULFATE HEPTAHYDRATE 2 G: 40 INJECTION, SOLUTION INTRAVENOUS at 04:40

## 2019-12-31 RX ADMIN — OXYCODONE HYDROCHLORIDE AND ACETAMINOPHEN 1 TABLET: 7.5; 325 TABLET ORAL at 12:51

## 2019-12-31 RX ADMIN — IPRATROPIUM BROMIDE AND ALBUTEROL SULFATE 1 AMPULE: .5; 3 SOLUTION RESPIRATORY (INHALATION) at 21:58

## 2019-12-31 RX ADMIN — METHYLPREDNISOLONE SODIUM SUCCINATE 40 MG: 40 INJECTION, POWDER, LYOPHILIZED, FOR SOLUTION INTRAMUSCULAR; INTRAVENOUS at 01:01

## 2019-12-31 RX ADMIN — OXYCODONE HYDROCHLORIDE AND ACETAMINOPHEN 1 TABLET: 7.5; 325 TABLET ORAL at 01:01

## 2019-12-31 RX ADMIN — OXYCODONE HYDROCHLORIDE AND ACETAMINOPHEN 1 TABLET: 7.5; 325 TABLET ORAL at 10:32

## 2019-12-31 RX ADMIN — CEFTRIAXONE 1 G: 1 INJECTION, POWDER, FOR SOLUTION INTRAMUSCULAR; INTRAVENOUS at 21:35

## 2019-12-31 RX ADMIN — GUAIFENESIN 600 MG: 600 TABLET, EXTENDED RELEASE ORAL at 21:34

## 2019-12-31 RX ADMIN — SODIUM CHLORIDE, PRESERVATIVE FREE 10 ML: 5 INJECTION INTRAVENOUS at 10:33

## 2019-12-31 RX ADMIN — IPRATROPIUM BROMIDE AND ALBUTEROL SULFATE 1 AMPULE: .5; 3 SOLUTION RESPIRATORY (INHALATION) at 12:33

## 2019-12-31 RX ADMIN — OXYCODONE HYDROCHLORIDE AND ACETAMINOPHEN 1 TABLET: 7.5; 325 TABLET ORAL at 21:34

## 2019-12-31 RX ADMIN — SODIUM CHLORIDE: 9 INJECTION, SOLUTION INTRAVENOUS at 17:02

## 2019-12-31 RX ADMIN — KIT FOR THE PREPARATION OF TECHNETIUM TC 99M PENTETATE 3 MILLICURIE: 20 INJECTION, POWDER, LYOPHILIZED, FOR SOLUTION INTRAVENOUS; RESPIRATORY (INHALATION) at 09:35

## 2019-12-31 RX ADMIN — OXYCODONE HYDROCHLORIDE AND ACETAMINOPHEN 1 TABLET: 7.5; 325 TABLET ORAL at 17:02

## 2019-12-31 RX ADMIN — GUAIFENESIN 600 MG: 600 TABLET, EXTENDED RELEASE ORAL at 10:32

## 2019-12-31 RX ADMIN — DULOXETINE HYDROCHLORIDE 60 MG: 30 CAPSULE, DELAYED RELEASE ORAL at 10:32

## 2019-12-31 RX ADMIN — IPRATROPIUM BROMIDE AND ALBUTEROL SULFATE 1 AMPULE: .5; 3 SOLUTION RESPIRATORY (INHALATION) at 08:08

## 2019-12-31 RX ADMIN — METHYLPREDNISOLONE SODIUM SUCCINATE 40 MG: 40 INJECTION, POWDER, LYOPHILIZED, FOR SOLUTION INTRAMUSCULAR; INTRAVENOUS at 12:51

## 2019-12-31 RX ADMIN — Medication 5.6 MILLICURIE: at 10:26

## 2019-12-31 RX ADMIN — ENOXAPARIN SODIUM 40 MG: 40 INJECTION SUBCUTANEOUS at 10:32

## 2019-12-31 RX ADMIN — AZITHROMYCIN MONOHYDRATE 500 MG: 500 INJECTION, POWDER, LYOPHILIZED, FOR SOLUTION INTRAVENOUS at 21:35

## 2019-12-31 RX ADMIN — METHYLPREDNISOLONE SODIUM SUCCINATE 40 MG: 40 INJECTION, POWDER, LYOPHILIZED, FOR SOLUTION INTRAMUSCULAR; INTRAVENOUS at 07:05

## 2019-12-31 RX ADMIN — FAMOTIDINE 20 MG: 20 TABLET, FILM COATED ORAL at 17:02

## 2019-12-31 RX ADMIN — METHYLPREDNISOLONE SODIUM SUCCINATE 40 MG: 40 INJECTION, POWDER, LYOPHILIZED, FOR SOLUTION INTRAMUSCULAR; INTRAVENOUS at 18:17

## 2019-12-31 ASSESSMENT — PAIN DESCRIPTION - PROGRESSION
CLINICAL_PROGRESSION: NOT CHANGED

## 2019-12-31 ASSESSMENT — PAIN - FUNCTIONAL ASSESSMENT
PAIN_FUNCTIONAL_ASSESSMENT: ACTIVITIES ARE NOT PREVENTED
PAIN_FUNCTIONAL_ASSESSMENT: ACTIVITIES ARE NOT PREVENTED

## 2019-12-31 ASSESSMENT — PAIN DESCRIPTION - FREQUENCY
FREQUENCY: INTERMITTENT
FREQUENCY: INTERMITTENT

## 2019-12-31 ASSESSMENT — PAIN DESCRIPTION - DESCRIPTORS
DESCRIPTORS: ACHING
DESCRIPTORS: ACHING

## 2019-12-31 ASSESSMENT — PAIN DESCRIPTION - LOCATION
LOCATION: BACK

## 2019-12-31 ASSESSMENT — PAIN DESCRIPTION - ORIENTATION
ORIENTATION: LOWER
ORIENTATION: LOWER

## 2019-12-31 ASSESSMENT — PAIN DESCRIPTION - PAIN TYPE
TYPE: CHRONIC PAIN

## 2019-12-31 ASSESSMENT — PAIN SCALES - GENERAL
PAINLEVEL_OUTOF10: 4
PAINLEVEL_OUTOF10: 3
PAINLEVEL_OUTOF10: 4
PAINLEVEL_OUTOF10: 5
PAINLEVEL_OUTOF10: 4
PAINLEVEL_OUTOF10: 6
PAINLEVEL_OUTOF10: 5

## 2019-12-31 ASSESSMENT — PAIN DESCRIPTION - ONSET
ONSET: ON-GOING
ONSET: ON-GOING

## 2019-12-31 NOTE — PROGRESS NOTES
Patient requested her lasix twice daily be restarted; pt could not remember the mg dose.  Notified Dr. Chago Hercules of request.

## 2019-12-31 NOTE — PROGRESS NOTES
Hospitalist Progress Note      Name:  Shashank De La Paz /Age/Sex: 1940  (78 y.o. female)   MRN & CSN:  [id number] & [id number] Admission Date/Time: 2019  6:01 PM   Location:  [address] [place] [address] PCP: Ellie Tavarez MD         Hospital Day: 2    Assessment and Plan:   Shashank De La Paz is a 78 y.o.  female  who presents with Acute on chronic respiratory failure with hypoxia ([facility]    1. Acute on chronic respiratory failure with hypoxia  · Presented with shortness of breath, and hypoxemia  · Afebrile, WBC 14  · CT chest with peripheral right upper lobe consolidation  · Being treated for community-acquired pneumonia with ceftriaxone and azithromycin  2. Acute kidney injury:   · Creatinine 1.8 at baseline, 1.4 on admission, now 1.2 after hydration  3. Hyponatremia: Urine sodium low. Expect to improve after hydration  4. Elevated troponin: We will monitor. Recent stress test negative. 5. Rheumatoid arthritis: On long-term prednisone  6. Hypertension   7. Hyperlipidemia  8. Depression continue Cymbalta  9. Fibromyalgia, chronic pain syndrome: Continue Cymbalta, Percocet as needed    Diet DIET CARDIAC;   DVT Prophylaxis [x] Lovenox, []  Heparin, [] SCDs, [] Warfarin  [] NOAC     GI Prophylaxis [] PPI,  [x] H2 Blocker,  [] Carafate,  [] Diet/Tube Feeds   Code Status Full Code   MDM [] Low, [x] Moderate,[]  High     History of Present Illness:     Chief Complaint: Acute on chronic respiratory failure with hypoxia ([facility]    She was seen and examined. Feels better. Still with cough on deep breaths. Has mild shortness of breath. Denied chest pain. Ten point ROS reviewed negative, unless as noted above    Objective:        Intake/Output Summary (Last 24 hours) at 2019 1342  Last data filed at 2019 2128  Gross per 24 hour   Intake 50 ml   Output --   Net 50 ml      Vitals:   Vitals:    19 1233   BP:    Pulse:    Resp:    Temp:    SpO2: 96%     Physical Exam:   GEN Awake female, sitting upright in AST 17 15   ALT 7* 6*   BILITOT 0.5 0.3   ALKPHOS 72 75     No results for input(s): INR in the last 72 hours.   Recent Labs     12/30/19  1755   TROPONINT 0.012*        Imaging reviewed      Electronically signed by Keegan Schuster MD on 12/31/2019 at 1:42 PM

## 2019-12-31 NOTE — CONSULTS
former  smoker. ALLERGIES:  ATIVAN, HUMIRA, ETANERCEPT, AND DOXYCYCLINE. MEDICATIONS AT HOME:  She is on Lasix, Lipitor, Cozaar, potassium,  Aldactone and Lopressor. PHYSICAL EXAMINATION:  GENERAL:  Awake, alert and answering questions, not in acute distress. VITAL SIGNS:  Temperature is afebrile, pulse is 73, blood pressure is  97/58. HEENT:  Head is normocephalic and atraumatic. Pupils are equal and  reactive. CHEST:  Equal expansion. LUNGS:  Clear to auscultation. No wheezing or rhonchi. HEART:  Regular rhythm. ABDOMEN:  Soft and nontender. Bowel sounds are present. No  hepatosplenomegaly or guarding appreciated. EXTREMITIES:  No cyanosis or clubbing noted. NEUROLOGIC:  Cranial nerves II through XII are grossly intact. LABORATORY DATA:  Sodium is low at 127, BUN is 32, creatinine 1.2, mag  is 1.3. Troponin is elevated but not positive. LFTs are within normal  limits. CBC is within normal range, white count is elevated. The patient had a chest x-ray done, right upper lobe pneumonia is  present. IMPRESSION:  This is a 69-year-old female patient who is here with  shortness of breath and cough present. The patient was seen by Dr. Bree Gilbert. From a cardiac stand, at this time her troponin is slightly  elevated but not positive. I will repeat troponin. We will keep a  watch. Her recent stress test was negative. We will repeat an echo  also. Further recommendations based on the hospital course.         Leah Armando MD    D: 12/31/2019 8:04:22       T: 12/31/2019 9:48:18     [id number]  Job#: [id number]     Doc#: [id number]    CC:

## 2019-12-31 NOTE — CONSULTS
year ago, but states that she has been  smoking on and off since then. She does not use alcohol and does not  have a history of illegal drug use. FAMILY HISTORY:  Positive for heart disease and MI in her father along  with emphysema and mother had CVA and arthritis, and there is a family  history of laryngeal cancer. REVIEW OF SYSTEMS:  CONSTITUTIONAL:  Positive for low-grade fever, anorexia, mild weight  loss, night sweats. HENT:  Negative for ear or throat pain. Negative for tinnitus. EYES:  Negative for diplopia or blurred vision or discharge. RESPIRATORY:  See HPI. CARDIAC:  Negative for MI, valvular heart disease, pericarditis. GI:  Positive for chronic diarrhea. Negative for nausea, vomiting,  cirrhosis. :  Negative for flank pain or inguinal hernias. Negative for dysuria. SKIN:  Negative for rash, pruritus. HEME/LYMPH:  Negative for easy bleeding, bruising, or lymphadenitis. NEUROLOGIC:  Negative for CVA, TIA, seizure disorder. PSYCHIATRIC:  Negative for bipolar disease, psychosis. PHYSICAL EXAMINATION:  VITAL SIGNS:  Her blood pressure is 107/53, but was recorded as low as  92/52, temperature is 97.5 with a high temperature of 99 on admission,  respirations 18, O2 saturation is 95% on 2.5 liters nasal oxygen, pulse  is 106 and regular. GENERAL:  She is a thin, pale-appearing female, who does not appear to  be in marked respiratory distress presently. HEENT:  The oropharynx is clear. NECK:  There are no palpable lymph nodes over neck. Mucous membranes  are slightly dry. There is no jugular venous distention at 45-degree  angle. No subcutaneous air in the neck or chest wall. CHEST:  Auscultation of lungs revealed diminished breath sounds  throughout all lung areas with a few scattered rhonchi. No wheezing is  noted. CARDIAC:  Sounds revealed normal S1 and S2. I do not hear any definite  murmurs or gallops. GI:  No palpable organomegaly or tenderness.   :  No flank pain or inguinal hernias. SKIN:  No rashes or chronic skin disorders. EXTREMITIES:  No peripheral cyanosis, clubbing, or edema, and no  evidence of DVT. NEUROLOGIC:  Oriented x3. No focal neurologic deficits noted. LABORATORY DATA:  Serum sodium 129, potassium 4.1, creatinine 1.4 with a  BUN of 30. ProBNP on admission was 1694 with a troponin of 0.012. White blood count is 18,000 with a hemoglobin of 12.6 gm. There is a  left shift in differential.  PCR/viral antigen profile is negative. Venous blood gases with venous pH of 7.36, pCO2 of 46, pO2 of 75. Chest  x-ray on admission shows a right upper lobe pneumonia with mild right  basilar disease, which may represent pneumonia or atelectasis, and  underlying findings of COPD. IMPRESSION:  1. Community-acquired pneumonia. 2.  History of moderate-to-severe COPD. 3.  History of bronchiectasis. 4.  Tobacco abuse. 5.  History rheumatoid arthritis/fibromyalgia. PLAN:  She has been started on broad-spectrum IV antibiotics to cover  community-acquired pneumonia. She will be continued on aerosolized  bronchodilators, IV corticosteroids, and aggressive bronchopulmonary  toilet. A noncontrast CT chest will be obtained. I will continue to  follow her.         Kaitlynn Barr MD    D: 12/31/2019 10:00:03       T: 12/31/2019 10:04:59     [email or web address]  Job#: [id number]     Doc#: [id number]    CC:

## 2019-12-31 NOTE — H&P
History and Physical      Name:  Lavon Hankins /Age/Sex: 1940  (78 y.o. female)   MRN & CSN:  [id number] & [id number] Admission Date/Time: 2019  6:01 PM   Location:  [facility] PCP: Summer Rinaldi MD       Hospital Day: 1        Admitting Physician: Dr. Lucita Phillips and Plan:   Lavon Hankins is a 78 y.o. female who presents with Shortness of Breathand Cough     Acute on chronic Resp failure 2/2 ACOPDE/ RUL pnuemonia. Hypoxic at pulmonology office requiring supplemental oxygen to maintain respiratory status. Sepsis Positive. Leukocytosis (18.1)/hypotensive/tachycardic   Admit to Med/Surg              Consult pulmonology              IV abx azithromycin/rocephin              Pending cultures- sputum/ blood/ viral panel              IV solumedrol 40 mg. Inhaled corticosteroids and LABAs              Pulmonary hygiene/Respiratory interventions- IS, supplemental oxygen, continuous pulse oximetry, and TCDB               Pending blood gas              PRN Bipap - hypoxia/dyspnea      Acute kidney injury- sCr 1.4 with normal baseline. Hyponatremia(129)- appears chronic   Monitor lab trends with IVF   Pending urine indices    Holding nephrotoxic agents- as able   Previous evaluation by nephrologist Dr Hui Agrawal (2017) 2/2 hyponatremia/HTN- consult pending clinical course     CAD-Elevated troponin- (0.012). Hx of type 2 NSTEMI   Trend/Telemetry monitoring   Follows with Dr Lisa Ross RA- on chronic prednisone    HTN-hold home antihypertensive regimen as she is currently hypotensive   HLD- statin    Depression- Cymbalta    Fibromyalgia/chronic pain syndrome- prn percocet      Patient case discussed with ED provider    Diet Renal   DVT Prophylaxis [] Lovenox, [x]  Heparin, [] SCDs, [] Ambulation  [] Long term AC   GI Prophylaxis [] PPI,  [x] H2 Blocker,  [] Carafate,  [] Diet/Tube Feeds   Code Status Full     Disposition Admit to inpatient.     Patient plans to smoking 7 days ago. Her smoking use included cigarettes. She started smoking about 57 years ago. She has a 41.25 pack-year smoking history. She has never used smokeless tobacco.  ETOH:   reports no history of alcohol use. Drugs:  reports no history of drug use. Allergies: Allergies   Allergen Reactions    Ativan [Lorazepam] Other (See Comments)     Violent, thrashing and combative. She has lacerations and bruising, had to be tied down.  Etanercept Other (See Comments)     No response    Humira [Adalimumab]     Prochlorperazine Edisylate Other (See Comments)     \"Compazine\"   Involuntary Muscle Spasms     Remicade [Infliximab Injection]     Doxycycline Other (See Comments)     Stomach pains     Home Medications:     Prior to Admission medications    Medication Sig Start Date End Date Taking?  Authorizing Provider   ULTICARE SHORT PEN NEEDLES 31G X 8 MM MISC USE AS DIRECTED WITH FORTEO 11/20/19   Suma Silver MD   furosemide (LASIX) 40 MG tablet Take 1 tablet by mouth 2 times daily 11/8/19   Suma Silver MD   atorvastatin (LIPITOR) 10 MG tablet TAKE 1 TABLET BY MOUTH EVERY EVENING 11/4/19   Suma Silver MD   traZODone (DESYREL) 50 MG tablet TAKE 1 TABLET BY MOUTH NIGHTLY 10/22/19   Chauncey Davis MD   triamcinolone (NASACORT ALLERGY 24HR) 55 MCG/ACT nasal inhaler 2 sprays by Each Nostril route daily 10/4/19   Suma Silver MD   predniSONE (DELTASONE) 10 MG tablet Take 10 mg by mouth daily    Historical Provider, MD   losartan (COZAAR) 100 MG tablet TAKE ONE TABLET BY MOUTH DAILY 9/18/19   Suma Silver MD   potassium chloride (KLOR-CON) 10 MEQ extended release tablet TAKE 2 TABLETS BY MOUTH EVERY MORNING AND TAKE 1 TABLET IN THE EVENING 8/15/19   Suma Silver MD   spironolactone (ALDACTONE) 50 MG tablet TAKE 1 TABLET BY ORAL ROUTE EVERY DAY 7/17/19   Historical Provider, MD   teriparatide, recombinant, (FORTEO) 600 MCG/2.4ML SOLN injection Inject 0.08 mLs into the skin daily 8/12/19   Charlie Ochoa MD   Cholecalciferol (VITAMIN D) 2000 units CAPS capsule Take 1 capsule by mouth daily    Historical Provider, MD   ferrous sulfate 325 (65 Fe) MG tablet Take 325 mg by mouth 2 times daily (with meals)    Historical Provider, MD   esomeprazole (NEXIUM) 40 MG delayed release capsule TAKE 1 CAPSULE BY MOUTH TWICE A DAY (MORNING AND BEFORE BED) 5/14/19   Historical Provider, MD   metoprolol tartrate (LOPRESSOR) 50 MG tablet TAKE 1 TABLET BY MOUTH 2 TIMES DAILY 5/28/19   Charlie Ochoa MD   ipratropium-albuterol (DUONEB) 0.5-2.5 (3) MG/3ML SOLN nebulizer solution Inhale 3 mLs into the lungs every 4 hours (while awake) 4/13/19   Ayana Bhatt MD   ondansetron (ZOFRAN-ODT) 4 MG disintegrating tablet Take 4 mg by mouth as needed for Nausea or Vomiting    Historical Provider, MD   PROAIR  (90 Base) MCG/ACT inhaler INHALE 2 PUFFS BY ORAL INHALATION EVERY 4 TO 6 HOURS AS NEEDED 3/28/19   Lida Blank MD   BREO ELLIPTA 100-25 MCG/INH AEPB inhaler INHALE 1 PUFF INTO THE LUNGS DAILY AFTER INHALATION THEN GARGLE 2/4/19   Lida Blank MD   oxyCODONE-acetaminophen (PERCOCET) 7.5-325 MG per tablet Take 1 tablet by mouth 4 times daily. Dirk Alejandro Historical Provider, MD   INCRUSE ELLIPTA 62.5 MCG/INH AEPB INHALE 1 PUFF VIA ORAL INHALATION ONCE DAILY 6/5/18   Lida Blank MD   vitamin B-12 (CYANOCOBALAMIN) 1000 MCG tablet Take 1,000 mcg by mouth daily. Historical Provider, MD   DULoxetine (CYMBALTA) 60 MG capsule Take 60 mg by mouth daily.     Historical Provider, MD   Calcium Citrate-Vitamin D (CITRACAL + D PO) Take 600 mg by mouth daily     Historical Provider, MD     Medications:   Medications:    predniSONE  60 mg Oral Once    cefTRIAXone (ROCEPHIN) IV  1 g Intravenous Once    azithromycin  500 mg Oral Once    sodium chloride  1,000 mL Intravenous Once    oxyCODONE-acetaminophen  1 tablet Oral Once      Infusions:   PRN Meds:      Data:     Laboratory this visit:  Reviewed  Recent Labs     12/30/19  1755   WBC 18.1*   HGB 12.6   HCT 40.7         Recent Labs     12/30/19  1755   *   K 4.1   CL 88*   CO2 27   BUN 30*   CREATININE 1.4*     Recent Labs     12/30/19  1755   AST 17   ALT 7*   BILITOT 0.5   ALKPHOS 72     No results for input(s): INR in the last 72 hours. Radiology this visit:  Reviewed. Xr Chest Standard (2 Vw)    Result Date: 12/30/2019  EXAMINATION: TWO XRAY VIEWS OF THE CHEST 12/30/2019 6:01 pm COMPARISON: 05/17/2019 HISTORY: ORDERING SYSTEM PROVIDED HISTORY: cough, sob, hx of copd TECHNOLOGIST PROVIDED HISTORY: Reason for exam:->cough, sob, hx of copd Reason for Exam: chest pain Acuity: Acute Type of Exam: Initial Additional signs and symptoms: na Relevant Medical/Surgical History: copd, hypertension FINDINGS: There is confluent airspace disease in the lateral right upper lobe marginated inferiorly by the minor fissure. There is mild disease at the right lung base. There is chronic atelectasis at the left lung base. Middle lobe calcified granuloma is unchanged. Tortuous descending thoracic aorta. Heart size is normal.     Right upper lobe pneumonia. Mild right basilar disease may represent pneumonia and or atelectasis. Underlying findings of COPD. Follow-up to complete clearing recommended.      EKG this visit:  Reviewed           Electronically signed by NATALIYA Howell CNP on 12/30/2019 at 8:34 PM

## 2020-01-01 ENCOUNTER — APPOINTMENT (OUTPATIENT)
Dept: GENERAL RADIOLOGY | Age: 80
DRG: 871 | End: 2020-01-01
Payer: MEDICARE

## 2020-01-01 LAB
ANION GAP SERPL CALCULATED.3IONS-SCNC: 15 MMOL/L (ref 4–16)
BUN BLDV-MCNC: 37 MG/DL (ref 6–23)
CALCIUM SERPL-MCNC: 8.8 MG/DL (ref 8.3–10.6)
CHLORIDE BLD-SCNC: 89 MMOL/L (ref 99–110)
CO2: 23 MMOL/L (ref 21–32)
CREAT SERPL-MCNC: 1.1 MG/DL (ref 0.6–1.1)
GFR AFRICAN AMERICAN: 58 ML/MIN/1.73M2
GFR NON-AFRICAN AMERICAN: 48 ML/MIN/1.73M2
GLUCOSE BLD-MCNC: 177 MG/DL (ref 70–99)
HCT VFR BLD CALC: 38.7 % (ref 37–47)
HEMOGLOBIN: 12.1 GM/DL (ref 12.5–16)
LEGIONELLA URINARY AG: NEGATIVE
MAGNESIUM: 2.1 MG/DL (ref 1.8–2.4)
MCH RBC QN AUTO: 29.9 PG (ref 27–31)
MCHC RBC AUTO-ENTMCNC: 31.3 % (ref 32–36)
MCV RBC AUTO: 95.6 FL (ref 78–100)
PDW BLD-RTO: 15.6 % (ref 11.7–14.9)
PLATELET # BLD: 398 K/CU MM (ref 140–440)
PMV BLD AUTO: 9.7 FL (ref 7.5–11.1)
POTASSIUM SERPL-SCNC: 3.9 MMOL/L (ref 3.5–5.1)
RBC # BLD: 4.05 M/CU MM (ref 4.2–5.4)
SODIUM BLD-SCNC: 127 MMOL/L (ref 135–145)
STREP PNEUMONIAE ANTIGEN: NORMAL
WBC # BLD: 14.4 K/CU MM (ref 4–10.5)

## 2020-01-01 PROCEDURE — 6370000000 HC RX 637 (ALT 250 FOR IP): Performed by: NURSE PRACTITIONER

## 2020-01-01 PROCEDURE — 6370000000 HC RX 637 (ALT 250 FOR IP): Performed by: INTERNAL MEDICINE

## 2020-01-01 PROCEDURE — 6360000002 HC RX W HCPCS: Performed by: INTERNAL MEDICINE

## 2020-01-01 PROCEDURE — 99232 SBSQ HOSP IP/OBS MODERATE 35: CPT | Performed by: INTERNAL MEDICINE

## 2020-01-01 PROCEDURE — 80048 BASIC METABOLIC PNL TOTAL CA: CPT

## 2020-01-01 PROCEDURE — 36415 COLL VENOUS BLD VENIPUNCTURE: CPT

## 2020-01-01 PROCEDURE — 6360000002 HC RX W HCPCS: Performed by: NURSE PRACTITIONER

## 2020-01-01 PROCEDURE — 94761 N-INVAS EAR/PLS OXIMETRY MLT: CPT

## 2020-01-01 PROCEDURE — 89220 SPUTUM SPECIMEN COLLECTION: CPT

## 2020-01-01 PROCEDURE — 1200000000 HC SEMI PRIVATE

## 2020-01-01 PROCEDURE — 83735 ASSAY OF MAGNESIUM: CPT

## 2020-01-01 PROCEDURE — 71045 X-RAY EXAM CHEST 1 VIEW: CPT

## 2020-01-01 PROCEDURE — 85027 COMPLETE CBC AUTOMATED: CPT

## 2020-01-01 PROCEDURE — 2580000003 HC RX 258: Performed by: NURSE PRACTITIONER

## 2020-01-01 PROCEDURE — 2700000000 HC OXYGEN THERAPY PER DAY

## 2020-01-01 PROCEDURE — 94640 AIRWAY INHALATION TREATMENT: CPT

## 2020-01-01 RX ORDER — DILTIAZEM HYDROCHLORIDE 120 MG/1
120 CAPSULE, COATED, EXTENDED RELEASE ORAL DAILY
Status: DISCONTINUED | OUTPATIENT
Start: 2020-01-01 | End: 2020-01-02

## 2020-01-01 RX ORDER — FUROSEMIDE 40 MG/1
40 TABLET ORAL 2 TIMES DAILY
Status: DISCONTINUED | OUTPATIENT
Start: 2020-01-01 | End: 2020-01-01

## 2020-01-01 RX ORDER — METHYLPREDNISOLONE SODIUM SUCCINATE 40 MG/ML
40 INJECTION, POWDER, LYOPHILIZED, FOR SOLUTION INTRAMUSCULAR; INTRAVENOUS EVERY 8 HOURS
Status: DISCONTINUED | OUTPATIENT
Start: 2020-01-01 | End: 2020-01-04 | Stop reason: HOSPADM

## 2020-01-01 RX ADMIN — SODIUM CHLORIDE, PRESERVATIVE FREE 10 ML: 5 INJECTION INTRAVENOUS at 20:35

## 2020-01-01 RX ADMIN — GUAIFENESIN 600 MG: 600 TABLET, EXTENDED RELEASE ORAL at 20:33

## 2020-01-01 RX ADMIN — METHYLPREDNISOLONE SODIUM SUCCINATE 40 MG: 40 INJECTION, POWDER, LYOPHILIZED, FOR SOLUTION INTRAMUSCULAR; INTRAVENOUS at 00:23

## 2020-01-01 RX ADMIN — FAMOTIDINE 20 MG: 20 TABLET, FILM COATED ORAL at 09:59

## 2020-01-01 RX ADMIN — OXYCODONE HYDROCHLORIDE AND ACETAMINOPHEN 1 TABLET: 7.5; 325 TABLET ORAL at 16:26

## 2020-01-01 RX ADMIN — GUAIFENESIN 600 MG: 600 TABLET, EXTENDED RELEASE ORAL at 09:59

## 2020-01-01 RX ADMIN — IPRATROPIUM BROMIDE AND ALBUTEROL SULFATE 1 AMPULE: .5; 3 SOLUTION RESPIRATORY (INHALATION) at 17:12

## 2020-01-01 RX ADMIN — OXYCODONE HYDROCHLORIDE AND ACETAMINOPHEN 1 TABLET: 7.5; 325 TABLET ORAL at 09:59

## 2020-01-01 RX ADMIN — FLUTICASONE PROPIONATE 2 SPRAY: 50 SPRAY, METERED NASAL at 10:07

## 2020-01-01 RX ADMIN — IPRATROPIUM BROMIDE AND ALBUTEROL SULFATE 1 AMPULE: .5; 3 SOLUTION RESPIRATORY (INHALATION) at 21:29

## 2020-01-01 RX ADMIN — METHYLPREDNISOLONE SODIUM SUCCINATE 40 MG: 40 INJECTION, POWDER, LYOPHILIZED, FOR SOLUTION INTRAMUSCULAR; INTRAVENOUS at 06:00

## 2020-01-01 RX ADMIN — CEFTRIAXONE 1 G: 1 INJECTION, POWDER, FOR SOLUTION INTRAMUSCULAR; INTRAVENOUS at 20:35

## 2020-01-01 RX ADMIN — DULOXETINE HYDROCHLORIDE 60 MG: 30 CAPSULE, DELAYED RELEASE ORAL at 10:04

## 2020-01-01 RX ADMIN — DILTIAZEM HYDROCHLORIDE 120 MG: 120 CAPSULE, COATED, EXTENDED RELEASE ORAL at 16:31

## 2020-01-01 RX ADMIN — IPRATROPIUM BROMIDE AND ALBUTEROL SULFATE 1 AMPULE: .5; 3 SOLUTION RESPIRATORY (INHALATION) at 12:26

## 2020-01-01 RX ADMIN — SODIUM CHLORIDE, PRESERVATIVE FREE 10 ML: 5 INJECTION INTRAVENOUS at 10:21

## 2020-01-01 RX ADMIN — METHYLPREDNISOLONE SODIUM SUCCINATE 40 MG: 40 INJECTION, POWDER, LYOPHILIZED, FOR SOLUTION INTRAMUSCULAR; INTRAVENOUS at 16:31

## 2020-01-01 RX ADMIN — OXYCODONE HYDROCHLORIDE AND ACETAMINOPHEN 1 TABLET: 7.5; 325 TABLET ORAL at 20:33

## 2020-01-01 RX ADMIN — METHYLPREDNISOLONE SODIUM SUCCINATE 40 MG: 40 INJECTION, POWDER, LYOPHILIZED, FOR SOLUTION INTRAMUSCULAR; INTRAVENOUS at 20:33

## 2020-01-01 RX ADMIN — AZITHROMYCIN MONOHYDRATE 500 MG: 500 INJECTION, POWDER, LYOPHILIZED, FOR SOLUTION INTRAVENOUS at 22:22

## 2020-01-01 RX ADMIN — IPRATROPIUM BROMIDE AND ALBUTEROL SULFATE 1 AMPULE: .5; 3 SOLUTION RESPIRATORY (INHALATION) at 08:19

## 2020-01-01 RX ADMIN — ENOXAPARIN SODIUM 40 MG: 40 INJECTION SUBCUTANEOUS at 10:00

## 2020-01-01 ASSESSMENT — PAIN DESCRIPTION - LOCATION
LOCATION: GENERALIZED
LOCATION: GENERALIZED

## 2020-01-01 ASSESSMENT — PAIN SCALES - GENERAL
PAINLEVEL_OUTOF10: 4
PAINLEVEL_OUTOF10: 5
PAINLEVEL_OUTOF10: 5
PAINLEVEL_OUTOF10: 0
PAINLEVEL_OUTOF10: 6

## 2020-01-01 ASSESSMENT — PAIN DESCRIPTION - PAIN TYPE
TYPE: CHRONIC PAIN
TYPE: CHRONIC PAIN

## 2020-01-01 ASSESSMENT — PAIN DESCRIPTION - PROGRESSION
CLINICAL_PROGRESSION: NOT CHANGED

## 2020-01-01 NOTE — PLAN OF CARE
Problem: Pain:  Goal: Pain level will decrease  Description  Pain level will decrease  Outcome: Ongoing  Goal: Control of acute pain  Description  Control of acute pain  Outcome: Ongoing  Goal: Control of chronic pain  Description  Control of chronic pain  Outcome: Ongoing     Problem: Falls - Risk of:  Goal: Will remain free from falls  Description  Will remain free from falls  Outcome: Ongoing  Goal: Absence of physical injury  Description  Absence of physical injury  Outcome: Ongoing     Problem: Discharge Planning:  Goal: Discharged to appropriate level of care  Description  Discharged to appropriate level of care  Outcome: Ongoing  Goal: Participates in care planning  Description  Participates in care planning  Outcome: Ongoing     Problem: Airway Clearance - Ineffective:  Goal: Clear lung sounds  Description  Clear lung sounds  Outcome: Ongoing  Goal: Ability to maintain a clear airway will improve  Description  Ability to maintain a clear airway will improve  Outcome: Ongoing     Problem: Fluid Volume - Deficit:  Goal: Achieves intake and output within specified parameters  Description  Achieves intake and output within specified parameters  Outcome: Ongoing     Problem: Gas Exchange - Impaired:  Goal: Levels of oxygenation will improve  Description  Levels of oxygenation will improve  Outcome: Ongoing     Problem: Hyperthermia:  Goal: Ability to maintain a body temperature in the normal range will improve  Description  Ability to maintain a body temperature in the normal range will improve  Outcome: Ongoing     Problem: Tobacco Use:  Goal: Will participate in inpatient tobacco-use cessation counseling  Description  Will participate in inpatient tobacco-use cessation counseling  Outcome: Ongoing

## 2020-01-01 NOTE — PROGRESS NOTES
Hospitalist Progress Note      Name:  Alisha Avitia /Age/Sex: 1940  (78 y.o. female)   MRN & CSN:  [id number] & [id number] Admission Date/Time: 2019  6:01 PM   Location:  [address] [place] [address] PCP: Nory Marshall MD         Hospital Day: 3    Assessment and Plan:   Alisha Avitia is a 78 y.o.  female  who presents with Acute on chronic respiratory failure with hypoxia ([facility]    1. Acute on chronic respiratory failure with hypoxia  · Presented with shortness of breath, and hypoxemia  · Afebrile, WBC 14.4  · CT chest with peripheral right upper lobe consolidation  · Being treated for community-acquired pneumonia with ceftriaxone and azithromycin  2. Acute kidney injury:   · Creatinine 1.8 at baseline, 1.4 on admission, now 1.2 after hydration  · BMP pending today  3. Hyponatremia: Urine sodium low. Expect to improve after hydration  4. Elevated troponin: We will monitor. Recent stress test negative. 5. Rheumatoid arthritis: On long-term prednisone  6. Hypertension   7. Hyperlipidemia  8. Depression continue Cymbalta  9. Fibromyalgia, chronic pain syndrome: Continue Cymbalta, Percocet as needed    Diet DIET CARDIAC;   DVT Prophylaxis [x] Lovenox, []  Heparin, [] SCDs, [] Warfarin  [] NOAC     GI Prophylaxis [] PPI,  [x] H2 Blocker,  [] Carafate,  [] Diet/Tube Feeds   Code Status Full Code   MDM [] Low, [x] Moderate,[]  High     History of Present Illness:     Chief Complaint: Acute on chronic respiratory failure with hypoxia ([facility] [address]    She was seen and examined. Still with coughing and SOB. Feels weak    Ten point ROS reviewed negative, unless as noted above    Objective:        Intake/Output Summary (Last 24 hours) at 2020 1212  Last data filed at 2020 0930  Gross per 24 hour   Intake 2508.25 ml   Output 750 ml   Net 1758.25 ml      Vitals:   Vitals:    20 0453   BP: (!) 118/57   Pulse: 95   Resp: 16   Temp: 97.3 °F (36.3 °C)   SpO2: 99%     Physical Exam:   GEN Awake female, sitting upright 12/31/19  0153   AST 17 15   ALT 7* 6*   BILITOT 0.5 0.3   ALKPHOS 72 75     Recent Labs     12/31/19  1456   INR 0.97     Recent Labs     12/30/19  1755 12/31/19  1456   TROPONINT 0.012* <0.010        Imaging reviewed      Electronically signed by Lu Etienne MD on 1/1/2020 at 12:12 PM

## 2020-01-01 NOTE — PROGRESS NOTES
Pulmonary and Critical Care  Progress Note      VITALS:  BP (!) 118/57   Pulse 95   Temp 97.3 °F (36.3 °C) (Oral)   Resp 16   Ht 5' 6\" (1.676 m)   Wt 151 lb 8 oz (68.7 kg)   SpO2 99%   BMI 24.45 kg/m²     Subjective:   Chief complaint: Community-acquired right upper lobe pneumonia, moderate to severe COPD, bronchiectasis, tobacco abuse  No resp distress  Patient awake and alert  Feels slightly better but still having night sweats  Objective:   PHYSICAL EXAM:    LUNGS: Diminished breath sounds bilateral with coarse rhonchi still noted more on the right than left  Minimal wheezing  No pleural rubs  PCR/viral antigen profile negative  Pro calcitonin 4.55  All blood cultures negative so far  DATA:    CBC:  Recent Labs     12/30/19  1755 12/31/19  0153   WBC 18.1* 14.0*   RBC 4.23 3.83*   HGB 12.6 11.3*   HCT 40.7 36.4*    300   MCV 96.2 95.0   MCH 29.8 29.5   MCHC 31.0* 31.0*   RDW 15.5* 15.4*   SEGSPCT 78.7*  --       BMP:  Recent Labs     12/30/19  1755 12/31/19  0153   * 127*   K 4.1 3.8   CL 88* 91*   CO2 27 24   BUN 30* 32*   CREATININE 1.4* 1.2*   CALCIUM 9.3 8.4   GLUCOSE 120* 141*      ABG:  No results for input(s): PH, PO2ART, VRQ1LVV, HCO3, BEART, O2SAT in the last 72 hours. BNP  Lab Results   Component Value Date    BNP 54 11/25/2012      D-Dimer:  Lab Results   Component Value Date    DDIMER 809 (H) 03/23/2019      1. Radiology: Chest x-ray this a.m. reviewed  Impression   1. Unchanged right upper lobe pneumonia. 2. Unchanged right basilar airspace opacity due in part to rounded   atelectasis as seen on CT.  An additional small area of pneumonia or   aspiration is likely present in the right lower lobe given the CT appearance. 3. Suspected trace right pleural effusion.        Assessment:     Patient Active Problem List   Diagnosis    Rheumatoid Arthritis    Hypertension    Hyperlipidemia    Fibromyalgia    Migraine    Chronic pain syndrome    Depression    Osteoporosis   

## 2020-01-01 NOTE — PROGRESS NOTES
Daily Progress Note    Patient is awake alert   Feeling better  No chest pain dyspnea improving   She is in sinus rate in 100  COPDE  Will add cardizem for heart rate change from betablockers    Echo---Summary   This is a limited echocardiogram.   Left ventricular systolic function is normal.   Ejection fraction is visually estimated at 50-55%. Mitral annular calcification is present. Mild tricuspid regurgitation; RVSP is 35 mmHg. No evidence of any pericardial effusion. moderate MR noted      Objective:   BP (!) 118/57   Pulse 95   Temp 97.3 °F (36.3 °C) (Oral)   Resp 16   Ht 5' 6\" (1.676 m)   Wt 151 lb 8 oz (68.7 kg)   SpO2 99%   BMI 24.45 kg/m²       Intake/Output Summary (Last 24 hours) at 1/1/2020 1224  Last data filed at 1/1/2020 0930  Gross per 24 hour   Intake 2508.25 ml   Output 750 ml   Net 1758.25 ml       Medications:   Scheduled Meds:   methylPREDNISolone  40 mg Intravenous Q8H    sodium chloride flush  10 mL Intravenous 2 times per day    enoxaparin  40 mg Subcutaneous Daily    ipratropium-albuterol  1 ampule Inhalation Q4H WA    cefTRIAXone (ROCEPHIN) IV  1 g Intravenous Q24H    azithromycin  500 mg Intravenous Q24H    DULoxetine  60 mg Oral Daily    oxyCODONE-acetaminophen  1 tablet Oral 4x Daily    fluticasone  2 spray Each Nostril Daily    teriparatide (recombinant)  20 mcg Subcutaneous Daily    guaiFENesin  600 mg Oral BID    famotidine  20 mg Oral Daily      Infusions:   sodium chloride 75 mL/hr at 12/31/19 1702      PRN Meds:  sodium chloride flush, senna, ondansetron, acetaminophen       Physical Exam:  Vitals:    01/01/20 0453   BP: (!) 118/57   Pulse: 95   Resp: 16   Temp: 97.3 °F (36.3 °C)   SpO2: 99%        General: awake alert   Chest: Nontender  Cardiac: sinus   Lungs:Clear to auscultation and percussion. Abdomen: Soft, NT, ND, +BS  Extremities: no edema   Vascular:  Equal 2+ peripheral pulses.         Lab Data:  CBC:   Recent Labs     12/30/19  9635 12/31/19  0153 01/01/20  1132   WBC 18.1* 14.0* 14.4*   HGB 12.6 11.3* 12.1*   HCT 40.7 36.4* 38.7   MCV 96.2 95.0 95.6    300 398     BMP:   Recent Labs     12/30/19  1755 12/31/19  0153   * 127*   K 4.1 3.8   CL 88* 91*   CO2 27 24   BUN 30* 32*   CREATININE 1.4* 1.2*     LIVER PROFILE:   Recent Labs     12/30/19  1755 12/31/19  0153   AST 17 15   ALT 7* 6*   BILITOT 0.5 0.3   ALKPHOS 72 75     PT/INR:   Recent Labs     12/31/19  1456   PROTIME 11.7   INR 0.97     APTT:   Recent Labs     12/31/19  1456   APTT 43.9*     BNP:  No results for input(s): BNP in the last 72 hours.       Assessment:  Patient Active Problem List    Diagnosis Date Noted    Pneumonia of right upper lobe due to infectious organism ([address]     Acute on chronic respiratory failure with hypoxia ([address] 12/30/2019    Chronic fatigue 07/08/2019    Intractable low back pain 06/11/2019    Back pain 06/09/2019    Nocturnal hypoxemia 05/21/2019    Shortness of breath 04/25/2019    Former smoker     Acute exacerbation of chronic obstructive pulmonary disease (COPD) ([address] 12/13/2018    COPD, severe ([address] 10/24/2017    Blood loss anemia 08/01/2017    Takotsubo syndrome 05/01/2017    Panic attack 11/18/2016    Lumbar spinal stenosis 01/01/2015    Vitamin B12 deficiency 12/01/2014    Osteoporosis 03/16/2012    Depression     Migraine 10/06/2010    Rheumatoid Arthritis     Hypertension     Hyperlipidemia     Fibromyalgia     Chronic pain syndrome        Caden Rodriguez MD 1/1/2020 12:24 PM

## 2020-01-02 LAB
ALBUMIN SERPL-MCNC: 2.9 GM/DL (ref 3.4–5)
ALP BLD-CCNC: 67 IU/L (ref 40–129)
ALT SERPL-CCNC: 6 U/L (ref 10–40)
ANION GAP SERPL CALCULATED.3IONS-SCNC: 13 MMOL/L (ref 4–16)
AST SERPL-CCNC: 12 IU/L (ref 15–37)
BILIRUB SERPL-MCNC: 0.1 MG/DL (ref 0–1)
BUN BLDV-MCNC: 39 MG/DL (ref 6–23)
CALCIUM SERPL-MCNC: 8.8 MG/DL (ref 8.3–10.6)
CHLORIDE BLD-SCNC: 95 MMOL/L (ref 99–110)
CO2: 22 MMOL/L (ref 21–32)
CREAT SERPL-MCNC: 1.1 MG/DL (ref 0.6–1.1)
GFR AFRICAN AMERICAN: 58 ML/MIN/1.73M2
GFR NON-AFRICAN AMERICAN: 48 ML/MIN/1.73M2
GLUCOSE BLD-MCNC: 142 MG/DL (ref 70–99)
HCT VFR BLD CALC: 36.2 % (ref 37–47)
HEMOGLOBIN: 11.1 GM/DL (ref 12.5–16)
MAGNESIUM: 2.2 MG/DL (ref 1.8–2.4)
MCH RBC QN AUTO: 29.8 PG (ref 27–31)
MCHC RBC AUTO-ENTMCNC: 30.7 % (ref 32–36)
MCV RBC AUTO: 97.1 FL (ref 78–100)
PDW BLD-RTO: 15.8 % (ref 11.7–14.9)
PLATELET # BLD: 384 K/CU MM (ref 140–440)
PMV BLD AUTO: 9.8 FL (ref 7.5–11.1)
POTASSIUM SERPL-SCNC: 4.3 MMOL/L (ref 3.5–5.1)
PROCALCITONIN: 1.48
RBC # BLD: 3.73 M/CU MM (ref 4.2–5.4)
SODIUM BLD-SCNC: 130 MMOL/L (ref 135–145)
TOTAL PROTEIN: 4.9 GM/DL (ref 6.4–8.2)
WBC # BLD: 12.3 K/CU MM (ref 4–10.5)

## 2020-01-02 PROCEDURE — 97530 THERAPEUTIC ACTIVITIES: CPT

## 2020-01-02 PROCEDURE — 80053 COMPREHEN METABOLIC PANEL: CPT

## 2020-01-02 PROCEDURE — 6370000000 HC RX 637 (ALT 250 FOR IP): Performed by: NURSE PRACTITIONER

## 2020-01-02 PROCEDURE — 97162 PT EVAL MOD COMPLEX 30 MIN: CPT

## 2020-01-02 PROCEDURE — 94640 AIRWAY INHALATION TREATMENT: CPT

## 2020-01-02 PROCEDURE — 2580000003 HC RX 258: Performed by: NURSE PRACTITIONER

## 2020-01-02 PROCEDURE — 99232 SBSQ HOSP IP/OBS MODERATE 35: CPT | Performed by: INTERNAL MEDICINE

## 2020-01-02 PROCEDURE — 85027 COMPLETE CBC AUTOMATED: CPT

## 2020-01-02 PROCEDURE — 6370000000 HC RX 637 (ALT 250 FOR IP): Performed by: INTERNAL MEDICINE

## 2020-01-02 PROCEDURE — 36415 COLL VENOUS BLD VENIPUNCTURE: CPT

## 2020-01-02 PROCEDURE — 83735 ASSAY OF MAGNESIUM: CPT

## 2020-01-02 PROCEDURE — 6360000002 HC RX W HCPCS: Performed by: INTERNAL MEDICINE

## 2020-01-02 PROCEDURE — 1200000000 HC SEMI PRIVATE

## 2020-01-02 PROCEDURE — 84145 PROCALCITONIN (PCT): CPT

## 2020-01-02 PROCEDURE — 6360000002 HC RX W HCPCS: Performed by: NURSE PRACTITIONER

## 2020-01-02 PROCEDURE — 97166 OT EVAL MOD COMPLEX 45 MIN: CPT

## 2020-01-02 PROCEDURE — 94761 N-INVAS EAR/PLS OXIMETRY MLT: CPT

## 2020-01-02 PROCEDURE — 97116 GAIT TRAINING THERAPY: CPT

## 2020-01-02 PROCEDURE — 2700000000 HC OXYGEN THERAPY PER DAY

## 2020-01-02 PROCEDURE — 80048 BASIC METABOLIC PNL TOTAL CA: CPT

## 2020-01-02 RX ORDER — DILTIAZEM HYDROCHLORIDE 120 MG/1
120 CAPSULE, COATED, EXTENDED RELEASE ORAL 2 TIMES DAILY
Status: DISCONTINUED | OUTPATIENT
Start: 2020-01-02 | End: 2020-01-04 | Stop reason: HOSPADM

## 2020-01-02 RX ADMIN — METHYLPREDNISOLONE SODIUM SUCCINATE 40 MG: 40 INJECTION, POWDER, LYOPHILIZED, FOR SOLUTION INTRAMUSCULAR; INTRAVENOUS at 18:01

## 2020-01-02 RX ADMIN — DILTIAZEM HYDROCHLORIDE 120 MG: 120 CAPSULE, COATED, EXTENDED RELEASE ORAL at 08:25

## 2020-01-02 RX ADMIN — ACETAMINOPHEN 650 MG: 325 TABLET ORAL at 02:34

## 2020-01-02 RX ADMIN — IPRATROPIUM BROMIDE AND ALBUTEROL SULFATE 1 AMPULE: .5; 3 SOLUTION RESPIRATORY (INHALATION) at 19:16

## 2020-01-02 RX ADMIN — AZITHROMYCIN MONOHYDRATE 500 MG: 500 INJECTION, POWDER, LYOPHILIZED, FOR SOLUTION INTRAVENOUS at 21:30

## 2020-01-02 RX ADMIN — OXYCODONE HYDROCHLORIDE AND ACETAMINOPHEN 1 TABLET: 7.5; 325 TABLET ORAL at 14:31

## 2020-01-02 RX ADMIN — SODIUM CHLORIDE, PRESERVATIVE FREE 10 ML: 5 INJECTION INTRAVENOUS at 21:30

## 2020-01-02 RX ADMIN — CEFTRIAXONE 1 G: 1 INJECTION, POWDER, FOR SOLUTION INTRAMUSCULAR; INTRAVENOUS at 19:49

## 2020-01-02 RX ADMIN — IPRATROPIUM BROMIDE AND ALBUTEROL SULFATE 1 AMPULE: .5; 3 SOLUTION RESPIRATORY (INHALATION) at 14:31

## 2020-01-02 RX ADMIN — DULOXETINE HYDROCHLORIDE 60 MG: 30 CAPSULE, DELAYED RELEASE ORAL at 08:25

## 2020-01-02 RX ADMIN — GUAIFENESIN 600 MG: 600 TABLET, EXTENDED RELEASE ORAL at 08:25

## 2020-01-02 RX ADMIN — METHYLPREDNISOLONE SODIUM SUCCINATE 40 MG: 40 INJECTION, POWDER, LYOPHILIZED, FOR SOLUTION INTRAMUSCULAR; INTRAVENOUS at 02:43

## 2020-01-02 RX ADMIN — GUAIFENESIN 600 MG: 600 TABLET, EXTENDED RELEASE ORAL at 21:30

## 2020-01-02 RX ADMIN — ENOXAPARIN SODIUM 40 MG: 40 INJECTION SUBCUTANEOUS at 08:25

## 2020-01-02 RX ADMIN — IPRATROPIUM BROMIDE AND ALBUTEROL SULFATE 1 AMPULE: .5; 3 SOLUTION RESPIRATORY (INHALATION) at 07:13

## 2020-01-02 RX ADMIN — METHYLPREDNISOLONE SODIUM SUCCINATE 40 MG: 40 INJECTION, POWDER, LYOPHILIZED, FOR SOLUTION INTRAMUSCULAR; INTRAVENOUS at 10:17

## 2020-01-02 RX ADMIN — FAMOTIDINE 20 MG: 20 TABLET, FILM COATED ORAL at 08:25

## 2020-01-02 RX ADMIN — OXYCODONE HYDROCHLORIDE AND ACETAMINOPHEN 1 TABLET: 7.5; 325 TABLET ORAL at 08:25

## 2020-01-02 RX ADMIN — SODIUM CHLORIDE: 9 INJECTION, SOLUTION INTRAVENOUS at 05:48

## 2020-01-02 RX ADMIN — DILTIAZEM HYDROCHLORIDE 120 MG: 120 CAPSULE, COATED, EXTENDED RELEASE ORAL at 21:30

## 2020-01-02 RX ADMIN — OXYCODONE HYDROCHLORIDE AND ACETAMINOPHEN 1 TABLET: 7.5; 325 TABLET ORAL at 21:30

## 2020-01-02 RX ADMIN — FLUTICASONE PROPIONATE 2 SPRAY: 50 SPRAY, METERED NASAL at 08:31

## 2020-01-02 ASSESSMENT — PAIN DESCRIPTION - LOCATION
LOCATION: GENERALIZED

## 2020-01-02 ASSESSMENT — PAIN DESCRIPTION - PROGRESSION: CLINICAL_PROGRESSION: GRADUALLY IMPROVING

## 2020-01-02 ASSESSMENT — PAIN SCALES - GENERAL
PAINLEVEL_OUTOF10: 4
PAINLEVEL_OUTOF10: 6
PAINLEVEL_OUTOF10: 0
PAINLEVEL_OUTOF10: 3
PAINLEVEL_OUTOF10: 9
PAINLEVEL_OUTOF10: 6

## 2020-01-02 ASSESSMENT — PAIN DESCRIPTION - DESCRIPTORS
DESCRIPTORS: ACHING
DESCRIPTORS: ACHING

## 2020-01-02 ASSESSMENT — PAIN DESCRIPTION - FREQUENCY
FREQUENCY: CONTINUOUS
FREQUENCY: CONTINUOUS

## 2020-01-02 ASSESSMENT — PAIN DESCRIPTION - PAIN TYPE
TYPE: CHRONIC PAIN

## 2020-01-02 ASSESSMENT — PAIN DESCRIPTION - ONSET
ONSET: ON-GOING
ONSET: ON-GOING

## 2020-01-02 NOTE — PLAN OF CARE
Problem: Pain:  Goal: Pain level will decrease  Description  Pain level will decrease  Outcome: Met This Shift  Goal: Control of acute pain  Description  Control of acute pain  Outcome: Met This Shift  Goal: Control of chronic pain  Description  Control of chronic pain  Outcome: Met This Shift     Problem: Falls - Risk of:  Goal: Will remain free from falls  Description  Will remain free from falls  Outcome: Met This Shift  Goal: Absence of physical injury  Description  Absence of physical injury  Outcome: Met This Shift     Problem: Discharge Planning:  Goal: Discharged to appropriate level of care  Description  Discharged to appropriate level of care  Outcome: Met This Shift  Goal: Participates in care planning  Description  Participates in care planning  Outcome: Met This Shift     Problem: Airway Clearance - Ineffective:  Goal: Clear lung sounds  Description  Clear lung sounds  Outcome: Met This Shift  Goal: Ability to maintain a clear airway will improve  Description  Ability to maintain a clear airway will improve  Outcome: Met This Shift     Problem: Fluid Volume - Deficit:  Goal: Achieves intake and output within specified parameters  Description  Achieves intake and output within specified parameters  Outcome: Met This Shift     Problem: Gas Exchange - Impaired:  Goal: Levels of oxygenation will improve  Description  Levels of oxygenation will improve  Outcome: Met This Shift     Problem: Hyperthermia:  Goal: Ability to maintain a body temperature in the normal range will improve  Description  Ability to maintain a body temperature in the normal range will improve  Outcome: Met This Shift     Problem: Tobacco Use:  Goal: Will participate in inpatient tobacco-use cessation counseling  Description  Will participate in inpatient tobacco-use cessation counseling  Outcome: Met This Shift

## 2020-01-02 NOTE — PROGRESS NOTES
WFL (denies numbness/tingling)  · Tone: Normal  · Coordination: WFL   · Perception: WNL    Activities of Daily Living (ADLs):  · Feeding: Independent   · Grooming: SBA (in standing at sink)  · UB bathing: SBA   · LB bathing: CGA (light dynamic standing balance for safety with reaching bottom)  · UB dressing: SBA (donning clean robe at chair level)  · LB dressing: CGA (light dynamic standing balance with mgmt of sweats to hips, able to don BL socks at chair level SBA with setup by leaning forward)  · Toileting: SBA    Cognitive and Psychosocial Functioning:  · Overall cognitive status: WNL  · Affect: Normal     Balance:   · Sitting: SBA at edge of chair  · Standing: SBA with RW    Functional Mobility:  · Bed Mobility: Not observed. Pt received sitting in chair. · Transfers: SBA to and from chair (min cues for safe hand placement)  · Ambulation: CGA with  ft (See PT note for gait assessment)      AM-PAC 6 click short form for inpatient daily activity:   How much help from another person does the patient currently need. .. Unable  Dep A Lot  Max A A Lot   Mod A A Little  Min A A Little   CGA  SBA None   Mod I  Indep  Sup   1. Putting on and taking off regular lower body clothing? [] 1    [] 2   [] 2   [] 3   [x] 3   [] 4      2. Bathing (including washing, rinsing, drying)? [] 1   [] 2   [] 2 [] 3 [x] 3 [] 4   3. Toileting, which includes using toilet, bedpan, or urinal? [] 1    [] 2   [] 2   [] 3   [] 3   [x] 4     4. Putting on and taking off regular upper body clothing? [] 1   [] 2   [] 2   [] 3   [] 3    [x] 4      5. Taking care of personal grooming such as brushing teeth? [] 1   [] 2    [] 2 [] 3    [] 3   [x] 4      6. Eating meals?    [] 1   [] 2   [] 2   [] 3   [] 3   [x] 4      Raw Score:  22    [24=0% impaired(CH), 23=1-19%(CI), 20-22=20-39%(CJ), 15-19=40-59%(CK), 10-14=60-79%(CL), 7-9=80-99%(CM), 6=100%(CN)]     Treatment:  Therapeutic Activity Training:   Therapeutic activity training was instructed today.  Cues were given for safety, sequence, UE/LE placement, awareness, and balance. Activities performed today included UB/LB dressing tasks, transfer training, HH mobility with RW, education on role of OT, POC, importance of OOB activity, pursed lip breathing, d/c planning      Safety Measures: Gait belt used, Left in Chair, Alarm in place    Assessment:  Pt is a 78year old female with a past medical history of Acute DVT of right tibial vein (HCC), Acute exacerbation of chronic obstructive pulmonary disease (COPD) ([facility] [address] Arthritis, Chronic pain syndrome, Clostridium difficile colitis, COPD, severe ([address] Depression, Fibromyalgia, Former smoker, Herniated cervical disc, Herpes zoster ophthalmicus, Hx of blood clots, Hyperlipidemia, Hypertension, L3 vertebral fracture (HCC), Lumbar spinal stenosis, Migraine, Nocturnal hypoxemia, Osteoporosis, Rheumatoid arthritis(714.0), S/P cardiac catheterization, Shortness of breath, Takotsubo syndrome, TMJ dysfunction, Tobacco abuse, and Vitamin B12 deficiency. Pt admitted with shortness of breath/cough and diagnosed with acute respiratory failure/pneumonia. Pt lives at home with her spouse and at baseline is independent with ADLs, IADLs, mobility, and driving. Pt currently presents with mild endurance impairments, but anticipate this with improve in the next couple days with continued medical and therapy intervention. Recommend d/c to home with initial 24 hour supervision and [facility] OT services recommended. Complexity: Moderate  Prognosis: Good  Plan: 3x/week      Goals:  1. Pt will complete all aspects of bed mobility for EOB/OOB ADLs independently with HOB flat  2. Pt will complete UB/LB bathing independently  3. Pt will complete all aspects of LB dressing independently  4. Pt will complete all functional transfers to and from bed, chair, toilet, shower chair mod I with use of grab bars PRN  5. Pt will ambulate HH distance to bathroom for toileting mod I with RW  6.  Pt will complete all aspects of toileting task independently  7. Pt will complete oral hygiene/grooming routine in standing at sink independently  8.  Pt will complete ther ex/ther act with focus on light UE strengthening and functional standing tolerance >10 minutes with no seated rest breaks          Time:   Time in: 1101  Time out: 1121  Timed treatment minutes: 10  Total time: 20      Electronically signed by:    FELICIA Nielsen/L, [address] [place] [address], [id number]

## 2020-01-02 NOTE — PROGRESS NOTES
 azithromycin  500 mg Intravenous Q24H    DULoxetine  60 mg Oral Daily    oxyCODONE-acetaminophen  1 tablet Oral 4x Daily    fluticasone  2 spray Each Nostril Daily    teriparatide (recombinant)  20 mcg Subcutaneous Daily    guaiFENesin  600 mg Oral BID    famotidine  20 mg Oral Daily      Infusions:     PRN Meds:  sodium chloride flush, senna, ondansetron, acetaminophen       Physical Exam:  Vitals:    01/02/20 1030   BP: 131/60   Pulse: 100   Resp: 18   Temp: 97.5 °F (36.4 °C)   SpO2: 100%        General: AAO, NAD  Chest: Nontender  Cardiac: First and Second Heart Sounds are Normal, No Murmurs or Gallops noted  Lungs:Clear to auscultation and percussion. Abdomen: Soft, NT, ND, +BS  Extremities: No clubbing, no edema  Vascular:  Equal 2+ peripheral pulses. Lab Data:  CBC:   Recent Labs     12/31/19  0153 01/01/20  1132 01/02/20  0605   WBC 14.0* 14.4* 12.3*   HGB 11.3* 12.1* 11.1*   HCT 36.4* 38.7 36.2*   MCV 95.0 95.6 97.1    398 384     BMP:   Recent Labs     12/31/19  0153 01/01/20  1132 01/02/20  0605   * 127* 130*   K 3.8 3.9 4.3   CL 91* 89* 95*   CO2 24 23 22   BUN 32* 37* 39*   CREATININE 1.2* 1.1 1.1     LIVER PROFILE:   Recent Labs     12/30/19  1755 12/31/19  0153 01/02/20  0605   AST 17 15 12*   ALT 7* 6* 6*   BILITOT 0.5 0.3 0.1   ALKPHOS 72 75 67     PT/INR:   Recent Labs     12/31/19  1456   PROTIME 11.7   INR 0.97     APTT:   Recent Labs     12/31/19  1456   APTT 43.9*     BNP:  No results for input(s): BNP in the last 72 hours.       Assessment:  Patient Active Problem List    Diagnosis Date Noted    Pneumonia of right upper lobe due to infectious organism [facility] [address] [facility]     Acute on chronic respiratory failure with hypoxia ([facility] [address] 12/30/2019    Chronic fatigue 07/08/2019    Intractable low back pain 06/11/2019    Back pain 06/09/2019    Nocturnal hypoxemia 05/21/2019    Shortness of breath 04/25/2019    Former smoker     Acute exacerbation of chronic obstructive pulmonary disease (COPD) ([facility] [address] 12/13/2018    COPD, severe ([facility] [address] 10/24/2017    Blood loss anemia 08/01/2017    Takotsubo syndrome 05/01/2017    Panic attack 11/18/2016    Lumbar spinal stenosis 01/01/2015    Vitamin B12 deficiency 12/01/2014    Osteoporosis 03/16/2012    Depression     Migraine 10/06/2010    Rheumatoid Arthritis     Hypertension     Hyperlipidemia     Fibromyalgia     Chronic pain syndrome        Electronically signed by Jacki Cherry PA-C on 1/2/2020 at 12:05 PM

## 2020-01-02 NOTE — PROGRESS NOTES
patient left in chair, call light within reach,  gait belt used. Assessment: Body structures, Functions, Activity limitations: Decreased functional mobility ; Decreased endurance; Decreased balance; Decreased high-level IADLs; Decreased strength; Decreased sensation  Pt is a 78year old female admitted with acute on chronic respiratory failure with hypoxia. Recommend home with 24/7 [facility] once medically stable. At baseline, pt is indep with gross mobility and ADLs. Pt performed well this date though functioning below her typical baseline. She would benefit from continued therapy to address current deficits, dec potential fall risk, and restore PLOF. Complexity: Moderate  Prognosis: Good, no significant barriers to participation at this time. Plan Times per week: 2+/week, 1 week,   Discharge Recommendations: 24 hour supervision or assist, Home with Home health PT, S Level 2  Equipment: continue to assess. No needs at this time. Goals:  Short term goals  Time Frame for Short term goals: 1 week   Short term goal 1: Pt will perform sit><supine indep   Short term goal 2: Pt will transfer to bed/recliner El   Short term goal 3: Pt will ambulate 150ft with LRAD El   Short term goal 4: Pt will tolerate ~5 minutes of standing dynamic activity El        Treatment plan:  Strengthening; Balance Training; Transfer Training; ROM; Functional Mobility Training; ADL/Self-care Training; IADL Training; Endurance Training; Gait Training; Neuromuscular Re-education; Home Exercise Program; Positioning; Equipment Evaluation, Education, & procurement; Safety Education & Training; Patient/Caregiver Education & Training; Modalities    Recommendations for NURSING mobility: ambulate frequently with use of RW.      Time:   Time in: 1102  Time out: 1122  Timed treatment minutes: 10  Total time: 20    Electronically signed by:    Aydin Katz [id number]  1/2/2020, 12:29 PM

## 2020-01-03 ENCOUNTER — APPOINTMENT (OUTPATIENT)
Dept: GENERAL RADIOLOGY | Age: 80
DRG: 871 | End: 2020-01-03
Payer: MEDICARE

## 2020-01-03 LAB
ANION GAP SERPL CALCULATED.3IONS-SCNC: 15 MMOL/L (ref 4–16)
BUN BLDV-MCNC: 36 MG/DL (ref 6–23)
CALCIUM SERPL-MCNC: 8.9 MG/DL (ref 8.3–10.6)
CHLORIDE BLD-SCNC: 94 MMOL/L (ref 99–110)
CO2: 22 MMOL/L (ref 21–32)
CREAT SERPL-MCNC: 1 MG/DL (ref 0.6–1.1)
GFR AFRICAN AMERICAN: >60 ML/MIN/1.73M2
GFR NON-AFRICAN AMERICAN: 53 ML/MIN/1.73M2
GLUCOSE BLD-MCNC: 130 MG/DL (ref 70–99)
HCT VFR BLD CALC: 36.4 % (ref 37–47)
HEMOGLOBIN: 11.5 GM/DL (ref 12.5–16)
MAGNESIUM: 2.1 MG/DL (ref 1.8–2.4)
MCH RBC QN AUTO: 29.7 PG (ref 27–31)
MCHC RBC AUTO-ENTMCNC: 31.6 % (ref 32–36)
MCV RBC AUTO: 94.1 FL (ref 78–100)
PDW BLD-RTO: 15.8 % (ref 11.7–14.9)
PLATELET # BLD: 427 K/CU MM (ref 140–440)
PMV BLD AUTO: 9.9 FL (ref 7.5–11.1)
POTASSIUM SERPL-SCNC: 4.6 MMOL/L (ref 3.5–5.1)
RBC # BLD: 3.87 M/CU MM (ref 4.2–5.4)
SODIUM BLD-SCNC: 131 MMOL/L (ref 135–145)
WBC # BLD: 11 K/CU MM (ref 4–10.5)

## 2020-01-03 PROCEDURE — 6370000000 HC RX 637 (ALT 250 FOR IP): Performed by: INTERNAL MEDICINE

## 2020-01-03 PROCEDURE — 94760 N-INVAS EAR/PLS OXIMETRY 1: CPT

## 2020-01-03 PROCEDURE — 80048 BASIC METABOLIC PNL TOTAL CA: CPT

## 2020-01-03 PROCEDURE — 99232 SBSQ HOSP IP/OBS MODERATE 35: CPT | Performed by: INTERNAL MEDICINE

## 2020-01-03 PROCEDURE — 2580000003 HC RX 258: Performed by: NURSE PRACTITIONER

## 2020-01-03 PROCEDURE — 2700000000 HC OXYGEN THERAPY PER DAY

## 2020-01-03 PROCEDURE — 83735 ASSAY OF MAGNESIUM: CPT

## 2020-01-03 PROCEDURE — 1200000000 HC SEMI PRIVATE

## 2020-01-03 PROCEDURE — 6370000000 HC RX 637 (ALT 250 FOR IP): Performed by: NURSE PRACTITIONER

## 2020-01-03 PROCEDURE — 87077 CULTURE AEROBIC IDENTIFY: CPT

## 2020-01-03 PROCEDURE — 85027 COMPLETE CBC AUTOMATED: CPT

## 2020-01-03 PROCEDURE — 87324 CLOSTRIDIUM AG IA: CPT

## 2020-01-03 PROCEDURE — 94761 N-INVAS EAR/PLS OXIMETRY MLT: CPT

## 2020-01-03 PROCEDURE — 87205 SMEAR GRAM STAIN: CPT

## 2020-01-03 PROCEDURE — 87070 CULTURE OTHR SPECIMN AEROBIC: CPT

## 2020-01-03 PROCEDURE — 71045 X-RAY EXAM CHEST 1 VIEW: CPT

## 2020-01-03 PROCEDURE — 6360000002 HC RX W HCPCS: Performed by: INTERNAL MEDICINE

## 2020-01-03 PROCEDURE — 97535 SELF CARE MNGMENT TRAINING: CPT

## 2020-01-03 PROCEDURE — 97530 THERAPEUTIC ACTIVITIES: CPT

## 2020-01-03 PROCEDURE — 94640 AIRWAY INHALATION TREATMENT: CPT

## 2020-01-03 PROCEDURE — 94618 PULMONARY STRESS TESTING: CPT

## 2020-01-03 PROCEDURE — 36415 COLL VENOUS BLD VENIPUNCTURE: CPT

## 2020-01-03 PROCEDURE — 87186 SC STD MICRODIL/AGAR DIL: CPT

## 2020-01-03 PROCEDURE — 6360000002 HC RX W HCPCS: Performed by: NURSE PRACTITIONER

## 2020-01-03 RX ORDER — FUROSEMIDE 40 MG/1
40 TABLET ORAL 2 TIMES DAILY
Status: DISCONTINUED | OUTPATIENT
Start: 2020-01-03 | End: 2020-01-04 | Stop reason: HOSPADM

## 2020-01-03 RX ORDER — TRAZODONE HYDROCHLORIDE 50 MG/1
50 TABLET ORAL NIGHTLY
Status: DISCONTINUED | OUTPATIENT
Start: 2020-01-03 | End: 2020-01-04 | Stop reason: HOSPADM

## 2020-01-03 RX ORDER — SPIRONOLACTONE 50 MG/1
50 TABLET, FILM COATED ORAL DAILY
Status: DISCONTINUED | OUTPATIENT
Start: 2020-01-03 | End: 2020-01-04 | Stop reason: HOSPADM

## 2020-01-03 RX ORDER — LOSARTAN POTASSIUM 100 MG/1
100 TABLET ORAL DAILY
Status: DISCONTINUED | OUTPATIENT
Start: 2020-01-03 | End: 2020-01-04 | Stop reason: HOSPADM

## 2020-01-03 RX ADMIN — METHYLPREDNISOLONE SODIUM SUCCINATE 40 MG: 40 INJECTION, POWDER, LYOPHILIZED, FOR SOLUTION INTRAMUSCULAR; INTRAVENOUS at 18:06

## 2020-01-03 RX ADMIN — FAMOTIDINE 20 MG: 20 TABLET, FILM COATED ORAL at 09:10

## 2020-01-03 RX ADMIN — OXYCODONE HYDROCHLORIDE AND ACETAMINOPHEN 1 TABLET: 7.5; 325 TABLET ORAL at 20:35

## 2020-01-03 RX ADMIN — CEFTRIAXONE 1 G: 1 INJECTION, POWDER, FOR SOLUTION INTRAMUSCULAR; INTRAVENOUS at 20:35

## 2020-01-03 RX ADMIN — OXYCODONE HYDROCHLORIDE AND ACETAMINOPHEN 1 TABLET: 7.5; 325 TABLET ORAL at 03:03

## 2020-01-03 RX ADMIN — FUROSEMIDE 40 MG: 40 TABLET ORAL at 16:24

## 2020-01-03 RX ADMIN — SODIUM CHLORIDE, PRESERVATIVE FREE 10 ML: 5 INJECTION INTRAVENOUS at 22:32

## 2020-01-03 RX ADMIN — METHYLPREDNISOLONE SODIUM SUCCINATE 40 MG: 40 INJECTION, POWDER, LYOPHILIZED, FOR SOLUTION INTRAMUSCULAR; INTRAVENOUS at 03:03

## 2020-01-03 RX ADMIN — AZITHROMYCIN MONOHYDRATE 500 MG: 500 INJECTION, POWDER, LYOPHILIZED, FOR SOLUTION INTRAVENOUS at 22:37

## 2020-01-03 RX ADMIN — FLUTICASONE PROPIONATE 2 SPRAY: 50 SPRAY, METERED NASAL at 09:11

## 2020-01-03 RX ADMIN — IPRATROPIUM BROMIDE AND ALBUTEROL SULFATE 1 AMPULE: .5; 3 SOLUTION RESPIRATORY (INHALATION) at 11:59

## 2020-01-03 RX ADMIN — ENOXAPARIN SODIUM 40 MG: 40 INJECTION SUBCUTANEOUS at 09:10

## 2020-01-03 RX ADMIN — DULOXETINE HYDROCHLORIDE 60 MG: 30 CAPSULE, DELAYED RELEASE ORAL at 09:10

## 2020-01-03 RX ADMIN — OXYCODONE HYDROCHLORIDE AND ACETAMINOPHEN 1 TABLET: 7.5; 325 TABLET ORAL at 09:10

## 2020-01-03 RX ADMIN — OXYCODONE HYDROCHLORIDE AND ACETAMINOPHEN 1 TABLET: 7.5; 325 TABLET ORAL at 15:02

## 2020-01-03 RX ADMIN — TRAZODONE HYDROCHLORIDE 50 MG: 50 TABLET ORAL at 20:35

## 2020-01-03 RX ADMIN — SODIUM CHLORIDE, PRESERVATIVE FREE 10 ML: 5 INJECTION INTRAVENOUS at 09:11

## 2020-01-03 RX ADMIN — SPIRONOLACTONE 50 MG: 50 TABLET ORAL at 10:18

## 2020-01-03 RX ADMIN — DILTIAZEM HYDROCHLORIDE 120 MG: 120 CAPSULE, COATED, EXTENDED RELEASE ORAL at 20:35

## 2020-01-03 RX ADMIN — LOSARTAN POTASSIUM 100 MG: 100 TABLET, FILM COATED ORAL at 10:18

## 2020-01-03 RX ADMIN — METHYLPREDNISOLONE SODIUM SUCCINATE 40 MG: 40 INJECTION, POWDER, LYOPHILIZED, FOR SOLUTION INTRAMUSCULAR; INTRAVENOUS at 10:18

## 2020-01-03 RX ADMIN — DILTIAZEM HYDROCHLORIDE 120 MG: 120 CAPSULE, COATED, EXTENDED RELEASE ORAL at 09:10

## 2020-01-03 RX ADMIN — IPRATROPIUM BROMIDE AND ALBUTEROL SULFATE 1 AMPULE: .5; 3 SOLUTION RESPIRATORY (INHALATION) at 19:04

## 2020-01-03 ASSESSMENT — PAIN DESCRIPTION - LOCATION: LOCATION: GENERALIZED

## 2020-01-03 ASSESSMENT — PAIN SCALES - GENERAL
PAINLEVEL_OUTOF10: 5
PAINLEVEL_OUTOF10: 3
PAINLEVEL_OUTOF10: 3
PAINLEVEL_OUTOF10: 5

## 2020-01-03 ASSESSMENT — PAIN DESCRIPTION - DESCRIPTORS: DESCRIPTORS: ACHING

## 2020-01-03 ASSESSMENT — PAIN DESCRIPTION - PAIN TYPE: TYPE: CHRONIC PAIN

## 2020-01-03 ASSESSMENT — PAIN DESCRIPTION - FREQUENCY: FREQUENCY: CONTINUOUS

## 2020-01-03 ASSESSMENT — PAIN DESCRIPTION - ONSET: ONSET: ON-GOING

## 2020-01-03 NOTE — CARE COORDINATION
CM into see pt to initiate a safe discharge plan. Cm into see, introduced self and explained role of CM. Pt is alone in room. Pt is kind, alert and oriented. Pt lives with her . Pt describes that when one is sick the other cares for them and vice versa. Pt has home O2 through [facility]. Pt other DME walker, w/c, shower chair and cane. Pt has a PCP. Pt has insurance and able to afford her medications. Pt lives in a one floor plan. CM discussed plans for discharge . Pt plans to return home when discharged. Declined home care. CM provided card and encouraged to call for any needs or concern. CM is available if any needs arise.
Met c pt to continue discharge planning. Pt confirms she lives at home with spouse, has walker/cane and wheelchair available if needed. On home 02 at night through [facility].  Pt declined option of hhc again. Pt states she has home care nurses in her family to call for help if needed. Pt advised CM available if needs arise.
answered. Call Physician,Nurse Navigator or Home Health Nurse if have signs and symptoms:  Shortness of breath,thicker/colored phlegm,  using rescue inhalers or nebulizer more than normal,  fatigued, unable to do usual activities,   fever,chills & sweating,   sleeping poorly,  dry cough , headache       Symptoms may indicate you may need to adjust your medications  Limit your activities  Make sure you are taking medications correctly  Force fluids  Take medications for your fever      Call Physician immediately or 911 if have symptoms:  Unrelieved shortness of breath  Shortness of breath at rest  Fever over 101  Confusion  Sharp/stabbing pain in chest worse with deep breaths and cough  Severe to increased coughing  Unusual fatigue  Unable to stay awake  Bluish color to lips or fingernails    Verified if patient had received flu or Pneumonia vaccines previously. Teachback received from patient/family. CN contact information given.     PNEUMONIA CHECKLIST    Was the PNA Order set used      No-COPD  Pneumonia Stoplight Education    Yes  Antibiotic given within 24 hours    Yes 12/30 2043  Blood cultures drawn before antibiotic given   Yes 12/30 1907  O2 Saturation on admission     97% 2.5 Liters      Consults:             Smoking Cessation      NA  Pulmonary       Yes  Med Assist Consult      No  Home Health Care Consult     No  Palliative Care Consult     No  Respiratory Consult      Yes  (including bedside instructions on nebulizers, inhalers, and assessment of oxygen and equipment needs at home)      Discharge:             Pneumococcal Vaccine given before discharge  NA 2016  Flu Vaccine given before discharge    NA 2019  Inhalers       Yes  Spacer        Yes  Steroids       Yes  Follow-up appointment scheduled within 5-7 days  No  Cardio/Pulmonary Wellness program consult  NA  Home Oxygen       Yes  Neblizer, bipap, cpap at home    Yes  Home air quality assessment [facility]
afternoon                                              [] No  [] Anytime

## 2020-01-03 NOTE — PROGRESS NOTES
Occupational Therapy      Occupational Therapy Treatment Note      Name: Meliton Mcgovern MRN: [id number] :   1940   Date:  1/3/2020   Admission Date: 2019 Room:  [facility]     Primary Problem: Acute respiratory failure, Pneumonia    Restrictions/Precautions: General Precautions, 2L O2, Pocket Telemetry    Communication with other providers:  RN    Subjective:  Patient states: \"I'm feeling better today. I'm not having any pain and my breathing is better! \"  Pain: Pt denied throughout session    Objective:    Observation: Pt received in supine upon OT arrival.   Objective Measures: Pt A & O x 4    Treatment, including education:  Therapeutic Activity Training:   Therapeutic activity training was instructed today. Cues were given for safety, sequence, UE/LE placement, awareness, and balance. Self Care Training:   Cues were given for safety, sequence, UE/LE placement, visual cues, and balance. Pt received in supine upon OT arrival. Pt re-educated on role of OT and POC. Pt transferred supine to sitting EOB independently. Pt completed sit to stand transfer from bed with supervision and min cues for pushing through arms. Pt ambulated 200 ft in hallway initially CGA progressing to supervision with RW. Pt with improved gait speed, posture, and activity tolerance, requiring no standing or seated rest breaks while in hallway. Pt returned to room and took 2 minute seated rest break at EOB level and was able to transfer onto bed with supervision. Pt set up for ADLs at sink. Pt completed sit to stand transfer from bed with supervision. Pt ambulated to bathroom with supervision using RW. Pt stood at sink at supervision level and completed oral hygiene tasks of soaking/brushing dentures, rinsing, and facial hygiene. Pt requested chair to be positioned behind her to take seated rest breaks PRN, and pt reported she has chair in her bathroom at home.  Pt able to complete full and thorough sponge bath seated on chair with supervision. Pt able to stand and wash bottom and landon region in standing with supervision. Pt able to don clean gown and BL socks with supervision by leaning forward. Pt completed toileting task of urinating in regular toilet with supervision. Pt able to manage underwear both directions and complete standing landon care without assist. Pt completed hand hygiene in standing at sink with supervision and returned to bed with supervision. Pt transferred sitting EOB to supine independently and left positioned for comfort in chair with all needs within reach bed alarm on. Pt educated on energy conservation, pursed lip breathing, and safe RW mgmt. Assessment / Impression:    Patient's tolerance of treatment: Well  Adverse Reaction: None  Significant change in status and impact: Much improved overall activity tolerance from initial evaluation yesterday  Barriers to improvement: None noted      Plan for Next Session:    Continue per OT POC.  Pt reports MD stated she will be discharging home tomorrow      Time in: 1106  Time out: 1156  Timed treatment minutes: 50  Total treatment time: 50      Electronically signed by:    Joseph Tompkins OTR/L, [facility], [id number]

## 2020-01-03 NOTE — PROGRESS NOTES
ipratropium-albuterol  1 ampule Inhalation Q4H WA    cefTRIAXone (ROCEPHIN) IV  1 g Intravenous Q24H    azithromycin  500 mg Intravenous Q24H    DULoxetine  60 mg Oral Daily    oxyCODONE-acetaminophen  1 tablet Oral 4x Daily    fluticasone  2 spray Each Nostril Daily    teriparatide (recombinant)  20 mcg Subcutaneous Daily    guaiFENesin  600 mg Oral BID    famotidine  20 mg Oral Daily      Infusions:     PRN Meds:  sodium chloride flush, senna, ondansetron, acetaminophen       Physical Exam:  Vitals:    01/03/20 0908   BP: (!) 153/69   Pulse: 84   Resp: 18   Temp: 97.9 °F (36.6 °C)   SpO2: 99%        General: AAO, NAD  Chest: Nontender  Cardiac: First and Second Heart Sounds are Normal, No Murmurs or Gallops noted  Lungs:Clear to auscultation and percussion. Abdomen: Soft, NT, ND, +BS  Extremities: No clubbing, no edema  Vascular:  Equal 2+ peripheral pulses. Lab Data:  CBC:   Recent Labs     01/01/20  1132 01/02/20  0605 01/03/20  0708   WBC 14.4* 12.3* 11.0*   HGB 12.1* 11.1* 11.5*   HCT 38.7 36.2* 36.4*   MCV 95.6 97.1 94.1    384 427     BMP:   Recent Labs     01/01/20  1132 01/02/20  0605 01/03/20  0708   * 130* 131*   K 3.9 4.3 4.6   CL 89* 95* 94*   CO2 23 22 22   BUN 37* 39* 36*   CREATININE 1.1 1.1 1.0     LIVER PROFILE:   Recent Labs     01/02/20  0605   AST 12*   ALT 6*   BILITOT 0.1   ALKPHOS 67     PT/INR:   Recent Labs     12/31/19  1456   PROTIME 11.7   INR 0.97     APTT:   Recent Labs     12/31/19  1456   APTT 43.9*     BNP:  No results for input(s): BNP in the last 72 hours.       Assessment:  Patient Active Problem List    Diagnosis Date Noted    Pneumonia of right upper lobe due to infectious organism ([facility] [address]     Acute on chronic respiratory failure with hypoxia ([facility] [address] 12/30/2019    Chronic fatigue 07/08/2019    Intractable low back pain 06/11/2019    Back pain 06/09/2019    Nocturnal hypoxemia 05/21/2019    Shortness of breath 04/25/2019    Former smoker     Acute exacerbation of chronic obstructive pulmonary disease (COPD) ([facility] [address] 12/13/2018    COPD, severe ([facility] [address] 10/24/2017    Blood loss anemia 08/01/2017    Takotsubo syndrome 05/01/2017    Panic attack 11/18/2016    Lumbar spinal stenosis 01/01/2015    Vitamin B12 deficiency 12/01/2014    Osteoporosis 03/16/2012    Depression     Migraine 10/06/2010    Rheumatoid Arthritis     Hypertension     Hyperlipidemia     Fibromyalgia     Chronic pain syndrome        Electronically signed by Sherwin Curtis PA-C on 1/3/2020 at 10:29 AM

## 2020-01-03 NOTE — PROGRESS NOTES
Pt has home oxygen for night use. Pt does not qualify for home oxygen during the day. SpO2 while resting on room air was 94%. Walking on room air was 89%.

## 2020-01-03 NOTE — DISCHARGE INSTR - OTHER ORDERS
You have a follow-up appointment with Dr. Chuy Crawford on Monday January 13th at 3pm.  If you cannot keep this appointment please call and reschedule as soon as possible.    [address] [place] [address] [address] [phone]

## 2020-01-03 NOTE — PROGRESS NOTES
Pulmonary and Critical Care  Progress Note      VITALS:  BP (!) 153/69   Pulse 84   Temp 97.9 °F (36.6 °C) (Axillary)   Resp 18   Ht 5' 6\" (1.676 m)   Wt 161 lb (73 kg)   SpO2 99%   BMI 25.99 kg/m²     Subjective:   Chief complaint:   Community-acquired right upper lobe pneumonia, moderate to severe COPD, bronchiectasis, tobacco abuse  Feeling better but still complaining of weakness, persistent cough which is mostly nonproductive  Mild resp distress  Patient awake and alert  Cardiology notes reviewed and treatment of SVT noted  Objective:   PHYSICAL EXAM:    LUNGS: Basilar rhonchi still noted with a few wheezes but generalized diminished breath sounds throughout all lung areas  O2 saturation 99% on 2 and half liters nasal oxygen  Remains afebrile  Sodium 131, potassium 4.6, creatinine 1.0  WBC 11.0 with hemoglobin 11.5    DATA:    CBC:  Recent Labs     01/01/20  1132 01/02/20  0605 01/03/20  0708   WBC 14.4* 12.3* 11.0*   RBC 4.05* 3.73* 3.87*   HGB 12.1* 11.1* 11.5*   HCT 38.7 36.2* 36.4*    384 427   MCV 95.6 97.1 94.1   MCH 29.9 29.8 29.7   MCHC 31.3* 30.7* 31.6*   RDW 15.6* 15.8* 15.8*      BMP:  Recent Labs     01/01/20  1132 01/02/20  0605 01/03/20  0708   * 130* 131*   K 3.9 4.3 4.6   CL 89* 95* 94*   CO2 23 22 22   BUN 37* 39* 36*   CREATININE 1.1 1.1 1.0   CALCIUM 8.8 8.8 8.9   GLUCOSE 177* 142* 130*      ABG:  No results for input(s): PH, PO2ART, CPS8RVZ, HCO3, BEART, O2SAT in the last 72 hours. BNP  Lab Results   Component Value Date    BNP 54 11/25/2012      D-Dimer:  Lab Results   Component Value Date    DDIMER 809 (H) 03/23/2019      Radiology: Chest x-ray this a.m. reviewed  Impression   1. Improving right upper lobe pneumonia.    2. Pulmonary vascular congestion       Assessment:     Patient Active Problem List   Diagnosis    Rheumatoid Arthritis    Hypertension    Hyperlipidemia    Fibromyalgia    Migraine    Chronic pain syndrome    Depression    Osteoporosis   

## 2020-01-03 NOTE — PROGRESS NOTES
Pt already has Home O2 through [facility]. I spoke to pt and she states she has [facility] and has no further oxygen needs. I have completed Home O2 evaluation based on this information.

## 2020-01-04 VITALS
BODY MASS INDEX: 25.88 KG/M2 | DIASTOLIC BLOOD PRESSURE: 68 MMHG | WEIGHT: 161 LBS | RESPIRATION RATE: 16 BRPM | HEART RATE: 72 BPM | SYSTOLIC BLOOD PRESSURE: 150 MMHG | HEIGHT: 66 IN | TEMPERATURE: 97.9 F | OXYGEN SATURATION: 96 %

## 2020-01-04 LAB
CULTURE: NORMAL
CULTURE: NORMAL
Lab: NORMAL
Lab: NORMAL
SPECIMEN: NORMAL
SPECIMEN: NORMAL

## 2020-01-04 PROCEDURE — 6370000000 HC RX 637 (ALT 250 FOR IP): Performed by: NURSE PRACTITIONER

## 2020-01-04 PROCEDURE — 94640 AIRWAY INHALATION TREATMENT: CPT

## 2020-01-04 PROCEDURE — 6360000002 HC RX W HCPCS: Performed by: INTERNAL MEDICINE

## 2020-01-04 PROCEDURE — 6370000000 HC RX 637 (ALT 250 FOR IP): Performed by: INTERNAL MEDICINE

## 2020-01-04 PROCEDURE — 99232 SBSQ HOSP IP/OBS MODERATE 35: CPT | Performed by: INTERNAL MEDICINE

## 2020-01-04 PROCEDURE — 94761 N-INVAS EAR/PLS OXIMETRY MLT: CPT

## 2020-01-04 PROCEDURE — 2700000000 HC OXYGEN THERAPY PER DAY

## 2020-01-04 PROCEDURE — 6360000002 HC RX W HCPCS: Performed by: NURSE PRACTITIONER

## 2020-01-04 PROCEDURE — 2580000003 HC RX 258: Performed by: NURSE PRACTITIONER

## 2020-01-04 RX ORDER — DILTIAZEM HYDROCHLORIDE 120 MG/1
120 CAPSULE, COATED, EXTENDED RELEASE ORAL 2 TIMES DAILY
Qty: 30 CAPSULE | Refills: 3 | Status: SHIPPED | OUTPATIENT
Start: 2020-01-04 | End: 2020-01-15 | Stop reason: ALTCHOICE

## 2020-01-04 RX ORDER — IPRATROPIUM BROMIDE AND ALBUTEROL SULFATE 2.5; .5 MG/3ML; MG/3ML
3 SOLUTION RESPIRATORY (INHALATION)
Qty: 360 ML | Refills: 2 | Status: ON HOLD | OUTPATIENT
Start: 2020-01-04 | End: 2021-01-06 | Stop reason: ALTCHOICE

## 2020-01-04 RX ORDER — FAMOTIDINE 20 MG/1
20 TABLET, FILM COATED ORAL DAILY
Qty: 60 TABLET | Refills: 3 | Status: CANCELLED | OUTPATIENT
Start: 2020-01-04

## 2020-01-04 RX ORDER — PREDNISONE 20 MG/1
40 TABLET ORAL DAILY
Qty: 10 TABLET | Refills: 0 | Status: SHIPPED | OUTPATIENT
Start: 2020-01-04 | End: 2020-01-09

## 2020-01-04 RX ORDER — LEVOFLOXACIN 500 MG/1
500 TABLET, FILM COATED ORAL DAILY
Qty: 5 TABLET | Refills: 0 | Status: SHIPPED | OUTPATIENT
Start: 2020-01-04 | End: 2020-01-09

## 2020-01-04 RX ORDER — GUAIFENESIN 600 MG/1
600 TABLET, EXTENDED RELEASE ORAL 2 TIMES DAILY
Qty: 30 TABLET | Refills: 0 | Status: ON HOLD | OUTPATIENT
Start: 2020-01-04 | End: 2020-02-16 | Stop reason: HOSPADM

## 2020-01-04 RX ADMIN — FLUTICASONE PROPIONATE 2 SPRAY: 50 SPRAY, METERED NASAL at 09:07

## 2020-01-04 RX ADMIN — OXYCODONE HYDROCHLORIDE AND ACETAMINOPHEN 1 TABLET: 7.5; 325 TABLET ORAL at 09:08

## 2020-01-04 RX ADMIN — SPIRONOLACTONE 50 MG: 50 TABLET ORAL at 09:08

## 2020-01-04 RX ADMIN — FUROSEMIDE 40 MG: 40 TABLET ORAL at 09:08

## 2020-01-04 RX ADMIN — LOSARTAN POTASSIUM 100 MG: 100 TABLET, FILM COATED ORAL at 09:07

## 2020-01-04 RX ADMIN — DILTIAZEM HYDROCHLORIDE 120 MG: 120 CAPSULE, COATED, EXTENDED RELEASE ORAL at 09:09

## 2020-01-04 RX ADMIN — OXYCODONE HYDROCHLORIDE AND ACETAMINOPHEN 1 TABLET: 7.5; 325 TABLET ORAL at 03:29

## 2020-01-04 RX ADMIN — SODIUM CHLORIDE, PRESERVATIVE FREE 10 ML: 5 INJECTION INTRAVENOUS at 09:09

## 2020-01-04 RX ADMIN — IPRATROPIUM BROMIDE AND ALBUTEROL SULFATE 1 AMPULE: .5; 3 SOLUTION RESPIRATORY (INHALATION) at 07:59

## 2020-01-04 RX ADMIN — DULOXETINE HYDROCHLORIDE 60 MG: 30 CAPSULE, DELAYED RELEASE ORAL at 09:09

## 2020-01-04 RX ADMIN — ENOXAPARIN SODIUM 40 MG: 40 INJECTION SUBCUTANEOUS at 09:09

## 2020-01-04 RX ADMIN — FAMOTIDINE 20 MG: 20 TABLET, FILM COATED ORAL at 09:07

## 2020-01-04 RX ADMIN — METHYLPREDNISOLONE SODIUM SUCCINATE 40 MG: 40 INJECTION, POWDER, LYOPHILIZED, FOR SOLUTION INTRAMUSCULAR; INTRAVENOUS at 03:29

## 2020-01-04 ASSESSMENT — PAIN SCALES - GENERAL
PAINLEVEL_OUTOF10: 3
PAINLEVEL_OUTOF10: 3

## 2020-01-04 NOTE — PROGRESS NOTES
Daily Progress Note    Doing better  Heart rate and BP stable  Going home  Keep on current meds--changed to cardizem from lopressor    Pt. Awake, alert and feeling ok  HR stable, NSR, BP stable  No CP SOB     COPDE/PNA    On ABx    Per pulm-improving     Tachycardia    On diltiazem BID now    Cont. Med. Tx.     Had pulm congestion on CXR-On diuretics now  NICOLASA resolved  Stable from cardiac standpoint-ok to D/C when ok with primary team  F/u at office in 2 weeks     Echo---Summary   This is a limited echocardiogram.   Left ventricular systolic function is normal.   Ejection fraction is visually estimated at 50-55%.   Mitral annular calcification is present.   Mild tricuspid regurgitation; RVSP is 35 mmHg.   No evidence of any pericardial effusion.   moderate MR noted     PAST MEDICAL HISTORY:  The patient had a history of having  nonobstructive coronary artery disease present, history of COPD,  fibromyalgia, hypertension, hyperlipidemia, compression fractures, and  rheumatoid arthritis.     PAST SURGICAL HISTORY:  Gallbladder surgery, appendectomy, and  hysterectomy.     SOCIAL HISTORY:  Does not smoke, does not drink.  She is a former  smoker.     ALLERGIES:  ATIVAN, HUMIRA, ETANERCEPT, AND DOXYCYCLINE.     MEDICATIONS AT HOME:  She is on Lasix, Lipitor, Cozaar, potassium,  Aldactone and Lopressor.     Objective:   BP (!) 150/68   Pulse 72   Temp 97.9 °F (36.6 °C) (Oral)   Resp 16   Ht 5' 6\" (1.676 m)   Wt 161 lb (73 kg)   SpO2 96%   BMI 25.99 kg/m²       Intake/Output Summary (Last 24 hours) at 1/4/2020 0958  Last data filed at 1/3/2020 2234  Gross per 24 hour   Intake 540 ml   Output --   Net 540 ml       Medications:   Scheduled Meds:   losartan  100 mg Oral Daily    spironolactone  50 mg Oral Daily    furosemide  40 mg Oral BID    traZODone  50 mg Oral Nightly    diltiazem  120 mg Oral BID    methylPREDNISolone  40 mg Intravenous Q8H    sodium chloride flush  10 mL Intravenous 2 times per day   

## 2020-01-04 NOTE — DISCHARGE INSTR - DIET

## 2020-01-04 NOTE — PROGRESS NOTES
Pulmonary and Critical Care  Progress Note      VITALS:  BP (!) 150/68   Pulse 72   Temp 97.9 °F (36.6 °C) (Oral)   Resp 18   Ht 5' 6\" (1.676 m)   Wt 161 lb (73 kg)   SpO2 96%   BMI 25.99 kg/m²     Subjective:   Chief complaint:  Community-acquired right upper lobe pneumonia, moderate to severe COPD, bronchiectasis, tobacco abuse  Mild resp distress-much of this is chronic and she states that she feels better with less chest tightness and cough  Still has a productive cough  Denies chest pain or hemoptysis  Patient awake and alert    Objective:   PHYSICAL EXAM:    LUNGS: A few coarse rhonchi are still heard in the mid lower lung fields on the right side  Minimal wheezing noted  All blood cultures negative  No new lab  O2 saturation 96% on 2 L nasal oxygen  Remains afebrile    DATA:    CBC:  Recent Labs     01/01/20  1132 01/02/20  0605 01/03/20  0708   WBC 14.4* 12.3* 11.0*   RBC 4.05* 3.73* 3.87*   HGB 12.1* 11.1* 11.5*   HCT 38.7 36.2* 36.4*    384 427   MCV 95.6 97.1 94.1   MCH 29.9 29.8 29.7   MCHC 31.3* 30.7* 31.6*   RDW 15.6* 15.8* 15.8*      BMP:  Recent Labs     01/01/20  1132 01/02/20  0605 01/03/20  0708   * 130* 131*   K 3.9 4.3 4.6   CL 89* 95* 94*   CO2 23 22 22   BUN 37* 39* 36*   CREATININE 1.1 1.1 1.0   CALCIUM 8.8 8.8 8.9   GLUCOSE 177* 142* 130*      ABG:  No results for input(s): PH, PO2ART, FVZ8ODG, HCO3, BEART, O2SAT in the last 72 hours. BNP  Lab Results   Component Value Date    BNP 54 11/25/2012      D-Dimer:  Lab Results   Component Value Date    DDIMER 809 (H) 03/23/2019      1. Radiology: No chest x-ray today  2.  Chest x-ray 1/3/2020 reviewedCommunity-acquired right upper lobe pneumonia, moderate to severe COPD, bronchiectasis, tobacco abuse    Assessment:     Patient Active Problem List   Diagnosis    Rheumatoid Arthritis    Hypertension    Hyperlipidemia    Fibromyalgia    Migraine    Chronic pain syndrome    Depression    Osteoporosis    Vitamin B12

## 2020-01-04 NOTE — DISCHARGE SUMMARY
Discharge Summary    Name:  Ailsha Avitia /Age/Sex: 1940  (78 y.o. female)   MRN & CSN:  [id number] & [id number] Admission Date/Time: 2019  6:01 PM   Attending:  Sheri Ding MD Discharging Physician: Sheri Ding MD     Hospital Course:   Alisha Avitia is a 78 y.o.  female  who presents with Acute on chronic respiratory failure with hypoxia ([address]    1. Acute on chronic respiratory failure with hypoxia  · Presented with shortness of breath, and hypoxemia  · Afebrile, WBC improving  · CT chest with peripheral right upper lobe consolidation  · Being treated for community-acquired pneumonia with ceftriaxone and azithromycin. DC on Levaquin  · Continue Steroid  2. Sepsis due to Bacterial Pneumonia  3. Acute kidney injury:   · Creatinine 1.8 at baseline, 1.4 on admission, now normal hydration  4. Hyponatremia: Urine sodium low. improved  5. Elevated troponin: We will monitor. Recent stress test negative. 6. Rheumatoid arthritis: On long-term prednisone  7. Hypertension:  Lasix, ARB and Aldactone restarted. 8. Hyperlipidemia  9. Depression continue Cymbalta  10. Fibromyalgia, chronic pain syndrome: PT/OT Continue Cymbalta, Percocet as needed    The patient expressed appropriate understanding of and agreement with the discharge recommendations, medications, and plan.      Consults this admission:  IP CONSULT TO PULMONOLOGY  IP CONSULT TO CARDIOLOGY  IP CONSULT TO [address] PROGRAM    Discharge Instruction:   Follow up appointments: Pulmonology, Cardiology  Primary care physician:  Within 1 week     Diet:  cardiac diet   Activity: activity as tolerated  Disposition: Discharged to:   [x]Home, []C, []SNF, []Acute Rehab, []Hospice   Condition on discharge: Stable    Discharge Medications:      Ishan Kearney   Home Medication Instructions [facility]:[id number]    Printed on:20 [id number]   Medication Information                      atorvastatin (LIPITOR) 10 MG tablet  TAKE 1 TABLET BY injection  Inject 0.08 mLs into the skin daily             traZODone (DESYREL) 50 MG tablet  TAKE 1 TABLET BY MOUTH NIGHTLY             triamcinolone (NASACORT ALLERGY 24HR) 55 MCG/ACT nasal inhaler  2 sprays by Each Nostril route daily             ULTICARE SHORT PEN NEEDLES 31G X 8 MM MISC  USE AS DIRECTED WITH FORTEO             vitamin B-12 (CYANOCOBALAMIN) 1000 MCG tablet  Take 1,000 mcg by mouth daily. Objective Findings at Discharge:   BP (!) 150/68   Pulse 72   Temp 97.9 °F (36.6 °C) (Oral)   Resp 16   Ht 5' 6\" (1.676 m)   Wt 161 lb (73 kg)   SpO2 96%   BMI 25.99 kg/m²            GEN    Awake female, sitting upright in bed in no apparent distress. Appears given age. EYES   Pupils are equally round. No scleral erythema, discharge, or conjunctivitis. HENT  Mucous membranes are moist. Oral pharynx without exudates, no evidence of thrush. NECK  Supple, no apparent thyromegaly or masses. RESP  crackles, right lung field, resolving  CARDIO/VASC           S1/S2 auscultated. Regular rate without appreciable murmurs, rubs, or gallops. No JVD or carotid bruits. Peripheral pulses equal bilaterally and palpable. No peripheral edema. GI        Abdomen is soft without significant tenderness, masses, or guarding. Bowel sounds are normoactive. Rectal exam deferred. MSK    No gross joint deformities. SKIN    Normal coloration, warm, dry. NEURO           Cranial nerves appear grossly intact, normal speech, no lateralizing weakness. PSYCH            Awake, alert, oriented x 4. Affect appropriate.     BMP/CBC  Recent Labs     01/01/20  1132 01/02/20  0605 01/03/20  0708   * 130* 131*   K 3.9 4.3 4.6   CL 89* 95* 94*   CO2 23 22 22   BUN 37* 39* 36*   CREATININE 1.1 1.1 1.0   WBC 14.4* 12.3* 11.0*   HCT 38.7 36.2* 36.4*    384 427       IMAGING:  Xr Chest Standard (2 Vw)    Result Date: 12/30/2019  EXAMINATION: TWO XRAY VIEWS OF THE CHEST 12/30/2019 6:01 pm COMPARISON: 05/17/2019 HISTORY: ORDERING SYSTEM PROVIDED HISTORY: cough, sob, hx of copd TECHNOLOGIST PROVIDED HISTORY: Reason for exam:->cough, sob, hx of copd Reason for Exam: chest pain Acuity: Acute Type of Exam: Initial Additional signs and symptoms: na Relevant Medical/Surgical History: copd, hypertension FINDINGS: There is confluent airspace disease in the lateral right upper lobe marginated inferiorly by the minor fissure. There is mild disease at the right lung base. There is chronic atelectasis at the left lung base. Middle lobe calcified granuloma is unchanged. Tortuous descending thoracic aorta. Heart size is normal.     Right upper lobe pneumonia. Mild right basilar disease may represent pneumonia and or atelectasis. Underlying findings of COPD. Follow-up to complete clearing recommended. Ct Chest Wo Contrast    Result Date: 12/31/2019  EXAMINATION: CT OF THE CHEST WITHOUT CONTRAST 12/31/2019 11:01 am TECHNIQUE: CT of the chest was performed without the administration of intravenous contrast. Multiplanar reformatted images are provided for review. Dose modulation, iterative reconstruction, and/or weight based adjustment of the mA/kV was utilized to reduce the radiation dose to as low as reasonably achievable. COMPARISON: 04/10/2019, chest radiograph 12/30/2019 HISTORY: ORDERING SYSTEM PROVIDED HISTORY: Right upper lobe pneumonia TECHNOLOGIST PROVIDED HISTORY: Reason for exam:->Right upper lobe pneumonia Reason for Exam: right upper lobe pneumonia Acuity: Unknown Type of Exam: Unknown FINDINGS: Mediastinum: Heart size is normal.  No pericardial effusion. Mitral annular calcifications. Calcified mediastinal and right hilar lymph nodes from remote granulomatous disease. Lungs/pleura: Peripheral right upper lobe lung consolidation is present with air bronchograms concerning for acute pneumonia. The area of consolidation measures up to 6 cm.   Additional consolidation or atelectasis is present in the lateral and posterior basal segments of the right lower lobe. No pneumothorax. No pleural effusion. Upper Abdomen:  Limited images of the upper abdomen demonstrate no significant abnormality. Soft Tissues/Bones: No lytic or blastic osseous lesion. Remote healed bilateral rib fractures. 1. Peripheral right upper lobe consolidation concerning for acute pneumonia. Follow-up radiographs or CT recommended to confirm resolution. 2. Linear right lower lobe atelectasis and minimal consolidation. Nm Lung Vent/perfusion (vq)    Result Date: 12/31/2019  EXAMINATION: NUCLEAR MEDICINE VENTILATION PERFUSION SCAN. 12/31/2019 TECHNIQUE: 97.7 millicuries aerosolized Tc99m DTPA was administered via mask prior to planar imaging of the lungs in multiple projections. Then, 5.6 millicuries of Tc 87A MAA was administered intravenously prior to planar imaging of the lungs in similar projections. COMPARISON: Chest radiograph yesterday. HISTORY: ORDERING SYSTEM PROVIDED HISTORY: r/o PE. Hx of DVT. admission for acute resp failure/NICOLASA TECHNOLOGIST PROVIDED HISTORY: Reason for exam:->r/o PE. Hx of DVT. admission for acute resp failure/NICOLASA Reason for Exam: r/o pe Acuity: Unknown Type of Exam: Unknown FINDINGS: PERFUSION: A small subsegmental perfusion abnormality is present in the peripheral right upper lobe, smaller than the corresponding consolidation on chest radiograph. No additional segmental perfusion defects. VENTILATION: Central deposition of radiotracer is compatible with COPD. Small subsegmental ventilation abnormality in the peripheral right upper lobe, smaller than the corresponding chest radiographic consolidation. CHEST RADIOGRAPH: Peripheral consolidation in the right upper lobe. Low probability for pulmonary embolus.      Xr Chest Portable    Result Date: 1/3/2020  EXAMINATION: ONE XRAY VIEW OF THE CHEST 1/3/2020 5:14 am COMPARISON: 01/01/2020 HISTORY: ORDERING SYSTEM PROVIDED HISTORY: Right upper lobe pneumonia TECHNOLOGIST

## 2020-01-07 LAB
EKG ATRIAL RATE: 99 BPM
EKG DIAGNOSIS: NORMAL
EKG P AXIS: 53 DEGREES
EKG P-R INTERVAL: 134 MS
EKG Q-T INTERVAL: 380 MS
EKG QRS DURATION: 136 MS
EKG QTC CALCULATION (BAZETT): 487 MS
EKG R AXIS: 105 DEGREES
EKG T AXIS: -36 DEGREES
EKG VENTRICULAR RATE: 99 BPM

## 2020-01-09 LAB
CULTURE: ABNORMAL
GRAM SMEAR: ABNORMAL
Lab: ABNORMAL
SPECIMEN: ABNORMAL

## 2020-01-10 ENCOUNTER — TELEPHONE (OUTPATIENT)
Dept: INTERNAL MEDICINE CLINIC | Age: 80
End: 2020-01-10

## 2020-01-13 NOTE — TELEPHONE ENCOUNTER
Patient was already discharged on levofloxacin (susceptible)- should be on effective treatment. Could you verify patient is taking/has taken the medication, can follow up in clinic if she is not having any improvement in symptoms. Thank you!

## 2020-01-15 ENCOUNTER — OFFICE VISIT (OUTPATIENT)
Dept: INTERNAL MEDICINE CLINIC | Age: 80
End: 2020-01-15
Payer: MEDICARE

## 2020-01-15 VITALS
WEIGHT: 162 LBS | SYSTOLIC BLOOD PRESSURE: 108 MMHG | BODY MASS INDEX: 26.15 KG/M2 | DIASTOLIC BLOOD PRESSURE: 52 MMHG | HEART RATE: 80 BPM

## 2020-01-15 PROCEDURE — 99215 OFFICE O/P EST HI 40 MIN: CPT | Performed by: INTERNAL MEDICINE

## 2020-01-15 PROCEDURE — G8482 FLU IMMUNIZE ORDER/ADMIN: HCPCS | Performed by: INTERNAL MEDICINE

## 2020-01-15 PROCEDURE — G8427 DOCREV CUR MEDS BY ELIG CLIN: HCPCS | Performed by: INTERNAL MEDICINE

## 2020-01-15 PROCEDURE — 1123F ACP DISCUSS/DSCN MKR DOCD: CPT | Performed by: INTERNAL MEDICINE

## 2020-01-15 PROCEDURE — 4040F PNEUMOC VAC/ADMIN/RCVD: CPT | Performed by: INTERNAL MEDICINE

## 2020-01-15 PROCEDURE — 1111F DSCHRG MED/CURRENT MED MERGE: CPT | Performed by: INTERNAL MEDICINE

## 2020-01-15 PROCEDURE — 1036F TOBACCO NON-USER: CPT | Performed by: INTERNAL MEDICINE

## 2020-01-15 PROCEDURE — G8399 PT W/DXA RESULTS DOCUMENT: HCPCS | Performed by: INTERNAL MEDICINE

## 2020-01-15 PROCEDURE — G8417 CALC BMI ABV UP PARAM F/U: HCPCS | Performed by: INTERNAL MEDICINE

## 2020-01-15 PROCEDURE — 3023F SPIROM DOC REV: CPT | Performed by: INTERNAL MEDICINE

## 2020-01-15 PROCEDURE — 1090F PRES/ABSN URINE INCON ASSESS: CPT | Performed by: INTERNAL MEDICINE

## 2020-01-15 PROCEDURE — G8926 SPIRO NO PERF OR DOC: HCPCS | Performed by: INTERNAL MEDICINE

## 2020-01-15 RX ORDER — METOPROLOL TARTRATE 50 MG/1
50 TABLET, FILM COATED ORAL 2 TIMES DAILY
Status: ON HOLD | COMMUNITY
End: 2020-02-16 | Stop reason: HOSPADM

## 2020-01-15 ASSESSMENT — ENCOUNTER SYMPTOMS
EYE ITCHING: 0
DIARRHEA: 0
CHEST TIGHTNESS: 0
VOICE CHANGE: 0
EYE DISCHARGE: 0
COLOR CHANGE: 0
ABDOMINAL PAIN: 0
WHEEZING: 0
COUGH: 0
TROUBLE SWALLOWING: 0
SHORTNESS OF BREATH: 0
NAUSEA: 0
BACK PAIN: 1
BLOOD IN STOOL: 0
VOMITING: 0

## 2020-01-15 NOTE — PROGRESS NOTES
ipratropium-albuterol (DUONEB) 0.5-2.5 (3) MG/3ML SOLN nebulizer solution Inhale 3 mLs into the lungs every 4 hours (while awake) 360 mL 2    furosemide (LASIX) 40 MG tablet Take 1 tablet by mouth 2 times daily 60 tablet 3    atorvastatin (LIPITOR) 10 MG tablet TAKE 1 TABLET BY MOUTH EVERY EVENING 90 tablet 4    traZODone (DESYREL) 50 MG tablet TAKE 1 TABLET BY MOUTH NIGHTLY 30 tablet 5    triamcinolone (NASACORT ALLERGY 24HR) 55 MCG/ACT nasal inhaler 2 sprays by Each Nostril route daily 1 Inhaler 3    losartan (COZAAR) 100 MG tablet TAKE ONE TABLET BY MOUTH DAILY 30 tablet 5    spironolactone (ALDACTONE) 50 MG tablet TAKE 1 TABLET BY ORAL ROUTE EVERY DAY  11    Cholecalciferol (VITAMIN D) 2000 units CAPS capsule Take 1 capsule by mouth daily      esomeprazole (NEXIUM) 40 MG delayed release capsule TAKE 1 CAPSULE BY MOUTH TWICE A DAY (MORNING AND BEFORE BED)  5    ondansetron (ZOFRAN-ODT) 4 MG disintegrating tablet Take 4 mg by mouth as needed for Nausea or Vomiting      PROAIR  (90 Base) MCG/ACT inhaler INHALE 2 PUFFS BY ORAL INHALATION EVERY 4 TO 6 HOURS AS NEEDED 8.5 g 10    BREO ELLIPTA 100-25 MCG/INH AEPB inhaler INHALE 1 PUFF INTO THE LUNGS DAILY AFTER INHALATION THEN GARGLE 1 each 11    oxyCODONE-acetaminophen (PERCOCET) 7.5-325 MG per tablet Take 1 tablet by mouth 4 times daily. William Crate INCRUSE ELLIPTA 62.5 MCG/INH AEPB INHALE 1 PUFF VIA ORAL INHALATION ONCE DAILY 1 each 11    vitamin B-12 (CYANOCOBALAMIN) 1000 MCG tablet Take 1,000 mcg by mouth daily.  DULoxetine (CYMBALTA) 60 MG capsule Take 60 mg by mouth daily.  Calcium Citrate-Vitamin D (CITRACAL + D PO) Take 600 mg by mouth daily        No current facility-administered medications for this visit.           Past Medical History:   Diagnosis Date    Acute DVT of right tibial vein ([address] 07/2017    ER imaging: distal right tibial vein DVT; no anticoag for bleeding/ anemia    Acute exacerbation of chronic obstructive pulmonary disease (COPD) ([facility] 2018    Arthritis     Chronic pain syndrome     Low back pain; lumbar disc disease    Clostridium difficile colitis 2017    COPD, severe ([address] 10/24/2017    Depression     Fibromyalgia     Former smoker     Quit 2019    Herniated cervical disc     Herpes zoster ophthalmicus 3/2012    Hx of blood clots     Hyperlipidemia     Hypertension     L3 vertebral fracture ([facility]     vertebroplasty    Lumbar spinal stenosis     moderately severe by MRI    Migraine     Nocturnal hypoxemia 2019    Osteoporosis 2010    Fragility Fx L3    Rheumatoid arthritis(714.0)     Dr Ramez Weathers S/P cardiac catheterization 2017    patent coronaries; Takotsubo; najeeb Ahmed    Shortness of breath 2019    Takotsubo syndrome 2017    TMJ dysfunction     Tobacco abuse 2018    Vitamin B12 deficiency 2014    B12 level 199      Past Surgical History:   Procedure Laterality Date    APPENDECTOMY  1966    CARDIAC CATHETERIZATION  2017    CHOLECYSTECTOMY  1966    w/ appendectomy    ELBOW SURGERY Right     FIXATION KYPHOPLASTY  2019    L4 kyphoplasty ; Lavonia Afshin    FOOT SURGERY      CO COLONOSCOPY FLX DX W/COLLJ SPEC WHEN PFRMD N/A 10/2/2018    COLONOSCOPY DIAGNOSTIC OR SCREENING performed by Brendan Kirk MD at [address] [place] [address] TOE SURGERY Left 2013    Deboo;     VERTEBROPLASTY  10/2010    L3    WRIST FUSION Left        Social History     Tobacco Use    Smoking status: Former Smoker     Packs/day: 0.75     Years: 55.00     Pack years: 41.25     Types: Cigarettes     Start date: 1962     Last attempt to quit: 2019     Years since quittin.0    Smokeless tobacco: Never Used   Substance Use Topics    Alcohol use: No     Alcohol/week: 0.0 standard drinks      Review of Systems   Constitutional: Positive for activity change and fatigue.  Negative for appetite change, fever and unexpected weight change. HENT: Negative for congestion, dental problem, postnasal drip, trouble swallowing and voice change. Eyes: Negative for discharge and itching. Respiratory: Negative for cough, chest tightness, shortness of breath and wheezing. Cardiovascular: Positive for leg swelling. Negative for chest pain and palpitations. Gastrointestinal: Negative for abdominal pain, blood in stool, diarrhea, nausea and vomiting. Genitourinary: Negative for difficulty urinating, dysuria, hematuria, pelvic pain and vaginal bleeding. Musculoskeletal: Positive for arthralgias, back pain, gait problem and neck stiffness. Negative for joint swelling and myalgias. Skin: Positive for rash. Negative for color change and pallor. Venous stasis dermatiits     Allergic/Immunologic: Positive for immunocompromised state. Neurological: Positive for weakness. Negative for dizziness, tremors, speech difficulty and numbness. Hematological: Negative for adenopathy. Does not bruise/bleed easily. Psychiatric/Behavioral: Positive for dysphoric mood and sleep disturbance. Negative for behavioral problems and decreased concentration. The patient is not nervous/anxious. ROS: The patient has had no headache, sore throat, fever or chills, cough, dyspnea, chest pain, nausea, vomiting or diarrhea, or edema. Objective:      BP (!) 108/52   Pulse 80   Wt 162 lb (73.5 kg)   BMI 26.15 kg/m²      Physical Exam  Vitals signs reviewed. Constitutional:       General: She is not in acute distress. Appearance: She is well-developed. Comments: Anxious and chronically ill appearing ; seated in wheelchair   HENT:      Head: Normocephalic and atraumatic. Mouth/Throat:      Mouth: Mucous membranes are moist.      Pharynx: Oropharynx is clear. Eyes:      General: No scleral icterus. Conjunctiva/sclera: Conjunctivae normal.   Neck:      Musculoskeletal: Neck supple. No muscular tenderness. Cardiovascular:      Rate and Rhythm: Normal rate and regular rhythm. Heart sounds: No murmur. Pulmonary:      Effort: Pulmonary effort is normal. No respiratory distress. Breath sounds: No wheezing or rales. Abdominal:      General: There is no distension. Palpations: Abdomen is soft. Tenderness: There is no tenderness. Musculoskeletal: Normal range of motion. General: Deformity present. Right lower leg: Edema present. Left lower leg: Edema present. Comments: RA changes fingers    3 + edema   Lymphadenopathy:      Cervical: No cervical adenopathy. Skin:     General: Skin is warm and dry. Findings: Rash present. Comments: Venous stasis legs  Some redness   Neurological:      General: No focal deficit present. Mental Status: She is alert and oriented to person, place, and time. Motor: Weakness present. Coordination: Coordination normal.      Gait: Gait abnormal.   Psychiatric:         Mood and Affect: Mood normal.         Behavior: Behavior normal.     hosp records; N Irene consult and notes rev'd  monjots notes and echo and labs and H and P and discharge summary revd       Assessment / Plan:      1. Chronic diastolic congestive heart failure ([address]    2. Bilateral leg edema    3. Venous stasis dermatitis of both lower extremities    4. COPD, severe ([address]    5. History of tachycardia    6.  Abnormal CXR            Plan   Get lab and CXR  Use compounded lotrisone, white petrolatum, bacitracin zinc bid to legs  Bed rest x 36 hr with elevation  ACE wrap or comp hose    TAKE EXTRA LASIX AND POTASSIUM THIS AFTERNOON    STOP CARDIZEM    RESTART LOPRESSOR 50 TWICE DAILY    USE CREAM ON LEGS TWICE DAILY    ELEVATE LEGS      Get lab and cxr  Orders Placed This Encounter   Procedures    XR CHEST STANDARD (2 VW)    Basic Metabolic Panel    Brain Natriuretic Peptide     RTC 7 d

## 2020-01-15 NOTE — PATIENT INSTRUCTIONS
TAKE EXTRA LASIX AND POTASSIUM THIS AFTERNOON    STOP CARDIZEM    RESTART LOPRESSOR 50 TWICE DAILY    USE CREAM ON LEGS TWICE DAILY    ELEVATE LEGS

## 2020-01-16 ENCOUNTER — OFFICE VISIT (OUTPATIENT)
Dept: PULMONOLOGY | Age: 80
End: 2020-01-16
Payer: MEDICARE

## 2020-01-16 VITALS
DIASTOLIC BLOOD PRESSURE: 68 MMHG | WEIGHT: 149.6 LBS | HEART RATE: 68 BPM | HEIGHT: 66 IN | OXYGEN SATURATION: 92 % | SYSTOLIC BLOOD PRESSURE: 102 MMHG | BODY MASS INDEX: 24.04 KG/M2

## 2020-01-16 PROCEDURE — 1036F TOBACCO NON-USER: CPT | Performed by: INTERNAL MEDICINE

## 2020-01-16 PROCEDURE — 4040F PNEUMOC VAC/ADMIN/RCVD: CPT | Performed by: INTERNAL MEDICINE

## 2020-01-16 PROCEDURE — G8427 DOCREV CUR MEDS BY ELIG CLIN: HCPCS | Performed by: INTERNAL MEDICINE

## 2020-01-16 PROCEDURE — G8926 SPIRO NO PERF OR DOC: HCPCS | Performed by: INTERNAL MEDICINE

## 2020-01-16 PROCEDURE — G8420 CALC BMI NORM PARAMETERS: HCPCS | Performed by: INTERNAL MEDICINE

## 2020-01-16 PROCEDURE — 99213 OFFICE O/P EST LOW 20 MIN: CPT | Performed by: INTERNAL MEDICINE

## 2020-01-16 PROCEDURE — G8482 FLU IMMUNIZE ORDER/ADMIN: HCPCS | Performed by: INTERNAL MEDICINE

## 2020-01-16 PROCEDURE — 1123F ACP DISCUSS/DSCN MKR DOCD: CPT | Performed by: INTERNAL MEDICINE

## 2020-01-16 PROCEDURE — G8399 PT W/DXA RESULTS DOCUMENT: HCPCS | Performed by: INTERNAL MEDICINE

## 2020-01-16 PROCEDURE — 1090F PRES/ABSN URINE INCON ASSESS: CPT | Performed by: INTERNAL MEDICINE

## 2020-01-16 PROCEDURE — 1111F DSCHRG MED/CURRENT MED MERGE: CPT | Performed by: INTERNAL MEDICINE

## 2020-01-16 PROCEDURE — 3023F SPIROM DOC REV: CPT | Performed by: INTERNAL MEDICINE

## 2020-01-16 NOTE — PROGRESS NOTES
SUBJECTIVE:  Chief Complaint: Severe COPD, nocturnal hypoxemia, shortness of breath, recent right upper lobe pneumonia with sepsis  Celestine is slowly recovering from her hospitalization earlier this month. She was diagnosed with sepsis and a right upper lobe pneumonia and treated with antibiotics steroids and aggressive bronchopulmonary toilet. She is off antibiotics and steroids now. Her major complaint is severe fatigue with shortness of breath. She also has noted worsening swelling in her legs which is being treated by Dr. Coty Matthews. She denies hemoptysis or chest pain. She continues on Breo, Incruse and has albuterol per MDI or nebulizer. She also continues to wear oxygen at nighttime. She has been off cigarettes for over a month. OBJECTIVE:  /68 (Site: Right Upper Arm, Position: Sitting, Cuff Size: Small Adult)   Pulse 68   Ht 5' 6\" (1.676 m)   Wt 149 lb 9.6 oz (67.9 kg)   SpO2 92%   BMI 24.15 kg/m²      Physical Exam:  Constitutional:  She appears well developed and well-nourished. Neck:  Supple, No palpable lymphadenopathy, No JVD  Cardiovascular:  S1, S2 Normal, Regular rhythm, no murmurs or gallops, No pericardial  rubs. Pulmonary: A few scattered rhonchi bilaterally but no wheezing is noted with moderately diminished breath sounds throughout all lung areas  Abdomen: Not examined  Extremities: Legs are wrapped because of peripheral edema  Neurologic:  Awake and Alert, No focal deficits    Radiology: Chest x-ray last obtained on 1/3/2020 showed improving right upper lobe pneumonia with pulmonary valve congestion  PFT: Office spirometry on 7/29/2019 demonstrated a moderate obstructive defect with no significant response to bronchodilators        ASSESSMENT:    1. COPD, severe ([address]    2. Nocturnal hypoxemia    3. Shortness of breath          PLAN:   I would like to repeat her chest x-ray. I will make no change in her current bronchodilator therapy.   I will continue to follow her  Return in about 3 months (around 4/16/2020) for Recheck for COPD, Recheck for Shortness of Breath, Nocturnal hypoxemia. This dictation was performed with a verbal recognition program and it was checked for errors. It is possible that there are still dictated errors within this office note. Any errors should be brought immediately to my attention for correction. All efforts were made to ensure that this office note is accurate.

## 2020-01-17 ENCOUNTER — HOSPITAL ENCOUNTER (OUTPATIENT)
Dept: GENERAL RADIOLOGY | Age: 80
Discharge: HOME | End: 2020-01-17
Payer: MEDICARE

## 2020-01-17 ENCOUNTER — HOSPITAL ENCOUNTER (OUTPATIENT)
Age: 80
Discharge: HOME | End: 2020-01-17
Payer: MEDICARE

## 2020-01-17 PROCEDURE — 71046 X-RAY EXAM CHEST 2 VIEWS: CPT

## 2020-01-22 ENCOUNTER — HOSPITAL ENCOUNTER (OUTPATIENT)
Age: 80
Discharge: HOME | End: 2020-01-22
Payer: MEDICARE

## 2020-01-22 LAB
ANION GAP SERPL CALCULATED.3IONS-SCNC: 13 MMOL/L (ref 4–16)
BUN BLDV-MCNC: 8 MG/DL (ref 6–23)
CALCIUM SERPL-MCNC: 8.8 MG/DL (ref 8.3–10.6)
CHLORIDE BLD-SCNC: 95 MMOL/L (ref 99–110)
CO2: 31 MMOL/L (ref 21–32)
CREAT SERPL-MCNC: 0.8 MG/DL (ref 0.6–1.1)
GFR AFRICAN AMERICAN: >60 ML/MIN/1.73M2
GFR NON-AFRICAN AMERICAN: >60 ML/MIN/1.73M2
GLUCOSE BLD-MCNC: 110 MG/DL (ref 70–99)
POTASSIUM SERPL-SCNC: 3.7 MMOL/L (ref 3.5–5.1)
SODIUM BLD-SCNC: 139 MMOL/L (ref 135–145)

## 2020-01-22 PROCEDURE — 36415 COLL VENOUS BLD VENIPUNCTURE: CPT

## 2020-01-22 PROCEDURE — 80048 BASIC METABOLIC PNL TOTAL CA: CPT

## 2020-01-24 ENCOUNTER — OFFICE VISIT (OUTPATIENT)
Dept: INTERNAL MEDICINE CLINIC | Age: 80
End: 2020-01-24
Payer: MEDICARE

## 2020-01-24 VITALS
BODY MASS INDEX: 22.6 KG/M2 | SYSTOLIC BLOOD PRESSURE: 116 MMHG | DIASTOLIC BLOOD PRESSURE: 70 MMHG | HEART RATE: 67 BPM | WEIGHT: 140 LBS | OXYGEN SATURATION: 97 % | RESPIRATION RATE: 16 BRPM

## 2020-01-24 PROCEDURE — 1123F ACP DISCUSS/DSCN MKR DOCD: CPT | Performed by: INTERNAL MEDICINE

## 2020-01-24 PROCEDURE — 99214 OFFICE O/P EST MOD 30 MIN: CPT | Performed by: INTERNAL MEDICINE

## 2020-01-24 PROCEDURE — 1090F PRES/ABSN URINE INCON ASSESS: CPT | Performed by: INTERNAL MEDICINE

## 2020-01-24 PROCEDURE — G8399 PT W/DXA RESULTS DOCUMENT: HCPCS | Performed by: INTERNAL MEDICINE

## 2020-01-24 PROCEDURE — G8427 DOCREV CUR MEDS BY ELIG CLIN: HCPCS | Performed by: INTERNAL MEDICINE

## 2020-01-24 PROCEDURE — 1111F DSCHRG MED/CURRENT MED MERGE: CPT | Performed by: INTERNAL MEDICINE

## 2020-01-24 PROCEDURE — G8420 CALC BMI NORM PARAMETERS: HCPCS | Performed by: INTERNAL MEDICINE

## 2020-01-24 PROCEDURE — G8482 FLU IMMUNIZE ORDER/ADMIN: HCPCS | Performed by: INTERNAL MEDICINE

## 2020-01-24 PROCEDURE — 1036F TOBACCO NON-USER: CPT | Performed by: INTERNAL MEDICINE

## 2020-01-24 PROCEDURE — 4040F PNEUMOC VAC/ADMIN/RCVD: CPT | Performed by: INTERNAL MEDICINE

## 2020-01-24 RX ORDER — POTASSIUM CHLORIDE 750 MG/1
TABLET, FILM COATED, EXTENDED RELEASE ORAL
Qty: 90 TABLET | Refills: 1 | Status: CANCELLED | OUTPATIENT
Start: 2020-01-24

## 2020-01-24 RX ORDER — POTASSIUM CHLORIDE 750 MG/1
10 TABLET, FILM COATED, EXTENDED RELEASE ORAL DAILY
Qty: 90 TABLET | Refills: 1 | Status: ON HOLD
Start: 2020-01-24 | End: 2020-03-27 | Stop reason: HOSPADM

## 2020-01-24 RX ORDER — FAMOTIDINE 20 MG/1
20 TABLET, FILM COATED ORAL 2 TIMES DAILY
Qty: 60 TABLET | Refills: 3 | Status: ON HOLD
Start: 2020-01-24 | End: 2020-02-16 | Stop reason: HOSPADM

## 2020-01-24 RX ORDER — CHOLESTYRAMINE 4 G/9G
1 POWDER, FOR SUSPENSION ORAL 2 TIMES DAILY
Qty: 60 PACKET | Refills: 2 | Status: ON HOLD
Start: 2020-01-24 | End: 2020-02-16 | Stop reason: HOSPADM

## 2020-01-24 NOTE — PATIENT INSTRUCTIONS
TOP NEXIUM    TAKE PEPCID TWICE DAILY IN PLACE OF NEXIU    [place] ONE OR TWO PACKS/ DAY    DON'T TAKE W/IN 2 HR  [address]

## 2020-01-24 NOTE — PROGRESS NOTES
mouth daily.  Calcium Citrate-Vitamin D (CITRACAL + D PO) Take 600 mg by mouth daily        No current facility-administered medications for this visit. Past Medical History:   Diagnosis Date    Acute DVT of right tibial vein ([facility] 2017    ER imaging: distal right tibial vein DVT; no anticoag for bleeding/ anemia    Acute exacerbation of chronic obstructive pulmonary disease (COPD) ([address] 2018    Arthritis     Chronic pain syndrome     Low back pain; lumbar disc disease    Clostridium difficile colitis 2017    COPD, severe ([address] 10/24/2017    Depression     Fibromyalgia     Former smoker     Quit 2019    Herniated cervical disc     Herpes zoster ophthalmicus 3/2012    Hx of blood clots     Hyperlipidemia     Hypertension     L3 vertebral fracture ([facility]     vertebroplasty    Lumbar spinal stenosis     moderately severe by MRI    Migraine     Nocturnal hypoxemia 2019    Osteoporosis 2010    Fragility Fx L3    Rheumatoid arthritis(714.0)     Dr Leighton Lima S/P cardiac catheterization 2017    patent coronaries; Takotsubo; naherbereb Ahmed    Shortness of breath 2019    Takotsubo syndrome 2017    TMJ dysfunction     Tobacco abuse 2018    Vitamin B12 deficiency 2014    B12 level 199        Social History     Tobacco Use    Smoking status: Former Smoker     Packs/day: 0.75     Years: 55.00     Pack years: 41.25     Types: Cigarettes     Start date: 1962     Last attempt to quit: 2019     Years since quittin.0    Smokeless tobacco: Never Used   Substance Use Topics    Alcohol use: No     Alcohol/week: 0.0 standard drinks        ROS: The patient has had no headache, sore throat, fever or chills, cough, dyspnea, chest pain, nausea, vomiting or diarrhea, or edema.        Objective:      /70   Pulse 67   Resp 16   Wt 140 lb (63.5 kg)   SpO2 97%   BMI 22.60 kg/m²      Physical Exam  Vitals signs reviewed. Constitutional:       General: She is not in acute distress. Appearance: She is well-developed. HENT:      Head: Normocephalic and atraumatic. Mouth/Throat:      Mouth: Mucous membranes are moist.      Pharynx: Oropharynx is clear. Eyes:      General: No scleral icterus. Conjunctiva/sclera: Conjunctivae normal.   Neck:      Musculoskeletal: Neck supple. No muscular tenderness. Cardiovascular:      Rate and Rhythm: Normal rate and regular rhythm. Heart sounds: No murmur. Pulmonary:      Effort: Pulmonary effort is normal. No respiratory distress. Breath sounds: No wheezing or rales. Abdominal:      General: There is no distension. Palpations: Abdomen is soft. Tenderness: There is no tenderness. Musculoskeletal: Normal range of motion. Right lower leg: No edema. Left lower leg: No edema. Lymphadenopathy:      Cervical: No cervical adenopathy. Skin:     General: Skin is warm and dry. Findings: No rash. Neurological:      General: No focal deficit present. Mental Status: She is alert and oriented to person, place, and time. Coordination: Coordination normal.   Psychiatric:         Behavior: Behavior normal.       Jan 22 lab on chart and rev'd:  K 3.7; creat 0.8;        Assessment / Plan:      1. Diarrhea, unspecified type    2. Leg edema    3. Gastroesophageal reflux disease, esophagitis presence not specified    4.  Chronic midline low back pain without sciatica            Plan   Stop nexium  pepcid bid  Questran dailiy to bid  Imodium prn  F/u Chris Grumbles for rhizotomy  Stop leg cream  RTC 1 mo     in in patient Hospice now

## 2020-02-06 ENCOUNTER — HOSPITAL ENCOUNTER (EMERGENCY)
Age: 80
Discharge: HOME | DRG: 177 | End: 2020-02-06
Attending: EMERGENCY MEDICINE
Payer: MEDICARE

## 2020-02-06 ENCOUNTER — APPOINTMENT (OUTPATIENT)
Dept: GENERAL RADIOLOGY | Age: 80
DRG: 177 | End: 2020-02-06
Payer: MEDICARE

## 2020-02-06 VITALS
DIASTOLIC BLOOD PRESSURE: 74 MMHG | SYSTOLIC BLOOD PRESSURE: 128 MMHG | OXYGEN SATURATION: 98 % | RESPIRATION RATE: 15 BRPM | HEART RATE: 85 BPM | TEMPERATURE: 97.8 F | BODY MASS INDEX: 22.18 KG/M2 | WEIGHT: 138 LBS | HEIGHT: 66 IN

## 2020-02-06 LAB
ALBUMIN SERPL-MCNC: 3.4 GM/DL (ref 3.4–5)
ALP BLD-CCNC: 94 IU/L (ref 40–128)
ALT SERPL-CCNC: 8 U/L (ref 10–40)
AMORPHOUS: ABNORMAL /HPF
ANION GAP SERPL CALCULATED.3IONS-SCNC: 13 MMOL/L (ref 4–16)
AST SERPL-CCNC: 20 IU/L (ref 15–37)
BACTERIA: NEGATIVE /HPF
BASOPHILS ABSOLUTE: 0.1 K/CU MM
BASOPHILS RELATIVE PERCENT: 0.6 % (ref 0–1)
BILIRUB SERPL-MCNC: 0.5 MG/DL (ref 0–1)
BILIRUBIN URINE: NEGATIVE MG/DL
BLOOD, URINE: NEGATIVE
BUN BLDV-MCNC: 13 MG/DL (ref 6–23)
CALCIUM SERPL-MCNC: 9 MG/DL (ref 8.3–10.6)
CHLORIDE BLD-SCNC: 89 MMOL/L (ref 99–110)
CLARITY: CLEAR
CO2: 30 MMOL/L (ref 21–32)
COLOR: YELLOW
CREAT SERPL-MCNC: 1.2 MG/DL (ref 0.6–1.1)
DIFFERENTIAL TYPE: ABNORMAL
EOSINOPHILS ABSOLUTE: 0 K/CU MM
EOSINOPHILS RELATIVE PERCENT: 0.2 % (ref 0–3)
GFR AFRICAN AMERICAN: 52 ML/MIN/1.73M2
GFR NON-AFRICAN AMERICAN: 43 ML/MIN/1.73M2
GLUCOSE BLD-MCNC: 94 MG/DL (ref 70–99)
GLUCOSE, URINE: NEGATIVE MG/DL
HCT VFR BLD CALC: 43.4 % (ref 37–47)
HEMOGLOBIN: 13.3 GM/DL (ref 12.5–16)
IMMATURE NEUTROPHIL %: 0.4 % (ref 0–0.43)
KETONES, URINE: ABNORMAL MG/DL
LEUKOCYTE ESTERASE, URINE: NEGATIVE
LIPASE: 18 IU/L (ref 13–60)
LYMPHOCYTES ABSOLUTE: 0.8 K/CU MM
LYMPHOCYTES RELATIVE PERCENT: 9.3 % (ref 24–44)
MCH RBC QN AUTO: 29.9 PG (ref 27–31)
MCHC RBC AUTO-ENTMCNC: 30.6 % (ref 32–36)
MCV RBC AUTO: 97.5 FL (ref 78–100)
MONOCYTES ABSOLUTE: 1.5 K/CU MM
MONOCYTES RELATIVE PERCENT: 18.2 % (ref 0–4)
NITRITE URINE, QUANTITATIVE: NEGATIVE
NUCLEATED RBC %: 0 %
PDW BLD-RTO: 14.6 % (ref 11.7–14.9)
PH, URINE: 8 (ref 5–8)
PLATELET # BLD: 374 K/CU MM (ref 140–440)
PMV BLD AUTO: 10.2 FL (ref 7.5–11.1)
POTASSIUM SERPL-SCNC: 4.1 MMOL/L (ref 3.5–5.1)
PROTEIN UA: NEGATIVE MG/DL
RBC # BLD: 4.45 M/CU MM (ref 4.2–5.4)
RBC URINE: ABNORMAL /HPF (ref 0–6)
SEGMENTED NEUTROPHILS ABSOLUTE COUNT: 5.9 K/CU MM
SEGMENTED NEUTROPHILS RELATIVE PERCENT: 71.3 % (ref 36–66)
SODIUM BLD-SCNC: 132 MMOL/L (ref 135–145)
SPECIFIC GRAVITY UA: 1.01 (ref 1–1.03)
SQUAMOUS EPITHELIAL: 1 /HPF
TOTAL IMMATURE NEUTOROPHIL: 0.03 K/CU MM
TOTAL NUCLEATED RBC: 0 K/CU MM
TOTAL PROTEIN: 6.4 GM/DL (ref 6.4–8.2)
TRICHOMONAS: ABNORMAL /HPF
UROBILINOGEN, URINE: NORMAL MG/DL (ref 0.2–1)
WBC # BLD: 8.2 K/CU MM (ref 4–10.5)
WBC UA: 1 /HPF (ref 0–5)

## 2020-02-06 PROCEDURE — 99284 EMERGENCY DEPT VISIT MOD MDM: CPT

## 2020-02-06 PROCEDURE — 83690 ASSAY OF LIPASE: CPT

## 2020-02-06 PROCEDURE — 6360000002 HC RX W HCPCS: Performed by: EMERGENCY MEDICINE

## 2020-02-06 PROCEDURE — 2580000003 HC RX 258: Performed by: EMERGENCY MEDICINE

## 2020-02-06 PROCEDURE — 85025 COMPLETE CBC W/AUTO DIFF WBC: CPT

## 2020-02-06 PROCEDURE — 96374 THER/PROPH/DIAG INJ IV PUSH: CPT

## 2020-02-06 PROCEDURE — 80053 COMPREHEN METABOLIC PANEL: CPT

## 2020-02-06 PROCEDURE — 71046 X-RAY EXAM CHEST 2 VIEWS: CPT

## 2020-02-06 PROCEDURE — 96375 TX/PRO/DX INJ NEW DRUG ADDON: CPT

## 2020-02-06 PROCEDURE — 81001 URINALYSIS AUTO W/SCOPE: CPT

## 2020-02-06 PROCEDURE — C9113 INJ PANTOPRAZOLE SODIUM, VIA: HCPCS | Performed by: EMERGENCY MEDICINE

## 2020-02-06 RX ORDER — DICYCLOMINE HYDROCHLORIDE 10 MG/1
10 CAPSULE ORAL EVERY 8 HOURS PRN
Qty: 30 CAPSULE | Refills: 0 | Status: ON HOLD | OUTPATIENT
Start: 2020-02-06 | End: 2020-02-16 | Stop reason: HOSPADM

## 2020-02-06 RX ORDER — PANTOPRAZOLE SODIUM 40 MG/10ML
40 INJECTION, POWDER, LYOPHILIZED, FOR SOLUTION INTRAVENOUS ONCE
Status: COMPLETED | OUTPATIENT
Start: 2020-02-06 | End: 2020-02-06

## 2020-02-06 RX ORDER — OMEPRAZOLE 40 MG/1
40 CAPSULE, DELAYED RELEASE ORAL
Qty: 30 CAPSULE | Refills: 0 | Status: ON HOLD | OUTPATIENT
Start: 2020-02-06 | End: 2020-11-23 | Stop reason: HOSPADM

## 2020-02-06 RX ORDER — MORPHINE SULFATE 4 MG/ML
4 INJECTION, SOLUTION INTRAMUSCULAR; INTRAVENOUS ONCE
Status: COMPLETED | OUTPATIENT
Start: 2020-02-06 | End: 2020-02-06

## 2020-02-06 RX ORDER — GUAIFENESIN/DEXTROMETHORPHAN 100-10MG/5
5 SYRUP ORAL 3 TIMES DAILY PRN
Qty: 120 ML | Refills: 0 | Status: ON HOLD | OUTPATIENT
Start: 2020-02-06 | End: 2020-02-16 | Stop reason: HOSPADM

## 2020-02-06 RX ORDER — ONDANSETRON 2 MG/ML
4 INJECTION INTRAMUSCULAR; INTRAVENOUS EVERY 6 HOURS PRN
Status: DISCONTINUED | OUTPATIENT
Start: 2020-02-06 | End: 2020-02-06 | Stop reason: HOSPADM

## 2020-02-06 RX ORDER — 0.9 % SODIUM CHLORIDE 0.9 %
1000 INTRAVENOUS SOLUTION INTRAVENOUS ONCE
Status: COMPLETED | OUTPATIENT
Start: 2020-02-06 | End: 2020-02-06

## 2020-02-06 RX ADMIN — ONDANSETRON 4 MG: 2 INJECTION INTRAMUSCULAR; INTRAVENOUS at 06:44

## 2020-02-06 RX ADMIN — MORPHINE SULFATE 4 MG: 4 INJECTION, SOLUTION INTRAMUSCULAR; INTRAVENOUS at 06:44

## 2020-02-06 RX ADMIN — PANTOPRAZOLE SODIUM 40 MG: 40 INJECTION, POWDER, FOR SOLUTION INTRAVENOUS at 06:44

## 2020-02-06 RX ADMIN — SODIUM CHLORIDE 1000 ML: 9 INJECTION, SOLUTION INTRAVENOUS at 06:44

## 2020-02-06 ASSESSMENT — PAIN SCALES - GENERAL
PAINLEVEL_OUTOF10: 5
PAINLEVEL_OUTOF10: 5

## 2020-02-06 ASSESSMENT — PAIN DESCRIPTION - LOCATION: LOCATION: ABDOMEN

## 2020-02-06 NOTE — ED NOTES
Dr. Ernestina Flores at bedside      Rita Mcelroy, [facility]  02/06/20 1519

## 2020-02-06 NOTE — ED NOTES
Pt resting in a position of comfort. No needs identified at this time. Bed in lowest position and call light within reach. Respirations even, no distress noted.      Seth Coon RN  02/06/20 1007

## 2020-02-06 NOTE — ED NOTES
Discussed discharge instructions with patient. All questions answered. Patient verbalized understanding.           Ida Das RN  02/06/20 0920

## 2020-02-06 NOTE — ED NOTES
Pt reports she uses a walker to ambulate and right now her son is living with her. Her  recently .       Yasmin Raza RN  20 [phone]

## 2020-02-06 NOTE — ED PROVIDER NOTES
eMERGENCY dEPARTMENT eNCOUnter      PCP: Brandon Sosa MD    [facility]    Chief Complaint   Patient presents with    Abdominal Pain    Cough       HPI    Demetrice Keane is a 78 y.o. female who presents with abdominal pain, cough and congestion. States that she had to bury her  on Monday. Since that time is been having lower abdominal discomfort, states she also has epigastric discomfort. States it is constant. Nonradiating. States water makes it better, no exacerbating factors. Denies diarrhea or constipation. Denies urinary symptoms. She denies flank pain, but does report chronic back pain. No change to this. She also denies nausea or vomiting. No fevers. She has some chills. Her son was recently ill with respiratory symptoms, fever. She states she has had cough which is been nonproductive. Also reports some congestion. REVIEW OF SYSTEMS    Constitutional:  Denies fever, + chills.    HENT:  Denies sore throat or ear pain   Cardiovascular:  Denies chest pain, palpitations   Respiratory:  + cough, denies shortness of breath    GI:  + abdominal pain, denies nausea, vomiting, or diarrhea  :  Denies any urinary symptoms, flank pain  Musculoskeletal:  + back pain, + extremity pain  Skin:  Denies rash, color change  Neurologic:  Denies headache, focal weakness or sensory changes   Lymphatic:  Denies swollen glands, edema    All other review of systems are negative  See HPI and nursing notes for additional information     PAST MEDICAL AND SURGICAL HISTORY    Past Medical History:   Diagnosis Date    Acute DVT of right tibial vein ([address] 07/2017    ER imaging: distal right tibial vein DVT; no anticoag for bleeding/ anemia    Acute exacerbation of chronic obstructive pulmonary disease (COPD) ([address] 12/13/2018    Arthritis     Chronic pain syndrome     Low back pain; lumbar disc disease    Clostridium difficile colitis 09/20/2017    COPD, severe ([address] 10/24/2017    Depression  Fibromyalgia     Former smoker     Quit 1/2019    Herniated cervical disc 5-1998    Herpes zoster ophthalmicus 3/2012    Hx of blood clots     Hyperlipidemia     Hypertension     L3 vertebral fracture ([address] 2010    vertebroplasty    Lumbar spinal stenosis 2015    moderately severe by MRI    Migraine     Nocturnal hypoxemia 5/21/2019    Osteoporosis 2010    Fragility Fx L3    Rheumatoid arthritis(714.0) 1976    Dr Hans Mclain S/P cardiac catheterization 05/2017    patent coronaries;  Takotsubo; najeeb Ahmed    Shortness of breath 4/25/2019    Takotsubo syndrome 05/2017    TMJ dysfunction     Tobacco abuse 12/13/2018    Vitamin B12 deficiency 12/2014    B12 level 199     Past Surgical History:   Procedure Laterality Date    APPENDECTOMY  1966   Francenia Saul CARDIAC CATHETERIZATION  05/21/2017    CHOLECYSTECTOMY  1966    w/ appendectomy    ELBOW SURGERY Right     FIXATION KYPHOPLASTY  06/12/2019    L4 kyphoplasty ; Mikayla Baez    FOOT SURGERY  1986    [place] COLONOSCOPY FLX DX W/COLLJ SPEC WHEN PFRMD N/A 10/2/2018    COLONOSCOPY DIAGNOSTIC OR SCREENING performed by Lizbeth Cruz MD at [facility]    TOE SURGERY Left 4/25/2013    Deboo;     VERTEBROPLASTY  10/2010    L3    WRIST FUSION Left 2000       CURRENT MEDICATIONS    Current Outpatient Rx   Medication Sig Dispense Refill    famotidine (PEPCID) 20 MG tablet Take 1 tablet by mouth 2 times daily 60 tablet 3    potassium chloride (KLOR-CON) 10 MEQ extended release tablet Take 1 tablet by mouth daily 90 tablet 1    cholestyramine (QUESTRAN) 4 g packet Take 1 packet by mouth 2 times daily 60 packet 2    metoprolol tartrate (LOPRESSOR) 50 MG tablet Take 50 mg by mouth 2 times daily      NONFORMULARY White petrolatum, bacitracin zinc, lotrisone bid to legs      OXYGEN Inhale 2 L/min into the lungs nightly      FORTEO 600 MCG/2.4ML SOLN injection INJECT 0.08MLS INTO THE SKIN DAILY 2.4 mL 5    guaiFENesin (MUCINEX) 600 MG Involuntary Muscle Spasms     Remicade [Infliximab Injection]     Doxycycline Other (See Comments)     Stomach pains       SOCIAL AND FAMILY HISTORY    Social History     Socioeconomic History    Marital status:      Spouse name: Fatimah Owusu Number of children: 2    Years of education: 15    Highest education level: None   Occupational History    Occupation: retired   Social Needs    Financial resource strain: None    Food insecurity:     Worry: None     Inability: None    Transportation needs:     Medical: None     Non-medical: None   Tobacco Use    Smoking status: Former Smoker     Packs/day: 0.75     Years: 55.00     Pack years: 41.25     Types: Cigarettes     Start date: 1962     Last attempt to quit: 2019     Years since quittin.1    Smokeless tobacco: Never Used   Substance and Sexual Activity    Alcohol use: No     Alcohol/week: 0.0 standard drinks    Drug use: No    Sexual activity: Not Currently   Lifestyle    Physical activity:     Days per week: None     Minutes per session: None    Stress: None   Relationships    Social connections:     Talks on phone: None     Gets together: None     Attends Alevism service: None     Active member of club or organization: None     Attends meetings of clubs or organizations: None     Relationship status: None    Intimate partner violence:     Fear of current or ex partner: None     Emotionally abused: None     Physically abused: None     Forced sexual activity: None   Other Topics Concern    None   Social History Narrative    None     Family History   Problem Relation Age of Onset    Heart Disease Father          FROM MI    Emphysema Father     Arthritis Mother     Stroke Mother     Heart Disease Other         family history    Cancer Other         family history -- larynx    Kidney Disease Son          PHYSICAL EXAM    VITAL SIGNS: /74   Pulse 85   Temp 97.8 °F (36.6 °C)   Resp 15   Ht 5' 6\" (1.676 m)

## 2020-02-09 ENCOUNTER — APPOINTMENT (OUTPATIENT)
Dept: CT IMAGING | Age: 80
DRG: 177 | End: 2020-02-09
Payer: MEDICARE

## 2020-02-09 ENCOUNTER — APPOINTMENT (OUTPATIENT)
Dept: GENERAL RADIOLOGY | Age: 80
DRG: 177 | End: 2020-02-09
Payer: MEDICARE

## 2020-02-09 ENCOUNTER — HOSPITAL ENCOUNTER (INPATIENT)
Age: 80
LOS: 7 days | Discharge: NURSING, REHAB OR LONG-TERM CARE | DRG: 177 | End: 2020-02-16
Attending: EMERGENCY MEDICINE | Admitting: HOSPITALIST
Payer: MEDICARE

## 2020-02-09 PROBLEM — J96.21 ACUTE ON CHRONIC RESPIRATORY FAILURE WITH HYPOXIA AND HYPERCAPNIA (HCC): Status: ACTIVE | Noted: 2020-02-09

## 2020-02-09 PROBLEM — J96.22 ACUTE ON CHRONIC RESPIRATORY FAILURE WITH HYPOXIA AND HYPERCAPNIA (HCC): Status: ACTIVE | Noted: 2020-02-09

## 2020-02-09 LAB
ALBUMIN SERPL-MCNC: 3.3 GM/DL (ref 3.4–5)
ALP BLD-CCNC: 93 IU/L (ref 40–129)
ALT SERPL-CCNC: 13 U/L (ref 10–40)
ANION GAP SERPL CALCULATED.3IONS-SCNC: 20 MMOL/L (ref 4–16)
APTT: 28.4 SECONDS (ref 25.1–37.1)
AST SERPL-CCNC: 32 IU/L (ref 15–37)
BACTERIA: NEGATIVE /HPF
BASE EXCESS: ABNORMAL (ref 0–2.4)
BASE EXCESS: ABNORMAL (ref 0–2.4)
BASOPHILS ABSOLUTE: 0 K/CU MM
BASOPHILS RELATIVE PERCENT: 0.3 % (ref 0–1)
BILIRUB SERPL-MCNC: 0.3 MG/DL (ref 0–1)
BILIRUBIN URINE: NEGATIVE MG/DL
BLOOD, URINE: NEGATIVE
BUN BLDV-MCNC: 11 MG/DL (ref 6–23)
CALCIUM SERPL-MCNC: 8.4 MG/DL (ref 8.3–10.6)
CHLORIDE BLD-SCNC: 87 MMOL/L (ref 99–110)
CLARITY: CLEAR
CO2: 22 MMOL/L (ref 21–32)
COLOR: ABNORMAL
COMMENT: ABNORMAL
COMMENT: ABNORMAL
CREAT SERPL-MCNC: 1 MG/DL (ref 0.6–1.1)
D DIMER: 2679 NG/ML(DDU)
DIFFERENTIAL TYPE: ABNORMAL
EOSINOPHILS ABSOLUTE: 0 K/CU MM
EOSINOPHILS RELATIVE PERCENT: 0.1 % (ref 0–3)
GFR AFRICAN AMERICAN: >60 ML/MIN/1.73M2
GFR NON-AFRICAN AMERICAN: 53 ML/MIN/1.73M2
GLUCOSE BLD-MCNC: 321 MG/DL (ref 70–99)
GLUCOSE, URINE: NEGATIVE MG/DL
HCO3 VENOUS: 19 MMOL/L (ref 19–25)
HCO3 VENOUS: 23 MMOL/L (ref 19–25)
HCT VFR BLD CALC: 43.1 % (ref 37–47)
HEMOGLOBIN: 12.8 GM/DL (ref 12.5–16)
HYALINE CASTS: 2 /LPF
IMMATURE NEUTROPHIL %: 0.5 % (ref 0–0.43)
INR BLD: 0.93 INDEX
KETONES, URINE: NEGATIVE MG/DL
LACTATE: 5.2 MMOL/L (ref 0.4–2)
LACTATE: ABNORMAL MMOL/L (ref 0.4–2)
LEUKOCYTE ESTERASE, URINE: NEGATIVE
LYMPHOCYTES ABSOLUTE: 1.4 K/CU MM
LYMPHOCYTES RELATIVE PERCENT: 13.8 % (ref 24–44)
MAGNESIUM: 1.5 MG/DL (ref 1.8–2.4)
MCH RBC QN AUTO: 29.6 PG (ref 27–31)
MCHC RBC AUTO-ENTMCNC: 29.7 % (ref 32–36)
MCV RBC AUTO: 99.8 FL (ref 78–100)
MONOCYTES ABSOLUTE: 0.4 K/CU MM
MONOCYTES RELATIVE PERCENT: 3.5 % (ref 0–4)
NITRITE URINE, QUANTITATIVE: NEGATIVE
NUCLEATED RBC %: 0 %
O2 SAT, VEN: 74.3 % (ref 50–70)
O2 SAT, VEN: 91.4 % (ref 50–70)
PCO2, VEN: 36 MMHG (ref 38–52)
PCO2, VEN: 55 MMHG (ref 38–52)
PDW BLD-RTO: 14.7 % (ref 11.7–14.9)
PH VENOUS: 7.23 (ref 7.32–7.42)
PH VENOUS: 7.33 (ref 7.32–7.42)
PH, URINE: 5 (ref 5–8)
PLATELET # BLD: 321 K/CU MM (ref 140–440)
PMV BLD AUTO: 10.5 FL (ref 7.5–11.1)
PO2, VEN: 40 MMHG (ref 28–48)
PO2, VEN: 57 MMHG (ref 28–48)
POTASSIUM SERPL-SCNC: 4.2 MMOL/L (ref 3.5–5.1)
PRO-BNP: 600 PG/ML
PROCALCITONIN: 14.11
PROTEIN UA: NEGATIVE MG/DL
PROTHROMBIN TIME: 11.3 SECONDS (ref 11.7–14.5)
RAPID INFLUENZA  B AGN: NEGATIVE
RAPID INFLUENZA A AGN: NEGATIVE
RBC # BLD: 4.32 M/CU MM (ref 4.2–5.4)
RBC URINE: 3 /HPF (ref 0–6)
SEGMENTED NEUTROPHILS ABSOLUTE COUNT: 8.2 K/CU MM
SEGMENTED NEUTROPHILS RELATIVE PERCENT: 81.8 % (ref 36–66)
SODIUM BLD-SCNC: 129 MMOL/L (ref 135–145)
SPECIFIC GRAVITY UA: 1.01 (ref 1–1.03)
TOTAL IMMATURE NEUTOROPHIL: 0.05 K/CU MM
TOTAL NUCLEATED RBC: 0 K/CU MM
TOTAL PROTEIN: 5.9 GM/DL (ref 6.4–8.2)
TRICHOMONAS: ABNORMAL /HPF
TROPONIN T: 0.2 NG/ML
TROPONIN T: 0.69 NG/ML
UROBILINOGEN, URINE: NORMAL MG/DL (ref 0.2–1)
WBC # BLD: 10.1 K/CU MM (ref 4–10.5)
WBC UA: 1 /HPF (ref 0–5)

## 2020-02-09 PROCEDURE — 2000000000 HC ICU R&B

## 2020-02-09 PROCEDURE — 2580000003 HC RX 258: Performed by: EMERGENCY MEDICINE

## 2020-02-09 PROCEDURE — 85025 COMPLETE CBC W/AUTO DIFF WBC: CPT

## 2020-02-09 PROCEDURE — 6360000002 HC RX W HCPCS: Performed by: NURSE PRACTITIONER

## 2020-02-09 PROCEDURE — 87804 INFLUENZA ASSAY W/OPTIC: CPT

## 2020-02-09 PROCEDURE — 87581 M.PNEUMON DNA AMP PROBE: CPT

## 2020-02-09 PROCEDURE — 2500000003 HC RX 250 WO HCPCS: Performed by: INTERNAL MEDICINE

## 2020-02-09 PROCEDURE — 93005 ELECTROCARDIOGRAM TRACING: CPT | Performed by: EMERGENCY MEDICINE

## 2020-02-09 PROCEDURE — 6360000002 HC RX W HCPCS: Performed by: INTERNAL MEDICINE

## 2020-02-09 PROCEDURE — 96374 THER/PROPH/DIAG INJ IV PUSH: CPT

## 2020-02-09 PROCEDURE — 6370000000 HC RX 637 (ALT 250 FOR IP): Performed by: NURSE PRACTITIONER

## 2020-02-09 PROCEDURE — 6360000002 HC RX W HCPCS

## 2020-02-09 PROCEDURE — 2580000003 HC RX 258: Performed by: HOSPITALIST

## 2020-02-09 PROCEDURE — 84145 PROCALCITONIN (PCT): CPT

## 2020-02-09 PROCEDURE — 93005 ELECTROCARDIOGRAM TRACING: CPT | Performed by: PHYSICIAN ASSISTANT

## 2020-02-09 PROCEDURE — 2500000003 HC RX 250 WO HCPCS: Performed by: EMERGENCY MEDICINE

## 2020-02-09 PROCEDURE — 6360000002 HC RX W HCPCS: Performed by: EMERGENCY MEDICINE

## 2020-02-09 PROCEDURE — 51702 INSERT TEMP BLADDER CATH: CPT

## 2020-02-09 PROCEDURE — 83735 ASSAY OF MAGNESIUM: CPT

## 2020-02-09 PROCEDURE — 6370000000 HC RX 637 (ALT 250 FOR IP): Performed by: EMERGENCY MEDICINE

## 2020-02-09 PROCEDURE — 6370000000 HC RX 637 (ALT 250 FOR IP): Performed by: INTERNAL MEDICINE

## 2020-02-09 PROCEDURE — 87486 CHLMYD PNEUM DNA AMP PROBE: CPT

## 2020-02-09 PROCEDURE — 87040 BLOOD CULTURE FOR BACTERIA: CPT

## 2020-02-09 PROCEDURE — 87633 RESP VIRUS 12-25 TARGETS: CPT

## 2020-02-09 PROCEDURE — 81001 URINALYSIS AUTO W/SCOPE: CPT

## 2020-02-09 PROCEDURE — 96375 TX/PRO/DX INJ NEW DRUG ADDON: CPT

## 2020-02-09 PROCEDURE — 36415 COLL VENOUS BLD VENIPUNCTURE: CPT

## 2020-02-09 PROCEDURE — 2580000003 HC RX 258: Performed by: NURSE PRACTITIONER

## 2020-02-09 PROCEDURE — 85610 PROTHROMBIN TIME: CPT

## 2020-02-09 PROCEDURE — 96365 THER/PROPH/DIAG IV INF INIT: CPT

## 2020-02-09 PROCEDURE — 82805 BLOOD GASES W/O2 SATURATION: CPT

## 2020-02-09 PROCEDURE — 83605 ASSAY OF LACTIC ACID: CPT

## 2020-02-09 PROCEDURE — 85730 THROMBOPLASTIN TIME PARTIAL: CPT

## 2020-02-09 PROCEDURE — 6360000002 HC RX W HCPCS: Performed by: HOSPITALIST

## 2020-02-09 PROCEDURE — 99285 EMERGENCY DEPT VISIT HI MDM: CPT

## 2020-02-09 PROCEDURE — 71275 CT ANGIOGRAPHY CHEST: CPT

## 2020-02-09 PROCEDURE — 71045 X-RAY EXAM CHEST 1 VIEW: CPT

## 2020-02-09 PROCEDURE — 85379 FIBRIN DEGRADATION QUANT: CPT

## 2020-02-09 PROCEDURE — 6370000000 HC RX 637 (ALT 250 FOR IP): Performed by: HOSPITALIST

## 2020-02-09 PROCEDURE — 6360000004 HC RX CONTRAST MEDICATION: Performed by: HOSPITALIST

## 2020-02-09 PROCEDURE — 80053 COMPREHEN METABOLIC PANEL: CPT

## 2020-02-09 PROCEDURE — 2580000003 HC RX 258: Performed by: INTERNAL MEDICINE

## 2020-02-09 PROCEDURE — 84484 ASSAY OF TROPONIN QUANT: CPT

## 2020-02-09 PROCEDURE — 96368 THER/DIAG CONCURRENT INF: CPT

## 2020-02-09 PROCEDURE — 83880 ASSAY OF NATRIURETIC PEPTIDE: CPT

## 2020-02-09 PROCEDURE — 96361 HYDRATE IV INFUSION ADD-ON: CPT

## 2020-02-09 PROCEDURE — C9113 INJ PANTOPRAZOLE SODIUM, VIA: HCPCS | Performed by: NURSE PRACTITIONER

## 2020-02-09 PROCEDURE — 4500000027

## 2020-02-09 PROCEDURE — 96366 THER/PROPH/DIAG IV INF ADDON: CPT

## 2020-02-09 PROCEDURE — 87798 DETECT AGENT NOS DNA AMP: CPT

## 2020-02-09 RX ORDER — CLOPIDOGREL BISULFATE 75 MG/1
75 TABLET ORAL DAILY
Status: DISCONTINUED | OUTPATIENT
Start: 2020-02-09 | End: 2020-02-16 | Stop reason: HOSPADM

## 2020-02-09 RX ORDER — OXYCODONE HYDROCHLORIDE AND ACETAMINOPHEN 5; 325 MG/1; MG/1
1 TABLET ORAL ONCE
Status: DISCONTINUED | OUTPATIENT
Start: 2020-02-09 | End: 2020-02-09

## 2020-02-09 RX ORDER — IPRATROPIUM BROMIDE AND ALBUTEROL SULFATE 2.5; .5 MG/3ML; MG/3ML
1 SOLUTION RESPIRATORY (INHALATION)
Status: DISCONTINUED | OUTPATIENT
Start: 2020-02-10 | End: 2020-02-16 | Stop reason: HOSPADM

## 2020-02-09 RX ORDER — SODIUM CHLORIDE 0.9 % (FLUSH) 0.9 %
10 SYRINGE (ML) INJECTION EVERY 12 HOURS SCHEDULED
Status: DISCONTINUED | OUTPATIENT
Start: 2020-02-09 | End: 2020-02-16 | Stop reason: HOSPADM

## 2020-02-09 RX ORDER — TRAZODONE HYDROCHLORIDE 50 MG/1
50 TABLET ORAL NIGHTLY
Status: DISCONTINUED | OUTPATIENT
Start: 2020-02-09 | End: 2020-02-16 | Stop reason: HOSPADM

## 2020-02-09 RX ORDER — MAGNESIUM SULFATE 1 G/100ML
1 INJECTION INTRAVENOUS ONCE
Status: COMPLETED | OUTPATIENT
Start: 2020-02-09 | End: 2020-02-09

## 2020-02-09 RX ORDER — LANOLIN ALCOHOL/MO/W.PET/CERES
1000 CREAM (GRAM) TOPICAL DAILY
Status: DISCONTINUED | OUTPATIENT
Start: 2020-02-09 | End: 2020-02-16 | Stop reason: HOSPADM

## 2020-02-09 RX ORDER — FUROSEMIDE 10 MG/ML
40 INJECTION INTRAMUSCULAR; INTRAVENOUS ONCE
Status: COMPLETED | OUTPATIENT
Start: 2020-02-09 | End: 2020-02-09

## 2020-02-09 RX ORDER — SODIUM CHLORIDE FOR INHALATION 0.9 %
3 VIAL, NEBULIZER (ML) INHALATION ONCE
Status: DISCONTINUED | OUTPATIENT
Start: 2020-02-09 | End: 2020-02-09

## 2020-02-09 RX ORDER — 0.9 % SODIUM CHLORIDE 0.9 %
500 INTRAVENOUS SOLUTION INTRAVENOUS ONCE
Status: COMPLETED | OUTPATIENT
Start: 2020-02-09 | End: 2020-02-09

## 2020-02-09 RX ORDER — ONDANSETRON 2 MG/ML
4 INJECTION INTRAMUSCULAR; INTRAVENOUS EVERY 6 HOURS PRN
Status: DISCONTINUED | OUTPATIENT
Start: 2020-02-09 | End: 2020-02-09

## 2020-02-09 RX ORDER — BUDESONIDE AND FORMOTEROL FUMARATE DIHYDRATE 160; 4.5 UG/1; UG/1
2 AEROSOL RESPIRATORY (INHALATION) 2 TIMES DAILY
Status: DISCONTINUED | OUTPATIENT
Start: 2020-02-09 | End: 2020-02-09 | Stop reason: CLARIF

## 2020-02-09 RX ORDER — ATORVASTATIN CALCIUM 10 MG/1
10 TABLET, FILM COATED ORAL EVERY EVENING
Status: DISCONTINUED | OUTPATIENT
Start: 2020-02-09 | End: 2020-02-16 | Stop reason: HOSPADM

## 2020-02-09 RX ORDER — PANTOPRAZOLE SODIUM 40 MG/10ML
40 INJECTION, POWDER, LYOPHILIZED, FOR SOLUTION INTRAVENOUS DAILY
Status: DISCONTINUED | OUTPATIENT
Start: 2020-02-09 | End: 2020-02-16 | Stop reason: HOSPADM

## 2020-02-09 RX ORDER — METHYLPREDNISOLONE SODIUM SUCCINATE 125 MG/2ML
60 INJECTION, POWDER, LYOPHILIZED, FOR SOLUTION INTRAMUSCULAR; INTRAVENOUS ONCE
Status: COMPLETED | OUTPATIENT
Start: 2020-02-09 | End: 2020-02-09

## 2020-02-09 RX ORDER — METHYLPREDNISOLONE SODIUM SUCCINATE 40 MG/ML
40 INJECTION, POWDER, LYOPHILIZED, FOR SOLUTION INTRAMUSCULAR; INTRAVENOUS EVERY 6 HOURS
Status: DISCONTINUED | OUTPATIENT
Start: 2020-02-09 | End: 2020-02-10

## 2020-02-09 RX ORDER — ASPIRIN 81 MG/1
81 TABLET, CHEWABLE ORAL DAILY
Status: DISCONTINUED | OUTPATIENT
Start: 2020-02-10 | End: 2020-02-16 | Stop reason: HOSPADM

## 2020-02-09 RX ORDER — HYDROXYZINE PAMOATE 25 MG/1
25 CAPSULE ORAL ONCE
Status: COMPLETED | OUTPATIENT
Start: 2020-02-09 | End: 2020-02-09

## 2020-02-09 RX ORDER — LEVALBUTEROL INHALATION SOLUTION 0.63 MG/3ML
1.25 SOLUTION RESPIRATORY (INHALATION) ONCE
Status: COMPLETED | OUTPATIENT
Start: 2020-02-09 | End: 2020-02-09

## 2020-02-09 RX ORDER — IPRATROPIUM BROMIDE AND ALBUTEROL SULFATE 2.5; .5 MG/3ML; MG/3ML
1 SOLUTION RESPIRATORY (INHALATION) ONCE
Status: DISCONTINUED | OUTPATIENT
Start: 2020-02-09 | End: 2020-02-09

## 2020-02-09 RX ORDER — SODIUM CHLORIDE 9 MG/ML
INJECTION, SOLUTION INTRAVENOUS CONTINUOUS
Status: DISCONTINUED | OUTPATIENT
Start: 2020-02-09 | End: 2020-02-10

## 2020-02-09 RX ORDER — OXYCODONE HYDROCHLORIDE AND ACETAMINOPHEN 5; 325 MG/1; MG/1
1 TABLET ORAL ONCE
Status: COMPLETED | OUTPATIENT
Start: 2020-02-09 | End: 2020-02-09

## 2020-02-09 RX ORDER — SODIUM CHLORIDE 9 MG/ML
INJECTION, SOLUTION INTRAVENOUS CONTINUOUS
Status: DISCONTINUED | OUTPATIENT
Start: 2020-02-09 | End: 2020-02-09

## 2020-02-09 RX ORDER — SODIUM CHLORIDE 0.9 % (FLUSH) 0.9 %
10 SYRINGE (ML) INJECTION 2 TIMES DAILY
Status: DISCONTINUED | OUTPATIENT
Start: 2020-02-09 | End: 2020-02-16 | Stop reason: HOSPADM

## 2020-02-09 RX ORDER — 0.9 % SODIUM CHLORIDE 0.9 %
500 INTRAVENOUS SOLUTION INTRAVENOUS ONCE
Status: DISCONTINUED | OUTPATIENT
Start: 2020-02-09 | End: 2020-02-09

## 2020-02-09 RX ORDER — SODIUM CHLORIDE 0.9 % (FLUSH) 0.9 %
10 SYRINGE (ML) INJECTION PRN
Status: DISCONTINUED | OUTPATIENT
Start: 2020-02-09 | End: 2020-02-16 | Stop reason: HOSPADM

## 2020-02-09 RX ORDER — FUROSEMIDE 10 MG/ML
INJECTION INTRAMUSCULAR; INTRAVENOUS
Status: COMPLETED
Start: 2020-02-09 | End: 2020-02-09

## 2020-02-09 RX ADMIN — AMIODARONE HYDROCHLORIDE 150 MG: 50 INJECTION, SOLUTION INTRAVENOUS at 16:12

## 2020-02-09 RX ADMIN — MAGNESIUM SULFATE HEPTAHYDRATE 1 G: 1 INJECTION, SOLUTION INTRAVENOUS at 18:22

## 2020-02-09 RX ADMIN — METHYLPREDNISOLONE SODIUM SUCCINATE 40 MG: 40 INJECTION, POWDER, FOR SOLUTION INTRAMUSCULAR; INTRAVENOUS at 21:21

## 2020-02-09 RX ADMIN — CLOPIDOGREL BISULFATE 75 MG: 75 TABLET ORAL at 21:21

## 2020-02-09 RX ADMIN — IOPAMIDOL 80 ML: 755 INJECTION, SOLUTION INTRAVENOUS at 21:56

## 2020-02-09 RX ADMIN — SODIUM CHLORIDE, PRESERVATIVE FREE 10 ML: 5 INJECTION INTRAVENOUS at 21:21

## 2020-02-09 RX ADMIN — CYANOCOBALAMIN TAB 1000 MCG 1000 MCG: 1000 TAB at 21:21

## 2020-02-09 RX ADMIN — FUROSEMIDE 40 MG: 10 INJECTION, SOLUTION INTRAVENOUS at 16:00

## 2020-02-09 RX ADMIN — DEXTROSE MONOHYDRATE 5 MG/HR: 50 INJECTION, SOLUTION INTRAVENOUS at 15:55

## 2020-02-09 RX ADMIN — CEFEPIME HYDROCHLORIDE 2 G: 2 INJECTION, POWDER, FOR SOLUTION INTRAVENOUS at 17:44

## 2020-02-09 RX ADMIN — SODIUM CHLORIDE 500 ML: 9 INJECTION, SOLUTION INTRAVENOUS at 17:44

## 2020-02-09 RX ADMIN — METHYLPREDNISOLONE SODIUM SUCCINATE 60 MG: 125 INJECTION, POWDER, LYOPHILIZED, FOR SOLUTION INTRAMUSCULAR; INTRAVENOUS at 16:08

## 2020-02-09 RX ADMIN — AMIODARONE HYDROCHLORIDE 1 MG/MIN: 50 INJECTION, SOLUTION INTRAVENOUS at 16:25

## 2020-02-09 RX ADMIN — ENOXAPARIN SODIUM 60 MG: 60 INJECTION SUBCUTANEOUS at 21:21

## 2020-02-09 RX ADMIN — MAGNESIUM SULFATE HEPTAHYDRATE 1 G: 1 INJECTION, SOLUTION INTRAVENOUS at 16:08

## 2020-02-09 RX ADMIN — LEVALBUTEROL 1.26 MG: 0.63 SOLUTION RESPIRATORY (INHALATION) at 15:50

## 2020-02-09 RX ADMIN — TRAZODONE HYDROCHLORIDE 50 MG: 50 TABLET ORAL at 21:29

## 2020-02-09 RX ADMIN — HYDROXYZINE PAMOATE 25 MG: 25 CAPSULE ORAL at 17:00

## 2020-02-09 RX ADMIN — PANTOPRAZOLE SODIUM 40 MG: 40 INJECTION, POWDER, FOR SOLUTION INTRAVENOUS at 21:21

## 2020-02-09 RX ADMIN — SODIUM BICARBONATE: 84 INJECTION, SOLUTION INTRAVENOUS at 18:22

## 2020-02-09 RX ADMIN — ATORVASTATIN CALCIUM 10 MG: 10 TABLET, FILM COATED ORAL at 21:21

## 2020-02-09 RX ADMIN — SODIUM CHLORIDE: 9 INJECTION, SOLUTION INTRAVENOUS at 19:16

## 2020-02-09 RX ADMIN — OXYCODONE HYDROCHLORIDE AND ACETAMINOPHEN 1 TABLET: 5; 325 TABLET ORAL at 18:46

## 2020-02-09 RX ADMIN — VANCOMYCIN HYDROCHLORIDE 1250 MG: 5 INJECTION, POWDER, LYOPHILIZED, FOR SOLUTION INTRAVENOUS at 18:22

## 2020-02-09 RX ADMIN — FUROSEMIDE 40 MG: 10 INJECTION INTRAMUSCULAR; INTRAVENOUS at 16:00

## 2020-02-09 RX ADMIN — SODIUM CHLORIDE, PRESERVATIVE FREE 10 ML: 5 INJECTION INTRAVENOUS at 21:57

## 2020-02-09 ASSESSMENT — PAIN DESCRIPTION - PAIN TYPE: TYPE: ACUTE PAIN

## 2020-02-09 ASSESSMENT — PAIN DESCRIPTION - FREQUENCY: FREQUENCY: CONTINUOUS

## 2020-02-09 ASSESSMENT — PAIN DESCRIPTION - ONSET: ONSET: ON-GOING

## 2020-02-09 ASSESSMENT — PAIN SCALES - GENERAL
PAINLEVEL_OUTOF10: 10
PAINLEVEL_OUTOF10: 0
PAINLEVEL_OUTOF10: 10

## 2020-02-09 ASSESSMENT — PAIN DESCRIPTION - LOCATION: LOCATION: CHEST

## 2020-02-09 ASSESSMENT — PAIN DESCRIPTION - DESCRIPTORS: DESCRIPTORS: PRESSURE

## 2020-02-09 ASSESSMENT — PAIN DESCRIPTION - ORIENTATION: ORIENTATION: MID

## 2020-02-09 NOTE — ED NOTES
Arrived to room 19 at this time. Placed on telemetry monitor and HR is up in the 170's and 180's and irregular. Appears to be in Afib/Flutter. Short of breath and tachypnic with respirations of 40/min. Dr Sharath Vitale brought to the bedside and respiratory called as well.  Patient to be moved to Trauma room 2 per Dr. Chavo Mercedes, RN  02/09/20 8899

## 2020-02-09 NOTE — ED PROVIDER NOTES
coronaries;  Takotsubo; humberto Bonilla    Shortness of breath 2019    Takotsubo syndrome 2017    TMJ dysfunction     Tobacco abuse 2018    Vitamin B12 deficiency 2014    B12 level 199     Past Surgical History:   Procedure Laterality Date    APPENDECTOMY  1966    CARDIAC CATHETERIZATION  2017    CHOLECYSTECTOMY  1966    w/ appendectomy    ELBOW SURGERY Right     FIXATION KYPHOPLASTY  2019    L4 kyphoplasty ; Jeramy Ponto    FOOT SURGERY  1986    [place] COLONOSCOPY FLX DX W/COLLJ SPEC WHEN PFRMD N/A 10/2/2018    COLONOSCOPY DIAGNOSTIC OR SCREENING performed by Farzaneh Grimaldo MD at [facility]    TOE SURGERY Left 2013    Deboo;     VERTEBROPLASTY  10/2010    L3    WRIST FUSION Left      Family History   Problem Relation Age of Onset    Heart Disease Father          FROM MI   Hickman Plunk Emphysema Father     Arthritis Mother     Stroke Mother     Heart Disease Other         family history    Cancer Other         family history -- larynx    Kidney Disease Son      Social History     Socioeconomic History    Marital status:      Spouse name: Guzman Keyes Number of children: 2    Years of education: 15    Highest education level: Not on file   Occupational History    Occupation: retired   Social Needs    Financial resource strain: Not on file    Food insecurity:     Worry: Not on file     Inability: Not on file    Transportation needs:     Medical: Not on file     Non-medical: Not on file   Tobacco Use    Smoking status: Former Smoker     Packs/day: 0.75     Years: 55.00     Pack years: 41.25     Types: Cigarettes     Start date: 1962     Last attempt to quit: 2019     Years since quittin.1    Smokeless tobacco: Never Used   Substance and Sexual Activity    Alcohol use: No     Alcohol/week: 0.0 standard drinks    Drug use: No    Sexual activity: Not Currently   Lifestyle    Physical activity:     Days per week: Not on file guaiFENesin-dextromethorphan (ROBITUSSIN DM) 100-10 MG/5ML syrup Take 5 mLs by mouth 3 times daily as needed for Cough 120 mL 0    famotidine (PEPCID) 20 MG tablet Take 1 tablet by mouth 2 times daily 60 tablet 3    potassium chloride (KLOR-CON) 10 MEQ extended release tablet Take 1 tablet by mouth daily 90 tablet 1    cholestyramine (QUESTRAN) 4 g packet Take 1 packet by mouth 2 times daily 60 packet 2    metoprolol tartrate (LOPRESSOR) 50 MG tablet Take 50 mg by mouth 2 times daily      NONFORMULARY White petrolatum, bacitracin zinc, lotrisone bid to legs      OXYGEN Inhale 2 L/min into the lungs nightly      FORTEO 600 MCG/2.4ML SOLN injection INJECT 0.08MLS INTO THE SKIN DAILY 2.4 mL 5    guaiFENesin (MUCINEX) 600 MG extended release tablet Take 1 tablet by mouth 2 times daily 30 tablet 0    ipratropium-albuterol (DUONEB) 0.5-2.5 (3) MG/3ML SOLN nebulizer solution Inhale 3 mLs into the lungs every 4 hours (while awake) 360 mL 2    furosemide (LASIX) 40 MG tablet Take 1 tablet by mouth 2 times daily 60 tablet 3    atorvastatin (LIPITOR) 10 MG tablet TAKE 1 TABLET BY MOUTH EVERY EVENING 90 tablet 4    traZODone (DESYREL) 50 MG tablet TAKE 1 TABLET BY MOUTH NIGHTLY 30 tablet 5    triamcinolone (NASACORT ALLERGY 24HR) 55 MCG/ACT nasal inhaler 2 sprays by Each Nostril route daily 1 Inhaler 3    losartan (COZAAR) 100 MG tablet TAKE ONE TABLET BY MOUTH DAILY 30 tablet 5    spironolactone (ALDACTONE) 50 MG tablet TAKE 1 TABLET BY ORAL ROUTE EVERY DAY  11    Cholecalciferol (VITAMIN D) 2000 units CAPS capsule Take 1 capsule by mouth daily      ondansetron (ZOFRAN-ODT) 4 MG disintegrating tablet Take 4 mg by mouth as needed for Nausea or Vomiting      PROAIR  (90 Base) MCG/ACT inhaler INHALE 2 PUFFS BY ORAL INHALATION EVERY 4 TO 6 HOURS AS NEEDED 8.5 g 10    BREO ELLIPTA 100-25 MCG/INH AEPB inhaler INHALE 1 PUFF INTO THE LUNGS DAILY AFTER INHALATION THEN GARGLE 1 each 11    retractions noted. She is very diminished, poor air movement, some end expiratory faint wheezing. Also crackles. Abdominal:   Soft. Nontender. Non distended. Neurological:  Alert and oriented           Psychiatric:  Very anxious    I have reviewed and interpreted all of the currently available lab results from this visit (if applicable):     Radiographs (if obtained):  Radiologist's Report Reviewed:  No results found. EKG (if obtained): (All EKG's are interpreted by myself in the absence of a cardiologist)  Appears to be likely atrial flutter with 2-1 conduction, does have wide QRS, posterior fascicular block, inferior ST and T wave changes. Rate of 174 bpm.  No previous to compare. Repeat at 1702  Sinus tachycardia with bifascicular block, rate of 131 bpm.  No ST elevation. Inferior leads some T wave changes. MDM:  58-year-old female presents in severe respiratory distress, significantly hypoxic, heart rate in the 170s, blood pressure actually appears to be a bit elevated. At [address], Tish Singh, came to get me, I called respiratory when I entered the room, asked to bring Vapotherm. She does have some mild wheeze, she is extremely anxious, placed on monitors, IV obtained, placed on Vapotherm with improvement. EKG was obtained with me at bedside, wide QRS, however does appear to be extremely regular which would be abnormal for V. tach. She is alert and talking to me, looks more like an a flutter. I sent a picture of the EKG through perfect serve to her cardiologist Dr. Hazel Gomez and he is coming. We will move her to a larger room, place her on pads for the defibrillator if needed. Reviewed chart and her last EF was 50 to 55%. She does have a history of talk at [facility], as well as COPD, CAD.   I am hesitant to start a lot of fluids in case this is a talk at [facility], we will need to rate control her, plan for Cardizem and amiodarone    1547- Dr. Hazel Gomez at bedside, moved to trauma 2, he agrees with tachycardic with poor perfusion. Suspect this more than sepsis at this time. Anion gap 20. Magnesium 1.5- more mag ordered by Dr. Awilda Null. Urine came back and there are no ketones, at 1750 she continues to improve, heart rate is leveling out, we did increase her Cardizem from 5-10. We will continue to monitor, plan for admission, does not require an insulin drip. She is significantly improving. Was given 1 dose of hydroxyzine for her anxiety little bit earlier but she did get a little confused with that, will hold off on that again. She is now clear again, feeling better, would like something for pain, is on Percocet chronically and so 1 dose of this was ordered. Discussed with hospitalist team, Wero Mcallister NP, for admission. No family at bedside. Total critical care time today provided was 78 minutes. This excludes seperately billable procedure. Critical care time provided for respiratory failure, lactic acidosis, NSTEMI  that required close evaluation and/or intervention with concern for patient decompensation. Clinical Impression:  1. Acute respiratory failure with hypoxia and hypercapnia (HCC)    2. NSTEMI (non-ST elevated myocardial infarction) ([facility]    3. Respiratory acidosis    4. Lactic acidosis    5. Hypomagnesemia    6. Hyperglycemia      Disposition referral (if applicable):  Evi Nielsen MD  [address] [place] [address]  [phone]          [address]ana St MD  [place] [id number]  [phone]          Disposition medications (if applicable):  New Prescriptions    No medications on file     ED Provider Disposition Time  DISPOSITION Admitted 02/09/2020 06:32:14 PM      Comment: Please note this report has been produced using speech recognition software and may contain errors related to that system including errors in grammar, punctuation, and spelling, as well as words and phrases that may be inappropriate.   Efforts were made to edit the

## 2020-02-09 NOTE — PROGRESS NOTES
[address] [place] [address]    Akilah Mancia is a 78 y.o. female started on vancomycin for empiric therapy. Pharmacy consulted by ED provider Dr Francesco Cee to order a dose of vancomycin in the emergency department. Other antimicrobials: cefepime    Ht Readings from Last 1 Encounters:   02/09/20 5' 6\" (1.676 m)     Wt Readings from Last 3 Encounters:   02/09/20 138 lb (62.6 kg)   02/06/20 138 lb (62.6 kg)   01/24/20 140 lb (63.5 kg)        Pertinent Laboratory Values:   Temp Readings from Last 3 Encounters:   02/09/20 98 °F (36.7 °C) (Oral)   02/06/20 97.8 °F (36.6 °C)   01/04/20 97.9 °F (36.6 °C) (Oral)     Recent Labs     02/09/20  1602   WBC 10.1   LACTATE 6.4  LACT CALLED TO DR ARNOLD AT 1726 ON [id number] BY  MT   RESULTS READ BACK  *     Recent Labs     02/09/20  1602   BUN 11   CREATININE 1.0     Estimated Creatinine Clearance: 43 mL/min (based on SCr of 1 mg/dL). No intake or output data in the 24 hours ending 02/09/20 2526    Assessment/Plan:  Pharmacy will order vancomycin 1250 mg (20 mg/kg). Please note, pharmacy will order a one-time dose of vancomycin for the Emergency Department. The consult will need to be re-ordered if vancomycin is to continue upon admission. Thank you for the consult.   Cortez Ordoñez, [address]  2/9/2020 5:56 PM

## 2020-02-10 PROBLEM — I50.21 ACUTE SYSTOLIC HEART FAILURE (HCC): Status: ACTIVE | Noted: 2020-02-10

## 2020-02-10 PROBLEM — J15.6 GRAM-NEGATIVE PNEUMONIA (HCC): Status: ACTIVE | Noted: 2020-02-10

## 2020-02-10 PROBLEM — J15.69 GRAM-NEGATIVE PNEUMONIA: Status: ACTIVE | Noted: 2020-02-10

## 2020-02-10 PROBLEM — R00.0 WIDE-COMPLEX TACHYCARDIA: Status: ACTIVE | Noted: 2020-02-10

## 2020-02-10 LAB
ADENOVIRUS DETECTION BY PCR: NOT DETECTED
ANION GAP SERPL CALCULATED.3IONS-SCNC: 14 MMOL/L (ref 4–16)
BORDETELLA PERTUSSIS PCR: NOT DETECTED
BUN BLDV-MCNC: 11 MG/DL (ref 6–23)
CALCIUM SERPL-MCNC: 7.3 MG/DL (ref 8.3–10.6)
CHLAMYDOPHILA PNEUMONIA PCR: NOT DETECTED
CHLORIDE BLD-SCNC: 93 MMOL/L (ref 99–110)
CHOLESTEROL: 95 MG/DL
CO2: 24 MMOL/L (ref 21–32)
CORONAVIRUS 229E PCR: NOT DETECTED
CORONAVIRUS HKU1 PCR: NOT DETECTED
CORONAVIRUS NL63 PCR: NOT DETECTED
CORONAVIRUS OC43 PCR: NOT DETECTED
CREAT SERPL-MCNC: 0.8 MG/DL (ref 0.6–1.1)
EKG ATRIAL RATE: 88 BPM
EKG DIAGNOSIS: NORMAL
EKG P AXIS: 59 DEGREES
EKG P-R INTERVAL: 158 MS
EKG Q-T INTERVAL: 490 MS
EKG QRS DURATION: 140 MS
EKG QTC CALCULATION (BAZETT): 592 MS
EKG R AXIS: 116 DEGREES
EKG T AXIS: 103 DEGREES
EKG VENTRICULAR RATE: 88 BPM
GFR AFRICAN AMERICAN: >60 ML/MIN/1.73M2
GFR NON-AFRICAN AMERICAN: >60 ML/MIN/1.73M2
GLUCOSE BLD-MCNC: 159 MG/DL (ref 70–99)
HCT VFR BLD CALC: 39.6 % (ref 37–47)
HDLC SERPL-MCNC: 33 MG/DL
HEMOGLOBIN: 12.7 GM/DL (ref 12.5–16)
HUMAN METAPNEUMOVIRUS PCR: NOT DETECTED
INFLUENZA A BY PCR: ABNORMAL
INFLUENZA A H1 (2009) PCR: NOT DETECTED
INFLUENZA A H1 PANDEMIC PCR: NOT DETECTED
INFLUENZA A H3 PCR: NOT DETECTED
INFLUENZA B BY PCR: NOT DETECTED
LACTATE: 1.2 MMOL/L (ref 0.4–2)
LDL CHOLESTEROL DIRECT: 43 MG/DL
MAGNESIUM: 1.8 MG/DL (ref 1.8–2.4)
MCH RBC QN AUTO: 29.7 PG (ref 27–31)
MCHC RBC AUTO-ENTMCNC: 32.1 % (ref 32–36)
MCV RBC AUTO: 92.7 FL (ref 78–100)
MYCOPLASMA PNEUMONIAE PCR: NOT DETECTED
PARAINFLUENZA 1 PCR: NOT DETECTED
PARAINFLUENZA 2 PCR: NOT DETECTED
PARAINFLUENZA 3 PCR: NOT DETECTED
PARAINFLUENZA 4 PCR: NOT DETECTED
PDW BLD-RTO: 14.6 % (ref 11.7–14.9)
PLATELET # BLD: 214 K/CU MM (ref 140–440)
PMV BLD AUTO: 10.4 FL (ref 7.5–11.1)
POTASSIUM SERPL-SCNC: 3.1 MMOL/L (ref 3.5–5.1)
POTASSIUM SERPL-SCNC: 4 MMOL/L (ref 3.5–5.1)
RBC # BLD: 4.27 M/CU MM (ref 4.2–5.4)
RHINOVIRUS ENTEROVIRUS PCR: NOT DETECTED
RSV PCR: NOT DETECTED
SODIUM BLD-SCNC: 131 MMOL/L (ref 135–145)
TRIGL SERPL-MCNC: 99 MG/DL
TROPONIN T: 0.59 NG/ML
TROPONIN T: 0.61 NG/ML
TROPONIN T: 0.7 NG/ML
WBC # BLD: 14 K/CU MM (ref 4–10.5)

## 2020-02-10 PROCEDURE — 80048 BASIC METABOLIC PNL TOTAL CA: CPT

## 2020-02-10 PROCEDURE — C9113 INJ PANTOPRAZOLE SODIUM, VIA: HCPCS | Performed by: NURSE PRACTITIONER

## 2020-02-10 PROCEDURE — 83605 ASSAY OF LACTIC ACID: CPT

## 2020-02-10 PROCEDURE — 2060000000 HC ICU INTERMEDIATE R&B

## 2020-02-10 PROCEDURE — 93005 ELECTROCARDIOGRAM TRACING: CPT | Performed by: PHYSICIAN ASSISTANT

## 2020-02-10 PROCEDURE — 2580000003 HC RX 258: Performed by: NURSE PRACTITIONER

## 2020-02-10 PROCEDURE — 2580000003 HC RX 258: Performed by: EMERGENCY MEDICINE

## 2020-02-10 PROCEDURE — 6370000000 HC RX 637 (ALT 250 FOR IP): Performed by: INTERNAL MEDICINE

## 2020-02-10 PROCEDURE — 99222 1ST HOSP IP/OBS MODERATE 55: CPT | Performed by: INTERNAL MEDICINE

## 2020-02-10 PROCEDURE — 6370000000 HC RX 637 (ALT 250 FOR IP): Performed by: PHYSICIAN ASSISTANT

## 2020-02-10 PROCEDURE — 83721 ASSAY OF BLOOD LIPOPROTEIN: CPT

## 2020-02-10 PROCEDURE — 6360000002 HC RX W HCPCS: Performed by: INTERNAL MEDICINE

## 2020-02-10 PROCEDURE — 6360000002 HC RX W HCPCS: Performed by: EMERGENCY MEDICINE

## 2020-02-10 PROCEDURE — 6370000000 HC RX 637 (ALT 250 FOR IP): Performed by: HOSPITALIST

## 2020-02-10 PROCEDURE — 6360000002 HC RX W HCPCS: Performed by: PHYSICIAN ASSISTANT

## 2020-02-10 PROCEDURE — 36415 COLL VENOUS BLD VENIPUNCTURE: CPT

## 2020-02-10 PROCEDURE — 84484 ASSAY OF TROPONIN QUANT: CPT

## 2020-02-10 PROCEDURE — 94761 N-INVAS EAR/PLS OXIMETRY MLT: CPT

## 2020-02-10 PROCEDURE — 80061 LIPID PANEL: CPT

## 2020-02-10 PROCEDURE — 93308 TTE F-UP OR LMTD: CPT

## 2020-02-10 PROCEDURE — 2580000003 HC RX 258: Performed by: HOSPITALIST

## 2020-02-10 PROCEDURE — 94640 AIRWAY INHALATION TREATMENT: CPT

## 2020-02-10 PROCEDURE — 2500000003 HC RX 250 WO HCPCS: Performed by: NURSE PRACTITIONER

## 2020-02-10 PROCEDURE — 2700000000 HC OXYGEN THERAPY PER DAY

## 2020-02-10 PROCEDURE — 6360000002 HC RX W HCPCS: Performed by: HOSPITALIST

## 2020-02-10 PROCEDURE — 85027 COMPLETE CBC AUTOMATED: CPT

## 2020-02-10 PROCEDURE — 84132 ASSAY OF SERUM POTASSIUM: CPT

## 2020-02-10 PROCEDURE — 6370000000 HC RX 637 (ALT 250 FOR IP): Performed by: NURSE PRACTITIONER

## 2020-02-10 PROCEDURE — 83735 ASSAY OF MAGNESIUM: CPT

## 2020-02-10 PROCEDURE — 87081 CULTURE SCREEN ONLY: CPT

## 2020-02-10 PROCEDURE — 6360000002 HC RX W HCPCS: Performed by: NURSE PRACTITIONER

## 2020-02-10 RX ORDER — OSELTAMIVIR PHOSPHATE 75 MG/1
75 CAPSULE ORAL 2 TIMES DAILY
Status: DISCONTINUED | OUTPATIENT
Start: 2020-02-10 | End: 2020-02-10 | Stop reason: DRUGHIGH

## 2020-02-10 RX ORDER — BUDESONIDE AND FORMOTEROL FUMARATE DIHYDRATE 160; 4.5 UG/1; UG/1
2 AEROSOL RESPIRATORY (INHALATION) 2 TIMES DAILY
Status: DISCONTINUED | OUTPATIENT
Start: 2020-02-10 | End: 2020-02-10 | Stop reason: CLARIF

## 2020-02-10 RX ORDER — ALPRAZOLAM 0.5 MG/1
0.5 TABLET ORAL ONCE
Status: COMPLETED | OUTPATIENT
Start: 2020-02-10 | End: 2020-02-10

## 2020-02-10 RX ORDER — METHYLPREDNISOLONE SODIUM SUCCINATE 40 MG/ML
40 INJECTION, POWDER, LYOPHILIZED, FOR SOLUTION INTRAMUSCULAR; INTRAVENOUS EVERY 8 HOURS
Status: DISCONTINUED | OUTPATIENT
Start: 2020-02-10 | End: 2020-02-16 | Stop reason: HOSPADM

## 2020-02-10 RX ORDER — POTASSIUM CHLORIDE 20 MEQ/1
40 TABLET, EXTENDED RELEASE ORAL ONCE
Status: COMPLETED | OUTPATIENT
Start: 2020-02-10 | End: 2020-02-10

## 2020-02-10 RX ORDER — ALPRAZOLAM 0.5 MG/1
0.5 TABLET ORAL EVERY 8 HOURS PRN
Status: DISCONTINUED | OUTPATIENT
Start: 2020-02-10 | End: 2020-02-16 | Stop reason: HOSPADM

## 2020-02-10 RX ORDER — AMIODARONE HYDROCHLORIDE 200 MG/1
200 TABLET ORAL DAILY
Status: DISCONTINUED | OUTPATIENT
Start: 2020-02-11 | End: 2020-02-16 | Stop reason: HOSPADM

## 2020-02-10 RX ORDER — OXYCODONE AND ACETAMINOPHEN 7.5; 325 MG/1; MG/1
1 TABLET ORAL 4 TIMES DAILY
Status: DISCONTINUED | OUTPATIENT
Start: 2020-02-10 | End: 2020-02-10

## 2020-02-10 RX ORDER — OSELTAMIVIR PHOSPHATE 30 MG/1
30 CAPSULE ORAL 2 TIMES DAILY
Status: COMPLETED | OUTPATIENT
Start: 2020-02-10 | End: 2020-02-14

## 2020-02-10 RX ORDER — VANCOMYCIN HYDROCHLORIDE 1 G/200ML
1000 INJECTION, SOLUTION INTRAVENOUS EVERY 12 HOURS
Status: DISCONTINUED | OUTPATIENT
Start: 2020-02-10 | End: 2020-02-10

## 2020-02-10 RX ORDER — POTASSIUM CHLORIDE 7.45 MG/ML
10 INJECTION INTRAVENOUS PRN
Status: DISCONTINUED | OUTPATIENT
Start: 2020-02-10 | End: 2020-02-16 | Stop reason: HOSPADM

## 2020-02-10 RX ORDER — VANCOMYCIN HYDROCHLORIDE 1 G/200ML
1000 INJECTION, SOLUTION INTRAVENOUS
Status: DISCONTINUED | OUTPATIENT
Start: 2020-02-10 | End: 2020-02-12

## 2020-02-10 RX ORDER — OXYCODONE AND ACETAMINOPHEN 7.5; 325 MG/1; MG/1
1 TABLET ORAL 4 TIMES DAILY PRN
Status: DISCONTINUED | OUTPATIENT
Start: 2020-02-10 | End: 2020-02-16 | Stop reason: HOSPADM

## 2020-02-10 RX ORDER — MIDODRINE HYDROCHLORIDE 5 MG/1
5 TABLET ORAL
Status: DISCONTINUED | OUTPATIENT
Start: 2020-02-10 | End: 2020-02-12

## 2020-02-10 RX ADMIN — ALPRAZOLAM 0.5 MG: 0.5 TABLET ORAL at 18:41

## 2020-02-10 RX ADMIN — METOPROLOL TARTRATE 25 MG: 25 TABLET ORAL at 08:15

## 2020-02-10 RX ADMIN — IPRATROPIUM BROMIDE AND ALBUTEROL SULFATE 1 AMPULE: .5; 3 SOLUTION RESPIRATORY (INHALATION) at 20:09

## 2020-02-10 RX ADMIN — ENOXAPARIN SODIUM 40 MG: 40 INJECTION SUBCUTANEOUS at 07:53

## 2020-02-10 RX ADMIN — METHYLPREDNISOLONE SODIUM SUCCINATE 40 MG: 40 INJECTION, POWDER, FOR SOLUTION INTRAMUSCULAR; INTRAVENOUS at 07:53

## 2020-02-10 RX ADMIN — CLOPIDOGREL BISULFATE 75 MG: 75 TABLET ORAL at 07:53

## 2020-02-10 RX ADMIN — CEFEPIME HYDROCHLORIDE 2 G: 2 INJECTION, POWDER, FOR SOLUTION INTRAVENOUS at 17:24

## 2020-02-10 RX ADMIN — MIDODRINE HYDROCHLORIDE 5 MG: 5 TABLET ORAL at 15:23

## 2020-02-10 RX ADMIN — SODIUM CHLORIDE, PRESERVATIVE FREE 10 ML: 5 INJECTION INTRAVENOUS at 20:38

## 2020-02-10 RX ADMIN — OXYCODONE HYDROCHLORIDE AND ACETAMINOPHEN 1 TABLET: 7.5; 325 TABLET ORAL at 03:41

## 2020-02-10 RX ADMIN — VANCOMYCIN HYDROCHLORIDE 1000 MG: 1 INJECTION, SOLUTION INTRAVENOUS at 11:17

## 2020-02-10 RX ADMIN — SODIUM CHLORIDE, PRESERVATIVE FREE 10 ML: 5 INJECTION INTRAVENOUS at 20:37

## 2020-02-10 RX ADMIN — CYANOCOBALAMIN TAB 1000 MCG 1000 MCG: 1000 TAB at 07:53

## 2020-02-10 RX ADMIN — OXYCODONE HYDROCHLORIDE AND ACETAMINOPHEN 1 TABLET: 7.5; 325 TABLET ORAL at 21:46

## 2020-02-10 RX ADMIN — SODIUM CHLORIDE, PRESERVATIVE FREE 10 ML: 5 INJECTION INTRAVENOUS at 07:53

## 2020-02-10 RX ADMIN — AMIODARONE HYDROCHLORIDE 0.5 MG/MIN: 50 INJECTION, SOLUTION INTRAVENOUS at 02:36

## 2020-02-10 RX ADMIN — METHYLPREDNISOLONE SODIUM SUCCINATE 40 MG: 40 INJECTION, POWDER, LYOPHILIZED, FOR SOLUTION INTRAMUSCULAR; INTRAVENOUS at 23:41

## 2020-02-10 RX ADMIN — OSELTAMIVIR PHOSPHATE 30 MG: 30 CAPSULE ORAL at 21:45

## 2020-02-10 RX ADMIN — METOPROLOL TARTRATE 25 MG: 25 TABLET ORAL at 20:37

## 2020-02-10 RX ADMIN — CEFEPIME HYDROCHLORIDE 2 G: 2 INJECTION, POWDER, FOR SOLUTION INTRAVENOUS at 05:12

## 2020-02-10 RX ADMIN — METHYLPREDNISOLONE SODIUM SUCCINATE 40 MG: 40 INJECTION, POWDER, LYOPHILIZED, FOR SOLUTION INTRAMUSCULAR; INTRAVENOUS at 15:23

## 2020-02-10 RX ADMIN — POTASSIUM CHLORIDE 10 MEQ: 7.46 INJECTION, SOLUTION INTRAVENOUS at 11:17

## 2020-02-10 RX ADMIN — IPRATROPIUM BROMIDE AND ALBUTEROL SULFATE 1 AMPULE: .5; 3 SOLUTION RESPIRATORY (INHALATION) at 12:20

## 2020-02-10 RX ADMIN — OXYCODONE HYDROCHLORIDE AND ACETAMINOPHEN 1 TABLET: 7.5; 325 TABLET ORAL at 15:23

## 2020-02-10 RX ADMIN — ATORVASTATIN CALCIUM 10 MG: 10 TABLET, FILM COATED ORAL at 17:24

## 2020-02-10 RX ADMIN — ASPIRIN 81 MG 81 MG: 81 TABLET ORAL at 07:53

## 2020-02-10 RX ADMIN — METHYLPREDNISOLONE SODIUM SUCCINATE 40 MG: 40 INJECTION, POWDER, FOR SOLUTION INTRAMUSCULAR; INTRAVENOUS at 03:41

## 2020-02-10 RX ADMIN — DEXTROSE MONOHYDRATE 2.5 MG/HR: 50 INJECTION, SOLUTION INTRAVENOUS at 00:34

## 2020-02-10 RX ADMIN — Medication 2 PUFF: at 20:14

## 2020-02-10 RX ADMIN — POTASSIUM CHLORIDE 40 MEQ: 1500 TABLET, EXTENDED RELEASE ORAL at 13:25

## 2020-02-10 RX ADMIN — SODIUM CHLORIDE, PRESERVATIVE FREE 10 ML: 5 INJECTION INTRAVENOUS at 23:41

## 2020-02-10 RX ADMIN — IPRATROPIUM BROMIDE AND ALBUTEROL SULFATE 1 AMPULE: .5; 3 SOLUTION RESPIRATORY (INHALATION) at 15:55

## 2020-02-10 RX ADMIN — OSELTAMIVIR PHOSPHATE 30 MG: 30 CAPSULE ORAL at 03:50

## 2020-02-10 RX ADMIN — TRAZODONE HYDROCHLORIDE 50 MG: 50 TABLET ORAL at 20:37

## 2020-02-10 RX ADMIN — Medication 2 PUFF: at 07:40

## 2020-02-10 RX ADMIN — SODIUM CHLORIDE, PRESERVATIVE FREE 10 ML: 5 INJECTION INTRAVENOUS at 07:54

## 2020-02-10 RX ADMIN — ALPRAZOLAM 0.5 MG: 0.5 TABLET ORAL at 20:37

## 2020-02-10 RX ADMIN — OXYCODONE HYDROCHLORIDE AND ACETAMINOPHEN 1 TABLET: 7.5; 325 TABLET ORAL at 11:17

## 2020-02-10 RX ADMIN — IPRATROPIUM BROMIDE AND ALBUTEROL SULFATE 1 AMPULE: .5; 3 SOLUTION RESPIRATORY (INHALATION) at 07:40

## 2020-02-10 RX ADMIN — PANTOPRAZOLE SODIUM 40 MG: 40 INJECTION, POWDER, FOR SOLUTION INTRAVENOUS at 07:53

## 2020-02-10 ASSESSMENT — PAIN DESCRIPTION - DESCRIPTORS
DESCRIPTORS: ACHING
DESCRIPTORS: ACHING

## 2020-02-10 ASSESSMENT — PAIN DESCRIPTION - ORIENTATION
ORIENTATION: RIGHT;LEFT
ORIENTATION: LOWER;RIGHT

## 2020-02-10 ASSESSMENT — PAIN DESCRIPTION - LOCATION
LOCATION: BACK;CHEST;SHOULDER
LOCATION: BACK;RIB CAGE

## 2020-02-10 ASSESSMENT — PAIN SCALES - GENERAL
PAINLEVEL_OUTOF10: 0
PAINLEVEL_OUTOF10: 0
PAINLEVEL_OUTOF10: 8
PAINLEVEL_OUTOF10: 10
PAINLEVEL_OUTOF10: 9
PAINLEVEL_OUTOF10: 7

## 2020-02-10 ASSESSMENT — PAIN DESCRIPTION - PAIN TYPE
TYPE: CHRONIC PAIN
TYPE: ACUTE PAIN

## 2020-02-10 NOTE — PROGRESS NOTES
RENAL DOSE ADJUSTMENT MADE PER P/T PROTOCOL    PREVIOUS ORDER:  Tamiflu 75 mg bid    Estimated Creatinine Clearance: 43 mL/min (based on SCr of 1 mg/dL).   Recent Labs     02/09/20  1602   BUN 11   CREATININE 1.0      INR 0.93     NEW RENALLY ADJUSTED ORDER:  Tamiflu 30 mg bid    Aster Isabel [address] [facility]  2/10/2020 3:25 AM  __________________________________________________

## 2020-02-10 NOTE — PROGRESS NOTES
Pt transferred to room 2017. Pt transferred with all of documented pt belongings. Report given to [facility] all questions answered.

## 2020-02-10 NOTE — H&P
History and Physical      Name:  Ida Crane /Age/Sex: 1940  (78 y.o. female)   MRN & CSN:  [id number] & [id number] Admission Date/Time: 2020  3:15 PM   Location:  -A PCP: Mya Squires MD       Admitting Physicians : Dr. James Oglesby is a 78 y.o.  female  who presents with Shortness of Breath and Anxiety    Assessment and Plan:   Acute on chronic respiratory failure with hypoxia and hypercapneic possible due to Takutsubo. # Anxiety  # ? NSTEMI  # Tachycardic  CXR: Non acute. Troponin 0.200. Elevated lactate likely due to hypoxia. Doubt infection. UA negative. -Received Cefepime and Vanco IV in ED, would hold abx for now  - Amiodarone and Cardizem started in ED  -Received Solumedrol and Xopenex in ED  -Heated nasal canula  -Blood cx  -Resp.  Panel  -CTA r/o PE  -Cardiology consulted in ED    Hyponatremia likely 2/2 diuretics  -NS @ 125 ml X1 followed by Sodium bicarb given in ED  -Repeat BMP then reevaluate      Hypomagnesemia-1.5  -Received 2 g x1 in ED  -Tele    HTN  -Hold Lasix and Spironolactone  -Continue ARB    HLD  -Continue     Depression/Fibromyalgia  -Continue Cymbalta    RA  -Continue Prednisone    Chronic pain syndrome  -Careful due to respiratory failure        DVT-PPX: Lovenox  Diet: Cardiac      Medications:   Medications:    vancomycin  20 mg/kg Intravenous Once    atorvastatin  10 mg Oral QPM    budesonide-formoterol  2 puff Inhalation BID    vitamin B-12  1,000 mcg Oral Daily    pantoprazole  40 mg Intravenous Daily    sodium chloride flush  10 mL Intravenous 2 times per day    [START ON 2/10/2020] aspirin  81 mg Oral Daily    enoxaparin  40 mg Subcutaneous Daily      Infusions:    amiodarone 1 mg/min (20 1625)    Followed by   Bhavna Smyth amiodarone      diltiazem (CARDIZEM) 100 mg in dextrose 5% 100 mL (ADD-[place]) 10 mg/hr (20 1752)    sodium chloride 150 mL/hr at 20 3845    IV infusion builder 50 mL/hr at 20 6014 PRN Meds: sodium chloride flush, 10 mL, PRN  magnesium hydroxide, 30 mL, Daily PRN  ondansetron, 4 mg, Q6H PRN        Current Facility-Administered Medications:     [COMPLETED] amiodarone (CORDARONE) 150 mg in dextrose 5 % 100 mL bolus, 150 mg, Intravenous, Once, Stopped at 02/09/20 1625 **FOLLOWED BY** amiodarone (CORDARONE) 450 mg in dextrose 5 % 250 mL infusion, 1 mg/min, Intravenous, Continuous, Last Rate: 33.3 mL/hr at 02/09/20 1625, 1 mg/min at 02/09/20 1625 **FOLLOWED BY** amiodarone (CORDARONE) 450 mg in dextrose 5 % 250 mL infusion, 0.5 mg/min, Intravenous, Continuous, Martina Lang MD    diltiazem 100 mg in dextrose 5 % 100 mL infusion (ADD-[place]), 10 mg/hr, Intravenous, Continuous, Xenia Paget, APRN - CNP, Last Rate: 10 mL/hr at 02/09/20 1752, 10 mg/hr at 02/09/20 1752    0.9 % sodium chloride infusion, , Intravenous, Continuous, Martina Lang MD, Last Rate: 150 mL/hr at 02/09/20 1916    sodium bicarbonate 75 mEq in sodium chloride 0.45 % 1,000 mL infusion, , Intravenous, Continuous, Xenia Paget, APRN - CNP, Last Rate: 50 mL/hr at 02/09/20 1822    vancomycin (VANCOCIN) 1,250 mg in dextrose 5 % 250 mL IVPB, 20 mg/kg, Intravenous, Once, Martina Lang MD, Last Rate: 125 mL/hr at 02/09/20 1822, 1,250 mg at 02/09/20 1822    atorvastatin (LIPITOR) tablet 10 mg, 10 mg, Oral, QPM, NATALIYA Pizarro CNP    budesonide-formoterol (SYMBICORT) 160-4.5 MCG/ACT inhaler 2 puff, 2 puff, Inhalation, BID, Xenia Paget, APRN - CNP    vitamin B-12 (CYANOCOBALAMIN) tablet 1,000 mcg, 1,000 mcg, Oral, Daily, Xenia Paget, APRN - CNP    pantoprazole (PROTONIX) injection 40 mg, 40 mg, Intravenous, Daily, Chloe Paget, APRN - CNP    sodium chloride flush 0.9 % injection 10 mL, 10 mL, Intravenous, 2 times per day, Ozone Paget, APRN - CNP    sodium chloride flush 0.9 % injection 10 mL, 10 mL, Intravenous, PRN, Chloe Paget, APRN - CNP    magnesium hydroxide (MILK OF MAGNESIA) 400 MG/5ML suspension 30 mL, 30 mL, Oral, Daily PRN, NATALIYA Reddy CNP    ondansetron (ZOFRAN) injection 4 mg, 4 mg, Intravenous, Q6H PRN, NATALIYA Reddy CNP    [START ON 2/10/2020] aspirin chewable tablet 81 mg, 81 mg, Oral, Daily, NATALIYA Pizarro CNP    enoxaparin (LOVENOX) injection 40 mg, 40 mg, Subcutaneous, Daily, NATALIYA Reddy CNP    History of present illness     Chief Complaint: Shortness of Breath and Anxiety      Zee Pate is a 78 y.o.  female  who presents with shortness of breath, anxiety, and palpitations that has been going on since Monday. Patient states he lost  on 01/29/2020 then buried him last Monday. Patient states she has been anxious since then. Patient was found to be hypoxic in the 70's, Tachypneic in the 50, Tachycardic in the 170, and  while in ED. She was put on Heated nasal canula and started on Amiodarone and Cardizem in ED for possible Takutsubo. Hx of COPD, DVT, RA, Fibromyalgia, and osteoporosis. Review of Systems   Ten point ROS reviewed and negative, unless as noted below. GENERAL:  Denies fever, chills, night sweats, or changes in weight. EYES:  Denies recent visual changes. ENT:  Denies ear pain, hearing loss or tinnitus  RESP:  Denies any cough, dyspnea, or wheezing. CV:  Denies any chest pain with exertion or at rest, palpitations, syncope, or edema. GI:  Denies any dysphagia, nausea, vomiting, abdominal pain, heartburn, changes in bowel habit, melena or rectal bleeding  MUSCULOSKELETAL:  Denies any joint swelling, joint pain, or loss of range of motion. NEURO:  Denies any headaches, tremors, dizziness, vertigo, memory loss, confusion, weakness, numbness or tingling. PSYCH:  Denies any sleeping problems, history of abuse, marital discord. HEME/LYMPHATIC/IMMUNO:  Denies , bruising, bleeding abnormalities   ENDO:  Denies any heat or cold intolerance, panemiaolyuria or polydipsia. Psych:  Anxiety      Objective: No intake or output data in the 24 hours ending 02/09/20 2009   Vitals:   Vitals:    02/09/20 2008   BP:    Pulse: 102   Resp: 30   Temp:    SpO2: 100%     Physical Exam:   Gen:  awake, alert, cooperative, moderate distress  EYES:Lids and lashes normal, pupils equal, round ,extra ocular muscles intact, sclera clear, conjunctiva normal  ENT:  Normocephalic, oral pharynx with moist mucus membranes  NECK:  Supple, symmetrical, trachea midline, no adenopathy,  LUNGS:  Diminished bilaterally, no rales ronchi or wheezing noted. On Vapotherm  CARDIOVASCULAR: Tachycardic, normal S1 and S2,no murmur noted, peripheral pulses 2+, no pitting edema  ABDOMEN: Normal BS, Non tender, non distended, no HSM noted. MUSCULOSKELETAL:  ROM of all extremities grossly wnl  NEUROLOGIC: AOx 3,  Cranial nerves II-XII are grossly intact. Motor is 5 out of 5 bilaterally. Sensory is intact, no lateralizing findings.    SKIN:  no bruising or bleeding, normal skin color, turgor, no redness, warmth, or swelling  Psych: Very anxious      Past Medical History:      Past Medical History:   Diagnosis Date    Acute DVT of right tibial vein ([address] 07/2017    ER imaging: distal right tibial vein DVT; no anticoag for bleeding/ anemia    Acute exacerbation of chronic obstructive pulmonary disease (COPD) ([address] 12/13/2018    Arthritis     Chronic pain syndrome     Low back pain; lumbar disc disease    Clostridium difficile colitis 09/20/2017    COPD, severe ([address] 10/24/2017    Depression     Fibromyalgia     Former smoker     Quit 1/2019    Herniated cervical disc 5-1998    Herpes zoster ophthalmicus 3/2012    Hx of blood clots     Hyperlipidemia     Hypertension     L3 vertebral fracture ([address] 2010    vertebroplasty    Lumbar spinal stenosis 2015    moderately severe by MRI    Migraine     Nocturnal hypoxemia 5/21/2019    Osteoporosis 2010    Fragility Fx L3    Rheumatoid arthritis(714.0) 1976    Dr Silke Daly S/P cardiac

## 2020-02-10 NOTE — PROGRESS NOTES
getting better. No acute overnight events. Did not sleep well last night. Objective: Intake/Output Summary (Last 24 hours) at 2/10/2020 1418  Last data filed at 2/10/2020 0620  Gross per 24 hour   Intake 2726 ml   Output 2275 ml   Net 451 ml      Vitals:   Vitals:    02/10/20 1002   BP: (!) 125/103   Pulse: 83   Resp: 23   Temp:    SpO2:      Physical Exam:  General Appearance:    Alert, cooperative, mild respiratory distress +  Head:      Normocephalic, without obvious abnormality, atraumatic  Eyes:       Conjunctiva/corneas clear, EOM's intact  Lungs:    B/L rhonchi, occasional crackles and wheeze at bases RT>LT+  Heart:                Regular rate and rhythm, S1 and S2 normal, no murmur,   rub or gallop  Abdomen:     Soft, non-tender, bowel sounds active, no masses, no organomegaly  Extremities:   B/L trace pedal edema+  Neurological:   Grossly Intact. Significant Diagnostic Studies:   DATA:    CBC   Recent Labs     02/09/20  1602 02/10/20  0455   WBC 10.1 14.0*   HGB 12.8 12.7   HCT 43.1 39.6    214      BMP   Recent Labs     02/09/20  1602 02/10/20  0455   * 131*   K 4.2 3.1*   CL 87* 93*   CO2 22 24   BUN 11 11   CREATININE 1.0 0.8     LFT'S   Recent Labs     02/09/20  1602   AST 32   ALT 13   BILITOT 0.3   ALKPHOS 93     COAG   Recent Labs     02/09/20  1602   INR 0.93     POC:   Lab Results   Component Value Date    POCGLU 119 10/27/2013    POCGLU 143 10/27/2013    POCGLU 177 10/26/2013    POCGLU 125 10/26/2013     [id number]:  Lab Results   Component Value Date    LABA1C 5.4 10/25/2013     CARDIAC ENZYMES  No results for input(s): CKTOTAL, CKMB, CKMBINDEX, TROPONINI in the last 72 hours.   Troponin:   Recent Labs     02/09/20  2045 02/09/20  2315 02/10/20  0455   TROPONINT 0.686* 0.701* 0.615*     BNP:   Recent Labs     02/09/20  1602   PROBNP 600.0*     U/A:    Lab Results   Component Value Date    COLORU STRAW 02/09/2020    WBCUA 1 02/09/2020    RBCUA 3 02/09/2020    MUCUS RARE 12/31/2019    BACTERIA NEGATIVE 02/09/2020    CLARITYU CLEAR 02/09/2020    SPECGRAV 1.008 02/09/2020    LEUKOCYTESUR NEGATIVE 02/09/2020    BLOODU NEGATIVE 02/09/2020    AMORPHOUS RARE 02/06/2020       Xr Chest Standard (2 Vw)    Result Date: 2/6/2020  EXAMINATION: TWO XRAY VIEWS OF THE CHEST 2/6/2020 6:42 am COMPARISON: 1/17/2020 HISTORY: ORDERING SYSTEM PROVIDED HISTORY: cough TECHNOLOGIST PROVIDED HISTORY: Reason for exam:->cough Reason for Exam: cough Acuity: Acute Type of Exam: Initial FINDINGS: The heart and mediastinal structures are stable. Mild size hiatal hernia is present. The pulmonary vasculature is normal.  The lungs are stable with linear fibrosis in the lung bases. There is a sclerotic lesion, anterior aspect right 4th rib, unchanged from prior exam.  Patient has undergone upper lumbar vertebroplasty, not completely assessed on this exam.     No acute cardiopulmonary disease. Chronic bibasilar fibrosis. Xr Chest Standard (2 Vw)    Result Date: 1/17/2020  EXAMINATION: TWO X-RAY VIEWS OF THE CHEST 1/17/2020 12:13 pm COMPARISON: 01/03/2020 HISTORY: ORDERING SYSTEM PROVIDED HISTORY: COPD, severe ([address] TECHNOLOGIST PROVIDED HISTORY: Reason for exam:->Follow-up right upper lobe pneumonia. Acuity: Acute Type of Exam: Subsequent/Follow-up Additional signs and symptoms: Follow up right upper lobe pneumonia. FINDINGS: The heart is stable in size and configuration. Atherosclerotic changes of the thoracic aorta are noted. There is eventration of the right hemidiaphragm with some bibasilar atelectasis. Remote right rib trauma is noted. When correlated to the CT from 12/31/2019 the infiltrate in the right upper lobe has resolved. Interval resolution of right upper lobe infiltrate. Chronic eventration right hemidiaphragm with sequela of old right rib trauma.      Xr Chest Portable    Result Date: 2/9/2020  EXAMINATION: ONE XRAY VIEW OF THE CHEST 2/9/2020 3:30 pm COMPARISON: 02/06/2020 HISTORY: ORDERING SYSTEM PROVIDED HISTORY: shortness of breath TECHNOLOGIST PROVIDED HISTORY: Reason for exam:->shortness of breath Reason for Exam: SOB Acuity: Acute Type of Exam: Initial FINDINGS: The lungs are without acute focal process. Chronic bibasilar linear opacities. There is no effusion or pneumothorax. The cardiomediastinal silhouette is stable. The osseous structures are stable. No acute process. Cta Chest W Contrast    Result Date: 2/9/2020  EXAMINATION: CTA OF THE CHEST 2/9/2020 10:00 pm TECHNIQUE: CTA of the chest was performed after the administration of intravenous contrast.  Multiplanar reformatted images are provided for review. MIP images are provided for review. Dose modulation, iterative reconstruction, and/or weight based adjustment of the mA/kV was utilized to reduce the radiation dose to as low as reasonably achievable. COMPARISON: CT chest dated December 31, 2019 HISTORY: ORDERING SYSTEM PROVIDED HISTORY: Hypoxia TECHNOLOGIST PROVIDED HISTORY: Reason for exam:->Hypoxia Reason for Exam: hypoxia elevated d-dimer Acuity: Acute Type of Exam: Initial Additional signs and symptoms: Acute respiratory failure with hypoxia and hypercapnia (HCC) (Primary Dx); NSTEMI (non-ST elevated myocardial infarction) ([facility] [address] Respiratory acidosis; Lactic acidosis; Hypomagnesemia; Hyperglycemia Relevant Medical/Surgical History: isovue 370  80 ml FINDINGS: Pulmonary Arteries: Pulmonary arteries are adequately opacified for evaluation. No evidence of intraluminal filling defect to suggest pulmonary embolism. Main pulmonary artery is normal in caliber. Mediastinum: No evidence of mediastinal lymphadenopathy. Partially calcified mediastinal hilar lymph nodes are present, related to old granulomatous disease. The heart and pericardium demonstrate no acute abnormality. There is no acute abnormality of the thoracic aorta.  Lungs/pleura: Bibasilar opacities are present, right greater than left, differential considerations to include aspiration or bacterial pneumonia, and atelectasis. Kyler Apple No pneumothorax is present. Mild degree of emphysematous changes seen throughout the lungs. Patchy areas of ground-glass opacity are seen within the lower lung zones bilaterally, and may represent pneumonia, or atelectasis. Upper Abdomen: Limited images of the upper abdomen are unremarkable. Soft Tissues/Bones: No acute bone or soft tissue abnormality. An enchondroma seen within the anterolateral aspect of the right 6th rib. 1. No evidence of pulmonary embolus, or aortic dissection 2. Bibasilar opacities, right greater than left, differential considerations to include aspiration or bacterial pneumonia, and atelectasis.            Jorge Weston  [facility]

## 2020-02-10 NOTE — PROGRESS NOTES
Pt admitted from ED to 2106 at this time. Admission skin assessment complete with Gasper Ortiz. Pt noted to have redness to buttocks w/ blanching noted and bruising to left hip and BUE. No open areas of concern noted.

## 2020-02-10 NOTE — PROGRESS NOTES
The patient was seen personally, and I discussed with Sharlene Young NP who wrote H&P and placed initial orders. This is a 69yo woman with severe COPD on home O2, who presented with respiratory distress, SOB, and palpitation that started today. She has had non-productive cough for the past few days. Denies fever or chills. She admits having chest pressure. Has not noticed wheezing. Of note, her  passed recently on 1/29/2020, and he was buried 6 days ago. In ED, she was hypoxic, and was given 100% O2. She was started on amiodarone, and cardizem for regular wide QRS tachycardia with HR 180s. Her lactte was elevated to 6. She was started on iv fluid. On exam, she is on 80% vapotherm, saturating 96% currently. Last vitals;   /60   Pulse 102   Temp 97.7 °F (36.5 °C) (Oral)   Resp 30   Ht 5' 6\" (1.676 m)   Wt 135 lb 2.3 oz (61.3 kg)   SpO2 100%   BMI 21.81 kg/m²   She is currently on cardizem and amiodarone drip. She appears moderately distressed, fatigued. She can talk in sentences. Lung sounds clear w/o wheeze or rhonchi. Heart sounds regular, no murmur. No edema. Abd soft, NTND. Normal BS    No CVAT  Ext  + vianney calf tenderness, but no swelling. Labs, EKG reviewed. · Acute hypoxic and hypercapnic resp failure, with normal CXR. COPD exacerbation, sepsis are possible. Continue both treatment. Check resp PCR for flu. Continue broad spectrum ABx pending cultures. No apparent source of infection at this time. UA is clear. Check D-dimer stat, and get CTA chest to rule out PE. Hold further aggrasive iv fluid, for concern of Takotsubo CM and NSTEMI. · Postive trop, could be either type 2 ischemia, vs Takotsubo cardiomyopathy, vs primary NSTEMI. Continue DAPT. Add lovenox 1mg/kg bid. Trend trop. Card consulted. · COPD with exacerbation. Solumedrol and duoneb. · Severe tachycardia, currently sinus tachy.   Concern for VT or Atrial flutter with RBBB.  Repeat 12 lead. Continue cardizem and amio drip per card.

## 2020-02-10 NOTE — PROGRESS NOTES
vancomycin  1,000 mg Intravenous Q18H    enoxaparin  40 mg Subcutaneous Daily    metoprolol tartrate  25 mg Oral BID    atorvastatin  10 mg Oral QPM    vitamin B-12  1,000 mcg Oral Daily    pantoprazole  40 mg Intravenous Daily    sodium chloride flush  10 mL Intravenous 2 times per day    aspirin  81 mg Oral Daily    mometasone-formoterol  2 puff Inhalation BID    ipratropium-albuterol  1 ampule Inhalation Q4H WA    methylPREDNISolone  40 mg Intravenous Q6H    clopidogrel  75 mg Oral Daily    traZODone  50 mg Oral Nightly    sodium chloride flush  10 mL Intravenous BID      Infusions:   amiodarone 0.5 mg/min (02/10/20 0236)      PRN Meds:  oxyCODONE-acetaminophen, potassium chloride, sodium chloride flush, magnesium hydroxide       Physical Exam:  Vitals:    02/10/20 0802   BP: 122/64   Pulse: 78   Resp: 23   Temp: 98 °F (36.7 °C)   SpO2:         General: AAO, NAD  Chest: Nontender  Cardiac: First and Second Heart Sounds are Normal, No Murmurs or Gallops noted  Lungs:Clear to auscultation and percussion. Abdomen: Soft, NT, ND, +BS  Extremities: No clubbing, no edema  Vascular:  Equal 2+ peripheral pulses. Lab Data:  CBC:   Recent Labs     02/09/20  1602 02/10/20  0455   WBC 10.1 14.0*   HGB 12.8 12.7   HCT 43.1 39.6   MCV 99.8 92.7    214     BMP:   Recent Labs     02/09/20  1602 02/10/20  0455   * 131*   K 4.2 3.1*   CL 87* 93*   CO2 22 24   BUN 11 11   CREATININE 1.0 0.8     LIVER PROFILE:   Recent Labs     02/09/20  1602   AST 32   ALT 13   BILITOT 0.3   ALKPHOS 93     PT/INR:   Recent Labs     02/09/20  1602   PROTIME 11.3*   INR 0.93     APTT:   Recent Labs     02/09/20  1602   APTT 28.4     BNP:  No results for input(s): BNP in the last 72 hours.       Assessment:  Patient Active Problem List    Diagnosis Date Noted    Acute on chronic respiratory failure with hypoxia and hypercapnia ([facility] [address] 02/09/2020    Pneumonia of right upper lobe due to infectious organism ([facility] [address]     Acute on chronic [facility] failure with hypoxia ([facility] 12/30/2019    Chronic fatigue 07/08/2019    Intractable low back pain 06/11/2019    Back pain 06/09/2019    Nocturnal hypoxemia 05/21/2019    Shortness of breath 04/25/2019    Former smoker     Acute exacerbation of chronic obstructive pulmonary disease (COPD) ([facility] [address] 12/13/2018    COPD, severe ([facility] [address] 10/24/2017    Blood loss anemia 08/01/2017    Takotsubo syndrome 05/01/2017    Panic attack 11/18/2016    Lumbar spinal stenosis 01/01/2015    Vitamin B12 deficiency 12/01/2014    Osteoporosis 03/16/2012    Depression     Migraine 10/06/2010    Rheumatoid Arthritis     Hypertension     Hyperlipidemia     Fibromyalgia     Chronic pain syndrome        Electronically signed by Buck Morales PA-C on 2/10/2020 at 9:18 AM

## 2020-02-10 NOTE — CONSULTS
[address] [place] [address], [facility]                                  CONSULTATION    PATIENT NAME: Vinay Lugo                     :        1940  MED REC NO:   [id number]                          ROOM:       2106  ACCOUNT NO:   [de-identified]                           ADMIT DATE: 2020  PROVIDER:     Silvio Green MD    CONSULT DATE:  02/10/2020    This is a private patient of Dr. Jhonny Wall. CHIEF COMPLAINT:  Shortness of breath with abdominal pain and cough. HISTORY OF PRESENT ILLNESS:  The patient is a 80-year-old female who  presents to the emergency room complaining of cough with chest  congestion and abdominal pain. She denies hemoptysis, but states that  she has had some productive cough. She was found to have a positive  influenza A while in the emergency room. She also was complaining of  marked anxiety and was noting palpitations. She does carry a long  history of chronic lung disease and has been maintained on Breo and  Incruse along with albuterol rescue. She also wears oxygen at nighttime  and was found to be hypoxemic with O2 saturations as low as 75%. She  was noted to have a wide complex tachycardia with 2:1 block and carries  a nonischemic cardiomyopathy. At that time, I saw her she was using  Vapotherm high-concentration oxygen therapy and was down to 40% and  slowly improving. She was recently hospitalized about a month ago with  a right upper lobe pneumonia. As mentioned, she just recently quit  smoking but does carry a long smoking history in the past.    PAST MEDICAL HISTORY:  Includes chronic pain, severe COPD, fibromyalgia,  hyperlipidemia, hypertension, rheumatoid arthritis, TMJ dysfunction,  Takotsubo cardiomyopathy and vitamin B12 deficiency. SOCIAL HISTORY:  Includes a pack-a-day smoker for almost 55 years but no  history of alcohol or illegal drug use.     FAMILY HISTORY: Includes arthritis in her mother, cancer in some family  members, emphysema in her father and heart disease in her father. REVIEW OF SYSTEMS:  Very difficult to take because she is extremely  short of breath at this time. PHYSICAL EXAMINATION:  VITAL SIGNS:  Her blood pressure is 125/103, pulse 83, respirations 23,  temperature is 98, O2 saturation currently on Vapotherm  high-concentration oxygen at 40% is 97%. She is afebrile. GENERAL:  She is a well-developed but chronically ill-appearing female  who does appear to be in moderate respiratory distress. HEENT:  The oropharynx is fairly clear although mucous membranes do look  dry. NECK:  I do not hear any wheezing or stridor over neck. There is no  palpable lymph nodes over neck. There is mild jugular venous distention  at 45-degree angle. LUNGS:  Auscultation of lungs reveal scattered basilar rales and rhonchi  but no wheezing is noted. CARDIAC:  Sounds reveal normal S1, S2.  I do not hear any definite  gallops, but there is a murmur along the left sternal border. There is  no pericardial rub. GI:  She has mild epigastric tenderness and is mildly distended. Bowel  sounds are heard. :  No flank pain or inguinal hernias. SKIN:  No rashes. EXTREMITIES:  Mild edema of the lower legs and ankles without definite  evidence of DVT by physical exam.  There is no peripheral cyanosis or  clubbing. NEURO:  Oriented x3. No focal neurologic deficit noted. LABORATORY DATA:  Serum sodium 131, potassium 3.1, creatinine 0.8 with a  BUN of 11. Procalcitonin is 14. 11. Lactate level was 5.2 on admission  but now is 1.2. ProBNP is 600. Troponin 0.615. White blood count is  14,000 with hemoglobin of 12.7 gm, there is a left shift in  differential.  D-dimer on admission was 2679. Viral antigen profile is  positive for influenza A. Venous blood gases with venous pH of 7.33,  pCO2 of 36, pO2 of 57. Chest x-ray on admission showed no acute  process.   A CTA of the chest showed no evidence of pulmonary embolus but  what appeared to be bibasilar opacities suggesting bilateral  hospital-acquired pneumonia. IMPRESSION:  1. Acute on chronic hypoxemic respiratory failure. 2.  Bibasilar pneumonia. 3.  Influenza A infection. 4. SVT. 5.  History of Takotsubo syndrome with reduced ejection fraction. PLAN:  Vapotherm oxygenation will be slowly tapered as tolerated. She  has been started on broad-spectrum antibiotics to cover  hospital-acquired pneumonia. She also will be continued on aerosolized  bronchodilators and IV corticosteroids. She will need a complete  cardiac workup. I will continue to follow her.         Jordyn Tatum MD    D: 02/10/2020 12:30:50       T: 02/10/2020 12:34:30     [email or web address]  Job#: [id number]     Doc#: [id number]    CC:

## 2020-02-10 NOTE — PROGRESS NOTES
She has positive flu A on PCR. Will start Tamiflu. She has bilateral opacity on CTA. Give cefepime/vanc as HCAP as well as postinfluenza pneumonia. Reduce lovenox to prophylaxis dose for elevated trop is likely type 2 ischemia from sepsis and hypoxic resp failure.

## 2020-02-10 NOTE — PROGRESS NOTES
[address] [facility]  consulted by Dr. Fabiola Robles for monitoring and adjustment. Indication for treatment: HCAP, possible sepsis, acute hypoxic and hypercapnic respiratory failure  Goal trough: 15-20 mcg/mL  Other Antimicrobials: cefepime     Pertinent Laboratory Values:   Temp Readings from Last 3 Encounters:   02/10/20 98.1 °F (36.7 °C) (Oral)   02/06/20 97.8 °F (36.6 °C)   01/04/20 97.9 °F (36.6 °C) (Oral)     Recent Labs     02/09/20  1602 02/09/20  1730 02/10/20  0455   WBC 10.1  --  14.0*   LACTATE 6.4  LACT CALLED TO DR ARNOLD AT 1726 ON [id number] BY  MT   RESULTS READ BACK  * 5.2* 1.2     Recent Labs     02/09/20  1602 02/10/20  0455   BUN 11 11   CREATININE 1.0 0.8     Estimated Creatinine Clearance: 53 mL/min (based on SCr of 0.8 mg/dL). Intake/Output Summary (Last 24 hours) at 2/10/2020 0624  Last data filed at 2/10/2020 0620  Gross per 24 hour   Intake 2726 ml   Output 2275 ml   Net 451 ml       Pertinent Cultures:  Date    Source    Results  2/9   Resp panel   Positive for influenza A  2/9   Blood x 2   Ordered  2/9   Influenza   Negative  2/9   MRSA screen   Ordered  Vancomycin level:   TROUGH:  No results for input(s): VANCOTROUGH in the last 72 hours. RANDOM:  No results for input(s): VANCORANDOM in the last 72 hours. Assessment:  WBC and temperature: WBC = 14; Afebrile  SCr, BUN, and urine output: WNL  Day(s) of therapy: 1  Vancomycin level: To be collected    Plan:  Patient received vancomycin 1250 mg x 1 in the ED  Start vancomycin 1000 mg IVPB q18h  Pharmacy will continue to monitor patient and adjust therapy as indicated     Charlie 02/11/2020 @4861    Thank you for the consult.   Viridiana Fong RPh  2/10/2020 6:24 AM

## 2020-02-11 ENCOUNTER — APPOINTMENT (OUTPATIENT)
Dept: GENERAL RADIOLOGY | Age: 80
DRG: 177 | End: 2020-02-11
Payer: MEDICARE

## 2020-02-11 LAB
ANION GAP SERPL CALCULATED.3IONS-SCNC: 14 MMOL/L (ref 4–16)
BUN BLDV-MCNC: 21 MG/DL (ref 6–23)
CALCIUM SERPL-MCNC: 7.8 MG/DL (ref 8.3–10.6)
CHLORIDE BLD-SCNC: 91 MMOL/L (ref 99–110)
CO2: 25 MMOL/L (ref 21–32)
CREAT SERPL-MCNC: 1 MG/DL (ref 0.6–1.1)
CULTURE: NORMAL
DOSE AMOUNT: NORMAL
DOSE TIME: NORMAL
EKG ATRIAL RATE: 131 BPM
EKG ATRIAL RATE: 178 BPM
EKG ATRIAL RATE: 88 BPM
EKG DIAGNOSIS: NORMAL
EKG P AXIS: 59 DEGREES
EKG P-R INTERVAL: 120 MS
EKG P-R INTERVAL: 158 MS
EKG Q-T INTERVAL: 300 MS
EKG Q-T INTERVAL: 356 MS
EKG Q-T INTERVAL: 490 MS
EKG QRS DURATION: 120 MS
EKG QRS DURATION: 140 MS
EKG QRS DURATION: 140 MS
EKG QTC CALCULATION (BAZETT): 510 MS
EKG QTC CALCULATION (BAZETT): 525 MS
EKG QTC CALCULATION (BAZETT): 592 MS
EKG R AXIS: 116 DEGREES
EKG R AXIS: 118 DEGREES
EKG R AXIS: 119 DEGREES
EKG T AXIS: -2 DEGREES
EKG T AXIS: 103 DEGREES
EKG T AXIS: 17 DEGREES
EKG VENTRICULAR RATE: 131 BPM
EKG VENTRICULAR RATE: 174 BPM
EKG VENTRICULAR RATE: 88 BPM
GFR AFRICAN AMERICAN: >60 ML/MIN/1.73M2
GFR NON-AFRICAN AMERICAN: 53 ML/MIN/1.73M2
GLUCOSE BLD-MCNC: 142 MG/DL (ref 70–99)
Lab: NORMAL
POTASSIUM SERPL-SCNC: 4.4 MMOL/L (ref 3.5–5.1)
REASON FOR REJECTION: NORMAL
REJECTED TEST: NORMAL
SODIUM BLD-SCNC: 130 MMOL/L (ref 135–145)
SPECIMEN: NORMAL
TROPONIN T: 0.54 NG/ML
VANCOMYCIN TROUGH: 19.8 UG/ML (ref 10–20)

## 2020-02-11 PROCEDURE — 94640 AIRWAY INHALATION TREATMENT: CPT

## 2020-02-11 PROCEDURE — 82565 ASSAY OF CREATININE: CPT

## 2020-02-11 PROCEDURE — 6370000000 HC RX 637 (ALT 250 FOR IP): Performed by: HOSPITALIST

## 2020-02-11 PROCEDURE — 6370000000 HC RX 637 (ALT 250 FOR IP): Performed by: INTERNAL MEDICINE

## 2020-02-11 PROCEDURE — 36415 COLL VENOUS BLD VENIPUNCTURE: CPT

## 2020-02-11 PROCEDURE — 6360000002 HC RX W HCPCS: Performed by: INTERNAL MEDICINE

## 2020-02-11 PROCEDURE — 6360000002 HC RX W HCPCS: Performed by: HOSPITALIST

## 2020-02-11 PROCEDURE — 6370000000 HC RX 637 (ALT 250 FOR IP): Performed by: PHYSICIAN ASSISTANT

## 2020-02-11 PROCEDURE — 80048 BASIC METABOLIC PNL TOTAL CA: CPT

## 2020-02-11 PROCEDURE — 99232 SBSQ HOSP IP/OBS MODERATE 35: CPT | Performed by: INTERNAL MEDICINE

## 2020-02-11 PROCEDURE — 6370000000 HC RX 637 (ALT 250 FOR IP): Performed by: NURSE PRACTITIONER

## 2020-02-11 PROCEDURE — 71045 X-RAY EXAM CHEST 1 VIEW: CPT

## 2020-02-11 PROCEDURE — 84484 ASSAY OF TROPONIN QUANT: CPT

## 2020-02-11 PROCEDURE — 93010 ELECTROCARDIOGRAM REPORT: CPT | Performed by: INTERNAL MEDICINE

## 2020-02-11 PROCEDURE — 94761 N-INVAS EAR/PLS OXIMETRY MLT: CPT

## 2020-02-11 PROCEDURE — 6360000002 HC RX W HCPCS: Performed by: NURSE PRACTITIONER

## 2020-02-11 PROCEDURE — C9113 INJ PANTOPRAZOLE SODIUM, VIA: HCPCS | Performed by: NURSE PRACTITIONER

## 2020-02-11 PROCEDURE — 80202 ASSAY OF VANCOMYCIN: CPT

## 2020-02-11 PROCEDURE — 1200000000 HC SEMI PRIVATE

## 2020-02-11 PROCEDURE — 2700000000 HC OXYGEN THERAPY PER DAY

## 2020-02-11 PROCEDURE — 2580000003 HC RX 258: Performed by: HOSPITALIST

## 2020-02-11 PROCEDURE — 2580000003 HC RX 258: Performed by: NURSE PRACTITIONER

## 2020-02-11 RX ORDER — FUROSEMIDE 10 MG/ML
20 INJECTION INTRAMUSCULAR; INTRAVENOUS 2 TIMES DAILY
Status: DISCONTINUED | OUTPATIENT
Start: 2020-02-11 | End: 2020-02-14

## 2020-02-11 RX ADMIN — ENOXAPARIN SODIUM 40 MG: 40 INJECTION SUBCUTANEOUS at 08:33

## 2020-02-11 RX ADMIN — PANTOPRAZOLE SODIUM 40 MG: 40 INJECTION, POWDER, FOR SOLUTION INTRAVENOUS at 08:34

## 2020-02-11 RX ADMIN — FUROSEMIDE 20 MG: 10 INJECTION, SOLUTION INTRAMUSCULAR; INTRAVENOUS at 17:10

## 2020-02-11 RX ADMIN — AMIODARONE HYDROCHLORIDE 200 MG: 200 TABLET ORAL at 08:35

## 2020-02-11 RX ADMIN — MIDODRINE HYDROCHLORIDE 5 MG: 5 TABLET ORAL at 17:10

## 2020-02-11 RX ADMIN — SODIUM CHLORIDE, PRESERVATIVE FREE 10 ML: 5 INJECTION INTRAVENOUS at 08:37

## 2020-02-11 RX ADMIN — SODIUM CHLORIDE, PRESERVATIVE FREE 10 ML: 5 INJECTION INTRAVENOUS at 21:12

## 2020-02-11 RX ADMIN — FUROSEMIDE 20 MG: 10 INJECTION, SOLUTION INTRAMUSCULAR; INTRAVENOUS at 12:34

## 2020-02-11 RX ADMIN — SODIUM CHLORIDE, PRESERVATIVE FREE 10 ML: 5 INJECTION INTRAVENOUS at 08:35

## 2020-02-11 RX ADMIN — Medication 2 PUFF: at 19:58

## 2020-02-11 RX ADMIN — OSELTAMIVIR PHOSPHATE 30 MG: 30 CAPSULE ORAL at 08:35

## 2020-02-11 RX ADMIN — ASPIRIN 81 MG 81 MG: 81 TABLET ORAL at 08:34

## 2020-02-11 RX ADMIN — CYANOCOBALAMIN TAB 1000 MCG 1000 MCG: 1000 TAB at 08:34

## 2020-02-11 RX ADMIN — OXYCODONE HYDROCHLORIDE AND ACETAMINOPHEN 1 TABLET: 7.5; 325 TABLET ORAL at 21:16

## 2020-02-11 RX ADMIN — IPRATROPIUM BROMIDE AND ALBUTEROL SULFATE 1 AMPULE: .5; 3 SOLUTION RESPIRATORY (INHALATION) at 08:14

## 2020-02-11 RX ADMIN — TRAZODONE HYDROCHLORIDE 50 MG: 50 TABLET ORAL at 21:12

## 2020-02-11 RX ADMIN — IPRATROPIUM BROMIDE AND ALBUTEROL SULFATE 1 AMPULE: .5; 3 SOLUTION RESPIRATORY (INHALATION) at 19:58

## 2020-02-11 RX ADMIN — CLOPIDOGREL BISULFATE 75 MG: 75 TABLET ORAL at 08:34

## 2020-02-11 RX ADMIN — CEFEPIME HYDROCHLORIDE 2 G: 2 INJECTION, POWDER, FOR SOLUTION INTRAVENOUS at 06:09

## 2020-02-11 RX ADMIN — CEFEPIME HYDROCHLORIDE 2 G: 2 INJECTION, POWDER, FOR SOLUTION INTRAVENOUS at 17:32

## 2020-02-11 RX ADMIN — VANCOMYCIN HYDROCHLORIDE 1000 MG: 1 INJECTION, SOLUTION INTRAVENOUS at 05:48

## 2020-02-11 RX ADMIN — MIDODRINE HYDROCHLORIDE 5 MG: 5 TABLET ORAL at 08:34

## 2020-02-11 RX ADMIN — OXYCODONE HYDROCHLORIDE AND ACETAMINOPHEN 1 TABLET: 7.5; 325 TABLET ORAL at 12:34

## 2020-02-11 RX ADMIN — METOPROLOL TARTRATE 25 MG: 25 TABLET ORAL at 21:12

## 2020-02-11 RX ADMIN — SODIUM CHLORIDE, PRESERVATIVE FREE 10 ML: 5 INJECTION INTRAVENOUS at 21:18

## 2020-02-11 RX ADMIN — ALPRAZOLAM 0.5 MG: 0.5 TABLET ORAL at 08:34

## 2020-02-11 RX ADMIN — IPRATROPIUM BROMIDE AND ALBUTEROL SULFATE 1 AMPULE: .5; 3 SOLUTION RESPIRATORY (INHALATION) at 12:42

## 2020-02-11 RX ADMIN — METOPROLOL TARTRATE 25 MG: 25 TABLET ORAL at 08:35

## 2020-02-11 RX ADMIN — METHYLPREDNISOLONE SODIUM SUCCINATE 40 MG: 40 INJECTION, POWDER, LYOPHILIZED, FOR SOLUTION INTRAMUSCULAR; INTRAVENOUS at 08:36

## 2020-02-11 RX ADMIN — ATORVASTATIN CALCIUM 10 MG: 10 TABLET, FILM COATED ORAL at 17:10

## 2020-02-11 RX ADMIN — Medication 2 PUFF: at 08:22

## 2020-02-11 RX ADMIN — METHYLPREDNISOLONE SODIUM SUCCINATE 40 MG: 40 INJECTION, POWDER, LYOPHILIZED, FOR SOLUTION INTRAMUSCULAR; INTRAVENOUS at 17:10

## 2020-02-11 RX ADMIN — OSELTAMIVIR PHOSPHATE 30 MG: 30 CAPSULE ORAL at 21:12

## 2020-02-11 RX ADMIN — IPRATROPIUM BROMIDE AND ALBUTEROL SULFATE 1 AMPULE: .5; 3 SOLUTION RESPIRATORY (INHALATION) at 16:38

## 2020-02-11 RX ADMIN — MIDODRINE HYDROCHLORIDE 5 MG: 5 TABLET ORAL at 12:34

## 2020-02-11 ASSESSMENT — PAIN DESCRIPTION - FREQUENCY
FREQUENCY: INTERMITTENT
FREQUENCY: INTERMITTENT

## 2020-02-11 ASSESSMENT — PAIN SCALES - GENERAL
PAINLEVEL_OUTOF10: 6
PAINLEVEL_OUTOF10: 0
PAINLEVEL_OUTOF10: 0
PAINLEVEL_OUTOF10: 6
PAINLEVEL_OUTOF10: 0
PAINLEVEL_OUTOF10: 0
PAINLEVEL_OUTOF10: 4

## 2020-02-11 ASSESSMENT — PAIN DESCRIPTION - DESCRIPTORS
DESCRIPTORS: ACHING
DESCRIPTORS: ACHING

## 2020-02-11 ASSESSMENT — PAIN DESCRIPTION - PAIN TYPE
TYPE: CHRONIC PAIN
TYPE: CHRONIC PAIN

## 2020-02-11 ASSESSMENT — PAIN DESCRIPTION - LOCATION
LOCATION: BACK
LOCATION: BACK

## 2020-02-11 ASSESSMENT — PAIN DESCRIPTION - ONSET
ONSET: ON-GOING
ONSET: ON-GOING

## 2020-02-11 ASSESSMENT — PAIN - FUNCTIONAL ASSESSMENT: PAIN_FUNCTIONAL_ASSESSMENT: ACTIVITIES ARE NOT PREVENTED

## 2020-02-11 ASSESSMENT — PAIN DESCRIPTION - ORIENTATION
ORIENTATION: LOWER
ORIENTATION: LOWER

## 2020-02-11 ASSESSMENT — PAIN DESCRIPTION - PROGRESSION: CLINICAL_PROGRESSION: NOT CHANGED

## 2020-02-11 NOTE — PROGRESS NOTES
Daily Progress Note    Feeling better  Sitting in chair  Heart rate is stable  Afib/flutter now sinus keep on lopressor  HFrEF -apical ballooning-EF 20% range  Flu positive   Keep on lasix  Elevated trop secondary to HF-acute  Cath in 5/17  Medical treatment     [id number]  LEFT MAIN PATENT  LAD/RAMUS/LCX/RCA-MILD CAD  LVEDP-10-EF 50%-APICAL BALLOONING  ABDOMINAL ANGIO PATENT RENAL AND BILATERAL COMMON ILIAC/INTERNAL AND EXTERNAL ILIAC  MEDICAL TREATMENT  NO COMPLICATIONS   Pt. Awake, alert and feeling better  HR stable, NSR, BP stable  No CP, SOB is improved     Influenza A    Sig. SOB on admisson    Influenza noted    On Tamiflu and ABx-concern for bacterial PNA-secondary to flu    Per primary     Trop elevated    Sig. Elevation-stablizing now    Appears to be Takotsubo CMP on echo    EF 20-30% with apical ballooning- no ACS, stress induced    On DAPT    On BB, No ACE d/t hypotension-would consider adding if BP remains stable today      A flutter    On amio, lovenox, lopressor    NSR now and stable     On midodrine for hypotension  Will cont. To follow    Echo-2/10/20  Summary   This is a limited echocardiogram.   Left ventricular systolic function is abnormal.   Ejection fraction is visually estimated at 20-30%. Apical akinesis is present. Possible takotsubo cardiomyopathy. Severe aortic regurgitation is noted ( ms). Moderate mitral regurgitation is present. Small mobile calcification attached to the posterior mitral valve chordae   tendinae. Mitral annular calcification is present.    No evidence of any pericardial effusion.     PAST MEDICAL HISTORY:  History of having nonobstructive coronary artery  present, history of cardiomyopathy in the past, COPD, fibromyalgia,  hypertension, hyperlipidemia, convulsive fractures and rheumatoid  arthritis.     PAST SURGICAL HISTORY:  Gallbladder surgery, appendix _____.     SOCIAL HISTORY:  She does not smoke, does not drink. Karina Hankins is a pulses. Lab Data:  CBC:   Recent Labs     02/09/20  1602 02/10/20  0455   WBC 10.1 14.0*   HGB 12.8 12.7   HCT 43.1 39.6   MCV 99.8 92.7    214     BMP:   Recent Labs     02/09/20  1602 02/10/20  0455 02/10/20  1830 02/11/20  0339   * 131*  --   --    K 4.2 3.1* 4.0  --    CL 87* 93*  --   --    CO2 22 24  --   --    BUN 11 11  --   --    CREATININE 1.0 0.8  --  1.0     LIVER PROFILE:   Recent Labs     02/09/20  1602   AST 32   ALT 13   BILITOT 0.3   ALKPHOS 93     PT/INR:   Recent Labs     02/09/20  1602   PROTIME 11.3*   INR 0.93     APTT:   Recent Labs     02/09/20  1602   APTT 28.4     BNP:  No results for input(s): BNP in the last 72 hours.       Assessment:  Patient Active Problem List    Diagnosis Date Noted    Wide-complex tachycardia ([address] 02/10/2020    Gram-negative pneumonia ([address] 09/87/6606    Acute systolic heart failure ([address] 02/10/2020    Acute on chronic respiratory failure with hypoxia and hypercapnia ([address] 02/09/2020    Pneumonia of right upper lobe due to infectious organism ([address]     Acute on chronic respiratory failure with hypoxia ([address] 12/30/2019    Chronic fatigue 07/08/2019    Intractable low back pain 06/11/2019    Back pain 06/09/2019    Nocturnal hypoxemia 05/21/2019    Shortness of breath 04/25/2019    Former smoker     Acute exacerbation of chronic obstructive pulmonary disease (COPD) ([address] 12/13/2018    COPD, severe ([address] 10/24/2017    Blood loss anemia 08/01/2017    Elevated troponin     Takotsubo syndrome 05/01/2017    Panic attack 11/18/2016    Lumbar spinal stenosis 01/01/2015    Vitamin B12 deficiency 12/01/2014    Osteoporosis 03/16/2012    Depression     Migraine 10/06/2010    Rheumatoid Arthritis     Hypertension     Hyperlipidemia     Fibromyalgia     Chronic pain syndrome        Electronically signed by Javad Otoole PA-C on 2/11/2020 at 9:11 AM

## 2020-02-11 NOTE — PROGRESS NOTES
Hospitalist Progress Note         Admit Date: 2/9/2020    PCP: John Gill MD     Chief Complaint   Patient presents with    Shortness of Breath    Anxiety        Assessment and Plan:      Acute on chronic respiratory failure with hypoxia and hypercapnia (HCC)/COPD exacerbation    Continue Solu-Medrol IV, nebulization and O2 support. Pulmonology on board. Continue monitor closely   Positive influenza: Continue Tamiflu and droplet precautions.  Wide-complex tachycardia ([address] completed amnio loading. Currently on amio p.o.   Gram-negative pneumonia (HCC) on broad-spectrum ABX-cefepime and vancomycin. Monitor   Acute systolic heart failure (HCC) EF 20%. Low-dose Lasix  for gentle diuresis. Started on Lopressor and Midodrin to support the blood pressure. Follow vitals closely   Elevated troponin likely secondary to tachycardia. Continue aspirin, statin and BB. VTE prophylaxis LMWH. DC plan hopefully in 2-3 days.   PT/OT evaluation    Current Facility-Administered Medications   Medication Dose Route Frequency Provider Last Rate Last Dose    furosemide (LASIX) injection 20 mg  20 mg Intravenous BID Jerome Figueroa MD   20 mg at 02/11/20 1234    oxyCODONE-acetaminophen (PERCOCET) 7.5-325 MG per tablet 1 tablet  1 tablet Oral 4x Daily PRN Bernadette Paige PA-C   1 tablet at 02/11/20 1234    cefepime (MAXIPIME) 2 g in dextrose 5 % 50 mL IVPB  2 g Intravenous Q12H Harsha Ortiz MD   Stopped at 02/11/20 0629    oseltamivir (TAMIFLU) capsule 30 mg  30 mg Oral BID Harsha Ortiz MD   30 mg at 02/11/20 0835    vancomycin (VANCOCIN) 1000 mg in dextrose 5% 200 mL IVPB  1,000 mg Intravenous Q18H Harsha Ortiz  mL/hr at 02/11/20 0548 1,000 mg at 02/11/20 0548    potassium chloride 10 mEq/100 mL IVPB (Peripheral Line)  10 mEq Intravenous PRN Bernadette Paige PA-C 100 mL/hr at 02/10/20 1117 10 mEq at 02/10/20 1117    enoxaparin (LOVENOX) injection 40 mg  40 mg Subcutaneous Daily Randy injection 10 mL  10 mL Intravenous BID [address] MD   10 mL at 02/11/20 0295       Subjective:     Patient states her shortness of breath is getting better. No acute overnight events. Slept okay last night      Objective: Intake/Output Summary (Last 24 hours) at 2/11/2020 1552  Last data filed at 2/11/2020 1200  Gross per 24 hour   Intake 1648 ml   Output 500 ml   Net 1148 ml      Vitals:   Vitals:    02/11/20 1243   BP:    Pulse: 70   Resp: 23   Temp:    SpO2: 96%     Physical Exam:  General Appearance:    Alert, cooperative, improving respiratory distress +  Head:      Normocephalic, without obvious abnormality, atraumatic  Eyes:       Conjunctiva/corneas clear, EOM's intact  Lungs:    Improving B/L rhonchi, occasional crackles and wheeze at bases RT>LT+  Heart:                Regular rate and rhythm, S1 and S2 normal, no murmur,   rub or gallop  Abdomen:     Soft, non-tender, bowel sounds active, no masses, no organomegaly  Extremities:    Improving B/L trace pedal edema+  Neurological:   Grossly Intact. Significant Diagnostic Studies:   DATA:    CBC   Recent Labs     02/09/20  1602 02/10/20  0455   WBC 10.1 14.0*   HGB 12.8 12.7   HCT 43.1 39.6    214      BMP   Recent Labs     02/09/20  1602 02/10/20  0455 02/10/20  1830 02/11/20  0339   * 131*  --  130*   K 4.2 3.1* 4.0 4.4   CL 87* 93*  --  91*   CO2 22 24  --  25   BUN 11 11  --  21   CREATININE 1.0 0.8  --  1.0  1.0     LFT'S   Recent Labs     02/09/20  1602   AST 32   ALT 13   BILITOT 0.3   ALKPHOS 93     COAG   Recent Labs     02/09/20  1602   INR 0.93     POC:   Lab Results   Component Value Date    POCGLU 119 10/27/2013    POCGLU 143 10/27/2013    POCGLU 177 10/26/2013    POCGLU 125 10/26/2013     YzkinjfavwT2T:  Lab Results   Component Value Date    LABA1C 5.4 10/25/2013     CARDIAC ENZYMES  No results for input(s): CKTOTAL, CKMB, CKMBINDEX, TROPONINI in the last 72 hours.   Troponin:   Recent Labs     02/10/20  5020 02/10/20  1338 02/11/20  0339   TROPONINT 0.615* 0.588* 0.543*     BNP:   Recent Labs     02/09/20  1602   PROBNP 600.0*     U/A:    Lab Results   Component Value Date    COLORU STRAW 02/09/2020    WBCUA 1 02/09/2020    RBCUA 3 02/09/2020    MUCUS RARE 12/31/2019    BACTERIA NEGATIVE 02/09/2020    CLARITYU CLEAR 02/09/2020    SPECGRAV 1.008 02/09/2020    LEUKOCYTESUR NEGATIVE 02/09/2020    BLOODU NEGATIVE 02/09/2020    AMORPHOUS RARE 02/06/2020       Xr Chest Standard (2 Vw)    Result Date: 2/6/2020  EXAMINATION: TWO XRAY VIEWS OF THE CHEST 2/6/2020 6:42 am COMPARISON: 1/17/2020 HISTORY: ORDERING SYSTEM PROVIDED HISTORY: cough TECHNOLOGIST PROVIDED HISTORY: Reason for exam:->cough Reason for Exam: cough Acuity: Acute Type of Exam: Initial FINDINGS: The heart and mediastinal structures are stable. Mild size hiatal hernia is present. The pulmonary vasculature is normal.  The lungs are stable with linear fibrosis in the lung bases. There is a sclerotic lesion, anterior aspect right 4th rib, unchanged from prior exam.  Patient has undergone upper lumbar vertebroplasty, not completely assessed on this exam.     No acute cardiopulmonary disease. Chronic bibasilar fibrosis. Xr Chest Standard (2 Vw)    Result Date: 1/17/2020  EXAMINATION: TWO X-RAY VIEWS OF THE CHEST 1/17/2020 12:13 pm COMPARISON: 01/03/2020 HISTORY: ORDERING SYSTEM PROVIDED HISTORY: COPD, severe ([address] TECHNOLOGIST PROVIDED HISTORY: Reason for exam:->Follow-up right upper lobe pneumonia. Acuity: Acute Type of Exam: Subsequent/Follow-up Additional signs and symptoms: Follow up right upper lobe pneumonia. FINDINGS: The heart is stable in size and configuration. Atherosclerotic changes of the thoracic aorta are noted. There is eventration of the right hemidiaphragm with some bibasilar atelectasis. Remote right rib trauma is noted. When correlated to the CT from 12/31/2019 the infiltrate in the right upper lobe has resolved.      Interval resolution present, related to old granulomatous disease. The heart and pericardium demonstrate no acute abnormality. There is no acute abnormality of the thoracic aorta. Lungs/pleura: Bibasilar opacities are present, right greater than left, differential considerations to include aspiration or bacterial pneumonia, and atelectasis. Brittany Martinecarmella No pneumothorax is present. Mild degree of emphysematous changes seen throughout the lungs. Patchy areas of ground-glass opacity are seen within the lower lung zones bilaterally, and may represent pneumonia, or atelectasis. Upper Abdomen: Limited images of the upper abdomen are unremarkable. Soft Tissues/Bones: No acute bone or soft tissue abnormality. An enchondroma seen within the anterolateral aspect of the right 6th rib. 1. No evidence of pulmonary embolus, or aortic dissection 2. Bibasilar opacities, right greater than left, differential considerations to include aspiration or bacterial pneumonia, and atelectasis.            [facility] Hospitalist

## 2020-02-11 NOTE — PROGRESS NOTES
Hypertension    Hyperlipidemia    Fibromyalgia    Migraine    Chronic pain syndrome    Depression    Osteoporosis    Vitamin B12 deficiency    Lumbar spinal stenosis    Panic attack    Elevated troponin    Takotsubo syndrome    Blood loss anemia    COPD, severe (HCC)    Acute exacerbation of chronic obstructive pulmonary disease ([facility]) [facility]    Former smoker    Shortness of breath    Nocturnal hypoxemia    Back pain    Intractable low back pain    Chronic fatigue    Acute on chronic respiratory failure with hypoxia (HCC)    Pneumonia of right upper lobe due to infectious organism ([facility]    Acute on chronic respiratory failure with hypoxia and hypercapnia (HCC)    Wide-complex tachycardia (HCC)    Gram-negative pneumonia (HCC)    Acute systolic heart failure (HCC)       Plan:   1. continue present treatment  2.  Continue aggressive bronchopulmonary toilet with IV antibiotics and aerosolized bronchodilators    Ariana Curtis MD  2/11/2020  11:41 AM

## 2020-02-11 NOTE — PLAN OF CARE
3195 by Nnamdi Peña RN  Outcome: Ongoing     Problem: Fluid Volume - Deficit:  Goal: Achieves intake and output within specified parameters  Description  Achieves intake and output within specified parameters  2/11/2020 0854 by Conrad Sood. Kanika Irizarry RN  Outcome: Ongoing  2/11/2020 0558 by Nnamdi Peña RN  Outcome: Ongoing     Problem: Gas Exchange - Impaired:  Goal: Levels of oxygenation will improve  Description  Levels of oxygenation will improve  2/11/2020 0854 by Conrad Muse RN  Outcome: Ongoing  2/11/2020 0558 by Nnamdi Peña RN  Outcome: Ongoing     Problem: Hyperthermia:  Goal: Ability to maintain a body temperature in the normal range will improve  Description  Ability to maintain a body temperature in the normal range will improve  2/11/2020 0854 by Conrad Sood. Kanika Irizarry RN  Outcome: Ongoing  2/11/2020 0558 by Nnamdi Peña RN  Outcome: Ongoing     Problem: Tobacco Use:  Goal: Will participate in inpatient tobacco-use cessation counseling  Description  Will participate in inpatient tobacco-use cessation counseling  2/11/2020 0854 by Conrad Sood.  Kanika Irizarry RN  Outcome: Ongoing  2/11/2020 0558 by Nnamdi Peña RN  Outcome: Ongoing

## 2020-02-11 NOTE — PROGRESS NOTES
[address] [facility]  consulted by Dr. Adma Gabriel for monitoring and adjustment. Indication for treatment: HCAP, possible sepsis, acute hypoxic and hypercapnic respiratory failure  Goal trough: 15-20 mcg/mL  Other Antimicrobials: cefepime     Pertinent Laboratory Values:   Temp Readings from Last 3 Encounters:   02/11/20 97.5 °F (36.4 °C) (Oral)   02/06/20 97.8 °F (36.6 °C)   01/04/20 97.9 °F (36.6 °C) (Oral)     Recent Labs     02/09/20  1602 02/09/20  1730 02/10/20  0455   WBC 10.1  --  14.0*   LACTATE 6.4  LACT CALLED TO DR ARNOLD AT 1726 ON [id number] BY [facility]   RESULTS READ BACK  * 5.2* 1.2     Recent Labs     02/09/20  1602 02/10/20  0455 02/11/20  0339   BUN 11 11 21   CREATININE 1.0 0.8 1.0  1.0     Estimated Creatinine Clearance: 43 mL/min (based on SCr of 1 mg/dL). Intake/Output Summary (Last 24 hours) at 2/11/2020 1442  Last data filed at 2/11/2020 1200  Gross per 24 hour   Intake 1995 ml   Output 500 ml   Net 1495 ml       Pertinent Cultures:  Date    Source    Results  2/9   Resp panel   Positive for influenza A  2/9   Blood x 2   NGTD  2/9   Influenza   Negative  2/9   MRSA screen  Negative    Vancomycin level:   TROUGH:  No results for input(s): VANCOTROUGH in the last 72 hours. RANDOM:  No results for input(s): VANCORANDOM in the last 72 hours. Assessment:  · WBC and temperature: No new WBC; Afebrile  · SCr, BUN, and urine output: WNL  · Day(s) of therapy: #2  · Vancomycin level: To be collected    Plan:  · Patient received vancomycin 1250 mg x 1 in the ED  · Continue vancomycin 1000 mg IVPB q18h  · Renal trends are stable at this time. · MRSA nasal screen is negative. Vancomycin for empiric MRSA pneumonia can be discontinued if appropriate per clinical course.   · Pharmacy will continue to monitor patient and adjust therapy as indicated     VANCOMYCIN TROUGH SCHEDULED FOR 02/11/2020 @ 0865    Thank you for the consult,    Elmo Renee, PharmD, [facility]

## 2020-02-12 LAB
EKG ATRIAL RATE: 71 BPM
EKG DIAGNOSIS: NORMAL
EKG P AXIS: 58 DEGREES
EKG P-R INTERVAL: 170 MS
EKG Q-T INTERVAL: 542 MS
EKG QRS DURATION: 140 MS
EKG QTC CALCULATION (BAZETT): 588 MS
EKG R AXIS: 114 DEGREES
EKG T AXIS: 114 DEGREES
EKG VENTRICULAR RATE: 71 BPM
TROPONIN T: 0.22 NG/ML

## 2020-02-12 PROCEDURE — 84484 ASSAY OF TROPONIN QUANT: CPT

## 2020-02-12 PROCEDURE — C9113 INJ PANTOPRAZOLE SODIUM, VIA: HCPCS | Performed by: NURSE PRACTITIONER

## 2020-02-12 PROCEDURE — 6360000002 HC RX W HCPCS: Performed by: HOSPITALIST

## 2020-02-12 PROCEDURE — 94761 N-INVAS EAR/PLS OXIMETRY MLT: CPT

## 2020-02-12 PROCEDURE — 2700000000 HC OXYGEN THERAPY PER DAY

## 2020-02-12 PROCEDURE — 6370000000 HC RX 637 (ALT 250 FOR IP): Performed by: PHYSICIAN ASSISTANT

## 2020-02-12 PROCEDURE — 2580000003 HC RX 258: Performed by: HOSPITALIST

## 2020-02-12 PROCEDURE — 6370000000 HC RX 637 (ALT 250 FOR IP): Performed by: NURSE PRACTITIONER

## 2020-02-12 PROCEDURE — 6370000000 HC RX 637 (ALT 250 FOR IP): Performed by: HOSPITALIST

## 2020-02-12 PROCEDURE — 93010 ELECTROCARDIOGRAM REPORT: CPT | Performed by: INTERNAL MEDICINE

## 2020-02-12 PROCEDURE — 94640 AIRWAY INHALATION TREATMENT: CPT

## 2020-02-12 PROCEDURE — 93005 ELECTROCARDIOGRAM TRACING: CPT | Performed by: INTERNAL MEDICINE

## 2020-02-12 PROCEDURE — 6360000002 HC RX W HCPCS: Performed by: INTERNAL MEDICINE

## 2020-02-12 PROCEDURE — 1200000000 HC SEMI PRIVATE

## 2020-02-12 PROCEDURE — 6370000000 HC RX 637 (ALT 250 FOR IP): Performed by: INTERNAL MEDICINE

## 2020-02-12 PROCEDURE — 36415 COLL VENOUS BLD VENIPUNCTURE: CPT

## 2020-02-12 PROCEDURE — 2580000003 HC RX 258: Performed by: NURSE PRACTITIONER

## 2020-02-12 PROCEDURE — 99232 SBSQ HOSP IP/OBS MODERATE 35: CPT | Performed by: INTERNAL MEDICINE

## 2020-02-12 PROCEDURE — 6360000002 HC RX W HCPCS: Performed by: NURSE PRACTITIONER

## 2020-02-12 RX ORDER — METOPROLOL SUCCINATE 25 MG/1
25 TABLET, EXTENDED RELEASE ORAL DAILY
Status: DISCONTINUED | OUTPATIENT
Start: 2020-02-12 | End: 2020-02-13

## 2020-02-12 RX ORDER — VANCOMYCIN HYDROCHLORIDE 1 G/200ML
1000 INJECTION, SOLUTION INTRAVENOUS EVERY 24 HOURS
Status: DISCONTINUED | OUTPATIENT
Start: 2020-02-13 | End: 2020-02-16 | Stop reason: HOSPADM

## 2020-02-12 RX ORDER — LISINOPRIL 2.5 MG/1
2.5 TABLET ORAL DAILY
Status: DISCONTINUED | OUTPATIENT
Start: 2020-02-12 | End: 2020-02-13

## 2020-02-12 RX ORDER — SPIRONOLACTONE 25 MG/1
25 TABLET ORAL DAILY
Status: DISCONTINUED | OUTPATIENT
Start: 2020-02-12 | End: 2020-02-16 | Stop reason: HOSPADM

## 2020-02-12 RX ADMIN — METHYLPREDNISOLONE SODIUM SUCCINATE 40 MG: 40 INJECTION, POWDER, LYOPHILIZED, FOR SOLUTION INTRAMUSCULAR; INTRAVENOUS at 17:35

## 2020-02-12 RX ADMIN — FUROSEMIDE 20 MG: 10 INJECTION, SOLUTION INTRAMUSCULAR; INTRAVENOUS at 09:04

## 2020-02-12 RX ADMIN — AMIODARONE HYDROCHLORIDE 200 MG: 200 TABLET ORAL at 09:05

## 2020-02-12 RX ADMIN — SODIUM CHLORIDE, PRESERVATIVE FREE 10 ML: 5 INJECTION INTRAVENOUS at 09:05

## 2020-02-12 RX ADMIN — PANTOPRAZOLE SODIUM 40 MG: 40 INJECTION, POWDER, FOR SOLUTION INTRAVENOUS at 09:04

## 2020-02-12 RX ADMIN — OSELTAMIVIR PHOSPHATE 30 MG: 30 CAPSULE ORAL at 21:05

## 2020-02-12 RX ADMIN — CLOPIDOGREL BISULFATE 75 MG: 75 TABLET ORAL at 09:05

## 2020-02-12 RX ADMIN — OXYCODONE HYDROCHLORIDE AND ACETAMINOPHEN 1 TABLET: 7.5; 325 TABLET ORAL at 10:30

## 2020-02-12 RX ADMIN — CEFEPIME HYDROCHLORIDE 2 G: 2 INJECTION, POWDER, FOR SOLUTION INTRAVENOUS at 04:35

## 2020-02-12 RX ADMIN — METHYLPREDNISOLONE SODIUM SUCCINATE 40 MG: 40 INJECTION, POWDER, LYOPHILIZED, FOR SOLUTION INTRAMUSCULAR; INTRAVENOUS at 09:04

## 2020-02-12 RX ADMIN — IPRATROPIUM BROMIDE AND ALBUTEROL SULFATE 1 AMPULE: .5; 3 SOLUTION RESPIRATORY (INHALATION) at 15:52

## 2020-02-12 RX ADMIN — TRAZODONE HYDROCHLORIDE 50 MG: 50 TABLET ORAL at 21:05

## 2020-02-12 RX ADMIN — IPRATROPIUM BROMIDE AND ALBUTEROL SULFATE 1 AMPULE: .5; 3 SOLUTION RESPIRATORY (INHALATION) at 12:15

## 2020-02-12 RX ADMIN — LISINOPRIL 2.5 MG: 2.5 TABLET ORAL at 13:57

## 2020-02-12 RX ADMIN — METOPROLOL TARTRATE 25 MG: 25 TABLET ORAL at 09:04

## 2020-02-12 RX ADMIN — IPRATROPIUM BROMIDE AND ALBUTEROL SULFATE 1 AMPULE: .5; 3 SOLUTION RESPIRATORY (INHALATION) at 08:08

## 2020-02-12 RX ADMIN — OSELTAMIVIR PHOSPHATE 30 MG: 30 CAPSULE ORAL at 09:13

## 2020-02-12 RX ADMIN — OXYCODONE HYDROCHLORIDE AND ACETAMINOPHEN 1 TABLET: 7.5; 325 TABLET ORAL at 19:41

## 2020-02-12 RX ADMIN — CYANOCOBALAMIN TAB 1000 MCG 1000 MCG: 1000 TAB at 09:05

## 2020-02-12 RX ADMIN — Medication 2 PUFF: at 22:34

## 2020-02-12 RX ADMIN — METHYLPREDNISOLONE SODIUM SUCCINATE 40 MG: 40 INJECTION, POWDER, LYOPHILIZED, FOR SOLUTION INTRAMUSCULAR; INTRAVENOUS at 00:09

## 2020-02-12 RX ADMIN — VANCOMYCIN HYDROCHLORIDE 1000 MG: 1 INJECTION, SOLUTION INTRAVENOUS at 00:15

## 2020-02-12 RX ADMIN — ASPIRIN 81 MG 81 MG: 81 TABLET ORAL at 09:04

## 2020-02-12 RX ADMIN — ATORVASTATIN CALCIUM 10 MG: 10 TABLET, FILM COATED ORAL at 17:35

## 2020-02-12 RX ADMIN — CEFEPIME HYDROCHLORIDE 2 G: 2 INJECTION, POWDER, FOR SOLUTION INTRAVENOUS at 17:35

## 2020-02-12 RX ADMIN — SPIRONOLACTONE 25 MG: 25 TABLET ORAL at 13:57

## 2020-02-12 RX ADMIN — SODIUM CHLORIDE, PRESERVATIVE FREE 10 ML: 5 INJECTION INTRAVENOUS at 21:04

## 2020-02-12 RX ADMIN — Medication 2 PUFF: at 08:08

## 2020-02-12 RX ADMIN — ENOXAPARIN SODIUM 40 MG: 40 INJECTION SUBCUTANEOUS at 09:05

## 2020-02-12 RX ADMIN — IPRATROPIUM BROMIDE AND ALBUTEROL SULFATE 1 AMPULE: .5; 3 SOLUTION RESPIRATORY (INHALATION) at 22:15

## 2020-02-12 RX ADMIN — FUROSEMIDE 20 MG: 10 INJECTION, SOLUTION INTRAMUSCULAR; INTRAVENOUS at 17:35

## 2020-02-12 ASSESSMENT — PAIN SCALES - GENERAL
PAINLEVEL_OUTOF10: 0
PAINLEVEL_OUTOF10: 10
PAINLEVEL_OUTOF10: 4
PAINLEVEL_OUTOF10: 0

## 2020-02-12 NOTE — PROGRESS NOTES
Hospitalist Progress Note         Admit Date: 2/9/2020    PCP: Betsy Essex, MD     Chief Complaint   Patient presents with    Shortness of Breath    Anxiety        Assessment and Plan:      Acute on chronic respiratory failure with hypoxia and hypercapnia (HCC)/COPD exacerbation    Continue Solu-Medrol IV, nebulization and O2 support. Pulmonology on board. Continue monitor closely   Positive influenza: Continue Tamiflu and droplet precautions.  Wide-complex tachycardia ([address] completed amnio loading. Currently on amio p.o.   Gram-negative pneumonia (HCC) on broad-spectrum ABX-cefepime and vancomycin. Monitor   Acute systolic heart failure (HCC) EF 20%. Low-dose Lasix  for gentle diuresis. Continue Toprol-XL, lisinopril and Aldactone as per cardio. Follow vitals closely   Elevated troponin likely secondary to tachycardia. Continue aspirin, statin and BB. VTE prophylaxis LMWH. DC plan hopefully in 1-2 days.   PT/OT evaluation for discharge disposition    Current Facility-Administered Medications   Medication Dose Route Frequency Provider Last Rate Last Dose    metoprolol succinate (TOPROL XL) extended release tablet 25 mg  25 mg Oral Daily Urmila Murry MD   Stopped at 02/12/20 1351    lisinopril (PRINIVIL;ZESTRIL) tablet 2.5 mg  2.5 mg Oral Daily Urmila Murry MD   2.5 mg at 02/12/20 1357    spironolactone (ALDACTONE) tablet 25 mg  25 mg Oral Daily Urmila Murry MD   25 mg at 02/12/20 1357    benzocaine-menthol (CEPACOL SORE THROAT) lozenge 1 lozenge  1 lozenge Oral Q2H PRN Maribel Porter MD        [START ON 2/13/2020] vancomycin (VANCOCIN) 1000 mg in dextrose 5% 200 mL IVPB  1,000 mg Intravenous Q24H Conor Espinoza MD        furosemide (LASIX) injection 20 mg  20 mg Intravenous BID Urmila Murry MD   20 mg at 02/12/20 0904    oxyCODONE-acetaminophen (PERCOCET) 7.5-325 MG per tablet 1 tablet  1 tablet Oral 4x Daily PRN Seema Winters PA-C   1 tablet at 02/12/20 1030    1552    clopidogrel (PLAVIX) tablet 75 mg  75 mg Oral Daily Naveen Quintana MD   75 mg at 02/12/20 0905    traZODone (DESYREL) tablet 50 mg  50 mg Oral Nightly Brian Cruz MD   50 mg at 02/11/20 2112    sodium chloride flush 0.9 % injection 10 mL  10 mL Intravenous BID Brian Cruz MD   10 mL at 02/12/20 4295       Subjective:     Patient states her shortness of breath is getting better. No acute overnight events. Slept okay last night      Objective: Intake/Output Summary (Last 24 hours) at 2/12/2020 1721  Last data filed at 2/12/2020 0434  Gross per 24 hour   Intake --   Output 600 ml   Net -600 ml      Vitals:   Vitals:    02/12/20 1357   BP: 114/78   Pulse:    Resp:    Temp:    SpO2:      Physical Exam:  General Appearance:    Alert, cooperative, improving respiratory distress +  Head:      Normocephalic, without obvious abnormality, atraumatic  Eyes:       Conjunctiva/corneas clear, EOM's intact  Lungs:    Improving B/L rhonchi, occasional crackles and wheeze at bases RT>LT+  Heart:                Regular rate and rhythm, S1 and S2 normal, no murmur,   rub or gallop  Abdomen:     Soft, non-tender, bowel sounds active, no masses, no organomegaly  Extremities:    Improving B/L trace pedal edema+  Neurological:   Grossly Intact. Significant Diagnostic Studies:   DATA:    CBC   Recent Labs     02/10/20  0455   WBC 14.0*   HGB 12.7   HCT 39.6         BMP   Recent Labs     02/10/20  0455 02/10/20  1830 02/11/20  0339 02/11/20  2221   *  --  130*  --    K 3.1* 4.0 4.4  --    CL 93*  --  91*  --    CO2 24  --  25  --    BUN 11  --  21  --    CREATININE 0.8  --  1.0  1.0 1.0     LFT'S   No results for input(s): AST, ALT, ALB, BILIDIR, BILITOT, ALKPHOS in the last 72 hours. COAG   No results for input(s): INR in the last 72 hours.   POC:   Lab Results   Component Value Date    POCGLU 119 10/27/2013    POCGLU 143 10/27/2013    POCGLU 177 10/26/2013    POCGLU 125 10/26/2013 is eventration of the right hemidiaphragm with some bibasilar atelectasis. Remote right rib trauma is noted. When correlated to the CT from 12/31/2019 the infiltrate in the right upper lobe has resolved. Interval resolution of right upper lobe infiltrate. Chronic eventration right hemidiaphragm with sequela of old right rib trauma. Xr Chest Portable    Result Date: 2/9/2020  EXAMINATION: ONE XRAY VIEW OF THE CHEST 2/9/2020 3:30 pm COMPARISON: 02/06/2020 HISTORY: ORDERING SYSTEM PROVIDED HISTORY: shortness of breath TECHNOLOGIST PROVIDED HISTORY: Reason for exam:->shortness of breath Reason for Exam: SOB Acuity: Acute Type of Exam: Initial FINDINGS: The lungs are without acute focal process. Chronic bibasilar linear opacities. There is no effusion or pneumothorax. The cardiomediastinal silhouette is stable. The osseous structures are stable. No acute process. Cta Chest W Contrast    Result Date: 2/9/2020  EXAMINATION: CTA OF THE CHEST 2/9/2020 10:00 pm TECHNIQUE: CTA of the chest was performed after the administration of intravenous contrast.  Multiplanar reformatted images are provided for review. MIP images are provided for review. Dose modulation, iterative reconstruction, and/or weight based adjustment of the mA/kV was utilized to reduce the radiation dose to as low as reasonably achievable. COMPARISON: CT chest dated December 31, 2019 HISTORY: ORDERING SYSTEM PROVIDED HISTORY: Hypoxia TECHNOLOGIST PROVIDED HISTORY: Reason for exam:->Hypoxia Reason for Exam: hypoxia elevated d-dimer Acuity: Acute Type of Exam: Initial Additional signs and symptoms: Acute respiratory failure with hypoxia and hypercapnia (HCC) (Primary Dx); NSTEMI (non-ST elevated myocardial infarction) ([address] Respiratory acidosis; Lactic acidosis; Hypomagnesemia; Hyperglycemia Relevant Medical/Surgical History: isovue 370  80 ml FINDINGS: Pulmonary Arteries: Pulmonary arteries are adequately opacified for evaluation.   No

## 2020-02-12 NOTE — PROGRESS NOTES
Pulmonary and Critical Care  Progress Note      VITALS:  /84   Pulse 58   Temp 97.9 °F (36.6 °C) (Oral)   Resp 21   Ht 5' 6\" (1.676 m)   Wt 141 lb 8.6 oz (64.2 kg)   SpO2 93%   BMI 22.84 kg/m²     Subjective:   Chief complaint: Acute on chronic hypoxemic respiratory failure, bibasilar pneumonia, influenza A infection, SVT, history of Takotsubo syndrome  Remains very weak but less chest congestion  No chest pain  Was able to get up in chair yesterday but not ambulate in room  Mild resp distress-at rest  Patient awake and alert-still lethargic    Objective:   PHYSICAL EXAM:    LUNGS: Diminished breath sounds throughout all lung fields with persistent rales heard posteriorly  Minimal wheezing  O2 saturation 93% on 3 L nasal oxygen  Remains afebrile  Lab this a.m. pending    DATA:    CBC:  Recent Labs     02/09/20  1602 02/10/20  0455   WBC 10.1 14.0*   RBC 4.32 4.27   HGB 12.8 12.7   HCT 43.1 39.6    214   MCV 99.8 92.7   MCH 29.6 29.7   MCHC 29.7* 32.1   RDW 14.7 14.6   SEGSPCT 81.8*  --       BMP:  Recent Labs     02/09/20  1602 02/10/20  0455 02/10/20  1830 02/11/20  0339 02/11/20  2221   * 131*  --  130*  --    K 4.2 3.1* 4.0 4.4  --    CL 87* 93*  --  91*  --    CO2 22 24  --  25  --    BUN 11 11  --  21  --    CREATININE 1.0 0.8  --  1.0  1.0 1.0   CALCIUM 8.4 7.3*  --  7.8*  --    GLUCOSE 321* 159*  --  142*  --       ABG:  No results for input(s): PH, PO2ART, REN2JWC, HCO3, BEART, O2SAT in the last 72 hours. BNP  Lab Results   Component Value Date    BNP 54 11/25/2012      D-Dimer:  Lab Results   Component Value Date    DDIMER 2571 (H) 02/09/2020      1.  Radiology: No chest x-ray today    Assessment:     Patient Active Problem List   Diagnosis    Rheumatoid Arthritis    Hypertension    Hyperlipidemia    Fibromyalgia    Migraine    Chronic pain syndrome    Depression    Osteoporosis    Vitamin B12 deficiency    Lumbar spinal stenosis    Panic attack    Elevated troponin  Takotsubo syndrome    Blood loss anemia    COPD, severe (HCC)    Acute exacerbation of chronic obstructive pulmonary disease (COPD) ([facility] [facility]    Former smoker    Shortness of breath    Nocturnal hypoxemia    Back pain    Intractable low back pain    Chronic fatigue    Acute on chronic respiratory failure with hypoxia (HCC)    [facility]neumonia of right upper [facility] due to infectious organism [facility]    Acute on chronic respiratory failure with hypoxia and hypercapnia (HCC)    Wide-complex tachycardia (HCC)    Gram-negative pneumonia (HCC)    Acute systolic heart failure [facility]       Plan:   1. continue present treatment  2. Expand physical therapy slowly  3.  Needs continued aggressive bronchopulmonary toilet with IV antibiotics, steroids and aerosolized bronchodilators     Ariana Curtis MD  2/12/2020  3:22 AM

## 2020-02-12 NOTE — PROGRESS NOTES
[facility] [facility]  consulted by Dr. Rohini Castañeda for monitoring and adjustment. Indication for treatment: Pneumonia  Goal trough: 15-20 mcg/mL  Other Antimicrobials: cefepime     Pertinent Laboratory Values:   Temp Readings from Last 3 Encounters:   02/12/20 97.4 °F (36.3 °C) (Oral)   02/06/20 97.8 °F (36.6 °C)   01/04/20 97.9 °F (36.6 °C) (Oral)     Recent Labs     02/09/20  1602 02/09/20  1730 02/10/20  0455   WBC 10.1  --  14.0*   LACTATE 6.4  LACT CALLED TO DR ARNOLD AT 1726 ON [id number] BY  MT   RESULTS READ BACK  * 5.2* 1.2     Recent Labs     02/09/20  1602 02/10/20  0455 02/11/20  0339 02/11/20  2221   BUN 11 11 21  --    CREATININE 1.0 0.8 1.0  1.0 1.0     Estimated Creatinine Clearance: 43 mL/min (based on SCr of 1 mg/dL). Intake/Output Summary (Last 24 hours) at 2/12/2020 1433  Last data filed at 2/12/2020 0434  Gross per 24 hour   Intake --   Output 600 ml   Net -600 ml       Pertinent Cultures:  Date    Source    Results  2/9   Resp panel   Positive for influenza A  2/9   Blood x 2   NGTD  2/9   Influenza   Negative  2/9   MRSA screen  Negative    Vancomycin level:   TROUGH:    Recent Labs     02/11/20  2221   VANCOTROUGH 19.8     RANDOM:  No results for input(s): VANCORANDOM in the last 72 hours.     Assessment:  · WBC and temperature: no new labs  · SCr, BUN, and urine output: no new labs  · Day(s) of therapy: #4  · Vancomycin level: 19.8    Plan:  · Vancomycin 1250 mg x 1 followed by 1000 mg IVPB q18h  · Trough was on the high end of normal decrease dose to 1000 mg q24h IVPB   · Repeat trough in 48h if necessary, however MRSA screen is negative, recommend to discontinue vancomycin at this time  · Pharmacy will continue to monitor patient and adjust therapy as indicated    Thank you for the consult,    Bandar Oneal PharmD

## 2020-02-12 NOTE — PROGRESS NOTES
Arthritis     Hypertension     Hyperlipidemia     Fibromyalgia     Chronic pain syndrome        Electronically signed by Kristy Gunter PA-C on 2/12/2020 at 8:44 AM

## 2020-02-13 LAB
ALBUMIN SERPL-MCNC: 2.9 GM/DL (ref 3.4–5)
ALP BLD-CCNC: 80 IU/L (ref 40–128)
ALT SERPL-CCNC: 10 U/L (ref 10–40)
ANION GAP SERPL CALCULATED.3IONS-SCNC: 15 MMOL/L (ref 4–16)
AST SERPL-CCNC: 16 IU/L (ref 15–37)
BILIRUB SERPL-MCNC: 0.2 MG/DL (ref 0–1)
BUN BLDV-MCNC: 29 MG/DL (ref 6–23)
CALCIUM SERPL-MCNC: 8.2 MG/DL (ref 8.3–10.6)
CHLORIDE BLD-SCNC: 92 MMOL/L (ref 99–110)
CO2: 24 MMOL/L (ref 21–32)
CREAT SERPL-MCNC: 1.1 MG/DL (ref 0.6–1.1)
GFR AFRICAN AMERICAN: 58 ML/MIN/1.73M2
GFR NON-AFRICAN AMERICAN: 48 ML/MIN/1.73M2
GLUCOSE BLD-MCNC: 175 MG/DL (ref 70–99)
HCT VFR BLD CALC: 39.9 % (ref 37–47)
HEMOGLOBIN: 12.1 GM/DL (ref 12.5–16)
MAGNESIUM: 2.2 MG/DL (ref 1.8–2.4)
MCH RBC QN AUTO: 29.2 PG (ref 27–31)
MCHC RBC AUTO-ENTMCNC: 30.3 % (ref 32–36)
MCV RBC AUTO: 96.4 FL (ref 78–100)
PDW BLD-RTO: 15 % (ref 11.7–14.9)
PHOSPHORUS: 2.1 MG/DL (ref 2.5–4.9)
PLATELET # BLD: 216 K/CU MM (ref 140–440)
PMV BLD AUTO: 11.5 FL (ref 7.5–11.1)
POTASSIUM SERPL-SCNC: 4 MMOL/L (ref 3.5–5.1)
PRO-BNP: ABNORMAL PG/ML
RBC # BLD: 4.14 M/CU MM (ref 4.2–5.4)
SODIUM BLD-SCNC: 131 MMOL/L (ref 135–145)
TOTAL PROTEIN: 5.1 GM/DL (ref 6.4–8.2)
WBC # BLD: 13.2 K/CU MM (ref 4–10.5)

## 2020-02-13 PROCEDURE — 6360000002 HC RX W HCPCS: Performed by: INTERNAL MEDICINE

## 2020-02-13 PROCEDURE — 80053 COMPREHEN METABOLIC PANEL: CPT

## 2020-02-13 PROCEDURE — 1200000000 HC SEMI PRIVATE

## 2020-02-13 PROCEDURE — 6370000000 HC RX 637 (ALT 250 FOR IP): Performed by: NURSE PRACTITIONER

## 2020-02-13 PROCEDURE — 6360000002 HC RX W HCPCS: Performed by: NURSE PRACTITIONER

## 2020-02-13 PROCEDURE — 2580000003 HC RX 258: Performed by: NURSE PRACTITIONER

## 2020-02-13 PROCEDURE — C9113 INJ PANTOPRAZOLE SODIUM, VIA: HCPCS | Performed by: NURSE PRACTITIONER

## 2020-02-13 PROCEDURE — 97530 THERAPEUTIC ACTIVITIES: CPT

## 2020-02-13 PROCEDURE — 6370000000 HC RX 637 (ALT 250 FOR IP): Performed by: HOSPITALIST

## 2020-02-13 PROCEDURE — 6370000000 HC RX 637 (ALT 250 FOR IP): Performed by: INTERNAL MEDICINE

## 2020-02-13 PROCEDURE — 99232 SBSQ HOSP IP/OBS MODERATE 35: CPT | Performed by: NURSE PRACTITIONER

## 2020-02-13 PROCEDURE — 6360000002 HC RX W HCPCS: Performed by: HOSPITALIST

## 2020-02-13 PROCEDURE — 2700000000 HC OXYGEN THERAPY PER DAY

## 2020-02-13 PROCEDURE — 94640 AIRWAY INHALATION TREATMENT: CPT

## 2020-02-13 PROCEDURE — 2580000003 HC RX 258: Performed by: HOSPITALIST

## 2020-02-13 PROCEDURE — 84100 ASSAY OF PHOSPHORUS: CPT

## 2020-02-13 PROCEDURE — 36415 COLL VENOUS BLD VENIPUNCTURE: CPT

## 2020-02-13 PROCEDURE — 6370000000 HC RX 637 (ALT 250 FOR IP): Performed by: PHYSICIAN ASSISTANT

## 2020-02-13 PROCEDURE — 85027 COMPLETE CBC AUTOMATED: CPT

## 2020-02-13 PROCEDURE — 83735 ASSAY OF MAGNESIUM: CPT

## 2020-02-13 PROCEDURE — 83880 ASSAY OF NATRIURETIC PEPTIDE: CPT

## 2020-02-13 PROCEDURE — 97162 PT EVAL MOD COMPLEX 30 MIN: CPT

## 2020-02-13 PROCEDURE — 94761 N-INVAS EAR/PLS OXIMETRY MLT: CPT

## 2020-02-13 RX ORDER — BUDESONIDE AND FORMOTEROL FUMARATE DIHYDRATE 160; 4.5 UG/1; UG/1
2 AEROSOL RESPIRATORY (INHALATION) 2 TIMES DAILY
Status: DISCONTINUED | OUTPATIENT
Start: 2020-02-13 | End: 2020-02-16 | Stop reason: HOSPADM

## 2020-02-13 RX ORDER — LISINOPRIL 5 MG/1
5 TABLET ORAL DAILY
Status: DISCONTINUED | OUTPATIENT
Start: 2020-02-14 | End: 2020-02-16 | Stop reason: HOSPADM

## 2020-02-13 RX ORDER — METOPROLOL SUCCINATE 25 MG/1
25 TABLET, EXTENDED RELEASE ORAL 2 TIMES DAILY
Status: DISCONTINUED | OUTPATIENT
Start: 2020-02-13 | End: 2020-02-16 | Stop reason: HOSPADM

## 2020-02-13 RX ADMIN — RIVAROXABAN 10 MG: 10 TABLET, FILM COATED ORAL at 17:31

## 2020-02-13 RX ADMIN — TRAZODONE HYDROCHLORIDE 50 MG: 50 TABLET ORAL at 19:58

## 2020-02-13 RX ADMIN — METHYLPREDNISOLONE SODIUM SUCCINATE 40 MG: 40 INJECTION, POWDER, LYOPHILIZED, FOR SOLUTION INTRAMUSCULAR; INTRAVENOUS at 17:30

## 2020-02-13 RX ADMIN — OSELTAMIVIR PHOSPHATE 30 MG: 30 CAPSULE ORAL at 08:05

## 2020-02-13 RX ADMIN — IPRATROPIUM BROMIDE AND ALBUTEROL SULFATE 1 AMPULE: .5; 3 SOLUTION RESPIRATORY (INHALATION) at 22:11

## 2020-02-13 RX ADMIN — METHYLPREDNISOLONE SODIUM SUCCINATE 40 MG: 40 INJECTION, POWDER, LYOPHILIZED, FOR SOLUTION INTRAMUSCULAR; INTRAVENOUS at 01:04

## 2020-02-13 RX ADMIN — CEFEPIME HYDROCHLORIDE 2 G: 2 INJECTION, POWDER, FOR SOLUTION INTRAVENOUS at 05:19

## 2020-02-13 RX ADMIN — ENOXAPARIN SODIUM 40 MG: 40 INJECTION SUBCUTANEOUS at 08:06

## 2020-02-13 RX ADMIN — ALPRAZOLAM 0.5 MG: 0.5 TABLET ORAL at 18:18

## 2020-02-13 RX ADMIN — METHYLPREDNISOLONE SODIUM SUCCINATE 40 MG: 40 INJECTION, POWDER, LYOPHILIZED, FOR SOLUTION INTRAMUSCULAR; INTRAVENOUS at 08:05

## 2020-02-13 RX ADMIN — METOPROLOL SUCCINATE 25 MG: 25 TABLET, FILM COATED, EXTENDED RELEASE ORAL at 19:57

## 2020-02-13 RX ADMIN — CLOPIDOGREL BISULFATE 75 MG: 75 TABLET ORAL at 08:06

## 2020-02-13 RX ADMIN — SODIUM CHLORIDE, PRESERVATIVE FREE 10 ML: 5 INJECTION INTRAVENOUS at 19:58

## 2020-02-13 RX ADMIN — METOPROLOL SUCCINATE 25 MG: 25 TABLET, FILM COATED, EXTENDED RELEASE ORAL at 08:06

## 2020-02-13 RX ADMIN — PANTOPRAZOLE SODIUM 40 MG: 40 INJECTION, POWDER, FOR SOLUTION INTRAVENOUS at 08:06

## 2020-02-13 RX ADMIN — SODIUM CHLORIDE, PRESERVATIVE FREE 10 ML: 5 INJECTION INTRAVENOUS at 08:05

## 2020-02-13 RX ADMIN — METHYLPREDNISOLONE SODIUM SUCCINATE 40 MG: 40 INJECTION, POWDER, LYOPHILIZED, FOR SOLUTION INTRAMUSCULAR; INTRAVENOUS at 23:30

## 2020-02-13 RX ADMIN — ATORVASTATIN CALCIUM 10 MG: 10 TABLET, FILM COATED ORAL at 17:31

## 2020-02-13 RX ADMIN — SPIRONOLACTONE 25 MG: 25 TABLET ORAL at 08:06

## 2020-02-13 RX ADMIN — ASPIRIN 81 MG 81 MG: 81 TABLET ORAL at 08:06

## 2020-02-13 RX ADMIN — OXYCODONE HYDROCHLORIDE AND ACETAMINOPHEN 1 TABLET: 7.5; 325 TABLET ORAL at 19:58

## 2020-02-13 RX ADMIN — FUROSEMIDE 20 MG: 10 INJECTION, SOLUTION INTRAMUSCULAR; INTRAVENOUS at 08:06

## 2020-02-13 RX ADMIN — CYANOCOBALAMIN TAB 1000 MCG 1000 MCG: 1000 TAB at 08:06

## 2020-02-13 RX ADMIN — IPRATROPIUM BROMIDE AND ALBUTEROL SULFATE 1 AMPULE: .5; 3 SOLUTION RESPIRATORY (INHALATION) at 12:09

## 2020-02-13 RX ADMIN — OXYCODONE HYDROCHLORIDE AND ACETAMINOPHEN 1 TABLET: 7.5; 325 TABLET ORAL at 13:50

## 2020-02-13 RX ADMIN — LISINOPRIL 2.5 MG: 2.5 TABLET ORAL at 08:06

## 2020-02-13 RX ADMIN — CEFEPIME HYDROCHLORIDE 2 G: 2 INJECTION, POWDER, FOR SOLUTION INTRAVENOUS at 17:32

## 2020-02-13 RX ADMIN — VANCOMYCIN HYDROCHLORIDE 1000 MG: 1 INJECTION, SOLUTION INTRAVENOUS at 01:04

## 2020-02-13 RX ADMIN — FUROSEMIDE 20 MG: 10 INJECTION, SOLUTION INTRAMUSCULAR; INTRAVENOUS at 17:30

## 2020-02-13 RX ADMIN — OSELTAMIVIR PHOSPHATE 30 MG: 30 CAPSULE ORAL at 19:58

## 2020-02-13 RX ADMIN — IPRATROPIUM BROMIDE AND ALBUTEROL SULFATE 1 AMPULE: .5; 3 SOLUTION RESPIRATORY (INHALATION) at 08:37

## 2020-02-13 RX ADMIN — Medication 2 PUFF: at 08:37

## 2020-02-13 RX ADMIN — VANCOMYCIN HYDROCHLORIDE 1000 MG: 1 INJECTION, SOLUTION INTRAVENOUS at 23:34

## 2020-02-13 RX ADMIN — OXYCODONE HYDROCHLORIDE AND ACETAMINOPHEN 1 TABLET: 7.5; 325 TABLET ORAL at 07:33

## 2020-02-13 RX ADMIN — AMIODARONE HYDROCHLORIDE 200 MG: 200 TABLET ORAL at 08:06

## 2020-02-13 ASSESSMENT — PAIN DESCRIPTION - DESCRIPTORS
DESCRIPTORS: ACHING
DESCRIPTORS: CONSTANT;DISCOMFORT

## 2020-02-13 ASSESSMENT — PAIN DESCRIPTION - PAIN TYPE
TYPE: CHRONIC PAIN

## 2020-02-13 ASSESSMENT — PAIN DESCRIPTION - ONSET
ONSET: ON-GOING

## 2020-02-13 ASSESSMENT — PAIN SCALES - GENERAL
PAINLEVEL_OUTOF10: 10
PAINLEVEL_OUTOF10: 8
PAINLEVEL_OUTOF10: 4
PAINLEVEL_OUTOF10: 8

## 2020-02-13 ASSESSMENT — PAIN DESCRIPTION - ORIENTATION: ORIENTATION: LOWER

## 2020-02-13 ASSESSMENT — PAIN DESCRIPTION - FREQUENCY
FREQUENCY: CONTINUOUS
FREQUENCY: CONTINUOUS

## 2020-02-13 ASSESSMENT — PAIN DESCRIPTION - PROGRESSION
CLINICAL_PROGRESSION: NOT CHANGED

## 2020-02-13 ASSESSMENT — PAIN DESCRIPTION - LOCATION
LOCATION: BACK
LOCATION: GENERALIZED
LOCATION: BACK

## 2020-02-13 ASSESSMENT — PAIN - FUNCTIONAL ASSESSMENT: PAIN_FUNCTIONAL_ASSESSMENT: ACTIVITIES ARE NOT PREVENTED

## 2020-02-13 NOTE — PROGRESS NOTES
Lipitor, Desyrel, Cozaar 100 mg a day,  and Aldactone.  She sees Dr. Donald Cruz for pain management. Princess Turner is on  oxycodone also. Objective:   /79   Pulse 96   Temp 97.5 °F (36.4 °C) (Oral)   Resp 18   Ht 5' 6\" (1.676 m)   Wt 145 lb 11.6 oz (66.1 kg)   SpO2 95%   BMI 23.52 kg/m²       Intake/Output Summary (Last 24 hours) at 2/13/2020 0851  Last data filed at 2/13/2020 8365  Gross per 24 hour   Intake 240 ml   Output 1300 ml   Net -1060 ml       Medications:   Scheduled Meds:   metoprolol succinate  25 mg Oral BID    lisinopril  2.5 mg Oral Daily    spironolactone  25 mg Oral Daily    vancomycin  1,000 mg Intravenous Q24H    furosemide  20 mg Intravenous BID    cefepime  2 g Intravenous Q12H    oseltamivir  30 mg Oral BID    enoxaparin  40 mg Subcutaneous Daily    methylPREDNISolone  40 mg Intravenous Q8H    amiodarone  200 mg Oral Daily    atorvastatin  10 mg Oral QPM    vitamin B-12  1,000 mcg Oral Daily    pantoprazole  40 mg Intravenous Daily    sodium chloride flush  10 mL Intravenous 2 times per day    aspirin  81 mg Oral Daily    mometasone-formoterol  2 puff Inhalation BID    ipratropium-albuterol  1 ampule Inhalation Q4H WA    clopidogrel  75 mg Oral Daily    traZODone  50 mg Oral Nightly    sodium chloride flush  10 mL Intravenous BID      Infusions:     PRN Meds:  benzocaine-menthol, oxyCODONE-acetaminophen, potassium chloride, ALPRAZolam, sodium chloride flush, magnesium hydroxide       Physical Exam:  Vitals:    02/13/20 0838   BP:    Pulse:    Resp:    Temp:    SpO2: 95%        General: AAO, NAD  Chest: Nontender  Cardiac: First and Second Heart Sounds are Normal, No Murmurs or Gallops noted  Lungs:Clear to auscultation and percussion. Abdomen: Soft, NT, ND, +BS  Extremities: No clubbing, no edema  Vascular:  Equal 2+ peripheral pulses.         Lab Data:  CBC:   Recent Labs     02/13/20  0317   WBC 13.2*   HGB 12.1*   HCT 39.9   MCV 96.4        BMP:   Recent Labs 02/10/20  1830 02/11/20  0339 02/11/20  2221 02/13/20 0317   NA  --  130*  --  131*   K 4.0 4.4  --  4.0   CL  --  91*  --  92*   CO2  --  25  --  24   PHOS  --   --   --  2.1*   BUN  --  21  --  29*   CREATININE  --  1.0  1.0 1.0 1.1     LIVER PROFILE:   Recent Labs     02/13/20 0317   AST 16   ALT 10   BILITOT 0.2   ALKPHOS 80     PT/INR: No results for input(s): PROTIME, INR in the last 72 hours. APTT: No results for input(s): APTT in the last 72 hours. BNP:  No results for input(s): BNP in the last 72 hours.       Assessment:  Patient Active Problem List    Diagnosis Date Noted    Wide-complex tachycardia ([address] 02/10/2020    Gram-negative pneumonia ([address] 18/09/6068    Acute systolic heart failure ([address] 02/10/2020    Acute on chronic respiratory failure with hypoxia and hypercapnia ([address] 02/09/2020    Pneumonia of right upper lobe due to infectious organism ([address]     Acute on chronic respiratory failure with hypoxia ([address] 12/30/2019    Chronic fatigue 07/08/2019    Intractable low back pain 06/11/2019    Back pain 06/09/2019    Nocturnal hypoxemia 05/21/2019    Shortness of breath 04/25/2019    Former smoker     Acute exacerbation of chronic obstructive pulmonary disease (COPD) ([address] 12/13/2018    COPD, severe ([address] 10/24/2017    Blood loss anemia 08/01/2017    Elevated troponin     Takotsubo syndrome 05/01/2017    Panic attack 11/18/2016    Lumbar spinal stenosis 01/01/2015    Vitamin B12 deficiency 12/01/2014    Osteoporosis 03/16/2012    Depression     Migraine 10/06/2010    Rheumatoid Arthritis     Hypertension     Hyperlipidemia     Fibromyalgia     Chronic pain syndrome        Electronically signed by Sherwin Curtis PA-C on 2/13/2020 at 8:51 AM

## 2020-02-13 NOTE — PLAN OF CARE
Problem: Pain:  Goal: Pain level will decrease  Description  Pain level will decrease  2/13/2020 1018 by Neptali Cordero RN  Outcome: Ongoing  2/12/2020 2257 by Jeanne Thibodeaux RN  Outcome: Ongoing  Goal: Control of acute pain  Description  Control of acute pain  2/13/2020 1018 by Neptali Cordero RN  Outcome: Ongoing  2/12/2020 2257 by Jeanne Thibodeaux RN  Outcome: Ongoing  Goal: Control of chronic pain  Description  Control of chronic pain  2/13/2020 1018 by Neptali Cordero RN  Outcome: Ongoing  2/12/2020 2257 by Jeanne Thibodeaux RN  Outcome: Ongoing     Problem: Falls - Risk of:  Goal: Will remain free from falls  Description  Will remain free from falls  2/13/2020 1018 by Neptali Cordero RN  Outcome: Ongoing  2/12/2020 2257 by Jeanne Thibodeaux RN  Outcome: Ongoing  Goal: Absence of physical injury  Description  Absence of physical injury  2/13/2020 1018 by Neptali Cordero RN  Outcome: Ongoing  2/12/2020 2257 by Jeanne Thibodeaux RN  Outcome: Ongoing     Problem: Discharge Planning:  Goal: Discharged to appropriate level of care  Description  Discharged to appropriate level of care  Outcome: Ongoing  Goal: Participates in care planning  Description  Participates in care planning  Outcome: Ongoing     Problem: Airway Clearance - Ineffective:  Goal: Clear lung sounds  Description  Clear lung sounds  2/13/2020 1018 by Neptali Cordero RN  Outcome: Ongoing  2/12/2020 2257 by Jeanne Thibodeaux RN  Outcome: Ongoing  Goal: Ability to maintain a clear airway will improve  Description  Ability to maintain a clear airway will improve  2/13/2020 1018 by Neptali Cordero RN  Outcome: Ongoing  2/12/2020 2257 by Jeanne Thibodeaux RN  Outcome: Ongoing     Problem: Fluid Volume - Deficit:  Goal: Achieves intake and output within specified parameters  Description  Achieves intake and output within specified parameters  2/13/2020 1018 by Neptali Cordero RN  Outcome: Ongoing  2/12/2020 2257 by Jeanne Thibodeaux RN  Outcome: Ongoing     Problem: Gas Exchange - Impaired:  Goal: Levels of oxygenation will improve  Description  Levels of oxygenation will improve  2/13/2020 1018 by Wero Szymanski RN  Outcome: Ongoing  2/12/2020 2257 by Jose Juan Nix RN  Outcome: Ongoing     Problem: Hyperthermia:  Goal: Ability to maintain a body temperature in the normal range will improve  Description  Ability to maintain a body temperature in the normal range will improve  Outcome: Ongoing     Problem: Tobacco Use:  Goal: Will participate in inpatient tobacco-use cessation counseling  Description  Will participate in inpatient tobacco-use cessation counseling  Outcome: Ongoing

## 2020-02-13 NOTE — PROGRESS NOTES
Hospitalist Progress Note         Admit Date: 2/9/2020    PCP: Finesse Black MD     Chief Complaint   Patient presents with    Shortness of Breath    Anxiety        Assessment and Plan:      Acute on chronic respiratory failure with hypoxia and hypercapnia (HCC)/COPD exacerbation    Continue Solu-Medrol IV, nebulization and O2 support. Pulmonology on board. Continue monitor closely   Positive influenza: Continue Tamiflu and droplet precautions.  Wide-complex tachycardia ([address] SVT/A. Fib: On amnio, metoprolol and started on Xarelto.  Gram-negative pneumonia (HCC) on broad-spectrum ABX-cefepime and vancomycin. Monitor   Acute systolic heart failure (HCC) EF 20%. Low-dose Lasix  for gentle diuresis. Continue Toprol-XL, lisinopril and Aldactone as per cardio. Follow vitals closely   Elevated troponin likely secondary to tachycardia. Continue DAPT, statin and BB. VTE prophylaxis LMWH. DC plan hopefully tomorrow if patient remains stable.   PT/OT evaluation for discharge disposition    Current Facility-Administered Medications   Medication Dose Route Frequency Provider Last Rate Last Dose    metoprolol succinate (TOPROL XL) extended release tablet 25 mg  25 mg Oral BID Jessica Wisdom MD   25 mg at 02/13/20 0806    [START ON 2/14/2020] lisinopril (PRINIVIL;ZESTRIL) tablet 5 mg  5 mg Oral Daily Gabby Anthony PA-C        rivaroxaban (XARELTO) tablet 10 mg  10 mg Oral Daily Jessica Wisdom MD        budesonide-formoterol (SYMBICORT) 160-4.5 MCG/ACT inhaler 2 puff  2 puff Inhalation BID Naheed Kendall MD        spironolactone (ALDACTONE) tablet 25 mg  25 mg Oral Daily Jessica Wisdom MD   25 mg at 02/13/20 0806    benzocaine-menthol (CEPACOL SORE THROAT) lozenge 1 lozenge  1 lozenge Oral Q2H PRN Hanane Das MD        vancomycin (VANCOCIN) 1000 mg in dextrose 5% 200 mL IVPB  1,000 mg Intravenous Q24H Naheed Kendall MD   Stopped at 02/13/20 0204    furosemide (LASIX) injection 20 mg  20 mg Intravenous BID Gregory Denise MD   20 mg at 02/13/20 0806    oxyCODONE-acetaminophen (PERCOCET) 7.5-325 MG per tablet 1 tablet  1 tablet Oral 4x Daily PRN Irving Echavarria PA-C   1 tablet at 02/13/20 1350    cefepime (MAXIPIME) 2 g in dextrose 5 % 50 mL IVPB  2 g Intravenous Q12H Damian Bartlett MD   Stopped at 02/13/20 0549    oseltamivir (TAMIFLU) capsule 30 mg  30 mg Oral BID Damian Bartlett MD   30 mg at 02/13/20 0805    potassium chloride 10 mEq/100 mL IVPB (Peripheral Line)  10 mEq Intravenous PRN Irving Echavarria PA-C 100 mL/hr at 02/10/20 1117 10 mEq at 02/10/20 1117    methylPREDNISolone sodium (SOLU-MEDROL) injection 40 mg  40 mg Intravenous Q8H Brayan Schmidt MD   40 mg at 02/13/20 0805    amiodarone (CORDARONE) tablet 200 mg  200 mg Oral Daily rGegory Denise MD   200 mg at 02/13/20 0806    ALPRAZolam (XANAX) tablet 0.5 mg  0.5 mg Oral Q8H PRN Valrie Bamberger, MD   0.5 mg at 02/11/20 6275    atorvastatin (LIPITOR) tablet 10 mg  10 mg Oral QPM NATALIYA Spencer - CNP   10 mg at 02/12/20 1735    vitamin B-12 (CYANOCOBALAMIN) tablet 1,000 mcg  1,000 mcg Oral Daily NATALIYA Spencer - CNP   1,000 mcg at 02/13/20 0806    pantoprazole (PROTONIX) injection 40 mg  40 mg Intravenous Daily NATALIYA Spencer - CNP   40 mg at 02/13/20 0806    sodium chloride flush 0.9 % injection 10 mL  10 mL Intravenous 2 times per day NATALIYA Spencer - CNP   10 mL at 02/13/20 0805    sodium chloride flush 0.9 % injection 10 mL  10 mL Intravenous PRN NATALIYA Spencer - CNP   10 mL at 02/10/20 7571    magnesium hydroxide (MILK OF MAGNESIA) 400 MG/5ML suspension 30 mL  30 mL Oral Daily PRN NATALIYA Spencer CNP        aspirin chewable tablet 81 mg  81 mg Oral Daily NATALIYA Spencer CNP   81 mg at 02/13/20 0806    ipratropium-albuterol (DUONEB) nebulizer solution 1 ampule  1 ampule Inhalation Q4H WA Damian Bartlett MD   1 ampule at 02/13/20 1209    clopidogrel (PLAVIX) tablet 75 mg  75 mg Oral Daily Gayathri Franklin MD   75 mg at 02/13/20 0806    traZODone (DESYREL) tablet 50 mg  50 mg Oral Nightly Terell Ramsay MD   50 mg at 02/12/20 2105    sodium chloride flush 0.9 % injection 10 mL  10 mL Intravenous BID Terell Ramsay MD   10 mL at 02/12/20 2104       Subjective:     Patient states her shortness of breath is better but still feels very weak. No acute overnight events. Slept okay last night      Objective: Intake/Output Summary (Last 24 hours) at 2/13/2020 1710  Last data filed at 2/13/2020 4778  Gross per 24 hour   Intake 240 ml   Output 1300 ml   Net -1060 ml      Vitals:   Vitals:    02/13/20 1541   BP: (!) 166/94   Pulse: 88   Resp: 17   Temp: 97.4 °F (36.3 °C)   SpO2: 100%     Physical Exam:  General Appearance:    Alert, cooperative, no distress  Head:      Normocephalic, without obvious abnormality, atraumatic  Eyes:       Conjunctiva/corneas clear, EOM's intact  Lungs:    CTA B/L with occasional crackles  at bases +  Heart:                Regular rate and rhythm, S1 and S2 normal, no murmur,   rub or gallop  Abdomen:     Soft, non-tender, bowel sounds active, no masses, no organomegaly   Extremities:    Improved B/L trace pedal edema  Neurological:   Grossly Intact. Significant Diagnostic Studies:   DATA:    CBC   Recent Labs     02/13/20 0317   WBC 13.2*   HGB 12.1*   HCT 39.9         BMP   Recent Labs     02/10/20  1830 02/11/20  0339 02/11/20  2221 02/13/20 0317   NA  --  130*  --  131*   K 4.0 4.4  --  4.0   CL  --  91*  --  92*   CO2  --  25  --  24   PHOS  --   --   --  2.1*   BUN  --  21  --  29*   CREATININE  --  1.0  1.0 1.0 1.1     LFT'S   Recent Labs     02/13/20 0317   AST 16   ALT 10   BILITOT 0.2   ALKPHOS 80     COAG   No results for input(s): INR in the last 72 hours.   POC:   Lab Results   Component Value Date    POCGLU 119 10/27/2013    POCGLU 143 10/27/2013    POCGLU 177 10/26/2013    POCGLU 125 10/26/2013

## 2020-02-13 NOTE — PLAN OF CARE
Problem: Pain:  Goal: Pain level will decrease  Description  Pain level will decrease  Outcome: Ongoing  Goal: Control of acute pain  Description  Control of acute pain  Outcome: Ongoing  Goal: Control of chronic pain  Description  Control of chronic pain  Outcome: Ongoing     Problem: Falls - Risk of:  Goal: Will remain free from falls  Description  Will remain free from falls  Outcome: Ongoing  Goal: Absence of physical injury  Description  Absence of physical injury  Outcome: Ongoing     Problem: Airway Clearance - Ineffective:  Goal: Clear lung sounds  Description  Clear lung sounds  Outcome: Ongoing  Goal: Ability to maintain a clear airway will improve  Description  Ability to maintain a clear airway will improve  Outcome: Ongoing     Problem: Fluid Volume - Deficit:  Goal: Achieves intake and output within specified parameters  Description  Achieves intake and output within specified parameters  Outcome: Ongoing     Problem: Gas Exchange - Impaired:  Goal: Levels of oxygenation will improve  Description  Levels of oxygenation will improve  Outcome: Ongoing

## 2020-02-13 NOTE — PROGRESS NOTES
Equipment: Grab bars in shower, Shower chair  Bathroom Accessibility: Accessible  Home Equipment: 4 wheeled walker, Standard walker, WC, O2 (2L PRN)   Receives Help From: Family  ADL Assistance: Independent with the exception of bathing (DIL assists)   Homemaking Assistance: Needs assistance. Dtr in law has been helping recently. Homemaking Responsibilities: Yes  Ambulation Assistance: Independent(El with 4ww)  Transfer Assistance: Independent  Active : Yes  Occupation: Retired  Leisure & Hobbies: iPad, Liya  Additional comments: no recent falls. Dtr in law stops over daily to check on pt/assist with household chores. Examination of body systems (includes body structures/functions, activity/participation limitations):  · Observation:  Supine in bed upon arrival. Agreeable to work with PT  · [facility] [employer] with glasses   · Hearing:  [facility] [employer]   · Cardiopulmonary:  2L O2 SpO2 84-95% with activity and rest. VCs for pursed lip breathing technique. · Orientation: [facility]     Musculoskeletal  · ROM R/L:  [facility]   · Strength R/L:  Hips 4/5, knees and ankles 4+/5. Dec strength observed in function and endurance. · Neuro:  Chronic numbness to left foot. Mobility:  · Rolling L/R:  NT   · Supine to sit:  SBA with inc time. HOB elevated ~45 deg and use of bed rail. · Scooting: SBA fwd to EOB   · Transfers:  · Sit to stand: CGA for safety from EOB and BSC. Poor carry over with hand sequencing between trials needing VCs on both transitions to push from more stable surface vs pull from RW. · Stand to sit: CGA for safety to [facility] and recliner. VCs for hand sequencing. · Step pivot: with RW CGA for safety (2x). VCs for body positioning within RW for greater support and stability. · Sitting balance:  SBA at EOB/BSC static and light dyamic without UE support   · Standing balance:  CGA at RW with at least single UE support   · Gait: 10ft x 2 with RW CGA for safety. Dec safety awareness to obstacles with use of RW. VCs for safe navigating around the obstacles. Dec pace, slightly fwd posture, quick fatigue, dec step length bilat. No major LOB noted. · Educated pt on POC, role of PT, [facility]), discharge recommendations. VCs for sequencing, posture,  UE/LE placement to inc safety and indep with mobility. [facility] 6 Clicks Inpatient Mobility:  AM-PAC Inpatient Mobility Raw Score : 17    Safety: patient left in chair, call light within reach,  gait belt used. Assessment: Body structures, Functions, Activity limitations: Decreased functional mobility ; Decreased safe awareness; Decreased endurance; Decreased balance; Decreased strength; Decreased posture; Increased pain  Pt is a 78year old female admitted with COPD exacerbation, flu, PNA, tachycardia, acute on chronic respiratory failure with hypoxia/hypercapnia. Recommend subacute rehab once medically stable. At baseline, she is El with gross mobility and ADLs. She is currently CGA with dec overall tolerance to activity. She is functioning below her typical baseline and does not appear to have 24/7 assist available. She would benefit from continued therapy to address deficits, dec potential fall risk, and restore function. Complexity: Moderate  Prognosis: Good, no significant barriers to participation at this time. Plan Times per week: 3+/week  Discharge Recommendations: Subacute/Skilled Nursing Facility  Equipment: continue to assess     Goals:  Short term goals  Time Frame for Short term goals: 1 week   Short term goal 1: Pt will perform sit><supine El   Short term goal 2: Pt will transfer to bed/recliner El   Short term goal 3: Pt will ambulate 100ft with RW El        Treatment plan:  Transfer Training; Balance Training; Strengthening; ROM; Functional Mobility Training; ADL/Self-care Training; IADL Training; Endurance Training; Gait Training; Neuromuscular Re-education; Home Exercise Program; Patient/Caregiver Education & Training; Modalities;  Positioning; Equipment Evaluation, Education, & procurement;  Safety Education & Training  Recommendations for NURSING mobility: ambulate to bathroom with RW     Time:   Time in: 1020  Time out: 1044  Timed treatment minutes: 9  Total time: 24    Electronically signed by:    Gisella Huang [id number]  2/13/2020, 11:39 AM

## 2020-02-13 NOTE — PROGRESS NOTES
Pulmonary and Critical Care  Progress Note      VITALS:  /79   Pulse 96   Temp 97.5 °F (36.4 °C) (Oral)   Resp 18   Ht 5' 6\" (1.676 m)   Wt 145 lb 11.6 oz (66.1 kg)   SpO2 95%   BMI 23.52 kg/m²     Subjective:   Chief complaint: Acute on chronic hypoxemic respiratory failure, bibasilar pneumonia, influenza A infection, SVT, history of Takotsubo syndrome    Awake and alert. Continues to complain of general weakness, states she was able to get up to the chair today but she was extremely short of breath with activity. She denies any current chest pain. She is noted fussing with her nasal cannula and it was not in place upon my initial presentation to her room. She does continue to have mild respiratory distress at rest emains very weak but less chest congestion    Objective:   PHYSICAL EXAM:    CONSTITUTIONAL:  awake, alert, cooperative, appears stated age, mild respiratory distress at rest  NECK:  Supple, symmetrical, trachea midline, no adenopathy, thyroid symmetric, not enlarged and no tenderness, skin normal  LUNGS: Diminished breath sounds throughout all lung fields with persistent rails noted posteriorly on left greater than right, minimal wheezing, remains on 3 L nasal cannula with saturation 95%  CARDIOVASCULAR:  normal S1 and S2, no edema and no JVD  ABDOMEN:  normal bowel sounds, non-distended and no masses palpated, and no tenderness to palpation. No hepatospleenomegaly  LYMPHADENOPATHY:  No axillary or supraclavicular adenopathy. No cervical adnenopathy  PSYCHIATRIC: Oriented to person place and time. No obvious depression or anxiety. MUSCULOSKELETAL: No obvious misalignment or effusion of the joints. No clubbing, cyanosis of the digits.   SKIN:  normal skin color, warm, no rash noted    DATA:    CBC:  Recent Labs     02/13/20  0317   WBC 13.2*   RBC 4.14*   HGB 12.1*   HCT 39.9      MCV 96.4   MCH 29.2   MCHC 30.3*   RDW 15.0*      BMP:  Recent Labs     02/10/20  1830 02/11/20  7157 02/11/20 2221 02/13/20  0317   NA  --  130*  --  131*   K 4.0 4.4  --  4.0   CL  --  91*  --  92*   CO2  --  25  --  24   BUN  --  21  --  29*   CREATININE  --  1.0  1.0 1.0 1.1   CALCIUM  --  7.8*  --  8.2*   GLUCOSE  --  142*  --  175*      ABG:  No results for input(s): PH, PO2ART, PRE7OVR, HCO3, BEART, O2SAT in the last 72 hours.   BNP  Lab Results   Component Value Date    BNP 54 11/25/2012      D-Dimer:  Lab Results   Component Value Date    DDIMER 2679 (H) 02/09/2020        Cultures:  Blood: Not obtained  Sputum: not obtained, prior culture 1/3/2020 demonstrated ELIZABETHKINGIA MENINGOSEPTICUM SCANTY GROWTH   Urine: Not obtained    MRSA screen - negative  Respiratory panel -positive influenza A    Radiology:  2/11/2020 portable chest x-ray  Basilar airspace disease, mildly improved at the left lung base otherwise similar to x-ray from 2/9/2020.    2/9/2020 CTA with contrast  No evidence of pulmonary emboli or aortic dissection, bibasilar opacities with right greater than left      Assessment/Plan     Patient Active Problem List    Diagnosis Date Noted    Wide-complex tachycardia ([address] 02/10/2020    Gram-negative pneumonia ([address] 79/97/5998    Acute systolic heart failure ([address] 02/10/2020    Acute on chronic respiratory failure with hypoxia and hypercapnia ([address] 02/09/2020    Pneumonia of right upper lobe due to infectious organism ([address]     Acute on chronic respiratory failure with hypoxia ([address] 12/30/2019    Chronic fatigue 07/08/2019    Intractable low back pain 06/11/2019    Back pain 06/09/2019    Nocturnal hypoxemia 05/21/2019     Overview Note:     NIGHTLY OXYGEN       Shortness of breath 04/25/2019    Former smoker      Overview Note:     Quit 1/2019      Acute exacerbation of chronic obstructive pulmonary disease (COPD) ([address] 12/13/2018    COPD, severe ([address] 10/24/2017    Blood loss anemia 08/01/2017     Overview Note:     hgb 8.1 7/2017;      Elevated troponin     Takotsubo syndrome 05/01/2017     Overview Note:     Hospitalized 5/2017      Panic attack 11/18/2016    Lumbar spinal stenosis 01/01/2015     Overview Note:     moderately severe by MRI; saw ELEAZAR VELASQUEZ [facility] 9/2016; referred to [address] [facility],  [address] Vitamin B12 deficiency 12/01/2014     Overview Note:     B12 level 199      Osteoporosis 03/16/2012     Overview Note:     Fragility Fx 2010; DEXA and Frax score 3/2012: 8 & 33%; DEXA at [facility] 1/2014; Reclast 2/2014; 2/2015; 5/2016; 5/2017; 8/15/2018;  annually      Depression     Migraine 10/06/2010    Rheumatoid Arthritis      Overview Note:     [facility]      Hypertension     Hyperlipidemia     Fibromyalgia      Overview Note:     Francoise King      Chronic pain syndrome      Overview Note:     DOC pain mgt: David Muniz; lumbar epidurals 3/2017;       PLAN:  1. Continue present treatment  2. Encourage physical therapy  3. Encourage and continue aggressive bronchopulmonary toilet  4. Continue IV antibiotics, steroids, bronchodilators  5.   Incentive spirometer every 2 hours while awake           Electronically signed by NATALIYA Ortiz CNP on 2/13/2020 at 11:38 AM

## 2020-02-14 LAB
CULTURE: NORMAL
CULTURE: NORMAL
Lab: NORMAL
Lab: NORMAL
SPECIMEN: NORMAL
SPECIMEN: NORMAL

## 2020-02-14 PROCEDURE — 2580000003 HC RX 258: Performed by: HOSPITALIST

## 2020-02-14 PROCEDURE — 1200000000 HC SEMI PRIVATE

## 2020-02-14 PROCEDURE — 2700000000 HC OXYGEN THERAPY PER DAY

## 2020-02-14 PROCEDURE — 6360000002 HC RX W HCPCS: Performed by: NURSE PRACTITIONER

## 2020-02-14 PROCEDURE — 6370000000 HC RX 637 (ALT 250 FOR IP): Performed by: INTERNAL MEDICINE

## 2020-02-14 PROCEDURE — 6360000002 HC RX W HCPCS: Performed by: INTERNAL MEDICINE

## 2020-02-14 PROCEDURE — 97530 THERAPEUTIC ACTIVITIES: CPT

## 2020-02-14 PROCEDURE — 97166 OT EVAL MOD COMPLEX 45 MIN: CPT

## 2020-02-14 PROCEDURE — 99232 SBSQ HOSP IP/OBS MODERATE 35: CPT | Performed by: NURSE PRACTITIONER

## 2020-02-14 PROCEDURE — 97535 SELF CARE MNGMENT TRAINING: CPT

## 2020-02-14 PROCEDURE — 6370000000 HC RX 637 (ALT 250 FOR IP): Performed by: PHYSICIAN ASSISTANT

## 2020-02-14 PROCEDURE — C9113 INJ PANTOPRAZOLE SODIUM, VIA: HCPCS | Performed by: NURSE PRACTITIONER

## 2020-02-14 PROCEDURE — 6360000002 HC RX W HCPCS: Performed by: HOSPITALIST

## 2020-02-14 PROCEDURE — 94640 AIRWAY INHALATION TREATMENT: CPT

## 2020-02-14 PROCEDURE — 6370000000 HC RX 637 (ALT 250 FOR IP): Performed by: HOSPITALIST

## 2020-02-14 PROCEDURE — 6370000000 HC RX 637 (ALT 250 FOR IP): Performed by: NURSE PRACTITIONER

## 2020-02-14 PROCEDURE — 2580000003 HC RX 258: Performed by: NURSE PRACTITIONER

## 2020-02-14 RX ORDER — FUROSEMIDE 10 MG/ML
40 INJECTION INTRAMUSCULAR; INTRAVENOUS 2 TIMES DAILY
Status: DISCONTINUED | OUTPATIENT
Start: 2020-02-14 | End: 2020-02-16 | Stop reason: HOSPADM

## 2020-02-14 RX ADMIN — PANTOPRAZOLE SODIUM 40 MG: 40 INJECTION, POWDER, FOR SOLUTION INTRAVENOUS at 10:09

## 2020-02-14 RX ADMIN — METOPROLOL SUCCINATE 25 MG: 25 TABLET, FILM COATED, EXTENDED RELEASE ORAL at 10:11

## 2020-02-14 RX ADMIN — BENZOCAINE, MENTHOL 1 LOZENGE: 15; 3.6 LOZENGE ORAL at 05:53

## 2020-02-14 RX ADMIN — FUROSEMIDE 40 MG: 10 INJECTION, SOLUTION INTRAMUSCULAR; INTRAVENOUS at 10:09

## 2020-02-14 RX ADMIN — OXYCODONE HYDROCHLORIDE AND ACETAMINOPHEN 1 TABLET: 7.5; 325 TABLET ORAL at 17:37

## 2020-02-14 RX ADMIN — TRAZODONE HYDROCHLORIDE 50 MG: 50 TABLET ORAL at 21:48

## 2020-02-14 RX ADMIN — CEFEPIME HYDROCHLORIDE 2 G: 2 INJECTION, POWDER, FOR SOLUTION INTRAVENOUS at 05:17

## 2020-02-14 RX ADMIN — CLOPIDOGREL BISULFATE 75 MG: 75 TABLET ORAL at 10:13

## 2020-02-14 RX ADMIN — FUROSEMIDE 40 MG: 10 INJECTION, SOLUTION INTRAMUSCULAR; INTRAVENOUS at 17:35

## 2020-02-14 RX ADMIN — SPIRONOLACTONE 25 MG: 25 TABLET ORAL at 10:13

## 2020-02-14 RX ADMIN — OSELTAMIVIR PHOSPHATE 30 MG: 30 CAPSULE ORAL at 10:13

## 2020-02-14 RX ADMIN — OSELTAMIVIR PHOSPHATE 30 MG: 30 CAPSULE ORAL at 21:48

## 2020-02-14 RX ADMIN — SODIUM CHLORIDE, PRESERVATIVE FREE 10 ML: 5 INJECTION INTRAVENOUS at 09:00

## 2020-02-14 RX ADMIN — OXYCODONE HYDROCHLORIDE AND ACETAMINOPHEN 1 TABLET: 7.5; 325 TABLET ORAL at 11:37

## 2020-02-14 RX ADMIN — METOPROLOL SUCCINATE 25 MG: 25 TABLET, FILM COATED, EXTENDED RELEASE ORAL at 21:48

## 2020-02-14 RX ADMIN — METHYLPREDNISOLONE SODIUM SUCCINATE 40 MG: 40 INJECTION, POWDER, LYOPHILIZED, FOR SOLUTION INTRAMUSCULAR; INTRAVENOUS at 21:48

## 2020-02-14 RX ADMIN — ALPRAZOLAM 0.5 MG: 0.5 TABLET ORAL at 15:08

## 2020-02-14 RX ADMIN — IPRATROPIUM BROMIDE AND ALBUTEROL SULFATE 1 AMPULE: .5; 3 SOLUTION RESPIRATORY (INHALATION) at 11:43

## 2020-02-14 RX ADMIN — ATORVASTATIN CALCIUM 10 MG: 10 TABLET, FILM COATED ORAL at 17:36

## 2020-02-14 RX ADMIN — SODIUM CHLORIDE, PRESERVATIVE FREE 10 ML: 5 INJECTION INTRAVENOUS at 16:14

## 2020-02-14 RX ADMIN — OXYCODONE HYDROCHLORIDE AND ACETAMINOPHEN 1 TABLET: 7.5; 325 TABLET ORAL at 05:34

## 2020-02-14 RX ADMIN — CEFEPIME HYDROCHLORIDE 2 G: 2 INJECTION, POWDER, FOR SOLUTION INTRAVENOUS at 17:21

## 2020-02-14 RX ADMIN — IPRATROPIUM BROMIDE AND ALBUTEROL SULFATE 1 AMPULE: .5; 3 SOLUTION RESPIRATORY (INHALATION) at 08:04

## 2020-02-14 RX ADMIN — RIVAROXABAN 10 MG: 10 TABLET, FILM COATED ORAL at 17:36

## 2020-02-14 RX ADMIN — ASPIRIN 81 MG 81 MG: 81 TABLET ORAL at 10:11

## 2020-02-14 RX ADMIN — SODIUM CHLORIDE, PRESERVATIVE FREE 10 ML: 5 INJECTION INTRAVENOUS at 10:21

## 2020-02-14 RX ADMIN — SODIUM CHLORIDE, PRESERVATIVE FREE 10 ML: 5 INJECTION INTRAVENOUS at 17:21

## 2020-02-14 RX ADMIN — METHYLPREDNISOLONE SODIUM SUCCINATE 40 MG: 40 INJECTION, POWDER, LYOPHILIZED, FOR SOLUTION INTRAMUSCULAR; INTRAVENOUS at 10:13

## 2020-02-14 RX ADMIN — BUDESONIDE AND FORMOTEROL FUMARATE DIHYDRATE 2 PUFF: 160; 4.5 AEROSOL RESPIRATORY (INHALATION) at 08:11

## 2020-02-14 RX ADMIN — SODIUM CHLORIDE, PRESERVATIVE FREE 10 ML: 5 INJECTION INTRAVENOUS at 17:35

## 2020-02-14 RX ADMIN — LISINOPRIL 5 MG: 5 TABLET ORAL at 10:13

## 2020-02-14 RX ADMIN — CYANOCOBALAMIN TAB 1000 MCG 1000 MCG: 1000 TAB at 10:13

## 2020-02-14 RX ADMIN — SODIUM CHLORIDE, PRESERVATIVE FREE 10 ML: 5 INJECTION INTRAVENOUS at 21:48

## 2020-02-14 RX ADMIN — METHYLPREDNISOLONE SODIUM SUCCINATE 40 MG: 40 INJECTION, POWDER, LYOPHILIZED, FOR SOLUTION INTRAMUSCULAR; INTRAVENOUS at 16:14

## 2020-02-14 RX ADMIN — AMIODARONE HYDROCHLORIDE 200 MG: 200 TABLET ORAL at 10:11

## 2020-02-14 RX ADMIN — IPRATROPIUM BROMIDE AND ALBUTEROL SULFATE 1 AMPULE: .5; 3 SOLUTION RESPIRATORY (INHALATION) at 15:48

## 2020-02-14 ASSESSMENT — PAIN SCALES - GENERAL
PAINLEVEL_OUTOF10: 8
PAINLEVEL_OUTOF10: 8
PAINLEVEL_OUTOF10: 3
PAINLEVEL_OUTOF10: 6
PAINLEVEL_OUTOF10: 8

## 2020-02-14 ASSESSMENT — PAIN DESCRIPTION - ORIENTATION: ORIENTATION: LOWER

## 2020-02-14 ASSESSMENT — PAIN DESCRIPTION - LOCATION: LOCATION: GENERALIZED

## 2020-02-14 ASSESSMENT — PAIN - FUNCTIONAL ASSESSMENT: PAIN_FUNCTIONAL_ASSESSMENT: ACTIVITIES ARE NOT PREVENTED

## 2020-02-14 ASSESSMENT — PAIN DESCRIPTION - PAIN TYPE: TYPE: CHRONIC PAIN

## 2020-02-14 ASSESSMENT — PAIN DESCRIPTION - ONSET: ONSET: ON-GOING

## 2020-02-14 ASSESSMENT — PAIN DESCRIPTION - PROGRESSION: CLINICAL_PROGRESSION: NOT CHANGED

## 2020-02-14 ASSESSMENT — PAIN DESCRIPTION - FREQUENCY: FREQUENCY: INTERMITTENT

## 2020-02-14 ASSESSMENT — PAIN DESCRIPTION - DESCRIPTORS: DESCRIPTORS: ACHING;DISCOMFORT

## 2020-02-14 NOTE — PROGRESS NOTES
Daily Progress Note     awake alert feeling better  Sitting in chair  Heart rate and BP stable  SVT/afib/ apical ballooning -HFrEf -medical treatment  Elevated trop secondary to above  Viral infection   Supportive care   Increase activity  Increase lasix dose for now    Echo-2/10/20  Summary   This is a limited echocardiogram.   Left ventricular systolic function is abnormal.   Ejection fraction is visually estimated at 20-30%.   Apical akinesis is present.   Possible takotsubo cardiomyopathy.   Severe aortic regurgitation is noted ( ms).   Moderate mitral regurgitation is present.   Small mobile calcification attached to the posterior mitral valve chordae   tendinae.   Mitral annular calcification is present.   No evidence of any pericardial effusion.     PAST MEDICAL HISTORY:  History of having nonobstructive coronary artery  present, history of cardiomyopathy in the past, COPD, fibromyalgia,  hypertension, hyperlipidemia, convulsive fractures and rheumatoid  arthritis.     PAST SURGICAL HISTORY:  Gallbladder surgery, appendix _____.     SOCIAL HISTORY:  She does not smoke, does not drink.  She is a former  smoker.     ALLERGIES:  CARDIZEM causing lower extremity edema.  HUMIRA, REMICADE,  ATIVAN, _____ and DOXYCYCLINE.     MEDICATIONS:  She is on potassium, Questran for diarrhea, Lopressor 50  mg b.i.d., Lasix 40 mg b.i.d., Lipitor, Desyrel, Cozaar 100 mg a day,  and Aldactone.  She sees Dr. Abbi Lackey for pain management. Karina Hankins is on  oxycodone also.       Objective:   /74   Pulse 84   Temp 98 °F (36.7 °C) (Oral)   Resp 18   Ht 5' 6\" (1.676 m)   Wt 145 lb 11.6 oz (66.1 kg)   SpO2 98%   BMI 23.52 kg/m²       Intake/Output Summary (Last 24 hours) at 2/14/2020 1022  Last data filed at 2/14/2020 0610  Gross per 24 hour   Intake 110 ml   Output 1100 ml   Net -990 ml       Medications:   Scheduled Meds:   furosemide  40 mg Intravenous BID    metoprolol succinate  25 mg Oral BID    lisinopril  5 mg Oral Daily    rivaroxaban  10 mg Oral Daily    budesonide-formoterol  2 puff Inhalation BID    spironolactone  25 mg Oral Daily    vancomycin  1,000 mg Intravenous Q24H    cefepime  2 g Intravenous Q12H    oseltamivir  30 mg Oral BID    methylPREDNISolone  40 mg Intravenous Q8H    amiodarone  200 mg Oral Daily    atorvastatin  10 mg Oral QPM    vitamin B-12  1,000 mcg Oral Daily    pantoprazole  40 mg Intravenous Daily    sodium chloride flush  10 mL Intravenous 2 times per day    aspirin  81 mg Oral Daily    ipratropium-albuterol  1 ampule Inhalation Q4H WA    clopidogrel  75 mg Oral Daily    traZODone  50 mg Oral Nightly    sodium chloride flush  10 mL Intravenous BID      Infusions:     PRN Meds:  benzocaine-menthol, oxyCODONE-acetaminophen, potassium chloride, ALPRAZolam, sodium chloride flush, magnesium hydroxide       Physical Exam:  Vitals:    02/14/20 0806   BP:    Pulse:    Resp:    Temp:    SpO2: 98%        General: awake alert   Chest: Nontender  Cardiac: sinus   Lungs:Clear to auscultation and percussion. Abdomen: Soft, NT, ND, +BS  Extremities: no edema   Vascular:  Equal 2+ peripheral pulses. Lab Data:  CBC:   Recent Labs     02/13/20 0317   WBC 13.2*   HGB 12.1*   HCT 39.9   MCV 96.4        BMP:   Recent Labs     02/11/20  2221 02/13/20 0317   NA  --  131*   K  --  4.0   CL  --  92*   CO2  --  24   PHOS  --  2.1*   BUN  --  29*   CREATININE 1.0 1.1     LIVER PROFILE:   Recent Labs     02/13/20 0317   AST 16   ALT 10   BILITOT 0.2   ALKPHOS 80     PT/INR: No results for input(s): PROTIME, INR in the last 72 hours. APTT: No results for input(s): APTT in the last 72 hours. BNP:  No results for input(s): BNP in the last 72 hours.       Assessment:  Patient Active Problem List    Diagnosis Date Noted    Wide-complex tachycardia ([facility] 02/10/2020    Gram-negative pneumonia ([facility] [address] 77/05/3898    Acute systolic heart failure ([facility] [address] 02/10/2020    Acute on chronic

## 2020-02-14 NOTE — PROGRESS NOTES
Henry Alvarenga MD, [address]                Internal Medicine Hospitalist             Daily Progress  Note   Subjective:     Chief Complaint   Patient presents with    Shortness of Breath    Anxiety     Ms. Carlos Enrique Verdin of being very anxious as her  just  a week ago. Objective:    /74   Pulse 84   Temp 98 °F (36.7 °C) (Oral)   Resp 18   Ht 5' 6\" (1.676 m)   Wt 145 lb 11.6 oz (66.1 kg)   SpO2 98%   BMI 23.52 kg/m²      Intake/Output Summary (Last 24 hours) at 2020 1545  Last data filed at 2020 0610  Gross per 24 hour   Intake 110 ml   Output 1100 ml   Net -990 ml      Physical Exam:  Heart:  Distant heart sounds. Lungs: Mostly clear to auscultation, decreased breath sounds at bases. No wheezes appreciated no crackles heard. Very poor air movement. Abdomen: Soft, non distended. Bowel sounds appreciated. No obvious liver or spleen enlargement. Non tender, no rebound noted. Extremities: Non tender, no swelling noted, strength 5/5 both legs. CNS: Grossly intact.     Labs:  CBC with Differential:    Lab Results   Component Value Date    WBC 13.2 2020    RBC 4.14 2020    HGB 12.1 2020    HCT 39.9 2020     2020    MCV 96.4 2020    MCH 29.2 2020    MCHC 30.3 2020    RDW 15.0 2020    SEGSPCT 81.8 2020    BANDSPCT 6 2017    LYMPHOPCT 13.8 2020    MONOPCT 3.5 2020    EOSPCT 2.0 2012    BASOPCT 0.3 2020    MONOSABS 0.4 2020    LYMPHSABS 1.4 2020    EOSABS 0.0 2020    BASOSABS 0.0 2020    DIFFTYPE AUTOMATED DIFFERENTIAL 2020     CMP:    Lab Results   Component Value Date     2020    K 4.0 2020    CL 92 2020    CO2 24 2020    BUN 29 2020    CREATININE 1.1 2020    GFRAA 58 2020    GFRAA >60 2012    AGRATIO 1.8 2014    LABGLOM 48 2020    LABGLOM 91 2014    GLUCOSE 175 2020    PROT 5.1 02/13/2020    PROT 7.6 03/04/2013    LABALBU 2.9 02/13/2020    CALCIUM 8.2 02/13/2020    BILITOT 0.2 02/13/2020    ALKPHOS 80 02/13/2020    AST 16 02/13/2020    ALT 10 02/13/2020     Recent Labs     02/12/20  0807   TROPONINT 0.220*     Lab Results   Component Value Date    TSH 0.604 12/31/2019           furosemide  40 mg Intravenous BID    metoprolol succinate  25 mg Oral BID    lisinopril  5 mg Oral Daily    rivaroxaban  10 mg Oral Daily    budesonide-formoterol  2 puff Inhalation BID    spironolactone  25 mg Oral Daily    vancomycin  1,000 mg Intravenous Q24H    cefepime  2 g Intravenous Q12H    oseltamivir  30 mg Oral BID    methylPREDNISolone  40 mg Intravenous Q8H    amiodarone  200 mg Oral Daily    atorvastatin  10 mg Oral QPM    vitamin B-12  1,000 mcg Oral Daily    pantoprazole  40 mg Intravenous Daily    sodium chloride flush  10 mL Intravenous 2 times per day    aspirin  81 mg Oral Daily    ipratropium-albuterol  1 ampule Inhalation Q4H WA    clopidogrel  75 mg Oral Daily    traZODone  50 mg Oral Nightly    sodium chloride flush  10 mL Intravenous BID         Assessment:       Patient Active Problem List    Diagnosis Date Noted    Wide-complex tachycardia ([facility] [address] 02/10/2020    Gram-negative pneumonia ([facility] [address] 80/33/4765    Acute systolic heart failure ([facility] [address] 02/10/2020    Acute on chronic respiratory failure with hypoxia and hypercapnia (HCC) 02/09/2020    Pneumonia of right upper lobe due to infectious organism ([facility] [address]     Acute on chronic respiratory failure with hypoxia ([address] 12/30/2019    Chronic fatigue 07/08/2019    Intractable low back pain 06/11/2019    Back pain 06/09/2019    Nocturnal hypoxemia 05/21/2019    Shortness of breath 04/25/2019    Former smoker     Acute exacerbation of chronic obstructive pulmonary disease (COPD) ([address] 12/13/2018    COPD, severe ([address] 10/24/2017    Blood loss anemia 08/01/2017    Elevated troponin     Takotsubo syndrome 05/01/2017    Panic attack 11/18/2016    Lumbar spinal stenosis 01/01/2015    Vitamin B12 deficiency 12/01/2014    Osteoporosis 03/16/2012    Depression     Migraine 10/06/2010    Rheumatoid Arthritis     Hypertension     Hyperlipidemia     Fibromyalgia     Chronic pain syndrome        Plan:     Problems being addressed this admission:     Acute Respiratory Failure / COPD / Flu A pos / PNA  2/13/20-Acute on chronic respiratory failure with hypoxia and hypercapnia (HCC)/COPD exacerbation  Continue Solu-Medrol IV, nebulization and O2 support. Pulmonology on board. Continue monitor closely Positive influenza: Continue Tamiflu and droplet precautions. Gram-negative pneumonia ([facility] [address] on broad-spectrum ABX-cefepime and vancomycin. 2/14/20- she is not breathing good at all, will need to continue as before. I would like to have an ABG done to get a better handle. Grief / Bereavement Reaction  2/14/20- she just lost her  and so will need some counseling. I will check with RN if she can be visited by the bry. I am unable to give her ativan type medication as it might depress her respiratory drive. SVT / A Fib / CMP non ischemic  2/13/20-Wide-complex tachycardia (HCC) SVT/A. Fib: On amnio, metoprolol and started on Xarelto. Acute systolic heart failure (HCC) EF 20%. Low-dose Lasix  for gentle diuresis. Continue Toprol-XL, lisinopril and Aldactone as per cardio. Follow vitals closely Elevated troponin likely secondary to tachycardia. Continue DAPT, statin and BB.  2/14/20- she has very high BNP, will have cardiology address it. Her troponin was positive. On DOAC. Disposition:  2/14/20- to be discharged to SNF, but not ready today. Consultants:  Pulmonary  Cardiology    General Orders:  Repeat basic labs again in am.  I have explained to the patient and discussed with him/her the treatment plan.   Darene Soulier, MD, [address]

## 2020-02-14 NOTE — SIGNIFICANT EVENT
Spent some time with patient discussing grief and ways to deal with grief. Patient was tearful at first, but was comforted with prayer, counseling, and listening presence.

## 2020-02-14 NOTE — PROGRESS NOTES
1/2019      Acute exacerbation of chronic obstructive pulmonary disease (COPD) ([facility] [address] 12/13/2018    COPD, severe ([facility] [address] 10/24/2017    Blood loss anemia 08/01/2017     Overview Note:     hgb 8.1 7/2017;      Elevated troponin     Takotsubo syndrome 05/01/2017     Overview Note:     Hospitalized 5/2017      Panic attack 11/18/2016    Lumbar spinal stenosis 01/01/2015     Overview Note:     moderately severe by MRI; saw [facility] 9/2016; referred to [facility],  [address] Vitamin B12 deficiency 12/01/2014     Overview Note:     B12 level 199      Osteoporosis 03/16/2012     Overview Note:     Fragility Fx 2010; DEXA and Frax score 3/2012: 8 & 33%; DEXA at [facility] 1/2014; Reclast 2/2014; 2/2015; 5/2016; 5/2017; 8/15/2018;  annually      Depression     Migraine 10/06/2010    Rheumatoid Arthritis      Overview Note:     [facility]      Hypertension     Hyperlipidemia     Fibromyalgia      Overview Note:     Yuriy Lei      Chronic pain syndrome      Overview Note:     DOC pain mgt: Raine Marshall; lumbar epidurals 3/2017;       Plan:  1. Continue present respiratory treatment - Symbicort, Duo Neb, solumedrol, Tamiflu, Droplet precautions  2. Encourage physical therapy, OOB and ambulation  3. Encourage and continue aggressive bronchopulmonary toilet  4. Continue IV antibiotics  5. Incentive spirometer every 2 hours while awake  6.  Spoke with Evelyn Morrison who contacted Abrahan Amezcua at [facility] and will try to see patient today           Electronically signed by NATALIYA Barillas CNP on 2/14/2020 at 9:12 AM

## 2020-02-14 NOTE — PLAN OF CARE
parameters  2/14/2020 0104 by Trevin Tracy RN  Outcome: Ongoing     Problem: Gas Exchange - Impaired:  Goal: Levels of oxygenation will improve  Description  Levels of oxygenation will improve  2/14/2020 0104 by Trevin Tracy RN  Outcome: Ongoing     Problem: Hyperthermia:  Goal: Ability to maintain a body temperature in the normal range will improve  Description  Ability to maintain a body temperature in the normal range will improve  2/14/2020 0104 by Trevin Tracy RN  Outcome: Ongoing     Problem: Tobacco Use:  Goal: Will participate in inpatient tobacco-use cessation counseling  Description  Will participate in inpatient tobacco-use cessation counseling  2/14/2020 0104 by Trevin Tracy RN  Outcome: Ongoing     Problem: Risk for Impaired Skin Integrity  Goal: Tissue integrity - skin and mucous membranes  Description  Structural intactness and normal physiological function of skin and  mucous membranes.   Outcome: Ongoing

## 2020-02-14 NOTE — PROGRESS NOTES
[address] [facility] ACUTE CARE OCCUPATIONAL THERAPY EVALUATION  [facility], 1940, [id number]/4107-[id number], 2/14/2020    History  [place]:  The primary encounter diagnosis was Acute respiratory failure with hypoxia and hypercapnia ([address] Diagnoses of NSTEMI (non-ST elevated myocardial infarction) ([address] Respiratory acidosis, Lactic acidosis, Hypomagnesemia, and Hyperglycemia were also pertinent to this visit. Patient  has a past medical history of Acute DVT of right tibial vein (HCC), Acute exacerbation of chronic obstructive pulmonary disease (COPD) ([address] Arthritis, Chronic pain syndrome, Clostridium difficile colitis, COPD, severe ([address] Depression, Fibromyalgia, Former smoker, Herniated cervical disc, Herpes zoster ophthalmicus, Hx of blood clots, Hyperlipidemia, Hypertension, L3 vertebral fracture ([address] Lumbar spinal stenosis, Migraine, Nocturnal hypoxemia, Osteoporosis, Rheumatoid arthritis(714.0), S/P cardiac catheterization, Shortness of breath, Takotsubo syndrome, TMJ dysfunction, Tobacco abuse, and Vitamin B12 deficiency. Patient  has a past surgical history that includes Cholecystectomy (1966); devyn and bso (cervix removed) (1991); Wrist fusion (Left, 2000); Elbow surgery (Right); Appendectomy (1966); vertebroplasty (10/2010); Foot surgery (1986); Toe Surgery (Left, 4/25/2013); Cardiac catheterization (05/21/2017); pr colonoscopy flx dx w/collj spec when pfrmd (N/A, 10/2/2018); and Fixation Kyphoplasty (06/12/2019). Subjective:  Patient states:  \"I could use a bath\". Pain:  Does not have any pain at rest, R shoulder hurts 4/10 aching due to rheumatoid arthritis.     Communication with other providers:  Handoff to RN,  Restrictions: Droplet Influenza, General Precautions, Fall Risk    Home Setup/Prior level of function    Social/Functional History  Lives With: Alone (spouse recently passed away)  Type of Home: Apartment(Condo)  Home Layout: One level  Home Access: Level don't have anyone to love today\" and began crying. Pt re-directed to perform BIMS exam and pt's affect became normal.    Cognitive Patterns   Cognitive Pattern Assessment Used BIMS   Repetition of Three Words (First Attempt) 3   Temporal Orientation: Year Correct   Temporal Orientation: Month Accurate within 5 days   Temporal Orientation: Day Correct   Able to recall \"sock Yes, no cue required   Able to recall \"blue\" Yes, no cue required   Able to recall \"bed\" Yes, no cue required   BIMS Summary Score 15 (calculated)         Mobility:  · Supine to sit:  SBA  · Transfers: CGA up to RW  · Sitting balance:  SBA. · Standing balance:  CGA w/ RW  · Functional Mobility: CGA w/ RW in room ~15 feet Pt became fatigued and demonstrated poor eccentric control when returned to chair. · Toilet/Shower Transfers: DNT             AM-[facility] Daily Activity Inpatient   How much help for putting on and taking off regular lower body clothing?: A Little  How much help for Bathing?: A Little  How much help for Toileting?: A Little  How much help for putting on and taking off regular upper body clothing?: A Little  How much help for taking care of personal grooming?: A Little  How much help for eating meals?: None  [facility] Inpatient Daily Activity Raw Score: 19  AM-[facility] Inpatient ADL T-Scale Score : 40.22  ADL Inpatient CMS 0-100% Score: 42.8  ADL Inpatient CMS G-Code Modifier : CK    Treatment:  Self Care Training:   Cues were given for safety, sequence, UE/LE placement, visual cues, and balance. Activities performed today included UB/LB bathing/dressing, grooming, toileting    Therapeutic Activity Training:   Therapeutic activity training was instructed today. Cues were given for safety, sequence, UE/LE placement, awareness, and balance.     Activities performed today included bed mobility training, sup-sit, sit-stand, functional mobility, stand to sit    Safety: patient left in chair with chair alarm, call light within reach, RN

## 2020-02-14 NOTE — DISCHARGE INSTR - COC
IM (Fluad 65 yrs and older) 10/04/2019    Pneumococcal Conjugate 13-valent ([id number]) 12/17/2014    Pneumococcal Polysaccharide ([id number]) 09/26/2008    Td, unspecified formulation 08/04/2016    Tdap (Boostrix, Adacel) 08/04/2016       Active Problems:  Patient Active Problem List   Diagnosis Code    Rheumatoid Arthritis M19.90    Hypertension I10    Hyperlipidemia E78.5    Fibromyalgia M79.7    Migraine G43.909    Chronic pain syndrome G89.4    Depression F32.9    Osteoporosis M81.0    Vitamin B12 deficiency E53.8    Lumbar spinal stenosis M48.061    Panic attack F41.0    Elevated troponin R79.89    Takotsubo syndrome I51.81    Blood loss anemia D50.0    COPD, severe ([facility]) J44.9    Acute exacerbation of chronic obstructive pulmonary disease (COPD) ([facility]) J44.1    Former smoker Z87.891    Shortness of breath R06.02    Nocturnal hypoxemia G47.34    Back pain M54.9    Intractable low back pain M54.5    Chronic fatigue R53.82    Acute on chronic respiratory failure with hypoxia ([facility]) J96.21    Pneumonia of right upper lobe due to infectious organism ([facility]) J18.1    Acute on chronic respiratory failure with hypoxia and hypercapnia ([facility]) J96.21, J96.22    Wide-complex tachycardia ([facility]) I47.2    Gram-negative pneumonia ([facility]) D80.7    Acute systolic heart failure ([facility]) I50.21       Isolation/Infection:   Isolation          Droplet        Patient Infection Status     Infection Onset Added Last Indicated Last Indicated By Review Planned Expiration Resolved Resolved By    INFLUENZA 02/09/20 02/10/20 02/09/20 RESP Disease Panel PCR 02/17/20 03/10/20      Resolved    C-diff Rule Out  12/31/19 12/31/19 C difficile Molecular/PCR (Ordered)   01/02/20 Claude Faulkner, RN          Nurse Assessment:  Last Vital Signs: /74   Pulse 84   Temp 98 °F (36.7 °C) (Oral)   Resp 18   Ht 5' 6\" (1.676 m)   Wt 145 lb 11.6 oz (66.1 kg)   SpO2 100%   BMI 23.52 kg/m²     Last documented pain score (0-10 Bearing Status/Restrictions: No weight bearing restirctions  Other Medical Equipment (for information only, NOT a DME order):  As needed. Other Treatments: as needed. Prognosis: Fair    Condition at Discharge: Stable    Rehab Potential (if transferring to Rehab): Fair    Recommended Labs or Other Treatments After Discharge: cbc and bmp in 3-5 days    Physician Certification: I certify the above information and transfer of Alisha Avitia  is necessary for the continuing treatment of the diagnosis listed and that she requires Located within [facility] for greater 30 days.      Update Admission H&P: No change in H&P    PHYSICIAN SIGNATURE:  Electronically signed by Yumi Du MD on 2/16/20 at 1:01 PM

## 2020-02-14 NOTE — PROGRESS NOTES
[facility]  consulted by Dr. Alejandra Bucio for monitoring and adjustment. Indication for treatment: Pneumonia  Goal trough: 15-20 mcg/mL  Other Antimicrobials: cefepime     Pertinent Laboratory Values:   Temp Readings from Last 3 Encounters:   02/14/20 98 °F (36.7 °C) (Oral)   02/06/20 97.8 °F (36.6 °C)   01/04/20 97.9 °F (36.6 °C) (Oral)     Recent Labs     02/13/20  0317   WBC 13.2*     Recent Labs     02/11/20  2221 02/13/20  0317   BUN  --  29*   CREATININE 1.0 1.1     Estimated Creatinine Clearance: 39 mL/min (based on SCr of 1.1 mg/dL). Intake/Output Summary (Last 24 hours) at 2/14/2020 1655  Last data filed at 2/14/2020 0610  Gross per 24 hour   Intake 110 ml   Output 1100 ml   Net -990 ml       Pertinent Cultures:  Date    Source    Results  2/9   Resp panel   Positive for influenza A  2/9   Blood x 2   NGTD  2/9   Influenza   Negative  2/9   MRSA screen  Negative    Vancomycin level:   TROUGH:    Recent Labs     02/11/20 2221   VANCOTROUGH 19.8     RANDOM:  No results for input(s): VANCORANDOM in the last 72 hours. Assessment:  · WBC and temperature: WBC @ 13.2 yesterday; Afebrile  · SCr, BUN, and urine output: SCR with slight upward trend, output good  · Day(s) of therapy: #6  · Vancomycin level: 19.8, therapeutic    Plan:  · SCR with a slight trend upward, will follow trend closely. · Continue Vancomycin 1000 mg q24h IVPB. · MRSA screen is negative, de-escalation may be considered  · Pharmacy will continue to monitor patient and adjust therapy as indicated    Thank you for the consult,    Corwin Fong.  Ruby Rivas

## 2020-02-15 ENCOUNTER — APPOINTMENT (OUTPATIENT)
Dept: GENERAL RADIOLOGY | Age: 80
DRG: 177 | End: 2020-02-15
Payer: MEDICARE

## 2020-02-15 LAB
BASE EXCESS MIXED: ABNORMAL (ref 0–2.3)
CARBON MONOXIDE, BLOOD: 1.6 % (ref 0–5)
CO2 CONTENT: 26.3 MMOL/L (ref 19–24)
COMMENT: ABNORMAL
CREAT SERPL-MCNC: 1.1 MG/DL (ref 0.6–1.1)
GFR AFRICAN AMERICAN: 58 ML/MIN/1.73M2
GFR NON-AFRICAN AMERICAN: 48 ML/MIN/1.73M2
HCO3 ARTERIAL: 25.3 MMOL/L (ref 18–23)
METHEMOGLOBIN ARTERIAL: 1.5 %
O2 SATURATION: 96.3 % (ref 96–97)
PCO2 ARTERIAL: 34 MMHG (ref 32–45)
PH BLOOD: 7.48 (ref 7.34–7.45)
PO2 ARTERIAL: 117 MMHG (ref 75–100)
PRO-BNP: ABNORMAL PG/ML

## 2020-02-15 PROCEDURE — 71046 X-RAY EXAM CHEST 2 VIEWS: CPT

## 2020-02-15 PROCEDURE — 82565 ASSAY OF CREATININE: CPT

## 2020-02-15 PROCEDURE — 1200000000 HC SEMI PRIVATE

## 2020-02-15 PROCEDURE — 6360000002 HC RX W HCPCS: Performed by: INTERNAL MEDICINE

## 2020-02-15 PROCEDURE — 6370000000 HC RX 637 (ALT 250 FOR IP): Performed by: INTERNAL MEDICINE

## 2020-02-15 PROCEDURE — 99232 SBSQ HOSP IP/OBS MODERATE 35: CPT | Performed by: NURSE PRACTITIONER

## 2020-02-15 PROCEDURE — 6370000000 HC RX 637 (ALT 250 FOR IP): Performed by: PHYSICIAN ASSISTANT

## 2020-02-15 PROCEDURE — 94640 AIRWAY INHALATION TREATMENT: CPT

## 2020-02-15 PROCEDURE — 2580000003 HC RX 258: Performed by: NURSE PRACTITIONER

## 2020-02-15 PROCEDURE — 6360000002 HC RX W HCPCS: Performed by: HOSPITALIST

## 2020-02-15 PROCEDURE — 6370000000 HC RX 637 (ALT 250 FOR IP): Performed by: HOSPITALIST

## 2020-02-15 PROCEDURE — 6370000000 HC RX 637 (ALT 250 FOR IP): Performed by: NURSE PRACTITIONER

## 2020-02-15 PROCEDURE — 83880 ASSAY OF NATRIURETIC PEPTIDE: CPT

## 2020-02-15 PROCEDURE — 36415 COLL VENOUS BLD VENIPUNCTURE: CPT

## 2020-02-15 PROCEDURE — 94761 N-INVAS EAR/PLS OXIMETRY MLT: CPT

## 2020-02-15 PROCEDURE — 6360000002 HC RX W HCPCS: Performed by: NURSE PRACTITIONER

## 2020-02-15 PROCEDURE — 82803 BLOOD GASES ANY COMBINATION: CPT

## 2020-02-15 PROCEDURE — 2700000000 HC OXYGEN THERAPY PER DAY

## 2020-02-15 PROCEDURE — C9113 INJ PANTOPRAZOLE SODIUM, VIA: HCPCS | Performed by: NURSE PRACTITIONER

## 2020-02-15 PROCEDURE — 2580000003 HC RX 258: Performed by: HOSPITALIST

## 2020-02-15 RX ADMIN — SODIUM CHLORIDE, PRESERVATIVE FREE 10 ML: 5 INJECTION INTRAVENOUS at 09:23

## 2020-02-15 RX ADMIN — FUROSEMIDE 40 MG: 10 INJECTION, SOLUTION INTRAMUSCULAR; INTRAVENOUS at 09:17

## 2020-02-15 RX ADMIN — OXYCODONE HYDROCHLORIDE AND ACETAMINOPHEN 1 TABLET: 7.5; 325 TABLET ORAL at 09:18

## 2020-02-15 RX ADMIN — CEFEPIME HYDROCHLORIDE 2 G: 2 INJECTION, POWDER, FOR SOLUTION INTRAVENOUS at 05:25

## 2020-02-15 RX ADMIN — CEFEPIME HYDROCHLORIDE 2 G: 2 INJECTION, POWDER, FOR SOLUTION INTRAVENOUS at 17:36

## 2020-02-15 RX ADMIN — TRAZODONE HYDROCHLORIDE 50 MG: 50 TABLET ORAL at 20:52

## 2020-02-15 RX ADMIN — OXYCODONE HYDROCHLORIDE AND ACETAMINOPHEN 1 TABLET: 7.5; 325 TABLET ORAL at 02:44

## 2020-02-15 RX ADMIN — CYANOCOBALAMIN TAB 1000 MCG 1000 MCG: 1000 TAB at 09:17

## 2020-02-15 RX ADMIN — METOPROLOL SUCCINATE 25 MG: 25 TABLET, FILM COATED, EXTENDED RELEASE ORAL at 09:17

## 2020-02-15 RX ADMIN — METHYLPREDNISOLONE SODIUM SUCCINATE 40 MG: 40 INJECTION, POWDER, LYOPHILIZED, FOR SOLUTION INTRAMUSCULAR; INTRAVENOUS at 16:23

## 2020-02-15 RX ADMIN — CLOPIDOGREL BISULFATE 75 MG: 75 TABLET ORAL at 09:18

## 2020-02-15 RX ADMIN — SPIRONOLACTONE 25 MG: 25 TABLET ORAL at 09:18

## 2020-02-15 RX ADMIN — IPRATROPIUM BROMIDE AND ALBUTEROL SULFATE 1 AMPULE: .5; 3 SOLUTION RESPIRATORY (INHALATION) at 21:23

## 2020-02-15 RX ADMIN — METOPROLOL SUCCINATE 25 MG: 25 TABLET, FILM COATED, EXTENDED RELEASE ORAL at 20:52

## 2020-02-15 RX ADMIN — SODIUM CHLORIDE, PRESERVATIVE FREE 10 ML: 5 INJECTION INTRAVENOUS at 20:56

## 2020-02-15 RX ADMIN — IPRATROPIUM BROMIDE AND ALBUTEROL SULFATE 1 AMPULE: .5; 3 SOLUTION RESPIRATORY (INHALATION) at 07:50

## 2020-02-15 RX ADMIN — VANCOMYCIN HYDROCHLORIDE 1000 MG: 1 INJECTION, SOLUTION INTRAVENOUS at 00:46

## 2020-02-15 RX ADMIN — METHYLPREDNISOLONE SODIUM SUCCINATE 40 MG: 40 INJECTION, POWDER, LYOPHILIZED, FOR SOLUTION INTRAMUSCULAR; INTRAVENOUS at 21:55

## 2020-02-15 RX ADMIN — IPRATROPIUM BROMIDE AND ALBUTEROL SULFATE 1 AMPULE: .5; 3 SOLUTION RESPIRATORY (INHALATION) at 11:32

## 2020-02-15 RX ADMIN — AMIODARONE HYDROCHLORIDE 200 MG: 200 TABLET ORAL at 09:17

## 2020-02-15 RX ADMIN — LISINOPRIL 5 MG: 5 TABLET ORAL at 09:17

## 2020-02-15 RX ADMIN — BUDESONIDE AND FORMOTEROL FUMARATE DIHYDRATE 2 PUFF: 160; 4.5 AEROSOL RESPIRATORY (INHALATION) at 07:51

## 2020-02-15 RX ADMIN — FUROSEMIDE 40 MG: 10 INJECTION, SOLUTION INTRAMUSCULAR; INTRAVENOUS at 17:36

## 2020-02-15 RX ADMIN — PANTOPRAZOLE SODIUM 40 MG: 40 INJECTION, POWDER, FOR SOLUTION INTRAVENOUS at 09:16

## 2020-02-15 RX ADMIN — RIVAROXABAN 10 MG: 10 TABLET, FILM COATED ORAL at 17:36

## 2020-02-15 RX ADMIN — METHYLPREDNISOLONE SODIUM SUCCINATE 40 MG: 40 INJECTION, POWDER, LYOPHILIZED, FOR SOLUTION INTRAMUSCULAR; INTRAVENOUS at 09:16

## 2020-02-15 RX ADMIN — IPRATROPIUM BROMIDE AND ALBUTEROL SULFATE 1 AMPULE: .5; 3 SOLUTION RESPIRATORY (INHALATION) at 15:19

## 2020-02-15 RX ADMIN — ATORVASTATIN CALCIUM 10 MG: 10 TABLET, FILM COATED ORAL at 17:36

## 2020-02-15 RX ADMIN — BUDESONIDE AND FORMOTEROL FUMARATE DIHYDRATE 2 PUFF: 160; 4.5 AEROSOL RESPIRATORY (INHALATION) at 21:23

## 2020-02-15 RX ADMIN — OXYCODONE HYDROCHLORIDE AND ACETAMINOPHEN 1 TABLET: 7.5; 325 TABLET ORAL at 16:23

## 2020-02-15 RX ADMIN — OXYCODONE HYDROCHLORIDE AND ACETAMINOPHEN 1 TABLET: 7.5; 325 TABLET ORAL at 20:53

## 2020-02-15 RX ADMIN — ASPIRIN 81 MG 81 MG: 81 TABLET ORAL at 09:17

## 2020-02-15 ASSESSMENT — PAIN SCALES - GENERAL
PAINLEVEL_OUTOF10: 6
PAINLEVEL_OUTOF10: 8
PAINLEVEL_OUTOF10: 0
PAINLEVEL_OUTOF10: 8
PAINLEVEL_OUTOF10: 7
PAINLEVEL_OUTOF10: 6
PAINLEVEL_OUTOF10: 6
PAINLEVEL_OUTOF10: 8
PAINLEVEL_OUTOF10: 5

## 2020-02-15 ASSESSMENT — PAIN DESCRIPTION - FREQUENCY
FREQUENCY: CONTINUOUS
FREQUENCY: CONTINUOUS

## 2020-02-15 ASSESSMENT — PAIN DESCRIPTION - LOCATION
LOCATION: GENERALIZED;SHOULDER
LOCATION: GENERALIZED;SHOULDER

## 2020-02-15 ASSESSMENT — PAIN DESCRIPTION - DESCRIPTORS
DESCRIPTORS: ACHING;DISCOMFORT
DESCRIPTORS: ACHING;DISCOMFORT

## 2020-02-15 ASSESSMENT — PAIN DESCRIPTION - PAIN TYPE
TYPE: CHRONIC PAIN
TYPE: CHRONIC PAIN

## 2020-02-15 ASSESSMENT — PAIN DESCRIPTION - ONSET
ONSET: ON-GOING
ONSET: ON-GOING

## 2020-02-15 ASSESSMENT — PAIN DESCRIPTION - ORIENTATION
ORIENTATION: RIGHT
ORIENTATION: RIGHT

## 2020-02-15 ASSESSMENT — PAIN DESCRIPTION - PROGRESSION
CLINICAL_PROGRESSION: GRADUALLY WORSENING
CLINICAL_PROGRESSION: GRADUALLY WORSENING

## 2020-02-15 NOTE — PROGRESS NOTES
Yumi Du MD, [facility]                Internal Medicine Hospitalist             Daily Progress  Note   Subjective:     Chief Complaint   Patient presents with    Shortness of Breath    Anxiety     Ms. Lopez Complains of pain everywhere. Every time she reminds me that she gets morphine when ever she is in the hospital.     Objective:    BP (!) 142/88   Pulse 87   Temp 98.5 °F (36.9 °C) (Oral)   Resp 20   Ht 5' 6\" (1.676 m)   Wt 140 lb 3.2 oz (63.6 kg)   SpO2 98%   BMI 22.63 kg/m²      Intake/Output Summary (Last 24 hours) at 2/15/2020 1620  Last data filed at 2/15/2020 7805  Gross per 24 hour   Intake 674.67 ml   Output 1850 ml   Net -1175.33 ml      Physical Exam:  Heart:  Regular rate and rhythm, normal S1 and S2 in all 4 auscultatory areas. No rubs  Murmurs or gallops heard. Lungs: Mostly clear to auscultation, decreased breath sounds at bases. No wheezes appreciated no crackles heard. Poor air exchange. Abdomen: Soft, non distended. Bowel sounds appreciated. No obvious liver or spleen enlargement. Non tender, no rebound noted. Extremities: Non tender, no swelling noted, strength 5/5 both legs. CNS: Grossly intact.     Labs:  CBC with Differential:    Lab Results   Component Value Date    WBC 13.2 02/13/2020    RBC 4.14 02/13/2020    HGB 12.1 02/13/2020    HCT 39.9 02/13/2020     02/13/2020    MCV 96.4 02/13/2020    MCH 29.2 02/13/2020    MCHC 30.3 02/13/2020    RDW 15.0 02/13/2020    SEGSPCT 81.8 02/09/2020    BANDSPCT 6 05/30/2017    LYMPHOPCT 13.8 02/09/2020    MONOPCT 3.5 02/09/2020    EOSPCT 2.0 05/07/2012    BASOPCT 0.3 02/09/2020    MONOSABS 0.4 02/09/2020    LYMPHSABS 1.4 02/09/2020    EOSABS 0.0 02/09/2020    BASOSABS 0.0 02/09/2020    DIFFTYPE AUTOMATED DIFFERENTIAL 02/09/2020     CMP:    Lab Results   Component Value Date     02/13/2020    K 4.0 02/13/2020    CL 92 02/13/2020    CO2 24 02/13/2020    BUN 29 02/13/2020    CREATININE 1.1 02/15/2020    GFRAA 58 02/15/2020 10/24/2017    Blood loss anemia 08/01/2017    Elevated troponin     Takotsubo syndrome 05/01/2017    Panic attack 11/18/2016    Lumbar spinal stenosis 01/01/2015    Vitamin B12 deficiency 12/01/2014    Osteoporosis 03/16/2012    Depression     Migraine 10/06/2010    Rheumatoid Arthritis     Hypertension     Hyperlipidemia     Fibromyalgia     Chronic pain syndrome        Plan:     Problems being addressed this admission:     Acute Respiratory Failure / COPD / Flu A pos / PNA  2/13/20-Acute on chronic respiratory failure with hypoxia and hypercapnia (HCC)/COPD exacerbation  Continue Solu-Medrol IV, nebulization and O2 support.  Pulmonology on board. Continue monitor closely Positive influenza: Continue Tamiflu and droplet precautions. Gram-negative pneumonia ([facility] on broad-spectrum ABX-cefepime and vancomycin. 2/14/20- she is not breathing good at all, will need to continue as before. I would like to have an ABG done to get a better handle. 2/15/20- continue as before. Check  ABG, order  PT/OT/CM.    Grief / Bereavement Reaction  2/14/20- she just lost her  and so will need some counseling. I will check with RN if she can be visited by the bry. I am unable to give her ativan type medication as it might depress her respiratory drive.      SVT / A Fib / CMP non ischemic  2/13/20-Wide-complex tachycardia (HCC) SVT/A. Fib: On amnio, metoprolol and started on Xarelto. Acute systolic heart failure (HCC) EF 20%.  Low-dose Lasix  for gentle diuresis.    Continue Toprol-XL, lisinopril and Aldactone as per cardio.  Follow vitals closely Elevated troponin likely secondary to tachycardia.  Continue DAPT, statin and BB.  2/14/20- she has very high BNP, will have cardiology address it. Her troponin was positive. On DOAC.   2/15/20- no change.     Disposition:  2/14/20- to be discharged to SNF, but not ready today. 2/15/20- SNF  ? tomorrow if ABG is good.

## 2020-02-15 NOTE — PROGRESS NOTES
GLUCOSE 175*  --       ABG:  No results for input(s): PH, PO2ART, QQK8UDR, HCO3, BEART, O2SAT in the last 72 hours.   BNP  Lab Results   Component Value Date    BNP 54 11/25/2012      D-Dimer:  Lab Results   Component Value Date    DDIMER 2679 (H) 02/09/2020        Cultures:  Blood: Not obtained  Sputum: not obtained, prior cultures have demonstratedELIZABETHKINGIA MENINGOSEPTICUM SCANTY Elida Rast obtained     MRSA screen - negative  Respiratory panel -positive influenza A     Radiology:  2/11/2020 portable chest x-ray  Basilar airspace disease, mildly improved at the left lung base otherwise similar to x-ray from 2/9/2020.     2/9/2020 CTA with contrast  No evidence of pulmonary emboli or aortic dissection, bibasilar opacities with right greater than left      Assessment/Plan     Patient Active Problem List    Diagnosis Date Noted    Wide-complex tachycardia ([address] 02/10/2020    Gram-negative pneumonia ([address] 39/92/7530    Acute systolic heart failure ([address] 02/10/2020    Acute on chronic respiratory failure with hypoxia and hypercapnia ([address] 02/09/2020    Pneumonia of right upper lobe due to infectious organism ([address]     Acute on chronic respiratory failure with hypoxia ([address] 12/30/2019    Chronic fatigue 07/08/2019    Intractable low back pain 06/11/2019    Back pain 06/09/2019    Nocturnal hypoxemia 05/21/2019     Overview Note:     NIGHTLY OXYGEN       Shortness of breath 04/25/2019    Former smoker      Overview Note:     Quit 1/2019      Acute exacerbation of chronic obstructive pulmonary disease (COPD) ([address] 12/13/2018    COPD, severe ([address] 10/24/2017    Blood loss anemia 08/01/2017     Overview Note:     hgb 8.1 7/2017;      Elevated troponin     Takotsubo syndrome 05/01/2017     Overview Note:     Hospitalized 5/2017      Panic attack 11/18/2016    Lumbar spinal stenosis 01/01/2015     Overview Note:     moderately severe by MRI; saw [facility] 9/2016; referred to [address] [place] [address],  [address] Vitamin B12 deficiency 12/01/2014     Overview Note:     B12 level 199      Osteoporosis 03/16/2012     Overview Note:     Fragility Fx 2010; DEXA and Frax score 3/2012: 8 & 33%; DEXA at [facility] 1/2014; Reclast 2/2014; 2/2015; 5/2016; 5/2017; 8/15/2018;  annually      Depression     Migraine 10/06/2010    Rheumatoid Arthritis      Overview Note:     [facility]      Hypertension     Hyperlipidemia     Fibromyalgia      Overview Note:     Sheridan Meigs      Chronic pain syndrome      Overview Note:     DOC pain mgt: Dustin Alarcon; lumbar epidurals 3/2017;         Plan:  1.  Continue present respiratory treatment - Symbicort, Duo Neb, solumedrol, Tamiflu, Droplet precautions  2.  Encourage physical therapy, OOB and ambulation  3.  Encourage and continue aggressive bronchopulmonary toilet  4.  Continue IV antibiotics, switch to po when able  5.  Incentive spirometer every 2 hours while awake  6.   [facility],  has been consulted      Electronically signed by NATALIYA Dewitt CNP on 2/15/2020 at 10:00 AM

## 2020-02-15 NOTE — PLAN OF CARE
Problem: Pain:  Goal: Pain level will decrease  Description  Pain level will decrease  2/15/2020 0200 by Patricia Sullivan RN  Outcome: Ongoing  2/14/2020 1258 by Severo Canada, RN  Outcome: Ongoing  Goal: Control of acute pain  Description  Control of acute pain  2/15/2020 0200 by Patricia Sullivan RN  Outcome: Ongoing  2/14/2020 1258 by Severo Canada, RN  Outcome: Ongoing  Goal: Control of chronic pain  Description  Control of chronic pain  2/15/2020 0200 by Patricia Sullivan RN  Outcome: Ongoing  2/14/2020 1258 by Severo Canada, RN  Outcome: Ongoing     Problem: Falls - Risk of:  Goal: Will remain free from falls  Description  Will remain free from falls  2/15/2020 0200 by Patircia Sullivan RN  Outcome: Ongoing  2/14/2020 1258 by Severo Canada, RN  Outcome: Ongoing  Goal: Absence of physical injury  Description  Absence of physical injury  2/15/2020 0200 by Patricia Sullivan RN  Outcome: Ongoing  2/14/2020 1258 by Severo Canada, RN  Outcome: Ongoing     Problem: Discharge Planning:  Goal: Discharged to appropriate level of care  Description  Discharged to appropriate level of care  2/15/2020 0200 by Patricia Sullivan RN  Outcome: Ongoing  2/14/2020 1258 by Severo Canada, RN  Outcome: Ongoing  Goal: Participates in care planning  Description  Participates in care planning  2/15/2020 0200 by Patricia Sullivan RN  Outcome: Ongoing  2/14/2020 1258 by Severo Canada, RN  Outcome: Ongoing     Problem: Airway Clearance - Ineffective:  Goal: Clear lung sounds  Description  Clear lung sounds  2/15/2020 0200 by Patricia Sullivan RN  Outcome: Ongoing  2/14/2020 1258 by Severo Canada, RN  Outcome: Ongoing  Goal: Ability to maintain a clear airway will improve  Description  Ability to maintain a clear airway will improve  2/15/2020 0200 by Patricia Sullvian RN  Outcome: Ongoing  2/14/2020 1258 by Severo Canada, RN  Outcome: Ongoing     Problem: Fluid Volume - Deficit:  Goal: Achieves intake and output within

## 2020-02-16 VITALS
SYSTOLIC BLOOD PRESSURE: 141 MMHG | OXYGEN SATURATION: 99 % | HEART RATE: 73 BPM | TEMPERATURE: 97.8 F | WEIGHT: 150.1 LBS | RESPIRATION RATE: 18 BRPM | HEIGHT: 66 IN | DIASTOLIC BLOOD PRESSURE: 88 MMHG | BODY MASS INDEX: 24.12 KG/M2

## 2020-02-16 LAB
ANION GAP SERPL CALCULATED.3IONS-SCNC: 13 MMOL/L (ref 4–16)
BUN BLDV-MCNC: 45 MG/DL (ref 6–23)
CALCIUM SERPL-MCNC: 8.4 MG/DL (ref 8.3–10.6)
CHLORIDE BLD-SCNC: 92 MMOL/L (ref 99–110)
CO2: 26 MMOL/L (ref 21–32)
CREAT SERPL-MCNC: 1.3 MG/DL (ref 0.6–1.1)
GFR AFRICAN AMERICAN: 48 ML/MIN/1.73M2
GFR NON-AFRICAN AMERICAN: 40 ML/MIN/1.73M2
GLUCOSE BLD-MCNC: 182 MG/DL (ref 70–99)
HCT VFR BLD CALC: 37.4 % (ref 37–47)
HEMOGLOBIN: 11.8 GM/DL (ref 12.5–16)
MCH RBC QN AUTO: 29.6 PG (ref 27–31)
MCHC RBC AUTO-ENTMCNC: 31.6 % (ref 32–36)
MCV RBC AUTO: 94 FL (ref 78–100)
PDW BLD-RTO: 14.5 % (ref 11.7–14.9)
PLATELET # BLD: 299 K/CU MM (ref 140–440)
PMV BLD AUTO: 11.2 FL (ref 7.5–11.1)
POTASSIUM SERPL-SCNC: 4.5 MMOL/L (ref 3.5–5.1)
PRO-BNP: ABNORMAL PG/ML
RBC # BLD: 3.98 M/CU MM (ref 4.2–5.4)
SODIUM BLD-SCNC: 131 MMOL/L (ref 135–145)
WBC # BLD: 13.6 K/CU MM (ref 4–10.5)

## 2020-02-16 PROCEDURE — 6370000000 HC RX 637 (ALT 250 FOR IP): Performed by: PHYSICIAN ASSISTANT

## 2020-02-16 PROCEDURE — 2580000003 HC RX 258: Performed by: NURSE PRACTITIONER

## 2020-02-16 PROCEDURE — 6360000002 HC RX W HCPCS: Performed by: INTERNAL MEDICINE

## 2020-02-16 PROCEDURE — 83880 ASSAY OF NATRIURETIC PEPTIDE: CPT

## 2020-02-16 PROCEDURE — 82565 ASSAY OF CREATININE: CPT

## 2020-02-16 PROCEDURE — 6370000000 HC RX 637 (ALT 250 FOR IP): Performed by: INTERNAL MEDICINE

## 2020-02-16 PROCEDURE — 6370000000 HC RX 637 (ALT 250 FOR IP): Performed by: NURSE PRACTITIONER

## 2020-02-16 PROCEDURE — C9113 INJ PANTOPRAZOLE SODIUM, VIA: HCPCS | Performed by: NURSE PRACTITIONER

## 2020-02-16 PROCEDURE — 80202 ASSAY OF VANCOMYCIN: CPT

## 2020-02-16 PROCEDURE — 80048 BASIC METABOLIC PNL TOTAL CA: CPT

## 2020-02-16 PROCEDURE — 36415 COLL VENOUS BLD VENIPUNCTURE: CPT

## 2020-02-16 PROCEDURE — 2580000003 HC RX 258: Performed by: HOSPITALIST

## 2020-02-16 PROCEDURE — 6370000000 HC RX 637 (ALT 250 FOR IP): Performed by: HOSPITALIST

## 2020-02-16 PROCEDURE — 99232 SBSQ HOSP IP/OBS MODERATE 35: CPT | Performed by: NURSE PRACTITIONER

## 2020-02-16 PROCEDURE — 94640 AIRWAY INHALATION TREATMENT: CPT

## 2020-02-16 PROCEDURE — 85027 COMPLETE CBC AUTOMATED: CPT

## 2020-02-16 PROCEDURE — 6360000002 HC RX W HCPCS: Performed by: NURSE PRACTITIONER

## 2020-02-16 PROCEDURE — 6360000002 HC RX W HCPCS: Performed by: HOSPITALIST

## 2020-02-16 RX ORDER — METOPROLOL SUCCINATE 25 MG/1
25 TABLET, EXTENDED RELEASE ORAL 2 TIMES DAILY
Qty: 30 TABLET | Refills: 0 | Status: ON HOLD | OUTPATIENT
Start: 2020-02-16 | End: 2020-11-23 | Stop reason: HOSPADM

## 2020-02-16 RX ORDER — OXYCODONE AND ACETAMINOPHEN 7.5; 325 MG/1; MG/1
1 TABLET ORAL 4 TIMES DAILY PRN
Qty: 13 TABLET | Refills: 0 | Status: SHIPPED | OUTPATIENT
Start: 2020-02-16 | End: 2020-02-19

## 2020-02-16 RX ORDER — LISINOPRIL 5 MG/1
5 TABLET ORAL DAILY
Qty: 30 TABLET | Refills: 0 | Status: ON HOLD | OUTPATIENT
Start: 2020-02-17 | End: 2020-03-27 | Stop reason: HOSPADM

## 2020-02-16 RX ORDER — ASPIRIN 81 MG/1
81 TABLET, CHEWABLE ORAL DAILY
Qty: 30 TABLET | Refills: 0 | Status: ON HOLD | OUTPATIENT
Start: 2020-02-17 | End: 2021-01-06 | Stop reason: ALTCHOICE

## 2020-02-16 RX ORDER — METHYLPREDNISOLONE 4 MG/1
TABLET ORAL
Qty: 21 TABLET | Refills: 0 | Status: SHIPPED | OUTPATIENT
Start: 2020-02-16 | End: 2020-02-22

## 2020-02-16 RX ORDER — ALPRAZOLAM 0.5 MG/1
0.5 TABLET ORAL EVERY 8 HOURS PRN
Qty: 12 TABLET | Refills: 0 | Status: SHIPPED | OUTPATIENT
Start: 2020-02-16 | End: 2020-02-20

## 2020-02-16 RX ORDER — AMIODARONE HYDROCHLORIDE 200 MG/1
200 TABLET ORAL DAILY
Qty: 30 TABLET | Refills: 0 | Status: SHIPPED | OUTPATIENT
Start: 2020-02-17

## 2020-02-16 RX ORDER — SPIRONOLACTONE 25 MG/1
25 TABLET ORAL DAILY
Qty: 30 TABLET | Refills: 0 | Status: ON HOLD | OUTPATIENT
Start: 2020-02-17 | End: 2020-11-23 | Stop reason: HOSPADM

## 2020-02-16 RX ORDER — CLOPIDOGREL BISULFATE 75 MG/1
75 TABLET ORAL DAILY
Qty: 30 TABLET | Refills: 0 | Status: ON HOLD | OUTPATIENT
Start: 2020-02-17 | End: 2020-03-11 | Stop reason: SDUPTHER

## 2020-02-16 RX ORDER — CEFUROXIME AXETIL 500 MG/1
500 TABLET ORAL 2 TIMES DAILY
Qty: 20 TABLET | Refills: 0 | Status: SHIPPED | OUTPATIENT
Start: 2020-02-16 | End: 2020-02-26

## 2020-02-16 RX ADMIN — IPRATROPIUM BROMIDE AND ALBUTEROL SULFATE 1 AMPULE: .5; 3 SOLUTION RESPIRATORY (INHALATION) at 07:53

## 2020-02-16 RX ADMIN — OXYCODONE HYDROCHLORIDE AND ACETAMINOPHEN 1 TABLET: 7.5; 325 TABLET ORAL at 00:23

## 2020-02-16 RX ADMIN — FUROSEMIDE 40 MG: 10 INJECTION, SOLUTION INTRAMUSCULAR; INTRAVENOUS at 16:54

## 2020-02-16 RX ADMIN — VANCOMYCIN HYDROCHLORIDE 1000 MG: 1 INJECTION, SOLUTION INTRAVENOUS at 00:18

## 2020-02-16 RX ADMIN — RIVAROXABAN 10 MG: 10 TABLET, FILM COATED ORAL at 16:54

## 2020-02-16 RX ADMIN — CEFEPIME HYDROCHLORIDE 2 G: 2 INJECTION, POWDER, FOR SOLUTION INTRAVENOUS at 05:55

## 2020-02-16 RX ADMIN — SODIUM CHLORIDE, PRESERVATIVE FREE 10 ML: 5 INJECTION INTRAVENOUS at 09:00

## 2020-02-16 RX ADMIN — AMIODARONE HYDROCHLORIDE 200 MG: 200 TABLET ORAL at 08:59

## 2020-02-16 RX ADMIN — METHYLPREDNISOLONE SODIUM SUCCINATE 40 MG: 40 INJECTION, POWDER, LYOPHILIZED, FOR SOLUTION INTRAMUSCULAR; INTRAVENOUS at 08:59

## 2020-02-16 RX ADMIN — ASPIRIN 81 MG 81 MG: 81 TABLET ORAL at 08:59

## 2020-02-16 RX ADMIN — BUDESONIDE AND FORMOTEROL FUMARATE DIHYDRATE 2 PUFF: 160; 4.5 AEROSOL RESPIRATORY (INHALATION) at 07:53

## 2020-02-16 RX ADMIN — FUROSEMIDE 40 MG: 10 INJECTION, SOLUTION INTRAMUSCULAR; INTRAVENOUS at 08:59

## 2020-02-16 RX ADMIN — METHYLPREDNISOLONE SODIUM SUCCINATE 40 MG: 40 INJECTION, POWDER, LYOPHILIZED, FOR SOLUTION INTRAMUSCULAR; INTRAVENOUS at 16:54

## 2020-02-16 RX ADMIN — PANTOPRAZOLE SODIUM 40 MG: 40 INJECTION, POWDER, FOR SOLUTION INTRAVENOUS at 08:59

## 2020-02-16 RX ADMIN — BENZOCAINE, MENTHOL 1 LOZENGE: 15; 3.6 LOZENGE ORAL at 12:37

## 2020-02-16 RX ADMIN — ALPRAZOLAM 0.5 MG: 0.5 TABLET ORAL at 03:57

## 2020-02-16 RX ADMIN — METOPROLOL SUCCINATE 25 MG: 25 TABLET, FILM COATED, EXTENDED RELEASE ORAL at 09:00

## 2020-02-16 RX ADMIN — ALPRAZOLAM 0.5 MG: 0.5 TABLET ORAL at 15:30

## 2020-02-16 RX ADMIN — IPRATROPIUM BROMIDE AND ALBUTEROL SULFATE 1 AMPULE: .5; 3 SOLUTION RESPIRATORY (INHALATION) at 15:56

## 2020-02-16 RX ADMIN — SODIUM CHLORIDE, PRESERVATIVE FREE 10 ML: 5 INJECTION INTRAVENOUS at 08:59

## 2020-02-16 RX ADMIN — CYANOCOBALAMIN TAB 1000 MCG 1000 MCG: 1000 TAB at 09:00

## 2020-02-16 RX ADMIN — SPIRONOLACTONE 25 MG: 25 TABLET ORAL at 08:59

## 2020-02-16 RX ADMIN — ATORVASTATIN CALCIUM 10 MG: 10 TABLET, FILM COATED ORAL at 16:54

## 2020-02-16 RX ADMIN — OXYCODONE HYDROCHLORIDE AND ACETAMINOPHEN 1 TABLET: 7.5; 325 TABLET ORAL at 12:37

## 2020-02-16 RX ADMIN — OXYCODONE HYDROCHLORIDE AND ACETAMINOPHEN 1 TABLET: 7.5; 325 TABLET ORAL at 05:55

## 2020-02-16 RX ADMIN — METHYLPREDNISOLONE SODIUM SUCCINATE 40 MG: 40 INJECTION, POWDER, LYOPHILIZED, FOR SOLUTION INTRAMUSCULAR; INTRAVENOUS at 05:55

## 2020-02-16 RX ADMIN — LISINOPRIL 5 MG: 5 TABLET ORAL at 08:59

## 2020-02-16 RX ADMIN — CLOPIDOGREL BISULFATE 75 MG: 75 TABLET ORAL at 08:59

## 2020-02-16 ASSESSMENT — PAIN DESCRIPTION - FREQUENCY: FREQUENCY: CONTINUOUS

## 2020-02-16 ASSESSMENT — PAIN DESCRIPTION - PROGRESSION: CLINICAL_PROGRESSION: GRADUALLY WORSENING

## 2020-02-16 ASSESSMENT — PAIN DESCRIPTION - DESCRIPTORS: DESCRIPTORS: ACHING;DISCOMFORT

## 2020-02-16 ASSESSMENT — PAIN SCALES - GENERAL
PAINLEVEL_OUTOF10: 4
PAINLEVEL_OUTOF10: 5
PAINLEVEL_OUTOF10: 5
PAINLEVEL_OUTOF10: 6

## 2020-02-16 ASSESSMENT — PAIN DESCRIPTION - LOCATION: LOCATION: GENERALIZED;SHOULDER

## 2020-02-16 ASSESSMENT — PAIN DESCRIPTION - ONSET: ONSET: ON-GOING

## 2020-02-16 ASSESSMENT — PAIN DESCRIPTION - ORIENTATION: ORIENTATION: RIGHT

## 2020-02-16 ASSESSMENT — PAIN - FUNCTIONAL ASSESSMENT: PAIN_FUNCTIONAL_ASSESSMENT: ACTIVITIES ARE NOT PREVENTED

## 2020-02-16 ASSESSMENT — PAIN DESCRIPTION - PAIN TYPE: TYPE: CHRONIC PAIN

## 2020-02-16 NOTE — PROGRESS NOTES
Daily Progress Note     awake alert   Feeling better  Still has cough and vital are stable   Will repeat echo in am  D/c alonzo  Increase activity  Flu and viral pneumonia   BNP elevated keep on diuretics   HFrEf medical treatment     Echo-2/10/20  Summary   This is a limited echocardiogram.   Left ventricular systolic function is abnormal.   Ejection fraction is visually estimated at 20-30%.   Apical akinesis is present.   Possible takotsubo cardiomyopathy.   Severe aortic regurgitation is noted ( ms).   Moderate mitral regurgitation is present.   Small mobile calcification attached to the posterior mitral valve chordae   tendinae.   Mitral annular calcification is present.   No evidence of any pericardial effusion.     PAST MEDICAL HISTORY:  History of having nonobstructive coronary artery  present, history of cardiomyopathy in the past, COPD, fibromyalgia,  hypertension, hyperlipidemia, convulsive fractures and rheumatoid  arthritis.     PAST SURGICAL HISTORY:  Gallbladder surgery, appendix _____.     SOCIAL HISTORY:  She does not smoke, does not drink.  She is a former  smoker.     ALLERGIES:  CARDIZEM causing lower extremity edema.  HUMIRA, REMICADE,  ATIVAN, _____ and DOXYCYCLINE.     MEDICATIONS:  She is on potassium, Questran for diarrhea, Lopressor 50  mg b.i.d., Lasix 40 mg b.i.d., Lipitor, Desyrel, Cozaar 100 mg a day,  and Aldactone.  She sees Dr. Carmita Guerra for pain management. Marcussukumar Nadiya is on  oxycodone also.          Objective:   /86   Pulse 76   Temp (!) 32 °F (0 °C)   Resp 16   Ht 5' 6\" (1.676 m)   Wt 150 lb 1.6 oz (68.1 kg)   SpO2 99%   BMI 24.23 kg/m²       Intake/Output Summary (Last 24 hours) at 2/16/2020 1002  Last data filed at 2/15/2020 2352  Gross per 24 hour   Intake --   Output 500 ml   Net -500 ml       Medications:   Scheduled Meds:   furosemide  40 mg Intravenous BID    metoprolol succinate  25 mg Oral BID    lisinopril  5 mg Oral Daily    rivaroxaban  10 mg Oral Daily

## 2020-02-16 NOTE — CARE COORDINATION
8174 CM reviewed notes and efaxed the face sheet to [facility]. CM into see pt to initiate a safe discharge plan. Cm into see, introduced self and explained role of CM. Pt is kind, alert and oriented. Pt lives alone. Spouse of 64 years  recently. Pt shared that she can not back to the condo. DEM includes a walker, cane and shower chair. Pt has 2 children and both are local. Pt has a PCP. Pt has insurance and able to obtain her prescriptions. Pt shared that she wants to go to [facility]. CM provided card and encouraged to call for any needs or concern. CM is available if any needs arise. 1230 CM called Gali at [facility]. CM informed that family has been working with them regarding assisted living in the future. CM informed that pt precert is completed and pt may go whenever medically cleared. CM placed a PS to Dr Montez Sen and informed that pt is approved for [facility] and may go whenever medically cleared. HENS completed and placed in envelope. CM placed envelope in the soft chart. CM called pts son Rohini Oro [phone] and left VM. CM spoke with Marcelino Aldana son. CM informed him that pt is approved and may go when medically cleared. Pt will be going to  [address].  1612 CM spoke with Dr Montez Sen, pt is not ready for discharge. Pt is precert good for the weekend.  [facility]
CEHRRY sent PS to Dr. John Escobedo him pt has been approved for SNF and bed available today if med stable for dc.   Electronically signed by SOLOMON Asencio on 2/16/2020 at 8:40 AM
home)      Discharge:             Pneumococcal Vaccine given before discharge  NA 2014  Flu Vaccine given before discharge    NA 2019  Inhalers       Yes  Spacer        Yes  Steroids       Yes  Follow-up appointment scheduled within 5-7 days  Yes  Cardio/Pulmonary Wellness program consult  NA  Home Oxygen       Yes  Neblizer, bipap, cpap at home    Yes  Home air quality assessment [facility] [place]

## 2020-02-16 NOTE — PROGRESS NOTES
Pulmonary   Progress Note      VITALS:  /86   Pulse 76   Temp 97.6 °F (36.4 °C) (Oral)   Resp 16   Ht 5' 6\" (1.676 m)   Wt 150 lb 1.6 oz (68.1 kg)   SpO2 99%   BMI 24.23 kg/m²     Subjective:    Chief complaint: Acute on chronic hypoxemic respiratory failure, bibasilar pneumonia, influenza A infection, SVT, history of Takotsubo syndrome     Awake and alert.    Continues to complain of general weakness, states she was able to get up to the chair today with less short of breath with activity. Intermittent positive productive cough, states she feels her cough has increased over yesterday.    She denies any current chest pain. Denies fever or chills in past 24 hours. Denies chest pain.      Objective:   PHYSICAL EXAM:    CONSTITUTIONAL:  awake, alert, cooperative, no apparent distress, and appears stated age  NECK:  Supple, symmetrical, trachea midline, no adenopathy  LUNGS: diminished breath sounds with slight improvement from yesterday, remains on supplemental oxygen at 2-3 LPM with O2 sat 99% at time of exam., productive cough, no wheezes  CARDIOVASCULAR:  normal S1 and S2, no edema and no JVD  ABDOMEN:  normal bowel sounds, non-distended and no masses palpated, and no tenderness to palpation. LYMPHADENOPATHY:  No supraclavicular adenopathy. No cervical adnenopathy  PSYCHIATRIC: Oriented to person place and time. + depression - recent loss of  of 64 years  MUSCULOSKELETAL: No obvious misalignment or effusion of the joints. No clubbing, cyanosis of the digits.   SKIN:  normal skin color, warm to touch      DATA:    CBC:  Recent Labs     02/16/20  0336   WBC 13.6*   RBC 3.98*   HGB 11.8*   HCT 37.4      MCV 94.0   MCH 29.6   MCHC 31.6*   RDW 14.5      BMP:  Recent Labs     02/15/20  0503 02/16/20  0336   NA  --  131*   K  --  4.5   CL  --  92*   CO2  --  26   BUN  --  45*   CREATININE 1.1 1.3*   CALCIUM  --  8.4   GLUCOSE  --  182*      ABG:  Recent Labs     02/15/20  1645   PH 7.48* PO2ART 117*   [id number] 34.0   O2SAT 96.3     BNP  Lab Results   Component Value Date    BNP 54 11/25/2012      D-Dimer:  Lab Results   Component Value Date    DDIMER 2679 (H) 02/09/2020        Cultures:  Blood: Not obtained  Sputum: not obtained this visit, prior culture from 1/3/2020 demonstrated Marbrianna Vegasher obtained     MRSA screen - negative  Respiratory panel -positive influenza A     Radiology:  2/15/2020 CXR  No acute cardiopulmonary disease    2/11/2020 portable chest x-ray  Basilar airspace disease, mildly improved at the left lung base otherwise similar to x-ray from 2/9/2020.     2/9/2020 CTA with contrast  No evidence of pulmonary emboli or aortic dissection, bibasilar opacities with right greater than left      Assessment/Plan     Patient Active Problem List    Diagnosis Date Noted    Wide-complex tachycardia ([address] 02/10/2020    Gram-negative pneumonia ([address] 05/20/4893    Acute systolic heart failure ([address] 02/10/2020    Acute on chronic respiratory failure with hypoxia and hypercapnia ([address] 02/09/2020    Pneumonia of right upper lobe due to infectious organism ([address]     Acute on chronic respiratory failure with hypoxia ([address] 12/30/2019    Chronic fatigue 07/08/2019    Intractable low back pain 06/11/2019    Back pain 06/09/2019    Nocturnal hypoxemia 05/21/2019     Overview Note:     NIGHTLY OXYGEN       Shortness of breath 04/25/2019    Former smoker      Overview Note:     Quit 1/2019      Acute exacerbation of chronic obstructive pulmonary disease (COPD) ([address] 12/13/2018    COPD, severe ([address] 10/24/2017    Blood loss anemia 08/01/2017     Overview Note:     hgb 8.1 7/2017;      Elevated troponin     Takotsubo syndrome 05/01/2017     Overview Note:     Hospitalized 5/2017      Panic attack 11/18/2016    Lumbar spinal stenosis 01/01/2015     Overview Note:     moderately severe by MRI; saw [facility] 9/2016; referred to [address] [place] [address], [address] Vitamin B12 deficiency 12/01/2014     Overview Note:     B12 level 199      Osteoporosis 03/16/2012     Overview Note:     Fragility Fx 2010; DEXA and Frax score 3/2012: 8 & 33%; DEXA at [facility] 1/2014; Reclast 2/2014; 2/2015; 5/2016; 5/2017; 8/15/2018;  annually      Depression     Migraine 10/06/2010    Rheumatoid Arthritis      Overview Note:     [facility]      Hypertension     Hyperlipidemia     Fibromyalgia      Overview Note:     Dominique Horn      Chronic pain syndrome      Overview Note:     DOC pain mgt: Dagoberto Leak; lumbar epidurals 3/2017;     Assessment:   Acute on chronic hypoxemic respiratory failure   Bibasilar pneumonia - last sputum culture 1/3/2020 positive ELIZABETHKINGIA     MENINGOSEPTICUM    Influenza A infection    Plan:  1.  Continue present respiratory treatment - Symbicort, Duo Neb, solumedrol, Tamiflu, Droplet precautions  2.  Encourage physical therapy, OOB and ambulation  3.  Encourage and continue aggressive bronchopulmonary toilet  4.  Continue antibiotic  5.  Incentive spirometer every 2 hours while awake  6.   Dr. Shanthi Perez will continue to follow tomorrow      Electronically signed by NATALIYA Ordaz CNP on 2/16/2020 at 10:27 AM

## 2020-02-16 NOTE — PROGRESS NOTES
[facility]  consulted by Dr. Elvira Muñiz for monitoring and adjustment. Indication for treatment: Pneumonia  Goal trough: 15-20 mcg/mL  Other Antimicrobials: cefepime     Pertinent Laboratory Values:   Temp Readings from Last 3 Encounters:   02/16/20 97.8 °F (36.6 °C) (Oral)   02/06/20 97.8 °F (36.6 °C)   01/04/20 97.9 °F (36.6 °C) (Oral)     Recent Labs     02/16/20  0336   WBC 13.6*     Recent Labs     02/15/20  0503 02/16/20  0336   BUN  --  45*   CREATININE 1.1 1.3*     Estimated Creatinine Clearance: 33 mL/min (A) (based on SCr of 1.3 mg/dL (H)). Intake/Output Summary (Last 24 hours) at 2/16/2020 1713  Last data filed at 2/16/2020 1512  Gross per 24 hour   Intake 140 ml   Output 1400 ml   Net -1260 ml       Pertinent Cultures:  Date    Source    Results  2/9   Resp panel   Positive for influenza A  2/9   Blood x 2   NGTD  2/9   Influenza   Negative  2/9   MRSA screen  Negative    Vancomycin level:   TROUGH:    No results for input(s): VANCOTROUGH in the last 72 hours. RANDOM:  No results for input(s): VANCORANDOM in the last 72 hours. Assessment:  · WBC and temperature:  WBC steady @ 13.6; Afebrile  · SCr, BUN, and urine output: SCR steady @1.3, output good  · Day(s) of therapy: #8  · Vancomycin level: 2/11 - 19.8, therapeutic    Plan:  · SCR jumped to 1.3 today. Will schedule a vanco trough before tonight's dose 2/16 @23:00  · Continue Vancomycin 1000 mg q24h IVPB. · MRSA screen is negative, de-escalation may be considered  · Pharmacy will continue to monitor patient and adjust therapy as indicated    Thank you for the consult,    Jody Oneal.  Abner Hanna

## 2020-02-17 ENCOUNTER — TELEPHONE (OUTPATIENT)
Dept: PULMONOLOGY | Age: 80
End: 2020-02-17

## 2020-02-17 NOTE — TELEPHONE ENCOUNTER
Pt called stating she was released from the hospital and wanted to know if you had a chance to look at the notes from her stay to make sure everything looked okay. Please advise.

## 2020-02-27 ENCOUNTER — HOSPITAL ENCOUNTER (OUTPATIENT)
Dept: INFUSION THERAPY | Age: 80
Setting detail: INFUSION SERIES
Discharge: HOME | End: 2020-02-27
Payer: MEDICARE

## 2020-02-27 VITALS
TEMPERATURE: 98.5 F | DIASTOLIC BLOOD PRESSURE: 68 MMHG | SYSTOLIC BLOOD PRESSURE: 123 MMHG | OXYGEN SATURATION: 100 % | HEART RATE: 108 BPM | RESPIRATION RATE: 16 BRPM

## 2020-02-27 PROCEDURE — 99211 OFF/OP EST MAY X REQ PHY/QHP: CPT

## 2020-02-27 PROCEDURE — 2580000003 HC RX 258: Performed by: FAMILY MEDICINE

## 2020-02-27 PROCEDURE — 86901 BLOOD TYPING SEROLOGIC RH(D): CPT

## 2020-02-27 PROCEDURE — 86900 BLOOD TYPING SEROLOGIC ABO: CPT

## 2020-02-27 PROCEDURE — 6370000000 HC RX 637 (ALT 250 FOR IP): Performed by: FAMILY MEDICINE

## 2020-02-27 PROCEDURE — 86922 COMPATIBILITY TEST ANTIGLOB: CPT

## 2020-02-27 PROCEDURE — 86850 RBC ANTIBODY SCREEN: CPT

## 2020-02-27 PROCEDURE — P9016 RBC LEUKOCYTES REDUCED: HCPCS

## 2020-02-27 PROCEDURE — 36430 TRANSFUSION BLD/BLD COMPNT: CPT

## 2020-02-27 RX ORDER — OXYCODONE AND ACETAMINOPHEN 7.5; 325 MG/1; MG/1
1 TABLET ORAL EVERY 6 HOURS PRN
Status: DISCONTINUED | OUTPATIENT
Start: 2020-02-27 | End: 2020-02-28 | Stop reason: HOSPADM

## 2020-02-27 RX ORDER — SODIUM CHLORIDE 0.9 % (FLUSH) 0.9 %
10 SYRINGE (ML) INJECTION PRN
Status: DISCONTINUED | OUTPATIENT
Start: 2020-02-27 | End: 2020-02-28 | Stop reason: HOSPADM

## 2020-02-27 RX ORDER — FUROSEMIDE 10 MG/ML
40 INJECTION INTRAMUSCULAR; INTRAVENOUS SEE ADMIN INSTRUCTIONS
Status: DISCONTINUED | OUTPATIENT
Start: 2020-02-27 | End: 2020-02-28 | Stop reason: HOSPADM

## 2020-02-27 RX ORDER — 0.9 % SODIUM CHLORIDE 0.9 %
250 INTRAVENOUS SOLUTION INTRAVENOUS ONCE
Status: COMPLETED | OUTPATIENT
Start: 2020-02-27 | End: 2020-02-27

## 2020-02-27 RX ADMIN — SODIUM CHLORIDE 250 ML: 9 INJECTION, SOLUTION INTRAVENOUS at 10:05

## 2020-02-27 RX ADMIN — SODIUM CHLORIDE, PRESERVATIVE FREE 10 ML: 5 INJECTION INTRAVENOUS at 12:25

## 2020-02-27 RX ADMIN — OXYCODONE AND ACETAMINOPHEN 1 TABLET: 7.5; 325 TABLET ORAL at 12:25

## 2020-02-27 ASSESSMENT — PAIN SCALES - GENERAL
PAINLEVEL_OUTOF10: 10
PAINLEVEL_OUTOF10: 10

## 2020-02-27 ASSESSMENT — PAIN DESCRIPTION - PROGRESSION
CLINICAL_PROGRESSION: NOT CHANGED

## 2020-02-27 ASSESSMENT — PAIN DESCRIPTION - LOCATION: LOCATION: BACK

## 2020-02-27 ASSESSMENT — PAIN DESCRIPTION - PAIN TYPE: TYPE: CHRONIC PAIN

## 2020-02-27 NOTE — PROGRESS NOTES
Called dr. Alana Chavez for pt request for pain meds. General discomfort to whole body d/t RA. DR umana gave order to give percocet 7.5/325mg. Pt voiced understanding of info given.

## 2020-02-27 NOTE — PROGRESS NOTES
Tolerated BLOOD TRANSFUSION well. Reviewed discharge instruction, voiced understanding. Copies of AVS given. Pt discharged home. Pt to exit via [address] per  Meli Ordaz.     Orders Placed This Encounter   Medications    0.9 % sodium chloride infusion 250 mL    sodium chloride flush 0.9 % injection 10 mL    furosemide (LASIX) injection 40 mg    oxyCODONE-acetaminophen (PERCOCET) 7.5-325 MG per tablet 1 tablet

## 2020-02-28 LAB
ABO/RH: NORMAL
ANTIBODY SCREEN: NEGATIVE
COMPONENT: NORMAL
CROSSMATCH RESULT: NORMAL
STATUS: NORMAL
TRANSFUSION STATUS: NORMAL
UNIT DIVISION: 0
UNIT NUMBER: NORMAL

## 2020-03-09 ENCOUNTER — ANESTHESIA EVENT (OUTPATIENT)
Dept: ENDOSCOPY | Age: 80
End: 2020-03-09
Payer: MEDICARE

## 2020-03-10 ASSESSMENT — ENCOUNTER SYMPTOMS: SHORTNESS OF BREATH: 1

## 2020-03-10 ASSESSMENT — COPD QUESTIONNAIRES: CAT_SEVERITY: SEVERE

## 2020-03-10 NOTE — ANESTHESIA PRE PROCEDURE
Department of Anesthesiology  Preprocedure Note       Name:  Demetrice Keane   Age:  78 y.o.  :  1940                                          MRN:  [id number]         Date:  3/10/2020      Surgeon: Dino Macario):  Shirley Cole MD    Procedure: EGD ESOPHAGOGASTRODUODENOSCOPY (N/A )  COLONOSCOPY DIAGNOSTIC (N/A )    Medications prior to admission:   Prior to Admission medications    Medication Sig Start Date End Date Taking?  Authorizing Provider   aspirin 81 MG chewable tablet Take 1 tablet by mouth daily 20   Rehana Solorio MD   amiodarone (CORDARONE) 200 MG tablet Take 1 tablet by mouth daily 20   Rehana Solorio MD   rivaroxaban Arna Overall) 10 MG TABS tablet Take 1 tablet by mouth daily 20   Rehana Solorio MD   lisinopril (PRINIVIL;ZESTRIL) 5 MG tablet Take 1 tablet by mouth daily 20   Rehana Solorio MD   metoprolol succinate (TOPROL XL) 25 MG extended release tablet Take 1 tablet by mouth 2 times daily 20   Rehana Solorio MD   spironolactone (ALDACTONE) 25 MG tablet Take 1 tablet by mouth daily 20   Rehana Solorio MD   clopidogrel (PLAVIX) 75 MG tablet Take 1 tablet by mouth daily 20   Rehana Solorio MD   omeprazole (PRILOSEC) 40 MG delayed release capsule Take 1 capsule by mouth every morning (before breakfast) 20   Jenn Sharma DO   potassium chloride (KLOR-CON) 10 MEQ extended release tablet Take 1 tablet by mouth daily 20   Mary Zamora MD   NONFORMULARY White petrolatum, bacitracin zinc, lotrisone bid to legs    Historical Provider, MD   OXYGEN Inhale 2 L/min into the lungs nightly    Historical Provider, MD   FORTEO 600 MCG/2.4ML SOLN injection INJECT 0.08MLS INTO THE SKIN DAILY 20   Mary Zamora MD   ipratropium-albuterol (DUONEB) 0.5-2.5 (3) MG/3ML SOLN nebulizer solution Inhale 3 mLs into the lungs every 4 hours (while awake) 20   Natasha Tubbs MD   furosemide (LASIX) 40 MG tablet Take 1 tablet by mouth 2 times daily 19   Mary Zamora MD daily 90 tablet 1    NONFORMULARY White petrolatum, bacitracin zinc, lotrisone bid to legs      OXYGEN Inhale 2 L/min into the lungs nightly      FORTEO 600 MCG/2.4ML SOLN injection INJECT 0.08MLS INTO THE SKIN DAILY 2.4 mL 5    ipratropium-albuterol (DUONEB) 0.5-2.5 (3) MG/3ML SOLN nebulizer solution Inhale 3 mLs into the lungs every 4 hours (while awake) 360 mL 2    furosemide (LASIX) 40 MG tablet Take 1 tablet by mouth 2 times daily 60 tablet 3    atorvastatin (LIPITOR) 10 MG tablet TAKE 1 TABLET BY MOUTH EVERY EVENING 90 tablet 4    traZODone (DESYREL) 50 MG tablet TAKE 1 TABLET BY MOUTH NIGHTLY 30 tablet 5    triamcinolone (NASACORT ALLERGY 24HR) 55 MCG/ACT nasal inhaler 2 sprays by Each Nostril route daily 1 Inhaler 3    Cholecalciferol (VITAMIN D) 2000 units CAPS capsule Take 1 capsule by mouth daily      PROAIR  (90 Base) MCG/ACT inhaler INHALE 2 PUFFS BY ORAL INHALATION EVERY 4 TO 6 HOURS AS NEEDED 8.5 g 10    BREO ELLIPTA 100-25 MCG/INH AEPB inhaler INHALE 1 PUFF INTO THE LUNGS DAILY AFTER INHALATION THEN GARGLE 1 each 11    INCRUSE ELLIPTA 62.5 MCG/INH AEPB INHALE 1 PUFF VIA ORAL INHALATION ONCE DAILY 1 each 11    vitamin B-12 (CYANOCOBALAMIN) 1000 MCG tablet Take 1,000 mcg by mouth daily.  Calcium Citrate-Vitamin D (CITRACAL + D PO) Take 600 mg by mouth daily        No current facility-administered medications for this visit. Allergies: Allergies   Allergen Reactions    Ativan [Lorazepam] Other (See Comments)     Violent, thrashing and combative. She has lacerations and bruising, had to be tied down.      Etanercept Other (See Comments)     No response    Humira [Adalimumab]     Prochlorperazine Edisylate Other (See Comments)     \"Compazine\"   Involuntary Muscle Spasms     Remicade [Infliximab Injection]     Doxycycline Other (See Comments)     Stomach pains       Problem List:    Patient Active Problem List   Diagnosis Code    Rheumatoid Arthritis M19.90    02/16/2020    RDW 14.5 02/16/2020     02/16/2020       CMP:   Lab Results   Component Value Date     02/16/2020    K 4.5 02/16/2020    CL 92 02/16/2020    CO2 26 02/16/2020    BUN 45 02/16/2020    CREATININE 1.3 02/16/2020    GFRAA 48 02/16/2020    GFRAA >60 03/17/2012    AGRATIO 1.8 04/01/2014    LABGLOM 40 02/16/2020    LABGLOM 91 04/01/2014    GLUCOSE 182 02/16/2020    PROT 5.1 02/13/2020    PROT 7.6 03/04/2013    CALCIUM 8.4 02/16/2020    BILITOT 0.2 02/13/2020    ALKPHOS 80 02/13/2020    AST 16 02/13/2020    ALT 10 02/13/2020       POC Tests: No results for input(s): POCGLU, POCNA, POCK, POCCL, POCBUN, POCHEMO, POCHCT in the last 72 hours. Coags:   Lab Results   Component Value Date    PROTIME 11.3 02/09/2020    PROTIME 13.1 11/26/2010    INR 0.93 02/09/2020    APTT 28.4 02/09/2020       HCG (If Applicable): No results found for: PREGTESTUR, PREGSERUM, HCG, HCGQUANT     ABGs:   Lab Results   Component Value Date    PO2ART 117 02/15/2020    ZDP0OJD 34.0 02/15/2020    GIY5CST 25.3 02/15/2020        Type & Screen (If Applicable):  No results found for: LABABO, [address]    Anesthesia Evaluation  Patient summary reviewed and Nursing notes reviewed  Airway: Mallampati: II        Dental:          Pulmonary:   (+) COPD: severe,  shortness of breath:                             Cardiovascular:  Exercise tolerance: poor (<4 METS),   (+) hypertension: moderate, valvular problems/murmurs:, CAD:, dysrhythmias:, CHF:, hyperlipidemia      ECG reviewed      Echocardiogram reviewed         Beta Blocker:  Dose within 24 Hrs      ROS comment: Summary   This is a limited echo to assess EF and RVSP.   Left ventricular function is normal , EF is estimated at 50-55 %.   Right ventricular systolic pressure is 21 mm Hg.   Mild-to-moderate aortic regurgitation is noted with a pressure half time of   456 msec   Moderate mitral regurgitation is present.   Mild tricuspid regurgitation.      This is a limited echocardiogram. Left ventricular systolic function is abnormal.   Ejection fraction is visually estimated at 20-30%. Apical akinesis is present. Possible takotsubo cardiomyopathy. Severe aortic regurgitation is noted ( ms). Moderate mitral regurgitation is present. Small mobile calcification attached to the posterior mitral valve chordae   tendinae. Mitral annular calcification is present. No evidence of any pericardial effusion. Signature      ------------------------------------------------------------------   Electronically signed by Violeta Head MD (Interpreting   physician) on 02/10/2020 at 11:21 AM   ------------------------------------------------------------------        Neuro/Psych:   (+) neuromuscular disease:, headaches: migraine headaches, psychiatric history:            GI/Hepatic/Renal:   (+) GERD:,           Endo/Other:    (+) blood dyscrasia: anticoagulation therapy and anemia, arthritis: rheumatoid. , .                 Abdominal:           Vascular:   + PVD, aortic or cerebral, . Anesthesia Plan      MAC     ASA 4     (Chart review only )            Plan discussed with CRNA.                   Chaparro Elizabeth, NATALIYA - SANAM   3/10/2020

## 2020-03-11 ENCOUNTER — HOSPITAL ENCOUNTER (OUTPATIENT)
Age: 80
Setting detail: OUTPATIENT SURGERY
Discharge: NURSING, REHAB OR LONG-TERM CARE | End: 2020-03-11
Attending: INTERNAL MEDICINE | Admitting: INTERNAL MEDICINE
Payer: MEDICARE

## 2020-03-11 ENCOUNTER — ANESTHESIA (OUTPATIENT)
Dept: ENDOSCOPY | Age: 80
End: 2020-03-11
Payer: MEDICARE

## 2020-03-11 VITALS
OXYGEN SATURATION: 98 % | HEIGHT: 66 IN | RESPIRATION RATE: 16 BRPM | DIASTOLIC BLOOD PRESSURE: 57 MMHG | SYSTOLIC BLOOD PRESSURE: 119 MMHG | TEMPERATURE: 97.7 F | BODY MASS INDEX: 21.69 KG/M2 | HEART RATE: 74 BPM | WEIGHT: 135 LBS

## 2020-03-11 VITALS — SYSTOLIC BLOOD PRESSURE: 114 MMHG | OXYGEN SATURATION: 91 % | DIASTOLIC BLOOD PRESSURE: 81 MMHG

## 2020-03-11 PROCEDURE — 2580000003 HC RX 258: Performed by: ANESTHESIOLOGY

## 2020-03-11 PROCEDURE — 6360000002 HC RX W HCPCS: Performed by: NURSE ANESTHETIST, CERTIFIED REGISTERED

## 2020-03-11 PROCEDURE — 3700000001 HC ADD 15 MINUTES (ANESTHESIA): Performed by: INTERNAL MEDICINE

## 2020-03-11 PROCEDURE — 2500000003 HC RX 250 WO HCPCS: Performed by: NURSE ANESTHETIST, CERTIFIED REGISTERED

## 2020-03-11 PROCEDURE — 3609012400 HC EGD TRANSORAL BIOPSY SINGLE/MULTIPLE: Performed by: INTERNAL MEDICINE

## 2020-03-11 PROCEDURE — 3609010300 HC COLONOSCOPY W/BIOPSY SINGLE/MULTIPLE: Performed by: INTERNAL MEDICINE

## 2020-03-11 PROCEDURE — 2709999900 HC NON-CHARGEABLE SUPPLY: Performed by: INTERNAL MEDICINE

## 2020-03-11 PROCEDURE — 3700000000 HC ANESTHESIA ATTENDED CARE: Performed by: INTERNAL MEDICINE

## 2020-03-11 PROCEDURE — 88342 IMHCHEM/IMCYTCHM 1ST ANTB: CPT

## 2020-03-11 PROCEDURE — 7100000010 HC PHASE II RECOVERY - FIRST 15 MIN: Performed by: INTERNAL MEDICINE

## 2020-03-11 PROCEDURE — 7100000011 HC PHASE II RECOVERY - ADDTL 15 MIN: Performed by: INTERNAL MEDICINE

## 2020-03-11 PROCEDURE — 88305 TISSUE EXAM BY PATHOLOGIST: CPT

## 2020-03-11 RX ORDER — SODIUM CHLORIDE, SODIUM LACTATE, POTASSIUM CHLORIDE, CALCIUM CHLORIDE 600; 310; 30; 20 MG/100ML; MG/100ML; MG/100ML; MG/100ML
INJECTION, SOLUTION INTRAVENOUS CONTINUOUS
Status: DISCONTINUED | OUTPATIENT
Start: 2020-03-11 | End: 2020-03-11 | Stop reason: HOSPADM

## 2020-03-11 RX ORDER — KETAMINE HYDROCHLORIDE 10 MG/ML
INJECTION, SOLUTION INTRAMUSCULAR; INTRAVENOUS PRN
Status: DISCONTINUED | OUTPATIENT
Start: 2020-03-11 | End: 2020-03-11 | Stop reason: SDUPTHER

## 2020-03-11 RX ORDER — CLOPIDOGREL BISULFATE 75 MG/1
75 TABLET ORAL DAILY
Qty: 30 TABLET | Refills: 0 | Status: ON HOLD
Start: 2020-03-13 | End: 2020-03-27 | Stop reason: HOSPADM

## 2020-03-11 RX ORDER — LIDOCAINE HYDROCHLORIDE 20 MG/ML
INJECTION, SOLUTION INFILTRATION; PERINEURAL PRN
Status: DISCONTINUED | OUTPATIENT
Start: 2020-03-11 | End: 2020-03-11 | Stop reason: SDUPTHER

## 2020-03-11 RX ORDER — PROPOFOL 10 MG/ML
INJECTION, EMULSION INTRAVENOUS PRN
Status: DISCONTINUED | OUTPATIENT
Start: 2020-03-11 | End: 2020-03-11 | Stop reason: SDUPTHER

## 2020-03-11 RX ORDER — BUSPIRONE HYDROCHLORIDE 5 MG/1
5 TABLET ORAL 3 TIMES DAILY
COMMUNITY

## 2020-03-11 RX ADMIN — PROPOFOL 60 MG: 10 INJECTION, EMULSION INTRAVENOUS at 13:35

## 2020-03-11 RX ADMIN — KETAMINE HYDROCHLORIDE 10 MG: 10 INJECTION INTRAMUSCULAR; INTRAVENOUS at 13:35

## 2020-03-11 RX ADMIN — LIDOCAINE HYDROCHLORIDE 80 MG: 20 INJECTION, SOLUTION INFILTRATION; PERINEURAL at 13:35

## 2020-03-11 RX ADMIN — KETAMINE HYDROCHLORIDE 5 MG: 10 INJECTION INTRAMUSCULAR; INTRAVENOUS at 13:45

## 2020-03-11 RX ADMIN — KETAMINE HYDROCHLORIDE 10 MG: 10 INJECTION INTRAMUSCULAR; INTRAVENOUS at 13:37

## 2020-03-11 RX ADMIN — SODIUM CHLORIDE, POTASSIUM CHLORIDE, SODIUM LACTATE AND CALCIUM CHLORIDE: 600; 310; 30; 20 INJECTION, SOLUTION INTRAVENOUS at 12:38

## 2020-03-11 ASSESSMENT — PAIN SCALES - GENERAL
PAINLEVEL_OUTOF10: 0

## 2020-03-11 ASSESSMENT — COPD QUESTIONNAIRES: CAT_SEVERITY: SEVERE

## 2020-03-11 ASSESSMENT — ENCOUNTER SYMPTOMS: SHORTNESS OF BREATH: 1

## 2020-03-11 ASSESSMENT — PAIN - FUNCTIONAL ASSESSMENT: PAIN_FUNCTIONAL_ASSESSMENT: 0-10

## 2020-03-11 NOTE — OP NOTE
1 [facility]      BRIEF OP REPORT:    Impression:    1) Small Hiatal Hernia On EGD, Otherwise normal Biopsy for Celiac and H Pylori done   2) C scope Mild sigmoid diverticulosdis, Grade II Hemorrhoids, Biospy of asc colon for Colitis done           Suggest:   1) advance Diet   2) Start Xaralto tomorrow, Plavix day after tomorrow FU 2 weeks   3) VCE as OP     Full EGD/COLONOSCOPY report available by going to \"chart review\" then \"procedures\" then  \"EGD/Colonoscopy\"  then \"View Endoscopy Report\"

## 2020-03-11 NOTE — ANESTHESIA PRE PROCEDURE
I10    Hyperlipidemia E78.5    Fibromyalgia M79.7    Migraine G43.909    Chronic pain syndrome G89.4    Depression F32.9    Osteoporosis M81.0    Vitamin B12 deficiency E53.8    Lumbar spinal stenosis M48.061    Panic attack F41.0    Elevated troponin R79.89    Takotsubo syndrome I51.81    Blood loss anemia D50.0    COPD, severe ([facility]) J44.9    Acute exacerbation of chronic obstructive pulmonary disease (COPD) ([facility]) J44.1    Former smoker Z87.891    Shortness of breath R06.02    Nocturnal hypoxemia G47.34    Back pain M54.9    Intractable low back pain M54.5    Chronic fatigue R53.82    Acute on chronic respiratory failure with hypoxia ([facility]) J96.21    Pneumonia of right upper lobe due to infectious organism ([facility]) J18.1    Acute on chronic respiratory failure with hypoxia and hypercapnia ([facility]) J96.21, J96.22    Wide-complex tachycardia ([facility]) I47.2    Gram-negative pneumonia ([facility]) T50.4    Acute systolic heart failure ([facility]) I50.21       Past Medical History:        Diagnosis Date    Acute DVT of right tibial vein ([address] 07/2017    ER imaging: distal right tibial vein DVT; no anticoag for bleeding/ anemia    Acute exacerbation of chronic obstructive pulmonary disease (COPD) ([address] 12/13/2018    Anemia     Arthritis     Atrial fibrillation (HCC)     CHF (congestive heart failure) ([facility])     Chronic pain syndrome     Low back pain; lumbar disc disease    Clostridium difficile colitis 09/20/2017    COPD, severe ([address] 10/24/2017    Depression     Fibromyalgia     Former smoker     Quit 1/2019    Herniated cervical disc 5-1998    Herpes zoster ophthalmicus 3/2012    Hx of blood clots     Hyperlipidemia     Hypertension     Kidney disease     L3 vertebral fracture ([address] 2010    vertebroplasty    Lumbar spinal stenosis 2015    moderately severe by MRI    Migraine     Nocturnal hypoxemia 5/21/2019    Osteoporosis 2010    Fragility Fx L3    Rheumatoid arthritis(714.0) 1976     Encounters:   03/09/20 135 lb (61.2 kg)   02/16/20 150 lb 1.6 oz (68.1 kg)   02/06/20 138 lb (62.6 kg)     Body mass index is 21.79 kg/m². CBC:   Lab Results   Component Value Date    WBC 13.6 02/16/2020    RBC 3.98 02/16/2020    HGB 11.8 02/16/2020    HCT 37.4 02/16/2020    MCV 94.0 02/16/2020    RDW 14.5 02/16/2020     02/16/2020       CMP:   Lab Results   Component Value Date     02/16/2020    K 4.5 02/16/2020    CL 92 02/16/2020    CO2 26 02/16/2020    BUN 45 02/16/2020    CREATININE 1.3 02/16/2020    GFRAA 48 02/16/2020    GFRAA >60 03/17/2012    AGRATIO 1.8 04/01/2014    LABGLOM 40 02/16/2020    LABGLOM 91 04/01/2014    GLUCOSE 182 02/16/2020    PROT 5.1 02/13/2020    PROT 7.6 03/04/2013    CALCIUM 8.4 02/16/2020    BILITOT 0.2 02/13/2020    ALKPHOS 80 02/13/2020    AST 16 02/13/2020    ALT 10 02/13/2020       POC Tests: No results for input(s): POCGLU, POCNA, POCK, POCCL, POCBUN, POCHEMO, POCHCT in the last 72 hours.     Coags:   Lab Results   Component Value Date    PROTIME 11.3 02/09/2020    PROTIME 13.1 11/26/2010    INR 0.93 02/09/2020    APTT 28.4 02/09/2020       HCG (If Applicable): No results found for: PREGTESTUR, PREGSERUM, HCG, HCGQUANT     ABGs:   Lab Results   Component Value Date    PO2ART 117 02/15/2020    NHA8HTN 34.0 02/15/2020    DUE0RQX 25.3 02/15/2020        Type & Screen (If Applicable):  No results found for: LABABO, [address]    Anesthesia Evaluation  Patient summary reviewed and Nursing notes reviewed  Airway: Mallampati: II        Dental:    (+) edentulous      Pulmonary:   (+) pneumonia: resolved,  COPD: severe,  shortness of breath:  rhonchi,                            ROS comment: Home o2   Cardiovascular:  Exercise tolerance: poor (<4 METS),   (+) hypertension: moderate, valvular problems/murmurs:, CAD:, dysrhythmias:, CHF:, hyperlipidemia      ECG reviewed      Echocardiogram reviewed         Beta Blocker:  Dose within 24 Hrs      ROS comment: Summary   This is a limited echo to assess EF and RVSP.   Left ventricular function is normal , EF is estimated at 50-55 %.   Right ventricular systolic pressure is 21 mm Hg.   Mild-to-moderate aortic regurgitation is noted with a pressure half time of   456 msec   Moderate mitral regurgitation is present.   Mild tricuspid regurgitation. This is a limited echocardiogram.   Left ventricular systolic function is abnormal.   Ejection fraction is visually estimated at 20-30%. Apical akinesis is present. Possible takotsubo cardiomyopathy. Severe aortic regurgitation is noted ( ms). Moderate mitral regurgitation is present. Small mobile calcification attached to the posterior mitral valve chordae   tendinae. Mitral annular calcification is present. No evidence of any pericardial effusion. Signature      ------------------------------------------------------------------   Electronically signed by Dayan Ovalles MD (Interpreting   physician) on 02/10/2020 at 11:21 AM   ------------------------------------------------------------------        Neuro/Psych:   (+) neuromuscular disease:, headaches: migraine headaches, psychiatric history:            GI/Hepatic/Renal:   (+) GERD:,           Endo/Other:    (+) blood dyscrasia: anticoagulation therapy and anemia, arthritis: rheumatoid. , .                 Abdominal:           Vascular:   + PVD, aortic or cerebral, . Anesthesia Plan      MAC     ASA 4       Induction: intravenous.           Plan discussed with CRNA and surgical team.                  NATALIYA Jansen - SANAM   3/11/2020

## 2020-03-11 NOTE — PROGRESS NOTES
[address] [place] [address]        I have examined the patient within 24 hours  before the procedure and there is no change in the previous history and physical exam,which has been reviewed. There is no history of sleep apnea, snoring, or stridor. There has been no  previous adverse experience with sedation/anesthesia. There is no increased risk for aspiration of gastric contents. The patient has been instructed that all resuscitative measures (during the operative and immediate perioperative period) will be instituted in the unlikely event that they will be needed. ASA Class: 3  AIRWAY Class: 3     Consent form signed, witnessed and in soft chart       I have reviewed with the patient and/or family the risks, benefits, and alternatives to the procedure.     Rosette Toro MD  [facility] gastroenterology  3/11/2020  1:24 PM

## 2020-03-19 ENCOUNTER — TELEPHONE (OUTPATIENT)
Dept: PULMONOLOGY | Age: 80
End: 2020-03-19

## 2020-03-20 ENCOUNTER — OFFICE VISIT (OUTPATIENT)
Dept: PULMONOLOGY | Age: 80
End: 2020-03-20
Payer: MEDICARE

## 2020-03-20 VITALS
TEMPERATURE: 97.9 F | OXYGEN SATURATION: 98 % | SYSTOLIC BLOOD PRESSURE: 86 MMHG | HEIGHT: 66 IN | HEART RATE: 75 BPM | BODY MASS INDEX: 20.89 KG/M2 | WEIGHT: 130 LBS | DIASTOLIC BLOOD PRESSURE: 40 MMHG

## 2020-03-20 PROCEDURE — 1090F PRES/ABSN URINE INCON ASSESS: CPT | Performed by: INTERNAL MEDICINE

## 2020-03-20 PROCEDURE — G8926 SPIRO NO PERF OR DOC: HCPCS | Performed by: INTERNAL MEDICINE

## 2020-03-20 PROCEDURE — G8420 CALC BMI NORM PARAMETERS: HCPCS | Performed by: INTERNAL MEDICINE

## 2020-03-20 PROCEDURE — G8482 FLU IMMUNIZE ORDER/ADMIN: HCPCS | Performed by: INTERNAL MEDICINE

## 2020-03-20 PROCEDURE — 1036F TOBACCO NON-USER: CPT | Performed by: INTERNAL MEDICINE

## 2020-03-20 PROCEDURE — 4040F PNEUMOC VAC/ADMIN/RCVD: CPT | Performed by: INTERNAL MEDICINE

## 2020-03-20 PROCEDURE — 99213 OFFICE O/P EST LOW 20 MIN: CPT | Performed by: INTERNAL MEDICINE

## 2020-03-20 PROCEDURE — G8427 DOCREV CUR MEDS BY ELIG CLIN: HCPCS | Performed by: INTERNAL MEDICINE

## 2020-03-20 PROCEDURE — 1123F ACP DISCUSS/DSCN MKR DOCD: CPT | Performed by: INTERNAL MEDICINE

## 2020-03-20 PROCEDURE — 3023F SPIROM DOC REV: CPT | Performed by: INTERNAL MEDICINE

## 2020-03-20 PROCEDURE — G8399 PT W/DXA RESULTS DOCUMENT: HCPCS | Performed by: INTERNAL MEDICINE

## 2020-03-20 RX ORDER — LEVOFLOXACIN 500 MG/1
500 TABLET, FILM COATED ORAL DAILY
Qty: 5 TABLET | Refills: 0 | Status: ON HOLD | OUTPATIENT
Start: 2020-03-20 | End: 2020-03-27 | Stop reason: HOSPADM

## 2020-03-20 RX ORDER — PREDNISONE 1 MG/1
TABLET ORAL
Qty: 38 TABLET | Refills: 0 | Status: ON HOLD | OUTPATIENT
Start: 2020-03-20 | End: 2020-03-27 | Stop reason: HOSPADM

## 2020-03-24 ENCOUNTER — HOSPITAL ENCOUNTER (INPATIENT)
Age: 80
LOS: 3 days | Discharge: HOME | DRG: 378 | End: 2020-03-27
Attending: EMERGENCY MEDICINE | Admitting: HOSPITALIST
Payer: MEDICARE

## 2020-03-24 ENCOUNTER — APPOINTMENT (OUTPATIENT)
Dept: GENERAL RADIOLOGY | Age: 80
DRG: 378 | End: 2020-03-24
Payer: MEDICARE

## 2020-03-24 PROBLEM — D64.9 SYMPTOMATIC ANEMIA: Status: ACTIVE | Noted: 2020-03-24

## 2020-03-24 LAB
ALBUMIN SERPL-MCNC: 3.5 GM/DL (ref 3.4–5)
ALP BLD-CCNC: 83 IU/L (ref 40–128)
ALT SERPL-CCNC: 7 U/L (ref 10–40)
ANION GAP SERPL CALCULATED.3IONS-SCNC: 13 MMOL/L (ref 4–16)
ANION GAP SERPL CALCULATED.3IONS-SCNC: 9 MMOL/L (ref 4–16)
APTT: 30.6 SECONDS (ref 25.1–37.1)
AST SERPL-CCNC: 21 IU/L (ref 15–37)
BACTERIA: NEGATIVE /HPF
BASOPHILS ABSOLUTE: 0 K/CU MM
BASOPHILS RELATIVE PERCENT: 0.2 % (ref 0–1)
BILIRUB SERPL-MCNC: 0.2 MG/DL (ref 0–1)
BILIRUBIN URINE: NEGATIVE MG/DL
BLOOD, URINE: NEGATIVE
BUN BLDV-MCNC: 53 MG/DL (ref 6–23)
BUN BLDV-MCNC: 59 MG/DL (ref 6–23)
CALCIUM SERPL-MCNC: 9 MG/DL (ref 8.3–10.6)
CALCIUM SERPL-MCNC: 9.7 MG/DL (ref 8.3–10.6)
CHLORIDE BLD-SCNC: 94 MMOL/L (ref 99–110)
CHLORIDE BLD-SCNC: 97 MMOL/L (ref 99–110)
CLARITY: CLEAR
CO2: 24 MMOL/L (ref 21–32)
CO2: 27 MMOL/L (ref 21–32)
COLOR: COLORLESS
CREAT SERPL-MCNC: 2.1 MG/DL (ref 0.6–1.1)
CREAT SERPL-MCNC: 2.3 MG/DL (ref 0.6–1.1)
DIFFERENTIAL TYPE: ABNORMAL
EOSINOPHILS ABSOLUTE: 0 K/CU MM
EOSINOPHILS RELATIVE PERCENT: 0.1 % (ref 0–3)
GFR AFRICAN AMERICAN: 25 ML/MIN/1.73M2
GFR AFRICAN AMERICAN: 27 ML/MIN/1.73M2
GFR NON-AFRICAN AMERICAN: 20 ML/MIN/1.73M2
GFR NON-AFRICAN AMERICAN: 23 ML/MIN/1.73M2
GLUCOSE BLD-MCNC: 105 MG/DL (ref 70–99)
GLUCOSE BLD-MCNC: 133 MG/DL (ref 70–99)
GLUCOSE, URINE: NEGATIVE MG/DL
HCT VFR BLD CALC: 23.2 % (ref 37–47)
HEMOGLOBIN: 7 GM/DL (ref 12.5–16)
HEMOGLOBIN: 8.7 GM/DL (ref 12.5–16)
IMMATURE NEUTROPHIL %: 0.8 % (ref 0–0.43)
INR BLD: 1.06 INDEX
KETONES, URINE: NEGATIVE MG/DL
LACTATE: 2 MMOL/L (ref 0.4–2)
LEUKOCYTE ESTERASE, URINE: NEGATIVE
LIPASE: 38 IU/L (ref 13–60)
LYMPHOCYTES ABSOLUTE: 1.3 K/CU MM
LYMPHOCYTES RELATIVE PERCENT: 9.6 % (ref 24–44)
MCH RBC QN AUTO: 27.3 PG (ref 27–31)
MCHC RBC AUTO-ENTMCNC: 30.2 % (ref 32–36)
MCV RBC AUTO: 90.6 FL (ref 78–100)
MONOCYTES ABSOLUTE: 0.6 K/CU MM
MONOCYTES RELATIVE PERCENT: 4.7 % (ref 0–4)
MUCUS: ABNORMAL HPF
NITRITE URINE, QUANTITATIVE: NEGATIVE
NUCLEATED RBC %: 0 %
PDW BLD-RTO: 16.8 % (ref 11.7–14.9)
PH, URINE: 5 (ref 5–8)
PLATELET # BLD: 572 K/CU MM (ref 140–440)
PMV BLD AUTO: 10 FL (ref 7.5–11.1)
POTASSIUM SERPL-SCNC: 5.3 MMOL/L (ref 3.5–5.1)
POTASSIUM SERPL-SCNC: ABNORMAL MMOL/L (ref 3.5–5.1)
PRO-BNP: 606.9 PG/ML
PROTEIN UA: NEGATIVE MG/DL
PROTHROMBIN TIME: 12.8 SECONDS (ref 11.7–14.5)
RBC # BLD: 2.56 M/CU MM (ref 4.2–5.4)
RBC URINE: ABNORMAL /HPF (ref 0–6)
SEGMENTED NEUTROPHILS ABSOLUTE COUNT: 11.3 K/CU MM
SEGMENTED NEUTROPHILS RELATIVE PERCENT: 84.6 % (ref 36–66)
SODIUM BLD-SCNC: 131 MMOL/L (ref 135–145)
SODIUM BLD-SCNC: 133 MMOL/L (ref 135–145)
SPECIFIC GRAVITY UA: 1.01 (ref 1–1.03)
TOTAL CK: 45 IU/L (ref 26–140)
TOTAL IMMATURE NEUTOROPHIL: 0.11 K/CU MM
TOTAL NUCLEATED RBC: 0 K/CU MM
TOTAL PROTEIN: 6.4 GM/DL (ref 6.4–8.2)
TRICHOMONAS: ABNORMAL /HPF
TROPONIN T: <0.01 NG/ML
TSH HIGH SENSITIVITY: 4.78 UIU/ML (ref 0.27–4.2)
UROBILINOGEN, URINE: NORMAL MG/DL (ref 0.2–1)
WBC # BLD: 13.3 K/CU MM (ref 4–10.5)
WBC UA: ABNORMAL /HPF (ref 0–5)

## 2020-03-24 PROCEDURE — 84484 ASSAY OF TROPONIN QUANT: CPT

## 2020-03-24 PROCEDURE — 6360000002 HC RX W HCPCS: Performed by: NURSE PRACTITIONER

## 2020-03-24 PROCEDURE — 83605 ASSAY OF LACTIC ACID: CPT

## 2020-03-24 PROCEDURE — 85730 THROMBOPLASTIN TIME PARTIAL: CPT

## 2020-03-24 PROCEDURE — 96375 TX/PRO/DX INJ NEW DRUG ADDON: CPT

## 2020-03-24 PROCEDURE — 84443 ASSAY THYROID STIM HORMONE: CPT

## 2020-03-24 PROCEDURE — 36430 TRANSFUSION BLD/BLD COMPNT: CPT

## 2020-03-24 PROCEDURE — 99291 CRITICAL CARE FIRST HOUR: CPT

## 2020-03-24 PROCEDURE — 6360000002 HC RX W HCPCS: Performed by: EMERGENCY MEDICINE

## 2020-03-24 PROCEDURE — 2580000003 HC RX 258: Performed by: EMERGENCY MEDICINE

## 2020-03-24 PROCEDURE — 85610 PROTHROMBIN TIME: CPT

## 2020-03-24 PROCEDURE — 85018 HEMOGLOBIN: CPT

## 2020-03-24 PROCEDURE — 82550 ASSAY OF CK (CPK): CPT

## 2020-03-24 PROCEDURE — 6370000000 HC RX 637 (ALT 250 FOR IP): Performed by: EMERGENCY MEDICINE

## 2020-03-24 PROCEDURE — 85025 COMPLETE CBC W/AUTO DIFF WBC: CPT

## 2020-03-24 PROCEDURE — 86850 RBC ANTIBODY SCREEN: CPT

## 2020-03-24 PROCEDURE — 81001 URINALYSIS AUTO W/SCOPE: CPT

## 2020-03-24 PROCEDURE — 86901 BLOOD TYPING SEROLOGIC RH(D): CPT

## 2020-03-24 PROCEDURE — 94640 AIRWAY INHALATION TREATMENT: CPT

## 2020-03-24 PROCEDURE — 93005 ELECTROCARDIOGRAM TRACING: CPT | Performed by: EMERGENCY MEDICINE

## 2020-03-24 PROCEDURE — 83880 ASSAY OF NATRIURETIC PEPTIDE: CPT

## 2020-03-24 PROCEDURE — 96374 THER/PROPH/DIAG INJ IV PUSH: CPT

## 2020-03-24 PROCEDURE — C9113 INJ PANTOPRAZOLE SODIUM, VIA: HCPCS | Performed by: EMERGENCY MEDICINE

## 2020-03-24 PROCEDURE — 83690 ASSAY OF LIPASE: CPT

## 2020-03-24 PROCEDURE — 80053 COMPREHEN METABOLIC PANEL: CPT

## 2020-03-24 PROCEDURE — 86922 COMPATIBILITY TEST ANTIGLOB: CPT

## 2020-03-24 PROCEDURE — 71045 X-RAY EXAM CHEST 1 VIEW: CPT

## 2020-03-24 PROCEDURE — G0328 FECAL BLOOD SCRN IMMUNOASSAY: HCPCS

## 2020-03-24 PROCEDURE — 94761 N-INVAS EAR/PLS OXIMETRY MLT: CPT

## 2020-03-24 PROCEDURE — 86900 BLOOD TYPING SEROLOGIC ABO: CPT

## 2020-03-24 PROCEDURE — 2000000000 HC ICU R&B

## 2020-03-24 PROCEDURE — 96372 THER/PROPH/DIAG INJ SC/IM: CPT

## 2020-03-24 PROCEDURE — P9016 RBC LEUKOCYTES REDUCED: HCPCS

## 2020-03-24 PROCEDURE — 2500000003 HC RX 250 WO HCPCS: Performed by: EMERGENCY MEDICINE

## 2020-03-24 PROCEDURE — 80048 BASIC METABOLIC PNL TOTAL CA: CPT

## 2020-03-24 PROCEDURE — 2580000003 HC RX 258: Performed by: NURSE PRACTITIONER

## 2020-03-24 RX ORDER — SODIUM CHLORIDE 0.9 % (FLUSH) 0.9 %
10 SYRINGE (ML) INJECTION PRN
Status: DISCONTINUED | OUTPATIENT
Start: 2020-03-24 | End: 2020-03-27 | Stop reason: HOSPADM

## 2020-03-24 RX ORDER — FUROSEMIDE 10 MG/ML
20 INJECTION INTRAMUSCULAR; INTRAVENOUS
Status: CANCELLED | OUTPATIENT
Start: 2020-03-24 | End: 2020-03-24

## 2020-03-24 RX ORDER — SODIUM POLYSTYRENE SULFONATE 15 G/60ML
15 SUSPENSION ORAL; RECTAL ONCE
Status: COMPLETED | OUTPATIENT
Start: 2020-03-24 | End: 2020-03-24

## 2020-03-24 RX ORDER — IPRATROPIUM BROMIDE AND ALBUTEROL SULFATE 2.5; .5 MG/3ML; MG/3ML
3 SOLUTION RESPIRATORY (INHALATION) EVERY 4 HOURS PRN
Status: DISCONTINUED | OUTPATIENT
Start: 2020-03-24 | End: 2020-03-27 | Stop reason: HOSPADM

## 2020-03-24 RX ORDER — ALBUTEROL SULFATE 2.5 MG/3ML
5 SOLUTION RESPIRATORY (INHALATION) ONCE
Status: COMPLETED | OUTPATIENT
Start: 2020-03-24 | End: 2020-03-24

## 2020-03-24 RX ORDER — DICYCLOMINE HYDROCHLORIDE 10 MG/ML
20 INJECTION INTRAMUSCULAR ONCE
Status: COMPLETED | OUTPATIENT
Start: 2020-03-24 | End: 2020-03-24

## 2020-03-24 RX ORDER — ACETAMINOPHEN 325 MG/1
650 TABLET ORAL EVERY 4 HOURS PRN
Status: DISCONTINUED | OUTPATIENT
Start: 2020-03-24 | End: 2020-03-27 | Stop reason: HOSPADM

## 2020-03-24 RX ORDER — 0.9 % SODIUM CHLORIDE 0.9 %
20 INTRAVENOUS SOLUTION INTRAVENOUS ONCE
Status: COMPLETED | OUTPATIENT
Start: 2020-03-24 | End: 2020-03-24

## 2020-03-24 RX ORDER — MORPHINE SULFATE 2 MG/ML
2 INJECTION, SOLUTION INTRAMUSCULAR; INTRAVENOUS EVERY 4 HOURS PRN
Status: DISCONTINUED | OUTPATIENT
Start: 2020-03-24 | End: 2020-03-25

## 2020-03-24 RX ORDER — 0.9 % SODIUM CHLORIDE 0.9 %
1000 INTRAVENOUS SOLUTION INTRAVENOUS ONCE
Status: COMPLETED | OUTPATIENT
Start: 2020-03-24 | End: 2020-03-24

## 2020-03-24 RX ORDER — PANTOPRAZOLE SODIUM 40 MG/10ML
40 INJECTION, POWDER, LYOPHILIZED, FOR SOLUTION INTRAVENOUS EVERY 12 HOURS
Status: DISCONTINUED | OUTPATIENT
Start: 2020-03-24 | End: 2020-03-27 | Stop reason: HOSPADM

## 2020-03-24 RX ORDER — PANTOPRAZOLE SODIUM 40 MG/10ML
40 INJECTION, POWDER, LYOPHILIZED, FOR SOLUTION INTRAVENOUS ONCE
Status: COMPLETED | OUTPATIENT
Start: 2020-03-24 | End: 2020-03-24

## 2020-03-24 RX ORDER — DEXTROSE MONOHYDRATE 25 G/50ML
25 INJECTION, SOLUTION INTRAVENOUS ONCE
Status: COMPLETED | OUTPATIENT
Start: 2020-03-24 | End: 2020-03-24

## 2020-03-24 RX ORDER — SODIUM CHLORIDE 0.9 % (FLUSH) 0.9 %
10 SYRINGE (ML) INJECTION EVERY 12 HOURS SCHEDULED
Status: DISCONTINUED | OUTPATIENT
Start: 2020-03-24 | End: 2020-03-27 | Stop reason: HOSPADM

## 2020-03-24 RX ORDER — SODIUM CHLORIDE 9 MG/ML
10 INJECTION INTRAVENOUS EVERY 12 HOURS
Status: DISCONTINUED | OUTPATIENT
Start: 2020-03-24 | End: 2020-03-27 | Stop reason: HOSPADM

## 2020-03-24 RX ORDER — SODIUM CHLORIDE 9 MG/ML
INJECTION, SOLUTION INTRAVENOUS CONTINUOUS
Status: DISCONTINUED | OUTPATIENT
Start: 2020-03-24 | End: 2020-03-26

## 2020-03-24 RX ORDER — ONDANSETRON 2 MG/ML
4 INJECTION INTRAMUSCULAR; INTRAVENOUS EVERY 6 HOURS PRN
Status: DISCONTINUED | OUTPATIENT
Start: 2020-03-24 | End: 2020-03-27 | Stop reason: HOSPADM

## 2020-03-24 RX ORDER — ATORVASTATIN CALCIUM 10 MG/1
10 TABLET, FILM COATED ORAL EVERY EVENING
Status: DISCONTINUED | OUTPATIENT
Start: 2020-03-24 | End: 2020-03-27 | Stop reason: HOSPADM

## 2020-03-24 RX ORDER — AMIODARONE HYDROCHLORIDE 200 MG/1
200 TABLET ORAL DAILY
Status: DISCONTINUED | OUTPATIENT
Start: 2020-03-24 | End: 2020-03-27 | Stop reason: HOSPADM

## 2020-03-24 RX ORDER — ONDANSETRON 2 MG/ML
4 INJECTION INTRAMUSCULAR; INTRAVENOUS EVERY 30 MIN PRN
Status: DISCONTINUED | OUTPATIENT
Start: 2020-03-24 | End: 2020-03-27 | Stop reason: HOSPADM

## 2020-03-24 RX ADMIN — ONDANSETRON 4 MG: 2 INJECTION INTRAMUSCULAR; INTRAVENOUS at 17:42

## 2020-03-24 RX ADMIN — SODIUM CHLORIDE 1000 ML: 9 INJECTION, SOLUTION INTRAVENOUS at 17:41

## 2020-03-24 RX ADMIN — DEXTROSE 50 % IN WATER (D50W) INTRAVENOUS SYRINGE 25 G: at 18:42

## 2020-03-24 RX ADMIN — Medication 25 MEQ: at 18:42

## 2020-03-24 RX ADMIN — PANTOPRAZOLE SODIUM 40 MG: 40 INJECTION, POWDER, FOR SOLUTION INTRAVENOUS at 17:41

## 2020-03-24 RX ADMIN — DICYCLOMINE HYDROCHLORIDE 20 MG: 20 INJECTION, SOLUTION INTRAMUSCULAR at 17:42

## 2020-03-24 RX ADMIN — FAMOTIDINE 20 MG: 10 INJECTION INTRAVENOUS at 17:41

## 2020-03-24 RX ADMIN — SODIUM CHLORIDE: 9 INJECTION, SOLUTION INTRAVENOUS at 23:55

## 2020-03-24 RX ADMIN — INSULIN HUMAN 4 UNITS: 100 INJECTION, SOLUTION PARENTERAL at 18:43

## 2020-03-24 RX ADMIN — SODIUM POLYSTYRENE SULFONATE 15 G: 15 SUSPENSION ORAL; RECTAL at 18:42

## 2020-03-24 RX ADMIN — ALBUTEROL SULFATE 5 MG: 2.5 SOLUTION RESPIRATORY (INHALATION) at 18:47

## 2020-03-24 RX ADMIN — MORPHINE SULFATE 2 MG: 2 INJECTION, SOLUTION INTRAMUSCULAR; INTRAVENOUS at 23:56

## 2020-03-24 RX ADMIN — SODIUM CHLORIDE 20 ML: 9 INJECTION, SOLUTION INTRAVENOUS at 17:42

## 2020-03-24 ASSESSMENT — PAIN DESCRIPTION - PROGRESSION: CLINICAL_PROGRESSION: NOT CHANGED

## 2020-03-24 ASSESSMENT — PAIN SCALES - GENERAL: PAINLEVEL_OUTOF10: 8

## 2020-03-24 ASSESSMENT — ENCOUNTER SYMPTOMS
BLOOD IN STOOL: 1
ALLERGIC/IMMUNOLOGIC NEGATIVE: 1
SHORTNESS OF BREATH: 1
EYES NEGATIVE: 1

## 2020-03-24 ASSESSMENT — PAIN DESCRIPTION - ORIENTATION: ORIENTATION: LOWER

## 2020-03-24 ASSESSMENT — PAIN DESCRIPTION - PAIN TYPE: TYPE: CHRONIC PAIN

## 2020-03-24 ASSESSMENT — PAIN DESCRIPTION - LOCATION: LOCATION: BACK

## 2020-03-24 ASSESSMENT — PAIN DESCRIPTION - ONSET: ONSET: ON-GOING

## 2020-03-24 ASSESSMENT — PAIN DESCRIPTION - FREQUENCY: FREQUENCY: CONTINUOUS

## 2020-03-24 ASSESSMENT — PAIN DESCRIPTION - DESCRIPTORS: DESCRIPTORS: ACHING

## 2020-03-24 NOTE — ED PROVIDER NOTES
10/24/2017    Depression     Fibromyalgia     Former smoker     Quit 2019    Herniated cervical disc -    Herpes zoster ophthalmicus 3/2012    Hx of blood clots     Hyperlipidemia     Hypertension     Kidney disease     L3 vertebral fracture ([address]     vertebroplasty    Lumbar spinal stenosis     moderately severe by MRI    Migraine     Nocturnal hypoxemia 2019    Osteoporosis 2010    Fragility Fx L3    Rheumatoid arthritis(714.0) 1976    Dr Shabnam Anthony S/P cardiac catheterization 2017    patent coronaries; Takotsubo; najeeb Ahmed    Shortness of breath 2019    Takotsubo syndrome 2017    TMJ dysfunction     Tobacco abuse 2018    Vitamin B12 deficiency 2014    B12 level 199     Past Surgical History:   Procedure Laterality Date    APPENDECTOMY  1966    CARDIAC CATHETERIZATION  2017    CHOLECYSTECTOMY  1966    w/ appendectomy    COLONOSCOPY N/A 3/11/2020    COLONOSCOPY WITH BIOPSY performed [facility] Right     FIXATION KYPHOPLASTY  2019    L4 kyphoplasty ; [facility]    FOOT SURGERY  1986    HYSTERECTOMY      FL COLONOSCOPY FLX DX W/COLLJ SPEC WHEN PFRMD N/A 10/2/2018    COLONOSCOPY DIAGNOSTIC OR SCREENING performed by Jose Manuel Martin MD at [address] [place] [address] TOE SURGERY Left 2013    Deboo;     UPPER GASTROINTESTINAL ENDOSCOPY N/A 3/11/2020    EGD BIOPSY performed by Tom Kingston MD at [place] [address] VERTEBROPLASTY  10/2010    L3    WRIST FUSION Left      Family History   Problem Relation Age of Onset    Heart Disease Father          FROM MI   [place] Emphysema Father     Arthritis Mother     Stroke Mother     Heart Disease Other         family history    Cancer Other         family history -- larynx    Kidney Disease Son      Social History     Socioeconomic History    Marital status:       Spouse name: David Sevilla Number of children: 2    Years of education: 15    Highest education level: Not on file   Occupational History    Occupation: retired   Social Needs    Financial resource strain: Not on file    Food insecurity     Worry: Not on file     Inability: Not on file   [employer] needs     Medical: Not on file     Non-medical: Not on file   Tobacco Use    Smoking status: Former Smoker     Packs/day: 0.75     Years: 55.00     Pack years: 41.25     Types: Cigarettes     Start date: 1962     Last attempt to quit: 2019     Years since quittin.2    Smokeless tobacco: Never Used   Substance and Sexual Activity    Alcohol use: No     Alcohol/week: 0.0 standard drinks    Drug use: No    Sexual activity: Not Currently   Lifestyle    Physical activity     Days per week: Not on file     Minutes per session: Not on file    Stress: Not on file   Relationships    Social connections     Talks on phone: Not on file     Gets together: Not on file     Attends Mosque service: Not on file     Active member of club or organization: Not on file     Attends meetings of clubs or organizations: Not on file     Relationship status: Not on file    Intimate partner violence     Fear of current or ex partner: Not on file     Emotionally abused: Not on file     Physically abused: Not on file     Forced sexual activity: Not on file   Other Topics Concern    Not on file   Social History Narrative    Not on file     Current Facility-Administered Medications   Medication Dose Route Frequency Provider Last Rate Last Dose    0.9 % sodium chloride bolus  20 mL Intravenous Once Florance Sessions, DO 10 mL/hr at 20 1742 20 mL at 20 174    0.9 % sodium chloride bolus  1,000 mL Intravenous Once Florance Sessions, DO 1,000 mL/hr at 20 1741 1,000 mL at 20 174    ondansetron (ZOFRAN) injection 4 mg  4 mg Intravenous Q30 Min PRN Florance Sessions, DO   4 mg at 20 174     Current Outpatient Medications   Medication Sig Dispense capsule Take 1 capsule by mouth daily      PROAIR  (90 Base) MCG/ACT inhaler INHALE 2 PUFFS BY ORAL INHALATION EVERY 4 TO 6 HOURS AS NEEDED 8.5 g 10    BREO ELLIPTA 100-25 MCG/INH AEPB inhaler INHALE 1 PUFF INTO THE LUNGS DAILY AFTER INHALATION THEN GARGLE (Patient not taking: Reported on 3/20/2020) 1 each 11    INCRUSE ELLIPTA 62.5 MCG/INH AEPB INHALE 1 PUFF VIA ORAL INHALATION ONCE DAILY 1 each 11    vitamin B-12 (CYANOCOBALAMIN) 1000 MCG tablet Take 1,000 mcg by mouth daily.  Calcium Citrate-Vitamin D (CITRACAL + D PO) Take 600 mg by mouth daily         Allergies   Allergen Reactions    Ativan [Lorazepam] Other (See Comments)     Violent, thrashing and combative. She has lacerations and bruising, had to be tied down.      Etanercept Other (See Comments)     No response    Humira [Adalimumab]     Prochlorperazine Edisylate Other (See Comments)     \"Compazine\"   Involuntary Muscle Spasms     Remicade [Infliximab Injection]     Doxycycline Other (See Comments)     Stomach pains     Current Facility-Administered Medications   Medication Dose Route Frequency Provider Last Rate Last Dose    0.9 % sodium chloride bolus  20 mL Intravenous Once [employer], DO 10 mL/hr at 03/24/20 1742 20 mL at 03/24/20 1742    0.9 % sodium chloride bolus  1,000 mL Intravenous Once [employer], DO 1,000 mL/hr at 03/24/20 1741 1,000 mL at 03/24/20 1741    ondansetron (ZOFRAN) injection 4 mg  4 mg Intravenous Q30 Min PRN [employer], DO   4 mg at 03/24/20 1742     Current Outpatient Medications   Medication Sig Dispense Refill    predniSONE (DELTASONE) 5 MG tablet Take 6 PO now, 5 PO qam for 2 days, 3 PO qam for 3 days, 2 PO qam for 4 days, 1 PO qam for 5 days, then STOP 38 tablet 0    levoFLOXacin (LEVAQUIN) 500 MG tablet Take 1 tablet by mouth daily for 5 days 5 tablet 0    busPIRone (BUSPAR) 5 MG tablet Take 5 mg by mouth 3 times daily      rivaroxaban (XARELTO) 10 MG TABS tablet Take 1 tablet by mouth daily 30 tablet 0    clopidogrel (PLAVIX) 75 MG tablet Take 1 tablet by mouth daily 30 tablet 0    aspirin 81 MG chewable tablet Take 1 tablet by mouth daily 30 tablet 0    amiodarone (CORDARONE) 200 MG tablet Take 1 tablet by mouth daily 30 tablet 0    lisinopril (PRINIVIL;ZESTRIL) 5 MG tablet Take 1 tablet by mouth daily 30 tablet 0    metoprolol succinate (TOPROL XL) 25 MG extended release tablet Take 1 tablet by mouth 2 times daily 30 tablet 0    spironolactone (ALDACTONE) 25 MG tablet Take 1 tablet by mouth daily 30 tablet 0    omeprazole (PRILOSEC) 40 MG delayed release capsule Take 1 capsule by mouth every morning (before breakfast) 30 capsule 0    potassium chloride (KLOR-CON) 10 MEQ extended release tablet Take 1 tablet by mouth daily 90 tablet 1    NONFORMULARY White petrolatum, bacitracin zinc, lotrisone bid to legs      OXYGEN Inhale 2 L/min into the lungs nightly      FORTEO 600 MCG/2.4ML SOLN injection INJECT 0.08MLS INTO THE SKIN DAILY 2.4 mL 5    ipratropium-albuterol (DUONEB) 0.5-2.5 (3) MG/3ML SOLN nebulizer solution Inhale 3 mLs into the lungs every 4 hours (while awake) 360 mL 2    furosemide (LASIX) 40 MG tablet Take 1 tablet by mouth 2 times daily 60 tablet 3    atorvastatin (LIPITOR) 10 MG tablet TAKE 1 TABLET BY MOUTH EVERY EVENING 90 tablet 4    traZODone (DESYREL) 50 MG tablet TAKE 1 TABLET BY MOUTH NIGHTLY 30 tablet 5    triamcinolone (NASACORT ALLERGY 24HR) 55 MCG/ACT nasal inhaler 2 sprays by Each Nostril route daily 1 Inhaler 3    Cholecalciferol (VITAMIN D) 2000 units CAPS capsule Take 1 capsule by mouth daily      PROAIR  (90 Base) MCG/ACT inhaler INHALE 2 PUFFS BY ORAL INHALATION EVERY 4 TO 6 HOURS AS NEEDED 8.5 g 10    BREO ELLIPTA 100-25 MCG/INH AEPB inhaler INHALE 1 PUFF INTO THE LUNGS DAILY AFTER INHALATION THEN GARGLE (Patient not taking: Reported on 3/20/2020) 1 each 11    INCRUSE ELLIPTA 62.5 MCG/INH AEPB INHALE 1 PUFF VIA ORAL INHALATION ONCE DAILY 1 each 11    vitamin B-12 (CYANOCOBALAMIN) 1000 MCG tablet Take 1,000 mcg by mouth daily.  Calcium Citrate-Vitamin D (CITRACAL + D PO) Take 600 mg by mouth daily          Nursing Notes Reviewed    VITAL SIGNS:  ED Triage Vitals   Enc Vitals Group      BP       Pulse       Resp       Temp       Temp src       SpO2       Weight       Height       Head Circumference       Peak Flow       Pain Score       Pain Loc       Pain Edu? Excl. in [address]? PHYSICAL EXAM:  Physical Exam  Vitals signs and nursing note reviewed. Constitutional:       General: She is not in acute distress. Appearance: Normal appearance. She is well-developed and well-groomed. She is not ill-appearing, toxic-appearing or diaphoretic. HENT:      Head: Normocephalic and atraumatic. Right Ear: External ear normal.      Left Ear: External ear normal.      Nose: Nose normal. No congestion or rhinorrhea. Mouth/Throat:      Mouth: Mucous membranes are moist.      Pharynx: No oropharyngeal exudate or posterior oropharyngeal erythema. Eyes:      General: No scleral icterus. Right eye: No discharge. Left eye: No discharge. Extraocular Movements: Extraocular movements intact. Conjunctiva/sclera: Conjunctivae normal.      Pupils: Pupils are equal, round, and reactive to light. Neck:      Musculoskeletal: Full passive range of motion without pain and normal range of motion. Normal range of motion. No edema, erythema, neck rigidity, crepitus, injury or muscular tenderness. Vascular: No carotid bruit or JVD. Trachea: Phonation normal.   Cardiovascular:      Rate and Rhythm: Normal rate and regular rhythm. Pulses: Normal pulses. Heart sounds: Normal heart sounds. No murmur. No friction rub. No gallop. Pulmonary:      Effort: Pulmonary effort is normal. No respiratory distress. Breath sounds: Normal breath sounds. No stridor. No wheezing, rhonchi or rales.    Abdominal:

## 2020-03-24 NOTE — ED NOTES
Called pharmacy and they will call me when they send insulin.      Adamaris Thorpe RN  03/24/20 0811

## 2020-03-24 NOTE — H&P
tenderness. Jeter catheter is not present. LYMPH-No palpable cervical lymphadenopathy and no hepatosplenomegaly. No petechiae or ecchymoses. MS -No gross joint deformities. SKIN -pallor warm, dry. NEURO-Cranial nerves appear grossly intact, normal speech, no lateralizing weakness. PSYC-Awake, alert, oriented x 4- person, place, time, situation,  Appropriate affect. Past Medical History:      Past Medical History:   Diagnosis Date    Acute DVT of right tibial vein ([address] 07/2017    ER imaging: distal right tibial vein DVT; no anticoag for bleeding/ anemia    Acute exacerbation of chronic obstructive pulmonary disease (COPD) ([address] 12/13/2018    Anemia     Arthritis     Atrial fibrillation (HCC)     [facility]congestive heart failure) [facility])     Chronic pain syndrome     Low back pain; lumbar disc disease    Clostridium difficile colitis 09/20/2017    COPD, severe ([address] 10/24/2017    Depression     Fibromyalgia     Former smoker     Quit 1/2019    Herniated cervical disc 5-1998    Herpes zoster ophthalmicus 3/2012    Hx of blood clots     Hyperlipidemia     Hypertension     Kidney disease     L3 vertebral fracture ([address] 2010    vertebroplasty    Lumbar spinal stenosis 2015    moderately severe by MRI    Migraine     Nocturnal hypoxemia 5/21/2019    Osteoporosis 2010    Fragility Fx L3    Rheumatoid arthritis(714.0) 1976    Dr Joseph Frey    S/P cardiac catheterization 05/2017    patent coronaries; Takotsubo; najeeb Ahmed    Shortness of breath 4/25/2019    Takotsubo syndrome 05/2017    TMJ dysfunction     Tobacco abuse 12/13/2018    Vitamin B12 deficiency 12/2014    B12 level 199       Past Surgical  History:    has a past surgical history that includes Cholecystectomy (1966); devyn and bso (cervix removed) (1991); Wrist fusion (Left, 2000); Elbow surgery (Right); Appendectomy (1966); vertebroplasty (10/2010); Foot surgery (1986); Toe Surgery (Left, 4/25/2013);  Cardiac catheterization (2017); pr colonoscopy flx dx w/collj spec when pfrmd (N/A, 10/2/2018); Fixation Kyphoplasty (2019); Hysterectomy; Upper gastrointestinal endoscopy (N/A, 3/11/2020); and Colonoscopy (N/A, 3/11/2020). Social History:    FAM HX: Reviewed  family history includes Arthritis in her mother; Cancer in an other family member; Emphysema in her father; Heart Disease in her father and another family member; Kidney Disease in her son; Stroke in her mother. Soc HX:   Social History     Socioeconomic History    Marital status:      Spouse name: Eunice Goodman Number of children: 2    Years of education: 15    Highest education level: None   Occupational History    Occupation: retired   Social Needs    Financial resource strain: None    Food insecurity     Worry: None     Inability: None    Transportation needs     Medical: None     Non-medical: None   Tobacco Use    Smoking status: Former Smoker     Packs/day: 0.75     Years: 55.00     Pack years: 41.25     Types: Cigarettes     Start date: 1962     Last attempt to quit: 2019     Years since quittin.2    Smokeless tobacco: Never Used   Substance and Sexual Activity    Alcohol use: No     Alcohol/week: 0.0 standard drinks    Drug use: No    Sexual activity: Not Currently   Lifestyle    Physical activity     Days per week: None     Minutes per session: None    Stress: None   Relationships    Social connections     Talks on phone: None     Gets together: None     Attends Buddhism service: None     Active member of club or organization: None     Attends meetings of clubs or organizations: None     Relationship status: None    Intimate partner violence     Fear of current or ex partner: None     Emotionally abused: None     Physically abused: None     Forced sexual activity: None   Other Topics Concern    None   Social History Narrative    None     TOBACCO:   reports that she quit smoking about 3 months ago.  Her smoking use included cigarettes. She started smoking about 57 years ago. She has a 41.25 pack-year smoking history. She has never used smokeless tobacco.  ETOH:   reports no history of alcohol use. Drugs:  reports no history of drug use. Allergies: Allergies   Allergen Reactions    Ativan [Lorazepam] Other (See Comments)     Violent, thrashing and combative. She has lacerations and bruising, had to be tied down.  Etanercept Other (See Comments)     No response    Humira [Adalimumab]     Prochlorperazine Edisylate Other (See Comments)     \"Compazine\"   Involuntary Muscle Spasms     Remicade [Infliximab Injection]     Doxycycline Other (See Comments)     Stomach pains       Home Medications:     Prior to Admission medications    Medication Sig Start Date End Date Taking?  Authorizing Provider   predniSONE (DELTASONE) 5 MG tablet Take 6 PO now, 5 PO qam for 2 days, 3 PO qam for 3 days, 2 PO qam for 4 days, 1 PO qam for 5 days, then STOP 3/20/20   Garrett Waggoner MD   levoFLOXacin (LEVAQUIN) 500 MG tablet Take 1 tablet by mouth daily for 5 days 3/20/20 3/25/20  Garrett Waggoner MD   busPIRone (BUSPAR) 5 MG tablet Take 5 mg by mouth 3 times daily    Historical Provider, MD   rivaroxaban (XARELTO) 10 MG TABS tablet Take 1 tablet by mouth daily 3/12/20   Osiel Caputo MD   clopidogrel (PLAVIX) 75 MG tablet Take 1 tablet by mouth daily 3/13/20   Osiel Caputo MD   aspirin 81 MG chewable tablet Take 1 tablet by mouth daily 2/17/20   Akbar Mobley MD   amiodarone (CORDARONE) 200 MG tablet Take 1 tablet by mouth daily 2/17/20   Abkar Mobley MD   lisinopril (PRINIVIL;ZESTRIL) 5 MG tablet Take 1 tablet by mouth daily 2/17/20   Akbar Mobley MD   metoprolol succinate (TOPROL XL) 25 MG extended release tablet Take 1 tablet by mouth 2 times daily 2/16/20   Akbar Mobley MD   spironolactone (ALDACTONE) 25 MG tablet Take 1 tablet by mouth daily 2/17/20   Akbar Mobley MD   omeprazole (PRILOSEC) 40 MG delayed release capsule Take 1 capsule by mouth every morning (before breakfast) 2/6/20   Abdullahi Davidson DO   potassium chloride (KLOR-CON) 10 MEQ extended release tablet Take 1 tablet by mouth daily 1/24/20   Janine Shaw MD   NONFORMULARY White petrolatum, bacitracin zinc, lotrisone bid to legs    Historical Provider, MD   OXYGEN Inhale 2 L/min into the lungs nightly    Historical Provider, MD   FORTEO 600 MCG/2.4ML SOLN injection INJECT 0.08MLS INTO THE SKIN DAILY 1/6/20   Janine Shaw MD   ipratropium-albuterol (DUONEB) 0.5-2.5 (3) MG/3ML SOLN nebulizer solution Inhale 3 mLs into the lungs every 4 hours (while awake) 1/4/20   Casey Luque MD   furosemide (LASIX) 40 MG tablet Take 1 tablet by mouth 2 times daily 11/8/19   Janine Shaw MD   atorvastatin (LIPITOR) 10 MG tablet TAKE 1 TABLET BY MOUTH EVERY EVENING 11/4/19   Janine Shaw MD   traZODone (DESYREL) 50 MG tablet TAKE 1 TABLET BY MOUTH NIGHTLY 10/22/19   Carl Cadena MD   triamcinolone (NASACORT ALLERGY 24HR) 55 MCG/ACT nasal inhaler 2 sprays by Each Nostril route daily 10/4/19   Janine Shaw MD   Cholecalciferol (VITAMIN D) 2000 units CAPS capsule Take 1 capsule by mouth daily    Historical Provider, MD   PROAIR  (90 Base) MCG/ACT inhaler INHALE 2 PUFFS BY ORAL INHALATION EVERY 4 TO 6 HOURS AS NEEDED 3/28/19   Pearl Reilly MD   BREO ELLIPTA 100-25 MCG/INH AEPB inhaler INHALE 1 PUFF INTO THE LUNGS DAILY AFTER INHALATION THEN GARGLE  Patient not taking: Reported on 3/20/2020 2/4/19   Pearl Reilly MD   INCRUSE ELLIPTA 62.5 MCG/INH AEPB INHALE 1 PUFF VIA ORAL INHALATION ONCE DAILY 6/5/18   Pearl Reilly MD   vitamin B-12 (CYANOCOBALAMIN) 1000 MCG tablet Take 1,000 mcg by mouth daily.     Historical Provider, MD   Calcium Citrate-Vitamin D (CITRACAL + D PO) Take 600 mg by mouth daily     Historical Provider, MD         Medications:   Medications:    sodium chloride  20 mL Intravenous Once      Infusions:   PRN Meds: ondansetron, 4 mg, Q30 Min

## 2020-03-25 ENCOUNTER — APPOINTMENT (OUTPATIENT)
Dept: GENERAL RADIOLOGY | Age: 80
DRG: 378 | End: 2020-03-25
Payer: MEDICARE

## 2020-03-25 LAB
ABO/RH: NORMAL
ALBUMIN SERPL-MCNC: 3 GM/DL (ref 3.4–5)
ALP BLD-CCNC: 61 IU/L (ref 40–128)
ALT SERPL-CCNC: <5 U/L (ref 10–40)
ANION GAP SERPL CALCULATED.3IONS-SCNC: 10 MMOL/L (ref 4–16)
ANTIBODY SCREEN: NEGATIVE
AST SERPL-CCNC: 10 IU/L (ref 15–37)
BASOPHILS ABSOLUTE: 0 K/CU MM
BASOPHILS ABSOLUTE: 0.1 K/CU MM
BASOPHILS RELATIVE PERCENT: 0.3 % (ref 0–1)
BASOPHILS RELATIVE PERCENT: 0.5 % (ref 0–1)
BILIRUB SERPL-MCNC: 0.4 MG/DL (ref 0–1)
BUN BLDV-MCNC: 49 MG/DL (ref 6–23)
CALCIUM SERPL-MCNC: 9 MG/DL (ref 8.3–10.6)
CHLORIDE BLD-SCNC: 98 MMOL/L (ref 99–110)
CHLORIDE URINE RANDOM: 99 MMOL/L (ref 43–210)
CO2: 26 MMOL/L (ref 21–32)
COMPONENT: NORMAL
CREAT SERPL-MCNC: 1.9 MG/DL (ref 0.6–1.1)
CREATININE URINE: 28.2 MG/DL (ref 28–217)
CROSSMATCH RESULT: NORMAL
DIFFERENTIAL TYPE: ABNORMAL
DIFFERENTIAL TYPE: ABNORMAL
EOSINOPHILS ABSOLUTE: 0 K/CU MM
EOSINOPHILS ABSOLUTE: 0 K/CU MM
EOSINOPHILS RELATIVE PERCENT: 0.1 % (ref 0–3)
EOSINOPHILS RELATIVE PERCENT: 0.3 % (ref 0–3)
FERRITIN: 36 NG/ML (ref 15–150)
FOLATE: 5.2 NG/ML (ref 3.1–17.5)
GFR AFRICAN AMERICAN: 31 ML/MIN/1.73M2
GFR NON-AFRICAN AMERICAN: 26 ML/MIN/1.73M2
GLUCOSE BLD-MCNC: 88 MG/DL (ref 70–99)
HCT VFR BLD CALC: 27.3 % (ref 37–47)
HCT VFR BLD CALC: 27.8 % (ref 37–47)
HEMOCCULT SP1 STL QL: POSITIVE
HEMOGLOBIN: 8.5 GM/DL (ref 12.5–16)
HEMOGLOBIN: 8.6 GM/DL (ref 12.5–16)
HEMOGLOBIN: 8.9 GM/DL (ref 12.5–16)
HEMOGLOBIN: 9.1 GM/DL (ref 12.5–16)
IMMATURE NEUTROPHIL %: 0.8 % (ref 0–0.43)
IMMATURE NEUTROPHIL %: 1.1 % (ref 0–0.43)
IRON: 87 UG/DL (ref 37–145)
LYMPHOCYTES ABSOLUTE: 2.2 K/CU MM
LYMPHOCYTES ABSOLUTE: 2.5 K/CU MM
LYMPHOCYTES RELATIVE PERCENT: 19 % (ref 24–44)
LYMPHOCYTES RELATIVE PERCENT: 22.1 % (ref 24–44)
MCH RBC QN AUTO: 27.7 PG (ref 27–31)
MCH RBC QN AUTO: 27.8 PG (ref 27–31)
MCHC RBC AUTO-ENTMCNC: 30.9 % (ref 32–36)
MCHC RBC AUTO-ENTMCNC: 31.1 % (ref 32–36)
MCV RBC AUTO: 89.2 FL (ref 78–100)
MCV RBC AUTO: 89.7 FL (ref 78–100)
MONOCYTES ABSOLUTE: 1.2 K/CU MM
MONOCYTES ABSOLUTE: 1.4 K/CU MM
MONOCYTES RELATIVE PERCENT: 10.4 % (ref 0–4)
MONOCYTES RELATIVE PERCENT: 11.4 % (ref 0–4)
NUCLEATED RBC %: 0 %
NUCLEATED RBC %: 0 %
PCT TRANSFERRIN: 27 % (ref 10–44)
PDW BLD-RTO: 15.9 % (ref 11.7–14.9)
PDW BLD-RTO: 15.9 % (ref 11.7–14.9)
PLATELET # BLD: 432 K/CU MM (ref 140–440)
PLATELET # BLD: 465 K/CU MM (ref 140–440)
PMV BLD AUTO: 9.3 FL (ref 7.5–11.1)
PMV BLD AUTO: 9.6 FL (ref 7.5–11.1)
POTASSIUM SERPL-SCNC: 4.6 MMOL/L (ref 3.5–5.1)
POTASSIUM, UR: 27.6 MMOL/L (ref 22–119)
PROT/CREAT RATIO, UR: NORMAL
RBC # BLD: 3.06 M/CU MM (ref 4.2–5.4)
RBC # BLD: 3.1 M/CU MM (ref 4.2–5.4)
RETICULOCYTE COUNT PCT: 2.9 % (ref 0.2–2.2)
SEGMENTED NEUTROPHILS ABSOLUTE COUNT: 7.5 K/CU MM
SEGMENTED NEUTROPHILS ABSOLUTE COUNT: 8 K/CU MM
SEGMENTED NEUTROPHILS RELATIVE PERCENT: 66 % (ref 36–66)
SEGMENTED NEUTROPHILS RELATIVE PERCENT: 68 % (ref 36–66)
SODIUM BLD-SCNC: 134 MMOL/L (ref 135–145)
SODIUM URINE: 94 MMOL/L (ref 35–167)
STATUS: NORMAL
TOTAL IMMATURE NEUTOROPHIL: 0.09 K/CU MM
TOTAL IMMATURE NEUTOROPHIL: 0.12 K/CU MM
TOTAL IRON BINDING CAPACITY: 318 UG/DL (ref 250–450)
TOTAL NUCLEATED RBC: 0 K/CU MM
TOTAL NUCLEATED RBC: 0 K/CU MM
TOTAL PROTEIN: 5.3 GM/DL (ref 6.4–8.2)
TRANSFUSION STATUS: NORMAL
TROPONIN T: <0.01 NG/ML
UNIT DIVISION: 0
UNIT NUMBER: NORMAL
UNSATURATED IRON BINDING CAPACITY: 231 UG/DL (ref 110–370)
URINE TOTAL PROTEIN: 4.1 MG/DL
VITAMIN B-12: 1232 PG/ML (ref 211–911)
WBC # BLD: 11.3 K/CU MM (ref 4–10.5)
WBC # BLD: 11.8 K/CU MM (ref 4–10.5)

## 2020-03-25 PROCEDURE — 83540 ASSAY OF IRON: CPT

## 2020-03-25 PROCEDURE — 80053 COMPREHEN METABOLIC PANEL: CPT

## 2020-03-25 PROCEDURE — 84484 ASSAY OF TROPONIN QUANT: CPT

## 2020-03-25 PROCEDURE — 82746 ASSAY OF FOLIC ACID SERUM: CPT

## 2020-03-25 PROCEDURE — 74018 RADEX ABDOMEN 1 VIEW: CPT

## 2020-03-25 PROCEDURE — 83550 IRON BINDING TEST: CPT

## 2020-03-25 PROCEDURE — 6370000000 HC RX 637 (ALT 250 FOR IP): Performed by: INTERNAL MEDICINE

## 2020-03-25 PROCEDURE — 84156 ASSAY OF PROTEIN URINE: CPT

## 2020-03-25 PROCEDURE — 2580000003 HC RX 258: Performed by: NURSE PRACTITIONER

## 2020-03-25 PROCEDURE — 85018 HEMOGLOBIN: CPT

## 2020-03-25 PROCEDURE — 84300 ASSAY OF URINE SODIUM: CPT

## 2020-03-25 PROCEDURE — 6370000000 HC RX 637 (ALT 250 FOR IP): Performed by: NURSE PRACTITIONER

## 2020-03-25 PROCEDURE — 85045 AUTOMATED RETICULOCYTE COUNT: CPT

## 2020-03-25 PROCEDURE — C9113 INJ PANTOPRAZOLE SODIUM, VIA: HCPCS | Performed by: NURSE PRACTITIONER

## 2020-03-25 PROCEDURE — 82607 VITAMIN B-12: CPT

## 2020-03-25 PROCEDURE — 85025 COMPLETE CBC W/AUTO DIFF WBC: CPT

## 2020-03-25 PROCEDURE — 82436 ASSAY OF URINE CHLORIDE: CPT

## 2020-03-25 PROCEDURE — 2580000003 HC RX 258: Performed by: INTERNAL MEDICINE

## 2020-03-25 PROCEDURE — 6360000002 HC RX W HCPCS: Performed by: NURSE PRACTITIONER

## 2020-03-25 PROCEDURE — 94761 N-INVAS EAR/PLS OXIMETRY MLT: CPT

## 2020-03-25 PROCEDURE — 82728 ASSAY OF FERRITIN: CPT

## 2020-03-25 PROCEDURE — 93010 ELECTROCARDIOGRAM REPORT: CPT | Performed by: INTERNAL MEDICINE

## 2020-03-25 PROCEDURE — 84133 ASSAY OF URINE POTASSIUM: CPT

## 2020-03-25 PROCEDURE — 2000000000 HC ICU R&B

## 2020-03-25 PROCEDURE — 82570 ASSAY OF URINE CREATININE: CPT

## 2020-03-25 PROCEDURE — 2700000000 HC OXYGEN THERAPY PER DAY

## 2020-03-25 RX ORDER — TRAZODONE HYDROCHLORIDE 50 MG/1
50 TABLET ORAL NIGHTLY
Status: DISCONTINUED | OUTPATIENT
Start: 2020-03-25 | End: 2020-03-27 | Stop reason: HOSPADM

## 2020-03-25 RX ORDER — OXYCODONE AND ACETAMINOPHEN 7.5; 325 MG/1; MG/1
1 TABLET ORAL EVERY 6 HOURS PRN
Status: DISCONTINUED | OUTPATIENT
Start: 2020-03-25 | End: 2020-03-27 | Stop reason: HOSPADM

## 2020-03-25 RX ORDER — BUDESONIDE AND FORMOTEROL FUMARATE DIHYDRATE 80; 4.5 UG/1; UG/1
2 AEROSOL RESPIRATORY (INHALATION) 2 TIMES DAILY
Status: DISCONTINUED | OUTPATIENT
Start: 2020-03-25 | End: 2020-03-25

## 2020-03-25 RX ORDER — BUSPIRONE HYDROCHLORIDE 5 MG/1
5 TABLET ORAL 3 TIMES DAILY
Status: DISCONTINUED | OUTPATIENT
Start: 2020-03-25 | End: 2020-03-27 | Stop reason: HOSPADM

## 2020-03-25 RX ORDER — ALBUTEROL SULFATE 90 UG/1
2 AEROSOL, METERED RESPIRATORY (INHALATION) EVERY 6 HOURS PRN
Status: DISCONTINUED | OUTPATIENT
Start: 2020-03-25 | End: 2020-03-25

## 2020-03-25 RX ORDER — FLUTICASONE FUROATE AND VILANTEROL 100; 25 UG/1; UG/1
1 POWDER RESPIRATORY (INHALATION) DAILY
Status: DISCONTINUED | OUTPATIENT
Start: 2020-03-25 | End: 2020-03-27 | Stop reason: HOSPADM

## 2020-03-25 RX ORDER — METOPROLOL SUCCINATE 25 MG/1
25 TABLET, EXTENDED RELEASE ORAL 2 TIMES DAILY
Status: DISCONTINUED | OUTPATIENT
Start: 2020-03-25 | End: 2020-03-27 | Stop reason: HOSPADM

## 2020-03-25 RX ORDER — HYDROXYZINE PAMOATE 25 MG/1
25 CAPSULE ORAL 3 TIMES DAILY PRN
Status: DISCONTINUED | OUTPATIENT
Start: 2020-03-25 | End: 2020-03-27 | Stop reason: HOSPADM

## 2020-03-25 RX ORDER — FLUTICASONE PROPIONATE 50 MCG
1 SPRAY, SUSPENSION (ML) NASAL DAILY
Status: DISCONTINUED | OUTPATIENT
Start: 2020-03-25 | End: 2020-03-27 | Stop reason: HOSPADM

## 2020-03-25 RX ORDER — SUCRALFATE 1 G/1
1 TABLET ORAL EVERY 6 HOURS SCHEDULED
Status: DISCONTINUED | OUTPATIENT
Start: 2020-03-25 | End: 2020-03-27 | Stop reason: HOSPADM

## 2020-03-25 RX ADMIN — BUSPIRONE HYDROCHLORIDE 5 MG: 5 TABLET ORAL at 11:04

## 2020-03-25 RX ADMIN — OXYCODONE HYDROCHLORIDE AND ACETAMINOPHEN 1 TABLET: 7.5; 325 TABLET ORAL at 11:04

## 2020-03-25 RX ADMIN — SODIUM CHLORIDE, PRESERVATIVE FREE 10 ML: 5 INJECTION INTRAVENOUS at 08:21

## 2020-03-25 RX ADMIN — MORPHINE SULFATE 2 MG: 2 INJECTION, SOLUTION INTRAMUSCULAR; INTRAVENOUS at 06:50

## 2020-03-25 RX ADMIN — SODIUM CHLORIDE: 9 INJECTION, SOLUTION INTRAVENOUS at 14:19

## 2020-03-25 RX ADMIN — SODIUM CHLORIDE, PRESERVATIVE FREE 10 ML: 5 INJECTION INTRAVENOUS at 21:30

## 2020-03-25 RX ADMIN — BUSPIRONE HYDROCHLORIDE 5 MG: 5 TABLET ORAL at 21:31

## 2020-03-25 RX ADMIN — SUCRALFATE 1 G: 1 TABLET ORAL at 11:32

## 2020-03-25 RX ADMIN — TRAZODONE HYDROCHLORIDE 50 MG: 50 TABLET ORAL at 21:31

## 2020-03-25 RX ADMIN — HYDROXYZINE PAMOATE 25 MG: 25 CAPSULE ORAL at 11:04

## 2020-03-25 RX ADMIN — AMIODARONE HYDROCHLORIDE 200 MG: 200 TABLET ORAL at 08:20

## 2020-03-25 RX ADMIN — PANTOPRAZOLE SODIUM 40 MG: 40 INJECTION, POWDER, FOR SOLUTION INTRAVENOUS at 08:20

## 2020-03-25 RX ADMIN — OXYCODONE HYDROCHLORIDE AND ACETAMINOPHEN 1 TABLET: 7.5; 325 TABLET ORAL at 17:10

## 2020-03-25 RX ADMIN — ONDANSETRON HYDROCHLORIDE 4 MG: 2 SOLUTION INTRAMUSCULAR; INTRAVENOUS at 11:04

## 2020-03-25 RX ADMIN — PANTOPRAZOLE SODIUM 40 MG: 40 INJECTION, POWDER, FOR SOLUTION INTRAVENOUS at 21:31

## 2020-03-25 RX ADMIN — METOPROLOL SUCCINATE 25 MG: 25 TABLET, EXTENDED RELEASE ORAL at 11:31

## 2020-03-25 RX ADMIN — HYDROXYZINE PAMOATE 25 MG: 25 CAPSULE ORAL at 17:10

## 2020-03-25 RX ADMIN — ATORVASTATIN CALCIUM 10 MG: 10 TABLET, FILM COATED ORAL at 21:31

## 2020-03-25 RX ADMIN — BUSPIRONE HYDROCHLORIDE 5 MG: 5 TABLET ORAL at 13:59

## 2020-03-25 RX ADMIN — FLUTICASONE PROPIONATE 1 SPRAY: 50 SPRAY, METERED NASAL at 13:58

## 2020-03-25 RX ADMIN — SUCRALFATE 1 G: 1 TABLET ORAL at 17:10

## 2020-03-25 ASSESSMENT — PAIN DESCRIPTION - PROGRESSION: CLINICAL_PROGRESSION: NOT CHANGED

## 2020-03-25 ASSESSMENT — PAIN SCALES - GENERAL
PAINLEVEL_OUTOF10: 0
PAINLEVEL_OUTOF10: 3
PAINLEVEL_OUTOF10: 0
PAINLEVEL_OUTOF10: 5
PAINLEVEL_OUTOF10: 6
PAINLEVEL_OUTOF10: 5
PAINLEVEL_OUTOF10: 8
PAINLEVEL_OUTOF10: 4

## 2020-03-25 ASSESSMENT — PAIN DESCRIPTION - PAIN TYPE: TYPE: CHRONIC PAIN

## 2020-03-25 ASSESSMENT — PAIN DESCRIPTION - ORIENTATION: ORIENTATION: LOWER

## 2020-03-25 ASSESSMENT — PAIN DESCRIPTION - LOCATION: LOCATION: BACK

## 2020-03-25 ASSESSMENT — PAIN DESCRIPTION - ONSET: ONSET: ON-GOING

## 2020-03-25 ASSESSMENT — PAIN DESCRIPTION - FREQUENCY: FREQUENCY: CONTINUOUS

## 2020-03-25 NOTE — PROGRESS NOTES
Pt ordered to be a full code and this nurse noted that the paperwork from pt facility note that pt is a DNR-CC. This nurse notified Cassandra SCHULTE and he requested that this nurse clarify with pt what her wishes are. This nurse spoke with pt and pt expresses that she wants everything done but chest compressions. Cassandra GONZALEZ notified and order adjusted.

## 2020-03-25 NOTE — PROGRESS NOTES
[address]    Hospitalist Progress Note      Name:  Yanira Weller /Age/Sex: 1940  (78 y.o. female)   MRN & CSN:  [id number] & [id number] Admission Date/Time: 3/24/2020  5:15 PM   Location:  -A PCP: Ca Garduno, [id number] Day: 2    Assessment and Plan:   Yanira Weller is a 78 y.o.  female  who presents with low hemoglobin from [facility]     GI bleed     Occult positive, recent EGD (3/11)   Presented with Hb of 6.2 >7.0 increased to 9.1 after transfusion   Continue with Protonix IV twice daily, sucralfate, clear liquids for now  GI consulted    Acute on chronic Anemia of GI bleed     Monitor H&H, transfuse for hemoglobin less than 7  GI consulted    NICOLASA on CKD stage III     Creatinine on admission 2.3>1.9  Holding Lasix, lisinopril and spironolactone, avoid nephrotoxins  Nephrology following    Hyperkalemia -resolved    Hyponatremia -  improved, continue monitoring BMP    Paroxysmal A. Fib -holding Xarelto for GI bleed, continue Toprol    COPD with no exacerbation -continue with inhalers    Chronic conditions  Hypertension  Rheumatoid arthritis  Depression  Fibromyalgia  Chronic respiratory failure on home oxygen  Chronic systolic CHF    Diet DIET CLEAR LIQUID;   DVT Prophylaxis [] Lovenox, []  Heparin, [] SCDs, []No VTE prophylaxis, patient ambulating   GI Prophylaxis [] PPI, [] H2 Blocker, [] No GI prophylaxis, patient is receiving diet/Tube Feeds   Code Status Limited   Disposition Patient requires continued admission due to severe anemia, GI bleed   MDM [] Low, [] Moderate,[]  High     History of Present Illness: Subjective     Patient Seen & Examined at the bedside      Patient is resting in bed with no distress while on nasal cannula  Feels anxious -denies any nausea or vomiting  She has no chest pain    Ten point ROS reviewed negative, unless as noted above    Objective:        Intake/Output Summary (Last 24 hours) at 3/25/2020 1011  Last data filed at 3/25/2020 1099  Gross per 24 hour   Intake 750 ml   Output 1000 ml   Net -250 ml      Vitals:   Vitals:    03/25/20 0858   BP:    Pulse: 68   Resp:    Temp:    SpO2:      Physical Exam:    GEN Awake female, resting in bed in no apparent distress. Appears given age. RESP Clear to auscultation, no wheezes, rales or rhonchi. CARDIO/VASC -S1/S2 auscultated. Regular rate without appreciable murmurs, rubs, or gallops. Peripheral pulses equal bilaterally and palpable. No peripheral edema. GI Abdomen is soft without significant tenderness, masses, or guarding. Bowel sounds are normoactive. Rectal exam deferred.  Jeter catheter is not present. MSK No gross joint deformities. Spontaneous movement of all extremities  SKIN Normal coloration, warm, dry.     Medications:   Medications:    busPIRone  5 mg Oral TID    metoprolol succinate  25 mg Oral BID    budesonide-formoterol  2 puff Inhalation BID    traZODone  50 mg Oral Nightly    fluticasone  1 spray Each Nostril Daily    sodium chloride flush  10 mL Intravenous 2 times per day    pantoprazole  40 mg Intravenous Q12H    And    sodium chloride (PF)  10 mL Intravenous Q12H    amiodarone  200 mg Oral Daily    atorvastatin  10 mg Oral QPM      Infusions:    sodium chloride 75 mL/hr at 03/24/20 2355     PRN Meds: albuterol sulfate HFA, 2 puff, Q6H PRN  ondansetron, 4 mg, Q30 Min PRN  sodium chloride flush, 10 mL, PRN  acetaminophen, 650 mg, Q4H PRN  ondansetron, 4 mg, Q6H PRN  ipratropium-albuterol, 3 mL, Q4H PRN  morphine, 2 mg, Q4H PRN          Electronically signed by Anila Rucker MD on 3/25/2020 at 10:11 AM

## 2020-03-25 NOTE — CONSULTS
[address], [address] [facility]                                  CONSULTATION    PATIENT NAME: Eduar Williamson                     :        1940  MED REC NO:   [id number]                          ROOM:       2124  ACCOUNT NO:   [de-identified]                           ADMIT DATE: 2020  PROVIDER:     Jean Marroquin MD    CONSULT DATE:  2020    CONSULT REQUESTED BY:  Westley Taylor MD.    REASON FOR CONSULT:  Acute kidney injury on top of CKD, stage III. BRIEF HISTORY:  The patient is a 68-year-old white female with multiple  medical conditions, which include but not limited to CKD and  questionable cardiomyopathy as per her echo that was done in 2020,  was brought to the emergency room via EMT with significant weakness to  the point that she was dropping things, poor appetite, and shortness of  breath. Apparently, the symptoms started about a week ago or so at  assisted living, where she is residing since probably 2020, when she  was in the hospital.    In the emergency room, the patient was rather hypotensive with blood  pressure of 80s/70s and underwent several diagnostic tests, which  include imaging and biochemical.  Imaging study, mainly chest x-ray was  unrevealing. Biochemical testing showed significant anemia with a  hemoglobin of 7 gm and high potassium of 6.0 and increased BUN and  creatinine of 59 and 2.3, respectively, which is much higher than a  recent baseline creatinine about 1.1 to 1.3 range. She was given 1 unit  of blood, IV normal saline, medically treated for hyperkalemia and  subsequently admitted to ICU. Repeat potassium was 4.6 and BUN and creatinine went down to 49 and 1.9,  respectively. PAST MEDICAL HISTORY:  1. Longstanding hypertension, looks like she was on ACE inhibitors  therapy. 2.  Question of dysrhythmia. She is on amiodarone therapy.   I am  assuming she had 20 mg daily, metoprolol XL 25 mg twice a day,  Protonix 40 mg q.12 hours. She is on sucralfate, trazodone, normal  saline 75 mL and one dose of Kayexalate was given. REVIEW OF SYSTEMS:  Mainly some shortness of breath, weakness, she was  dropping things, poor appetite, not feeling very well, fatigue,  tiredness. No fever, chills or rigors. No chest pain. Rest of the  review of systems is negative other than previous paragraph. PHYSICAL EXAMINATION:  VITAL SIGNS:  At the time of examination, her temperature, T-max of  97.8, blood pressure did come up to 110/50s, pulse 68, respiratory rate  14, saturating 98%. GENERAL:  The patient is without any acute distress. HEAD AND NECK:  Normocephalic and atraumatic. EYES:  Striking conjunctival pallor, at least 2+, this is after  transfusion by the way. EARS, MOUTH AND THROAT:  She has dentures in the upper and lower jaw,  but normal findings. CARDIOVASCULAR:  Regular rate and rhythm. RESPIRATORY:  I did not hear any crackles. ABDOMEN:  Soft. EXTREMITIES:  I do not see any edema at this time. LABORATORY VALUES AND ANCILLARY SERVICES:  As mentioned earlier. IMPRESSION:  A 72-year-old female with acute kidney injury. 1.  Acute kidney injury on top of CKD, stage IIIA1 (nonoliguric). She  does have a Jeter catheter by the way. Her urine is bland and available  data suggests mainly prerenal, but may have some structural tubular  injury as we do not have precise biomarker, either serum or urine. Likely due to severe anemia with associated hypertension with  concomitant ACE inhibitors, bleeding, etc.  2.  Hyperkalemia, mainly from acute kidney injury, as well as  medications such as ACE inhibitors, potassium supplementation and  spironolactone, also bleeding can contribute. 3.  Anemia, likely bleeding. She has risk for retroperitoneal bleed. She was on direct oral anticoagulation. 4.  Possible cardiomyopathy.   5.  Underlying questionable atherosclerotic cardiovascular disease,  rheumatoid arthritis, hypertension, COPD, etc.    PLAN:  She is of course off all blood pressure medications, particularly  ACE inhibitors, spironolactone, potassium supplementation, etc.  She got  1 unit of packed red blood cells and getting normal saline. Given her  probable cardiomyopathy, I would stop the fluid after 2 liters and keep  an eye on her lung status and reassess. Also, if there is no evidence  of GI bleeding, I probably would rule out retroperitoneal bleed even  though she does not have any flank pain, but this all is possible as it  can potentially cause Page kidney, and we will check the lab tomorrow  morning, supportive care and follow up clinically.         Celina Han MD    D: 03/25/2020 10:20:02       T: 03/25/2020 13:52:04     [email or web address]  Job#: [id number]     Doc#: [id number]    CC:

## 2020-03-25 NOTE — CONSULTS
CREATININE 2.3* 2.1* 1.9*   GLUCOSE 133* 105* 88     Magnesium:   Lab Results   Component Value Date    MG 2.2 02/13/2020     Hepatic:   Recent Labs     03/24/20  1730 03/25/20  0337   AST 21 10*   ALT 7* <5*   BILITOT 0.2 0.4   ALKPHOS 83 61     Recent Labs     03/24/20  1730   LIPASE 38     Recent Labs     03/24/20  1730   PROTIME 12.8   INR 1.06     No results for input(s): PTT in the last 72 hours. Lipids: No results for input(s): CHOL, HDL in the last 72 hours.     Invalid input(s): LDLCALCU  INR:   Recent Labs     03/24/20  1730   INR 1.06     TSH: No results found for: TSH    Intake/Output Summary (Last 24 hours) at 3/25/2020 1042  Last data filed at 3/25/2020 4804  Gross per 24 hour   Intake 750 ml   Output 1000 ml   Net -250 ml      sodium chloride 75 mL/hr at 03/24/20 2355       Imaging Studies: Reviewed    Patient Active Problem List   Diagnosis Code    Rheumatoid Arthritis M19.90    Hypertension I10    Hyperlipidemia E78.5    Fibromyalgia M79.7    Migraine G43.909    Chronic pain syndrome G89.4    Depression F32.9    Osteoporosis M81.0    Vitamin B12 deficiency E53.8    Lumbar spinal stenosis M48.061    Panic attack F41.0    Takotsubo syndrome I51.81    Blood loss anemia D50.0    COPD, severe ([facility]) J44.9    Acute exacerbation of chronic obstructive pulmonary disease (COPD) ([facility]) J44.1    Former smoker Z87.891    Shortness of breath R06.02    Nocturnal hypoxemia G47.34    Back pain M54.9    Intractable low back pain M54.5    Chronic fatigue R53.82    Acute on chronic respiratory failure with hypoxia ([facility]) J96.21    Pneumonia of right upper lobe due to infectious organism ([facility]) J18.1    Acute on chronic respiratory failure with hypoxia and hypercapnia ([facility]) J96.21, J96.22    Wide-complex tachycardia ([facility]) I47.2    Gram-negative pneumonia ([facility]) Z81.0    Acute systolic heart failure ([facility]) I50.21    Symptomatic anemia D64.9     Assessment:  Anemia   Hgb 8.5 s/p 1 units PRBC Most likley bleeding AVM in Small Bowel  had egd and c scope nondiagnostic, WCE poor prep; ? Blood vs. Orange liquid, AVM's    RECOMMENDATIONS:  Anemia panel, may need IV iron   Continue PPI BID   STAT OP referral to [facility] for upper enteroscopy   H&h q 12 hrs    Discussed plan of care with patient     Patient clinical, biochemical, and radiological information discussed with Dr. Haydee Garcia. He agrees with the assessment and plan. Denisa Shelby CNP  3/25/2020  10:42 AM     Recent egd and c scope non diagnostic  VIDEO CAPSULE ENDOSCOPY - showed a few non bleeding AVM , but also some orange material in proximal small bowel ? jello ? Gi bleed  No active bleed at this time  OP Double balloon enteroscopy at [facility]    I have seen and examined this patient personally, and independently of the nurse practitioner. The plan was developed mutually at the time of the visit with the patient. Denisa Shelby and myself have spoken with patient, nursing staff and provided written and verbal instructions .     The above note has been reviewed and I agree with the Assessment,  Diagnosis, and Treatment plan as suggested by Denisa Shelby CNP      [address] [facility] [place] gastroenterology

## 2020-03-26 LAB
ALBUMIN SERPL-MCNC: 2.8 GM/DL (ref 3.4–5)
ALP BLD-CCNC: 53 IU/L (ref 40–128)
ALT SERPL-CCNC: <5 U/L (ref 10–40)
ANION GAP SERPL CALCULATED.3IONS-SCNC: 9 MMOL/L (ref 4–16)
AST SERPL-CCNC: 11 IU/L (ref 15–37)
BASOPHILS ABSOLUTE: 0.1 K/CU MM
BASOPHILS RELATIVE PERCENT: 0.6 % (ref 0–1)
BILIRUB SERPL-MCNC: 0.2 MG/DL (ref 0–1)
BUN BLDV-MCNC: 33 MG/DL (ref 6–23)
CALCIUM SERPL-MCNC: 8.6 MG/DL (ref 8.3–10.6)
CHLORIDE BLD-SCNC: 104 MMOL/L (ref 99–110)
CO2: 26 MMOL/L (ref 21–32)
CREAT SERPL-MCNC: 1.5 MG/DL (ref 0.6–1.1)
DIFFERENTIAL TYPE: ABNORMAL
EOSINOPHILS ABSOLUTE: 0.2 K/CU MM
EOSINOPHILS RELATIVE PERCENT: 2 % (ref 0–3)
GFR AFRICAN AMERICAN: 41 ML/MIN/1.73M2
GFR NON-AFRICAN AMERICAN: 33 ML/MIN/1.73M2
GLUCOSE BLD-MCNC: 87 MG/DL (ref 70–99)
HCT VFR BLD CALC: 25.8 % (ref 37–47)
HCT VFR BLD CALC: 26 % (ref 37–47)
HEMOGLOBIN: 7.9 GM/DL (ref 12.5–16)
HEMOGLOBIN: 7.9 GM/DL (ref 12.5–16)
IMMATURE NEUTROPHIL %: 1.1 % (ref 0–0.43)
LYMPHOCYTES ABSOLUTE: 3.1 K/CU MM
LYMPHOCYTES RELATIVE PERCENT: 28.8 % (ref 24–44)
MAGNESIUM: 1.7 MG/DL (ref 1.8–2.4)
MCH RBC QN AUTO: 27.3 PG (ref 27–31)
MCHC RBC AUTO-ENTMCNC: 30.4 % (ref 32–36)
MCV RBC AUTO: 90 FL (ref 78–100)
MONOCYTES ABSOLUTE: 1.1 K/CU MM
MONOCYTES RELATIVE PERCENT: 10.1 % (ref 0–4)
NUCLEATED RBC %: 0 %
PDW BLD-RTO: 15.9 % (ref 11.7–14.9)
PHOSPHORUS: 3.3 MG/DL (ref 2.5–4.9)
PLATELET # BLD: 396 K/CU MM (ref 140–440)
PMV BLD AUTO: 9.2 FL (ref 7.5–11.1)
POTASSIUM SERPL-SCNC: 4.5 MMOL/L (ref 3.5–5.1)
RBC # BLD: 2.89 M/CU MM (ref 4.2–5.4)
SEGMENTED NEUTROPHILS ABSOLUTE COUNT: 6.1 K/CU MM
SEGMENTED NEUTROPHILS RELATIVE PERCENT: 57.4 % (ref 36–66)
SODIUM BLD-SCNC: 139 MMOL/L (ref 135–145)
TOTAL IMMATURE NEUTOROPHIL: 0.12 K/CU MM
TOTAL NUCLEATED RBC: 0 K/CU MM
TOTAL PROTEIN: 4.6 GM/DL (ref 6.4–8.2)
TROPONIN T: 0.01 NG/ML
WBC # BLD: 10.7 K/CU MM (ref 4–10.5)

## 2020-03-26 PROCEDURE — C9113 INJ PANTOPRAZOLE SODIUM, VIA: HCPCS | Performed by: NURSE PRACTITIONER

## 2020-03-26 PROCEDURE — 97116 GAIT TRAINING THERAPY: CPT

## 2020-03-26 PROCEDURE — 94761 N-INVAS EAR/PLS OXIMETRY MLT: CPT

## 2020-03-26 PROCEDURE — 97166 OT EVAL MOD COMPLEX 45 MIN: CPT

## 2020-03-26 PROCEDURE — 84100 ASSAY OF PHOSPHORUS: CPT

## 2020-03-26 PROCEDURE — 80053 COMPREHEN METABOLIC PANEL: CPT

## 2020-03-26 PROCEDURE — 6360000002 HC RX W HCPCS: Performed by: INTERNAL MEDICINE

## 2020-03-26 PROCEDURE — 85025 COMPLETE CBC W/AUTO DIFF WBC: CPT

## 2020-03-26 PROCEDURE — 2700000000 HC OXYGEN THERAPY PER DAY

## 2020-03-26 PROCEDURE — 97162 PT EVAL MOD COMPLEX 30 MIN: CPT

## 2020-03-26 PROCEDURE — 6370000000 HC RX 637 (ALT 250 FOR IP): Performed by: NURSE PRACTITIONER

## 2020-03-26 PROCEDURE — 80048 BASIC METABOLIC PNL TOTAL CA: CPT

## 2020-03-26 PROCEDURE — 6370000000 HC RX 637 (ALT 250 FOR IP): Performed by: INTERNAL MEDICINE

## 2020-03-26 PROCEDURE — 6360000002 HC RX W HCPCS: Performed by: NURSE PRACTITIONER

## 2020-03-26 PROCEDURE — 2000000000 HC ICU R&B

## 2020-03-26 PROCEDURE — 93308 TTE F-UP OR LMTD: CPT

## 2020-03-26 PROCEDURE — 2580000003 HC RX 258: Performed by: NURSE PRACTITIONER

## 2020-03-26 PROCEDURE — 83735 ASSAY OF MAGNESIUM: CPT

## 2020-03-26 PROCEDURE — 85014 HEMATOCRIT: CPT

## 2020-03-26 PROCEDURE — 85018 HEMOGLOBIN: CPT

## 2020-03-26 PROCEDURE — 84484 ASSAY OF TROPONIN QUANT: CPT

## 2020-03-26 PROCEDURE — 97530 THERAPEUTIC ACTIVITIES: CPT

## 2020-03-26 RX ORDER — MAGNESIUM SULFATE IN WATER 40 MG/ML
2 INJECTION, SOLUTION INTRAVENOUS ONCE
Status: COMPLETED | OUTPATIENT
Start: 2020-03-26 | End: 2020-03-26

## 2020-03-26 RX ADMIN — SODIUM CHLORIDE, PRESERVATIVE FREE 10 ML: 5 INJECTION INTRAVENOUS at 07:51

## 2020-03-26 RX ADMIN — AMIODARONE HYDROCHLORIDE 200 MG: 200 TABLET ORAL at 07:50

## 2020-03-26 RX ADMIN — BUSPIRONE HYDROCHLORIDE 5 MG: 5 TABLET ORAL at 20:03

## 2020-03-26 RX ADMIN — OXYCODONE HYDROCHLORIDE AND ACETAMINOPHEN 1 TABLET: 7.5; 325 TABLET ORAL at 13:44

## 2020-03-26 RX ADMIN — OXYCODONE HYDROCHLORIDE AND ACETAMINOPHEN 1 TABLET: 7.5; 325 TABLET ORAL at 20:03

## 2020-03-26 RX ADMIN — HYDROXYZINE PAMOATE 25 MG: 25 CAPSULE ORAL at 18:52

## 2020-03-26 RX ADMIN — OXYCODONE HYDROCHLORIDE AND ACETAMINOPHEN 1 TABLET: 7.5; 325 TABLET ORAL at 07:50

## 2020-03-26 RX ADMIN — FLUTICASONE PROPIONATE 1 SPRAY: 50 SPRAY, METERED NASAL at 07:50

## 2020-03-26 RX ADMIN — PANTOPRAZOLE SODIUM 40 MG: 40 INJECTION, POWDER, FOR SOLUTION INTRAVENOUS at 07:51

## 2020-03-26 RX ADMIN — SUCRALFATE 1 G: 1 TABLET ORAL at 00:46

## 2020-03-26 RX ADMIN — HYDROXYZINE PAMOATE 25 MG: 25 CAPSULE ORAL at 10:15

## 2020-03-26 RX ADMIN — SUCRALFATE 1 G: 1 TABLET ORAL at 17:23

## 2020-03-26 RX ADMIN — METOPROLOL SUCCINATE 25 MG: 25 TABLET, EXTENDED RELEASE ORAL at 07:50

## 2020-03-26 RX ADMIN — BUSPIRONE HYDROCHLORIDE 5 MG: 5 TABLET ORAL at 13:45

## 2020-03-26 RX ADMIN — PANTOPRAZOLE SODIUM 40 MG: 40 INJECTION, POWDER, FOR SOLUTION INTRAVENOUS at 20:03

## 2020-03-26 RX ADMIN — SUCRALFATE 1 G: 1 TABLET ORAL at 11:47

## 2020-03-26 RX ADMIN — SUCRALFATE 1 G: 1 TABLET ORAL at 06:29

## 2020-03-26 RX ADMIN — BUSPIRONE HYDROCHLORIDE 5 MG: 5 TABLET ORAL at 07:50

## 2020-03-26 RX ADMIN — SODIUM CHLORIDE, PRESERVATIVE FREE 10 ML: 5 INJECTION INTRAVENOUS at 20:03

## 2020-03-26 RX ADMIN — OXYCODONE HYDROCHLORIDE AND ACETAMINOPHEN 1 TABLET: 7.5; 325 TABLET ORAL at 00:46

## 2020-03-26 RX ADMIN — TRAZODONE HYDROCHLORIDE 50 MG: 50 TABLET ORAL at 20:03

## 2020-03-26 RX ADMIN — ATORVASTATIN CALCIUM 10 MG: 10 TABLET, FILM COATED ORAL at 20:03

## 2020-03-26 RX ADMIN — MAGNESIUM SULFATE HEPTAHYDRATE 2 G: 40 INJECTION, SOLUTION INTRAVENOUS at 09:43

## 2020-03-26 ASSESSMENT — PAIN DESCRIPTION - PROGRESSION
CLINICAL_PROGRESSION: NOT CHANGED

## 2020-03-26 ASSESSMENT — PAIN SCALES - GENERAL
PAINLEVEL_OUTOF10: 3
PAINLEVEL_OUTOF10: 3
PAINLEVEL_OUTOF10: 0
PAINLEVEL_OUTOF10: 7
PAINLEVEL_OUTOF10: 8
PAINLEVEL_OUTOF10: 5
PAINLEVEL_OUTOF10: 5
PAINLEVEL_OUTOF10: 7

## 2020-03-26 ASSESSMENT — PAIN DESCRIPTION - ORIENTATION: ORIENTATION: LOWER

## 2020-03-26 ASSESSMENT — PAIN DESCRIPTION - FREQUENCY: FREQUENCY: CONTINUOUS

## 2020-03-26 ASSESSMENT — PAIN DESCRIPTION - ONSET: ONSET: ON-GOING

## 2020-03-26 ASSESSMENT — PAIN DESCRIPTION - DESCRIPTORS: DESCRIPTORS: ACHING

## 2020-03-26 ASSESSMENT — PAIN DESCRIPTION - PAIN TYPE: TYPE: CHRONIC PAIN

## 2020-03-26 ASSESSMENT — PAIN DESCRIPTION - LOCATION: LOCATION: BACK

## 2020-03-26 NOTE — PROGRESS NOTES
[address]    Hospitalist Progress Note      Name:  Rangel León /Age/Sex: 1940  (78 y.o. female)   MRN & CSN:  [id number] & [id number] Admission Date/Time: 3/24/2020  5:15 PM   Location:  -A PCP: Kaela Galeano, [address] Day: 3    Assessment and Plan:   Rangel León is a 78 y.o.  female  who presents with low hemoglobin from [facility]     GI bleed     Occult positive, recent EGD (3/11)   Presented with Hb of 6.2 >7.0 increased to 9.1 after transfusion >7.9   Continue with Protonix IV twice daily, sucralfate  GI consulted and plan for OP Enteroscopy at [facility]     Acute on chronic Anemia of GI bleed     Monitor H&H, transfuse for hemoglobin less than 7  S/p 1 unit 3/24 => 6.2 > 8.6> 9.0 >7.9  GI following     NICOLASA on CKD stage III     Creatinine on admission 2.3>1.9>1.5   Holding Lasix, lisinopril and spironolactone, avoid nephrotoxins  Nephrology following    Cardiomyopathy      ECHO 19 LVEF 55% and down to 20% on 2/10/20   Cardiology consulted plan to r/o ischemic etiology     Paroxysmal A.  Fib -holding Xarelto for GI bleed, continue Toprol    COPD with no exacerbation -continue with inhalers    Chronic conditions  Hypertension  Rheumatoid arthritis  Depression  Fibromyalgia  Chronic respiratory failure on home oxygen  Chronic systolic CHF    Diet DIET CARDIAC;   DVT Prophylaxis [] Lovenox, []  Heparin, [] SCDs, []No VTE prophylaxis, patient ambulating   GI Prophylaxis [] PPI, [] H2 Blocker, [] No GI prophylaxis, patient is receiving diet/Tube Feeds   Code Status Limited   Disposition Patient requires continued admission due to severe anemia, GI bleed   MDM [] Low, [] Moderate,[]  High     History of Present Illness: Subjective     Patient Seen & Examined at the bedside      Patient is resting in bed with no distress while on nasal cannula  Feels better today and wants to get solid diet   She has no chest pain    Ten point ROS reviewed negative, unless as noted above    Objective: Intake/Output Summary (Last 24 hours) at 3/26/2020 0914  Last data filed at 3/26/2020 0630  Gross per 24 hour   Intake 2946.5 ml   Output 1000 ml   Net 1946.5 ml      Vitals:   Vitals:    03/26/20 0802   BP: (!) 111/53   Pulse: 63   Resp: 14   Temp: 97.8 °F (36.6 °C)   SpO2:      Physical Exam:    GEN Awake female, resting in bed in no apparent distress. Appears given age. RESP Clear to auscultation, no wheezes, rales or rhonchi. CARDIO/VASC -S1/S2 auscultated. Regular rate without appreciable murmurs, rubs, or gallops. Peripheral pulses equal bilaterally and palpable. No peripheral edema. GI Abdomen is soft without significant tenderness, masses, or guarding. Bowel sounds are normoactive. Rectal exam deferred.  Jeter catheter is not present. MSK No gross joint deformities. Spontaneous movement of all extremities  SKIN Normal coloration, warm, dry.     Medications:   Medications:    magnesium sulfate  2 g Intravenous Once    busPIRone  5 mg Oral TID    metoprolol succinate  25 mg Oral BID    traZODone  50 mg Oral Nightly    fluticasone  1 spray Each Nostril Daily    sucralfate  1 g Oral 4 times per day    fluticasone-vilanterol  1 puff Inhalation Daily    sodium chloride flush  10 mL Intravenous 2 times per day    pantoprazole  40 mg Intravenous Q12H    And    sodium chloride (PF)  10 mL Intravenous Q12H    amiodarone  200 mg Oral Daily    atorvastatin  10 mg Oral QPM      Infusions:     PRN Meds: hydrOXYzine, 25 mg, TID PRN  oxyCODONE-acetaminophen, 1 tablet, Q6H PRN  albuterol, 2.5 mg, Q6H PRN  ondansetron, 4 mg, Q30 Min PRN  sodium chloride flush, 10 mL, PRN  acetaminophen, 650 mg, Q4H PRN  ondansetron, 4 mg, Q6H PRN  ipratropium-albuterol, 3 mL, Q4H PRN          Electronically signed by Elisabeth Roberson MD on 3/26/2020 at 9:14 AM

## 2020-03-26 NOTE — PROGRESS NOTES
03/25/20  1140 03/25/20  1858 03/26/20  0250   WBC 11.3*  --  11.8*  --  10.7*   HGB 8.6*   < > 8.5* 8.9* 7.9*   *  --  432  --  396    < > = values in this interval not displayed.      BMP:    Recent Labs     03/24/20  2250 03/25/20  0337 03/26/20  0250   * 134* 139   K 5.3* 4.6 4.5   CL 97* 98* 104   CO2 27 26 26   BUN 53* 49* 33*   CREATININE 2.1* 1.9* 1.5*   GLUCOSE 105* 88 87     Magnesium:   Lab Results   Component Value Date    MG 1.7 03/26/2020     Hepatic:   Recent Labs     03/24/20  1730 03/25/20  0337 03/26/20  0250   AST 21 10* 11*   ALT 7* <5* <5*   BILITOT 0.2 0.4 0.2   ALKPHOS 83 61 53     INR:   Recent Labs     03/24/20 1730   INR 1.06         Intake/Output Summary (Last 24 hours) at 3/26/2020 1056  Last data filed at 3/26/2020 0630  Gross per 24 hour   Intake 2946.5 ml   Output 1000 ml   Net 1946.5 ml         Medications    Scheduled Medications:    magnesium sulfate  2 g Intravenous Once    busPIRone  5 mg Oral TID    metoprolol succinate  25 mg Oral BID    traZODone  50 mg Oral Nightly    fluticasone  1 spray Each Nostril Daily    sucralfate  1 g Oral 4 times per day    fluticasone-vilanterol  1 puff Inhalation Daily    sodium chloride flush  10 mL Intravenous 2 times per day    pantoprazole  40 mg Intravenous Q12H    And    sodium chloride (PF)  10 mL Intravenous Q12H    amiodarone  200 mg Oral Daily    atorvastatin  10 mg Oral QPM     PRN Medications: hydrOXYzine, oxyCODONE-acetaminophen, albuterol, ondansetron, sodium chloride flush, acetaminophen, ondansetron, ipratropium-albuterol  Infusions:         Patient Active Problem List   Diagnosis Code    Rheumatoid Arthritis M19.90    Hypertension I10    Hyperlipidemia E78.5    Fibromyalgia M79.7    Migraine G43.909    Chronic pain syndrome G89.4    Depression F32.9    Osteoporosis M81.0    Vitamin B12 deficiency E53.8    Lumbar spinal stenosis M48.061    Panic attack F41.0    Takotsubo syndrome I51.81    Blood

## 2020-03-26 NOTE — CONSULTS
[address], 1940, 2124/2124-A, 3/26/2020    History  [facility] [place]:  The primary encounter diagnosis was Anemia, unspecified type. Diagnoses of Dyspnea, unspecified type, Chest pain, unspecified type, Leukocytosis, unspecified type, Hyperkalemia, and Acute renal failure, unspecified acute renal failure type [facility]) were also pertinent to this visit. Patient  has a past medical history of Acute DVT of right tibial vein (HCC), Acute exacerbation of chronic obstructive pulmonary disease (COPD) ([address] Anemia, Arthritis, Atrial fibrillation ([address] CHF (congestive heart failure) ([address] Chronic pain syndrome, Clostridium difficile colitis, COPD, severe ([address] Depression, Fibromyalgia, Former smoker, Herniated cervical disc, Herpes zoster ophthalmicus, Hx of blood clots, Hyperlipidemia, Hypertension, Kidney disease, L3 vertebral fracture ([address] Lumbar spinal stenosis, Migraine, Nocturnal hypoxemia, Osteoporosis, Rheumatoid arthritis(714.0), S/P cardiac catheterization, Shortness of breath, Takotsubo syndrome, TMJ dysfunction, Tobacco abuse, and Vitamin B12 deficiency. Patient  has a past surgical history that includes Cholecystectomy (1966); devyn and bso (cervix removed) (1991); Wrist fusion (Left, 2000); Elbow surgery (Right); Appendectomy (1966); vertebroplasty (10/2010); Foot surgery (1986); Toe Surgery (Left, 4/25/2013); Cardiac catheterization (05/21/2017); pr colonoscopy flx dx w/collj spec when pfrmd (N/A, 10/2/2018); Fixation Kyphoplasty (06/12/2019); Hysterectomy; Upper gastrointestinal endoscopy (N/A, 3/11/2020); and Colonoscopy (N/A, 3/11/2020). Subjective:  Patient states: \"Are you here to transfer me?\". Pain:  Denies. Communication with other providers:  Handoff to RN, co-eval with OT.    Restrictions: General    Home Setup/Prior level of function  Social/Functional History  Lives With: Alone  ADL Assistance: Needs assistance(Pt toilets and feeds self, but otherwise has assistance from staff. )  Homemaking Responsibilities: No  Ambulation Assistance: Independent( c walker)  Transfer Assistance: Independent    Examination of body systems (includes body structures/functions, activity/participation limitations):  · Observation:  Supine in bed upon arrival   · [facility]  · Hearing:  [facility]  · Cardiopulmonary:  On 2L of O2 (at baseline)  · Cognition: WFL, see OT/SLP note for further evaluation. Musculoskeletal  · ROM R/L:  WFL. · Strength R/L:  4+/5, min weakness in function and endurance. · Neuro:  [facility]      Mobility:  · Supine to sit:  Mod I  · Transfers: SBA  · Sitting balance: Mod I.    · Standing balance:  SBA. · Gait: CGA-SBA    [facility] 6 Clicks Inpatient Mobility:  AM-PAC Inpatient Mobility Raw Score : 20    Treatment:  Sup to sit: mod I, HOB flat. Sitting balance: mod I. Transfers: STS from bed x3 with SBA and RW. Gait: ambulated x80ft with RW, initially CGA for safety, progressed to SBA, demonstrates min SOB, discussed pursed lip breathing during gait, at end of gait trial O2 sats 88%, returned to 100% within 1 min. Safety: patient left in bed with bed alarm, call light within reach, RN notified, gait belt used. Assessment:  Patient is a 79 yo female who presents with low Hgb d/t potential GI bleed. Patient demonstrates safe mobility and is at her baseline. Patient demonstrates min decreased endurance with gait. Rec d/c back to assisted living and [place] [id number]. Complexity: Moderate  Prognosis: Good, no significant barriers to participation at this time. Plan Times per week: 2/week, 1 week,   Discharge Recommendations: Home with Home health PT(back to assisted living)  Equipment: patient has necessary equipment.      Goals:  Short term goals  Time Frame for Short term goals: 1 week  Short term goal 1: Patient will ambulate 50ft mod I with stable O2 sats       Treatment plan:  Bed mobility, transfers, balance, gait, TA,

## 2020-03-26 NOTE — PROGRESS NOTES
Occupational Therapy  [facility] OCCUPATIONAL THERAPY EVALUATION    History  [place]:  The primary encounter diagnosis was Anemia, unspecified type. Diagnoses of Dyspnea, unspecified type, Chest pain, unspecified type, Leukocytosis, unspecified type, Hyperkalemia, and Acute renal failure, unspecified acute renal failure type [facility]) were also pertinent to this visit. Restrictions:                           Communication with other providers: PT    Subjective:  Patient states:  Chaitanya Kirk you here to move me\"  Pain:  7/10   Patient goal:  Return home to assisted living    Occupational profile (relevant social history and personal factors):    Social/Functional History  Lives With: Alone  ADL Assistance: Needs assistance(Pt toilets and feeds self, but otherwise has assistance from staff. )  Homemaking Responsibilities: No  Ambulation Assistance: Independent( c walker)  Transfer Assistance: Independent    Examination of body systems (includes body structures/functions, activity/participation limitations):  · Orientation: WFL   · Cognition:  WFL   · Observation:  Received pt in bed. Alert and cooperative. · Vision:  WFL, glasses  · Hearing:  WFL   · ROM:  WFL BUE  · Strength: WFL BUE  · Sensation: not tsted    ADLs  Feeding: Reynold    Grooming: [place]old    Dressing: UB SBA in seated LB CGA    Bathing: UB SBA in seated LB CGA    Toileting: CGA    *Some ADL determined per observation of actual ADL performance, functional mobility, balance, activity tolerance, and cognition. AM-PAC 6 click short form for inpatient daily activity:  Raw Score: 20  24/24 = unimpaired  23/24 = 1-20% impaired   20/24-22/24 = 21-40% impaired  15/24-19/24 = 41-59% impaired   10/24-14/24 = 60%-79% impaired  7/24-9/24 = 80%-99% impaired  6/24 = 100% impaired    Functional Mobility  Bed mobility:   Sup to sit: mod I, HOB flat.   Sit to supine: mod I, HOB flat     Sitting balance: mod I.     Transfers: STS from bed x3 with SBA and RW.     Gait: ambulated x80ft

## 2020-03-26 NOTE — PROGRESS NOTES
than may have retroperitonea bleed- no sx now   3. Underlying CMp ? Transient - worth re checking the echo   4. H/o arhythmia - now seems NSR   5. Underlying RA/HTN? COPD etc     Recommendation/Plan  :     1. IVF stopped   2. Manual BP  3. If no GI bleed - than may have retroperitoneal bleed but her renal fx  Better - so may well just watch  - as no effective treatment unless she kept bleeding than may deborah angio- emboli etc   4.  Follow clinically and bio chemically       Wilfredo Covington MD

## 2020-03-27 VITALS
RESPIRATION RATE: 20 BRPM | HEIGHT: 66 IN | SYSTOLIC BLOOD PRESSURE: 113 MMHG | TEMPERATURE: 97.9 F | BODY MASS INDEX: 23.81 KG/M2 | WEIGHT: 148.15 LBS | OXYGEN SATURATION: 99 % | HEART RATE: 70 BPM | DIASTOLIC BLOOD PRESSURE: 55 MMHG

## 2020-03-27 LAB
ANION GAP SERPL CALCULATED.3IONS-SCNC: 10 MMOL/L (ref 4–16)
BASOPHILS ABSOLUTE: 0.1 K/CU MM
BASOPHILS RELATIVE PERCENT: 0.5 % (ref 0–1)
BUN BLDV-MCNC: 22 MG/DL (ref 6–23)
CALCIUM SERPL-MCNC: 8.6 MG/DL (ref 8.3–10.6)
CHLORIDE BLD-SCNC: 104 MMOL/L (ref 99–110)
CO2: 22 MMOL/L (ref 21–32)
CREAT SERPL-MCNC: 1.2 MG/DL (ref 0.6–1.1)
DIFFERENTIAL TYPE: ABNORMAL
EOSINOPHILS ABSOLUTE: 0.4 K/CU MM
EOSINOPHILS RELATIVE PERCENT: 3.2 % (ref 0–3)
GFR AFRICAN AMERICAN: 52 ML/MIN/1.73M2
GFR NON-AFRICAN AMERICAN: 43 ML/MIN/1.73M2
GLUCOSE BLD-MCNC: 106 MG/DL (ref 70–99)
HCT VFR BLD CALC: 28.3 % (ref 37–47)
HEMOGLOBIN: 8.3 GM/DL (ref 12.5–16)
IMMATURE NEUTROPHIL %: 0.8 % (ref 0–0.43)
LYMPHOCYTES ABSOLUTE: 2.3 K/CU MM
LYMPHOCYTES RELATIVE PERCENT: 20.6 % (ref 24–44)
MCH RBC QN AUTO: 27.3 PG (ref 27–31)
MCHC RBC AUTO-ENTMCNC: 29.3 % (ref 32–36)
MCV RBC AUTO: 93.1 FL (ref 78–100)
MONOCYTES ABSOLUTE: 1 K/CU MM
MONOCYTES RELATIVE PERCENT: 8.9 % (ref 0–4)
NUCLEATED RBC %: 0 %
PDW BLD-RTO: 15.9 % (ref 11.7–14.9)
PLATELET # BLD: 374 K/CU MM (ref 140–440)
PMV BLD AUTO: 9.7 FL (ref 7.5–11.1)
POTASSIUM SERPL-SCNC: 4.3 MMOL/L (ref 3.5–5.1)
RBC # BLD: 3.04 M/CU MM (ref 4.2–5.4)
REASON FOR REJECTION: NORMAL
REASON FOR REJECTION: NORMAL
REJECTED TEST: NORMAL
SEGMENTED NEUTROPHILS ABSOLUTE COUNT: 7.3 K/CU MM
SEGMENTED NEUTROPHILS RELATIVE PERCENT: 66 % (ref 36–66)
SODIUM BLD-SCNC: 136 MMOL/L (ref 135–145)
TOTAL IMMATURE NEUTOROPHIL: 0.09 K/CU MM
TOTAL NUCLEATED RBC: 0 K/CU MM
WBC # BLD: 11 K/CU MM (ref 4–10.5)

## 2020-03-27 PROCEDURE — 85025 COMPLETE CBC W/AUTO DIFF WBC: CPT

## 2020-03-27 PROCEDURE — 80048 BASIC METABOLIC PNL TOTAL CA: CPT

## 2020-03-27 PROCEDURE — 6370000000 HC RX 637 (ALT 250 FOR IP): Performed by: INTERNAL MEDICINE

## 2020-03-27 PROCEDURE — 36415 COLL VENOUS BLD VENIPUNCTURE: CPT

## 2020-03-27 PROCEDURE — C9113 INJ PANTOPRAZOLE SODIUM, VIA: HCPCS | Performed by: NURSE PRACTITIONER

## 2020-03-27 PROCEDURE — 6360000002 HC RX W HCPCS: Performed by: NURSE PRACTITIONER

## 2020-03-27 PROCEDURE — 2580000003 HC RX 258: Performed by: NURSE PRACTITIONER

## 2020-03-27 PROCEDURE — 6370000000 HC RX 637 (ALT 250 FOR IP): Performed by: NURSE PRACTITIONER

## 2020-03-27 RX ADMIN — BUSPIRONE HYDROCHLORIDE 5 MG: 5 TABLET ORAL at 13:29

## 2020-03-27 RX ADMIN — HYDROXYZINE PAMOATE 25 MG: 25 CAPSULE ORAL at 06:40

## 2020-03-27 RX ADMIN — METOPROLOL SUCCINATE 25 MG: 25 TABLET, EXTENDED RELEASE ORAL at 08:23

## 2020-03-27 RX ADMIN — SUCRALFATE 1 G: 1 TABLET ORAL at 13:29

## 2020-03-27 RX ADMIN — AMIODARONE HYDROCHLORIDE 200 MG: 200 TABLET ORAL at 08:23

## 2020-03-27 RX ADMIN — SUCRALFATE 1 G: 1 TABLET ORAL at 06:40

## 2020-03-27 RX ADMIN — ONDANSETRON HYDROCHLORIDE 4 MG: 2 SOLUTION INTRAMUSCULAR; INTRAVENOUS at 14:12

## 2020-03-27 RX ADMIN — SODIUM CHLORIDE, PRESERVATIVE FREE 10 ML: 5 INJECTION INTRAVENOUS at 08:24

## 2020-03-27 RX ADMIN — PANTOPRAZOLE SODIUM 40 MG: 40 INJECTION, POWDER, FOR SOLUTION INTRAVENOUS at 08:23

## 2020-03-27 RX ADMIN — SUCRALFATE 1 G: 1 TABLET ORAL at 00:23

## 2020-03-27 RX ADMIN — OXYCODONE HYDROCHLORIDE AND ACETAMINOPHEN 1 TABLET: 7.5; 325 TABLET ORAL at 10:33

## 2020-03-27 RX ADMIN — BUSPIRONE HYDROCHLORIDE 5 MG: 5 TABLET ORAL at 08:23

## 2020-03-27 RX ADMIN — OXYCODONE HYDROCHLORIDE AND ACETAMINOPHEN 1 TABLET: 7.5; 325 TABLET ORAL at 04:31

## 2020-03-27 ASSESSMENT — PAIN DESCRIPTION - PROGRESSION: CLINICAL_PROGRESSION: GRADUALLY WORSENING

## 2020-03-27 ASSESSMENT — PAIN DESCRIPTION - PAIN TYPE: TYPE: CHRONIC PAIN

## 2020-03-27 ASSESSMENT — PAIN DESCRIPTION - ORIENTATION: ORIENTATION: LOWER

## 2020-03-27 ASSESSMENT — PAIN DESCRIPTION - ONSET: ONSET: ON-GOING

## 2020-03-27 ASSESSMENT — PAIN DESCRIPTION - DESCRIPTORS: DESCRIPTORS: ACHING

## 2020-03-27 ASSESSMENT — PAIN DESCRIPTION - FREQUENCY: FREQUENCY: CONTINUOUS

## 2020-03-27 ASSESSMENT — PAIN SCALES - GENERAL
PAINLEVEL_OUTOF10: 0
PAINLEVEL_OUTOF10: 7
PAINLEVEL_OUTOF10: 0
PAINLEVEL_OUTOF10: 9

## 2020-03-27 ASSESSMENT — PAIN DESCRIPTION - LOCATION: LOCATION: BACK

## 2020-03-27 NOTE — DISCHARGE SUMMARY
Discharge Summary    Name:  Chantelle Rivero /Age/Sex: 1940  (78 y.o. female)   MRN & CSN:  [id number] & [id number] Admission Date/Time: 3/24/2020  5:15 PM   Attending:  Carla Breen MD Discharging Physician: Kelley Mcintosh MD     Hospital Course:   Chantelle Rivero is a 78 y.o.  female  who presents with low hemoglobin from [facility]     Patient was seen and examined  Denied any worsening SOB, dizziness  No palpitations, chest pain  No further GI bleeding reported    Assessment and Plan     Acute on chronic Anemia of GI bleed   Occult positive, recent EGD (3/11)   Presented with Hb of 6.2 >8.3  Continue PPI  GI and plan for OP Enteroscopy at [facility]      NICOLASA on CKD stage III   -Creatinine on admission 2.3>1.9>1.5>1.2   -Holding Lasix, lisinopril and spironolactone  -Avoid nephrotoxins  -Nephrology following     Chronic Cardiomyopathy    ECHO 19 LVEF 55% and down to 20% on 2/10/20   Cardiology on board; ok to continue only ASA     Paroxysmal A. Fib   -Hold Xarelto for GI bleed until GI intervention is done per cardio  -Continue Toprol X     COPD with no exacerbation -continue with inhalers     Chronic conditions  Hypertension  Rheumatoid arthritis  Depression  Fibromyalgia  Chronic respiratory failure on home oxygen  Chronic systolic CHF     The patient expressed appropriate understanding of and agreement with the discharge recommendations, medications, and plan.      Consults this admission:  IP CONSULT TO HOSPITALIST  IP CONSULT TO NEPHROLOGY  IP CONSULT TO GI  IP CONSULT TO CARDIOLOGY    Discharge Instruction:   Follow up appointments: Cardio and GI in 2 weeks  Primary care physician:  within 2 weeks    Diet:  cardiac diet   Activity: activity as tolerated  Disposition: Discharged to:   [x][facility], [][facility], []SNF, []Acute Rehab, []Hospice   Condition on discharge: Stable    Discharge Medications:      Rhetta Pancake   Home Medication Instructions ZND:[id number]    Printed on:20 1215   Medication Information                      amiodarone (CORDARONE) 200 MG tablet  Take 1 tablet by mouth daily             aspirin 81 MG chewable tablet  Take 1 tablet by mouth daily             atorvastatin (LIPITOR) 10 MG tablet  TAKE 1 TABLET BY MOUTH EVERY EVENING             BREO ELLIPTA 100-25 MCG/INH AEPB inhaler  INHALE 1 PUFF INTO THE LUNGS DAILY AFTER INHALATION THEN GARGLE             busPIRone (BUSPAR) 5 MG tablet  Take 5 mg by mouth 3 times daily             Cholecalciferol (VITAMIN D) 2000 units CAPS capsule  Take 1 capsule by mouth daily             FORTEO 600 MCG/2.4ML SOLN injection  INJECT 0.08MLS INTO THE SKIN DAILY             INCRUSE ELLIPTA 62.5 MCG/INH AEPB  INHALE 1 PUFF VIA ORAL INHALATION ONCE DAILY             ipratropium-albuterol (DUONEB) 0.5-2.5 (3) MG/3ML SOLN nebulizer solution  Inhale 3 mLs into the lungs every 4 hours (while awake)             metoprolol succinate (TOPROL XL) 25 MG extended release tablet  Take 1 tablet by mouth 2 times daily             NONFORMULARY  White petrolatum, bacitracin zinc, lotrisone bid to legs             omeprazole (PRILOSEC) 40 MG delayed release capsule  Take 1 capsule by mouth every morning (before breakfast)             OXYGEN  Inhale 2 L/min into the lungs nightly             PROAIR  (90 Base) MCG/ACT inhaler  INHALE 2 PUFFS BY ORAL INHALATION EVERY 4 TO 6 HOURS AS NEEDED             spironolactone (ALDACTONE) 25 MG tablet  Take 1 tablet by mouth daily             traZODone (DESYREL) 50 MG tablet  TAKE 1 TABLET BY MOUTH NIGHTLY             triamcinolone (NASACORT ALLERGY 24HR) 55 MCG/ACT nasal inhaler  2 sprays by Each Nostril route daily             vitamin B-12 (CYANOCOBALAMIN) 1000 MCG tablet  Take 1,000 mcg by mouth daily.                  Objective Findings at Discharge:   BP (!) 113/55   Pulse 70   Temp 97.9 °F (36.6 °C) (Oral)   Resp 20   Ht 5' 6\" (1.676 m)   Wt 148 lb 2.4 oz (67.2 kg)   SpO2 99%   BMI 23.91 kg/m² PHYSICAL EXAM   GEN Awake female, sitting upright in bed in no apparent distress. Appears given age. EYES Pupils are equally round. No scleral erythema, discharge, or conjunctivitis. HENT Mucous membranes are moist. Oral pharynx without exudates, no evidence of thrush. NECK Supple, no apparent thyromegaly or masses. RESP Clear to auscultation, no wheezes, rales or rhonchi. Symmetric chest movement while on 2L of O2.  CARDIO/VASC S1/S2 auscultated. Regular rate without appreciable murmurs, rubs, or gallops. No JVD or carotid bruits. Peripheral pulses equal bilaterally and palpable. No peripheral edema. GI Abdomen is soft without significant tenderness, masses, or guarding. Bowel sounds are normoactive. Rectal exam deferred.  No costovertebral angle tenderness. Normal appearing external genitalia. Jeter catheter is not present. HEME/LYMPH No palpable cervical lymphadenopathy and no hepatosplenomegaly. No petechiae or ecchymoses. MSK No gross joint deformities. SKIN Normal coloration, warm, dry. NEURO Cranial nerves appear grossly intact, normal speech, no lateralizing weakness. PSYCH Awake, alert, oriented x 4. Affect appropriate.     BMP/CBC  Recent Labs     03/25/20  0337 03/25/20  1140 03/26/20  0250 03/26/20  2045 03/27/20  0346 03/27/20  0717   *  --  139  --  136  --    K 4.6  --  4.5  --  4.3  --    CL 98*  --  104  --  104  --    CO2 26  --  26  --  22  --    BUN 49*  --  33*  --  22  --    CREATININE 1.9*  --  1.5*  --  1.2*  --    WBC 11.3* 11.8* 10.7*  --   --  11.0*   HCT 27.8* 27.3* 26.0* 25.8*  --  28.3*   * 432 396  --   --  374       IMAGING:  As above    Discharge Time of 35 minutes    Electronically signed by Trever Connor MD on 3/27/2020 at 12:15 PM

## 2020-03-27 NOTE — CARE COORDINATION
Cm contacted admissions at [facility] to advise that pt is on 02 and to see if they can bring portable 02 to get pt home. Admissions advises that pt only uses home 02 at night but will work something out. CM asked pt if she contacted her family re; return to [facility]  Today and she advised that she spoke with her son, Carmina Washington, re her discharge.

## 2020-03-27 NOTE — PROGRESS NOTES
Hb stable  Advance diet  Will Fu as needed  OP enteroscopy at [facility] for transfer out f ice  Keep protonix bid    Libertad Rose

## 2020-03-27 NOTE — PROGRESS NOTES
Daily Progress Note     patient is awake alert   Remain in sinus rate is stable  Repeat echo showed improved EF'  Believe she has CM=stress related  Anemia   Renal insuffiencey  Improved   Cath in 2017-non obstructive CAD   Recent aflutter now sinus--stop AC due to anemia    Echo--Summary   This is a limited echocardiogram.   Left ventricular systolic function is normal.   Ejection fraction is visually estimated at 50-55%. moderate aortic regurgitation is noted ( ms). Small mobile calcification attached to the posterior mitral valve chordae   tendinae. No evidence of any pericardial effusion. LV function has improved compare to her study from 2/20       Objective:   BP (!) 113/55   Pulse 70   Temp 97.9 °F (36.6 °C) (Oral)   Resp 20   Ht 5' 6\" (1.676 m)   Wt 148 lb 2.4 oz (67.2 kg)   SpO2 99%   BMI 23.91 kg/m²       Intake/Output Summary (Last 24 hours) at 3/27/2020 8807  Last data filed at 3/27/2020 0449  Gross per 24 hour   Intake 300 ml   Output 650 ml   Net -350 ml       Medications:   Scheduled Meds:   busPIRone  5 mg Oral TID    metoprolol succinate  25 mg Oral BID    traZODone  50 mg Oral Nightly    fluticasone  1 spray Each Nostril Daily    sucralfate  1 g Oral 4 times per day    fluticasone-vilanterol  1 puff Inhalation Daily    sodium chloride flush  10 mL Intravenous 2 times per day    pantoprazole  40 mg Intravenous Q12H    And    sodium chloride (PF)  10 mL Intravenous Q12H    amiodarone  200 mg Oral Daily    atorvastatin  10 mg Oral QPM      Infusions:     PRN Meds:  hydrOXYzine, oxyCODONE-acetaminophen, albuterol, ondansetron, sodium chloride flush, acetaminophen, ondansetron, ipratropium-albuterol       Physical Exam:  Vitals:    03/27/20 0823   BP: (!) 113/55   Pulse: 70   Resp:    Temp:    SpO2:         General: awake alert   Chest: Nontender  Cardiac: sinus   Lungs:Clear to auscultation and percussion.   Abdomen: Soft, NT, ND, +BS  Extremities:  No edema

## 2020-03-28 ENCOUNTER — CARE COORDINATION (OUTPATIENT)
Dept: CASE MANAGEMENT | Age: 80
End: 2020-03-28

## 2020-03-31 LAB
EKG ATRIAL RATE: 75 BPM
EKG DIAGNOSIS: NORMAL
EKG P AXIS: 49 DEGREES
EKG P-R INTERVAL: 178 MS
EKG Q-T INTERVAL: 396 MS
EKG QRS DURATION: 138 MS
EKG QTC CALCULATION (BAZETT): 442 MS
EKG R AXIS: 96 DEGREES
EKG T AXIS: 16 DEGREES
EKG VENTRICULAR RATE: 75 BPM

## 2020-04-09 ENCOUNTER — HOSPITAL ENCOUNTER (OUTPATIENT)
Dept: INFUSION THERAPY | Age: 80
Setting detail: INFUSION SERIES
Discharge: HOME | End: 2020-04-09
Payer: MEDICARE

## 2020-04-09 VITALS
SYSTOLIC BLOOD PRESSURE: 133 MMHG | RESPIRATION RATE: 14 BRPM | HEART RATE: 68 BPM | OXYGEN SATURATION: 100 % | DIASTOLIC BLOOD PRESSURE: 66 MMHG | TEMPERATURE: 97 F

## 2020-04-09 PROCEDURE — 86900 BLOOD TYPING SEROLOGIC ABO: CPT

## 2020-04-09 PROCEDURE — 6370000000 HC RX 637 (ALT 250 FOR IP): Performed by: FAMILY MEDICINE

## 2020-04-09 PROCEDURE — 86922 COMPATIBILITY TEST ANTIGLOB: CPT

## 2020-04-09 PROCEDURE — 2580000003 HC RX 258: Performed by: FAMILY MEDICINE

## 2020-04-09 PROCEDURE — 36430 TRANSFUSION BLD/BLD COMPNT: CPT

## 2020-04-09 PROCEDURE — P9016 RBC LEUKOCYTES REDUCED: HCPCS

## 2020-04-09 PROCEDURE — 99211 OFF/OP EST MAY X REQ PHY/QHP: CPT

## 2020-04-09 PROCEDURE — 86850 RBC ANTIBODY SCREEN: CPT

## 2020-04-09 PROCEDURE — 86901 BLOOD TYPING SEROLOGIC RH(D): CPT

## 2020-04-09 RX ORDER — FERROUS SULFATE 325(65) MG
325 TABLET ORAL
COMMUNITY

## 2020-04-09 RX ORDER — FUROSEMIDE 10 MG/ML
20 INJECTION INTRAMUSCULAR; INTRAVENOUS SEE ADMIN INSTRUCTIONS
Status: DISCONTINUED | OUTPATIENT
Start: 2020-04-09 | End: 2020-04-10 | Stop reason: HOSPADM

## 2020-04-09 RX ORDER — OXYCODONE HYDROCHLORIDE AND ACETAMINOPHEN 5; 325 MG/1; MG/1
1 TABLET ORAL EVERY 6 HOURS PRN
Status: ON HOLD | COMMUNITY
End: 2020-11-23 | Stop reason: SDUPTHER

## 2020-04-09 RX ORDER — DIPHENHYDRAMINE HCL 25 MG
25 TABLET ORAL SEE ADMIN INSTRUCTIONS
Status: DISCONTINUED | OUTPATIENT
Start: 2020-04-09 | End: 2020-04-10 | Stop reason: HOSPADM

## 2020-04-09 RX ORDER — 0.9 % SODIUM CHLORIDE 0.9 %
250 INTRAVENOUS SOLUTION INTRAVENOUS ONCE
Status: COMPLETED | OUTPATIENT
Start: 2020-04-09 | End: 2020-04-09

## 2020-04-09 RX ORDER — LORATADINE 10 MG/1
10 CAPSULE, LIQUID FILLED ORAL DAILY
COMMUNITY

## 2020-04-09 RX ORDER — FUROSEMIDE 20 MG/1
20 TABLET ORAL DAILY
Status: ON HOLD | COMMUNITY
End: 2020-11-23 | Stop reason: HOSPADM

## 2020-04-09 RX ORDER — ALPRAZOLAM 0.5 MG/1
0.5 TABLET ORAL EVERY 8 HOURS PRN
Status: ON HOLD | COMMUNITY
End: 2020-11-23 | Stop reason: SDUPTHER

## 2020-04-09 RX ORDER — DIPHENHYDRAMINE HYDROCHLORIDE 50 MG/ML
25 INJECTION INTRAMUSCULAR; INTRAVENOUS SEE ADMIN INSTRUCTIONS
Status: DISCONTINUED | OUTPATIENT
Start: 2020-04-09 | End: 2020-04-10 | Stop reason: HOSPADM

## 2020-04-09 RX ORDER — RIVAROXABAN 10 MG/1
10 TABLET, FILM COATED ORAL
COMMUNITY
End: 2020-11-15

## 2020-04-09 RX ORDER — ACETAMINOPHEN 325 MG/1
650 TABLET ORAL SEE ADMIN INSTRUCTIONS
Status: COMPLETED | OUTPATIENT
Start: 2020-04-09 | End: 2020-04-09

## 2020-04-09 RX ORDER — SERTRALINE HYDROCHLORIDE 25 MG/1
25 TABLET, FILM COATED ORAL DAILY
Status: ON HOLD | COMMUNITY
End: 2021-01-07 | Stop reason: ALTCHOICE

## 2020-04-09 RX ADMIN — ACETAMINOPHEN 650 MG: 325 TABLET ORAL at 09:16

## 2020-04-09 RX ADMIN — SODIUM CHLORIDE 250 ML: 9 INJECTION, SOLUTION INTRAVENOUS at 09:16

## 2020-04-09 ASSESSMENT — PAIN SCALES - GENERAL
PAINLEVEL_OUTOF10: 0
PAINLEVEL_OUTOF10: 0

## 2020-09-09 ENCOUNTER — OFFICE VISIT (OUTPATIENT)
Dept: PULMONOLOGY | Age: 80
End: 2020-09-09
Payer: MEDICARE

## 2020-09-09 VITALS
HEIGHT: 66 IN | BODY MASS INDEX: 21.02 KG/M2 | SYSTOLIC BLOOD PRESSURE: 116 MMHG | OXYGEN SATURATION: 97 % | DIASTOLIC BLOOD PRESSURE: 64 MMHG | HEART RATE: 77 BPM | WEIGHT: 130.8 LBS

## 2020-09-09 PROBLEM — J96.21 ACUTE ON CHRONIC RESPIRATORY FAILURE WITH HYPOXIA (HCC): Status: RESOLVED | Noted: 2019-12-30 | Resolved: 2020-09-09

## 2020-09-09 PROBLEM — J96.21 ACUTE ON CHRONIC RESPIRATORY FAILURE WITH HYPOXIA AND HYPERCAPNIA (HCC): Status: RESOLVED | Noted: 2020-02-09 | Resolved: 2020-09-09

## 2020-09-09 PROBLEM — J96.11 CHRONIC HYPOXEMIC RESPIRATORY FAILURE (HCC): Status: ACTIVE | Noted: 2020-09-09

## 2020-09-09 PROBLEM — G47.34 NOCTURNAL HYPOXEMIA: Status: RESOLVED | Noted: 2019-05-21 | Resolved: 2020-09-09

## 2020-09-09 PROBLEM — J96.22 ACUTE ON CHRONIC RESPIRATORY FAILURE WITH HYPOXIA AND HYPERCAPNIA (HCC): Status: RESOLVED | Noted: 2020-02-09 | Resolved: 2020-09-09

## 2020-09-09 PROCEDURE — 4040F PNEUMOC VAC/ADMIN/RCVD: CPT | Performed by: INTERNAL MEDICINE

## 2020-09-09 PROCEDURE — 3023F SPIROM DOC REV: CPT | Performed by: INTERNAL MEDICINE

## 2020-09-09 PROCEDURE — G8427 DOCREV CUR MEDS BY ELIG CLIN: HCPCS | Performed by: INTERNAL MEDICINE

## 2020-09-09 PROCEDURE — G8926 SPIRO NO PERF OR DOC: HCPCS | Performed by: INTERNAL MEDICINE

## 2020-09-09 PROCEDURE — 1123F ACP DISCUSS/DSCN MKR DOCD: CPT | Performed by: INTERNAL MEDICINE

## 2020-09-09 PROCEDURE — G8399 PT W/DXA RESULTS DOCUMENT: HCPCS | Performed by: INTERNAL MEDICINE

## 2020-09-09 PROCEDURE — 1036F TOBACCO NON-USER: CPT | Performed by: INTERNAL MEDICINE

## 2020-09-09 PROCEDURE — 99213 OFFICE O/P EST LOW 20 MIN: CPT | Performed by: INTERNAL MEDICINE

## 2020-09-09 PROCEDURE — G8420 CALC BMI NORM PARAMETERS: HCPCS | Performed by: INTERNAL MEDICINE

## 2020-09-09 PROCEDURE — 1090F PRES/ABSN URINE INCON ASSESS: CPT | Performed by: INTERNAL MEDICINE

## 2020-09-09 NOTE — PROGRESS NOTES
SUBJECTIVE:  Chief Complaint: Severe COPD, chronic hypoxemic respiratory failure, shortness of breath  Hanh Gibbons states that she has had no recent bronchitic exacerbation. She continues on Advair Diskus, Incruse and albuterol MDI or solution per nebulizer. She also continues on oxygen 24 hours a day. She denies worsening chest congestion or cough and has had no purulent sputum expectoration. She also denies any fever. She has been socially distancing herself at [facility] and has had no known COVID-19 exposure. However she states that several workers there have been tested positive. She specifically denies any fever associated with her cough. She continues to be short of breath with mild exertion. She denies hemoptysis or chest pain      ROS:  Constitution:  HEENT: Negative for ear, throat pain  Cardiovascular: Negative for chest pain, syncope, edema  Pulmonary: See HPI  Musculoskeletal: Negative for DVT, myalgias, arthralgias    OBJECTIVE:  /64   Pulse 77   Ht 5' 6\" (1.676 m)   Wt 130 lb 12.8 oz (59.3 kg)   SpO2 97%   BMI 21.11 kg/m²      Physical Exam:  Constitutional:  She appears well developed and well-nourished. Looks chronically ill. Wearing nasal oxygen  Neck:  Supple, No palpable lymphadenopathy, No JVD  Cardiovascular:  S1, S2 Normal, Regular rhythm, no murmurs or gallops, No pericardial  rubs. Pulmonary: Diminished breath sounds bilateral with a few scattered basilar rhonchi  Abdomen: Not examined  Extremities: no edema, No DVT  Neurologic: Oriented x3, No focal deficits    Radiology: Chest x-ray on 3/24/2020 showed no acute cardiopulmonary process with scarring in the left lower lobe  PFT: Office spirometry last obtained on 7/29/2019 demonstrated a moderate obstructive defect with no significant response to bronchodilators        ASSESSMENT:    1. COPD, severe ([address]    2. Shortness of breath    3.  Chronic hypoxemic respiratory failure (HCC)          PLAN:   I will make no change in her current bronchodilator therapy. I did encourage her to continue wearing oxygen 24 hours a day. I will continue to follow her    We have discussed the need to maintain yearly flu immunization, pneumococcal vaccination. We have discussed Coronavirus precaution including handwashing practice, wiping items touched in public such as gas pumps, door handles, shopping carts, etc. Self monitoring for infection - fever, chills, cough, SOB. Should they develop symptoms they should call office for further instructions. Return in about 4 months (around 1/9/2021) for Recheck for COPD, Recheck for Shortness of Breath, chronic hypoxemic respiratory failure. This dictation was performed with a verbal recognition program and it was checked for errors. It is possible that there are still dictated errors within this office note. Any errors should be brought immediately to my attention for correction. All efforts were made to ensure that this office note is accurate.

## 2020-11-03 LAB
A/G RATIO: 1.6
ALBUMIN SERPL-MCNC: 3.3 G/DL
ALP BLD-CCNC: 73 U/L
ALT SERPL-CCNC: 17 U/L
AST SERPL-CCNC: 19 U/L
BILIRUB SERPL-MCNC: 0.3 MG/DL (ref 0.1–1.4)
BILIRUBIN DIRECT: 0.1 MG/DL
BILIRUBIN, INDIRECT: NORMAL
BUN BLDV-MCNC: 28 MG/DL
CALCIUM SERPL-MCNC: 8.8 MG/DL
CHLORIDE BLD-SCNC: 99 MMOL/L
CO2: 33 MMOL/L
CREAT SERPL-MCNC: 1 MG/DL
GFR CALCULATED: 53
GLOBULIN: NORMAL
GLUCOSE BLD-MCNC: 79 MG/DL
POTASSIUM SERPL-SCNC: 4.8 MMOL/L
PROTEIN TOTAL: 5.4 G/DL
SODIUM BLD-SCNC: 136 MMOL/L

## 2020-11-04 LAB
HCT VFR BLD CALC: 34.4 % (ref 36–46)
HEMOGLOBIN: 10.9 G/DL (ref 12–16)

## 2020-11-15 ENCOUNTER — HOSPITAL ENCOUNTER (INPATIENT)
Age: 80
LOS: 8 days | Discharge: NURSING, REHAB OR LONG-TERM CARE | DRG: 871 | End: 2020-11-23
Attending: EMERGENCY MEDICINE | Admitting: HOSPITALIST
Payer: MEDICARE

## 2020-11-15 ENCOUNTER — APPOINTMENT (OUTPATIENT)
Dept: CT IMAGING | Age: 80
DRG: 871 | End: 2020-11-15
Payer: MEDICARE

## 2020-11-15 ENCOUNTER — APPOINTMENT (OUTPATIENT)
Dept: GENERAL RADIOLOGY | Age: 80
DRG: 871 | End: 2020-11-15
Payer: MEDICARE

## 2020-11-15 PROBLEM — U07.1 ACUTE HYPOXEMIC RESPIRATORY FAILURE DUE TO COVID-19 (HCC): Status: ACTIVE | Noted: 2020-11-15

## 2020-11-15 PROBLEM — J96.01 ACUTE HYPOXEMIC RESPIRATORY FAILURE DUE TO COVID-19 (HCC): Status: ACTIVE | Noted: 2020-11-15

## 2020-11-15 LAB
ABO/RH: NORMAL
ALBUMIN SERPL-MCNC: 2.6 GM/DL (ref 3.4–5)
ALP BLD-CCNC: 76 IU/L (ref 40–129)
ALT SERPL-CCNC: 82 U/L (ref 10–40)
ANION GAP SERPL CALCULATED.3IONS-SCNC: 10 MMOL/L (ref 4–16)
ANTIBODY SCREEN: NEGATIVE
AST SERPL-CCNC: 54 IU/L (ref 15–37)
BACTERIA: NEGATIVE /HPF
BASOPHILS ABSOLUTE: 0 K/CU MM
BASOPHILS RELATIVE PERCENT: 0.2 % (ref 0–1)
BILIRUB SERPL-MCNC: 0.2 MG/DL (ref 0–1)
BILIRUBIN URINE: NEGATIVE MG/DL
BLOOD, URINE: ABNORMAL
BUN BLDV-MCNC: 44 MG/DL (ref 6–23)
CALCIUM SERPL-MCNC: 8.3 MG/DL (ref 8.3–10.6)
CHLORIDE BLD-SCNC: 99 MMOL/L (ref 99–110)
CHP ED QC CHECK: NORMAL
CLARITY: ABNORMAL
CO2: 24 MMOL/L (ref 21–32)
COLOR: YELLOW
CREAT SERPL-MCNC: 1.3 MG/DL (ref 0.6–1.1)
D DIMER: 4677 NG/ML(DDU)
DIFFERENTIAL TYPE: ABNORMAL
EOSINOPHILS ABSOLUTE: 0 K/CU MM
EOSINOPHILS RELATIVE PERCENT: 0 % (ref 0–3)
GFR AFRICAN AMERICAN: 48 ML/MIN/1.73M2
GFR NON-AFRICAN AMERICAN: 39 ML/MIN/1.73M2
GLUCOSE BLD-MCNC: 107 MG/DL
GLUCOSE BLD-MCNC: 107 MG/DL (ref 70–99)
GLUCOSE BLD-MCNC: 85 MG/DL (ref 70–99)
GLUCOSE, URINE: NEGATIVE MG/DL
HCT VFR BLD CALC: 32.7 % (ref 37–47)
HEMOGLOBIN: 10.1 GM/DL (ref 12.5–16)
HIGH SENSITIVE C-REACTIVE PROTEIN: 205.5 MG/L
IMMATURE NEUTROPHIL %: 1.1 % (ref 0–0.43)
INR BLD: 0.93 INDEX
KETONES, URINE: NEGATIVE MG/DL
LEGIONELLA URINARY AG: NEGATIVE
LEUKOCYTE ESTERASE, URINE: ABNORMAL
LYMPHOCYTES ABSOLUTE: 0.3 K/CU MM
LYMPHOCYTES RELATIVE PERCENT: 2.3 % (ref 24–44)
MCH RBC QN AUTO: 24.8 PG (ref 27–31)
MCHC RBC AUTO-ENTMCNC: 30.9 % (ref 32–36)
MCV RBC AUTO: 80.3 FL (ref 78–100)
MONOCYTES ABSOLUTE: 1.1 K/CU MM
MONOCYTES RELATIVE PERCENT: 8.6 % (ref 0–4)
MUCUS: ABNORMAL HPF
NITRITE URINE, QUANTITATIVE: POSITIVE
NUCLEATED RBC %: 0 %
PDW BLD-RTO: 16.8 % (ref 11.7–14.9)
PH, URINE: 5 (ref 5–8)
PLATELET # BLD: 221 K/CU MM (ref 140–440)
PMV BLD AUTO: 10.6 FL (ref 7.5–11.1)
POTASSIUM SERPL-SCNC: 4.3 MMOL/L (ref 3.5–5.1)
PROCALCITONIN: 1.87
PROTEIN UA: NEGATIVE MG/DL
PROTHROMBIN TIME: 11.3 SECONDS (ref 11.7–14.5)
RBC # BLD: 4.07 M/CU MM (ref 4.2–5.4)
RBC URINE: 3 /HPF (ref 0–6)
SARS-COV-2, NAAT: DETECTED
SEGMENTED NEUTROPHILS ABSOLUTE COUNT: 11.3 K/CU MM
SEGMENTED NEUTROPHILS RELATIVE PERCENT: 87.8 % (ref 36–66)
SODIUM BLD-SCNC: 133 MMOL/L (ref 135–145)
SPECIFIC GRAVITY UA: 1.04 (ref 1–1.03)
SQUAMOUS EPITHELIAL: <1 /HPF
TOTAL IMMATURE NEUTOROPHIL: 0.14 K/CU MM
TOTAL NUCLEATED RBC: 0 K/CU MM
TOTAL PROTEIN: 4.7 GM/DL (ref 6.4–8.2)
TRICHOMONAS: ABNORMAL /HPF
TROPONIN T: 0.01 NG/ML
UROBILINOGEN, URINE: NORMAL MG/DL (ref 0.2–1)
VITAMIN D 25-HYDROXY: 32.47 NG/ML
WBC # BLD: 12.9 K/CU MM (ref 4–10.5)
WBC UA: 47 /HPF (ref 0–5)

## 2020-11-15 PROCEDURE — 87186 SC STD MICRODIL/AGAR DIL: CPT

## 2020-11-15 PROCEDURE — 70498 CT ANGIOGRAPHY NECK: CPT

## 2020-11-15 PROCEDURE — 2700000000 HC OXYGEN THERAPY PER DAY

## 2020-11-15 PROCEDURE — 2580000003 HC RX 258: Performed by: INTERNAL MEDICINE

## 2020-11-15 PROCEDURE — 85610 PROTHROMBIN TIME: CPT

## 2020-11-15 PROCEDURE — 84484 ASSAY OF TROPONIN QUANT: CPT

## 2020-11-15 PROCEDURE — 93005 ELECTROCARDIOGRAM TRACING: CPT | Performed by: EMERGENCY MEDICINE

## 2020-11-15 PROCEDURE — 82962 GLUCOSE BLOOD TEST: CPT

## 2020-11-15 PROCEDURE — 71045 X-RAY EXAM CHEST 1 VIEW: CPT

## 2020-11-15 PROCEDURE — 85025 COMPLETE CBC W/AUTO DIFF WBC: CPT

## 2020-11-15 PROCEDURE — 6360000004 HC RX CONTRAST MEDICATION: Performed by: EMERGENCY MEDICINE

## 2020-11-15 PROCEDURE — 82306 VITAMIN D 25 HYDROXY: CPT

## 2020-11-15 PROCEDURE — 85379 FIBRIN DEGRADATION QUANT: CPT

## 2020-11-15 PROCEDURE — 86900 BLOOD TYPING SEROLOGIC ABO: CPT

## 2020-11-15 PROCEDURE — 70450 CT HEAD/BRAIN W/O DYE: CPT

## 2020-11-15 PROCEDURE — 87077 CULTURE AEROBIC IDENTIFY: CPT

## 2020-11-15 PROCEDURE — 87899 AGENT NOS ASSAY W/OPTIC: CPT

## 2020-11-15 PROCEDURE — 87086 URINE CULTURE/COLONY COUNT: CPT

## 2020-11-15 PROCEDURE — 81001 URINALYSIS AUTO W/SCOPE: CPT

## 2020-11-15 PROCEDURE — 6360000002 HC RX W HCPCS: Performed by: HOSPITALIST

## 2020-11-15 PROCEDURE — 84145 PROCALCITONIN (PCT): CPT

## 2020-11-15 PROCEDURE — 6370000000 HC RX 637 (ALT 250 FOR IP): Performed by: HOSPITALIST

## 2020-11-15 PROCEDURE — 80053 COMPREHEN METABOLIC PANEL: CPT

## 2020-11-15 PROCEDURE — 2060000000 HC ICU INTERMEDIATE R&B

## 2020-11-15 PROCEDURE — 86850 RBC ANTIBODY SCREEN: CPT

## 2020-11-15 PROCEDURE — 2580000003 HC RX 258: Performed by: HOSPITALIST

## 2020-11-15 PROCEDURE — 86141 C-REACTIVE PROTEIN HS: CPT

## 2020-11-15 PROCEDURE — 94761 N-INVAS EAR/PLS OXIMETRY MLT: CPT

## 2020-11-15 PROCEDURE — 36415 COLL VENOUS BLD VENIPUNCTURE: CPT

## 2020-11-15 PROCEDURE — 99285 EMERGENCY DEPT VISIT HI MDM: CPT

## 2020-11-15 PROCEDURE — 87449 NOS EACH ORGANISM AG IA: CPT

## 2020-11-15 PROCEDURE — 70496 CT ANGIOGRAPHY HEAD: CPT

## 2020-11-15 PROCEDURE — 86901 BLOOD TYPING SEROLOGIC RH(D): CPT

## 2020-11-15 PROCEDURE — U0002 COVID-19 LAB TEST NON-CDC: HCPCS

## 2020-11-15 RX ORDER — ALPRAZOLAM 0.5 MG/1
0.5 TABLET ORAL EVERY 8 HOURS PRN
Status: DISCONTINUED | OUTPATIENT
Start: 2020-11-15 | End: 2020-11-18

## 2020-11-15 RX ORDER — AMLODIPINE BESYLATE 5 MG/1
5 TABLET ORAL DAILY
Status: DISCONTINUED | OUTPATIENT
Start: 2020-11-16 | End: 2020-11-18

## 2020-11-15 RX ORDER — AMIODARONE HYDROCHLORIDE 200 MG/1
200 TABLET ORAL DAILY
Status: DISCONTINUED | OUTPATIENT
Start: 2020-11-15 | End: 2020-11-23 | Stop reason: HOSPADM

## 2020-11-15 RX ORDER — ALBUTEROL SULFATE 90 UG/1
2 AEROSOL, METERED RESPIRATORY (INHALATION) 4 TIMES DAILY
Status: DISCONTINUED | OUTPATIENT
Start: 2020-11-15 | End: 2020-11-23 | Stop reason: HOSPADM

## 2020-11-15 RX ORDER — ROSUVASTATIN CALCIUM 20 MG/1
40 TABLET, COATED ORAL NIGHTLY
Status: DISCONTINUED | OUTPATIENT
Start: 2020-11-15 | End: 2020-11-18

## 2020-11-15 RX ORDER — TRAZODONE HYDROCHLORIDE 50 MG/1
50 TABLET ORAL NIGHTLY
Status: DISCONTINUED | OUTPATIENT
Start: 2020-11-15 | End: 2020-11-23 | Stop reason: HOSPADM

## 2020-11-15 RX ORDER — LABETALOL HYDROCHLORIDE 5 MG/ML
10 INJECTION, SOLUTION INTRAVENOUS EVERY 10 MIN PRN
Status: DISCONTINUED | OUTPATIENT
Start: 2020-11-15 | End: 2020-11-23 | Stop reason: HOSPADM

## 2020-11-15 RX ORDER — SODIUM CHLORIDE 0.9 % (FLUSH) 0.9 %
10 SYRINGE (ML) INJECTION PRN
Status: DISCONTINUED | OUTPATIENT
Start: 2020-11-15 | End: 2020-11-23 | Stop reason: HOSPADM

## 2020-11-15 RX ORDER — PANTOPRAZOLE SODIUM 40 MG/1
40 TABLET, DELAYED RELEASE ORAL
Status: DISCONTINUED | OUTPATIENT
Start: 2020-11-16 | End: 2020-11-19

## 2020-11-15 RX ORDER — OXYCODONE HYDROCHLORIDE AND ACETAMINOPHEN 5; 325 MG/1; MG/1
1 TABLET ORAL EVERY 6 HOURS PRN
Status: DISCONTINUED | OUTPATIENT
Start: 2020-11-15 | End: 2020-11-23 | Stop reason: HOSPADM

## 2020-11-15 RX ORDER — SODIUM CHLORIDE 9 MG/ML
INJECTION, SOLUTION INTRAVENOUS CONTINUOUS
Status: DISCONTINUED | OUTPATIENT
Start: 2020-11-15 | End: 2020-11-17

## 2020-11-15 RX ORDER — LANOLIN ALCOHOL/MO/W.PET/CERES
1000 CREAM (GRAM) TOPICAL DAILY
Status: DISCONTINUED | OUTPATIENT
Start: 2020-11-15 | End: 2020-11-23 | Stop reason: HOSPADM

## 2020-11-15 RX ORDER — SODIUM CHLORIDE 0.9 % (FLUSH) 0.9 %
10 SYRINGE (ML) INJECTION EVERY 12 HOURS SCHEDULED
Status: DISCONTINUED | OUTPATIENT
Start: 2020-11-15 | End: 2020-11-23 | Stop reason: HOSPADM

## 2020-11-15 RX ORDER — BUSPIRONE HYDROCHLORIDE 5 MG/1
5 TABLET ORAL 3 TIMES DAILY
Status: DISCONTINUED | OUTPATIENT
Start: 2020-11-15 | End: 2020-11-23 | Stop reason: HOSPADM

## 2020-11-15 RX ORDER — POLYETHYLENE GLYCOL 3350 17 G/17G
17 POWDER, FOR SOLUTION ORAL DAILY PRN
Status: DISCONTINUED | OUTPATIENT
Start: 2020-11-15 | End: 2020-11-23 | Stop reason: HOSPADM

## 2020-11-15 RX ORDER — ACETAMINOPHEN 325 MG/1
650 TABLET ORAL EVERY 6 HOURS PRN
Status: DISCONTINUED | OUTPATIENT
Start: 2020-11-15 | End: 2020-11-23 | Stop reason: HOSPADM

## 2020-11-15 RX ORDER — ACETAMINOPHEN 650 MG/1
650 SUPPOSITORY RECTAL EVERY 6 HOURS PRN
Status: DISCONTINUED | OUTPATIENT
Start: 2020-11-15 | End: 2020-11-23 | Stop reason: HOSPADM

## 2020-11-15 RX ORDER — METOPROLOL SUCCINATE 25 MG/1
25 TABLET, EXTENDED RELEASE ORAL 2 TIMES DAILY
Status: DISCONTINUED | OUTPATIENT
Start: 2020-11-15 | End: 2020-11-17

## 2020-11-15 RX ORDER — ASPIRIN 600 MG/1
300 SUPPOSITORY RECTAL DAILY
Status: DISCONTINUED | OUTPATIENT
Start: 2020-11-15 | End: 2020-11-18

## 2020-11-15 RX ORDER — DEXAMETHASONE 4 MG/1
6 TABLET ORAL DAILY
Status: DISCONTINUED | OUTPATIENT
Start: 2020-11-15 | End: 2020-11-17

## 2020-11-15 RX ORDER — ASPIRIN 81 MG/1
81 TABLET ORAL DAILY
Status: DISCONTINUED | OUTPATIENT
Start: 2020-11-15 | End: 2020-11-23 | Stop reason: HOSPADM

## 2020-11-15 RX ADMIN — OXYCODONE HYDROCHLORIDE AND ACETAMINOPHEN 1 TABLET: 5; 325 TABLET ORAL at 23:28

## 2020-11-15 RX ADMIN — SODIUM CHLORIDE, PRESERVATIVE FREE 10 ML: 5 INJECTION INTRAVENOUS at 20:43

## 2020-11-15 RX ADMIN — TRAZODONE HYDROCHLORIDE 50 MG: 50 TABLET ORAL at 20:43

## 2020-11-15 RX ADMIN — ALPRAZOLAM 0.5 MG: 0.5 TABLET ORAL at 20:43

## 2020-11-15 RX ADMIN — ROSUVASTATIN CALCIUM 40 MG: 20 TABLET, COATED ORAL at 20:43

## 2020-11-15 RX ADMIN — ENOXAPARIN SODIUM 30 MG: 30 INJECTION SUBCUTANEOUS at 20:43

## 2020-11-15 RX ADMIN — IOPAMIDOL 80 ML: 755 INJECTION, SOLUTION INTRAVENOUS at 12:49

## 2020-11-15 RX ADMIN — BUSPIRONE HYDROCHLORIDE 5 MG: 5 TABLET ORAL at 20:43

## 2020-11-15 RX ADMIN — METOPROLOL SUCCINATE 25 MG: 25 TABLET, EXTENDED RELEASE ORAL at 20:44

## 2020-11-15 RX ADMIN — SODIUM CHLORIDE: 9 INJECTION, SOLUTION INTRAVENOUS at 23:29

## 2020-11-15 ASSESSMENT — PAIN SCALES - GENERAL
PAINLEVEL_OUTOF10: 10
PAINLEVEL_OUTOF10: 0
PAINLEVEL_OUTOF10: 0

## 2020-11-15 NOTE — ED NOTES
The patient has been assessed per Dr Rita Ndiaye of [facility] and diagnosed with a potential TIA. Dr Gina Grijalva was also at the bedside to speak to him. The plan is to admit the patient to [facility] and follow with an MRI.       Roxanne Figueroa RN  11/15/20 1074

## 2020-11-15 NOTE — PROGRESS NOTES
Pt arrived to unit at this time. NIHSS handoff score 2 for L sided facial droop and mild aphasia. No skin issues noted. A&O x4.

## 2020-11-15 NOTE — ED NOTES
This nurse spoke with Karyle Manly, mike on [address] [place] [address]. She sts the room 2023 is not yet cleaned. And they will call me directly for report upon the room being clean.       Hai Milton RN  11/15/20 4770

## 2020-11-15 NOTE — ED NOTES
The patient sts the Lolis Demarcus is bothering her, so it is removed per this nurse. Taken to 2E per this nurse and an EMT to are both wearing PPE. The patient is wearing a mask and 02 at 4 liters/per minute.       Srinivasan Joseph RN  11/15/20 2294

## 2020-11-15 NOTE — H&P
History and Physical      Name:  Ana Tse /Age/Sex: 1940  ([de-identified] y.o. female)   MRN & CSN:  [id number] & [id number] Admission Date/Time: 11/15/2020 12:14 PM   Location:  [facility]- PCP: Vianey Greenberg, [address] Day: 1    Assessment and Plan:   Ana Tse is a [de-identified] y.o.  female  who presents with Facial droop, cough and dyspnea.     > Acute on chronic resp failure  > COvid pneumonia  - admit to covid unit, Decadron, oxygen,   - check pre calcitonin, CRP, D-Dimer, CBC, CMP,   - consult pulm    > Facial droop   - neg CT and CTA for acute pathology  - Check MRI, consult Neurology/PT/OT/SLP     >Paroxysmal A. Fib   >H/o Chronic Cardiomyopathy    ECHO 19 LVEF 55% and down to 20% on 2/10/20   - ASA, Held Xarelto for GI bleed   -Continue Toprol X, consult Cardio    >H/o of GI bleed   - PPI, monitor H&H     >CKD stage III   -Holding Lasix, and spironolactone  -Avoid nephrotoxins, monitor      >COPD with no exacerbation -continue with inhalers    > Lung nodule  - OP FU with Pulm     >Chronic conditions  Hypertension  Rheumatoid arthritis  Depression  Fibromyalgia  Chronic respiratory failure on home oxygen  Chronic systolic CHF    Diet Diet NPO Effective Now   DVT Prophylaxis [] Lovenox   GI Prophylaxis [] PPI   Code Status Prior   Disposition  Pending clinical improvement. History of Present Illness:     Chief Complaint: Facial droop, cough and dyspnea. Ana Tse is a [de-identified] y.o.  female  who presents with Facial droop, cough and dyspnea. Pt is [facility] resident with limited mobility due to advanced RA, recently diagnosed and was being treated for Covid,  On 2 L/min baseline , has a h/o A-fib was on xarelto but was discontinued due to GI bleed. Pt was brought to ED with facial droop, [facility] tele Neuro contacted , pt no candidate for tPA due to being out side the window, CT and CTA neg for acute pathology, MRI recommended.  Pt also reports worsening dyspnea, worse with exertion, better with rest, associated with coughing,  chest pain with cough, on 4 L/min oxygen now. Facial droop resolved    Called and discussed with pt's son ( POA ) ( with pt's permission ) , discussed plan and options including plasma and remdesivir, and he would like to hold on that until he discuss with pt. I also confirmed code status as Full code, per son pt has changed her mind repeatedly, and he will discuss with pt once able to talk to her. 10-14 point ROS reviewed negative, unless as noted above     Objective:   No intake or output data in the 24 hours ending 11/15/20 1332   Vitals:   Vitals:    11/15/20 1331   BP: (!) 105/53   Pulse: 78   Resp: 16   Temp:    SpO2: 97%     Physical Exam:    GEN Awake female, sitting upright in bed in no apparent distress. Appears given age. EYES Pupils are equally round. No scleral erythema, discharge, or conjunctivitis. HENT Mucous membranes are moist.   NECK No apparent thyromegaly or masses. RESP Decreased air sounds. Symmetric chest movement . CARDIO/VASC S1/S2 auscultated. Regular rate . No peripheral edema. GI Abdomen is soft without significant tenderness, or guarding. Bowel sounds are normoactive. Rectal exam deferred.  Jetre catheter is not present. MSK RA joint deformities. Spontaneous movement of all extremities  SKIN Normal coloration, warm, dry. NEURO Cranial nerves appear grossly intact, normal speech, no lateralizing weakness. PSYCH Awake, alert, oriented x 4. Affect appropriate.     Past Medical History:      Past Medical History:   Diagnosis Date    Acute DVT of right tibial vein ([address] 07/2017    ER imaging: distal right tibial vein DVT; no anticoag for bleeding/ anemia    Acute exacerbation of chronic obstructive pulmonary disease (COPD) ([address] 12/13/2018    Anemia     Arthritis     Atrial fibrillation (HCC)     CHF (congestive heart failure) (HCC)     Chronic hypoxemic respiratory failure ([address] 9/9/2020    Chronic pain syndrome     Low back pain; lumbar disc disease    Clostridium difficile colitis 09/20/2017    COPD, severe ([facility] 10/24/2017    Depression     Fibromyalgia     Former smoker     Quit 1/2019    Herniated cervical disc 5-1998    Herpes zoster ophthalmicus 3/2012    Hx of blood clots     Hyperlipidemia     Hypertension     Kidney disease     L3 vertebral fracture ([facility] 2010    vertebroplasty    Lumbar spinal stenosis 2015    moderately severe by MRI    Migraine     Nocturnal hypoxemia 5/21/2019    Osteoporosis 2010    Fragility Fx L3    Rheumatoid arthritis(714.0) 1976    Dr Da Dobbins S/P cardiac catheterization 05/2017    patent coronaries; Takotsubo; najeeb Ahmed    Shortness of breath 4/25/2019    Takotsubo syndrome 05/2017    TMJ dysfunction     Tobacco abuse 12/13/2018    Vitamin B12 deficiency 12/2014    B12 level 199     PSHX:  has a past surgical history that includes Cholecystectomy (1966); devyn and bso (cervix removed) (1991); Wrist fusion (Left, 2000); Elbow surgery (Right); Appendectomy (1966); vertebroplasty (10/2010); Foot surgery (1986); Toe Surgery (Left, 4/25/2013); Cardiac catheterization (05/21/2017); pr colonoscopy flx dx w/collj spec when pfrmd (N/A, 10/2/2018); Fixation Kyphoplasty (06/12/2019); Hysterectomy; Upper gastrointestinal endoscopy (N/A, 3/11/2020); and Colonoscopy (N/A, 3/11/2020). Allergies: Allergies   Allergen Reactions    Ativan [Lorazepam] Other (See Comments)     Violent, thrashing and combative. She has lacerations and bruising, had to be tied down.      Etanercept Other (See Comments)     No response    Humira [Adalimumab]     Prochlorperazine Edisylate Other (See Comments)     \"Compazine\"   Involuntary Muscle Spasms     Remicade [Infliximab Injection]     Doxycycline Other (See Comments)     Stomach pains       FAM HX: family history includes Arthritis in her mother; Cancer in an other family member; Emphysema in her father; Heart Disease in her father and another family member; Kidney Disease in her son; Stroke in her mother. Soc HX:   Social History     Socioeconomic History    Marital status:      Spouse name: Cammie Madden Number of children: 2    Years of education: 15    Highest education level: None   Occupational History    Occupation: retired   Social Needs    Financial resource strain: None    Food insecurity     Worry: None     Inability: None    Transportation needs     Medical: None     Non-medical: None   Tobacco Use    Smoking status: Former Smoker     Packs/day: 0.75     Years: 55.00     Pack years: 41.25     Types: Cigarettes     Start date: 1962     Last attempt to quit: 2019     Years since quittin.8    Smokeless tobacco: Never Used   Substance and Sexual Activity    Alcohol use: No     Alcohol/week: 0.0 standard drinks    Drug use: No    Sexual activity: Not Currently   Lifestyle    Physical activity     Days per week: None     Minutes per session: None    Stress: None   Relationships    Social connections     Talks on phone: None     Gets together: None     Attends [employer] service: None     Active member of club or organization: None     Attends meetings of clubs or organizations: None     Relationship status: None    Intimate partner violence     Fear of current or ex partner: None     Emotionally abused: None     Physically abused: None     Forced sexual activity: None   Other Topics Concern    None   Social History Narrative    None       Medications:   Medications:   Prior to Admission medications    Medication Sig Start Date End Date Taking?  Authorizing Provider   ferrous sulfate (IRON 325) 325 (65 Fe) MG tablet Take 325 mg by mouth daily (with breakfast)    Historical Provider, MD   rivaroxaban (XARELTO) 10 MG TABS tablet Take 10 mg by mouth GIVE ONE TABLE ORALLY ONE TIME A DAY EVERY 2 DAYS FOR ANTICOAGULANT    Historical Provider, MD   sertraline (ZOLOFT) 25 MG tablet Take 25 mg by mouth daily    Historical Provider, MD   loratadine (CLARITIN) 10 MG capsule Take 10 mg by mouth daily    Historical Provider, MD   furosemide (LASIX) 20 MG tablet Take 20 mg by mouth daily    Historical Provider, MD   ALPRAZolam (XANAX) 0.5 MG tablet Take 0.5 mg by mouth every 8 hours as needed for Anxiety. Historical Provider, MD   bacitracin-neomycin-polymyxin b-hydrocortisone 1 % ointment Apply topically 2 times daily Apply topically 2 times daily. Historical Provider, MD   oxyCODONE-acetaminophen (PERCOCET) 5-325 MG per tablet Take 1 tablet by mouth every 6 hours as needed for Pain.     Historical Provider, MD   busPIRone (BUSPAR) 5 MG tablet Take 5 mg by mouth 3 times daily    Margi Provider, MD   aspirin 81 MG chewable tablet Take 1 tablet by mouth daily 2/17/20   Buzz Salgado MD   amiodarone (CORDARONE) 200 MG tablet Take 1 tablet by mouth daily 2/17/20   Buzz Salgado MD   metoprolol succinate (TOPROL XL) 25 MG extended release tablet Take 1 tablet by mouth 2 times daily  Patient taking differently: Take 25 mg by mouth daily  2/16/20   Buzz Salgado MD   spironolactone (ALDACTONE) 25 MG tablet Take 1 tablet by mouth daily 2/17/20   Buzz Salgado MD   omeprazole (PRILOSEC) 40 MG delayed release capsule Take 1 capsule by mouth every morning (before breakfast) 2/6/20   Guilherme Pia, DO   NONFORMULARY White petrolatum, bacitracin zinc, lotrisone bid to legs    Historical Provider, MD   OXYGEN Inhale 2 L/min into the lungs nightly    Margi Provider, MD   FORTEO 600 MCG/2.4ML SOLN injection INJECT 0.08MLS INTO THE SKIN DAILY 1/6/20   Nat Davis MD   ipratropium-albuterol (DUONEB) 0.5-2.5 (3) MG/3ML SOLN nebulizer solution Inhale 3 mLs into the lungs every 4 hours (while awake) 1/4/20   Sanchez Navarrete MD   atorvastatin (LIPITOR) 10 MG tablet TAKE 1 TABLET BY MOUTH EVERY EVENING 11/4/19   Nat Davis MD   traZODone (DESYREL) 50 MG tablet TAKE 1 TABLET BY MOUTH NIGHTLY 10/22/19 Day Driscoll MD   triamcinolone (NASACORT ALLERGY 24HR) 55 MCG/ACT nasal inhaler 2 sprays by Each Nostril route daily 10/4/19   Nat Davis MD   Cholecalciferol (VITAMIN D) 2000 units CAPS capsule Take 1 capsule by mouth daily    Historical Provider, MD   PROAIR  (90 Base) MCG/ACT inhaler INHALE 2 PUFFS BY ORAL INHALATION EVERY 4 TO 6 HOURS AS NEEDED 3/28/19   Eliecer Alvarez MD   BREO ELLIPTA 100-25 MCG/INH AEPB inhaler INHALE 1 PUFF INTO THE LUNGS DAILY AFTER INHALATION THEN GARGLE 2/4/19   Eliecer Alvarez MD   INCRUSE ELLIPTA 62.5 MCG/INH AEPB INHALE 1 PUFF VIA ORAL INHALATION ONCE DAILY 6/5/18   Eliecer Alvarez MD   vitamin B-12 (CYANOCOBALAMIN) 1000 MCG tablet Take 1,000 mcg by mouth daily.     Historical Provider, MD      Infusions:   PRN Meds:       Electronically signed by Sarah Sanz MD on 11/15/2020 at 1:32 PM

## 2020-11-15 NOTE — ED NOTES
Bed: 01TR-01  Expected date:   Expected time:   Means of arrival:   Comments:  [facility] [address], [facility]  11/15/20 2819

## 2020-11-15 NOTE — ED NOTES
A very wet depends is removed from this patient per this nurse and Maya RIVAS. Dagoberto Therese is placed between her legs after explaining the process to the patient, and the suction is turned on. No urine output at this time. The entire 1000 mL NaCl% has infused that was in place per the medics.       Sydnee Roach RN  11/15/20 1012

## 2020-11-15 NOTE — ED NOTES
Face sheet faxed to [facility]  [facility]  11/15/20 2835

## 2020-11-15 NOTE — ED PROVIDER NOTES
Triage Chief Complaint:   Altered Mental Status (left side weakness) and Extremity Weakness (left side)    [place]:  Shiraz Barksdale is a [de-identified] y.o. female he tested positive for Covid on Tuesday at her care facility that presents with left-sided facial droop and left arm weakness from her care facility. She was last seen well at about 8 AM this morning. She tells me she feels unwell but is unable to assist further in the history due to feeling unwell. She is coughing on exam.  Per report patient is normally on 2 L of oxygen via nasal cannula but has been on 6L this last week due to Covid. ROS:   Review of Systems   Unable to perform ROS: Acuity of condition   Neurological: Positive for facial asymmetry, speech difficulty and weakness (left arm). Past Medical History:   Diagnosis Date    Acute DVT of right tibial vein ([address] 07/2017    ER imaging: distal right tibial vein DVT; no anticoag for bleeding/ anemia    Acute exacerbation of chronic obstructive pulmonary disease (COPD) ([address] 12/13/2018    Anemia     Arthritis     Atrial fibrillation (HCC)     CHF (congestive heart failure) (HCC)     Chronic hypoxemic respiratory failure (HCC) 9/9/2020    Chronic pain syndrome     Low back pain; lumbar disc disease    Clostridium difficile colitis 09/20/2017    COPD, severe ([address] 10/24/2017    Depression     Fibromyalgia     Former smoker     Quit 1/2019    Herniated cervical disc 5-1998    Herpes zoster ophthalmicus 3/2012    Hx of blood clots     Hyperlipidemia     Hypertension     Kidney disease     L3 vertebral fracture ([address] 2010    vertebroplasty    Lumbar spinal stenosis 2015    moderately severe by MRI    Migraine     Nocturnal hypoxemia 5/21/2019    Osteoporosis 2010    Fragility Fx L3    Rheumatoid arthritis(714.0) 1976    Dr Jena Spurling    S/P cardiac catheterization 05/2017    patent coronaries;  Takotsubo; humberto Bonilla    Shortness of breath 4/25/2019    Takotsubo syndrome 2017    TMJ dysfunction     Tobacco abuse 2018    Vitamin B12 deficiency 2014    B12 level 199     Past Surgical History:   Procedure Laterality Date    APPENDECTOMY  1966    CARDIAC CATHETERIZATION  2017    CHOLECYSTECTOMY  1966    w/ appendectomy    COLONOSCOPY N/A 3/11/2020    COLONOSCOPY WITH BIOPSY performed by J[facility] at [facility] Right     FIXATION KYPHOPLASTY  2019    L4 kyphoplasty ; Ethlyn Larger    FOOT SURGERY  1986    HYSTERECTOMY      IA COLONOSCOPY FLX DX W/COLLJ SPEC WHEN PFRMD N/A 10/2/2018    COLONOSCOPY DIAGNOSTIC OR SCREENING performed by Sierra Em MD at [address] [place] [address]    TOE SURGERY Left 2013    Deboo;     UPPER GASTROINTESTINAL ENDOSCOPY N/A 3/11/2020    EGD BIOPSY performed by Jocelyn Fisher MD at [place] [address] VERTEBROPLASTY  10/2010    L3    WRIST FUSION Left      Family History   Problem Relation Age of Onset    Heart Disease Father          FROM MI   Unk Fujisawa Emphysema Father     Arthritis Mother     Stroke Mother     Heart Disease Other         family history    Cancer Other         family history -- larynx    Kidney Disease Son      Social History     Socioeconomic History    Marital status:       Spouse name: Carlos Brideggavin Number of children: 2    Years of education: 15    Highest education level: Not on file   Occupational History    Occupation: retired   Social Needs    Financial resource strain: Not on file    Food insecurity     Worry: Not on file     Inability: Not on file   [employer] needs     Medical: Not on file     Non-medical: Not on file   Tobacco Use    Smoking status: Former Smoker     Packs/day: 0.75     Years: 55.00     Pack years: 41.25     Types: Cigarettes     Start date: 1962     Last attempt to quit: 2019     Years since quittin.8    Smokeless tobacco: Never Used   Substance and Sexual Activity    Alcohol use: No     Alcohol/week: 0.0 standard drinks    Drug use: No    Sexual activity: Not Currently   Lifestyle    Physical activity     Days per week: Not on file     Minutes per session: Not on file    Stress: Not on file   Relationships    Social connections     Talks on phone: Not on file     Gets together: Not on file     Attends Muslim service: Not on file     Active member of club or organization: Not on file     Attends meetings of clubs or organizations: Not on file     Relationship status: Not on file    Intimate partner violence     Fear of current or ex partner: Not on file     Emotionally abused: Not on file     Physically abused: Not on file     Forced sexual activity: Not on file   Other Topics Concern    Not on file   Social History Narrative    Not on file     No current facility-administered medications for this encounter. Current Outpatient Medications   Medication Sig Dispense Refill    ferrous sulfate (IRON 325) 325 (65 Fe) MG tablet Take 325 mg by mouth daily (with breakfast)      rivaroxaban (XARELTO) 10 MG TABS tablet Take 10 mg by mouth GIVE ONE TABLE ORALLY ONE TIME A DAY EVERY 2 DAYS FOR ANTICOAGULANT      sertraline (ZOLOFT) 25 MG tablet Take 25 mg by mouth daily      loratadine (CLARITIN) 10 MG capsule Take 10 mg by mouth daily      furosemide (LASIX) 20 MG tablet Take 20 mg by mouth daily      ALPRAZolam (XANAX) 0.5 MG tablet Take 0.5 mg by mouth every 8 hours as needed for Anxiety.  bacitracin-neomycin-polymyxin b-hydrocortisone 1 % ointment Apply topically 2 times daily Apply topically 2 times daily.  oxyCODONE-acetaminophen (PERCOCET) 5-325 MG per tablet Take 1 tablet by mouth every 6 hours as needed for Pain.       busPIRone (BUSPAR) 5 MG tablet Take 5 mg by mouth 3 times daily      aspirin 81 MG chewable tablet Take 1 tablet by mouth daily 30 tablet 0    amiodarone (CORDARONE) 200 MG tablet Take 1 tablet by mouth daily 30 tablet 0    metoprolol succinate (TOPROL XL) 25 MG extended release tablet Take 1 tablet by mouth 2 times daily (Patient taking differently: Take 25 mg by mouth daily ) 30 tablet 0    spironolactone (ALDACTONE) 25 MG tablet Take 1 tablet by mouth daily 30 tablet 0    omeprazole (PRILOSEC) 40 MG delayed release capsule Take 1 capsule by mouth every morning (before breakfast) 30 capsule 0    NONFORMULARY White petrolatum, bacitracin zinc, lotrisone bid to legs      OXYGEN Inhale 2 L/min into the lungs nightly      FORTEO 600 MCG/2.4ML SOLN injection INJECT 0.08MLS INTO THE SKIN DAILY 2.4 mL 5    ipratropium-albuterol (DUONEB) 0.5-2.5 (3) MG/3ML SOLN nebulizer solution Inhale 3 mLs into the lungs every 4 hours (while awake) 360 mL 2    atorvastatin (LIPITOR) 10 MG tablet TAKE 1 TABLET BY MOUTH EVERY EVENING 90 tablet 4    traZODone (DESYREL) 50 MG tablet TAKE 1 TABLET BY MOUTH NIGHTLY 30 tablet 5    triamcinolone (NASACORT ALLERGY 24HR) 55 MCG/ACT nasal inhaler 2 sprays by Each Nostril route daily 1 Inhaler 3    Cholecalciferol (VITAMIN D) 2000 units CAPS capsule Take 1 capsule by mouth daily      PROAIR  (90 Base) MCG/ACT inhaler INHALE 2 PUFFS BY ORAL INHALATION EVERY 4 TO 6 HOURS AS NEEDED 8.5 g 10    BREO ELLIPTA 100-25 MCG/INH AEPB inhaler INHALE 1 PUFF INTO THE LUNGS DAILY AFTER INHALATION THEN GARGLE 1 each 11    INCRUSE ELLIPTA 62.5 MCG/INH AEPB INHALE 1 PUFF VIA ORAL INHALATION ONCE DAILY 1 each 11    vitamin B-12 (CYANOCOBALAMIN) 1000 MCG tablet Take 1,000 mcg by mouth daily. Allergies   Allergen Reactions    Ativan [Lorazepam] Other (See Comments)     Violent, thrashing and combative. She has lacerations and bruising, had to be tied down.      Etanercept Other (See Comments)     No response    Humira [Adalimumab]     Prochlorperazine Edisylate Other (See Comments)     \"Compazine\"   Involuntary Muscle Spasms     Remicade [Infliximab Injection]     Doxycycline Other (See Comments)     Stomach pains       Nursing Notes Reviewed Physical Exam:   ED Triage Vitals   Enc Vitals Group      BP       Pulse       Resp       Temp       Temp src       SpO2       Weight       Height       Head Circumference       Peak Flow       Pain Score       Pain Loc       Pain Edu? Excl. in [address]? BP (!) 105/53   Pulse 78   Temp 98.4 °F (36.9 °C) (Oral)   Resp 16   Ht 5' 7\" (1.702 m)   Wt 135 lb (61.2 kg)   SpO2 97%   BMI 21.14 kg/m²   My pulse ox interpretation is - normal  Physical Exam  Vitals signs and nursing note reviewed. Constitutional:       General: She is not in acute distress. Appearance: Normal appearance. She is not toxic-appearing or diaphoretic. HENT:      Head: Normocephalic and atraumatic. Eyes:      General:         Right eye: No discharge. Left eye: No discharge. Conjunctiva/sclera: Conjunctivae normal.   Cardiovascular:      Rate and Rhythm: Normal rate and regular rhythm. Pulses: Normal pulses. Radial pulses are 2+ on the right side and 2+ on the left side. Pulmonary:      Effort: Pulmonary effort is normal. No respiratory distress. Breath sounds: Rhonchi (diffuse) present. No wheezing or rales. Comments: On 6L via NC    Abdominal:      General: There is no distension. Tenderness: There is no abdominal tenderness. There is no guarding or rebound. Musculoskeletal: Normal range of motion. General: No swelling or tenderness. Skin:     General: Skin is warm and dry. Neurological:      General: No focal deficit present. Mental Status: She is alert. Cranial Nerves: No cranial nerve deficit. Sensory: No sensory deficit. Comments: 5/5 strength in bilateral upper and lower extremities. Strength is equal bilaterally. There is a droop on the left side of her face. Occasional expressive aphasia.    Psychiatric:         Mood and Affect: Mood normal.         Behavior: Behavior normal.         I have reviewed and interpreted all of the currently available lab results from this visit (if applicable):  Results for orders placed or performed during the hospital encounter of 11/15/20   CBC Auto Differential   Result Value Ref Range    WBC 12.9 (H) 4.0 - 10.5 K/CU MM    RBC 4.07 (L) 4.2 - 5.4 M/CU MM    Hemoglobin 10.1 (L) 12.5 - 16.0 GM/DL    Hematocrit 32.7 (L) 37 - 47 %    MCV 80.3 78 - 100 FL    MCH 24.8 (L) 27 - 31 PG    MCHC 30.9 (L) 32.0 - 36.0 %    RDW 16.8 (H) 11.7 - 14.9 %    Platelets 298 405 - 906 K/CU MM    MPV 10.6 7.5 - 11.1 FL    Differential Type AUTOMATED DIFFERENTIAL     Segs Relative 87.8 (H) 36 - 66 %    Lymphocytes % 2.3 (L) 24 - 44 %    Monocytes % 8.6 (H) 0 - 4 %    Eosinophils % 0.0 0 - 3 %    Basophils % 0.2 0 - 1 %    Segs Absolute 11.3 K/CU MM    Lymphocytes Absolute 0.3 K/CU MM    Monocytes Absolute 1.1 K/CU MM    Eosinophils Absolute 0.0 K/CU MM    Basophils Absolute 0.0 K/CU MM    Nucleated RBC % 0.0 %    Total Nucleated RBC 0.0 K/CU MM    Total Immature Neutrophil 0.14 K/CU MM    Immature Neutrophil % 1.1 (H) 0 - 0.43 %   Comprehensive Metabolic Panel w/ Reflex to MG   Result Value Ref Range    Sodium 133 (L) 135 - 145 MMOL/L    Potassium 4.3 3.5 - 5.1 MMOL/L    Chloride 99 99 - 110 mMol/L    CO2 24 21 - 32 MMOL/L    BUN 44 (H) 6 - 23 MG/DL    CREATININE 1.3 (H) 0.6 - 1.1 MG/DL    Glucose 85 70 - 99 MG/DL    Calcium 8.3 8.3 - 10.6 MG/DL    Alb 2.6 (L) 3.4 - 5.0 GM/DL    Total Protein 4.7 (L) 6.4 - 8.2 GM/DL    Total Bilirubin 0.2 0.0 - 1.0 MG/DL    ALT 82 (H) 10 - 40 U/L    AST 54 (H) 15 - 37 IU/L    Alkaline Phosphatase 76 40 - 129 IU/L    GFR Non- 39 (L) >60 mL/min/1.73m2    GFR  48 (L) >60 mL/min/1.73m2    Anion Gap 10 4 - 16   Troponin   Result Value Ref Range    Troponin T 0.010 (H) <0.01 NG/ML   Protime-INR   Result Value Ref Range    Protime 11.3 (L) 11.7 - 14.5 SECONDS    INR 0.93 INDEX   COVID-19    Specimen: Nasopharyngeal Swab   Result Value Ref Range    SARS-CoV-2, NAAT DETECTED (A) POCT Glucose   Result Value Ref Range    Glucose 107 mg/dL    QC OK? ok    POCT Glucose   Result Value Ref Range    POC Glucose 107 (H) 70 - 99 MG/DL   EKG 12 Lead   Result Value Ref Range    Ventricular Rate 81 BPM    Atrial Rate 81 BPM    P-R Interval 188 ms    QRS Duration 148 ms    Q-T Interval 450 ms    QTc Calculation (Bazett) 522 ms    P Axis 53 degrees    R Axis 114 degrees    T Axis 50 degrees    Diagnosis       Normal sinus rhythm  Nonspecific intraventricular block  Abnormal ECG  When compared with ECG of 24-MAR-2020 17:31,  T wave inversion less evident in Inferior leads  T wave inversion less evident in Anterior leads  QT has lengthened        Radiographs (if obtained):  [] The following radiograph was interpreted by myself in the absence of a radiologist:  [x]Radiologist's Report Reviewed:  CTA NECK W CONTRAST   Final Result   1. No flow limiting stenosis of the cervical carotid/vertebral arteries. 2. No focal stenosis of the [id number]-[id number]. 3. There is a 7 mm nodule within the right lung apex, which was not   visualized on the CTA from 02/09/2020. Recommendations as below. RECOMMENDATIONS:   7.0 mm solid pulmonary nodule within the upper lobe detected on incomplete   chest CT. Recommend a non-contrast Chest CT at 6-12 months, then consider an   additional non-contrast Chest CT at 18-24 months. These guidelines do not apply to immunocompromised patients and patients with   cancer. Follow up in patients with significant comorbidities as clinically   warranted. For lung cancer screening, adhere to Lung-RADS guidelines. Reference: Radiology. 2017; 284(1):228-43. CTA HEAD W CONTRAST   Final Result   1. No flow limiting stenosis of the cervical carotid/vertebral arteries. 2. No focal stenosis of the [id number]-[id number]-[id number]. 3. There is a 7 mm nodule within the right lung apex, which was not   visualized on the CTA from 02/09/2020. Recommendations as below.       RECOMMENDATIONS: 7.0 mm solid pulmonary nodule within the upper lobe detected on incomplete   chest CT. Recommend a non-contrast Chest CT at 6-12 months, then consider an   additional non-contrast Chest CT at 18-24 months. These guidelines do not apply to immunocompromised patients and patients with   cancer. Follow up in patients with significant comorbidities as clinically   warranted. For lung cancer screening, adhere to Lung-RADS guidelines. Reference: Radiology. 2017; 284(1):228-43. CT HEAD WO CONTRAST   Final Result   1. No acute intracranial abnormality. 2. Mild global parenchymal volume loss with moderate chronic microvascular   ischemic changes. 3. Trace right mastoid effusion. Findings were discussed with Dr. Gina Grijalva on 11/15/2020 at 12:39 pm.         XR CHEST PORTABLE    (Results Pending)         EKG (if obtained): (All EKG's are interpreted by myself in the absence of a cardiologist)  Normal sinus rhythm with a rate of 81. CA interval 188, , QTc 522. No ST elevations or depressions. Nonspecific T waves. Nonspecific interventricular block. Impression: Abnormal EKG. When compared to previous EKG from 3/24/2020, there are no significant changes. MDM:  Differential diagnoses considered include but are not limited to Ischemic vs hemorrhagic CVA, hypoglycemia, electrolyte abnormality, intracranial mass lesion, Will's paralysis, UTI, delirium. Stroke Alert:    1. Physician evaluation performed at:  1230  2. Stroke alert called at:  Prior to arrival  3. CT results obtained at:  0005  1. Decision was made that TPA is not indicated at:  1325 in consultation with [address] [place] [address], Dr. Vanessa Rodriguez. 5.  Exclusion criteria were reviewed and noted below:     t-PA NOT given due to the following EXCLUSION CRITERIA:  -Outside of the tPA window    POC Glucose unremarkable. Basic labs were obtained and mild leukocytosis and a slight elevation in her creatinine level.   There is a minimal troponin bump.  EKG is not concerning for acute ischemia. CTA resulted. No flow-limiting stenosis. I spoke with Dr. Loren Lakhani, who graciously agreed to admit the patient. Plan of care explained to patient. X-ray shows bilateral patchy areas of airspace opacities consistent with her known diagnosis of Covid. We will admit patient to the hospital for further evaluation likely to include an MRI and treatment of her Covid. Upon my evaluation, this patient had a high probability of imminent or life-threatening deterioration due to acute stroke-like symptoms, which required my direct attention, intervention, and personal management. Critical time is performed by myself in 38 minutes exclusive of separately billable procedures. This time includes bedside physical exams and repeat evaluation, close medical management, discussion with consultants, including [facility] stroke neurologist, review of diagnostic testing results and monitoring for potential decompensation. Interventions were performed as documented above. I did don appropriate PPE (including N95 face mask, protective eye ware/safety glasses, gloves, hair covering, and + isolation gown), as recommended by the health facility/national standard best practice, during my bedside interactions with the patient. The likelihood of other entities in the differential is insufficient to justify any further testing for them. This was explained to the patient. The patient was advised that persistent or worsening symptoms would requirefurther evaluation. Clinical Impression:  1. Facial droop    2. COVID-19          Maulik Stafford MD       Please note that portions of this note may have been complete with a voice recognition program.  Effortswere made to edit the dictations, but occasional words are mis-transcribed.           Maulik Stafford MD  11/15/20 [id number]

## 2020-11-15 NOTE — ACP (ADVANCE CARE PLANNING)
Advance Care Planning     Advance Care Planning Activator (Inpatient)  Conversation Note      Date of ACP Conversation: 11/15/2020    Conversation Conducted with: {Discussion Participants:83904::\"Patient with Decision Making Capacity\"}    ACP Activator: Sterling Garcia    *When Katt Zaidi makes decisions on behalf of the incapacitated patient: Decision Maker is asked to consider and make decisions based on patient values, known preferences, or best interests. Health Care Decision Maker:     Current Designated Health Care Decision Maker:   (If there is a valid [place] named in the [address] [employer]\" box in the ACP activity, but it is not visible above, be sure to open that field and then select the health care decision maker relationship (ie \"primary\") in the blank space to the right of the name.) Validate  this information as still accurate & up-to-date; edit [place] field as needed.)    Note: Assess and validate information in current ACP documents, as indicated. If no Decision Maker listed above or available through scanned documents, then:    If no Authorized Decision Maker has previously been identified, then patient chooses [place]:  \"Who would you like to name as your primary health care decision-maker? \"               Name: ***        Relationship: ***          Phone number: ***  \"Can this person be reached easily? \" {YES/NO:19726}  \"Who would you like to name as your back-up decision maker? \"   Name: ***        Relationship: ***          Phone number: ***  \"Can this person be reached easily? \" {YES/NO:19726}    Note: If the relationship of these Decision-Makers to the patient does NOT follow your state's Next of Kin hierarchy, recommend that patient complete ACP document that meets state-specific requirements to allow them to act on the patient's behalf when appropriate. Care Preferences    Ventilation:   \"If you were in your present state of health and suddenly became very ill and were unable to breathe on your own, what would your preference be about the use of a ventilator (breathing machine) if it were available to you? \"      Would the patient desire the use of ventilator (breathing machine)?: {Yes/No-Ex:189744}    \"If your health worsens and it becomes clear that your chance of recovery is unlikely, what would your preference be about the use of a ventilator (breathing machine) if it were available to you? \"     Would the patient desire the use of ventilator (breathing machine)?: {YES/NO:03780}      Resuscitation  \"CPR works best to restart the heart when there is a sudden event, like a heart attack, in someone who is otherwise healthy. Unfortunately, CPR does not typically restart the heart for people who have serious health conditions or who are very sick. \"    \"In the event your heart stopped as a result of an underlying serious health condition, would you want attempts to be made to restart your heart (answer \"yes\" for attempt to resuscitate) or would you prefer a natural death (answer \"no\" for do not attempt to resuscitate)? \" {Yes/No-Ex:543995}      NOTE: If the patient has a valid advance directive AND now provides care preference(s) that are inconsistent with that prior directive, advise the patient to consider either: creating a new advance directive that complies with state-specific requirements; or, if that is not possible, orally revoking that prior directive in accordance with state-specific requirements, which must be documented in the EHR. [] Yes   [] No   Educated Patient / Mary [place] Quoin regarding differences between Advance Directives and portable DNR orders.     Length of ACP Conversation in minutes:      Conversation Outcomes:  [] ACP discussion completed  [] Existing advance directive reviewed with patient; no changes to patient's previously recorded wishes  [] New Advance Directive completed  [] Portable Do Not Rescitate prepared for Provider review and signature  [] POLST/POST/MOLST/MOST prepared for Provider review and signature      Follow-up plan:    [] Schedule follow-up conversation to continue planning  [] Referred individual to Provider for additional questions/concerns   [] Advised patient/agent/surrogate to review completed ACP document and update if needed with changes in condition, patient preferences or care setting    [] This note routed to one or more involved healthcare providers

## 2020-11-15 NOTE — ED NOTES
The son of the patient, George Palma, at [phone] spoke with the patient concerning getting her permission for the IV viral meds and plasma. They decided together that she does want all treatment.       Ewelina Zaman, ERNIE  [birth year] [address] [facility]  11/15/20 8638

## 2020-11-16 ENCOUNTER — APPOINTMENT (OUTPATIENT)
Dept: MRI IMAGING | Age: 80
DRG: 871 | End: 2020-11-16
Payer: MEDICARE

## 2020-11-16 LAB
LV EF: 53 %
LVEF MODALITY: NORMAL
STREP PNEUMONIAE ANTIGEN: NORMAL

## 2020-11-16 PROCEDURE — 2580000003 HC RX 258: Performed by: INTERNAL MEDICINE

## 2020-11-16 PROCEDURE — 6360000002 HC RX W HCPCS: Performed by: PHYSICIAN ASSISTANT

## 2020-11-16 PROCEDURE — 2700000000 HC OXYGEN THERAPY PER DAY

## 2020-11-16 PROCEDURE — 2580000003 HC RX 258: Performed by: HOSPITALIST

## 2020-11-16 PROCEDURE — 2500000003 HC RX 250 WO HCPCS: Performed by: INTERNAL MEDICINE

## 2020-11-16 PROCEDURE — 2060000000 HC ICU INTERMEDIATE R&B

## 2020-11-16 PROCEDURE — 6370000000 HC RX 637 (ALT 250 FOR IP): Performed by: INTERNAL MEDICINE

## 2020-11-16 PROCEDURE — XW033E5 INTRODUCTION OF REMDESIVIR ANTI-INFECTIVE INTO PERIPHERAL VEIN, PERCUTANEOUS APPROACH, NEW TECHNOLOGY GROUP 5: ICD-10-PCS | Performed by: INTERNAL MEDICINE

## 2020-11-16 PROCEDURE — 93010 ELECTROCARDIOGRAM REPORT: CPT | Performed by: INTERNAL MEDICINE

## 2020-11-16 PROCEDURE — 94640 AIRWAY INHALATION TREATMENT: CPT

## 2020-11-16 PROCEDURE — 83721 ASSAY OF BLOOD LIPOPROTEIN: CPT

## 2020-11-16 PROCEDURE — 93308 TTE F-UP OR LMTD: CPT

## 2020-11-16 PROCEDURE — 6370000000 HC RX 637 (ALT 250 FOR IP): Performed by: HOSPITALIST

## 2020-11-16 PROCEDURE — 6360000002 HC RX W HCPCS: Performed by: INTERNAL MEDICINE

## 2020-11-16 PROCEDURE — 92610 EVALUATE SWALLOWING FUNCTION: CPT

## 2020-11-16 PROCEDURE — 6360000002 HC RX W HCPCS: Performed by: HOSPITALIST

## 2020-11-16 PROCEDURE — 94761 N-INVAS EAR/PLS OXIMETRY MLT: CPT

## 2020-11-16 RX ORDER — HALOPERIDOL 5 MG/ML
5 INJECTION INTRAMUSCULAR ONCE
Status: COMPLETED | OUTPATIENT
Start: 2020-11-16 | End: 2020-11-16

## 2020-11-16 RX ORDER — 0.9 % SODIUM CHLORIDE 0.9 %
30 INTRAVENOUS SOLUTION INTRAVENOUS PRN
Status: DISCONTINUED | OUTPATIENT
Start: 2020-11-16 | End: 2020-11-23 | Stop reason: HOSPADM

## 2020-11-16 RX ORDER — ZINC SULFATE 50(220)MG
50 CAPSULE ORAL DAILY
Status: DISCONTINUED | OUTPATIENT
Start: 2020-11-16 | End: 2020-11-23 | Stop reason: HOSPADM

## 2020-11-16 RX ADMIN — BUSPIRONE HYDROCHLORIDE 5 MG: 5 TABLET ORAL at 21:09

## 2020-11-16 RX ADMIN — PANTOPRAZOLE SODIUM 40 MG: 40 TABLET, DELAYED RELEASE ORAL at 12:19

## 2020-11-16 RX ADMIN — AMIODARONE HYDROCHLORIDE 200 MG: 200 TABLET ORAL at 12:20

## 2020-11-16 RX ADMIN — TRAZODONE HYDROCHLORIDE 50 MG: 50 TABLET ORAL at 21:09

## 2020-11-16 RX ADMIN — Medication 2000 UNITS: at 12:19

## 2020-11-16 RX ADMIN — ALPRAZOLAM 0.5 MG: 0.5 TABLET ORAL at 04:52

## 2020-11-16 RX ADMIN — DEXAMETHASONE 6 MG: 4 TABLET ORAL at 12:19

## 2020-11-16 RX ADMIN — SODIUM CHLORIDE, PRESERVATIVE FREE 10 ML: 5 INJECTION INTRAVENOUS at 12:21

## 2020-11-16 RX ADMIN — ENOXAPARIN SODIUM 30 MG: 30 INJECTION SUBCUTANEOUS at 12:20

## 2020-11-16 RX ADMIN — CEFTRIAXONE 1 G: 1 INJECTION, POWDER, FOR SOLUTION INTRAMUSCULAR; INTRAVENOUS at 14:20

## 2020-11-16 RX ADMIN — ALBUTEROL SULFATE 2 PUFF: 90 AEROSOL, METERED RESPIRATORY (INHALATION) at 11:44

## 2020-11-16 RX ADMIN — CYANOCOBALAMIN TAB 1000 MCG 1000 MCG: 1000 TAB at 12:20

## 2020-11-16 RX ADMIN — ZINC SULFATE 220 MG (50 MG) CAPSULE 50 MG: CAPSULE at 12:19

## 2020-11-16 RX ADMIN — ROSUVASTATIN CALCIUM 40 MG: 20 TABLET, COATED ORAL at 21:09

## 2020-11-16 RX ADMIN — HALOPERIDOL LACTATE 5 MG: 5 INJECTION, SOLUTION INTRAMUSCULAR at 00:41

## 2020-11-16 RX ADMIN — METOPROLOL SUCCINATE 25 MG: 25 TABLET, EXTENDED RELEASE ORAL at 21:09

## 2020-11-16 RX ADMIN — SODIUM CHLORIDE: 9 INJECTION, SOLUTION INTRAVENOUS at 18:06

## 2020-11-16 RX ADMIN — ASPIRIN 81 MG: 81 TABLET, FILM COATED ORAL at 12:20

## 2020-11-16 RX ADMIN — REMDESIVIR 200 MG: 5 INJECTION INTRAVENOUS at 12:02

## 2020-11-16 RX ADMIN — AMLODIPINE BESYLATE 5 MG: 5 TABLET ORAL at 12:19

## 2020-11-16 RX ADMIN — ENOXAPARIN SODIUM 30 MG: 30 INJECTION SUBCUTANEOUS at 21:09

## 2020-11-16 RX ADMIN — METOPROLOL SUCCINATE 25 MG: 25 TABLET, EXTENDED RELEASE ORAL at 12:20

## 2020-11-16 RX ADMIN — BUSPIRONE HYDROCHLORIDE 5 MG: 5 TABLET ORAL at 12:20

## 2020-11-16 RX ADMIN — ALPRAZOLAM 0.5 MG: 0.5 TABLET ORAL at 14:29

## 2020-11-16 RX ADMIN — ACETAMINOPHEN 650 MG: 325 TABLET ORAL at 04:52

## 2020-11-16 RX ADMIN — SODIUM CHLORIDE, PRESERVATIVE FREE 10 ML: 5 INJECTION INTRAVENOUS at 21:09

## 2020-11-16 ASSESSMENT — PAIN SCALES - GENERAL
PAINLEVEL_OUTOF10: 5
PAINLEVEL_OUTOF10: 0
PAINLEVEL_OUTOF10: 0

## 2020-11-16 NOTE — CARE COORDINATION
Chart reviewed/call to Pt room. Pt did not answer the phone. Per chart Pt is a resident at [facility]. Call to Paula Sanz at [facility]. Pt is typically from the AL at [facility]. Pt was brought over to the skilled side when Pt tested positive for Covid. Pt may be able to return to skilled if recommended by therapy. White board placed asking for recommendations.

## 2020-11-16 NOTE — CONSULTS
[address], [facility]                                  CONSULTATION    PATIENT NAME: Jessa Castle                     :        1940  MED REC NO:   [id number]                          ROOM:         ACCOUNT NO:   [de-identified]                           ADMIT DATE: 11/15/2020  PROVIDER:     Skylar Lewis MD    CONSULT DATE:  2020    INDICATION:  Atrial fibrillation, TIA. HISTORY OF PRESENT ILLNESS:  An 79-year-old female patient who comes to  the hospital from a nursing home. She is COVID positive. She is in  [facility] floor. She comes in with having altered mental status, left-sided  weakness, and extremity present. She was tested for COVID and was  positive. She was found to have left-sided facial droop and left arm  weakness present. She repeats herself. She is awake and alert,  answering the questions. She is able to follow the commands also, but  she repeats her words. I think she was ruled out for acute stroke. An  MRI is pending at this time. The patient's echo is pending. She had echo done back in 2020 and  echo showed LV function was preserved, moderate aortic regurgitation  noted. Small mobile calcification attached to the posterior mitral  valve chordae. The patient has a history of having anemia in the past, hyperkalemia  also, renal injury, and her EF has actually improved. She had Takotsubo  in the past with low EF and had improved. She had a heart  catheterization in 2017, left main was patent. LAD, circ, and ramus,  she had mild disease present. Apical ballooning was noted, Takotsubo. She has also had paroxysmal atrial fibrillation noted with a 2:1 block.     PAST MEDICAL HISTORY:  The patient sees Dr. Yara Andrade for her COPD, history  of having heart failure with apical ballooning that improved, history of  heart catheterization in 2017, found to have nonobstructive coronary  artery disease present, hypertension, hyperlipidemia, fibromyalgia,  atrial flutter, 2:1 conduction noted, and she was on anticoagulation for  that. PAST SURGICAL HISTORY:  Gallbladder surgery, hysterectomy done, and  appendectomy. SOCIAL HISTORY:  She does not smoke, does not drink. 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 ALLERGIES:  HUMIRA, REMICADE, LORAZEPAM, _____, COMPAZINE, and  DOXYCYCLINE. MEDICATIONS AT HOME:  She was on iron sulfate, Zoloft, Claritin, Lasix,  Xanax, aspirin, amiodarone, Toprol 25 mg b.i.d., and Aldactone. PHYSICAL EXAMINATION:  GENERAL:  The patient is awake and alert, answering questions. VITAL SIGNS:  Temperature is afebrile, pulse is 76, blood pressure is  140/126. Repeat blood pressure is 159/70. HEENT:  Head is normocephalic and atraumatic. Pupils are equal and  reactive. CHEST:  Equal expansion. LUNGS:  Clear to auscultation. No wheezing or rhonchi. HEART:  Regular rhythm. ABDOMEN:  Soft and nontender. Bowel sounds are present. No  hepatomegaly or guarding appreciated. EXTREMITIES:  No cyanosis or clubbing noted. NEUROLOGIC:  Cranial nerves II through XII are grossly intact. Able to  move all extremities. LABORATORY DATA:  Creatinine is 1.3. Troponin is elevated, not positive. C-reactive protein is 205. LFTs are slightly elevated. CBC is normal.   PT/INR is normal.  D-dimer is 4677. IMAGING:  Chest x-ray, bilateral airspace disease, possible pneumonia. CT of the head and neck was performed with contrast and no flow-limiting  stenosis of the cervical, carotid, or vertebral arteries. No focal  stenosis noted. CT of the head was also negative. MRI is pending. IMPRESSION AND PLAN:  This is an 80-year-old female patient who comes  with metastatic change present and possible stroke. She was not a  candidate for tPA, so she is undergoing MRI. She has history of having  Takotsubo and she is in sinus rhythm at this time.   I will repeat an  echo on her and will make further recommendations based on hospital  course. I think we will wait for her workup to come back and the MRI  also, and we will make further recommendations based on hospital course. Overall prognosis is guarded. She does have a history of paroxysmal  atrial fibrillation and she had been on anticoagulation, we will review  that again.         Stephanie Brewer MD    D: 11/16/2020 7:51:47       T: 11/16/2020 10:33:49     [email or web address]  Job#: [id number]     Doc#: [id number]    CC:

## 2020-11-16 NOTE — CONSULTS
[address] [place] [address], [address] [facility]                                  CONSULTATION    PATIENT NAME: Kenneth Ramirez                     :        1940  MED REC NO:   [id number]                          ROOM:         ACCOUNT NO:   [de-identified]                           ADMIT DATE: 11/15/2020  PROVIDER:     Reinaldo Porter MD    CONSULT DATE:  2020    HISTORY OF PRESENT ILLNESS:  The patient is an 25-year-old lady with  history of COPD, arthritis, atrial fibrillation, rheumatoid arthritis,  who is a resident of [facility], who was admitted through the  emergency room with complaints of increasing shortness of breath, cough,  and facial droop. The patient was recently diagnosed with COVID  infection in the nursing home. In the emergency room, she was requiring  higher oxygen than baseline. Her facial droop improved and she was  subsequently admitted to the [facility] unit. Her chest x-ray showed  bilateral patchy airspace opacities. Her rapid COVID-19 test was  positive. PAST MEDICAL HISTORY:  Significant for COPD, congestive heart failure,  atrial fibrillation, and arthritis. PAST SURGICAL HISTORY:  Remarkable for vertebroplasty, hysterectomy,  kyphoplasty, colonoscopy, cholecystectomy, cardiac catheterization, and  appendectomy. FAMILY HISTORY:  Reveals that her mother had arthritis and stroke. Father had heart disease and COPD. SOCIAL HISTORY:  Reveals that she quit smoking in 2019, but used to  smoke three-fourth of a pack per day for 55 years prior to that. No  history of alcohol or drug abuse. MEDICATIONS:  Reviewed. ALLERGIES:  She is allergic to ATIVAN, ETANERCEPT, HUMIRA,  PROCHLORPERAZINE, REMICADE, and DOXYCYCLINE. REVIEW OF SYSTEMS:  A 10- to 14-point review of systems was reviewed and  is negative except for what is mentioned in the history of present  illness.     PHYSICAL EXAMINATION:  GENERAL:  The patient is awake and responsive, in no acute respiratory  distress. VITAL SIGNS:  Her blood pressure is 109/73 mmHg, pulse of 76 per minute,  and respiratory rate of 19 per minute. She is afebrile. Her saturation  is 93% on 4 liters nasal cannula. HEENT:  Essentially unremarkable. NECK:  There is no JVD. No lymphadenopathy. The neck is supple. LUNG:  Revealed diminished breath sounds, basilar crackles. HEART:  Showed normal S1 and S2. There was no S3 or S4 noted. ABDOMEN:  Benign. There is no evidence of any organomegaly. The bowel  sounds are present. NEUROLOGIC:  Reveals that she is awake and responsive, and no  lateralizing signs. LABORATORY DATA:  Her electrolytes showed sodium of 133, potassium 4.3,  chloride 99, carbon dioxide 24, BUN 44, creatinine of 1.3. Her chest  x-ray showed bilateral patchy areas of airspace opacities with scarring  and atelectasis, mainly in the lower lobes. Her D-dimer was 4677. Her  procalcitonin level was 1.87. Her Legionella urinary antigen was  negative. Her Strep pneumo antigen was negative. Her CBC showed a  white count of 12.9, hemoglobin 10.1, and hematocrit 32.7. IMPRESSION:  1. Acute-on-chronic respiratory failure secondary to bilateral  pneumonia secondary to COVID infection. 2.  Bilateral pneumonia secondary to COVID infection. 3.  Facial droop, which has resolved. PLAN:  1. Albuterol two puffs four times a day. 2.  Spiriva once a day. 3.  We will add zinc supplement. 4.  Remdesivir. 5.  Decadron 6 mg once a day. 6.  Continue present antibiotic therapy. 7.  Sputum for culture and sensitivity. 8.  Check magnesium and phosphorus. 9.  Lovenox 30 mg twice a day. 10.  As per orders.         Crispin Henry MD    D: 11/16/2020 11:02:36       T: 11/16/2020 13:16:05     /[email or web address]  Job#: [id number]     Doc#: [id number]    CC:

## 2020-11-16 NOTE — PROGRESS NOTES
Pharmacy Consult for Remdesivir Initiation per Dr. Keyona Hoffman for Use:   Covid (+) - Yes   Requiring supplemental oxygen - Yes   Requiring high-flow oxygen (>15L/min), non-invasive (BiPAP), or invasive mechanical ventilation - No   Use of 1 pressor or less - Yes   eGFR >30mL/min - Yes   ALT <5x ULN - Yes    Recommendations are consistent with the [address] criteria for use published on the Hub with consideration from multiple clinical trials. https://hub.[facility]. org/sites/Quality/COVID-19/Clinical%20Care/Pharmaceutical/BS%20COVID%2019%20Treatment%20Summary%20-%20Hub%20Edition. pdf    Based on the above criteria, the patient is a candidate for remdesivir therapy. Remdesivir 200 mg on day 1 followed by 100 mg daily on days 2-5. The following test will also be ordered:   BMP x5 days   CBC x5 days   LFT x5 days    RENAL LAB EVALUATION  Estimated Creatinine Clearance: 34 mL/min (A) (based on SCr of 1.3 mg/dL (H)).   Recent Labs     11/15/20  1235   BUN 44*   CREATININE 1.3*       LIVER FUNCTION LAB EVALUATION  Recent Labs     11/15/20  1235   AST 54*   ALT 82*   ALKPHOS 76   BILITOT 0.2   INR 0.93     PLATELETS  Platelets   Date Value Ref Range Status   11/15/2020 221 140 - 440 K/CU MM Final   03/27/2020 374 140 - 440 K/CU MM Final   03/26/2020 396 140 - 440 K/CU MM Final   03/25/2020 432 140 - 440 K/CU MM Final   03/25/2020 465 (H) 140 - 440 K/CU MM Final       Thank you for the consult,  Lynda Martinez, PharmD, [facility]

## 2020-11-16 NOTE — PROGRESS NOTES
Speech Language Pathology  Facility/Department: [facility] ICU STEPDOWN   CLINICAL BEDSIDE SWALLOW EVALUATION    NAME: Heidi Toledo  : 1940  MRN: [id number]    IMPRESSIONS: Heidi Toledo was referred for a bedside swallow evaluation after being admitted to [facility] with acute on chronic respiratory failure, COVID PNA, facial droop concerning for stroke. MRI pending. Medical hx includes afib, CHF, CKD, COPD, RA, fibromyalgia, HTN. No known history of dysphagia prior to admission. Pt seen for evaluation seated upright in bed, alert, restless, cooperative given cues/redirections. She did not follow directions to participate in oral mechanism examination. She was presented with PO trials of ice chips, thin liquids via tsp/cup/straw, puree, and regular solids. Oral stage mildly impaired with slow mastication and reduced bolus formation d/t frequent pauses for respiration; she also exhibited oral holding of all consistencies and incomplete clearance with regular solids. Suspect mild pharyngeal impairment characterized by inconsistent coordination of respiration and swallow, resulting in immediate cough with large straw sips of thin liquids x1. Swallow initiation and laryngeal elevation appear adequate. Recommend initiation of dysphagia II diet/thin liquids with aspiration precautions - upright positioning, small bites/drinks, slow rate of intake. Anticipate diet advancement as respiratory status improves. SLP will follow. Results/recommendations d/w RN.    ADMISSION DATE: 11/15/2020  ADMITTING DIAGNOSIS: has Rheumatoid Arthritis; Hypertension; Hyperlipidemia; Fibromyalgia; Migraine; Chronic pain syndrome; Depression; Osteoporosis; Vitamin B12 deficiency; Lumbar spinal stenosis; Panic attack; Takotsubo syndrome; Blood loss anemia; COPD, severe ([address] Acute exacerbation of chronic obstructive pulmonary disease (COPD) ([address] Former smoker; Shortness of breath; Back pain;  Intractable low back pain; Chronic fatigue; Pneumonia of right upper lobe due to infectious organism; Wide-complex tachycardia ([address] Gram-negative pneumonia ([address] Acute systolic heart failure ([address] Symptomatic anemia; Chronic hypoxemic respiratory failure ([address] and Acute hypoxemic respiratory failure due to COVID-19 [facility]) on their problem list.  ONSET DATE: this admission    Recent Chest Xray/CT of Chest: see chart    Date of Eval: 11/16/2020  Evaluating Therapist: Brittny Schafer    Current Diet level:  Current Diet : NPO  Current Liquid Diet : NPO      Primary Complaint  Patient Complaint: does not state, restless    Pain:  Pain Assessment  Pain Assessment: 0-10  Pain Level: 0  Patient's Stated Pain Goal: No pain    Reason for Referral  Maureen Galvez was referred for a bedside swallow evaluation to assess the efficiency of her swallow function, identify signs and symptoms of aspiration and make recommendations regarding safe dietary consistencies, effective compensatory strategies, and safe eating environment. Impression  Dysphagia Diagnosis: Mild oral stage dysphagia;Mild pharyngeal stage dysphagia  Dysphagia Outcome Severity Scale: Level 5: Mild dysphagia- Distant supervision. May need one diet consistency restricted     Treatment Plan  Requires SLP Intervention: Yes  Duration/Frequency of Treatment: 1-2/week for LOS          Recommended Diet and Intervention  Diet Solids Recommendation: Dysphagia Minced and Moist (Dysphagia II)  Liquid Consistency Recommendation: Thin  Recommended Form of Meds: Meds in puree     Therapeutic Interventions: Diet tolerance monitoring;Patient/Family education; Therapeutic PO trials with SLP    Compensatory Swallowing Strategies  Compensatory Swallowing Strategies: Upright as possible for all oral intake; Total feed;Small bites/sips;Eat/Feed slowly    Treatment/Goals  Short-term Goals  Timeframe for Short-term Goals: length of admission  Goal 1: Pt will tolerate dysphagia II diet/thin liquids with adequate oral manipulation/clearance and no s/s aspiration. Goal 2: Pt will tolerate PO trials of advanced textures with adequate oral manipulation/clearance and no s/s aspiration. Goal 3: Pt will demonstrate airway protection strategies 9/10 trials given min cues. Goal 4: Pt/caregivers will indicate understanding of all recommendations. General  Chart Reviewed: Yes  Behavior/Cognition: Alert;Confused; Requires cueing;Distractible  Respiratory Status: O2 via nasual cannula  O2 Device: Nasal cannula  Communication Observation: (cognitive communication deficits)  Dentition: Adequate  Patient Positioning: Upright in bed  Baseline Vocal Quality: Normal  Prior Dysphagia History: none known prior to admission  Consistencies Administered: Reg solid; Dysphagia Pureed (Dysphagia I); Thin - cup; Thin - straw           Vision/Hearing  Hearing  Hearing: Within functional limits    Oral Motor Deficits  Oral/Motor  Oral Motor: Exceptions to [facility]    Oral Phase Dysfunction  Oral Phase  Oral Phase: Exceptions     Indicators of Pharyngeal Phase Dysfunction   Pharyngeal Phase  Pharyngeal Phase: Exceptions    Prognosis  Prognosis  Prognosis for safe diet advancement: good  Barriers/Prognosis Comment: anticipate diet advancement with improvements in respiratory status  Individuals consulted  Consulted and agree with results and recommendations: RN    Education  Patient Education: recommendations/plan  Patient Education Response: No evidence of learning  Safety Devices in place: Yes  Type of devices:  All fall risk precautions in place       Therapy Time  SLP Individual Minutes  Time In: 1330  Time Out: [address] 73  Minutes: [address] [facility]-SLP  11/16/2020 2:10 PM

## 2020-11-16 NOTE — PROGRESS NOTES
and charge nurse at bedside to see pt confused and not redirectable. Pt unable to understand safety precautions or dangers to self. Pt attempting multi times to get out of bed and leave. Pt pulling wires off and pulling at lines. Restraints placed on pt.

## 2020-11-16 NOTE — PROGRESS NOTES
Hospitalist Progress Note      Name:  Roge Quintanilla /Age/Sex: 1940  ([de-identified] y.o. female)   MRN & CSN:  [id number] & [id number] Admission Date/Time: 11/15/2020 12:14 PM   Location:  -A PCP: Miriam Wall, [address] Day: 2    History of Present Illness:     Chief Complaint: Acute hypoxemic respiratory failure due to COVID-19 [facility])  Roge Quintanilla is a [de-identified] y.o.  female  who presents with facial droop, cough and dyspnea     The patient seen and examined at bed side. She is currently awake, alert. Denies any chest pain. ROS 10 system negative otherwise. Objective:   No intake or output data in the 24 hours ending 20 1042   Vitals:   Vitals:    20 0816   BP: 109/73   Pulse: 90   Resp: 19   Temp: 97.7 °F (36.5 °C)   SpO2: 93%     Physical Exam:   General Appearance: alert, awake and oriented  Cardiovascular: normal rate, regular rhythm, normal S1 and S2  Pulmonary/Chest: decreased breath sounds bilaterally  Abdomen: soft, non-tender, non-distended, normal bowel sounds, no masses   Extremities: no cyanosis, clubbing or edema, pulse   Skin: warm and dry, no rash or erythema  Head: normocephalic and atraumatic  Eyes: pupils equal, round, and reactive to light  Neck: supple and non-tender without mass, no thyromegaly   Musculoskeletal: normal range of motion, no joint swelling, deformity or tenderness  Neurological: alert, awake, oriented.  NFD at this time    Medications:   Medications:    cefTRIAXone (ROCEPHIN) IV  1 g Intravenous Q24H    remdesivir IVPB  200 mg Intravenous Once    Followed by   Alia Beatty ON 2020] remdesivir IVPB  100 mg Intravenous Daily    sodium chloride flush  10 mL Intravenous 2 times per day    aspirin  81 mg Oral Daily    Or    aspirin  300 mg Rectal Daily    rosuvastatin  40 mg Oral Nightly    dexamethasone  6 mg Oral Daily    albuterol sulfate HFA  2 puff Inhalation 4x daily    tiotropium  2 puff Inhalation Daily    enoxaparin  30 mg Subcutaneous BID    traZODone  50 mg Oral Nightly    vitamin B-12  1,000 mcg Oral Daily    pantoprazole  40 mg Oral QAM AC    metoprolol succinate  25 mg Oral BID    vitamin D  2 capsule Oral Daily    busPIRone  5 mg Oral TID    amiodarone  200 mg Oral Daily    amLODIPine  5 mg Oral Daily      Infusions:    sodium chloride 50 mL/hr at 11/15/20 2329     PRN Meds: sodium chloride, 30 mL, PRN  sodium chloride flush, 10 mL, PRN  acetaminophen, 650 mg, Q6H PRN    Or  acetaminophen, 650 mg, Q6H PRN  polyethylene glycol, 17 g, Daily PRN  labetalol, 10 mg, Q10 Min PRN  oxyCODONE-acetaminophen, 1 tablet, Q6H PRN  ALPRAZolam, 0.5 mg, Q8H PRN            Pertinent New Labs & Imaging Studies     CBC with Differential:    Lab Results   Component Value Date    WBC 12.9 11/15/2020    RBC 4.07 11/15/2020    HGB 10.1 11/15/2020    HCT 32.7 11/15/2020     11/15/2020    MCV 80.3 11/15/2020    MCH 24.8 11/15/2020    MCHC 30.9 11/15/2020    RDW 16.8 11/15/2020    SEGSPCT 87.8 11/15/2020    BANDSPCT 6 05/30/2017    LYMPHOPCT 2.3 11/15/2020    MONOPCT 8.6 11/15/2020    EOSPCT 2.0 05/07/2012    BASOPCT 0.2 11/15/2020    MONOSABS 1.1 11/15/2020    LYMPHSABS 0.3 11/15/2020    EOSABS 0.0 11/15/2020    BASOSABS 0.0 11/15/2020    DIFFTYPE AUTOMATED DIFFERENTIAL 11/15/2020     CMP:    Lab Results   Component Value Date     11/15/2020    K 4.3 11/15/2020    CL 99 11/15/2020    CO2 24 11/15/2020    BUN 44 11/15/2020    CREATININE 1.3 11/15/2020    GFRAA 48 11/15/2020    GFRAA >60 03/17/2012    AGRATIO 1.8 04/01/2014    LABGLOM 39 11/15/2020    LABGLOM 91 04/01/2014    GLUCOSE 85 11/15/2020    PROT 4.7 11/15/2020    PROT 7.6 03/04/2013    LABALBU 2.6 11/15/2020    CALCIUM 8.3 11/15/2020    BILITOT 0.2 11/15/2020    ALKPHOS 76 11/15/2020    AST 54 11/15/2020    ALT 82 11/15/2020     Ct Head Wo Contrast    Result Date: 11/15/2020  EXAMINATION: CT OF THE HEAD WITHOUT CONTRAST  11/15/2020 12:21 pm TECHNIQUE: CT of the head was performed without the administration of intravenous contrast. Dose modulation, iterative reconstruction, and/or weight based adjustment of the mA/kV was utilized to reduce the radiation dose to as low as reasonably achievable. COMPARISON: 06/06/2017. HISTORY: ORDERING SYSTEM PROVIDED HISTORY: stroke alert TECHNOLOGIST PROVIDED HISTORY: Has a \"code stroke\" or \"stroke alert\" been called? ->Yes Reason for exam:->stroke alert Reason for Exam: stroke alert, AMS Acuity: Acute Type of Exam: Initial FINDINGS: Motion degrades images limiting evaluation. BRAIN/VENTRICLES: There is no acute intracranial hemorrhage, mass effect or midline shift. No abnormal extra-axial fluid collection. The gray-white differentiation is maintained without evidence of an acute infarct. There is no evidence of hydrocephalus. There are areas of hypoattenuation in the periventricular and subcortical white matter, which is nonspecific, but may represent chronic microvasvular ischemic change. There is prominence of the ventricles and sulci due to global parenchymal volume loss. There appears to be a chronic lacunar infarct within the left thalamus. ORBITS: The visualized portion of the orbits demonstrate no acute abnormality. SINUSES: The visualized paranasal sinuses demonstrate no acute abnormality. Trace right mastoid effusion. SOFT TISSUES/SKULL:  No acute abnormality of the visualized skull or soft tissues. 1. No acute intracranial abnormality. 2. Mild global parenchymal volume loss with moderate chronic microvascular ischemic changes. 3. Trace right mastoid effusion. Findings were discussed with Dr. Clifton Encinas on 11/15/2020 at 12:39 pm.     Xr Chest Portable    Result Date: 11/15/2020  EXAMINATION: ONE XRAY VIEW OF THE CHEST 11/15/2020 12:35 pm COMPARISON: 03/24/2020.  HISTORY: ORDERING SYSTEM PROVIDED HISTORY: other, stroke alert TECHNOLOGIST PROVIDED HISTORY: Reason for exam:->other, stroke alert FINDINGS: The cardiac silhouette appears magnified. The aorta is uncoiled and atherosclerotic. There are bilateral new areas of airspace opacities, most notably affecting the left midlung field with scarring/atelectatic changes seen in the bilateral lower lung fields. Redemonstration of deformity and increased density seen along the anterolateral aspect of the right 5th rib is redemonstrated. There is redemonstration of dextroscoliosis of the thoracic spine. Bilateral patchy areas of airspace opacities with scarring/atelectatic changes seen at the bilateral lower lung fields, which may reflect multifocal pneumonia in the correct clinical setting. Follow-up imaging resolution is recommended. Cta Neck W Contrast    Result Date: 11/15/2020  EXAMINATION: CTA OF THE HEAD WITH CONTRAST; CTA OF THE NECK 11/15/2020 12:41 pm; 11/15/2020 12:40 pm: TECHNIQUE: CTA of the head/brain was performed with the administration of intravenous contrast. Multiplanar reformatted images are provided for review. MIP images are provided for review. Dose modulation, iterative reconstruction, and/or weight based adjustment of the mA/kV was utilized to reduce the radiation dose to as low as reasonably achievable.; CTA of the neck was performed with the administration of intravenous contrast. Multiplanar reformatted images are provided for review. MIP images are provided for review. Stenosis of the internal carotid arteries measured using NASCET criteria. Dose modulation, iterative reconstruction, and/or weight based adjustment of the mA/kV was utilized to reduce the radiation dose to as low as reasonably achievable. COMPARISON: None. HISTORY: ORDERING SYSTEM PROVIDED HISTORY: stroke alert TECHNOLOGIST PROVIDED HISTORY: Has a \"code stroke\" or \"stroke alert\" been called? ->Yes Reason for exam:->stroke alert Reason for Exam: stroke alert, AMS Acuity: Acute Type of Exam: Initial FINDINGS: CTA NECK: AORTIC ARCH/ARCH VESSELS: No focal stenosis of the visualized innominate or subclavian arteries. The left subclavian artery is partially obscured due to in flowing contrast. CAROTID ARTERIES: No focal stenosis of the common carotid arteries. There is a retropharyngeal course of the common carotid arteries. Atherosclerosis of the proximal internal carotid arteries bilaterally. No evidence of a flow limiting stenosis of the internal carotid arteries by NASCET criteria. VERTEBRAL ARTERIES: No flow limiting stenosis is seen of the vertebral arteries. SOFT TISSUES: There is a 7 mm nodule within the right lung apex. BONES: No acute osseous abnormality is seen. Severe degenerative changes of the cervical spine are noted. CTA HEAD: ANTERIOR CIRCULATION: No significant stenosis of the intracranial internal carotid, anterior cerebral, or middle cerebral arteries. No aneurysm. POSTERIOR CIRCULATION: No significant stenosis of the vertebral, basilar, or posterior cerebral arteries. No aneurysm. OTHER: No dural venous sinus thrombosis on this non-dedicated study. BRAIN: No mass effect or midline shift. 1. No flow limiting stenosis of the cervical carotid/vertebral arteries. 2. No focal stenosis of the [id number]-my-Udvpog. 3. There is a 7 mm nodule within the right lung apex, which was not visualized on the CTA from 02/09/2020. Recommendations as below. RECOMMENDATIONS: 7.0 mm solid pulmonary nodule within the upper lobe detected on incomplete chest CT. Recommend a non-contrast Chest CT at 6-12 months, then consider an additional non-contrast Chest CT at 18-24 months. These guidelines do not apply to immunocompromised patients and patients with cancer. Follow up in patients with significant comorbidities as clinically warranted. For lung cancer screening, adhere to Lung-RADS guidelines. Reference: Radiology. 2017; 284(1):228-43.      Cta Head W Contrast    Result Date: 11/15/2020  EXAMINATION: CTA OF THE HEAD WITH CONTRAST; CTA OF THE NECK 11/15/2020 12:41 pm; 11/15/2020 12:40 pm: TECHNIQUE: CTA of arthritis  Depression  Fibromyalgia  Chronic respiratory failure on home oxygen  Chronic systolic CHF    Diet Diet NPO Effective Now   DVT Prophylaxis [x] Lovenox, []  Heparin, [] SCDs, [] Ambulation   GI Prophylaxis [x] PPI,  [] H2 Blocker,  [] Carafate,  [] Diet/Tube Feeds   Code Status Full Code   Disposition Patient requires continued admission due to SOB   MDM [] Low, [x] Moderate,[]  High     Electronically signed by Kasandra Cartagena MD on 11/16/2020 at 10:42 AM

## 2020-11-17 ENCOUNTER — APPOINTMENT (OUTPATIENT)
Dept: GENERAL RADIOLOGY | Age: 80
DRG: 871 | End: 2020-11-17
Payer: MEDICARE

## 2020-11-17 PROBLEM — R74.01 TRANSAMINITIS: Status: ACTIVE | Noted: 2020-11-17

## 2020-11-17 LAB
ALBUMIN SERPL-MCNC: 2.8 GM/DL (ref 3.4–5)
ALBUMIN SERPL-MCNC: 2.8 GM/DL (ref 3.4–5)
ALP BLD-CCNC: 88 IU/L (ref 40–128)
ALP BLD-CCNC: 88 IU/L (ref 40–129)
ALT SERPL-CCNC: 69 U/L (ref 10–40)
ALT SERPL-CCNC: 69 U/L (ref 10–40)
AMMONIA: 32 UMOL/L (ref 11–51)
ANION GAP SERPL CALCULATED.3IONS-SCNC: 12 MMOL/L (ref 4–16)
ANION GAP SERPL CALCULATED.3IONS-SCNC: 15 MMOL/L (ref 4–16)
ANISOCYTOSIS: ABNORMAL
AST SERPL-CCNC: 75 IU/L (ref 15–37)
AST SERPL-CCNC: 75 IU/L (ref 15–37)
BACTERIA: NEGATIVE /HPF
BANDED NEUTROPHILS ABSOLUTE COUNT: 3.02 K/CU MM
BANDED NEUTROPHILS RELATIVE PERCENT: 15 % (ref 5–11)
BASE EXCESS: 3 (ref 0–2.4)
BILIRUB SERPL-MCNC: 0.3 MG/DL (ref 0–1)
BILIRUB SERPL-MCNC: 0.3 MG/DL (ref 0–1)
BILIRUBIN DIRECT: 0.2 MG/DL (ref 0–0.3)
BILIRUBIN URINE: NEGATIVE MG/DL
BILIRUBIN, INDIRECT: 0.1 MG/DL (ref 0–0.7)
BLOOD, URINE: ABNORMAL
BUN BLDV-MCNC: 47 MG/DL (ref 6–23)
BUN BLDV-MCNC: 53 MG/DL (ref 6–23)
CALCIUM SERPL-MCNC: 8.2 MG/DL (ref 8.3–10.6)
CALCIUM SERPL-MCNC: 8.3 MG/DL (ref 8.3–10.6)
CARBON MONOXIDE, BLOOD: 1.3 % (ref 0–5)
CHLORIDE BLD-SCNC: 103 MMOL/L (ref 99–110)
CHLORIDE BLD-SCNC: 109 MMOL/L (ref 99–110)
CHLORIDE URINE RANDOM: 127 MMOL/L (ref 43–210)
CLARITY: CLEAR
CO2 CONTENT: 22.9 MMOL/L (ref 19–24)
CO2: 21 MMOL/L (ref 21–32)
CO2: 23 MMOL/L (ref 21–32)
COLOR: ABNORMAL
COMMENT: ABNORMAL
CREAT SERPL-MCNC: 0.9 MG/DL (ref 0.6–1.1)
CREAT SERPL-MCNC: 1 MG/DL (ref 0.6–1.1)
D DIMER: 4894 NG/ML(DDU)
DIFFERENTIAL TYPE: ABNORMAL
GFR AFRICAN AMERICAN: >60 ML/MIN/1.73M2
GFR AFRICAN AMERICAN: >60 ML/MIN/1.73M2
GFR NON-AFRICAN AMERICAN: 53 ML/MIN/1.73M2
GFR NON-AFRICAN AMERICAN: >60 ML/MIN/1.73M2
GLUCOSE BLD-MCNC: 238 MG/DL (ref 70–99)
GLUCOSE BLD-MCNC: 98 MG/DL (ref 70–99)
GLUCOSE, URINE: NEGATIVE MG/DL
HCO3 ARTERIAL: 21.8 MMOL/L (ref 18–23)
HCT VFR BLD CALC: 35 % (ref 37–47)
HEMOGLOBIN: 10.5 GM/DL (ref 12.5–16)
KETONES, URINE: NEGATIVE MG/DL
LEUKOCYTE ESTERASE, URINE: NEGATIVE
LYMPHOCYTES ABSOLUTE: 0.6 K/CU MM
LYMPHOCYTES RELATIVE PERCENT: 3 % (ref 24–44)
MAGNESIUM: 2 MG/DL (ref 1.8–2.4)
MCH RBC QN AUTO: 23.6 PG (ref 27–31)
MCHC RBC AUTO-ENTMCNC: 30 % (ref 32–36)
MCV RBC AUTO: 78.7 FL (ref 78–100)
METAMYELOCYTES ABSOLUTE COUNT: 0.4 K/CU MM
METAMYELOCYTES PERCENT: 2 %
METHEMOGLOBIN ARTERIAL: 1.2 %
MONOCYTES ABSOLUTE: 1.4 K/CU MM
MONOCYTES RELATIVE PERCENT: 7 % (ref 0–4)
MUCUS: ABNORMAL HPF
NITRITE URINE, QUANTITATIVE: NEGATIVE
O2 SATURATION: 95.6 % (ref 96–97)
PCO2 ARTERIAL: 36 MMHG (ref 32–45)
PDW BLD-RTO: 17 % (ref 11.7–14.9)
PH BLOOD: 7.39 (ref 7.34–7.45)
PH, URINE: 5 (ref 5–8)
PHOSPHORUS: 2.5 MG/DL (ref 2.5–4.9)
PLATELET # BLD: 335 K/CU MM (ref 140–440)
PMV BLD AUTO: 10.3 FL (ref 7.5–11.1)
PO2 ARTERIAL: 93 MMHG (ref 75–100)
POTASSIUM SERPL-SCNC: 4.1 MMOL/L (ref 3.5–5.1)
POTASSIUM SERPL-SCNC: 4.4 MMOL/L (ref 3.5–5.1)
POTASSIUM, UR: 13.8 MMOL/L (ref 22–119)
PROCALCITONIN: 1.58
PROTEIN UA: 30 MG/DL
RBC # BLD: 4.45 M/CU MM (ref 4.2–5.4)
RBC URINE: 55 /HPF (ref 0–6)
SEGMENTED NEUTROPHILS ABSOLUTE COUNT: 14.7 K/CU MM
SEGMENTED NEUTROPHILS RELATIVE PERCENT: 73 % (ref 36–66)
SODIUM BLD-SCNC: 141 MMOL/L (ref 135–145)
SODIUM BLD-SCNC: 142 MMOL/L (ref 135–145)
SODIUM URINE: 127 MMOL/L (ref 35–167)
SPECIFIC GRAVITY UA: 1.01 (ref 1–1.03)
SQUAMOUS EPITHELIAL: <1 /HPF
TOTAL PROTEIN: 4.8 GM/DL (ref 6.4–8.2)
TOTAL PROTEIN: 4.8 GM/DL (ref 6.4–8.2)
TOXIC GRANULATION: PRESENT
TRICHOMONAS: ABNORMAL /HPF
TSH HIGH SENSITIVITY: 0.55 UIU/ML (ref 0.27–4.2)
UROBILINOGEN, URINE: NORMAL MG/DL (ref 0.2–1)
VITAMIN B-12: >2000 PG/ML (ref 211–911)
WBC # BLD: 20.1 K/CU MM (ref 4–10.5)
WBC UA: 1 /HPF (ref 0–5)

## 2020-11-17 PROCEDURE — 82436 ASSAY OF URINE CHLORIDE: CPT

## 2020-11-17 PROCEDURE — 6370000000 HC RX 637 (ALT 250 FOR IP): Performed by: INTERNAL MEDICINE

## 2020-11-17 PROCEDURE — 71045 X-RAY EXAM CHEST 1 VIEW: CPT

## 2020-11-17 PROCEDURE — 2060000000 HC ICU INTERMEDIATE R&B

## 2020-11-17 PROCEDURE — 76937 US GUIDE VASCULAR ACCESS: CPT

## 2020-11-17 PROCEDURE — 2700000000 HC OXYGEN THERAPY PER DAY

## 2020-11-17 PROCEDURE — 82248 BILIRUBIN DIRECT: CPT

## 2020-11-17 PROCEDURE — 6360000002 HC RX W HCPCS: Performed by: INTERNAL MEDICINE

## 2020-11-17 PROCEDURE — 85007 BL SMEAR W/DIFF WBC COUNT: CPT

## 2020-11-17 PROCEDURE — 84100 ASSAY OF PHOSPHORUS: CPT

## 2020-11-17 PROCEDURE — 82607 VITAMIN B-12: CPT

## 2020-11-17 PROCEDURE — 6370000000 HC RX 637 (ALT 250 FOR IP): Performed by: HOSPITALIST

## 2020-11-17 PROCEDURE — 84133 ASSAY OF URINE POTASSIUM: CPT

## 2020-11-17 PROCEDURE — 85027 COMPLETE CBC AUTOMATED: CPT

## 2020-11-17 PROCEDURE — 2580000003 HC RX 258: Performed by: INTERNAL MEDICINE

## 2020-11-17 PROCEDURE — 36569 INSJ PICC 5 YR+ W/O IMAGING: CPT

## 2020-11-17 PROCEDURE — 87081 CULTURE SCREEN ONLY: CPT

## 2020-11-17 PROCEDURE — 80048 BASIC METABOLIC PNL TOTAL CA: CPT

## 2020-11-17 PROCEDURE — 84300 ASSAY OF URINE SODIUM: CPT

## 2020-11-17 PROCEDURE — 82803 BLOOD GASES ANY COMBINATION: CPT

## 2020-11-17 PROCEDURE — 2580000003 HC RX 258: Performed by: HOSPITALIST

## 2020-11-17 PROCEDURE — 6360000002 HC RX W HCPCS: Performed by: HOSPITALIST

## 2020-11-17 PROCEDURE — 94761 N-INVAS EAR/PLS OXIMETRY MLT: CPT

## 2020-11-17 PROCEDURE — 2500000003 HC RX 250 WO HCPCS: Performed by: INTERNAL MEDICINE

## 2020-11-17 PROCEDURE — 84145 PROCALCITONIN (PCT): CPT

## 2020-11-17 PROCEDURE — C1751 CATH, INF, PER/CENT/MIDLINE: HCPCS

## 2020-11-17 PROCEDURE — 80053 COMPREHEN METABOLIC PANEL: CPT

## 2020-11-17 PROCEDURE — 36600 WITHDRAWAL OF ARTERIAL BLOOD: CPT

## 2020-11-17 PROCEDURE — 82140 ASSAY OF AMMONIA: CPT

## 2020-11-17 PROCEDURE — 84443 ASSAY THYROID STIM HORMONE: CPT

## 2020-11-17 PROCEDURE — 85379 FIBRIN DEGRADATION QUANT: CPT

## 2020-11-17 PROCEDURE — 94640 AIRWAY INHALATION TREATMENT: CPT

## 2020-11-17 PROCEDURE — 87040 BLOOD CULTURE FOR BACTERIA: CPT

## 2020-11-17 PROCEDURE — 99223 1ST HOSP IP/OBS HIGH 75: CPT | Performed by: INTERNAL MEDICINE

## 2020-11-17 PROCEDURE — 83735 ASSAY OF MAGNESIUM: CPT

## 2020-11-17 PROCEDURE — 81001 URINALYSIS AUTO W/SCOPE: CPT

## 2020-11-17 RX ORDER — FENTANYL CITRATE 50 UG/ML
12.5 INJECTION, SOLUTION INTRAMUSCULAR; INTRAVENOUS
Status: DISCONTINUED | OUTPATIENT
Start: 2020-11-17 | End: 2020-11-23

## 2020-11-17 RX ORDER — FUROSEMIDE 10 MG/ML
40 INJECTION INTRAMUSCULAR; INTRAVENOUS 3 TIMES DAILY
Status: COMPLETED | OUTPATIENT
Start: 2020-11-17 | End: 2020-11-18

## 2020-11-17 RX ORDER — DEXAMETHASONE SODIUM PHOSPHATE 4 MG/ML
6 INJECTION, SOLUTION INTRA-ARTICULAR; INTRALESIONAL; INTRAMUSCULAR; INTRAVENOUS; SOFT TISSUE DAILY
Status: DISCONTINUED | OUTPATIENT
Start: 2020-11-17 | End: 2020-11-17

## 2020-11-17 RX ORDER — FUROSEMIDE 10 MG/ML
40 INJECTION INTRAMUSCULAR; INTRAVENOUS ONCE
Status: COMPLETED | OUTPATIENT
Start: 2020-11-17 | End: 2020-11-17

## 2020-11-17 RX ORDER — 0.9 % SODIUM CHLORIDE 0.9 %
20 INTRAVENOUS SOLUTION INTRAVENOUS ONCE
Status: COMPLETED | OUTPATIENT
Start: 2020-11-17 | End: 2020-11-17

## 2020-11-17 RX ORDER — DIAZEPAM 5 MG/ML
2.5 INJECTION, SOLUTION INTRAMUSCULAR; INTRAVENOUS EVERY 4 HOURS PRN
Status: DISCONTINUED | OUTPATIENT
Start: 2020-11-17 | End: 2020-11-23 | Stop reason: HOSPADM

## 2020-11-17 RX ORDER — GUAIFENESIN 100 MG/5ML
200 SOLUTION ORAL EVERY 6 HOURS
Status: DISCONTINUED | OUTPATIENT
Start: 2020-11-17 | End: 2020-11-23 | Stop reason: HOSPADM

## 2020-11-17 RX ORDER — LIDOCAINE HYDROCHLORIDE 10 MG/ML
5 INJECTION, SOLUTION EPIDURAL; INFILTRATION; INTRACAUDAL; PERINEURAL ONCE
Status: COMPLETED | OUTPATIENT
Start: 2020-11-17 | End: 2020-11-17

## 2020-11-17 RX ORDER — ACETAMINOPHEN 650 MG/1
650 SUPPOSITORY RECTAL ONCE
Status: DISCONTINUED | OUTPATIENT
Start: 2020-11-17 | End: 2020-11-23 | Stop reason: HOSPADM

## 2020-11-17 RX ORDER — SODIUM CHLORIDE 0.9 % (FLUSH) 0.9 %
10 SYRINGE (ML) INJECTION EVERY 12 HOURS SCHEDULED
Status: DISCONTINUED | OUTPATIENT
Start: 2020-11-17 | End: 2020-11-17 | Stop reason: SDUPTHER

## 2020-11-17 RX ORDER — CLOPIDOGREL BISULFATE 75 MG/1
75 TABLET ORAL DAILY
Status: DISCONTINUED | OUTPATIENT
Start: 2020-11-17 | End: 2020-11-20

## 2020-11-17 RX ORDER — METHYLPREDNISOLONE SODIUM SUCCINATE 40 MG/ML
40 INJECTION, POWDER, LYOPHILIZED, FOR SOLUTION INTRAMUSCULAR; INTRAVENOUS EVERY 12 HOURS
Status: DISCONTINUED | OUTPATIENT
Start: 2020-11-17 | End: 2020-11-20

## 2020-11-17 RX ORDER — METOPROLOL TARTRATE 5 MG/5ML
5 INJECTION INTRAVENOUS EVERY 8 HOURS
Status: DISCONTINUED | OUTPATIENT
Start: 2020-11-17 | End: 2020-11-18

## 2020-11-17 RX ORDER — SODIUM CHLORIDE 0.9 % (FLUSH) 0.9 %
10 SYRINGE (ML) INJECTION PRN
Status: DISCONTINUED | OUTPATIENT
Start: 2020-11-17 | End: 2020-11-17 | Stop reason: SDUPTHER

## 2020-11-17 RX ADMIN — SODIUM CHLORIDE, PRESERVATIVE FREE 10 ML: 5 INJECTION INTRAVENOUS at 10:19

## 2020-11-17 RX ADMIN — METRONIDAZOLE 500 MG: 500 INJECTION, SOLUTION INTRAVENOUS at 20:22

## 2020-11-17 RX ADMIN — METOPROLOL TARTRATE 5 MG: 5 INJECTION INTRAVENOUS at 10:19

## 2020-11-17 RX ADMIN — METHYLPREDNISOLONE SODIUM SUCCINATE 40 MG: 40 INJECTION, POWDER, FOR SOLUTION INTRAMUSCULAR; INTRAVENOUS at 20:19

## 2020-11-17 RX ADMIN — SODIUM CHLORIDE 20 ML: 9 INJECTION, SOLUTION INTRAVENOUS at 17:19

## 2020-11-17 RX ADMIN — ENOXAPARIN SODIUM 30 MG: 30 INJECTION SUBCUTANEOUS at 20:19

## 2020-11-17 RX ADMIN — ROSUVASTATIN CALCIUM 40 MG: 20 TABLET, COATED ORAL at 20:19

## 2020-11-17 RX ADMIN — ENOXAPARIN SODIUM 30 MG: 30 INJECTION SUBCUTANEOUS at 10:20

## 2020-11-17 RX ADMIN — FUROSEMIDE 40 MG: 10 INJECTION, SOLUTION INTRAMUSCULAR; INTRAVENOUS at 10:18

## 2020-11-17 RX ADMIN — ASPIRIN 300 MG: 600 SUPPOSITORY RECTAL at 10:19

## 2020-11-17 RX ADMIN — FUROSEMIDE 40 MG: 10 INJECTION, SOLUTION INTRAMUSCULAR; INTRAVENOUS at 17:19

## 2020-11-17 RX ADMIN — METOPROLOL TARTRATE 5 MG: 5 INJECTION INTRAVENOUS at 19:13

## 2020-11-17 RX ADMIN — TRAZODONE HYDROCHLORIDE 50 MG: 50 TABLET ORAL at 20:22

## 2020-11-17 RX ADMIN — FUROSEMIDE 40 MG: 10 INJECTION, SOLUTION INTRAMUSCULAR; INTRAVENOUS at 20:19

## 2020-11-17 RX ADMIN — LIDOCAINE HYDROCHLORIDE ANHYDROUS 5 ML: 10 INJECTION, SOLUTION INFILTRATION at 15:31

## 2020-11-17 RX ADMIN — DEXAMETHASONE SODIUM PHOSPHATE 6 MG: 4 INJECTION, SOLUTION INTRAMUSCULAR; INTRAVENOUS at 10:19

## 2020-11-17 RX ADMIN — ACETAMINOPHEN 650 MG: 650 SUPPOSITORY RECTAL at 10:33

## 2020-11-17 RX ADMIN — VANCOMYCIN HYDROCHLORIDE 1500 MG: 5 INJECTION, POWDER, LYOPHILIZED, FOR SOLUTION INTRAVENOUS at 17:20

## 2020-11-17 RX ADMIN — GUAIFENESIN 200 MG: 100 SOLUTION ORAL at 22:34

## 2020-11-17 RX ADMIN — METRONIDAZOLE 500 MG: 500 INJECTION, SOLUTION INTRAVENOUS at 14:39

## 2020-11-17 RX ADMIN — FUROSEMIDE 40 MG: 10 INJECTION, SOLUTION INTRAMUSCULAR; INTRAVENOUS at 19:13

## 2020-11-17 RX ADMIN — CEFTRIAXONE 1 G: 1 INJECTION, POWDER, FOR SOLUTION INTRAMUSCULAR; INTRAVENOUS at 10:18

## 2020-11-17 RX ADMIN — ALPRAZOLAM 0.5 MG: 0.5 TABLET ORAL at 06:45

## 2020-11-17 RX ADMIN — ALPRAZOLAM 0.5 MG: 0.5 TABLET ORAL at 20:19

## 2020-11-17 RX ADMIN — SODIUM CHLORIDE, PRESERVATIVE FREE 10 ML: 5 INJECTION INTRAVENOUS at 20:22

## 2020-11-17 RX ADMIN — REMDESIVIR 100 MG: 5 INJECTION INTRAVENOUS at 17:19

## 2020-11-17 RX ADMIN — BUSPIRONE HYDROCHLORIDE 5 MG: 5 TABLET ORAL at 20:19

## 2020-11-17 RX ADMIN — CEFEPIME HYDROCHLORIDE 2 G: 2 INJECTION, POWDER, FOR SOLUTION INTRAVENOUS at 13:06

## 2020-11-17 RX ADMIN — PANTOPRAZOLE SODIUM 40 MG: 40 TABLET, DELAYED RELEASE ORAL at 05:50

## 2020-11-17 RX ADMIN — ALBUTEROL SULFATE 2 PUFF: 90 AEROSOL, METERED RESPIRATORY (INHALATION) at 23:47

## 2020-11-17 ASSESSMENT — PAIN SCALES - GENERAL
PAINLEVEL_OUTOF10: 0

## 2020-11-17 NOTE — PROGRESS NOTES
Called nurse about mri -pt still unable to hold still for mri = sliding in bed and unable to ly flat for length of mri- pt on vapotherm -mri on hold

## 2020-11-17 NOTE — PROGRESS NOTES
Pt found very lethargic and extremely confused. Knows name an birth date but nothing else. Continues to repeat name and BD as answer to any question asked. SPO2 90%On 5L increased to 7L 92%. Wll draw ABG now as per physician.

## 2020-11-17 NOTE — CONSULTS
Infectious Disease Consult Note  2020   Patient Name: Shiraz Barksdale : 1940   Impression  COVID-19 pneumonia with acute hypoxic respiratory failure:   Encephalopathy  Date of symptom onset:unknown  Date of positive COVID-19 PCR: 11/10/2020  Exposure: [facility]  Oxygen supplementation: HFNO  Bacterial infection: probably as pct is elevated. CAD  COPD   Multi-morbidity: per PMHx  Plan:   Therapeutic: continue vancomycin, cefepime, metronidazole. D/c remdesivir. D/c dexamethasone. Start Solumedrol.  Diagnostic: trend CRP and pct.  F/u test: Blood culture  Thank you for allowing me to consult in the care of this patient.  ------------------------  REASON FOR CONSULT: Infective syndrome   Requested by: Dr. Myra Jimenez  History obtained from chart review, ERNIE Reeves and family member as the patient is confused    HPI:Patient is a [de-identified] y.o.  female nursing home [facility] resident with atrial fibrillation, COPD, previous cigarette smoking (quit in 2019), coronary artery disease, rheumatoid arthritis, COPD who was admitted 11/15/2020 for further evaluation and management of worsening weakness, left facial droop and left arm weakness. Apparently the patient tested positive for SARS-CoV-2 on 11/10/2020. This admission was prompted by left arm weakness. A clinical diagnosis of possible CVA was made. The patient was not given TPA as she was outside the window. Unfortunately, respiratory status continued to worsen. Oxygen needs have increased. Patient spiked a fever on . Antibiotics were changed to vancomycin and cefepime. The patient is receiving remdesivir. ? Infectious diseases service was consulted to evaluate the pt, and recommend further investigative and therapeutic measures.   Review and summary of old records:  ROS: Other systems reviewed Including eyes, ENT, respiratory, cardiovascular, GI, , dermatologic, neurologic, psych, hem/lymphatic, musculoskeletal and endocrine were negative other than what is mentioned above.    Unable to obtain; pt on vapotherm  Patient Active Problem List    Diagnosis Date Noted    Acute hypoxemic respiratory failure due to COVID-19 [facility]) 11/15/2020    Chronic hypoxemic respiratory failure ([address] 09/09/2020    Symptomatic anemia 03/24/2020    Wide-complex tachycardia ([address] 02/10/2020    Gram-negative pneumonia ([address] 28/36/8478    Acute systolic heart failure ([address] 02/10/2020    Pneumonia of right upper lobe due to infectious organism     Chronic fatigue 07/08/2019    Intractable low back pain 06/11/2019    Back pain 06/09/2019    Shortness of breath 04/25/2019    Former smoker     Acute exacerbation of chronic obstructive pulmonary disease (COPD) ([address] 12/13/2018    COPD, severe ([address] 10/24/2017    Blood loss anemia 08/01/2017    Takotsubo syndrome 05/01/2017    Panic attack 11/18/2016    Lumbar spinal stenosis 01/01/2015    Vitamin B12 deficiency 12/01/2014    Osteoporosis 03/16/2012    Depression     Migraine 10/06/2010    Rheumatoid Arthritis     Hypertension     Hyperlipidemia     Fibromyalgia     Chronic pain syndrome      Past Medical History:   Diagnosis Date    Acute DVT of right tibial vein ([address] 07/2017    ER imaging: distal right tibial vein DVT; no anticoag for bleeding/ anemia    Acute exacerbation of chronic obstructive pulmonary disease (COPD) ([address] 12/13/2018    Anemia     Arthritis     Atrial fibrillation (HCC)     CHF (congestive heart failure) (HCC)     Chronic hypoxemic respiratory failure (HCC) 9/9/2020    Chronic pain syndrome     Low back pain; lumbar disc disease    Clostridium difficile colitis 09/20/2017    COPD, severe ([address] 10/24/2017    Depression     Fibromyalgia     Former smoker     Quit 1/2019    Herniated cervical disc 5-1998    Herpes zoster ophthalmicus 3/2012    Hx of blood clots     Hyperlipidemia     Hypertension     Kidney disease     L3 vertebral fracture [facility])     vertebroplasty    Lumbar spinal stenosis     moderately severe by MRI    Migraine     Nocturnal hypoxemia 2019    Osteoporosis 2010    Fragility Fx L3    Rheumatoid arthritis(714.0)     Dr Vignesh White S/P cardiac catheterization 2017    patent coronaries; Takotsubo; humberto Douglasmed    Shortness of breath 2019    Takotsubo syndrome 2017    TMJ dysfunction     Tobacco abuse 2018    Vitamin B12 deficiency 2014    B12 level 199      Past Surgical History:   Procedure Laterality Date    APPENDECTOMY  1966    CARDIAC CATHETERIZATION  2017    CHOLECYSTECTOMY  1966    w/ appendectomy    COLONOSCOPY N/A 3/11/2020    COLONOSCOPY WITH BIOPSY performed [facility] at [facility]     FIXATION KYPHOPLASTY  2019    L4 kyphoplasty ; [facility]    FOOT SURGERY  1986    HYSTERECTOMY      CA COLONOSCOPY FLX DX W/COLLJ SPEC WHEN PFRMD N/A 10/2/2018    COLONOSCOPY DIAGNOSTIC OR SCREENING performed by Jovany Daniel MD at [address] [place] [address] TOE SURGERY Left 2013    [facility];     UPPER GASTROINTESTINAL ENDOSCOPY N/A 3/11/2020    EGD BIOPSY performed by Arely Alvarez MD at [place] [address] VERTEBROPLASTY  10/2010    L3    WRIST FUSION Left       Family History   Problem Relation Age of Onset    Heart Disease Father          FROM MI   Ann Stabs Emphysema Father     Arthritis Mother     Stroke Mother     Heart Disease Other         family history    Cancer Other         family history -- larynx    Kidney Disease Son       Infectious disease related family history - not contibutory.    SOCIAL HISTORY  Social History     Tobacco Use    Smoking status: Former Smoker     Packs/day: 0.75     Years: 55.00     Pack years: 41.25     Types: Cigarettes     Start date: 1962     Last attempt to quit: 2019     Years since quittin.9    Smokeless tobacco: Never Used   Substance Use Topics    Alcohol use: No     Alcohol/week: 0.0 standard drinks       Born:  [address] Occupation:   No recent travel of significance.  No recent unusual exposures.  NO pets   ? ALLERGIES  Allergies   Allergen Reactions    Ativan [Lorazepam] Other (See Comments)     Violent, thrashing and combative. She has lacerations and bruising, had to be tied down.  Etanercept Other (See Comments)     No response    Humira [Adalimumab]     Prochlorperazine Edisylate Other (See Comments)     \"Compazine\"   Involuntary Muscle Spasms     Remicade [Infliximab Injection]     Doxycycline Other (See Comments)     Stomach pains      MEDICATIONS  Reviewed and are per the chart/EMR. IMMUNIZATION HISTORY  Immunization History   Administered Date(s) Administered    Influenza Vaccine, unspecified formulation 10/11/2013    Influenza Virus Vaccine 10/15/2014, 10/14/2015, 10/12/2016, 10/16/2017    Influenza, High Dose (Fluzone 65 yrs and older) 09/17/2018    Influenza, Triv, inactivated, subunit, adjuvanted, IM (Fluad 65 yrs and older) 10/04/2019    Pneumococcal Conjugate 13-valent ([id number]) 12/17/2014    Pneumococcal Polysaccharide ([id number]) 09/26/2008    Td, unspecified formulation 08/04/2016    Tdap (Boostrix, Adacel) 08/04/2016     ? Antibiotics:   ceftriaxone 11/16-17  Vancomycin 11/17  Cefepime 11/17-  ?  -------------------------------------------------------------------------------------------------------------------    Vital Signs:  Vitals:    11/17/20 1825   BP:    Pulse: 83   Resp: (!) 31   Temp:    SpO2:          Exam:    VS: noted; wt 67.9 kg  Gen: confused  Skin: no stigmata of endocarditis  Wounds: C/D/I  HEMT: AT/NC Oropharynx pink, moist, and without lesions or exudates; dentition in good state of repair  Eyes: PERRLA, EOMI, conjunctiva pink, sclera anicteric. Neck: Supple. Trachea midline. No LAD. Chest: no distress and CTA. Good air movement. Heart: RRR and no MRG.    Abd: soft, non-distended, no tenderness, no hepatomegaly. Normoactive bowel sounds. Ext: no clubbing, cyanosis, or edema  Catheter Site: without erythema or tenderness  Neuro: Awake but confused. ?Diagnostic Studies: reviewed  ? Persistent extensive bilateral pulmonary opacities could represent multifocal bacterial or atypical pneumonia   ? I have examined this patient and available medical records on this date and have made the above observations, conclusions and recommendations.   Electronically signed by: Electronically signed by Kelly Winter MD on 11/17/2020 at 6:50 PM

## 2020-11-17 NOTE — PROGRESS NOTES
Temperature 102.7. Pt given tylenol suppository and placed on a cooling blanket at this time. Dr. Leidy Griffith on unit and notified.

## 2020-11-17 NOTE — PROGRESS NOTES
Hospitalist Progress Note      Name:  Silvio Kinney /Age/Sex: 1940  ([de-identified] y.o. female)   MRN & CSN:  [id number] & [id number] Admission Date/Time: 11/15/2020 12:14 PM   Location:  -A PCP: Fanny Sandoval, [address] Day: 3      Assessment and Plan:       Summary for -patient was in critical condition this morning, with respiratory rate going up into the 40s on several occasions. Temperature went up to 102.7. Additionally, patient is on Percocet 7.5 mg 4 times a day and Xanax 0.5 mg 3 times a day -and has been somewhat unable to take them since admission -spoke to pulmonary, this may have contributed to tachypnea. By the evening respiratory rate was in the 30s. She does have underlying severe COPD and is at high risk.  -Discussed with pulmonary today, and decided to give her plasma    Silvio Kinney is a [de-identified] y.o.  female  who presents with Acute hypoxemic respiratory failure due to COVID-19 [facility])     Acute on chronic resp failure  Covid pneumonia  Decadron  monitor pre calcitonin, CRP, D-Dimer, CBC, CMP  Pharmacy consult to dose and start Remdesivir   Oxygen support     Facial droop   Neg CT and CTA for acute pathology  Patient deemed as not a TPA candidate  MRI pending -could not tolerated so far  Neurology consult    Paroxysmal A.  Fib   H/o Chronic Cardiomyopathy    ECHO 19 LVEF 55% and down to 20% on 2/10/20   Repeat ECHO with EF 50-55% with moderate MR  ASA, Held Xarelto for GI bleed   Continue Toprol XL  Cardiology recommendations appreciated     H/o of GI bleed   PPI, monitor H&H     CKD stage III   IV Lasix ordered , appreciate consult with Dr. Marilyn Zambrano  Avoid nephrotoxins, monitor  Spironolactone on hold      COPD with no exacerbation -continue with inhalers     Lung nodule  OP FU with Pulm     Hypertension  Rheumatoid arthritis  Depression  Fibromyalgia  Chronic respiratory failure on home oxygen  Chronic systolic CHF      Echo noted    Lung nodule on CTA noted    D-dimer 4894    Severe COPD per Dr. Dariusz Bello 3 years ago      Rheum note 2019: [place] off of all her meds. She self d/stella Arava and Humira. Had significant anemia requiring blood transfusion. So cannot use Arava. Had pneumonia so will not use Humira. Can continue prednisone from pulmonologist. Kena Parisi on quitting tobacco.       Lovenox dose is 30 mg twice a day    Critical care time today was 1 hour. Patient in critical condition today, with a potentially life-threatening condition. Examined patient several times. Reviewed her records in detail. Reviewed outside rheumatology records and prior pulmonary records. Reviewed nursing home medication sheet. Spoke to both of her sons. Spoke to the respiratory therapist, so the charge nurse, and to the bedside nurse. Spoke to pulmonology. Reviewed labs and imaging. Remains a full code. Her son, who is her power of . Discussed with nephrology as well. Subjective:     Patient was extremely tachypneic today. When I walked in the room she did verbally acknowledge my presence, but she was much more confused than yesterday per nursing. The patient today could not carry on a conversation due to her critical condition. ED:  Shiraz Barksdale is a [de-identified] y.o. female he tested positive for Covid on Tuesday at her care facility that presents with left-sided facial droop and left arm weakness from her care facility. She was last seen well at about 8 AM this morning. She tells me she feels unwell but is unable to assist further in the history due to feeling unwell. She is coughing on exam.  Per report patient is normally on 2 L of oxygen via nasal cannula but has been on 6L this last week due to Covid. Kelli:  Pt is a resident at [facility]. Call to [facility] at [facility]. Pt is typically from the AL at [facility]. Pt was brought over to the skilled side when Pt tested positive for Covid.  Pt may be able to return to skilled if recommended by therapy. White board placed asking for recommendations       Objective: Intake/Output Summary (Last 24 hours) at 11/17/2020 0850  Last data filed at 11/17/2020 0405  Gross per 24 hour   Intake 550 ml   Output 400 ml   Net 150 ml      Vitals:   Vitals:    11/17/20 0808   BP:    Pulse: 112   Resp: (!) 48   Temp:    SpO2: 93%     Physical Exam:      Physical Exam  Constitutional:       General: She is in acute distress. Appearance: She is ill-appearing. Cardiovascular:      Rate and Rhythm: Regular rhythm. Tachycardia present. Pulmonary:      Comments: She has loud bilateral rhonchi. When I saw her several times her respirations were extremely rapid. At times she was using her accessory muscles maximally. Abdominal:      General: There is no distension. Tenderness: There is no abdominal tenderness. There is no guarding or rebound. Neurological:      Comments: She is quite confused today. She was able to give 1 word answers when I saw her.            Medications:   Medications:    cefTRIAXone (ROCEPHIN) IV  1 g Intravenous Q24H    remdesivir IVPB  100 mg Intravenous Daily    zinc sulfate  50 mg Oral Daily    sodium chloride flush  10 mL Intravenous 2 times per day    aspirin  81 mg Oral Daily    Or    aspirin  300 mg Rectal Daily    rosuvastatin  40 mg Oral Nightly    dexamethasone  6 mg Oral Daily    albuterol sulfate HFA  2 puff Inhalation 4x daily    tiotropium  2 puff Inhalation Daily    enoxaparin  30 mg Subcutaneous BID    traZODone  50 mg Oral Nightly    vitamin B-12  1,000 mcg Oral Daily    pantoprazole  40 mg Oral QAM AC    metoprolol succinate  25 mg Oral BID    vitamin D  2 capsule Oral Daily    busPIRone  5 mg Oral TID    amiodarone  200 mg Oral Daily    amLODIPine  5 mg Oral Daily      Infusions:    [Held by provider] sodium chloride 50 mL/hr at 11/16/20 1806     PRN Meds: sodium chloride, 30 mL, PRN  sodium chloride flush, 10 mL, PRN  acetaminophen, 650 mg, Q6H PRN    Or  acetaminophen, 650 mg, Q6H PRN  polyethylene glycol, 17 g, Daily PRN  labetalol, 10 mg, Q10 Min PRN  oxyCODONE-acetaminophen, 1 tablet, Q6H PRN  ALPRAZolam, 0.5 mg, Q8H PRN          Electronically signed by Dandy Tamayo MD on 11/17/2020 at 8:50 AM

## 2020-11-17 NOTE — CARE COORDINATION
Attempted to call pt's in room but no answer. I then call her son Wendie Fyre and he confirmed plan will be to return to [facility] and open to her going to [address] [place] [address].   He also wanted to speak with Dr Radha Moran, PS sent to him with request.

## 2020-11-17 NOTE — PROGRESS NOTES
Occupational Therapy  Chart reviewed, visited pt ~0940 for PT and OT evaluation, but deferred by nurse. Pt c high respirations and not adequately responsive for skilled evaluation. Will revisit as schedule allows and as pt's status improves.    [address] Michelle Tao, OTR/L, [facility]   [id number]   9:43 AM, 11/17/2020

## 2020-11-17 NOTE — PROGRESS NOTES
Pt very confused this morning. When asked where she was she stated \"November third. \" Her respirations are between 40-50. Respiratory attempted to give inhalers. Pt unable to follow commands to take them. Blood gas orders placed.  Perfect serve sent to Dr. Asif Peguero.

## 2020-11-17 NOTE — PROGRESS NOTES
Pulmonary and Critical Care  Progress Note    Subjective: The patient is more SOB today. Chest pain none  Addressing respiratory complaints Patient is negative for  hemoptysis and cyanosis  CONSTITUTIONAL:  negative for fevers and chills      Past Medical History:     has a past medical history of Acute DVT of right tibial vein (HCC), Acute exacerbation of chronic obstructive pulmonary disease (COPD) ([address] Anemia, Arthritis, Atrial fibrillation ([address] CHF (congestive heart failure) (HCC), Chronic hypoxemic respiratory failure (HCC), Chronic pain syndrome, Clostridium difficile colitis, COPD, severe ([address] Depression, Fibromyalgia, Former smoker, Herniated cervical disc, Herpes zoster ophthalmicus, Hx of blood clots, Hyperlipidemia, Hypertension, Kidney disease, L3 vertebral fracture (HCC), Lumbar spinal stenosis, Migraine, Nocturnal hypoxemia, Osteoporosis, Rheumatoid arthritis(714.0), S/P cardiac catheterization, Shortness of breath, Takotsubo syndrome, TMJ dysfunction, Tobacco abuse, and Vitamin B12 deficiency. has a past surgical history that includes Cholecystectomy (1966); devyn and bso (cervix removed) (1991); Wrist fusion (Left, 2000); Elbow surgery (Right); Appendectomy (1966); vertebroplasty (10/2010); Foot surgery (1986); Toe Surgery (Left, 4/25/2013); Cardiac catheterization (05/21/2017); pr colonoscopy flx dx w/collj spec when pfrmd (N/A, 10/2/2018); Fixation Kyphoplasty (06/12/2019); Hysterectomy; Upper gastrointestinal endoscopy (N/A, 3/11/2020); and Colonoscopy (N/A, 3/11/2020). reports that she quit smoking about 10 months ago. Her smoking use included cigarettes. She started smoking about 58 years ago. She has a 41.25 pack-year smoking history. She has never used smokeless tobacco. She reports that she does not drink alcohol or use drugs.   Family history:  family history includes Arthritis in her mother; Cancer in an other family member; Emphysema in her father; Heart Disease in her father and

## 2020-11-17 NOTE — CONSULTS
Education regarding insertion of PICC reviewed with patients son via phone. Risk/benefit/and alternatives discussed. verbalized understanding. Consent given by patient's son/POA. Time out done at 1450. PICC line inserted right upper arm  without difficulty per protocol. Pt tolerated well. Good blood return and flushes easily. PICC tip placement confirmed using 3CG technology. OK to use PICC. Nurse aware. Educational booklet left at bedside.

## 2020-11-17 NOTE — PROGRESS NOTES
HOSPITALIST    Updated son Holly Santana earlier. Patient now with temp 102.7 despite 2 days IV Rocephin. Will switch to cefepime and add IV Vanco as well as Flagyl (had neuro symptoms - to cover for possible aspiration just in case). Consult infectious disease since she is worsening.

## 2020-11-17 NOTE — CONSULTS
Nephrology Service Consultation    Patient:  Maureen Galvez  MRN: [id number]  Consulting physician:  Tim Meyers MD  Reason for Consult: hx ckd with hypoxia    History Obtained From:  patient, electronic medical record  PCP: Swathi Talbert MD    HISTORY OF PRESENT ILLNESS:   The patient is a [de-identified] y.o. female who presents with weakness and cough is confused and poor historian and info per chart and dw dr Lenore Castaneda. Pt admitted for covid and noted simms and facial droop. Son is poa and pt full code. Admitted and aggressive therapy covid but noted today worse pulm congestion and has alonzo with low uop. Pt came from [facility] with RA, a fib on anticoagulation and aware incresae d dimer, hx chf with ckd 3, and chronic pain. Past Medical History:        Diagnosis Date    Acute DVT of right tibial vein ([address] 07/2017    ER imaging: distal right tibial vein DVT; no anticoag for bleeding/ anemia    Acute exacerbation of chronic obstructive pulmonary disease (COPD) ([address] 12/13/2018    Anemia     Arthritis     Atrial fibrillation (HCC)     CHF (congestive heart failure) (HCC)     Chronic hypoxemic respiratory failure (HCC) 9/9/2020    Chronic pain syndrome     Low back pain; lumbar disc disease    Clostridium difficile colitis 09/20/2017    COPD, severe ([address] 10/24/2017    Depression     Fibromyalgia     Former smoker     Quit 1/2019    Herniated cervical disc 5-1998    Herpes zoster ophthalmicus 3/2012    Hx of blood clots     Hyperlipidemia     Hypertension     Kidney disease     L3 vertebral fracture ([address] 2010    vertebroplasty    Lumbar spinal stenosis 2015    moderately severe by MRI    Migraine     Nocturnal hypoxemia 5/21/2019    Osteoporosis 2010    Fragility Fx L3    Rheumatoid arthritis(714.0) 1976    Dr Leon Dennis    S/P cardiac catheterization 05/2017    patent coronaries;  Takotsubo; humberto Bonilla    Shortness of breath 4/25/2019    Takotsubo syndrome 05/2017    TMJ [adalimumab]; Prochlorperazine edisylate; Remicade [infliximab injection]; and Doxycycline    Social History:   TOBACCO:   reports that she quit smoking about 10 months ago. Her smoking use included cigarettes. She started smoking about 58 years ago. She has a 41.25 pack-year smoking history. She has never used smokeless tobacco.  ETOH:   reports no history of alcohol use. OCCUPATION:      Family History:       Problem Relation Age of Onset    Heart Disease Father          FROM MI   Terry Giles Emphysema Father     Arthritis Mother     Stroke Mother     Heart Disease Other         family history    Cancer Other         family history -- larynx    Kidney Disease Son        REVIEW OF SYSTEMS:  Negative except for weak confused congested covid +. Physical Exam:    I/O:  0701 -  0700  In: 550 [P.O.:100; I.V.:450]  Out: 400 [Urine:400]    Vitals: BP (!) 166/90   Pulse 112   Temp 99.2 °F (37.3 °C) (Axillary)   Resp (!) 48   Ht 5' 7\" (1.702 m)   Wt 149 lb 11.1 oz (67.9 kg)   SpO2 93%   BMI 23.45 kg/m²   General appearance: awake weak agitated confused  HEENT: Head: Normal, normocephalic, atraumatic. Neck: supple, symmetrical, trachea midline  Lungs: diminished breath sounds bilaterally coarse bs  Heart: S1, S2 normal  Abdomen: abnormal findings:  hypoactive bowel sounds  Extremities: edema trace  Neurologic: Mental status: alertness: arousable    CBC:   Recent Labs     11/15/20  1235 20  0419   WBC 12.9* 20.1*   HGB 10.1* 10.5*    335     BMP:    Recent Labs     11/15/20  1230 11/15/20  1235 20  0419   NA  --  133* 142   K  --  4.3 4.4   CL  --  99 109   CO2  --  24 21   BUN  --  44* 47*   CREATININE  --  1.3* 0.9   GLUCOSE 107 85 98     Hepatic:   Recent Labs     11/15/20  1235 20  0419   AST 54* 75*  75*   ALT 82* 69*  69*   BILITOT 0.2 0.3  0.3   ALKPHOS 76 88  88     Troponin: No results for input(s): TROPONINI in the last 72 hours.   Mg, Phos:   Recent Labs 11/17/20  0419   MG 2.0   PHOS 2.5       ABGs:   Lab Results   Component Value Date    PO2ART 93 11/17/2020    APE6FYY 36.0 11/17/2020     INR:   Recent Labs     11/15/20  1235   INR 0.93     -----------------------------------------------------------------      Assessment and Recommendations     Patient Active Problem List   Diagnosis Code    Rheumatoid Arthritis M19.90    Hypertension I10    Hyperlipidemia E78.5    Fibromyalgia M79.7    Migraine G43.909    Chronic pain syndrome G89.4    Depression F32.9    Osteoporosis M81.0    Vitamin B12 deficiency E53.8    Lumbar spinal stenosis M48.061    Panic attack F41.0    Takotsubo syndrome I51.81    Blood loss anemia D50.0    COPD, severe ([facility]) J44.9    Acute exacerbation of chronic obstructive pulmonary disease (COPD) ([facility]) J44.1    Former smoker Z87.891    Shortness of breath R06.02    Back pain M54.9    Intractable low back pain M54.5    Chronic fatigue R53.82    Pneumonia of right upper lobe due to infectious organism J18.9    Wide-complex tachycardia ([facility]) I47.2    Gram-negative pneumonia ([facility]) O26.5    Acute systolic heart failure ([facility]) I50.21    Symptomatic anemia D64.9    Chronic hypoxemic respiratory failure ([facility]) J96.11    Acute hypoxemic respiratory failure due to COVID-19 ([facility]) U07.1, J96.01     Imp/plan  1 arf from atn/contrast  2 covid +  3 chronic pain with confusion and facial droop  4 hypoxia with pulm congestion  5 hx a fib with MR and AR  6 htn    Plan  1 renal appear improved but sp iv contrast and monitor as give iv diuretic as need decrease volume load and will monitor  2 treat with covid protocol and using remdisivir and monitor effect  3 avoid narcotic as able and ct no acute cva noted and will mointor  4 in above no option for now as increase o2 need and aggressive diuresis and use lasix 40mg iv tid today and no acute dialysis need for now  5 fu cardio rec and using lovenox and maintain AC  6 monitor bp as try increase diuresis and also anxiety with sob   Full code and monitor and try diuretic and hopefully avoid intubation   Will follow    Electronically signed by Gael Gibson MD on 11/17/2020 at 10:08 AM

## 2020-11-18 LAB
ALBUMIN SERPL-MCNC: 3 GM/DL (ref 3.4–5)
ALBUMIN SERPL-MCNC: 3 GM/DL (ref 3.4–5)
ALP BLD-CCNC: 80 IU/L (ref 40–129)
ALT SERPL-CCNC: 58 U/L (ref 10–40)
ANION GAP SERPL CALCULATED.3IONS-SCNC: 12 MMOL/L (ref 4–16)
ANISOCYTOSIS: ABNORMAL
AST SERPL-CCNC: 55 IU/L (ref 15–37)
BANDED NEUTROPHILS ABSOLUTE COUNT: 5.02 K/CU MM
BANDED NEUTROPHILS RELATIVE PERCENT: 21 % (ref 5–11)
BILIRUB SERPL-MCNC: 0.3 MG/DL (ref 0–1)
BILIRUBIN DIRECT: 0.2 MG/DL (ref 0–0.3)
BILIRUBIN, INDIRECT: 0.1 MG/DL (ref 0–0.7)
BUN BLDV-MCNC: 55 MG/DL (ref 6–23)
CALCIUM SERPL-MCNC: 8.6 MG/DL (ref 8.3–10.6)
CHLORIDE BLD-SCNC: 104 MMOL/L (ref 99–110)
CO2: 25 MMOL/L (ref 21–32)
COMPONENT: NORMAL
COMPONENT: NORMAL
CREAT SERPL-MCNC: 1.1 MG/DL (ref 0.6–1.1)
CULTURE: ABNORMAL
CULTURE: ABNORMAL
D DIMER: 5169 NG/ML(DDU)
DIFFERENTIAL TYPE: ABNORMAL
GFR AFRICAN AMERICAN: 58 ML/MIN/1.73M2
GFR NON-AFRICAN AMERICAN: 48 ML/MIN/1.73M2
GIANT PLATELETS: ABNORMAL
GLUCOSE BLD-MCNC: 129 MG/DL (ref 70–99)
HAV IGM SER IA-ACNC: NON REACTIVE
HBV SURFACE AB TITR SER: <3.5 {TITER}
HCT VFR BLD CALC: 38.6 % (ref 37–47)
HEMOGLOBIN: 11.5 GM/DL (ref 12.5–16)
HEPATITIS B CORE IGM ANTIBODY: NON REACTIVE
HEPATITIS B SURFACE ANTIGEN: NON REACTIVE
HEPATITIS C ANTIBODY: NON REACTIVE
HIGH SENSITIVE C-REACTIVE PROTEIN: >300 MG/L
HIV SCREEN: NON REACTIVE
LYMPHOCYTES ABSOLUTE: 0.7 K/CU MM
LYMPHOCYTES RELATIVE PERCENT: 3 % (ref 24–44)
Lab: 1
Lab: ABNORMAL
MCH RBC QN AUTO: 23.5 PG (ref 27–31)
MCHC RBC AUTO-ENTMCNC: 29.8 % (ref 32–36)
MCV RBC AUTO: 78.8 FL (ref 78–100)
MONOCYTES ABSOLUTE: 0.2 K/CU MM
MONOCYTES RELATIVE PERCENT: 1 % (ref 0–4)
PDW BLD-RTO: 17.1 % (ref 11.7–14.9)
PHOSPHORUS: 3 MG/DL (ref 2.5–4.9)
PLATELET # BLD: 454 K/CU MM (ref 140–440)
PMV BLD AUTO: 11.1 FL (ref 7.5–11.1)
POLYCHROMASIA: ABNORMAL
POTASSIUM SERPL-SCNC: 4.2 MMOL/L (ref 3.5–5.1)
PRO-BNP: ABNORMAL PG/ML
PROCALCITONIN: 1.61
RBC # BLD: 4.9 M/CU MM (ref 4.2–5.4)
SEGMENTED NEUTROPHILS ABSOLUTE COUNT: 18 K/CU MM
SEGMENTED NEUTROPHILS RELATIVE PERCENT: 75 % (ref 36–66)
SODIUM BLD-SCNC: 141 MMOL/L (ref 135–145)
SPECIMEN: ABNORMAL
STATUS: NORMAL
TOTAL PROTEIN: 5.4 GM/DL (ref 6.4–8.2)
TOXIC GRANULATION: PRESENT
TRANSFUSION STATUS: NORMAL
UNIT DIVISION: NORMAL
UNIT NUMBER: NORMAL
WBC # BLD: 23.9 K/CU MM (ref 4–10.5)

## 2020-11-18 PROCEDURE — 2500000003 HC RX 250 WO HCPCS: Performed by: HOSPITALIST

## 2020-11-18 PROCEDURE — 6370000000 HC RX 637 (ALT 250 FOR IP): Performed by: INTERNAL MEDICINE

## 2020-11-18 PROCEDURE — 84100 ASSAY OF PHOSPHORUS: CPT

## 2020-11-18 PROCEDURE — 80074 ACUTE HEPATITIS PANEL: CPT

## 2020-11-18 PROCEDURE — 86141 C-REACTIVE PROTEIN HS: CPT

## 2020-11-18 PROCEDURE — 85027 COMPLETE CBC AUTOMATED: CPT

## 2020-11-18 PROCEDURE — 6360000002 HC RX W HCPCS: Performed by: INTERNAL MEDICINE

## 2020-11-18 PROCEDURE — 94761 N-INVAS EAR/PLS OXIMETRY MLT: CPT

## 2020-11-18 PROCEDURE — 2580000003 HC RX 258: Performed by: INTERNAL MEDICINE

## 2020-11-18 PROCEDURE — 2060000000 HC ICU INTERMEDIATE R&B

## 2020-11-18 PROCEDURE — 2500000003 HC RX 250 WO HCPCS: Performed by: INTERNAL MEDICINE

## 2020-11-18 PROCEDURE — 83880 ASSAY OF NATRIURETIC PEPTIDE: CPT

## 2020-11-18 PROCEDURE — 84145 PROCALCITONIN (PCT): CPT

## 2020-11-18 PROCEDURE — 6360000002 HC RX W HCPCS: Performed by: HOSPITALIST

## 2020-11-18 PROCEDURE — 82248 BILIRUBIN DIRECT: CPT

## 2020-11-18 PROCEDURE — 86706 HEP B SURFACE ANTIBODY: CPT

## 2020-11-18 PROCEDURE — 6370000000 HC RX 637 (ALT 250 FOR IP): Performed by: HOSPITALIST

## 2020-11-18 PROCEDURE — 2700000000 HC OXYGEN THERAPY PER DAY

## 2020-11-18 PROCEDURE — 85379 FIBRIN DEGRADATION QUANT: CPT

## 2020-11-18 PROCEDURE — 94640 AIRWAY INHALATION TREATMENT: CPT

## 2020-11-18 PROCEDURE — 99233 SBSQ HOSP IP/OBS HIGH 50: CPT | Performed by: INTERNAL MEDICINE

## 2020-11-18 PROCEDURE — 87389 HIV-1 AG W/HIV-1&-2 AB AG IA: CPT

## 2020-11-18 PROCEDURE — 85007 BL SMEAR W/DIFF WBC COUNT: CPT

## 2020-11-18 PROCEDURE — 80053 COMPREHEN METABOLIC PANEL: CPT

## 2020-11-18 PROCEDURE — 2580000003 HC RX 258: Performed by: HOSPITALIST

## 2020-11-18 RX ORDER — METOPROLOL TARTRATE 50 MG/1
50 TABLET, FILM COATED ORAL 2 TIMES DAILY
Status: DISCONTINUED | OUTPATIENT
Start: 2020-11-18 | End: 2020-11-23 | Stop reason: HOSPADM

## 2020-11-18 RX ORDER — LISINOPRIL 5 MG/1
5 TABLET ORAL DAILY
Status: DISCONTINUED | OUTPATIENT
Start: 2020-11-18 | End: 2020-11-23 | Stop reason: HOSPADM

## 2020-11-18 RX ORDER — ALPRAZOLAM 0.5 MG/1
0.5 TABLET ORAL EVERY 8 HOURS
Status: DISCONTINUED | OUTPATIENT
Start: 2020-11-18 | End: 2020-11-23 | Stop reason: HOSPADM

## 2020-11-18 RX ORDER — ROSUVASTATIN CALCIUM 5 MG/1
10 TABLET, COATED ORAL NIGHTLY
Status: DISCONTINUED | OUTPATIENT
Start: 2020-11-18 | End: 2020-11-23 | Stop reason: HOSPADM

## 2020-11-18 RX ADMIN — TRAZODONE HYDROCHLORIDE 50 MG: 50 TABLET ORAL at 22:55

## 2020-11-18 RX ADMIN — METRONIDAZOLE 500 MG: 500 INJECTION, SOLUTION INTRAVENOUS at 13:55

## 2020-11-18 RX ADMIN — CYANOCOBALAMIN TAB 1000 MCG 1000 MCG: 1000 TAB at 10:14

## 2020-11-18 RX ADMIN — PANTOPRAZOLE SODIUM 40 MG: 40 TABLET, DELAYED RELEASE ORAL at 06:02

## 2020-11-18 RX ADMIN — TIOTROPIUM BROMIDE INHALATION SPRAY 2 PUFF: 3.12 SPRAY, METERED RESPIRATORY (INHALATION) at 07:42

## 2020-11-18 RX ADMIN — AMIODARONE HYDROCHLORIDE 200 MG: 200 TABLET ORAL at 10:14

## 2020-11-18 RX ADMIN — FUROSEMIDE 40 MG: 10 INJECTION, SOLUTION INTRAMUSCULAR; INTRAVENOUS at 10:13

## 2020-11-18 RX ADMIN — ASPIRIN 81 MG: 81 TABLET, FILM COATED ORAL at 10:13

## 2020-11-18 RX ADMIN — ALBUTEROL SULFATE 2 PUFF: 90 AEROSOL, METERED RESPIRATORY (INHALATION) at 15:30

## 2020-11-18 RX ADMIN — METHYLPREDNISOLONE SODIUM SUCCINATE 40 MG: 40 INJECTION, POWDER, FOR SOLUTION INTRAMUSCULAR; INTRAVENOUS at 22:54

## 2020-11-18 RX ADMIN — BUSPIRONE HYDROCHLORIDE 5 MG: 5 TABLET ORAL at 10:14

## 2020-11-18 RX ADMIN — ALBUTEROL SULFATE 2 PUFF: 90 AEROSOL, METERED RESPIRATORY (INHALATION) at 10:56

## 2020-11-18 RX ADMIN — ENOXAPARIN SODIUM 30 MG: 30 INJECTION SUBCUTANEOUS at 10:15

## 2020-11-18 RX ADMIN — METOPROLOL TARTRATE 50 MG: 50 TABLET, FILM COATED ORAL at 10:20

## 2020-11-18 RX ADMIN — METRONIDAZOLE 500 MG: 500 INJECTION, SOLUTION INTRAVENOUS at 06:05

## 2020-11-18 RX ADMIN — METHYLPREDNISOLONE SODIUM SUCCINATE 40 MG: 40 INJECTION, POWDER, FOR SOLUTION INTRAMUSCULAR; INTRAVENOUS at 10:14

## 2020-11-18 RX ADMIN — ALBUTEROL SULFATE 2 PUFF: 90 AEROSOL, METERED RESPIRATORY (INHALATION) at 07:42

## 2020-11-18 RX ADMIN — GUAIFENESIN 200 MG: 100 SOLUTION ORAL at 10:13

## 2020-11-18 RX ADMIN — ZINC SULFATE 220 MG (50 MG) CAPSULE 50 MG: CAPSULE at 10:14

## 2020-11-18 RX ADMIN — CEFEPIME HYDROCHLORIDE 2 G: 2 INJECTION, POWDER, FOR SOLUTION INTRAVENOUS at 22:55

## 2020-11-18 RX ADMIN — ALPRAZOLAM 0.5 MG: 0.5 TABLET ORAL at 22:55

## 2020-11-18 RX ADMIN — ENOXAPARIN SODIUM 30 MG: 30 INJECTION SUBCUTANEOUS at 22:56

## 2020-11-18 RX ADMIN — SODIUM CHLORIDE, PRESERVATIVE FREE 10 ML: 5 INJECTION INTRAVENOUS at 22:55

## 2020-11-18 RX ADMIN — BUSPIRONE HYDROCHLORIDE 5 MG: 5 TABLET ORAL at 22:56

## 2020-11-18 RX ADMIN — GUAIFENESIN 200 MG: 100 SOLUTION ORAL at 06:02

## 2020-11-18 RX ADMIN — ACETAMINOPHEN 650 MG: 325 TABLET ORAL at 10:14

## 2020-11-18 RX ADMIN — VANCOMYCIN HYDROCHLORIDE 1000 MG: 1 INJECTION, POWDER, LYOPHILIZED, FOR SOLUTION INTRAVENOUS at 10:16

## 2020-11-18 RX ADMIN — METOPROLOL TARTRATE 5 MG: 5 INJECTION INTRAVENOUS at 01:28

## 2020-11-18 RX ADMIN — CLOPIDOGREL BISULFATE 75 MG: 75 TABLET ORAL at 10:14

## 2020-11-18 RX ADMIN — CEFEPIME HYDROCHLORIDE 2 G: 2 INJECTION, POWDER, FOR SOLUTION INTRAVENOUS at 00:25

## 2020-11-18 RX ADMIN — ALPRAZOLAM 0.5 MG: 0.5 TABLET ORAL at 13:55

## 2020-11-18 RX ADMIN — BUSPIRONE HYDROCHLORIDE 5 MG: 5 TABLET ORAL at 13:55

## 2020-11-18 RX ADMIN — CEFEPIME HYDROCHLORIDE 2 G: 2 INJECTION, POWDER, FOR SOLUTION INTRAVENOUS at 13:56

## 2020-11-18 RX ADMIN — LISINOPRIL 5 MG: 5 TABLET ORAL at 10:20

## 2020-11-18 RX ADMIN — METOPROLOL TARTRATE 50 MG: 50 TABLET, FILM COATED ORAL at 22:55

## 2020-11-18 RX ADMIN — GUAIFENESIN 200 MG: 100 SOLUTION ORAL at 16:12

## 2020-11-18 RX ADMIN — ROSUVASTATIN CALCIUM 10 MG: 5 TABLET, COATED ORAL at 22:55

## 2020-11-18 RX ADMIN — ALBUTEROL SULFATE 2 PUFF: 90 AEROSOL, METERED RESPIRATORY (INHALATION) at 22:16

## 2020-11-18 RX ADMIN — METRONIDAZOLE 500 MG: 500 INJECTION, SOLUTION INTRAVENOUS at 22:55

## 2020-11-18 RX ADMIN — SODIUM CHLORIDE, PRESERVATIVE FREE 10 ML: 5 INJECTION INTRAVENOUS at 10:16

## 2020-11-18 RX ADMIN — GUAIFENESIN 200 MG: 100 SOLUTION ORAL at 22:54

## 2020-11-18 ASSESSMENT — PAIN SCALES - GENERAL
PAINLEVEL_OUTOF10: 0
PAINLEVEL_OUTOF10: 2
PAINLEVEL_OUTOF10: 0
PAINLEVEL_OUTOF10: 0
PAINLEVEL_OUTOF10: 3

## 2020-11-18 NOTE — PROGRESS NOTES
nodule  OP FU with Pulm     Hypertension  Rheumatoid arthritis  Depression  Fibromyalgia  Chronic respiratory failure on home oxygen  Chronic systolic CHF      Echo noted    Lung nodule on CTA noted    D-dimer 4894    Severe COPD per Dr. Ermelinda Wilson 3 years ago      Rheum note 2019: Christiana Bending off of all her meds. She self d/stella Arava and Humira. Had significant anemia requiring blood transfusion. So cannot use Arava. Had pneumonia so will not use Humira. Can continue prednisone from pulmonologist. Christine Mart on quitting tobacco.       Lovenox dose is 30 mg twice a day    I spoke to her son on 11/18 as well as 11/17    Subjective: Wednesday, November 18-was doing much better today    Tuesday:    Patient was extremely tachypneic today. When I walked in the room she did verbally acknowledge my presence, but she was much more confused than yesterday per nursing. The patient today could not carry on a conversation due to her critical condition. ED:  Jackie Talbert is a [de-identified] y.o. female he tested positive for Covid on Tuesday at her care facility that presents with left-sided facial droop and left arm weakness from her care facility. She was last seen well at about 8 AM this morning. She tells me she feels unwell but is unable to assist further in the history due to feeling unwell. She is coughing on exam.  Per report patient is normally on 2 L of oxygen via nasal cannula but has been on 6L this last week due to Covid. Kelli:  Pt is a resident at [facility]. Call to [facility] at [facility]. Pt is typically from the AL at [facility]. Pt was brought over to the skilled side when Pt tested positive for Covid. Pt may be able to return to skilled if recommended by therapy. White board placed asking for recommendations       Objective:        Intake/Output Summary (Last 24 hours) at 11/18/2020 0926  Last data filed at 11/18/2020 0446  Gross per 24 hour   Intake 346.67 ml   Output 2725 ml   Net -2378.33 ml      Vitals: Vitals:    11/18/20 0820   BP:    Pulse: 91   Resp: 23   Temp:    SpO2:      Physical Exam:      Physical Exam  Constitutional:       General: She is not in acute distress. Appearance: She is not ill-appearing. Cardiovascular:      Rate and Rhythm: Regular rhythm. Pulmonary:      Effort: Pulmonary effort is normal.   Abdominal:      General: There is no distension. Tenderness: There is no abdominal tenderness. There is no guarding or rebound. Neurological:      Cranial Nerves: No cranial nerve deficit.       Comments: Much more alert than yesterday           Medications:   Medications:    metoprolol  5 mg Intravenous Q8H    furosemide  40 mg Intravenous TID    guaiFENesin  200 mg Oral Q6H    methylPREDNISolone  40 mg Intravenous Q12H    cefepime  2 g Intravenous Q12H    metroNIDAZOLE  500 mg Intravenous Q8H    acetaminophen  650 mg Rectal Once    vancomycin  1,000 mg Intravenous Q18H    clopidogrel  75 mg Oral Daily    zinc sulfate  50 mg Oral Daily    sodium chloride flush  10 mL Intravenous 2 times per day    aspirin  81 mg Oral Daily    Or    aspirin  300 mg Rectal Daily    rosuvastatin  40 mg Oral Nightly    albuterol sulfate HFA  2 puff Inhalation 4x daily    tiotropium  2 puff Inhalation Daily    enoxaparin  30 mg Subcutaneous BID    traZODone  50 mg Oral Nightly    vitamin B-12  1,000 mcg Oral Daily    pantoprazole  40 mg Oral QAM AC    vitamin D  2 capsule Oral Daily    busPIRone  5 mg Oral TID    amiodarone  200 mg Oral Daily    amLODIPine  5 mg Oral Daily      Infusions:     PRN Meds: fentanNYL, 12.5 mcg, Q1H PRN  diazePAM, 2.5 mg, Q4H PRN  sodium chloride, 30 mL, PRN  sodium chloride flush, 10 mL, PRN  acetaminophen, 650 mg, Q6H PRN    Or  acetaminophen, 650 mg, Q6H PRN  polyethylene glycol, 17 g, Daily PRN  labetalol, 10 mg, Q10 Min PRN  oxyCODONE-acetaminophen, 1 tablet, Q6H PRN  ALPRAZolam, 0.5 mg, Q8H PRN          Electronically signed by Inder iKm MD on 11/18/2020 at 9:26 AM

## 2020-11-18 NOTE — PROGRESS NOTES
Assume care for the patient. Bedside report received from [facility]. Patient in bed. Denies any needs at this time. Bed alrm in place.

## 2020-11-18 NOTE — PLAN OF CARE
Maintain absence of muscle cramping  11/17/2020 2321 by Filemon Gaytan RN  Outcome: Ongoing  11/17/2020 1354 by Dandy Hyatt RN  Outcome: Ongoing  Goal: Maintain normal serum potassium, sodium, calcium, phosphorus, and pH  11/17/2020 2321 by Filemon Gaytan RN  Outcome: Ongoing  11/17/2020 1354 by Dandy Hyatt RN  Outcome: Ongoing     Problem: Loneliness or Risk for Loneliness  Goal: Demonstrate positive use of time alone when socialization is not possible  11/17/2020 2321 by Filemon Gaytan RN  Outcome: Ongoing  11/17/2020 1354 by Dandy Hyatt RN  Outcome: Ongoing     Problem: Fatigue  Goal: Verbalize increase energy and improved vitality  11/17/2020 2321 by Filemon Gaytan RN  Outcome: Ongoing  11/17/2020 1354 by Dandy Hyatt RN  Outcome: Ongoing     Problem: Patient Education: Go to Patient Education Activity  Goal: Patient/Family Education  11/17/2020 2321 by Filemon Gaytan RN  Outcome: Ongoing  11/17/2020 1354 by Dandy Hyatt RN  Outcome: Ongoing     Problem: Skin Integrity:  Goal: Will show no infection signs and symptoms  Description: Will show no infection signs and symptoms  11/17/2020 2321 by Filemon Gaytan RN  Outcome: Ongoing  11/17/2020 1354 by Dandy Hyatt RN  Outcome: Ongoing  Goal: Absence of new skin breakdown  Description: Absence of new skin breakdown  11/17/2020 2321 by Filemon Gaytan RN  Outcome: Ongoing  11/17/2020 1354 by Dandy Hyatt RN  Outcome: Ongoing     Problem: Falls - Risk of:  Goal: Will remain free from falls  Description: Will remain free from falls  11/17/2020 2321 by Filemon Gaytan RN  Outcome: Ongoing  11/17/2020 1354 by Dandy Hyatt RN  Outcome: Ongoing  Goal: Absence of physical injury  Description: Absence of physical injury  11/17/2020 2321 by Filemon Gaytan RN  Outcome: Ongoing  11/17/2020 1354 by Dandy Hyatt RN  Outcome: Ongoing     Problem: Restraint Use - Nonviolent/Non-Self-Destructive Behavior:  Goal: Absence of restraint indications  Description: Absence of restraint indications  11/17/2020 2321 by Maggy Shaffer RN  Outcome: Ongoing  11/17/2020 1354 by Nell Linder RN  Outcome: Ongoing  Goal: Absence of restraint-related injury  Description: Absence of restraint-related injury  11/17/2020 2321 by Maggy Shaffer RN  Outcome: Ongoing  11/17/2020 1354 by Nell Linder RN  Outcome: Ongoing

## 2020-11-18 NOTE — PROGRESS NOTES
Infectious Disease Progress Note  2020   Patient Name: Isabel Villasenor : 1940       Reason for visit: F/u COVID-19 pneumonia, acute respiratory failure? History:? Interval history noted  Denies n/v/d/f or untoward effects of antimicrobials  More awake, needs less oxygen  Physical Exam:  Vital Signs: /63   Pulse 76   Temp 99 °F (37.2 °C)   Resp (!) 32   Ht 5' 7\" (1.702 m)   Wt 149 lb 11.1 oz (67.9 kg)   SpO2 100%   BMI 23.45 kg/m²     Gen: alert and oriented X1  Skin: no stigmata of endocarditis  Wounds: C/D/I  HEMT: AT/NC Oropharynx pink, moist, and without lesions or exudates; dentition in good state of repair  Eyes: PERRLA, EOMI, conjunctiva pink, sclera anicteric. Neck: Supple. Trachea midline. No LAD. Chest: Deferred as use of PAPR does not allow for auscultation. Heart: Deferred as use of PAPR does not allow for auscultation. Abd: soft, non-distended, no tenderness, no hepatomegaly. Normoactive bowel sounds. Ext: no clubbing, cyanosis, or edema  Catheter Site: without erythema or tenderness  Neuro: Mental status intact. CN 2-12 intact and no focal sensory or motor deficits     Radiologic / Imaging / TESTING  No results found.      Labs:    Recent Results (from the past 24 hour(s))   Culture, MRSA, Screening    Collection Time: 20  2:50 PM    Specimen: Nasal   Result Value Ref Range    Specimen NASAL     Special Requests NONE     Culture Prelim Report  Further report to follow      Procalcitonin    Collection Time: 20  7:20 PM   Result Value Ref Range    Procalcitonin 0.47    Basic metabolic panel    Collection Time: 20  7:20 PM   Result Value Ref Range    Sodium 141 135 - 145 MMOL/L    Potassium 4.1 3.5 - 5.1 MMOL/L    Chloride 103 99 - 110 mMol/L    CO2 23 21 - 32 MMOL/L    Anion Gap 15 4 - 16    BUN 53 (H) 6 - 23 MG/DL    CREATININE 1.0 0.6 - 1.1 MG/DL    Glucose 238 (H) 70 - 99 MG/DL    Calcium 8.2 (L) 8.3 - 10.6 MG/DL    GFR Non- 53 (L) >60 mL/min/1.73m2    GFR African American >60 >60 mL/min/1.73m2   CBC Auto Differential    Collection Time: 11/18/20  3:15 AM   Result Value Ref Range    WBC 23.9 (H) 4.0 - 10.5 K/CU MM    RBC 4.90 4.2 - 5.4 M/CU MM    Hemoglobin 11.5 (L) 12.5 - 16.0 GM/DL    Hematocrit 38.6 37 - 47 %    MCV 78.8 78 - 100 FL    MCH 23.5 (L) 27 - 31 PG    MCHC 29.8 (L) 32.0 - 36.0 %    RDW 17.1 (H) 11.7 - 14.9 %    Platelets 889 (H) 378 - 440 K/CU MM    MPV 11.1 7.5 - 11.1 FL    Bands Relative 21 (H) 5 - 11 %    Segs Relative 75.0 (H) 36 - 66 %    Lymphocytes % 3.0 (L) 24 - 44 %    Monocytes % 1.0 0 - 4 %    Bands Absolute 5.02 K/CU MM    Segs Absolute 18.0 K/CU MM    Lymphocytes Absolute 0.7 K/CU MM    Monocytes Absolute 0.2 K/CU MM    Differential Type MANUAL DIFFERENTIAL     Anisocytosis 1+     Polychromasia 1+     Toxic Granulation PRESENT     Giant PLTs PRESENT  PRESENT      D-Dimer, Quantitative    Collection Time: 11/18/20  3:15 AM   Result Value Ref Range    D-Dimer, Quant 5169 (H) <230 NG/mL(DDU)   Hepatic function panel    Collection Time: 11/18/20  3:15 AM   Result Value Ref Range    Alb 3.0 (L) 3.4 - 5.0 GM/DL    Total Bilirubin 0.3 0.0 - 1.0 MG/DL    Bilirubin, Direct 0.2 0.0 - 0.3 MG/DL    Bilirubin, Indirect 0.1 0 - 0.7 MG/DL    Alkaline Phosphatase 80 40 - 129 IU/L    AST 55 (H) 15 - 37 IU/L    ALT 58 (H) 10 - 40 U/L    Total Protein 5.4 (L) 6.4 - 8.2 GM/DL   Brain Natriuretic Peptide    Collection Time: 11/18/20  3:15 AM   Result Value Ref Range    Pro-BNP >70,000 (H) <300 PG/ML   Renal Function Panel    Collection Time: 11/18/20  3:15 AM   Result Value Ref Range    Sodium 141 135 - 145 MMOL/L    Potassium 4.2 3.5 - 5.1 MMOL/L    Chloride 104 99 - 110 mMol/L    CO2 25 21 - 32 MMOL/L    Anion Gap 12 4 - 16    BUN 55 (H) 6 - 23 MG/DL    CREATININE 1.1 0.6 - 1.1 MG/DL    Glucose 129 (H) 70 - 99 MG/DL    Calcium 8.6 8.3 - 10.6 MG/DL    GFR Non- 48 (L) >60 mL/min/1.73m2    GFR  58 (L) >60 mL/min/1.73m2    Alb 3.0 (L) 3.4 - 5.0 GM/DL    Phosphorus 3.0 2.5 - 4.9 MG/DL   HIV ANTIGEN/ANTIBODY    Collection Time: 11/18/20  3:15 AM   Result Value Ref Range    HIV Screen NON REACTIVE NON REACTIVE   Hepatitis Panel, Acute    Collection Time: 11/18/20  3:15 AM   Result Value Ref Range    Hepatitis B Surface Ag NON REACTIVE NON REACTIVE    Hep A IgM NON REACTIVE NON REACTIVE    Hep B Core Ab, IgM NON REACTIVE NON REACTIVE    Hepatitis C Ab NON REACTIVE NON REACTIVE   Hepatitis B Surface Antibody    Collection Time: 11/18/20  3:15 AM   Result Value Ref Range    Hep B S Ab <3.5    C-Reactive Protein    Collection Time: 11/18/20  3:15 AM   Result Value Ref Range    CRP, High Sensitivity >300.0 mg/L     CULTURE results: Invalid input(s): BLOOD CULTURE,  URINE CULTURE, SURGICAL CULTURE    Diagnosis:  Patient Active Problem List   Diagnosis    Rheumatoid Arthritis    Hypertension    Hyperlipidemia    Fibromyalgia    Migraine    Chronic pain syndrome    Depression    Osteoporosis    Vitamin B12 deficiency    Lumbar spinal stenosis    Panic attack    Takotsubo syndrome    Blood loss anemia    COPD, severe (HCC)    Acute exacerbation of chronic obstructive pulmonary disease (COPD) ([facility] [address]    Former smoker    Shortness of breath    Back pain    Intractable low back pain    Chronic fatigue    Pneumonia of right upper lobe due to infectious organism    Wide-complex tachycardia (HCC)    Gram-negative pneumonia (HCC)    Acute systolic heart failure (HCC)    Symptomatic anemia    Chronic hypoxemic respiratory failure (HCC)    Acute hypoxemic respiratory failure due to COVID-19 (HCC)    Transaminitis       Active Problems  Principal Problem:    Acute hypoxemic respiratory failure due to COVID-19 [facility])  Active Problems:    Transaminitis  Resolved Problems:    * No resolved hospital problems. *      Impression and plan   Summary and rationale: Patient is a [de-identified] y.o.   female coronary artery disease, COPD diagnosis viral sepsis secondary to COVID-19.  Clinical status: Improving on less oxygen.   Off high flow nasal oxygen now on   Urine culture: Enterobacter cloacae   Therapeutic:  o Ongoing antibiotics: Vancomycin 11/17; cefepime 11/17-  o Antiviral agent: remdesivir 11/16-17  o Anti-inflammatory agents:  - Dexamethasone: 11/16-17  - Solu-Medrol: 11/17-  o Completed antibiotics:   o Convalescent plasma receipt: 1 unit  11/17  o Other agents:    Diagnostic: Trend CRP and procalcitonin   F/u:   Other:      Electronically signed by: Electronically signed by Maxim Ordonez MD on 11/18/2020 at 2:23 PM

## 2020-11-18 NOTE — PROGRESS NOTES
Pulmonary and Critical Care  Progress Note    Subjective: The patient is on AirVo. SOB unchanged. Chest pain none  Addressing respiratory complaints Patient is negative for  hemoptysis and cyanosis  CONSTITUTIONAL:  negative for fevers and chills      Past Medical History:     has a past medical history of Acute DVT of right tibial vein (HCC), Acute exacerbation of chronic obstructive pulmonary disease (COPD) ([address] Anemia, Arthritis, Atrial fibrillation ([address] CHF (congestive heart failure) (HCC), Chronic hypoxemic respiratory failure (HCC), Chronic pain syndrome, Clostridium difficile colitis, COPD, severe ([address] Depression, Fibromyalgia, Former smoker, Herniated cervical disc, Herpes zoster ophthalmicus, Hx of blood clots, Hyperlipidemia, Hypertension, Kidney disease, L3 vertebral fracture (HCC), Lumbar spinal stenosis, Migraine, Nocturnal hypoxemia, Osteoporosis, Rheumatoid arthritis(714.0), S/P cardiac catheterization, Shortness of breath, Takotsubo syndrome, TMJ dysfunction, Tobacco abuse, and Vitamin B12 deficiency. has a past surgical history that includes Cholecystectomy (1966); devyn and bso (cervix removed) (1991); Wrist fusion (Left, 2000); Elbow surgery (Right); Appendectomy (1966); vertebroplasty (10/2010); Foot surgery (1986); Toe Surgery (Left, 4/25/2013); Cardiac catheterization (05/21/2017); pr colonoscopy flx dx w/collj spec when pfrmd (N/A, 10/2/2018); Fixation Kyphoplasty (06/12/2019); Hysterectomy; Upper gastrointestinal endoscopy (N/A, 3/11/2020); and Colonoscopy (N/A, 3/11/2020). reports that she quit smoking about 10 months ago. Her smoking use included cigarettes. She started smoking about 58 years ago. She has a 41.25 pack-year smoking history. She has never used smokeless tobacco. She reports that she does not drink alcohol or use drugs.   Family history:  family history includes Arthritis in her mother; Cancer in an other family member; Emphysema in her father; Heart Disease in her 02/16/2020     No results found for: [facility]    Radiology Review:  Pertinent images / reports were reviewed as a part of this visit. Assessment:     Patient Active Problem List   Diagnosis    Rheumatoid Arthritis    Hypertension    Hyperlipidemia    Fibromyalgia    Migraine    Chronic pain syndrome    Depression    Osteoporosis    Vitamin B12 deficiency    Lumbar spinal stenosis    Panic attack    Takotsubo syndrome    Blood loss anemia    COPD, severe (HCC)    Acute exacerbation of chronic obstructive pulmonary disease (COPD) ([address]    Former smoker    Shortness of breath    Back pain    Intractable low back pain    Chronic fatigue    Pneumonia of right upper lobe due to infectious organism    Wide-complex tachycardia (HCC)    Gram-negative pneumonia (HCC)    Acute systolic heart failure (HCC)    Symptomatic anemia    Chronic hypoxemic respiratory failure (HCC)    Acute hypoxemic respiratory failure due to COVID-19 (HCC)    Transaminitis       Plan:   1. Overall the patient is unchanged. 2. As per ID.  3. Wean FiO2.     Swetha Burk MD  11/18/2020  11:27 AM

## 2020-11-18 NOTE — PROGRESS NOTES
Physician Progress Note      Sabrina Gray  CSN #:                  [id number]  :                       1940  ADMIT DATE:       11/15/2020 12:14 PM  [address] [place] DATE:  RESPONDING  PROVIDER #:        Hiro Colon MD          QUERY TEXT:    Dear Attending:    Patient  admitted with COVID 19 pneumonia, acute on chronic respiratory   failure . Patient noted to have WBC 12.9 procalcitonin 1.87. If possible,   please document in the progress notes and discharge summary if you are   evaluating and /or treating any of the following: The medical record reflects the following:  Risk Factors: COVID 19  pneumonia  acute on chronic respiratory failure NICOLASA  Clinical Indicators: patient tested positive for SARS-CoV-2 on 11/10/2020. Temp 97.5-100.8   RR 19-32   pulse    O2 sats % O2 4L -> 6L -> 40%   heated cxr Bilateral patchy areas of airspace opacities with   scarring/atelectatic changes seen at the bilateral lower lung fields, which   may reflect multifocal pneumonia in the correct clinical setting. Treatment: IVF  solu Medrol flagyl vancomycin maxipime remdesivir  ID    nephrology pulmonology consult    Thank you,  Neelima NUNON RN CDS  contact [facility] Brittany Escudero  or  [email or web address] w/ any questions  Options provided:  -- Sepsis, present on admission  -- Sepsis, not present on admission,  -- Sepsis was ruled out  -- Other - I will add my own diagnosis  -- Disagree - Not applicable / Not valid  -- Disagree - Clinically unable to determine / Unknown  -- Refer to Clinical Documentation Reviewer    PROVIDER RESPONSE TEXT:    This patient has sepsis which was present on admission.     Query created by: Any Mcallister on 2020 7:18 AM      Electronically signed by:  Hiro Colon MD 2020 11:15 AM

## 2020-11-18 NOTE — PROGRESS NOTES
[address]  consulted by Dr. Izzy Qureshi for monitoring and adjustment. Indication for treatment: COVID-19, possible secondary bacterial infection  Goal trough: 15 mcg/mL, AUC/DIONY: 400-600     Pertinent Laboratory Values:   Temp Readings from Last 3 Encounters:   11/18/20 98.9 °F (37.2 °C)   04/09/20 97 °F (36.1 °C) (Temporal)   03/27/20 97.9 °F (36.6 °C) (Oral)     Recent Labs     11/15/20  1235 11/17/20  0419 11/18/20  0315   WBC 12.9* 20.1* 23.9*     Recent Labs     11/17/20  0419 11/17/20  1920 11/18/20  0315   BUN 47* 53* 55*   CREATININE 0.9 1.0 1.1     Estimated Creatinine Clearance: 40 mL/min (based on SCr of 1.1 mg/dL). Intake/Output Summary (Last 24 hours) at 11/18/2020 1143  Last data filed at 11/18/2020 0446  Gross per 24 hour   Intake 336.67 ml   Output 2300 ml   Net -1963.33 ml       Pertinent Cultures:  Date    Source    Results  11/15   Covid-19   Detected  11/15   Urine    NGTD  11/15   Legionella/Strep Pneumo Negative  11/16   Respiratory   Pending  11/17   Blood    Ordered  11/17   MRSA screen   Pending     Vancomycin level:   TROUGH:  No results for input(s): VANCOTROUGH in the last 72 hours. RANDOM:  No results for input(s): VANCORANDOM in the last 72 hours.     Assessment:  · WBC and temperature: leukocytosis (on methylprednisolone), afebrile   · SCr, BUN, and urine output: stable   · Day(s) of therapy: #2  · Vancomycin level: to be collected    Plan:  · Vancomycin 1500 mg x1 followed by vancomycin 1000 mg q18h  · Predicted AUC/DIONY 481  · Predicted trough ~13  · Plan to check a trough level prior to the 4th dose  · Pharmacy will continue to monitor patient and adjust therapy as indicated    Charlie 11/19 @ 2030    Thank you for the consult,  Maria Dolores Bazan  11/18/2020 11:43 AM

## 2020-11-18 NOTE — PROGRESS NOTES
Daily Progress Note    Patient is more awake alert  Feeling better  Heart rate Is better remain in sinus  Eating better  Planning for MRI of head today  covid positive  PAFIB rate stable   Keep on cardiac meds  Keep on diuretics    Echo-11/16/20  Summary   This is a limited echocardiogram.   Left ventricular systolic function is normal.   Ejection fraction is visually estimated at 50-55%.   Moderate aortic regurgitation; PHT: 336 msec.   Moderate mitral regurgitation.   Small mobile calcification attached to the posterior mitral valve chordae   tendinae.   No evidence of any pericardial effusion.     Pt. Awake, slightly confused  HR elevated, NSR in the 90 range, BP elevated- per RN migel  this am  No CP, She is SOB but improved today     Covid PNA    Per primary    On remdesivir     PAF with possible TIA    On amio, stopped toprol d/t tachypnea-ok to cont. To hold today as she is in ADHF    Rate stable and sinus    On Lovenox-Dimer elevated- echo was stable- no signs of RV strain    CTA head and neck neg. Awaiting MRI     Acute on Chronic HFpEF    EF 50-55% on echo    BNP very elevated    Renal following and on Lasix high dose    Good UOP    Cont. Med. Tx.     Will cont.  To follow       PAST MEDICAL HISTORY:  The patient sees Dr. Mio Spencer for her COPD, history  of having heart failure with apical ballooning that improved, history of  heart catheterization in 2017, found to have nonobstructive coronary  artery disease present, hypertension, hyperlipidemia, fibromyalgia,  atrial flutter, 2:1 conduction noted, and she was on anticoagulation for  that.     PAST SURGICAL HISTORY:  Gallbladder surgery, hysterectomy done, and  appendectomy.     SOCIAL HISTORY:  She does not smoke, does not drink.  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     ALLERGIES:  HUMIRA, REMICADE, LORAZEPAM, _____, COMPAZINE, and  DOXYCYCLINE.     MEDICATIONS AT HOME:  She was on iron sulfate, Zoloft, Claritin, Lasix,  Xanax, aspirin, amiodarone, Toprol 25 mg b.i.d., and Aldactone. Objective:   BP (!) 160/87   Pulse 91   Temp 99.2 °F (37.3 °C)   Resp 23   Ht 5' 7\" (1.702 m)   Wt 149 lb 11.1 oz (67.9 kg)   SpO2 99%   BMI 23.45 kg/m²       Intake/Output Summary (Last 24 hours) at 11/18/2020 0917  Last data filed at 11/18/2020 0446  Gross per 24 hour   Intake 346.67 ml   Output 2725 ml   Net -2378.33 ml       Medications:   Scheduled Meds:   metoprolol  5 mg Intravenous Q8H    furosemide  40 mg Intravenous TID    guaiFENesin  200 mg Oral Q6H    methylPREDNISolone  40 mg Intravenous Q12H    cefepime  2 g Intravenous Q12H    metroNIDAZOLE  500 mg Intravenous Q8H    acetaminophen  650 mg Rectal Once    vancomycin  1,000 mg Intravenous Q18H    clopidogrel  75 mg Oral Daily    zinc sulfate  50 mg Oral Daily    sodium chloride flush  10 mL Intravenous 2 times per day    aspirin  81 mg Oral Daily    Or    aspirin  300 mg Rectal Daily    rosuvastatin  40 mg Oral Nightly    albuterol sulfate HFA  2 puff Inhalation 4x daily    tiotropium  2 puff Inhalation Daily    enoxaparin  30 mg Subcutaneous BID    traZODone  50 mg Oral Nightly    vitamin B-12  1,000 mcg Oral Daily    pantoprazole  40 mg Oral QAM AC    vitamin D  2 capsule Oral Daily    busPIRone  5 mg Oral TID    amiodarone  200 mg Oral Daily    amLODIPine  5 mg Oral Daily      Infusions:     PRN Meds:  fentanNYL, diazePAM, sodium chloride, sodium chloride flush, acetaminophen **OR** acetaminophen, polyethylene glycol, labetalol, oxyCODONE-acetaminophen, ALPRAZolam       Physical Exam:  Vitals:    11/18/20 0820   BP:    Pulse: 91   Resp: 23   Temp:    SpO2:         General: AAO, NAD  Chest: Nontender  Cardiac: First and Second Heart Sounds are Normal, No Murmurs or Gallops noted  Lungs:Clear to auscultation and percussion. Abdomen: Soft, NT, ND, +BS  Extremities: No clubbing, no edema  Vascular:  Equal 2+ peripheral pulses.         Lab Data:  CBC:   Recent Labs     11/15/20  1235 11/17/20  0419 11/18/20  0315   WBC 12.9* 20.1* 23.9*   HGB 10.1* 10.5* 11.5*   HCT 32.7* 35.0* 38.6   MCV 80.3 78.7 78.8    335 454*     BMP:   Recent Labs     11/17/20  0419 11/17/20  1920 11/18/20  0315    141 141   K 4.4 4.1 4.2    103 104   CO2 21 23 25   PHOS 2.5  --  3.0   BUN 47* 53* 55*   CREATININE 0.9 1.0 1.1     LIVER PROFILE:   Recent Labs     11/15/20  1235 11/17/20  0419 11/18/20 0315   AST 54* 75*  75* 55*   ALT 82* 69*  69* 58*   BILIDIR  --  0.2 0.2   BILITOT 0.2 0.3  0.3 0.3   ALKPHOS 76 88  88 80     PT/INR:   Recent Labs     11/15/20  1235   PROTIME 11.3*   INR 0.93     APTT: No results for input(s): APTT in the last 72 hours. BNP:  No results for input(s): BNP in the last 72 hours.       Assessment:  Patient Active Problem List    Diagnosis Date Noted    Transaminitis 11/17/2020    Acute hypoxemic respiratory failure due to COVID-19 [facility]) 11/15/2020    Chronic hypoxemic respiratory failure ([facility] [address] 09/09/2020    Symptomatic anemia 03/24/2020    Wide-complex tachycardia ([facility] [address] 02/10/2020    Gram-negative pneumonia ([facility] [address] 37/65/4970    Acute systolic heart failure ([facility] [address] 02/10/2020    Pneumonia of right upper lobe due to infectious organism     Chronic fatigue 07/08/2019    Intractable low back pain 06/11/2019    Back pain 06/09/2019    Shortness of breath 04/25/2019    Former smoker     Acute exacerbation of chronic obstructive pulmonary disease (COPD) ([address] 12/13/2018    COPD, severe ([facility] [address] 10/24/2017    Blood loss anemia 08/01/2017    Takotsubo syndrome 05/01/2017    Panic attack 11/18/2016    Lumbar spinal stenosis 01/01/2015    Vitamin B12 deficiency 12/01/2014    Osteoporosis 03/16/2012    Depression     Migraine 10/06/2010    Rheumatoid Arthritis     Hypertension     Hyperlipidemia     Fibromyalgia     Chronic pain syndrome        Electronically signed by Antonette Panda PA-C on 11/18/2020 at 9:17 AM

## 2020-11-18 NOTE — PLAN OF CARE
Problem: Airway Clearance - Ineffective  Goal: Achieve or maintain patent airway  11/18/2020 1156 by Serafin Bernal RN  Outcome: Ongoing  11/17/2020 2321 by Dieudonne Griffin RN  Outcome: Ongoing     Problem: Gas Exchange - Impaired  Goal: Absence of hypoxia  11/18/2020 1156 by Serafin Bernal RN  Outcome: Ongoing  11/17/2020 2321 by Dieudonne Griffin RN  Outcome: Ongoing  Goal: Promote optimal lung function  11/18/2020 1156 by Serafin Bernal RN  Outcome: Ongoing  11/17/2020 2321 by Dieudonne Griffin RN  Outcome: Ongoing     Problem: Breathing Pattern - Ineffective  Goal: Ability to achieve and maintain a regular respiratory rate  11/18/2020 1156 by Serafin Bernal RN  Outcome: Ongoing  11/17/2020 2321 by Dieudonne Griffin RN  Outcome: Ongoing     Problem:  Body Temperature -  Risk of, Imbalanced  Goal: Ability to maintain a body temperature within defined limits  11/18/2020 1156 by Serafin Bernal RN  Outcome: Ongoing  11/17/2020 2321 by Dieudonne Griffin RN  Outcome: Ongoing  Goal: Will regain or maintain usual level of consciousness  11/18/2020 1156 by Serafin Bernal RN  Outcome: Ongoing  11/17/2020 2321 by Dieudonne Griffin RN  Outcome: Ongoing  Goal: Complications related to the disease process, condition or treatment will be avoided or minimized  11/18/2020 1156 by Serafin Bernal RN  Outcome: Ongoing  11/17/2020 2321 by Dieudonne Griffin RN  Outcome: Ongoing     Problem: Isolation Precautions - Risk of Spread of Infection  Goal: Prevent transmission of infection  11/18/2020 1156 by Serafin Bernal RN  Outcome: Ongoing  11/17/2020 2321 by Dieudonne Griffin RN  Outcome: Ongoing     Problem: Nutrition Deficits  Goal: Optimize nutrtional status  11/18/2020 1156 by Serafin Bernal RN  Outcome: Ongoing  11/17/2020 2321 by Dieudonne Griffin RN  Outcome: Ongoing     Problem: Risk for Fluid Volume Deficit  Goal: Maintain normal heart rhythm  11/18/2020 1156 by Nancy Kaushik Cardoza RN  Outcome: Ongoing  11/17/2020 2321 by Dieudonne Griffin RN  Outcome: Ongoing  Goal: Maintain absence of muscle cramping  11/18/2020 1156 by Serafin Bernal RN  Outcome: Ongoing  11/17/2020 2321 by Dieudonne Griffin RN  Outcome: Ongoing  Goal: Maintain normal serum potassium, sodium, calcium, phosphorus, and pH  11/18/2020 1156 by Serafin Bernal RN  Outcome: Ongoing  11/17/2020 2321 by Dieudonne Griffin RN  Outcome: Ongoing     Problem: Loneliness or Risk for Loneliness  Goal: Demonstrate positive use of time alone when socialization is not possible  11/18/2020 1156 by Serafin Bernal RN  Outcome: Ongoing  11/17/2020 2321 by Dieudonne Griffin RN  Outcome: Ongoing     Problem: Fatigue  Goal: Verbalize increase energy and improved vitality  11/18/2020 1156 by Serafin Bernal RN  Outcome: Ongoing  11/17/2020 2321 by Dieudonne Griffin RN  Outcome: Ongoing     Problem: Patient Education: Go to Patient Education Activity  Goal: Patient/Family Education  11/18/2020 1156 by Serafin Bernal RN  Outcome: Ongoing  11/17/2020 2321 by Dieudonne Griffin RN  Outcome: Ongoing     Problem: Skin Integrity:  Goal: Will show no infection signs and symptoms  Description: Will show no infection signs and symptoms  11/18/2020 1156 by Serafin Bernal RN  Outcome: Ongoing  11/17/2020 2321 by Dieudonne Griffin RN  Outcome: Ongoing  Goal: Absence of new skin breakdown  Description: Absence of new skin breakdown  11/18/2020 1156 by Serafin Bernal RN  Outcome: Ongoing  11/17/2020 2321 by Dieudonne Griffin RN  Outcome: Ongoing     Problem: Falls - Risk of:  Goal: Will remain free from falls  Description: Will remain free from falls  11/18/2020 1156 by Serafin Bernal RN  Outcome: Ongoing  11/17/2020 2321 by Dieudonne Griffin RN  Outcome: Ongoing  Goal: Absence of physical injury  Description: Absence of physical injury  11/18/2020 1156 by Serafin Bernal RN  Outcome: Ongoing  11/17/2020 2321 by Gualberto Gilmore RN  Outcome: Ongoing     Problem: Restraint Use - Nonviolent/Non-Self-Destructive Behavior:  Goal: Absence of restraint indications  Description: Absence of restraint indications  11/18/2020 1156 by Brayan Yu RN  Outcome: Ongoing  11/17/2020 2321 by Gualberto Gilmore RN  Outcome: Ongoing  Goal: Absence of restraint-related injury  Description: Absence of restraint-related injury  11/18/2020 1156 by Brayan Yu RN  Outcome: Ongoing  11/17/2020 2321 by Gualberto Gilmore RN  Outcome: Ongoing

## 2020-11-18 NOTE — PROGRESS NOTES
Nephrology Progress Note  11/18/2020 12:18 PM  Subjective:      Interval History: Nael Beltran is a [de-identified] y.o. female with weakness and on vapotherm but less sob        Data:   Scheduled Meds:   metoprolol tartrate  50 mg Oral BID    rosuvastatin  10 mg Oral Nightly    lisinopril  5 mg Oral Daily    guaiFENesin  200 mg Oral Q6H    methylPREDNISolone  40 mg Intravenous Q12H    cefepime  2 g Intravenous Q12H    metroNIDAZOLE  500 mg Intravenous Q8H    acetaminophen  650 mg Rectal Once    vancomycin  1,000 mg Intravenous Q18H    clopidogrel  75 mg Oral Daily    zinc sulfate  50 mg Oral Daily    sodium chloride flush  10 mL Intravenous 2 times per day    aspirin  81 mg Oral Daily    albuterol sulfate HFA  2 puff Inhalation 4x daily    tiotropium  2 puff Inhalation Daily    enoxaparin  30 mg Subcutaneous BID    traZODone  50 mg Oral Nightly    vitamin B-12  1,000 mcg Oral Daily    pantoprazole  40 mg Oral QAM AC    vitamin D  2 capsule Oral Daily    busPIRone  5 mg Oral TID    amiodarone  200 mg Oral Daily     Continuous Infusions:        CBC:   Recent Labs     11/15/20  1235 11/17/20  0419 11/18/20  0315   WBC 12.9* 20.1* 23.9*   HGB 10.1* 10.5* 11.5*    335 454*     BMP:    Recent Labs     11/17/20  0419 11/17/20  1920 11/18/20  0315    141 141   K 4.4 4.1 4.2    103 104   CO2 21 23 25   BUN 47* 53* 55*   CREATININE 0.9 1.0 1.1   GLUCOSE 98 238* 129*       Renal Labs  Albumin:    Lab Results   Component Value Date    LABALBU 3.0 11/18/2020    LABALBU 3.0 11/18/2020     Calcium:    Lab Results   Component Value Date    CALCIUM 8.6 11/18/2020     Phosphorus:    Lab Results   Component Value Date    PHOS 3.0 11/18/2020     U/A:    Lab Results   Component Value Date    NITRU NEGATIVE 11/17/2020    COLORU STRAW 11/17/2020    WBCUA 1 11/17/2020    RBCUA 55 11/17/2020    MUCUS RARE 11/17/2020    TRICHOMONAS NONE SEEN 11/17/2020    BACTERIA NEGATIVE 11/17/2020    CLARITYU CLEAR 11/17/2020    SPECGRAV 1.010 11/17/2020    UROBILINOGEN NORMAL 11/17/2020    BILIRUBINUR NEGATIVE 11/17/2020    BLOODU MODERATE 11/17/2020    KETUA NEGATIVE 11/17/2020    AMORPHOUS RARE 02/06/2020           Objective:   I/O: 11/17 0701 - 11/18 0700  In: 346.7 [P.O.:120; I.V.:10]  Out: 2725 [Urine:2725]  Vitals: BP (!) 140/77   Pulse 91   Temp 98.9 °F (37.2 °C)   Resp 20   Ht 5' 7\" (1.702 m)   Wt 149 lb 11.1 oz (67.9 kg)   SpO2 95%   BMI 23.45 kg/m²   General appearance: awake weak  HEENT: Head: Normal, normocephalic, atraumatic.   Neck: supple, symmetrical, trachea midline  Lungs: diminished breath sounds bilaterally  Heart: S1, S2 normal  Abdomen: abnormal findings:  soft nt  Extremities: edema trace  Neurologic: Mental status: alertness: alert        Assessment and Plan:      IMP:  1 arf from atn/contrast  2 covid +  3 chronic pain with confusion and facial droop  4 hypoxia with pulm congestion  5 hx a fib with MR and AR  6 htn    Plan     1 renal slight increase with diuresis and monitor  2 treat with covid protocol  3 affect better today monitor  4 wean o2 from vapotherm  5 maintain on diuretic and negative balance with K stbale  6 hr stable  7 bp stable  Will follow             Molly Carbajal MD

## 2020-11-19 ENCOUNTER — APPOINTMENT (OUTPATIENT)
Dept: CT IMAGING | Age: 80
DRG: 871 | End: 2020-11-19
Payer: MEDICARE

## 2020-11-19 LAB
ALBUMIN SERPL-MCNC: 2.6 GM/DL (ref 3.4–5)
ALBUMIN SERPL-MCNC: 2.6 GM/DL (ref 3.4–5)
ALP BLD-CCNC: 76 IU/L (ref 40–129)
ALT SERPL-CCNC: 43 U/L (ref 10–40)
ANION GAP SERPL CALCULATED.3IONS-SCNC: 12 MMOL/L (ref 4–16)
AST SERPL-CCNC: 45 IU/L (ref 15–37)
BASOPHILS ABSOLUTE: 0.1 K/CU MM
BASOPHILS RELATIVE PERCENT: 0.3 % (ref 0–1)
BILIRUB SERPL-MCNC: 0.3 MG/DL (ref 0–1)
BILIRUBIN DIRECT: 0.2 MG/DL (ref 0–0.3)
BILIRUBIN, INDIRECT: 0.1 MG/DL (ref 0–0.7)
BUN BLDV-MCNC: 56 MG/DL (ref 6–23)
CALCIUM SERPL-MCNC: 8.1 MG/DL (ref 8.3–10.6)
CHLORIDE BLD-SCNC: 104 MMOL/L (ref 99–110)
CO2: 25 MMOL/L (ref 21–32)
CREAT SERPL-MCNC: 1.1 MG/DL (ref 0.6–1.1)
CULTURE: NORMAL
D DIMER: >5250 NG/ML(DDU)
DIFFERENTIAL TYPE: ABNORMAL
DOSE AMOUNT: NORMAL
DOSE TIME: NORMAL
EOSINOPHILS ABSOLUTE: 0 K/CU MM
EOSINOPHILS RELATIVE PERCENT: 0 % (ref 0–3)
FOLATE: 10 NG/ML (ref 3.1–17.5)
GFR AFRICAN AMERICAN: 58 ML/MIN/1.73M2
GFR NON-AFRICAN AMERICAN: 48 ML/MIN/1.73M2
GLUCOSE BLD-MCNC: 127 MG/DL (ref 70–99)
HCT VFR BLD CALC: 32.2 % (ref 37–47)
HEMOGLOBIN: 9.7 GM/DL (ref 12.5–16)
HIGH SENSITIVE C-REACTIVE PROTEIN: 144.4 MG/L
HOMOCYSTEINE: 11 UMOL/L (ref 0–10)
IMMATURE NEUTROPHIL %: 0.7 % (ref 0–0.43)
LYMPHOCYTES ABSOLUTE: 0.4 K/CU MM
LYMPHOCYTES RELATIVE PERCENT: 2.4 % (ref 24–44)
Lab: NORMAL
MCH RBC QN AUTO: 23.8 PG (ref 27–31)
MCHC RBC AUTO-ENTMCNC: 30.1 % (ref 32–36)
MCV RBC AUTO: 78.9 FL (ref 78–100)
MONOCYTES ABSOLUTE: 0.4 K/CU MM
MONOCYTES RELATIVE PERCENT: 2.3 % (ref 0–4)
NUCLEATED RBC %: 0 %
PDW BLD-RTO: 16.9 % (ref 11.7–14.9)
PHOSPHORUS: 2.6 MG/DL (ref 2.5–4.9)
PLATELET # BLD: 380 K/CU MM (ref 140–440)
PMV BLD AUTO: 10.3 FL (ref 7.5–11.1)
POTASSIUM SERPL-SCNC: 3.8 MMOL/L (ref 3.5–5.1)
PRO-BNP: ABNORMAL PG/ML
PROCALCITONIN: 0.59
RBC # BLD: 4.08 M/CU MM (ref 4.2–5.4)
SEGMENTED NEUTROPHILS ABSOLUTE COUNT: 17.4 K/CU MM
SEGMENTED NEUTROPHILS RELATIVE PERCENT: 94.3 % (ref 36–66)
SODIUM BLD-SCNC: 141 MMOL/L (ref 135–145)
SPECIMEN: NORMAL
TOTAL IMMATURE NEUTOROPHIL: 0.13 K/CU MM
TOTAL NUCLEATED RBC: 0 K/CU MM
TOTAL PROTEIN: 4.8 GM/DL (ref 6.4–8.2)
VANCOMYCIN TROUGH: 16.4 UG/ML (ref 10–20)
VITAMIN B-12: >2000 PG/ML (ref 211–911)
WBC # BLD: 18.5 K/CU MM (ref 4–10.5)

## 2020-11-19 PROCEDURE — 80202 ASSAY OF VANCOMYCIN: CPT

## 2020-11-19 PROCEDURE — 6370000000 HC RX 637 (ALT 250 FOR IP): Performed by: INTERNAL MEDICINE

## 2020-11-19 PROCEDURE — 6360000002 HC RX W HCPCS: Performed by: INTERNAL MEDICINE

## 2020-11-19 PROCEDURE — 85379 FIBRIN DEGRADATION QUANT: CPT

## 2020-11-19 PROCEDURE — 82248 BILIRUBIN DIRECT: CPT

## 2020-11-19 PROCEDURE — 94761 N-INVAS EAR/PLS OXIMETRY MLT: CPT

## 2020-11-19 PROCEDURE — 82607 VITAMIN B-12: CPT

## 2020-11-19 PROCEDURE — 94664 DEMO&/EVAL PT USE INHALER: CPT

## 2020-11-19 PROCEDURE — 6360000002 HC RX W HCPCS: Performed by: HOSPITALIST

## 2020-11-19 PROCEDURE — 2700000000 HC OXYGEN THERAPY PER DAY

## 2020-11-19 PROCEDURE — 82746 ASSAY OF FOLIC ACID SERUM: CPT

## 2020-11-19 PROCEDURE — 2580000003 HC RX 258: Performed by: HOSPITALIST

## 2020-11-19 PROCEDURE — 92526 ORAL FUNCTION THERAPY: CPT

## 2020-11-19 PROCEDURE — 2580000003 HC RX 258: Performed by: INTERNAL MEDICINE

## 2020-11-19 PROCEDURE — 84145 PROCALCITONIN (PCT): CPT

## 2020-11-19 PROCEDURE — 83090 ASSAY OF HOMOCYSTEINE: CPT

## 2020-11-19 PROCEDURE — 70450 CT HEAD/BRAIN W/O DYE: CPT

## 2020-11-19 PROCEDURE — 80053 COMPREHEN METABOLIC PANEL: CPT

## 2020-11-19 PROCEDURE — 6370000000 HC RX 637 (ALT 250 FOR IP): Performed by: HOSPITALIST

## 2020-11-19 PROCEDURE — 84100 ASSAY OF PHOSPHORUS: CPT

## 2020-11-19 PROCEDURE — 85025 COMPLETE CBC W/AUTO DIFF WBC: CPT

## 2020-11-19 PROCEDURE — 2060000000 HC ICU INTERMEDIATE R&B

## 2020-11-19 PROCEDURE — 2500000003 HC RX 250 WO HCPCS: Performed by: INTERNAL MEDICINE

## 2020-11-19 PROCEDURE — 86141 C-REACTIVE PROTEIN HS: CPT

## 2020-11-19 PROCEDURE — 99233 SBSQ HOSP IP/OBS HIGH 50: CPT | Performed by: INTERNAL MEDICINE

## 2020-11-19 PROCEDURE — 94640 AIRWAY INHALATION TREATMENT: CPT

## 2020-11-19 PROCEDURE — 83880 ASSAY OF NATRIURETIC PEPTIDE: CPT

## 2020-11-19 RX ORDER — B12/LEVOMEFOLATE CALCIUM/B-6 2-1.13-25
1 TABLET ORAL DAILY
Status: DISCONTINUED | OUTPATIENT
Start: 2020-11-19 | End: 2020-11-23 | Stop reason: HOSPADM

## 2020-11-19 RX ORDER — CLONIDINE HYDROCHLORIDE 0.1 MG/1
0.1 TABLET ORAL 2 TIMES DAILY
Status: DISCONTINUED | OUTPATIENT
Start: 2020-11-19 | End: 2020-11-20

## 2020-11-19 RX ORDER — PANTOPRAZOLE SODIUM 40 MG/1
40 TABLET, DELAYED RELEASE ORAL
Status: DISCONTINUED | OUTPATIENT
Start: 2020-11-20 | End: 2020-11-20

## 2020-11-19 RX ADMIN — BUSPIRONE HYDROCHLORIDE 5 MG: 5 TABLET ORAL at 23:11

## 2020-11-19 RX ADMIN — PANTOPRAZOLE SODIUM 40 MG: 40 TABLET, DELAYED RELEASE ORAL at 06:10

## 2020-11-19 RX ADMIN — CLOPIDOGREL BISULFATE 75 MG: 75 TABLET ORAL at 09:09

## 2020-11-19 RX ADMIN — ENOXAPARIN SODIUM 30 MG: 30 INJECTION SUBCUTANEOUS at 23:12

## 2020-11-19 RX ADMIN — CYANOCOBALAMIN TAB 1000 MCG 1000 MCG: 1000 TAB at 09:09

## 2020-11-19 RX ADMIN — CLONIDINE HYDROCHLORIDE 0.1 MG: 0.1 TABLET ORAL at 11:37

## 2020-11-19 RX ADMIN — CLONIDINE HYDROCHLORIDE 0.1 MG: 0.1 TABLET ORAL at 23:12

## 2020-11-19 RX ADMIN — ALBUTEROL SULFATE 2 PUFF: 90 AEROSOL, METERED RESPIRATORY (INHALATION) at 11:48

## 2020-11-19 RX ADMIN — METHYLPREDNISOLONE SODIUM SUCCINATE 40 MG: 40 INJECTION, POWDER, FOR SOLUTION INTRAMUSCULAR; INTRAVENOUS at 09:10

## 2020-11-19 RX ADMIN — METRONIDAZOLE 500 MG: 500 INJECTION, SOLUTION INTRAVENOUS at 11:38

## 2020-11-19 RX ADMIN — VANCOMYCIN HYDROCHLORIDE 1000 MG: 1 INJECTION, POWDER, LYOPHILIZED, FOR SOLUTION INTRAVENOUS at 23:13

## 2020-11-19 RX ADMIN — ALBUTEROL SULFATE 2 PUFF: 90 AEROSOL, METERED RESPIRATORY (INHALATION) at 08:02

## 2020-11-19 RX ADMIN — VANCOMYCIN HYDROCHLORIDE 1000 MG: 1 INJECTION, POWDER, LYOPHILIZED, FOR SOLUTION INTRAVENOUS at 06:10

## 2020-11-19 RX ADMIN — ENOXAPARIN SODIUM 30 MG: 30 INJECTION SUBCUTANEOUS at 09:10

## 2020-11-19 RX ADMIN — BUSPIRONE HYDROCHLORIDE 5 MG: 5 TABLET ORAL at 09:09

## 2020-11-19 RX ADMIN — SODIUM CHLORIDE, PRESERVATIVE FREE 10 ML: 5 INJECTION INTRAVENOUS at 09:23

## 2020-11-19 RX ADMIN — METRONIDAZOLE 500 MG: 500 INJECTION, SOLUTION INTRAVENOUS at 23:13

## 2020-11-19 RX ADMIN — ALPRAZOLAM 0.5 MG: 0.5 TABLET ORAL at 06:10

## 2020-11-19 RX ADMIN — METRONIDAZOLE 500 MG: 500 INJECTION, SOLUTION INTRAVENOUS at 06:10

## 2020-11-19 RX ADMIN — TRAZODONE HYDROCHLORIDE 50 MG: 50 TABLET ORAL at 23:12

## 2020-11-19 RX ADMIN — TIOTROPIUM BROMIDE INHALATION SPRAY 2 PUFF: 3.12 SPRAY, METERED RESPIRATORY (INHALATION) at 08:03

## 2020-11-19 RX ADMIN — ZINC SULFATE 220 MG (50 MG) CAPSULE 50 MG: CAPSULE at 09:09

## 2020-11-19 RX ADMIN — GUAIFENESIN 200 MG: 100 SOLUTION ORAL at 23:12

## 2020-11-19 RX ADMIN — ASPIRIN 81 MG: 81 TABLET, FILM COATED ORAL at 09:09

## 2020-11-19 RX ADMIN — SODIUM CHLORIDE, PRESERVATIVE FREE 10 ML: 5 INJECTION INTRAVENOUS at 23:14

## 2020-11-19 RX ADMIN — LISINOPRIL 5 MG: 5 TABLET ORAL at 09:09

## 2020-11-19 RX ADMIN — GUAIFENESIN 200 MG: 100 SOLUTION ORAL at 06:10

## 2020-11-19 RX ADMIN — BUSPIRONE HYDROCHLORIDE 5 MG: 5 TABLET ORAL at 17:07

## 2020-11-19 RX ADMIN — ALBUTEROL SULFATE 2 PUFF: 90 AEROSOL, METERED RESPIRATORY (INHALATION) at 15:45

## 2020-11-19 RX ADMIN — GUAIFENESIN 200 MG: 100 SOLUTION ORAL at 09:09

## 2020-11-19 RX ADMIN — CEFEPIME HYDROCHLORIDE 2 G: 2 INJECTION, POWDER, FOR SOLUTION INTRAVENOUS at 11:38

## 2020-11-19 RX ADMIN — METOPROLOL TARTRATE 50 MG: 50 TABLET, FILM COATED ORAL at 23:12

## 2020-11-19 RX ADMIN — ALPRAZOLAM 0.5 MG: 0.5 TABLET ORAL at 23:11

## 2020-11-19 RX ADMIN — GUAIFENESIN 200 MG: 100 SOLUTION ORAL at 17:07

## 2020-11-19 RX ADMIN — Medication 2000 UNITS: at 09:10

## 2020-11-19 RX ADMIN — METOPROLOL TARTRATE 50 MG: 50 TABLET, FILM COATED ORAL at 09:09

## 2020-11-19 RX ADMIN — CEFEPIME HYDROCHLORIDE 2 G: 2 INJECTION, POWDER, FOR SOLUTION INTRAVENOUS at 23:30

## 2020-11-19 RX ADMIN — METHYLPREDNISOLONE SODIUM SUCCINATE 40 MG: 40 INJECTION, POWDER, FOR SOLUTION INTRAMUSCULAR; INTRAVENOUS at 23:13

## 2020-11-19 RX ADMIN — ALBUTEROL SULFATE 2 PUFF: 90 AEROSOL, METERED RESPIRATORY (INHALATION) at 19:31

## 2020-11-19 RX ADMIN — ALPRAZOLAM 0.5 MG: 0.5 TABLET ORAL at 11:38

## 2020-11-19 RX ADMIN — ROSUVASTATIN CALCIUM 10 MG: 5 TABLET, COATED ORAL at 23:11

## 2020-11-19 RX ADMIN — Medication 1 TABLET: at 09:12

## 2020-11-19 RX ADMIN — AMIODARONE HYDROCHLORIDE 200 MG: 200 TABLET ORAL at 09:10

## 2020-11-19 ASSESSMENT — PAIN SCALES - GENERAL
PAINLEVEL_OUTOF10: 0

## 2020-11-19 NOTE — PROGRESS NOTES
Per RN Denisa Mcgovern, Dr. Renetta Butt ordered a CT of the head for this patient. The MRI is to be on hold, and attempted 11-20.

## 2020-11-19 NOTE — PROGRESS NOTES
Pulmonary and Critical Care  Progress Note    Subjective: The patient is better. On N/C.  SOB better. Chest pain none  Addressing respiratory complaints Patient is negative for  hemoptysis and cyanosis  CONSTITUTIONAL:  negative for fevers and chills      Past Medical History:     has a past medical history of Acute DVT of right tibial vein (HCC), Acute exacerbation of chronic obstructive pulmonary disease (COPD) ([address] Anemia, Arthritis, Atrial fibrillation ([address] CHF (congestive heart failure) (HCC), Chronic hypoxemic respiratory failure (HCC), Chronic pain syndrome, Clostridium difficile colitis, COPD, severe ([address] Depression, Fibromyalgia, Former smoker, Herniated cervical disc, Herpes zoster ophthalmicus, Hx of blood clots, Hyperlipidemia, Hypertension, Kidney disease, L3 vertebral fracture (HCC), Lumbar spinal stenosis, Migraine, Nocturnal hypoxemia, Osteoporosis, Rheumatoid arthritis(714.0), S/P cardiac catheterization, Shortness of breath, Takotsubo syndrome, TMJ dysfunction, Tobacco abuse, and Vitamin B12 deficiency. has a past surgical history that includes Cholecystectomy (1966); devyn and bso (cervix removed) (1991); Wrist fusion (Left, 2000); Elbow surgery (Right); Appendectomy (1966); vertebroplasty (10/2010); Foot surgery (1986); Toe Surgery (Left, 4/25/2013); Cardiac catheterization (05/21/2017); pr colonoscopy flx dx w/collj spec when pfrmd (N/A, 10/2/2018); Fixation Kyphoplasty (06/12/2019); Hysterectomy; Upper gastrointestinal endoscopy (N/A, 3/11/2020); and Colonoscopy (N/A, 3/11/2020). reports that she quit smoking about 10 months ago. Her smoking use included cigarettes. She started smoking about 58 years ago. She has a 41.25 pack-year smoking history. She has never used smokeless tobacco. She reports that she does not drink alcohol or use drugs.   Family history:  family history includes Arthritis in her mother; Cancer in an other family member; Emphysema in her father; Heart Disease in her father and another family member; Kidney Disease in her son; Stroke in her mother. Allergies   Allergen Reactions    Ativan [Lorazepam] Other (See Comments)     Violent, thrashing and combative. She has lacerations and bruising, had to be tied down.  Etanercept Other (See Comments)     No response    Humira [Adalimumab]     Prochlorperazine Edisylate Other (See Comments)     \"Compazine\"   Involuntary Muscle Spasms     Remicade [Infliximab Injection]     Doxycycline Other (See Comments)     Stomach pains     Social History:    Reviewed; no changes    Objective:   PHYSICAL EXAM:        VITALS:  /72   Pulse 80   Temp 98.7 °F (37.1 °C) (Oral)   Resp 20   Ht 5' 7\" (1.702 m)   Wt 149 lb 11.1 oz (67.9 kg)   SpO2 99%   BMI 23.45 kg/m²     24HR INTAKE/OUTPUT:      Intake/Output Summary (Last 24 hours) at 11/19/2020 1135  Last data filed at 11/19/2020 0254  Gross per 24 hour   Intake --   Output 500 ml   Net -500 ml       CONSTITUTIONAL:  Awake. LUNGS:  decreased breath sounds, occ basilar crackles. CARDIOVASCULAR:  normal S1 and S2 and negative JVD  ABD:Abdomen soft, non-tender. BS normal. No masses,  No organomegaly  NEURO:Alert.   DATA:    CBC:  Recent Labs     11/17/20  0419 11/18/20 0315 11/19/20  0520   WBC 20.1* 23.9* 18.5*   RBC 4.45 4.90 4.08*   HGB 10.5* 11.5* 9.7*   HCT 35.0* 38.6 32.2*    454* 380   MCV 78.7 78.8 78.9   MCH 23.6* 23.5* 23.8*   MCHC 30.0* 29.8* 30.1*   RDW 17.0* 17.1* 16.9*   SEGSPCT 73.0* 75.0* 94.3*   BANDSPCT 15* 21*  --       BMP:  Recent Labs     11/17/20 1920 11/18/20 0315 11/19/20  0520    141 141   K 4.1 4.2 3.8    104 104   CO2 23 25 25   BUN 53* 55* 56*   CREATININE 1.0 1.1 1.1   CALCIUM 8.2* 8.6 8.1*   GLUCOSE 238* 129* 127*      ABG:  Recent Labs     11/17/20  0815   PH 7.39   PO2ART 93   [id number] 36.0   O2SAT 95.6*     Lab Results   Component Value Date    PROBNP 35,438 (H) 11/19/2020    PROBNP >70,000 (H) 11/18/2020    PROBNP 606.9 (H) 03/24/2020     No results found for: [facility]    Radiology Review:  Pertinent images / reports were reviewed as a part of this visit. Assessment:     Patient Active Problem List   Diagnosis    Rheumatoid Arthritis    Hypertension    Hyperlipidemia    Fibromyalgia    Migraine    Chronic pain syndrome    Depression    Osteoporosis    Vitamin B12 deficiency    Lumbar spinal stenosis    Panic attack    Takotsubo syndrome    Blood loss anemia    COPD, severe (HCC)    Acute exacerbation of chronic obstructive pulmonary disease (COPD) ([address]    Former smoker    Shortness of breath    Back pain    Intractable low back pain    Chronic fatigue    Pneumonia of right upper lobe due to infectious organism    Wide-complex tachycardia (HCC)    Gram-negative pneumonia (HCC)    Acute systolic heart failure (HCC)    Symptomatic anemia    Chronic hypoxemic respiratory failure (HCC)    Acute hypoxemic respiratory failure due to COVID-19 (HCC)    Transaminitis       Plan:   1. Overall the patient is better. 2. Wean FiO2.     Joce Geronimo MD  11/19/2020  11:35 AM

## 2020-11-19 NOTE — PROGRESS NOTES
[id number] [place] OF SPEECH/LANGUAGE PATHOLOGY  DAILY PROGRESS NOTE  Jackie Talbert  11/19/2020  [id number]  Facial droop [R29.810]  Acute hypoxemic respiratory failure due to COVID-19 (HCC) [U07.1, J96.01]  COVID-19 [U07.1]  Allergies   Allergen Reactions    Ativan [Lorazepam] Other (See Comments)     Violent, thrashing and combative. She has lacerations and bruising, had to be tied down.  Etanercept Other (See Comments)     No response    Humira [Adalimumab]     Prochlorperazine Edisylate Other (See Comments)     \"Compazine\"   Involuntary Muscle Spasms     Remicade [Infliximab Injection]     Doxycycline Other (See Comments)     Stomach pains         Pt was seen this date for dysphagia treatment. IMPRESSION AND RECOMMENDATIONS:   Jackie Talbert was seen for a bedside swallowing treatment and diet tolerance monitoring. Pt was alert and appeared confused throughout assessment. She was able to answer simple questions and identify objects in 10/10 trials. Pt stated \"I get to talk to my children tonight\" and requested a glass of water. She was positioned upright in bed and accepted PO trials of thin liquids, puree and soft solids. Oral holding and slow A-P transit was observed with all PO intake. Lingual residue was observed with trials of soft solids. Pharyngeal swallow appeared delayed with adequate laryngeal elevation. Clear vocal quality and 0 overt s/s of aspiration were observed with all PO trails given. Recommend continue dysphagia 2 diet/thin liquids with general aspiration precautions. ST will continue to follow. GOALS (current status in bold):  Short-term Goals  Timeframe for Short-term Goals: length of admission  Goal 1: Pt will tolerate dysphagia II diet/thin liquids with adequate oral manipulation/clearance and no s/s aspiration.  Goal being met, continue  Goal 2: Pt will tolerate PO trials of advanced textures with adequate oral manipulation/clearance and no s/s aspiration. DNT pt continues to appear SOB and demonstrating prolonged oral manipulation with soft solid trials   Goal 3: Pt will demonstrate airway protection strategies 9/10 trials given min cues. DNT  Goal 4: Pt/caregivers will indicate understanding of all recommendations.  Goal being met, continue                EDUCATION: recommendations/POC    PAIN RATING (0-10 Scale): denies   Time in/Time out: SLP Individual Minutes  Time In: 1530  Time Out: 1600  Minutes: 30    Visit number: [address] Katelin Aguilera, [address], [facility], 11/19/2020

## 2020-11-19 NOTE — PROGRESS NOTES
Nephrology Progress Note  11/19/2020 9:23 AM  Subjective:      Interval History: Isabel Villasenor is a [de-identified] y.o. female  More awake and also confused less congested        Data:   Scheduled Meds:   folic acid-pyridoxine-cyancobalamin  1 tablet Oral Daily    metoprolol tartrate  50 mg Oral BID    rosuvastatin  10 mg Oral Nightly    lisinopril  5 mg Oral Daily    ALPRAZolam  0.5 mg Oral Q8H    guaiFENesin  200 mg Oral Q6H    methylPREDNISolone  40 mg Intravenous Q12H    cefepime  2 g Intravenous Q12H    metroNIDAZOLE  500 mg Intravenous Q8H    acetaminophen  650 mg Rectal Once    vancomycin  1,000 mg Intravenous Q18H    clopidogrel  75 mg Oral Daily    zinc sulfate  50 mg Oral Daily    sodium chloride flush  10 mL Intravenous 2 times per day    aspirin  81 mg Oral Daily    albuterol sulfate HFA  2 puff Inhalation 4x daily    tiotropium  2 puff Inhalation Daily    enoxaparin  30 mg Subcutaneous BID    traZODone  50 mg Oral Nightly    vitamin B-12  1,000 mcg Oral Daily    pantoprazole  40 mg Oral QAM AC    vitamin D  2 capsule Oral Daily    busPIRone  5 mg Oral TID    amiodarone  200 mg Oral Daily     Continuous Infusions:        CBC:   Recent Labs     11/17/20  0419 11/18/20  0315 11/19/20  0520   WBC 20.1* 23.9* 18.5*   HGB 10.5* 11.5* 9.7*    454* 380     BMP:    Recent Labs     11/17/20  1920 11/18/20  0315 11/19/20  0520    141 141   K 4.1 4.2 3.8    104 104   CO2 23 25 25   BUN 53* 55* 56*   CREATININE 1.0 1.1 1.1   GLUCOSE 238* 129* 127*       Renal Labs  Albumin:    Lab Results   Component Value Date    LABALBU 2.6 11/19/2020    LABALBU 2.6 11/19/2020     Calcium:    Lab Results   Component Value Date    CALCIUM 8.1 11/19/2020     Phosphorus:    Lab Results   Component Value Date    PHOS 2.6 11/19/2020     U/A:    Lab Results   Component Value Date    NITRU NEGATIVE 11/17/2020    COLORU STRAW 11/17/2020    WBCUA 1 11/17/2020    RBCUA 55 11/17/2020    MUCUS RARE 11/17/2020 TRICHOMONAS NONE SEEN 11/17/2020    BACTERIA NEGATIVE 11/17/2020    CLARITYU CLEAR 11/17/2020    SPECGRAV 1.010 11/17/2020    UROBILINOGEN NORMAL 11/17/2020    BILIRUBINUR NEGATIVE 11/17/2020    BLOODU MODERATE 11/17/2020    KETUA NEGATIVE 11/17/2020    AMORPHOUS RARE 02/06/2020           Objective:   I/O: 11/18 0701 - 11/19 0700  In: -   Out: 500 [Urine:500]  Vitals: BP (!) 159/91   Pulse 94   Temp 97.6 °F (36.4 °C) (Oral)   Resp 23   Ht 5' 7\" (1.702 m)   Wt 149 lb 11.1 oz (67.9 kg)   SpO2 94%   BMI 23.45 kg/m²   General appearance: awake weak  HEENT: Head: Normal, normocephalic, atraumatic.   Neck: supple, symmetrical, trachea midline  Lungs: diminished breath sounds bilaterally  Heart: S1, S2 normal  Abdomen: abnormal findings:  soft nt  Extremities: edema trace  Neurologic: Mental status: alertness: alert        Assessment and Plan:      IMP:  1 arf from atn/contrast  2 covid +  3 chronic pain with confusion and facial droop  4 hypoxia with pulm congestion  5 hx a fib with MR and AR with anemia  6 htn    Plan     1 renal stable monitor uop with diuretic as need  2 o2 improved monitor  3 affect monitor fu neuro and with gi and card and neuro evalauted and plan restart anticoagulation and monitor hb  4 bp vary adjust   Will monitor               Ania Vila MD

## 2020-11-19 NOTE — CONSULTS
CATHETERIZATION  05/21/2017    CHOLECYSTECTOMY  1966    w/ appendectomy    COLONOSCOPY N/A 3/11/2020    COLONOSCOPY WITH BIOPSY performed by Lizzeth Gavin MD at [facility]     FIXATION KYPHOPLASTY  06/12/2019    L4 kyphoplasty ; [facility]    FOOT SURGERY  1986    HYSTERECTOMY      MT COLONOSCOPY FLX DX W/COLLJ SPEC WHEN PFRMD N/A 10/2/2018    COLONOSCOPY DIAGNOSTIC OR SCREENING performed by Deniz Jimenez MD at [address] [place] [address]    TOE SURGERY Left 4/25/2013    [place];     UPPER GASTROINTESTINAL ENDOSCOPY N/A 3/11/2020    EGD BIOPSY performed by Lizzeth Gavin MD at [place] [address] VERTEBROPLASTY  10/2010    L3    WRIST FUSION Left 2000     Current Medications:   Current Facility-Administered Medications: metoprolol tartrate (LOPRESSOR) tablet 50 mg, 50 mg, Oral, BID  rosuvastatin (CRESTOR) tablet 10 mg, 10 mg, Oral, Nightly  lisinopril (PRINIVIL;ZESTRIL) tablet 5 mg, 5 mg, Oral, Daily  ALPRAZolam (XANAX) tablet 0.5 mg, 0.5 mg, Oral, Q8H  guaiFENesin (ROBITUSSIN) 100 MG/5ML oral solution 200 mg, 200 mg, Oral, Q6H  methylPREDNISolone sodium (SOLU-MEDROL) injection 40 mg, 40 mg, Intravenous, Q12H  cefepime (MAXIPIME) 2 g IVPB minibag, 2 g, Intravenous, Q12H  metronidazole (FLAGYL) 500 mg in NaCl 100 mL IVPB premix, 500 mg, Intravenous, Q8H  fentaNYL (SUBLIMAZE) injection 12.5 mcg, 12.5 mcg, Intravenous, Q1H PRN  acetaminophen (TYLENOL) suppository 650 mg, 650 mg, Rectal, Once  vancomycin 1000 mg IVPB in 250 mL D5W addavial, 1,000 mg, Intravenous, Q18H  clopidogrel (PLAVIX) tablet 75 mg, 75 mg, Oral, Daily  diazePAM (VALIUM) injection 2.5 mg, 2.5 mg, Intravenous, Q4H PRN  0.9 % sodium chloride bolus, 30 mL, Intravenous, PRN  zinc sulfate (ZINCATE) capsule 50 mg, 50 mg, Oral, Daily  sodium chloride flush 0.9 % injection 10 mL, 10 mL, Intravenous, 2 times per day  sodium chloride flush 0.9 % injection 10 mL, 10 mL, Intravenous, PRN  acetaminophen (TYLENOL) tablet 650 mg, 650 mg, Oral, Q6H PRN **OR** acetaminophen (TYLENOL) suppository 650 mg, 650 mg, Rectal, Q6H PRN  polyethylene glycol (GLYCOLAX) packet 17 g, 17 g, Oral, Daily PRN  aspirin EC tablet 81 mg, 81 mg, Oral, Daily **OR** [DISCONTINUED] aspirin suppository 300 mg, 300 mg, Rectal, Daily  labetalol (NORMODYNE;TRANDATE) injection 10 mg, 10 mg, Intravenous, Q10 Min PRN  albuterol sulfate  (90 Base) MCG/ACT inhaler 2 puff, 2 puff, Inhalation, 4x daily  tiotropium (SPIRIVA RESPIMAT) 2.5 MCG/ACT inhaler 2 puff, 2 puff, Inhalation, Daily  enoxaparin (LOVENOX) injection 30 mg, 30 mg, Subcutaneous, BID  traZODone (DESYREL) tablet 50 mg, 50 mg, Oral, Nightly  vitamin B-12 (CYANOCOBALAMIN) tablet 1,000 mcg, 1,000 mcg, Oral, Daily  oxyCODONE-acetaminophen (PERCOCET) 5-325 MG per tablet 1 tablet, 1 tablet, Oral, Q6H PRN  pantoprazole (PROTONIX) tablet 40 mg, 40 mg, Oral, QAM AC  vitamin D CAPS 2,000 Units, 2 capsule, Oral, Daily  busPIRone (BUSPAR) tablet 5 mg, 5 mg, Oral, TID  amiodarone (CORDARONE) tablet 200 mg, 200 mg, Oral, Daily  Allergies:  Ativan [lorazepam]; Etanercept; Humira [adalimumab]; Prochlorperazine edisylate; Remicade [infliximab injection]; and Doxycycline    Social History:  TOBACCO:   reports that she quit smoking about 10 months ago. Her smoking use included cigarettes. She started smoking about 58 years ago. She has a 41.25 pack-year smoking history. She has never used smokeless tobacco.  ETOH:   reports no history of alcohol use. DRUGS:   reports no history of drug use.   Family History:       Problem Relation Age of Onset    Heart Disease Father          FROM [facility] Emphysema Father     Arthritis Mother     Stroke Mother     Heart Disease Other         family history    Cancer Other         family history -- larynx    Kidney Disease Son        REVIEW OF SYSTEMS:  CONSTITUTIONAL:  negative  HEENT:  negative  RESPIRATORY:  negative  CARDIOVASCULAR:  negative  GASTROINTESTINAL: negative  GENITOURINARY:  negative  MUSCULOSKELETAL:  negative  BEHAVIOR/PSYCH:  Negative    ROS neg    Family hx ne1    PHYSICAL EXAM  ------------------------  Vitals:  BP (!) 140/98   Pulse 80   Temp 99.1 °F (37.3 °C)   Resp 24   Ht 5' 7\" (1.702 m)   Wt 149 lb 11.1 oz (67.9 kg)   SpO2 93%   BMI 23.45 kg/m²      General:  Awake, alert, oriented X 3. Well developed, well nourished, well groomed. No apparent distress. HEENT:  Normocephalic, atraumatic. Pupils equal, round, reactive to light. No scleral icterus. No conjunctival injection. Normal lips, teeth, and gums. No nasal discharge. Neck:  Supple  Heart:  RRR, no murmurs, gallops, rubs  Lungs:  CTA bilaterally, bilat symmetrical expansion, no wheeze, rales, or rhonchi  Abdomen: Bowel sounds present, soft, nontender, no masses, no organomegaly, no peritoneal signs  Extremities:  No clubbing, cyanosis, or edema  Skin:  Warm and dry, no open lesions or rash  Breast: deferred  Rectal: deferred  Genitalia:  deferred    NEUROLOGICAL EXAM  ---------------------------------    Mental Status Exam:             Alert and oriented times three,follows commands,speech and language intact    Cranial Tozwts-SY-LGC Intact.         Cranial nerve II           Visual acuity:  normal                 Cranial nerve III           Pupils:  equal, round, reactive to light      Cranial nerves III, IV, VI           Extraocular Movements: intact      Cranial nerve V           Facial sensation:  intact      Cranial nerve VII           Facial strength: intact      Cranial nerve VIII           Hearing:  intact      Cranial nerve IX           Palate:  intact      Cranial nerve XI         Shoulder shrug:  intact      Cranial nerve XII          Tongue movement:  normal    Motor:    Drift:  absent  Motor exam is symmetrical 5 out of 5 all extremities bilaterally  Tone:  normal  Abnormal Movements:  Absent    DTRs-2+ biceps,triceps,brachioradialis,knee jerks and ankle jerks 11/17/2020    TRICHOMONAS NONE SEEN 11/17/2020    BACTERIA NEGATIVE 11/17/2020    CLARITYU CLEAR 11/17/2020    SPECGRAV 1.010 11/17/2020    LEUKOCYTESUR NEGATIVE 11/17/2020    UROBILINOGEN NORMAL 11/17/2020    BILIRUBINUR NEGATIVE 11/17/2020    BLOODU MODERATE 11/17/2020    AMORPHOUS RARE 02/06/2020     TSH:  No results found for: TSH  VITAMIN B12: No components found for: B12  FOLATE:    Lab Results   Component Value Date    FOLATE 5.2 03/25/2020     RPR:  No results found for: RPR  CHON:  No results found for: ANATITER, CHON  Urine Toxicology:  No components found for: IAMMENTA, IBARBIT, IBENZO, ICOCAINE, IMARTHC, IOPIATES, IPHENCYC     IMPRESSION:    TIA r/o cardio carotid embolic event    Hypoxic encephalopathy    Covid Pneumonia    Hx GI bleed    Hx PAF    PLAN:    CT brain as above    CTA head neck neg    C doppler    Echo    B 12 folate TSH homocysteine level    Asa plavix    Consult GI on if xarolto can be started for A fib-TIA    Discussed plan of care with pts nurse. Yessy Grant MD  BOARD CERTIFIED-NEUROLOGY.

## 2020-11-19 NOTE — PROGRESS NOTES
Daily Progress Note     patient is awake some confusion   Remain in sinus rate is stable this am  Neuro noted reviewed  Hx of PAFIB now sinus   covid positive   Hx of apical ballooning current EF is stable   Keep on diuretics per renal   Agree with [facility] [place] for pafib     Echo-11/16/20--Summary   This is a limited echocardiogram.   Left ventricular systolic function is normal.   Ejection fraction is visually estimated at 50-55%.   Moderate aortic regurgitation; PHT: 336 msec.   Moderate mitral regurgitation.   Small mobile calcification attached to the posterior mitral valve chordae   tendinae.   No evidence of any pericardial effusion.     PAST MEDICAL HISTORY:  The patient sees Dr. Nicolasa Manning for her COPD, history  of having heart failure with apical ballooning that improved, history of  heart catheterization in 2017, found to have nonobstructive coronary  artery disease present, hypertension, hyperlipidemia, fibromyalgia,  atrial flutter, 2:1 conduction noted, and she was on anticoagulation for  that.     PAST SURGICAL HISTORY:  Gallbladder surgery, hysterectomy done, and  appendectomy.     SOCIAL HISTORY:  She does not smoke, does not drink.  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     ALLERGIES:  HUMIRA, REMICADE, LORAZEPAM, _____, COMPAZINE, and  DOXYCYCLINE.     MEDICATIONS AT HOME:  She was on iron sulfate, Zoloft, Claritin, Lasix,  Xanax, aspirin, amiodarone, Toprol 25 mg b.i.d., and Aldactone.     Objective:   /84   Pulse 109   Temp 99.1 °F (37.3 °C) (Rectal)   Resp 28   Ht 5' 7\" (1.702 m)   Wt 149 lb 11.1 oz (67.9 kg)   SpO2 100%   BMI 23.45 kg/m²       Intake/Output Summary (Last 24 hours) at 11/19/2020 0826  Last data filed at 11/19/2020 0254  Gross per 24 hour   Intake --   Output 500 ml   Net -500 ml       Medications:   Scheduled Meds:   metoprolol tartrate  50 mg Oral BID    rosuvastatin  10 mg Oral Nightly    lisinopril  5 mg Oral Daily    ALPRAZolam  0.5 mg Oral Q8H    guaiFENesin  200 mg Oral Q6H  methylPREDNISolone  40 mg Intravenous Q12H    cefepime  2 g Intravenous Q12H    metroNIDAZOLE  500 mg Intravenous Q8H    acetaminophen  650 mg Rectal Once    vancomycin  1,000 mg Intravenous Q18H    clopidogrel  75 mg Oral Daily    zinc sulfate  50 mg Oral Daily    sodium chloride flush  10 mL Intravenous 2 times per day    aspirin  81 mg Oral Daily    albuterol sulfate HFA  2 puff Inhalation 4x daily    tiotropium  2 puff Inhalation Daily    enoxaparin  30 mg Subcutaneous BID    traZODone  50 mg Oral Nightly    vitamin B-12  1,000 mcg Oral Daily    pantoprazole  40 mg Oral QAM AC    vitamin D  2 capsule Oral Daily    busPIRone  5 mg Oral TID    amiodarone  200 mg Oral Daily      Infusions:     PRN Meds:  fentanNYL, diazePAM, sodium chloride, sodium chloride flush, acetaminophen **OR** acetaminophen, polyethylene glycol, labetalol, oxyCODONE-acetaminophen       Physical Exam:  Vitals:    11/19/20 0805   BP:    Pulse:    Resp: 28   Temp:    SpO2: 100%        General: awake alert   Chest: Nontender  Cardiac: sinus   Lungs:Clear to auscultation and percussion. Abdomen: Soft, NT, ND, +BS  Extremities: no edema   Vascular:  Equal 2+ peripheral pulses. Lab Data:  CBC:   Recent Labs     11/17/20 0419 11/18/20 0315 11/19/20  0520   WBC 20.1* 23.9* 18.5*   HGB 10.5* 11.5* 9.7*   HCT 35.0* 38.6 32.2*   MCV 78.7 78.8 78.9    454* 380     BMP:   Recent Labs     11/17/20 0419 11/17/20 1920 11/18/20 0315 11/19/20  0520    141 141 141   K 4.4 4.1 4.2 3.8    103 104 104   CO2 21 23 25 25   PHOS 2.5  --  3.0 2.6   BUN 47* 53* 55* 56*   CREATININE 0.9 1.0 1.1 1.1     LIVER PROFILE:   Recent Labs     11/17/20 0419 11/18/20 0315 11/19/20  0520   AST 75*  75* 55* 45*   ALT 69*  69* 58* 43*   BILIDIR 0.2 0.2 0.2   BILITOT 0.3  0.3 0.3 0.3   ALKPHOS 88  88 80 76     PT/INR: No results for input(s): PROTIME, INR in the last 72 hours. APTT: No results for input(s):  APTT in the last 72 hours. BNP:  No results for input(s): BNP in the last 72 hours.       Assessment:  Patient Active Problem List    Diagnosis Date Noted    Transaminitis 11/17/2020    Acute hypoxemic respiratory failure due to COVID-19 [facility]) 11/15/2020    Chronic hypoxemic respiratory failure ([address] 09/09/2020    Symptomatic anemia 03/24/2020    Wide-complex tachycardia ([facility] 02/10/2020    Gram-negative pneumonia ([facility] 40/80/9734    Acute systolic heart failure ([facility] 02/10/2020    Pneumonia of right upper lobe due to infectious organism     Chronic fatigue 07/08/2019    Intractable low back pain 06/11/2019    Back pain 06/09/2019    Shortness of breath 04/25/2019    Former smoker     Acute exacerbation of chronic obstructive pulmonary disease (COPD) ([address] 12/13/2018    COPD, severe ([facility] 10/24/2017    Blood loss anemia 08/01/2017    Takotsubo syndrome 05/01/2017    Panic attack 11/18/2016    Lumbar spinal stenosis 01/01/2015    Vitamin B12 deficiency 12/01/2014    Osteoporosis 03/16/2012    Depression     Migraine 10/06/2010    Rheumatoid Arthritis     Hypertension     Hyperlipidemia     Fibromyalgia     Chronic pain syndrome        Russ Sunshine MD 11/19/2020 8:47 AM

## 2020-11-19 NOTE — PROGRESS NOTES
Hospitalist Progress Note      Name:  Angle Ferrell /Age/Sex: 1940  ([de-identified] y.o. female)   MRN & CSN:  [id number] & [id number] Admission Date/Time: 11/15/2020 12:14 PM   Location:  -A PCP: Lara Roland, [address] Day: 5      Assessment and Plan:          - on 4 L oxygen NC today, better than yesterday -speech garbled at times-repeat head CT negative for acute event    Angle Ferrell is a [de-identified] y.o.  female  who presents with Acute hypoxemic respiratory failure due to COVID-19 [facility])    Assessment and plan       Acute on chronic resp failure  Covid pneumonia  Decadron --changed to Solu-Medrol  -continue  Had 2 doses remdesivir, it was stopped  Oxygen support    Suspected bacterial superinfection  -Cefepime, vancomycin, and Flagyl added  -continue     Facial droop   Neg CT and CTA for acute pathology  Patient deemed as not a TPA candidate  MRI pending -could not tolerated so far  Neurology consult  Head CT repeated -no acute event    Paroxysmal A. Fib   H/o Chronic Cardiomyopathy    ECHO 19 LVEF 55% and down to 20% on 2/10/20   Repeat ECHO with EF 50-55% with moderate MR  ASA, Held Xarelto for GI bleed   Continue Toprol XL  Cardiology recommendations appreciated  -GI consult to see if she can possibly tolerate Xarelto     H/o of GI bleed   PPI, monitor H&H     CKD stage III   IV Lasix ordered , appreciate consult with Dr. Wilner Ash  Avoid nephrotoxins, monitor  Spironolactone on hold      COPD with no exacerbation -continue with inhalers     Lung nodule  OP FU with Pulm     Hypertension  Rheumatoid arthritis  Depression  Fibromyalgia  Chronic respiratory failure on home oxygen  Chronic systolic CHF      Echo noted    Lung nodule on CTA noted    D-dimer 4894    Severe COPD per Dr. Luc Multani 3 years ago      Rheum note 2019: Jose Manuel off of all her meds. She self d/stella Arava and Humira. Had significant anemia requiring blood transfusion. So cannot use Arava. Had pneumonia so will not use Humira. Can continue prednisone from pulmonologist. Concepcion Kapoor on quitting tobacco.       Lovenox dose is 30 mg twice a day    I spoke to her son on 11/18 as well as 11/17    Subjective:     11/19 - responded to me, but speech was difficult to understand    Summary for 11/18-doing better today-respiratory status is much more stable, and mental status is better      Summary for 11/17-patient was in critical condition this morning, with respiratory rate going up into the 40s on several occasions. Temperature went up to 102.7. Additionally, patient is on Percocet 7.5 mg 4 times a day and Xanax 0.5 mg 3 times a day -and has been somewhat unable to take them since admission -spoke to pulmonary, this may have contributed to tachypnea. By the evening respiratory rate was in the 30s. She does have underlying severe COPD and is at high risk.  -Discussed with pulmonary today, and decided to give her plasma      Wednesday, November 18-was doing much better today    Tuesday:    Patient was extremely tachypneic today. When I walked in the room she did verbally acknowledge my presence, but she was much more confused than yesterday per nursing. The patient today could not carry on a conversation due to her critical condition. ED:  Kyler Alberto is a [de-identified] y.o. female he tested positive for Covid on Tuesday at her care facility that presents with left-sided facial droop and left arm weakness from her care facility. She was last seen well at about 8 AM this morning. She tells me she feels unwell but is unable to assist further in the history due to feeling unwell. She is coughing on exam.  Per report patient is normally on 2 L of oxygen via nasal cannula but has been on 6L this last week due to Covid. Kelli:  Pt is a resident at [facility]. Call to Jackie Lo at [facility]. Pt is typically from the [facility] at [facility]. Pt was brought over to the skilled side when Pt tested positive for Covid.  Pt may be able to return to skilled if recommended by therapy. White board placed asking for recommendations       Objective: Intake/Output Summary (Last 24 hours) at 11/19/2020 1118  Last data filed at 11/19/2020 0254  Gross per 24 hour   Intake --   Output 500 ml   Net -500 ml      Vitals:   Vitals:    11/19/20 0903   BP: (!) 159/91   Pulse: 94   Resp: 23   Temp: 97.6 °F (36.4 °C)   SpO2: 94%     Physical Exam:      Physical Exam  Constitutional:       General: She is not in acute distress. Appearance: She is not ill-appearing. Cardiovascular:      Rate and Rhythm: Regular rhythm. Pulmonary:      Effort: Pulmonary effort is normal.   Abdominal:      General: There is no distension. Tenderness: There is no abdominal tenderness. There is no guarding or rebound. Neurological:      Cranial Nerves: No cranial nerve deficit.        Speech is garbled at times    Medications:   Medications:    folic acid-pyridoxine-cyancobalamin  1 tablet Oral Daily    cloNIDine  0.1 mg Oral BID    metoprolol tartrate  50 mg Oral BID    rosuvastatin  10 mg Oral Nightly    lisinopril  5 mg Oral Daily    ALPRAZolam  0.5 mg Oral Q8H    guaiFENesin  200 mg Oral Q6H    methylPREDNISolone  40 mg Intravenous Q12H    cefepime  2 g Intravenous Q12H    metroNIDAZOLE  500 mg Intravenous Q8H    acetaminophen  650 mg Rectal Once    vancomycin  1,000 mg Intravenous Q18H    clopidogrel  75 mg Oral Daily    zinc sulfate  50 mg Oral Daily    sodium chloride flush  10 mL Intravenous 2 times per day    aspirin  81 mg Oral Daily    albuterol sulfate HFA  2 puff Inhalation 4x daily    tiotropium  2 puff Inhalation Daily    enoxaparin  30 mg Subcutaneous BID    traZODone  50 mg Oral Nightly    vitamin B-12  1,000 mcg Oral Daily    pantoprazole  40 mg Oral QAM AC    vitamin D  2 capsule Oral Daily    busPIRone  5 mg Oral TID    amiodarone  200 mg Oral Daily      Infusions:     PRN Meds: fentanNYL, 12.5 mcg, Q1H PRN  diazePAM, 2.5 mg, Q4H PRN  sodium chloride, 30 mL, PRN  sodium chloride flush, 10 mL, PRN  acetaminophen, 650 mg, Q6H PRN    Or  acetaminophen, 650 mg, Q6H PRN  polyethylene glycol, 17 g, Daily PRN  labetalol, 10 mg, Q10 Min PRN  oxyCODONE-acetaminophen, 1 tablet, Q6H PRN          Electronically signed by Tyson Armstrong MD on 11/19/2020 at 11:18 AM

## 2020-11-19 NOTE — CONSULTS
[facility]  Gastroenterology Consultation  Telemedicine  Start time: 586  End time: 2020  8:37 AM    Patient:    Serena Lu  : 1940   [de-identified] y.o. MRN: [id number]  Admitted: 11/15/2020 12:14 PM ATT: Ray Horner MD   [id number]  AdmitDx: Facial droop [R29.810]  Acute hypoxemic respiratory failure due to COVID-19 ([address] [U07.1, J96.01]  COVID-19 [U07.1]  PCP: Leslie Ricci MD    Reason for Consult:  Anemia    Requesting Physician:  Ray Horner MD      History Obtained From:  Patient and review of all records    HISTORY OF PRESENT ILLNESS:                The patient is a [de-identified] y.o. female with significant   Past Medical History:   Diagnosis Date    Acute DVT of right tibial vein ([address] 2017    ER imaging: distal right tibial vein DVT; no anticoag for bleeding/ anemia    Acute exacerbation of chronic obstructive pulmonary disease (COPD) ([address] 2018    Anemia     Arthritis     Atrial fibrillation ([address]     CHF (congestive heart failure) ([address]     Chronic hypoxemic respiratory failure ([address] 2020    Chronic pain syndrome     Low back pain; lumbar disc disease    Clostridium difficile colitis 2017    COPD, severe ([address] 10/24/2017    Depression     Fibromyalgia     Former smoker     Quit 2019    Herniated cervical disc -    Herpes zoster ophthalmicus 3/2012    Hx of blood clots     Hyperlipidemia     Hypertension     Kidney disease     L3 vertebral fracture ([address] 2010    vertebroplasty    Lumbar spinal stenosis     moderately severe by MRI    Migraine     Nocturnal hypoxemia 2019    Osteoporosis 2010    Fragility Fx L3    Rheumatoid arthritis(714.0)     Dr Salo Thibodeaux S/P cardiac catheterization 2017    patent coronaries;  Takotsubo; najeeb Ahmed    Shortness of breath 2019    Takotsubo syndrome 2017    TMJ dysfunction     Tobacco abuse 2018    Vitamin B12 deficiency 12/2014    B12 level 199    who presented to [facility] ED 11/15 after testing positive for Covid five days prior and developing left-sided facial droop and left upper extremity weakness. EGD 3/2020 - small hiatal hernia, bx un remarkable    Colonoscopy 3/2020 - mild sigmoid diverticulosis, grade II hemorrhoids, biospy of asc colon unremarkable                 Due to the current efforts to prevent transmission of COVID-19 and also the need to preserve PPE for other caregivers, a face-to-face encounter with the patient was not performed. That being said, all relevant records and diagnostic tests were reviewed, including laboratory results and imaging. Please reference any relevant documentation elsewhere. Care will be coordinated with the primary service.        No abdominal pain per RN/staff  NO N/V   Last BM yesterday, large loose brown/black stool yesterday  No gross blood today per Bonifacio Berrios RN  RN reports patient somewhat confused today     Repeat CT of head pending today     Non-smoker  Denies alcohol intake    Past Medical History:        Diagnosis Date    Acute DVT of right tibial vein ([address] 07/2017    ER imaging: distal right tibial vein DVT; no anticoag for bleeding/ anemia    Acute exacerbation of chronic obstructive pulmonary disease (COPD) ([address] 12/13/2018    Anemia     Arthritis     Atrial fibrillation (HCC)     CHF (congestive heart failure) (HCC)     Chronic hypoxemic respiratory failure (HCC) 9/9/2020    Chronic pain syndrome     Low back pain; lumbar disc disease    Clostridium difficile colitis 09/20/2017    COPD, severe ([address] 10/24/2017    Depression     Fibromyalgia     Former smoker     Quit 1/2019    Herniated cervical disc 5-1998    Herpes zoster ophthalmicus 3/2012    Hx of blood clots     Hyperlipidemia     Hypertension     Kidney disease     L3 vertebral fracture ([address] 2010    vertebroplasty    Lumbar spinal stenosis 2015    moderately severe by MRI    Migraine  Nocturnal hypoxemia 5/21/2019    Osteoporosis 2010    Fragility Fx L3    Rheumatoid arthritis(714.0) 1976    Dr Harley Del Castillo S/P cardiac catheterization 05/2017    patent coronaries;  Takotsubo; humberto Bonilla    Shortness of breath 4/25/2019    Takotsubo syndrome 05/2017    TMJ dysfunction     Tobacco abuse 12/13/2018    Vitamin B12 deficiency 12/2014    B12 level 199       Past Surgical History:        Procedure Laterality Date    APPENDECTOMY  1966    CARDIAC CATHETERIZATION  05/21/2017    CHOLECYSTECTOMY  1966    w/ appendectomy    COLONOSCOPY N/A 3/11/2020    COLONOSCOPY WITH BIOPSY performed [facility] at [facility] Right     FIXATION KYPHOPLASTY  06/12/2019    L4 kyphoplasty ; [facility]    FOOT SURGERY  1986    HYSTERECTOMY      [place] COLONOSCOPY FLX DX W/COLLJ SPEC WHEN PFRMD N/A 10/2/2018    COLONOSCOPY DIAGNOSTIC OR SCREENING performed by Robbie Bernal MD at [address] [place] [address]    TOE SURGERY Left 4/25/2013    Deboo;    [facility] UPPER GASTROINTESTINAL ENDOSCOPY N/A 3/11/2020    EGD BIOPSY performed by Nikki Rosa MD at [place] [address] VERTEBROPLASTY  10/2010    L3    WRIST FUSION Left 2000         Current Medications:    Medications    Scheduled Medications:    metoprolol tartrate  50 mg Oral BID    rosuvastatin  10 mg Oral Nightly    lisinopril  5 mg Oral Daily    ALPRAZolam  0.5 mg Oral Q8H    guaiFENesin  200 mg Oral Q6H    methylPREDNISolone  40 mg Intravenous Q12H    cefepime  2 g Intravenous Q12H    metroNIDAZOLE  500 mg Intravenous Q8H    acetaminophen  650 mg Rectal Once    vancomycin  1,000 mg Intravenous Q18H    clopidogrel  75 mg Oral Daily    zinc sulfate  50 mg Oral Daily    sodium chloride flush  10 mL Intravenous 2 times per day    aspirin  81 mg Oral Daily    albuterol sulfate HFA  2 puff Inhalation 4x daily    tiotropium  2 puff Inhalation Daily    enoxaparin  30 mg Subcutaneous BID    traZODone  50 mg Oral Nightly    vitamin B-12  1,000 mcg Oral Daily    pantoprazole  40 mg Oral QAM AC    vitamin D  2 capsule Oral Daily    busPIRone  5 mg Oral TID    amiodarone  200 mg Oral Daily     PRN Medications: fentanNYL, diazePAM, sodium chloride, sodium chloride flush, acetaminophen **OR** acetaminophen, polyethylene glycol, labetalol, oxyCODONE-acetaminophen      Allergies:  Ativan [lorazepam]; Etanercept; Humira [adalimumab]; Prochlorperazine edisylate; Remicade [infliximab injection]; and Doxycycline    Social History:   TOBACCO:   reports that she quit smoking about 10 months ago. Her smoking use included cigarettes. She started smoking about 58 years ago. She has a 41.25 pack-year smoking history. She has never used smokeless tobacco.  ETOH:   reports no history of alcohol use.     Family History:       Problem Relation Age of Onset    Heart Disease Father          FROM MI   Terry Skaggs Emphysema Father     Arthritis Mother     Stroke Mother     Heart Disease Other         family history    Cancer Other         family history -- larynx    Kidney Disease Son        REVIEW OF SYSTEMS:  Deferred to hospital medicine team      PHYSICAL EXAM:  Deferred to hospital medicine team     Vitals:    /84   Pulse 109   Temp 99.1 °F (37.3 °C) (Rectal)   Resp 28   Ht 5' 7\" (1.702 m)   Wt 149 lb 11.1 oz (67.9 kg)   SpO2 100%   BMI 23.45 kg/m²                                         DATA:    ABGs:   Lab Results   Component Value Date    PO2ART 93 2020    [id number] 36.0 2020     CBC:   Recent Labs     20  0419 20  0315 20  0520   WBC 20.1* 23.9* 18.5*   HGB 10.5* 11.5* 9.7*    454* 380     BMP:    Recent Labs     20  1920 20  0315 20  0520    141 141   K 4.1 4.2 3.8    104 104   CO2 23 25 25   BUN 53* 55* 56*   CREATININE 1.0 1.1 1.1   GLUCOSE 238* 129* 127*     Magnesium:   Lab Results   Component Value Date    MG 2.0 2020     Hepatic:   Recent Labs 11/17/20  0419 11/18/20  0315 11/19/20  0520   AST 75*  75* 55* 45*   ALT 69*  69* 58* 43*   BILITOT 0.3  0.3 0.3 0.3   ALKPHOS 88  88 80 76     No results for input(s): LIPASE, AMYLASE in the last 72 hours. No results for input(s): PROTIME, INR in the last 72 hours. No results for input(s): PTT in the last 72 hours. Lipids: No results for input(s): CHOL, HDL in the last 72 hours. Invalid input(s): LDLCALCU  INR: No results for input(s): INR in the last 72 hours. TSH: No results found for: TSH    Intake/Output Summary (Last 24 hours) at 11/19/2020 0837  Last data filed at 11/19/2020 0254  Gross per 24 hour   Intake --   Output 500 ml   Net -500 ml         Imaging Studies:    Reviewed     IMPRESSION:      Patient Active Problem List   Diagnosis Code    Rheumatoid Arthritis M19.90    Hypertension I10    Hyperlipidemia E78.5    Fibromyalgia M79.7    Migraine G43.909    Chronic pain syndrome G89.4    Depression F32.9    Osteoporosis M81.0    Vitamin B12 deficiency E53.8    Lumbar spinal stenosis M48.061    Panic attack F41.0    Takotsubo syndrome I51.81    Blood loss anemia D50.0    COPD, severe ([facility]) J44.9    Acute exacerbation of chronic obstructive pulmonary disease (COPD) ([facility]) J44.1    Former smoker Z87.891    Shortness of breath R06.02    Back pain M54.9    Intractable low back pain M54.5    Chronic fatigue R53.82    Pneumonia of right upper lobe due to infectious organism J18.9    Wide-complex tachycardia ([facility]) I47.2    Gram-negative pneumonia ([facility]) N55.6    Acute systolic heart failure ([facility]) I50.21    Symptomatic anemia D64.9    Chronic hypoxemic respiratory failure ([facility]) J96.11    Acute hypoxemic respiratory failure due to COVID-19 ([facility]) U07.1, J96.01    Transaminitis R74.01       ASSESSMENT/RECOMMENDATIONS:    Anemia:  GI work up 3/2020 unremarkable  One brown/black stool yesterday, advised RN to monitor stools closely.  No BM today   Decline in hgb noted  Recommend CBC in am, monitor H&H  Recommend PPI BID  Okay for patient to receive ASA 81 mg and Xarelto, advised to stop Plavix     Discussed plan of care with RN    Patient clinical, biochemical, and radiological information discussed with Dr. Reuben Donovan. He agrees with the assessment and plan. Adore Lemus CNP  11/19/2020  8:37 AM     EGD, C scope no event bleed recently  May have AVM    RECOMMEND cautious anticoagulation  Stop Plavix if ok by cardiology  Can continue low dose aspirin  \  I have seen and examined this patient personally, and independently of the nurse practitioner. The plan was developed mutually at the time of the visit with the patient. Merry Velasquez and myself have spoken with patient, nursing staff and provided written and verbal instructions .     The above note has been reviewed and I agree with the Assessment,  Diagnosis, and Treatment plan as suggested by Merry Velasquez CNP      [address] [facility] gastroenterology

## 2020-11-19 NOTE — PROGRESS NOTES
HOSPITALIST    Son stated Avelar Solange were going to send her to [place]\" to see where the bleeding was coming from \"but then the bleeding stopped\" - referring to a prior admit her.

## 2020-11-20 ENCOUNTER — APPOINTMENT (OUTPATIENT)
Dept: GENERAL RADIOLOGY | Age: 80
DRG: 871 | End: 2020-11-20
Payer: MEDICARE

## 2020-11-20 LAB
ABO/RH: NORMAL
ALBUMIN SERPL-MCNC: 2.7 GM/DL (ref 3.4–5)
ALBUMIN SERPL-MCNC: 2.7 GM/DL (ref 3.4–5)
ALP BLD-CCNC: 76 IU/L (ref 40–129)
ALT SERPL-CCNC: 37 U/L (ref 10–40)
ANION GAP SERPL CALCULATED.3IONS-SCNC: 8 MMOL/L (ref 4–16)
ANTIBODY SCREEN: NEGATIVE
AST SERPL-CCNC: 33 IU/L (ref 15–37)
BASOPHILS ABSOLUTE: 0 K/CU MM
BASOPHILS RELATIVE PERCENT: 0.2 % (ref 0–1)
BILIRUB SERPL-MCNC: 0.4 MG/DL (ref 0–1)
BILIRUBIN DIRECT: 0.2 MG/DL (ref 0–0.3)
BILIRUBIN, INDIRECT: 0.2 MG/DL (ref 0–0.7)
BUN BLDV-MCNC: 49 MG/DL (ref 6–23)
CALCIUM SERPL-MCNC: 8.1 MG/DL (ref 8.3–10.6)
CHLORIDE BLD-SCNC: 105 MMOL/L (ref 99–110)
CO2: 26 MMOL/L (ref 21–32)
CREAT SERPL-MCNC: 0.9 MG/DL (ref 0.6–1.1)
D DIMER: 4884 NG/ML(DDU)
DIFFERENTIAL TYPE: ABNORMAL
DU ANTIGEN: NEGATIVE
EOSINOPHILS ABSOLUTE: 0 K/CU MM
EOSINOPHILS RELATIVE PERCENT: 0 % (ref 0–3)
GFR AFRICAN AMERICAN: >60 ML/MIN/1.73M2
GFR NON-AFRICAN AMERICAN: >60 ML/MIN/1.73M2
GLUCOSE BLD-MCNC: 138 MG/DL (ref 70–99)
HCT VFR BLD CALC: 29.4 % (ref 37–47)
HEMOGLOBIN: 8.7 GM/DL (ref 12.5–16)
HIGH SENSITIVE C-REACTIVE PROTEIN: 83.8 MG/L
IMMATURE NEUTROPHIL %: 1.1 % (ref 0–0.43)
LYMPHOCYTES ABSOLUTE: 0.4 K/CU MM
LYMPHOCYTES RELATIVE PERCENT: 2.1 % (ref 24–44)
MCH RBC QN AUTO: 23.6 PG (ref 27–31)
MCHC RBC AUTO-ENTMCNC: 29.6 % (ref 32–36)
MCV RBC AUTO: 79.7 FL (ref 78–100)
MONOCYTES ABSOLUTE: 0.8 K/CU MM
MONOCYTES RELATIVE PERCENT: 3.7 % (ref 0–4)
NUCLEATED RBC %: 0 %
PDW BLD-RTO: 16.8 % (ref 11.7–14.9)
PHOSPHORUS: 2.3 MG/DL (ref 2.5–4.9)
PLATELET # BLD: 351 K/CU MM (ref 140–440)
PMV BLD AUTO: 10.2 FL (ref 7.5–11.1)
POTASSIUM SERPL-SCNC: 3.8 MMOL/L (ref 3.5–5.1)
PROCALCITONIN: 0.4
RBC # BLD: 3.69 M/CU MM (ref 4.2–5.4)
SEGMENTED NEUTROPHILS ABSOLUTE COUNT: 19.4 K/CU MM
SEGMENTED NEUTROPHILS RELATIVE PERCENT: 92.9 % (ref 36–66)
SODIUM BLD-SCNC: 139 MMOL/L (ref 135–145)
TOTAL IMMATURE NEUTOROPHIL: 0.22 K/CU MM
TOTAL NUCLEATED RBC: 0 K/CU MM
TOTAL PROTEIN: 4.9 GM/DL (ref 6.4–8.2)
WBC # BLD: 20.9 K/CU MM (ref 4–10.5)

## 2020-11-20 PROCEDURE — 97116 GAIT TRAINING THERAPY: CPT

## 2020-11-20 PROCEDURE — 97535 SELF CARE MNGMENT TRAINING: CPT

## 2020-11-20 PROCEDURE — 2500000003 HC RX 250 WO HCPCS: Performed by: INTERNAL MEDICINE

## 2020-11-20 PROCEDURE — 71045 X-RAY EXAM CHEST 1 VIEW: CPT

## 2020-11-20 PROCEDURE — 6370000000 HC RX 637 (ALT 250 FOR IP): Performed by: INTERNAL MEDICINE

## 2020-11-20 PROCEDURE — 6370000000 HC RX 637 (ALT 250 FOR IP): Performed by: NURSE PRACTITIONER

## 2020-11-20 PROCEDURE — 97530 THERAPEUTIC ACTIVITIES: CPT

## 2020-11-20 PROCEDURE — 2580000003 HC RX 258: Performed by: NURSE PRACTITIONER

## 2020-11-20 PROCEDURE — 86901 BLOOD TYPING SEROLOGIC RH(D): CPT

## 2020-11-20 PROCEDURE — 84145 PROCALCITONIN (PCT): CPT

## 2020-11-20 PROCEDURE — 94640 AIRWAY INHALATION TREATMENT: CPT

## 2020-11-20 PROCEDURE — 6360000002 HC RX W HCPCS: Performed by: NURSE PRACTITIONER

## 2020-11-20 PROCEDURE — 6360000002 HC RX W HCPCS: Performed by: HOSPITALIST

## 2020-11-20 PROCEDURE — 84100 ASSAY OF PHOSPHORUS: CPT

## 2020-11-20 PROCEDURE — 85025 COMPLETE CBC W/AUTO DIFF WBC: CPT

## 2020-11-20 PROCEDURE — 86141 C-REACTIVE PROTEIN HS: CPT

## 2020-11-20 PROCEDURE — 94761 N-INVAS EAR/PLS OXIMETRY MLT: CPT

## 2020-11-20 PROCEDURE — 86850 RBC ANTIBODY SCREEN: CPT

## 2020-11-20 PROCEDURE — 2580000003 HC RX 258: Performed by: INTERNAL MEDICINE

## 2020-11-20 PROCEDURE — 6370000000 HC RX 637 (ALT 250 FOR IP): Performed by: HOSPITALIST

## 2020-11-20 PROCEDURE — 1200000000 HC SEMI PRIVATE

## 2020-11-20 PROCEDURE — 6360000002 HC RX W HCPCS: Performed by: INTERNAL MEDICINE

## 2020-11-20 PROCEDURE — 97166 OT EVAL MOD COMPLEX 45 MIN: CPT

## 2020-11-20 PROCEDURE — 80053 COMPREHEN METABOLIC PANEL: CPT

## 2020-11-20 PROCEDURE — 97163 PT EVAL HIGH COMPLEX 45 MIN: CPT

## 2020-11-20 PROCEDURE — 2580000003 HC RX 258: Performed by: HOSPITALIST

## 2020-11-20 PROCEDURE — 82248 BILIRUBIN DIRECT: CPT

## 2020-11-20 PROCEDURE — 86900 BLOOD TYPING SEROLOGIC ABO: CPT

## 2020-11-20 PROCEDURE — 99233 SBSQ HOSP IP/OBS HIGH 50: CPT | Performed by: INTERNAL MEDICINE

## 2020-11-20 PROCEDURE — C9113 INJ PANTOPRAZOLE SODIUM, VIA: HCPCS | Performed by: NURSE PRACTITIONER

## 2020-11-20 PROCEDURE — 2700000000 HC OXYGEN THERAPY PER DAY

## 2020-11-20 PROCEDURE — 85379 FIBRIN DEGRADATION QUANT: CPT

## 2020-11-20 RX ORDER — 0.9 % SODIUM CHLORIDE 0.9 %
500 INTRAVENOUS SOLUTION INTRAVENOUS ONCE
Status: DISCONTINUED | OUTPATIENT
Start: 2020-11-20 | End: 2020-11-20

## 2020-11-20 RX ORDER — METHYLPREDNISOLONE SODIUM SUCCINATE 40 MG/ML
40 INJECTION, POWDER, LYOPHILIZED, FOR SOLUTION INTRAMUSCULAR; INTRAVENOUS DAILY
Status: DISCONTINUED | OUTPATIENT
Start: 2020-11-21 | End: 2020-11-23 | Stop reason: HOSPADM

## 2020-11-20 RX ADMIN — ALBUTEROL SULFATE 2 PUFF: 90 AEROSOL, METERED RESPIRATORY (INHALATION) at 15:48

## 2020-11-20 RX ADMIN — BUSPIRONE HYDROCHLORIDE 5 MG: 5 TABLET ORAL at 13:01

## 2020-11-20 RX ADMIN — Medication 2000 UNITS: at 09:35

## 2020-11-20 RX ADMIN — TRAZODONE HYDROCHLORIDE 50 MG: 50 TABLET ORAL at 20:11

## 2020-11-20 RX ADMIN — CEFEPIME HYDROCHLORIDE 2 G: 2 INJECTION, POWDER, FOR SOLUTION INTRAVENOUS at 13:01

## 2020-11-20 RX ADMIN — ALBUTEROL SULFATE 2 PUFF: 90 AEROSOL, METERED RESPIRATORY (INHALATION) at 12:01

## 2020-11-20 RX ADMIN — GUAIFENESIN 200 MG: 100 SOLUTION ORAL at 04:12

## 2020-11-20 RX ADMIN — OXYCODONE HYDROCHLORIDE AND ACETAMINOPHEN 1 TABLET: 5; 325 TABLET ORAL at 15:11

## 2020-11-20 RX ADMIN — PANTOPRAZOLE SODIUM 40 MG: 40 TABLET, DELAYED RELEASE ORAL at 05:43

## 2020-11-20 RX ADMIN — TIOTROPIUM BROMIDE INHALATION SPRAY 2 PUFF: 3.12 SPRAY, METERED RESPIRATORY (INHALATION) at 08:16

## 2020-11-20 RX ADMIN — BUSPIRONE HYDROCHLORIDE 5 MG: 5 TABLET ORAL at 20:11

## 2020-11-20 RX ADMIN — ALPRAZOLAM 0.5 MG: 0.5 TABLET ORAL at 05:43

## 2020-11-20 RX ADMIN — ALPRAZOLAM 0.5 MG: 0.5 TABLET ORAL at 13:01

## 2020-11-20 RX ADMIN — ALBUTEROL SULFATE 2 PUFF: 90 AEROSOL, METERED RESPIRATORY (INHALATION) at 21:19

## 2020-11-20 RX ADMIN — METRONIDAZOLE 500 MG: 500 INJECTION, SOLUTION INTRAVENOUS at 13:01

## 2020-11-20 RX ADMIN — AMIODARONE HYDROCHLORIDE 200 MG: 200 TABLET ORAL at 09:35

## 2020-11-20 RX ADMIN — RIVAROXABAN 20 MG: 20 TABLET, FILM COATED ORAL at 17:48

## 2020-11-20 RX ADMIN — ROSUVASTATIN CALCIUM 10 MG: 5 TABLET, COATED ORAL at 20:11

## 2020-11-20 RX ADMIN — ENOXAPARIN SODIUM 30 MG: 30 INJECTION SUBCUTANEOUS at 09:35

## 2020-11-20 RX ADMIN — CYANOCOBALAMIN TAB 1000 MCG 1000 MCG: 1000 TAB at 09:34

## 2020-11-20 RX ADMIN — SODIUM CHLORIDE, PRESERVATIVE FREE 10 ML: 5 INJECTION INTRAVENOUS at 09:34

## 2020-11-20 RX ADMIN — ALBUTEROL SULFATE 2 PUFF: 90 AEROSOL, METERED RESPIRATORY (INHALATION) at 08:15

## 2020-11-20 RX ADMIN — ALPRAZOLAM 0.5 MG: 0.5 TABLET ORAL at 20:11

## 2020-11-20 RX ADMIN — Medication 1 TABLET: at 09:35

## 2020-11-20 RX ADMIN — SODIUM CHLORIDE 8 MG/HR: 9 INJECTION, SOLUTION INTRAVENOUS at 22:35

## 2020-11-20 RX ADMIN — METOPROLOL TARTRATE 50 MG: 50 TABLET, FILM COATED ORAL at 09:34

## 2020-11-20 RX ADMIN — SODIUM CHLORIDE 8 MG/HR: 9 INJECTION, SOLUTION INTRAVENOUS at 13:36

## 2020-11-20 RX ADMIN — METHYLPREDNISOLONE SODIUM SUCCINATE 40 MG: 40 INJECTION, POWDER, FOR SOLUTION INTRAMUSCULAR; INTRAVENOUS at 09:35

## 2020-11-20 RX ADMIN — METRONIDAZOLE 500 MG: 500 INJECTION, SOLUTION INTRAVENOUS at 04:12

## 2020-11-20 RX ADMIN — SODIUM CHLORIDE, PRESERVATIVE FREE 10 ML: 5 INJECTION INTRAVENOUS at 20:11

## 2020-11-20 RX ADMIN — GUAIFENESIN 200 MG: 100 SOLUTION ORAL at 09:35

## 2020-11-20 RX ADMIN — METOPROLOL TARTRATE 50 MG: 50 TABLET, FILM COATED ORAL at 20:11

## 2020-11-20 RX ADMIN — GUAIFENESIN 200 MG: 100 SOLUTION ORAL at 17:47

## 2020-11-20 RX ADMIN — BUSPIRONE HYDROCHLORIDE 5 MG: 5 TABLET ORAL at 09:35

## 2020-11-20 RX ADMIN — ASPIRIN 81 MG: 81 TABLET, FILM COATED ORAL at 09:34

## 2020-11-20 RX ADMIN — ZINC SULFATE 220 MG (50 MG) CAPSULE 50 MG: CAPSULE at 09:35

## 2020-11-20 RX ADMIN — GUAIFENESIN 200 MG: 100 SOLUTION ORAL at 22:35

## 2020-11-20 RX ADMIN — LISINOPRIL 5 MG: 5 TABLET ORAL at 09:34

## 2020-11-20 ASSESSMENT — PAIN SCALES - GENERAL
PAINLEVEL_OUTOF10: 6
PAINLEVEL_OUTOF10: 0

## 2020-11-20 ASSESSMENT — PAIN DESCRIPTION - PAIN TYPE: TYPE: CHRONIC PAIN

## 2020-11-20 ASSESSMENT — PAIN DESCRIPTION - FREQUENCY: FREQUENCY: INTERMITTENT

## 2020-11-20 ASSESSMENT — PAIN DESCRIPTION - LOCATION: LOCATION: GENERALIZED

## 2020-11-20 ASSESSMENT — PAIN DESCRIPTION - ONSET: ONSET: ON-GOING

## 2020-11-20 ASSESSMENT — PAIN DESCRIPTION - DESCRIPTORS: DESCRIPTORS: ACHING;DISCOMFORT

## 2020-11-20 ASSESSMENT — PAIN DESCRIPTION - PROGRESSION: CLINICAL_PROGRESSION: GRADUALLY WORSENING

## 2020-11-20 ASSESSMENT — PAIN - FUNCTIONAL ASSESSMENT: PAIN_FUNCTIONAL_ASSESSMENT: ACTIVITIES ARE NOT PREVENTED

## 2020-11-20 NOTE — PROGRESS NOTES
Nephrology Progress Note  11/20/2020 2:01 PM        Subjective:   Admit Date: 11/15/2020  PCP: Joseluis Villasenor MD    Interval History: seems little confused this  am     Diet: ? some    ROS:  No sob , some ? Confusion - with PPI drip     Data:     Current med's:    rivaroxaban  20 mg Oral Daily    folic acid-pyridoxine-cyancobalamin  1 tablet Oral Daily    metoprolol tartrate  50 mg Oral BID    rosuvastatin  10 mg Oral Nightly    lisinopril  5 mg Oral Daily    ALPRAZolam  0.5 mg Oral Q8H    guaiFENesin  200 mg Oral Q6H    methylPREDNISolone  40 mg Intravenous Q12H    cefepime  2 g Intravenous Q12H    metroNIDAZOLE  500 mg Intravenous Q8H    acetaminophen  650 mg Rectal Once    zinc sulfate  50 mg Oral Daily    sodium chloride flush  10 mL Intravenous 2 times per day    aspirin  81 mg Oral Daily    albuterol sulfate HFA  2 puff Inhalation 4x daily    tiotropium  2 puff Inhalation Daily    traZODone  50 mg Oral Nightly    vitamin B-12  1,000 mcg Oral Daily    vitamin D  2 capsule Oral Daily    busPIRone  5 mg Oral TID    amiodarone  200 mg Oral Daily      pantoprozole (PROTONIX) infusion 8 mg/hr (11/20/20 1336)         I/O last 3 completed shifts: In: 762.2 [P.O.:240; IV Piggyback:522.2]  Out: 675 [Urine:675]    CBC:   Recent Labs     11/18/20 0315 11/19/20  0520 11/20/20  0420   WBC 23.9* 18.5* 20.9*   HGB 11.5* 9.7* 8.7*   * 380 351          Recent Labs     11/18/20  0315 11/19/20  0520 11/20/20  0420    141 139   K 4.2 3.8 3.8    104 105   CO2 25 25 26   BUN 55* 56* 49*   CREATININE 1.1 1.1 0.9   GLUCOSE 129* 127* 138*       Lab Results   Component Value Date    CALCIUM 8.1 (L) 11/20/2020    PHOS 2.3 (L) 11/20/2020       Objective:     Vitals: /71   Pulse 66   Temp 98.8 °F (37.1 °C) (Oral)   Resp 28   Ht 5' 7\" (1.702 m)   Wt 150 lb 2.1 oz (68.1 kg)   SpO2 99%   BMI 23.51 kg/m²     General appearance:   Thin as above  HEENT:  + conj pallor  Neck: supple  Lungs:  Coarse b/l BS  Heart: irregular  Abdomen: soft  Extremities:  No edema       Problem List :         Impression :     1. NICOLASA- recovering  2. COVId - 19 lung wise better still with 4 l o2 per NC  along with pother major organ   3. underlying a.fib / HTN     Recommendation/Plan  :     1. Keep BP > 120/70   2. She is with abx - revisit  julisa with / confusion  3. Follow clinically  4.  Watch for iatrogenic and nosocomial  complication       Vida Ma MD

## 2020-11-20 NOTE — CARE COORDINATION
Message from Dr Zoya Guallpa, he is wanting to send pt to 3 [facility]. Spoke with Hugh Loredo from [facility] and they will take pt once prior auth completed but they are awaiting therapy evals to completed prior auth. Whiteboard message to therapy and spoke with Aaron Tidwell of [facility] . 0 Spoke with Mike director of therapy and she states they will see pt today. Discussed need to have ASAP for prior auth. He verbalized understanding,stating Harpreet Gomez and Rick Tucker are wondering around that unit seeing Covid patients.

## 2020-11-20 NOTE — PROGRESS NOTES
[address] [facility]  consulted by Dr. Yudi Rodriguez for monitoring and adjustment. Indication for treatment: COVID-19, possible secondary bacterial infection  Goal trough: 15 mcg/mL, AUC/DIONY: 400-600     Pertinent Laboratory Values:   Temp Readings from Last 3 Encounters:   11/20/20 97.6 °F (36.4 °C) (Axillary)   04/09/20 97 °F (36.1 °C) (Temporal)   03/27/20 97.9 °F (36.6 °C) (Oral)     Recent Labs     11/18/20  0315 11/19/20  0520 11/20/20  0420   WBC 23.9* 18.5* 20.9*     Recent Labs     11/18/20 0315 11/19/20 0520 11/20/20  0420   BUN 55* 56* 49*   CREATININE 1.1 1.1 0.9     Estimated Creatinine Clearance: 48 mL/min (based on SCr of 0.9 mg/dL). Intake/Output Summary (Last 24 hours) at 11/20/2020 0926  Last data filed at 11/20/2020 0600  Gross per 24 hour   Intake 762.15 ml   Output 675 ml   Net 87.15 ml       Pertinent Cultures:  Date    Source    Results  11/15   Covid-19   Detected  11/15   Urine    Enterobacter  11/15   Legionella/Strep Pneumo Negative  11/16   Respiratory   Ordered  11/17   Blood    NGTD  11/17   MRSA screen   Negative    Vancomycin level:   TROUGH:    Recent Labs     11/19/20  2045   VANCOTROUGH 16.4     RANDOM:  No results for input(s): VANCORANDOM in the last 72 hours.     Assessment:  · WBC and temperature: leukocytosis (on methylprednisolone), afebrile   · SCr, BUN, and urine output: stable   · Day(s) of therapy: #4  · MRSA nasal negative  · Vancomycin level:  · 11/19: 16.4, 15h post-dose    Plan:  · Vancomycin 1500 mg x1 followed by vancomycin 1000 mg q18h  · Trough level 16.4, 15h post-dose  · Extrapolated trough: 14, therapeutic  · AUC/DIONY: 500, therapeutic  · Repeat next level in 2 days  · Pharmacy will continue to monitor patient and adjust therapy as indicated    Charlie 11/22 @ 2030    Thank you for the consult,  Abi Figueroa, [address]  11/20/2020 9:26 AM

## 2020-11-20 NOTE — PROGRESS NOTES
Occupational Therapy  [facility] OCCUPATIONAL THERAPY EVALUATION    History  [place]:  The primary encounter diagnosis was Facial droop. A diagnosis of COVID-19 was also pertinent to this visit. Restrictions:  Restrictions/Precautions  Restrictions/Precautions: General Precautions, Fall Risk                        Communication with other providers: RN, PT    Subjective:  Patient states:  \"please help\" repeatedly  Pain:  No c/o  Patient goal:  \"I need to go home\" states repeatedly but confusedly    Occupational profile (relevant social history and personal factors):    Social/Functional History  Lives With: Alone  Type of Home: Apartment  Home Layout: One level  ADL Assistance: Needs assistance(assist as needed)  Homemaking Assistance: Needs assistance  Ambulation Assistance: Independent(mod I with rolling walker)  Transfer Assistance: Independent    Examination of body systems (includes body structures/functions, activity/participation limitations):  · Orientation: ox self and place only  · Cognition:  Impaired, impulsive, does not reorient to situation, appears altered from baseline  · Observation:  Received pt in bed. Alert, impulsive, reduced attention. VSS, 2-3L of O2. Arthritic changes in hands  · Vision: At least fair   · Hearing:  WFL   · ROM:  WFL   · Strength: 4-/5 BUE, LUE weaker in function, remote hx of CVA  · Sensation: not tested  · Coordination: reduced GMC, globally    ADLs  Feeding: SBA/cues, does not attend    Grooming: SBA, cues, in seated, cannot in standing due to inadequate balance    Dressing: UB Jane in seated LB MaxA    Bathing: UB Jane in seated LB MaxA    Toileting: MaxA    *Some ADL determined per observation of actual ADL performance, functional mobility, balance, activity tolerance, and cognition.      AM-PAC 6 click short form for inpatient daily activity:  Raw Score: 15  24/24 = unimpaired  23/24 = 1-20% impaired   20/24-22/24 = 21-40% impaired  15/24-19/24 = 41-59% impaired 10/24-14/24 = 60%-79% impaired  7/24-9/24 = 80%-99% impaired  6/24 = 100% impaired    Functional Mobility  Bed mobility:   Supine to sit: Jane via log roll to sit c bed flat, c sequential cues, c rail    Sitting balance: close SBA-CGA to prevent pt from impulsively standing, no true trunk weakness, but high fall risk due to impulsivity and disorientation    Transfers:   Sit to stand: Jane for steadying from EOB 2 reps, ModA from chair for lift and steadying  Stand to sit: ModA to EOB 2 reps and chair    Standing balance: Min-ModA, impulsively reaches and takes steps    Ambulation:  Jane-ModA c RW 8 feet forward, poor posture    Activity tolerance  Fair. VSS but fatigues quickly     Assessment:  Assessment  Performance deficits / Impairments: Decreased functional mobility , Decreased cognition, Decreased high-level IADLs, Decreased ADL status, Decreased safe awareness, Decreased strength, Decreased balance, Decreased endurance, Decreased posture, Decreased coordination  Treatment Diagnosis: COVID-19  Prognosis: Good  Decision Making: Medium Complexity  REQUIRES OT FOLLOW UP: Yes  Discharge Recommendations: Subacute/Skilled Nursing Facility        Goals:  By d/c or goals met:     Pt will perform all bed mobility with CGA in prep for EOB/OOB activity. Pt will perform all functional transfers with CGA and appropriate use of LRD to bed, toilet, chair in prep for increased functional independence. Pt will perform UB ADLs with CGA to increase functional independence. Pt will perform LB ADLs with Jane to increase functional independence. Pt will perform all aspects of toileting with Jane to increase functional independence. Pt will participate in therex/therax c emphasis on strength, activity tolerance,  safety, DIDIER tasks. Plan:  Plan  Times per week: 2x      Treatment today:    Self Care Training:   Self care training was performed today.   Cues were given for safety, sequence, UE/LE placement, visual cues, and balance. Activities performed today included LB dressing  Therapeutic Activity Training:   Therapeutic activity training was instructed today. Cues were given for safety, sequence, UE/LE placement, visual cues, and balance. Activities performed today included bed mobility training, sup-sit, sit-stand, SPT. Education: Role of OT, OT POC, d/c needs, home safety    Safety: Left in bed with all needs in reach. bed alarm applied. Gait belt used for transfer and mobility. Time in:  1335  Time out:  1415  Timed treatment minutes:  25  Total treatment time:  40    Electronically signed by:    [address] Michelle Tao, OTR/L, [place]   [id number]   3:10 PM, 11/20/2020

## 2020-11-20 NOTE — DISCHARGE INSTR - COC
Continuity of Care Form    Patient Name: Sekou Fowler   :  1940  MRN:  [id number]    Admit date:  11/15/2020  Discharge date:  20    Code Status Order: Full Code   Advance Directives:   [facility] Documentation       Date/Time Healthcare Directive Type of Healthcare Directive Copy in [address] [facility] [address] Agent's Name Healthcare Agent's Phone Number    11/15/20 1959  No, patient does not have an advance directive for healthcare treatment -- -- -- -- --            Admitting Physician:  Ernie Combs MD  PCP: Janiya Champion MD    Discharging Nurse: [facility] Unit/Room#: 4782/3508-I  Discharging Unit Phone Number: ***    Emergency Contact:   Extended Emergency Contact Information  Primary Emergency Contact: sunny hameedKEON  Home Phone: [phone]  Relation: Child  Preferred language: English   needed? No  Secondary Emergency Contact: maximino hameed LILIANA  Home Phone: [phone]  Relation: Child  Preferred language: English   needed?  No    Past Surgical History:  Past Surgical History:   Procedure Laterality Date    APPENDECTOMY  1966    CARDIAC CATHETERIZATION  2017    CHOLECYSTECTOMY  1966    w/ appendectomy    COLONOSCOPY N/A 3/11/2020    COLONOSCOPY WITH BIOPSY performed by [facility]o Mckeon MD at [facility] Right     FIXATION KYPHOPLASTY  2019    L4 kyphoplasty ; Gagandeep Chimes    FOOT SURGERY  1986    HYSTERECTOMY      OH COLONOSCOPY FLX DX W/COLLJ SPEC WHEN PFRMD N/A 10/2/2018    COLONOSCOPY DIAGNOSTIC OR SCREENING performed by Nohemy Lawrence MD at [address] [place] [address] TOE SURGERY Left 2013    Deboo;     UPPER GASTROINTESTINAL ENDOSCOPY N/A 3/11/2020    EGD BIOPSY performed by Kimo Mckeon MD at [place] [address] VERTEBROPLASTY  10/2010    L3    WRIST FUSION Left        Immunization History:   Immunization History   Administered Date(s) Administered   Everlene Ibis 20 RESP Disease Panel PCR (Ordered)   03/10/20     C-diff Rule Out  19 C difficile Molecular/PCR (Ordered)   20 Oumou Eubanks RN            Nurse Assessment:  Last Vital Signs: /68   Pulse 62   Temp 97.6 °F (36.4 °C) (Axillary) Comment (Src): unable to keep under   Resp 22   Ht 5' 7\" (1.702 m)   Wt 150 lb 2.1 oz (68.1 kg)   SpO2 95%   BMI 23.51 kg/m²     Last documented pain score (0-10 scale): Pain Level: 0  Last Weight:   Wt Readings from Last 1 Encounters:   20 150 lb 2.1 oz (68.1 kg)     Mental Status:  disoriented and alert    IV Access:  - None    Nursing Mobility/ADLs:  Walking   Assisted  Transfer  Assisted  Bathing  Dependent  Dressing  Dependent  Toileting  Dependent  Feeding  Assisted  Med Admin  Assisted  Med Delivery   whole and prefers mixed with applesauce    Wound Care Documentation and Therapy:        Elimination:  Continence:   · Bowel: No  · Bladder: No  Urinary Catheter: Removal Date 20   Colostomy/Ileostomy/Ileal Conduit: No       Date of Last BM: 20    Intake/Output Summary (Last 24 hours) at 2020 1058  Last data filed at 2020 0600  Gross per 24 hour   Intake 762.15 ml   Output 675 ml   Net 87.15 ml     I/O last 3 completed shifts: In: 762.2 [P.O.:240; IV Piggyback:522.2]  Out: 675 [Urine:675]    Safety Concerns:      At Risk for Falls    Impairments/Disabilities:      None    Patient's personal belongings (please select all that are sent with patient):  None    RN SIGNATURE:  Electronically signed by Samy Lopez RN on 20 at 4:17 PM EST                PHYSICIAN SECTION    Nutrition Therapy:  Current Nutrition Therapy:   - Oral Diet:  Cardiac    Routes of Feeding: Oral  Liquids: No Restrictions  Daily Fluid Restriction: no  Last Modified Barium Swallow with Video (Video Swallowing Test): not done    Treatments at the Time of Hospital Discharge:   Respiratory Treatments: duoneb  Oxygen Therapy:  is on oxygen at 2 L/min per nasal cannula. Ventilator:    - No ventilator support    Rehab Therapies: Physical Therapy and Occupational Therapy  Weight Bearing Status/Restrictions: No weight bearing restirctions  Other Medical Equipment (for information only, NOT a DME order):  wheelchair  Other Treatments:     Prognosis: Good    Condition at Discharge: Stable    Rehab Potential (if transferring to Rehab): Good      Recommended Labs or Other Treatments After Discharge: CBC and BMP in a week    Physician Certification: I certify the above information and transfer of Barb Schmidt  is necessary for the continuing treatment of the diagnosis listed and that she requires [facility] for greater 30 days.      Update Admission H&P: No change in H&P    PHYSICIAN SIGNATURE:  Electronically signed by uJn Belcher MD on 11/23/20 at 4:12 PM EST

## 2020-11-20 NOTE — PROGRESS NOTES
Hospitalist Progress Note      Name:  Shiraz Barksdale /Age/Sex: 1940  ([de-identified] y.o. female)   MRN & CSN:  [id number] & [id number] Admission Date/Time: 11/15/2020 12:14 PM   Location:  -A PCP: Mariam Deal Day: 6      Assessment and Plan:      - on 4 L today  -CXR better today  -spoke with RN - patient is a feed. Confused today  -left message for CM - re dc planning. She is on a proton pump inhibitor drip today     - on 4 L oxygen NC today, better than yesterday -speech garbled at times-repeat head CT negative for acute event    Shiraz Barksdale is a [de-identified] y.o.  female  who presents with Acute hypoxemic respiratory failure due to COVID- [facility])    Assessment and plan       Acute on chronic resp failure  Covid pneumonia  Decadron --changed to Solu-Medrol  -continue  Had 2 doses of remdesivir, it was stopped  Oxygen support    Suspected bacterial superinfection  -Cefepime, vancomycin, and Flagyl added  -continue     Facial droop   Neg CT and CTA for acute pathology  Patient deemed as not a TPA candidate  MRI pending -could not tolerated so far  Neurology consult  Head CT repeated -no acute event    Paroxysmal A.  Fib   H/o Chronic Cardiomyopathy    ECHO 19 LVEF 55% and down to 20% on 2/10/20   Repeat ECHO with EF 50-55% with moderate MR  ASA, Held Xarelto for GI bleed   Continue Toprol XL  Cardiology recommendations appreciated  -GI consult to see if she can possibly tolerate Xarelto     H/o of GI bleed   PPI, monitor H&H     CKD stage III   IV Lasix ordered , appreciate consult with Dr. Davin Rollins  Avoid nephrotoxins, monitor  Spironolactone on hold      COPD with no exacerbation -continue with inhalers     Lung nodule  OP FU with Pulm     Hypertension  Rheumatoid arthritis  Depression  Fibromyalgia  Chronic respiratory failure on home oxygen  Chronic systolic CHF      Echo noted    Lung nodule on CTA noted    D-dimer 4894    Severe COPD per Dr. Renee Trejo 3 years ago      Rheum note 2019: Camille Hickey off of all her meds. She self d/stella Arava and Humira. Had significant anemia requiring blood transfusion. So cannot use Arava. Had pneumonia so will not use Humira. Can continue prednisone from pulmonologist. Alma Esparza on quitting tobacco.       Lovenox dose is 30 mg twice a day    I spoke to her son on 11/18 as well as 11/17    Subjective:     11/20-she was talking better today, but has been confused as yesterday. 11/19 - responded to me, but speech was difficult to understand    Summary for 11/18-doing better today-respiratory status is much more stable, and mental status is better      Summary for 11/17-patient was in critical condition this morning, with respiratory rate going up into the 40s on several occasions. Temperature went up to 102.7. Additionally, patient is on Percocet 7.5 mg 4 times a day and Xanax 0.5 mg 3 times a day -and has been somewhat unable to take them since admission -spoke to pulmonary, this may have contributed to tachypnea. By the evening respiratory rate was in the 30s. She does have underlying severe COPD and is at high risk.  -Discussed with pulmonary today, and decided to give her plasma      Wednesday, November 18-was doing much better today    Tuesday:    Patient was extremely tachypneic today. When I walked in the room she did verbally acknowledge my presence, but she was much more confused than yesterday per nursing. The patient today could not carry on a conversation due to her critical condition. ED:  Lizandro Houston is a [de-identified] y.o. female he tested positive for Covid on Tuesday at her care facility that presents with left-sided facial droop and left arm weakness from her care facility. She was last seen well at about 8 AM this morning. She tells me she feels unwell but is unable to assist further in the history due to feeling unwell.   She is coughing on exam.  Per report patient is normally on 2 L of oxygen via nasal cannula but has been on 6L this last week due to Covid. Kelli:  Pt is a resident at [facility]. Call to [facility]. Pt is typically from the AL at [facility]. Pt was brought over to the skilled side when Pt tested positive for Covid. Pt may be able to return to skilled if recommended by therapy. White board placed asking for recommendations       Objective: Intake/Output Summary (Last 24 hours) at 11/20/2020 0945  Last data filed at 11/20/2020 0600  Gross per 24 hour   Intake 762.15 ml   Output 675 ml   Net 87.15 ml      Vitals:   Vitals:    11/20/20 0933   BP: 111/68   Pulse: 70   Resp: 26   Temp:    SpO2:      Physical Exam:      Physical Exam  Constitutional:       General: She is not in acute distress. Appearance: She is not ill-appearing. Cardiovascular:      Rate and Rhythm: Regular rhythm. Pulmonary:      Effort: Pulmonary effort is normal.   Abdominal:      General: There is no distension. Tenderness: There is no abdominal tenderness. There is no guarding or rebound. Neurological:      Cranial Nerves: No cranial nerve deficit.        Speech is garbled at times    Medications:   Medications:    rivaroxaban  20 mg Oral Daily    folic acid-pyridoxine-cyancobalamin  1 tablet Oral Daily    pantoprazole  40 mg Oral BID AC    metoprolol tartrate  50 mg Oral BID    rosuvastatin  10 mg Oral Nightly    lisinopril  5 mg Oral Daily    ALPRAZolam  0.5 mg Oral Q8H    guaiFENesin  200 mg Oral Q6H    methylPREDNISolone  40 mg Intravenous Q12H    cefepime  2 g Intravenous Q12H    metroNIDAZOLE  500 mg Intravenous Q8H    acetaminophen  650 mg Rectal Once    vancomycin  1,000 mg Intravenous Q18H    zinc sulfate  50 mg Oral Daily    sodium chloride flush  10 mL Intravenous 2 times per day    aspirin  81 mg Oral Daily    albuterol sulfate HFA  2 puff Inhalation 4x daily    tiotropium  2 puff Inhalation Daily    traZODone  50 mg Oral Nightly    vitamin B-12  1,000 mcg Oral Daily    vitamin D  2 capsule Oral Daily    busPIRone  5 mg Oral TID    amiodarone  200 mg Oral Daily      Infusions:     PRN Meds: fentanNYL, 12.5 mcg, Q1H PRN  diazePAM, 2.5 mg, Q4H PRN  sodium chloride, 30 mL, PRN  sodium chloride flush, 10 mL, PRN  acetaminophen, 650 mg, Q6H PRN    Or  acetaminophen, 650 mg, Q6H PRN  polyethylene glycol, 17 g, Daily PRN  labetalol, 10 mg, Q10 Min PRN  oxyCODONE-acetaminophen, 1 tablet, Q6H PRN          Electronically signed by Ryan Wagoner MD on 11/20/2020 at 9:45 AM

## 2020-11-20 NOTE — PROGRESS NOTES
HOSPITALIST    AC:    Cont ASA  Stop Plavix  Start Xarelto    Above as per GI recommendations - appreciate input from all consultants.

## 2020-11-20 NOTE — PROGRESS NOTES
Pulmonary and Critical Care  Progress Note    Subjective: The patient has improved. On 2L N/C.CXR : Better. Shortness of breath has improved  Chest pain none  Addressing respiratory complaints Patient is negative for  hemoptysis and cyanosis  CONSTITUTIONAL:  negative for fevers and chills      Past Medical History:     has a past medical history of Acute DVT of right tibial vein (HCC), Acute exacerbation of chronic obstructive pulmonary disease (COPD) ([address] Anemia, Arthritis, Atrial fibrillation ([address] CHF (congestive heart failure) (HCC), Chronic hypoxemic respiratory failure (HCC), Chronic pain syndrome, Clostridium difficile colitis, COPD, severe ([address] Depression, Fibromyalgia, Former smoker, Herniated cervical disc, Herpes zoster ophthalmicus, Hx of blood clots, Hyperlipidemia, Hypertension, Kidney disease, L3 vertebral fracture ([address] Lumbar spinal stenosis, Migraine, Nocturnal hypoxemia, Osteoporosis, Rheumatoid arthritis(714.0), S/P cardiac catheterization, Shortness of breath, Takotsubo syndrome, TMJ dysfunction, Tobacco abuse, and Vitamin B12 deficiency. has a past surgical history that includes Cholecystectomy (1966); devyn and bso (cervix removed) (1991); Wrist fusion (Left, 2000); Elbow surgery (Right); Appendectomy (1966); vertebroplasty (10/2010); Foot surgery (1986); Toe Surgery (Left, 4/25/2013); Cardiac catheterization (05/21/2017); pr colonoscopy flx dx w/collj spec when pfrmd (N/A, 10/2/2018); Fixation Kyphoplasty (06/12/2019); Hysterectomy; Upper gastrointestinal endoscopy (N/A, 3/11/2020); and Colonoscopy (N/A, 3/11/2020). reports that she quit smoking about 10 months ago. Her smoking use included cigarettes. She started smoking about 58 years ago. She has a 41.25 pack-year smoking history. She has never used smokeless tobacco. She reports that she does not drink alcohol or use drugs.   Family history:  family history includes Arthritis in her mother; Cancer in an other family member; CREATININE 1.1 1.1 0.9   CALCIUM 8.6 8.1* 8.1*   GLUCOSE 129* 127* 138*      ABG:  No results for input(s): PH, PO2ART, MBF3UDF, HCO3, BEART, O2SAT in the last 72 hours. Lab Results   Component Value Date    PROBNP 35,438 (H) 11/19/2020    PROBNP >70,000 (H) 11/18/2020    PROBNP 606.9 (H) 03/24/2020     No results found for: [facility]    Radiology Review:  Pertinent images / reports were reviewed as a part of this visit. Assessment:     Patient Active Problem List   Diagnosis    Rheumatoid Arthritis    Hypertension    Hyperlipidemia    Fibromyalgia    Migraine    Chronic pain syndrome    Depression    Osteoporosis    Vitamin B12 deficiency    Lumbar spinal stenosis    Panic attack    Takotsubo syndrome    Blood loss anemia    COPD, severe (HCC)    Acute exacerbation of chronic obstructive pulmonary disease (COPD) ([address]    Former smoker    Shortness of breath    Back pain    Intractable low back pain    Chronic fatigue    Pneumonia of right upper lobe due to infectious organism    Wide-complex tachycardia (HCC)    Gram-negative pneumonia (HCC)    Acute systolic heart failure (HCC)    Symptomatic anemia    Chronic hypoxemic respiratory failure (HCC)    Acute hypoxemic respiratory failure due to COVID-19 (HCC)    Transaminitis       Plan:   1. Overall the patient has improved. 2. Inc. activity.       Christina Mayer MD  11/20/2020  10:59 AM

## 2020-11-20 NOTE — PROGRESS NOTES
Daily Progress Note    Awake but confused  Stopped clonidine due to low BP  Heart rate stable  She is in sinus now and PAFIB  Covid positive  Supportive care  [facility] if possible for PAFIB then stop plavix and keep on AC only due to risk for bleeding   Normal EF on echo and mild CAD on cath 2017    Pt. Awake, slightly confused  HR elevated, NSR in the 70 range, BP low overnight  No CP, Improving SOB slowly     Covid PNA    Per primary    On remdesivir    Tx. Per ID     PAF with possible TIA    On amio, lopressor    Rate stable and sinus    On Lovenox-Dimer elevated- echo was stable- no signs of RV strain    Holding xarelto d/t GI bleed- cleared by GI to restart    CTA head and neck neg.    Awaiting MRI-unable to tolerate so far     Acute on Chronic HFpEF    EF 50-55% on echo    BNP very elevated-improving    Renal following-diuretics per renal    Good UOP    Recheck BNP and CXR    Cont. Med. Tx.      Per GI recs to stop plavix  Will cont.  To follow     Echo-11/16/20  Summary   This is a limited echocardiogram.   Left ventricular systolic function is normal.   Ejection fraction is visually estimated at 50-55%.   Moderate aortic regurgitation; PHT: 336 msec.   Moderate mitral regurgitation.   Small mobile calcification attached to the posterior mitral valve chordae   tendinae.   No evidence of any pericardial effusion.     PAST MEDICAL HISTORY:  The patient sees Dr. Catalino Singer for her COPD, history  of having heart failure with apical ballooning that improved, history of  heart catheterization in 2017, found to have nonobstructive coronary  artery disease present, hypertension, hyperlipidemia, fibromyalgia,  atrial flutter, 2:1 conduction noted, and she was on anticoagulation for  that.     PAST SURGICAL HISTORY:  Gallbladder surgery, hysterectomy done, and  appendectomy.     SOCIAL HISTORY:  She does not smoke, does not drink.  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     ALLERGIES:  HUMIRA, REMICADE, LORAZEPAM, _____, COMPAZINE, and  DOXYCYCLINE.     MEDICATIONS AT HOME:  She was on iron sulfate, Zoloft, Claritin, Lasix,  Xanax, aspirin, amiodarone, Toprol 25 mg b.i.d., and Aldactone.       Objective:   BP (!) 126/91   Pulse 77   Temp 97.6 °F (36.4 °C) (Axillary) Comment (Src): unable to keep under   Resp 16   Ht 5' 7\" (1.702 m)   Wt 150 lb 2.1 oz (68.1 kg)   SpO2 94%   BMI 23.51 kg/m²       Intake/Output Summary (Last 24 hours) at 11/20/2020 0813  Last data filed at 11/20/2020 0600  Gross per 24 hour   Intake 762.15 ml   Output 675 ml   Net 87.15 ml       Medications:   Scheduled Meds:   folic acid-pyridoxine-cyancobalamin  1 tablet Oral Daily    pantoprazole  40 mg Oral BID AC    metoprolol tartrate  50 mg Oral BID    rosuvastatin  10 mg Oral Nightly    lisinopril  5 mg Oral Daily    ALPRAZolam  0.5 mg Oral Q8H    guaiFENesin  200 mg Oral Q6H    methylPREDNISolone  40 mg Intravenous Q12H    cefepime  2 g Intravenous Q12H    metroNIDAZOLE  500 mg Intravenous Q8H    acetaminophen  650 mg Rectal Once    vancomycin  1,000 mg Intravenous Q18H    clopidogrel  75 mg Oral Daily    zinc sulfate  50 mg Oral Daily    sodium chloride flush  10 mL Intravenous 2 times per day    aspirin  81 mg Oral Daily    albuterol sulfate HFA  2 puff Inhalation 4x daily    tiotropium  2 puff Inhalation Daily    enoxaparin  30 mg Subcutaneous BID    traZODone  50 mg Oral Nightly    vitamin B-12  1,000 mcg Oral Daily    vitamin D  2 capsule Oral Daily    busPIRone  5 mg Oral TID    amiodarone  200 mg Oral Daily      Infusions:     PRN Meds:  fentanNYL, diazePAM, sodium chloride, sodium chloride flush, acetaminophen **OR** acetaminophen, polyethylene glycol, labetalol, oxyCODONE-acetaminophen       Physical Exam:  Vitals:    11/20/20 0752   BP: (!) 126/91   Pulse: 77   Resp: 16   Temp: 97.6 °F (36.4 °C)   SpO2: 94%        General: AAO, NAD  Chest: Nontender  Cardiac: First and Second Heart Sounds are Normal, No Murmurs or Gallops noted  Lungs:Clear to auscultation and percussion. Abdomen: Soft, NT, ND, +BS  Extremities: No clubbing, no edema  Vascular:  Equal 2+ peripheral pulses. Lab Data:  CBC:   Recent Labs     11/18/20 0315 11/19/20 0520 11/20/20 0420   WBC 23.9* 18.5* 20.9*   HGB 11.5* 9.7* 8.7*   HCT 38.6 32.2* 29.4*   MCV 78.8 78.9 79.7   * 380 351     BMP:   Recent Labs     11/18/20 0315 11/19/20 0520 11/20/20 0420    141 139   K 4.2 3.8 3.8    104 105   CO2 25 25 26   PHOS 3.0 2.6 2.3*   BUN 55* 56* 49*   CREATININE 1.1 1.1 0.9     LIVER PROFILE:   Recent Labs     11/18/20 0315 11/19/20 0520 11/20/20 0420   AST 55* 45* 33   ALT 58* 43* 37   BILIDIR 0.2 0.2 0.2   BILITOT 0.3 0.3 0.4   ALKPHOS 80 76 76     PT/INR: No results for input(s): PROTIME, INR in the last 72 hours. APTT: No results for input(s): APTT in the last 72 hours. BNP:  No results for input(s): BNP in the last 72 hours.       Assessment:  Patient Active Problem List    Diagnosis Date Noted    Transaminitis 11/17/2020    Acute hypoxemic respiratory failure due to COVID-19 [facility]) 11/15/2020    Chronic hypoxemic respiratory failure ([address] 09/09/2020    Symptomatic anemia 03/24/2020    Wide-complex tachycardia ([address] 02/10/2020    Gram-negative pneumonia ([address] 26/89/4392    Acute systolic heart failure ([facility] 02/10/2020    Pneumonia of right upper lobe due to infectious organism     Chronic fatigue 07/08/2019    Intractable low back pain 06/11/2019    Back pain 06/09/2019    Shortness of breath 04/25/2019    Former smoker     Acute exacerbation of chronic obstructive pulmonary disease (COPD) ([address] 12/13/2018    COPD, severe ([facility] 10/24/2017    Blood loss anemia 08/01/2017    Takotsubo syndrome 05/01/2017    Panic attack 11/18/2016    Lumbar spinal stenosis 01/01/2015    Vitamin B12 deficiency 12/01/2014    Osteoporosis 03/16/2012    Depression     Migraine 10/06/2010    Rheumatoid Arthritis     Hypertension    

## 2020-11-20 NOTE — PROGRESS NOTES
Infectious Disease Progress Note  2020   Patient Name: Barb Schmidt : 1940       Reason for visit: F/u COVID-19 pneumonia, acute respiratory failure? History:? Interval history noted  Denies n/v/d/f or untoward effects of antimicrobials  More awake, needs less oxygen, would like to return home. Physical Exam:  Vital Signs: /68   Pulse 62   Temp 97.6 °F (36.4 °C) (Axillary) Comment (Src): unable to keep under   Resp 22   Ht 5' 7\" (1.702 m)   Wt 150 lb 2.1 oz (68.1 kg)   SpO2 95%   BMI 23.51 kg/m²     Gen: alert and oriented X1  Skin: no stigmata of endocarditis  Wounds: C/D/I  HEMT: AT/NC Oropharynx pink, moist, and without lesions or exudates; dentition in good state of repair  Eyes: PERRLA, EOMI, conjunctiva pink, sclera anicteric. Neck: Supple. Trachea midline. No LAD. Chest: Deferred as use of PAPR does not allow for auscultation. Heart: Deferred as use of PAPR does not allow for auscultation. Abd: soft, non-distended, no tenderness, no hepatomegaly. Normoactive bowel sounds. Ext: no clubbing, cyanosis, or edema  Catheter Site: without erythema or tenderness  Neuro: Mental status intact. CN 2-12 intact and no focal sensory or motor deficits     Radiologic / Imaging / TESTING  No results found. Labs:    Recent Results (from the past 24 hour(s))   Procalcitonin    Collection Time: 20  5:00 PM   Result Value Ref Range    Procalcitonin 0.591    Vancomycin, trough    Collection Time: 20  8:45 PM   Result Value Ref Range    Vancomycin Tr 16.4 10 - 20 UG/ML    DOSE AMOUNT DOSE AMT.  GIVEN - 1000     DOSE TIME DOSE TIME GIVEN - 2100    CBC Auto Differential    Collection Time: 20  4:20 AM   Result Value Ref Range    WBC 20.9 (H) 4.0 - 10.5 K/CU MM    RBC 3.69 (L) 4.2 - 5.4 M/CU MM    Hemoglobin 8.7 (L) 12.5 - 16.0 GM/DL    Hematocrit 29.4 (L) 37 - 47 %    MCV 79.7 78 - 100 FL    MCH 23.6 (L) 27 - 31 PG    MCHC 29.6 (L) 32.0 - 36.0 %    RDW 16.8 (H) 11.7 - 14.9 % Platelets 876 169 - 624 K/CU MM    MPV 10.2 7.5 - 11.1 FL    Differential Type AUTOMATED DIFFERENTIAL     Segs Relative 92.9 (H) 36 - 66 %    Lymphocytes % 2.1 (L) 24 - 44 %    Monocytes % 3.7 0 - 4 %    Eosinophils % 0.0 0 - 3 %    Basophils % 0.2 0 - 1 %    Segs Absolute 19.4 K/CU MM    Lymphocytes Absolute 0.4 K/CU MM    Monocytes Absolute 0.8 K/CU MM    Eosinophils Absolute 0.0 K/CU MM    Basophils Absolute 0.0 K/CU MM    Nucleated RBC % 0.0 %    Total Nucleated RBC 0.0 K/CU MM    Total Immature Neutrophil 0.22 K/CU MM    Immature Neutrophil % 1.1 (H) 0 - 0.43 %   D-Dimer, Quantitative    Collection Time: 11/20/20  4:20 AM   Result Value Ref Range    D-Dimer, Quant 4884 (H) <230 NG/mL(DDU)   Hepatic function panel    Collection Time: 11/20/20  4:20 AM   Result Value Ref Range    Alb 2.7 (L) 3.4 - 5.0 GM/DL    Total Bilirubin 0.4 0.0 - 1.0 MG/DL    Bilirubin, Direct 0.2 0.0 - 0.3 MG/DL    Bilirubin, Indirect 0.2 0 - 0.7 MG/DL    Alkaline Phosphatase 76 40 - 129 IU/L    AST 33 15 - 37 IU/L    ALT 37 10 - 40 U/L    Total Protein 4.9 (L) 6.4 - 8.2 GM/DL   Renal Function Panel    Collection Time: 11/20/20  4:20 AM   Result Value Ref Range    Sodium 139 135 - 145 MMOL/L    Potassium 3.8 3.5 - 5.1 MMOL/L    Chloride 105 99 - 110 mMol/L    CO2 26 21 - 32 MMOL/L    Anion Gap 8 4 - 16    BUN 49 (H) 6 - 23 MG/DL    CREATININE 0.9 0.6 - 1.1 MG/DL    Glucose 138 (H) 70 - 99 MG/DL    Calcium 8.1 (L) 8.3 - 10.6 MG/DL    GFR Non-African American >60 >60 mL/min/1.73m2    GFR African American >60 >60 mL/min/1.73m2    Alb 2.7 (L) 3.4 - 5.0 GM/DL    Phosphorus 2.3 (L) 2.5 - 4.9 MG/DL   C-Reactive Protein    Collection Time: 11/20/20  4:20 AM   Result Value Ref Range    CRP, High Sensitivity 83.8 mg/L   Procalcitonin    Collection Time: 11/20/20  4:20 AM   Result Value Ref Range    Procalcitonin 0.402    TYPE AND SCREEN    Collection Time: 11/20/20  4:20 AM   Result Value Ref Range    ABO/Rh B NEGATIVE     Antibody Screen NEGATIVE Du Antigen NEGATIVE      CULTURE results: Invalid input(s): BLOOD CULTURE,  URINE CULTURE, SURGICAL CULTURE    Diagnosis:  Patient Active Problem List   Diagnosis    Rheumatoid Arthritis    Hypertension    Hyperlipidemia    Fibromyalgia    Migraine    Chronic pain syndrome    Depression    Osteoporosis    Vitamin B12 deficiency    Lumbar spinal stenosis    Panic attack    Takotsubo syndrome    Blood loss anemia    COPD, severe (HCC)    Acute exacerbation of chronic obstructive pulmonary disease ([facility]D) ([facility]    Former smoker    Shortness of breath    Back pain    Intractable low back pain    Chronic fatigue    Pneumonia of right upper lobe due to infectious organism    Wide-complex tachycardia (HCC)    Gram-negative pneumonia (HCC)    Acute systolic heart failure (HCC)    Symptomatic anemia    Chronic hypoxemic respiratory failure (HCC)    Acute hypoxemic respiratory failure due to COVID-19 ([facility]    Transaminitis       Active Problems  Principal Problem:    Acute hypoxemic respiratory failure due to COVID-19 [facility])  Active Problems:    Transaminitis  Resolved Problems:    * No resolved hospital problems. *      Impression and plan   Summary and rationale: Patient is a [de-identified] y.o.  female coronary artery disease, COPD diagnosis viral sepsis secondary to COVID-19. Suspect CHF exacerbation, improved BNP with diuresis.  Clinical status: Improving on less oxygen. More awake.  Urine culture: Enterobacter cloacae, unable to tell if this is a true infection. Absence of pyuria does not categorically rule out a UTI.    Therapeutic:  o Ongoing antibiotics:  cefepime 11/17-(plan to treat for 7 days, tentative end date 11/23/2020),   o Antiviral agent: remdesivir 11/16-17  o Anti-inflammatory agents:  - Dexamethasone: 11/16-17  - Solu-Medrol: 11/17-  o Completed antibiotics: d/c Vancomycin 11/17-20;metronidazole 11/17-20  o Convalescent plasma receipt: 1 unit  11/17  o Other agents:  Diagnostic: Trend CRP and procalcitonin   F/u:   Other:      Electronically signed by: Electronically signed by Benedict Singh MD on 11/20/2020 at 10:52 AM

## 2020-11-20 NOTE — PROGRESS NOTES
[facility]        Progress Note                   Telemedicine start: 900 End time: 2020  6:28 AM    Patient:    Beryl Brandon  : 1940   [de-identified] y.o. MRN: [id number]  Admitted: 11/15/2020 12:14 PM ATT: Christiana Eaton MD   [id number]  AdmitDx: Facial droop [R29.810]  Acute hypoxemic respiratory failure due to COVID-19 ([facility] [U07.1, J96.01]  COVID-19 [U07.1]  PCP: Fly Calderón MD    SUBJECTIVE:  Chart reviewed, events noted  Large black stool last evening ~1800 per [facility]. Patient intermittently confused per RN. Tolerating diet without emesis. No N/V today. No abdominal pain. Obtained information from  [address] and [facility] charge nurse. Due to the current efforts to prevent transmission of COVID-19 and also the need to preserve PPE for other caregivers, a face-to-face encounter with the patient was not performed. That being said, all relevant records and diagnostic tests were reviewed, including laboratory results and imaging. Please reference any relevant documentation elsewhere. Care will be coordinated with the primary service.       ROS: Deferred to hospitalist note     OBJECTIVE:      BP (!) 104/54   Pulse 56   Temp 98.5 °F (36.9 °C) (Oral)   Resp 23   Ht 5' 7\" (1.702 m)   Wt 150 lb 2.1 oz (68.1 kg)   SpO2 98%   BMI 23.51 kg/m²      Physical exam: Deferred to hospitalist assessment      CBC:   Recent Labs     20   WBC 23.9* 18.5* 20.9*   HGB 11.5* 9.7* 8.7*   * 380 351     BMP:    Recent Labs     20  0420    141 139   K 4.2 3.8 3.8    104 105   CO2 25 25 26   BUN 55* 56* 49*   CREATININE 1.1 1.1 0.9   GLUCOSE 129* 127* 138*     Magnesium:   Lab Results   Component Value Date    MG 2.0 2020     Hepatic:   Recent Labs     20  0315 20  0520 20  0420   AST 55* 45* 33   ALT 58* 43* 37   BILITOT 0.3 0.3 0.4 ALKPHOS 80 76 76     INR: No results for input(s): INR in the last 72 hours.       Intake/Output Summary (Last 24 hours) at 11/20/2020 8748  Last data filed at 11/20/2020 0600  Gross per 24 hour   Intake 762.15 ml   Output 675 ml   Net 87.15 ml         Medications    Scheduled Medications:    folic acid-pyridoxine-cyancobalamin  1 tablet Oral Daily    cloNIDine  0.1 mg Oral BID    pantoprazole  40 mg Oral BID AC    metoprolol tartrate  50 mg Oral BID    rosuvastatin  10 mg Oral Nightly    lisinopril  5 mg Oral Daily    ALPRAZolam  0.5 mg Oral Q8H    guaiFENesin  200 mg Oral Q6H    methylPREDNISolone  40 mg Intravenous Q12H    cefepime  2 g Intravenous Q12H    metroNIDAZOLE  500 mg Intravenous Q8H    acetaminophen  650 mg Rectal Once    vancomycin  1,000 mg Intravenous Q18H    clopidogrel  75 mg Oral Daily    zinc sulfate  50 mg Oral Daily    sodium chloride flush  10 mL Intravenous 2 times per day    aspirin  81 mg Oral Daily    albuterol sulfate HFA  2 puff Inhalation 4x daily    tiotropium  2 puff Inhalation Daily    enoxaparin  30 mg Subcutaneous BID    traZODone  50 mg Oral Nightly    vitamin B-12  1,000 mcg Oral Daily    vitamin D  2 capsule Oral Daily    busPIRone  5 mg Oral TID    amiodarone  200 mg Oral Daily     PRN Medications: fentanNYL, diazePAM, sodium chloride, sodium chloride flush, acetaminophen **OR** acetaminophen, polyethylene glycol, labetalol, oxyCODONE-acetaminophen  Infusions:       Patient Active Problem List   Diagnosis Code    Rheumatoid Arthritis M19.90    Hypertension I10    Hyperlipidemia E78.5    Fibromyalgia M79.7    Migraine G43.909    Chronic pain syndrome G89.4    Depression F32.9    Osteoporosis M81.0    Vitamin B12 deficiency E53.8    Lumbar spinal stenosis M48.061    Panic attack F41.0    Takotsubo syndrome I51.81    Blood loss anemia D50.0    COPD, severe (HCC) J44.9    Acute exacerbation of chronic obstructive pulmonary disease (COPD) ([facility] J44.1    Former smoker Z87.891    Shortness of breath R06.02    Back pain M54.9    Intractable low back pain M54.5    Chronic fatigue R53.82    Pneumonia of right upper lobe due to infectious organism J18.9    Wide-complex tachycardia (HCC) I47.2    Gram-negative pneumonia ([facility] Y73.4    Acute systolic heart failure ([facility]) I50.21    Symptomatic anemia D64.9    Chronic hypoxemic respiratory failure (HCC) J96.11    Acute hypoxemic respiratory failure due to COVID-19 ([facility]) U07.1, J96.01    Transaminitis R74.01     ASSESSMENT AND RECOMMENDATIONS:    Anemia:  GI work up 3/2020 unremarkable  One black stool last evening, denies blood in stool today  Decline in hgb noted, hgb 8.7 today   Recommend PPI infusion for 24-48 hrs then reduce to BID  Okay for patient to receive ASA 81 mg and Xarelto, advised to stop Plavix    Discussed plan of care with patient      Patient clinical, biochemical, and radiological information discussed with Dr. Yunier Padilla. He agrees with the assessment and plan.      Cornelius Lemus, CNP  11/20/2020  6:28 AM     ONE black Bm  OTHERWISE no overt BLEED  Treat symptomatically  D/W H Allan  Agree with plan    Alan Avitia

## 2020-11-20 NOTE — PROGRESS NOTES
Physical Therapy    Facility/Department: [facility] ICU STEPDOWN  Initial Assessment    NAME: Ana Tse  : 1940  MRN: [id number]    Date of Service: 2020    Discharge Recommendations:  Subacute/Skilled Nursing Facility        Assessment   Assessment: pt is an [de-identified] y.o. female with medical history, surgical history, co-morbidities, and personal factors including Acute DVT of right tibial vein, Acute exacerbation of chronic obstructive pulmonary disease (COPD), Anemia, Arthritis, Atrial fibrillation, CHF (congestive heart failure), Chronic hypoxemic respiratory failure, Chronic pain syndrome, Clostridium difficile colitis, COPD, severe, Depression, Fibromyalgia, Former smoker, Herniated cervical disc, Herpes zoster ophthalmicus, Hx of blood clots, Hyperlipidemia, Hypertension, Kidney disease, L3 vertebral fracture (HCC), Lumbar spinal stenosis, Migraine, Nocturnal hypoxemia, Osteoporosis, Rheumatoid arthritis(714.0), S/P cardiac catheterization, Shortness of breath, Takotsubo syndrome, TMJ dysfunction, Tobacco abuse, Vitamin B12 deficiency, Cholecystectomy (); devyn and bso (cervix removed) (); Wrist fusion (Left, ); Elbow surgery (Right); Appendectomy (); vertebroplasty (10/2010); Foot surgery (); Toe Surgery (Left, 2013); Cardiac catheterization (2017); pr colonoscopy flx dx w/collj spec when pfrmd (N/A, 10/2/2018); Fixation Kyphoplasty (2019); Hysterectomy; Upper gastrointestinal endoscopy (N/A, 3/11/2020); and Colonoscopy (N/A, 3/11/2020) with admission for facial droop and COVID-19. Prior to admission, pt was modified independent with functional mobility and required assistance with ADLs as needed. Examination of body systems reveals decreased strength, decreased balance, decreased aerobic capacity, impaired cognition, and decreased independence with functional mobility.          Prognosis: Good  Decision Making: High Complexity  Clinical Presentation: unpredictable characteristics  PT Education: Goals;PT Role;Plan of Care;Equipment;Transfer Training;General Safety;Gait Training;Functional Mobility Training  REQUIRES PT FOLLOW UP: Yes  Activity Tolerance  Activity Tolerance: Patient Tolerated treatment well         Restrictions  Restrictions/Precautions  Restrictions/Precautions: General Precautions, Fall Risk  Vision/Hearing  Vision: Within Functional Limits  Hearing: Within functional limits     Subjective  General  Chart Reviewed: Yes  Patient assessed for rehabilitation services?: Yes  Family / Caregiver Present: No  Follows Commands: Impaired(impulsive)  Pain Screening  Patient Currently in Pain: Yes  Vital Signs  Patient Currently in Pain: Denies       Orientation  Orientation  Overall Orientation Status: Impaired  Orientation Level: Disoriented to time;Oriented to person  Social/Functional History  Social/Functional History  Lives With: Alone  Type of Home: Apartment  Home Layout: One level  ADL Assistance: Needs assistance(assist as needed)  Homemaking Assistance: Needs assistance  Ambulation Assistance: Independent(mod I with rolling walker)  Transfer Assistance: Independent  Cognition   Cognition  Overall Cognitive Status: Exceptions  Following Commands: Inconsistently follows commands  Attention Span: Attends with cues to redirect  Memory: Decreased short term memory;Decreased long term memory  Safety Judgement: Decreased awareness of need for safety;Decreased awareness of need for assistance  Problem Solving: Decreased awareness of errors  Insights: Decreased awareness of deficits  Initiation: Requires cues for some  Sequencing: Requires cues for some    Objective             Strength RLE  Comment: knee extension: 4/5, ankle DF: 4+/5  Strength LLE  Comment: knee extension: 4/5, ankle DF: 4-/5     Sensation  Overall Sensation Status: WNL  Bed mobility  Supine to Sit: Moderate assistance(with verbal and tactile cues for BUE and BLE placement throughout transfer; with HOB elevated; with increased time for task completion.)  Sit to Supine: Moderate assistance  Transfers  Sit to Stand: Moderate Assistance(with verbal cues to push through bed/chair and avoid pulling on walker)  Stand to sit: Moderate Assistance(with verbal cues to feel chair against back of legs, reach back, and sit slowly)  Ambulation  Ambulation?: Yes  Ambulation 1  Surface: level tile  Device: Rolling Walker  Assistance: Moderate assistance;2 Person assistance  Quality of Gait: decreased gait speed, decreased step length bilaterally, unsteady gait  Distance: 10 feet  Comments: with verbal and tactile cues for BLE placement, walker placement, and sequence throughout ambulation; with verbal and tactile cues to maintain upright posture in order to avoid COM shifting outside of TARAH     Balance  Posture: Poor(forward head, rounded shoulders, increased thoracic kyphosis)  Sitting - Static: Good  Sitting - Dynamic: Fair  Standing - Static: Poor;+  Standing - Dynamic: Poor      Gait Training:  Cues were given for safety, sequence, device management, balance, posture, awareness, path. Therapeutic Activity Training:   Therapeutic activity training was instructed today. Cues were given for safety, sequence, UE/LE placement, awareness, and balance. Activities performed today included bed mobility training, sup-sit, sit-stand, SPT. Plan   Plan  Times per week: 3+  Current Treatment Recommendations: Strengthening, ROM, Balance Training, Functional Mobility Training, Transfer Training, ADL/Self-care Training, Stair training, Gait Training, IADL Training, Neuromuscular Re-education, Safety Education & Training, Cognitive/Perceptual Training, Endurance Training, Patient/Caregiver Education & Training, Wheelchair Mobility Training, Equipment Evaluation, Education, & procurement, Cognitive Reorientation, Positioning, Pain Management, Home Exercise Program  Safety Devices  Type of devices:  All fall risk precautions in place, Bed alarm in place, Call light within reach, Nurse notified, Gait belt, Patient at risk for falls, Left in bed      AM-PAC Score  AM-PAC Inpatient Mobility Raw Score : 11 (11/20/20 1650)  AM-PAC Inpatient T-Scale Score : 33.86 (11/20/20 1650)  Mobility Inpatient CMS 0-100% Score: 72.57 (11/20/20 1650)  Mobility Inpatient CMS G-Code Modifier : CL (11/20/20 1650)          Goals  Long term goals  Time Frame for Long term goals :  In one week:  Long term goal 1: Pt will complete all bed mobility with SBAx1  Long term goal 2: Pt will complete sit <> stand transfers with CGAx1  Long term goal 3: Pt will ambulate 50 feet with minAx1 with LRAD  Long term goal 4: Pt will independently complete 3 sets of 10 reps of BLE AROM exercises in available and allowed ROM       Time In: 1341  Time Out: 1415  Total Treatment Time: 34  Timed Code Treatment Minutes: [address] Katt Zambrano PT, DPT  License #: [id number]

## 2020-11-20 NOTE — PROGRESS NOTES
Comprehensive Nutrition Assessment    Type and Reason for Visit:  Initial, RD Nutrition Re-Screen/LOS    Nutrition Recommendations/Plan:   · Continue current diet  · Start standard supplements    Nutrition Assessment:  Pt assessed due to a los. She has been in a dysphagia diet and has been refusing to eat meals. Will order supplements and continue to follow. Malnutrition Assessment:  Malnutrition Status: At risk for malnutrition (Comment)    Context:  Acute Illness       Estimated Daily Nutrient Needs:  Energy (kcal):  6393-5668; Weight Used for Energy Requirements:  Current     Protein (g):  74; Weight Used for Protein Requirements:  Ideal        Fluid (ml/day):  2798-2749; Method Used for Fluid Requirements:  1 ml/kcal      Wounds:  None       Current Nutrition Therapies:    DIET GENERAL; Dysphagia Minced and Moist    Anthropometric Measures:  · Height: 5' 7\" (170.2 cm)  · Current Body Weight: 150 lb (68 kg)   · Admission Body Weight: 150 lb (68 kg)  · Usual Body Weight: 150 lb (68 kg)     · Ideal Body Weight: 135 lbs; % Ideal Body Weight 111.1 %   · BMI: 23.5  · BMI Categories: Normal Weight (BMI 18.5-24. 9)       Nutrition Diagnosis:   · Inadequate oral intake related to acute injury/trauma as evidenced by intake 0-25%      Nutrition Interventions:   Food and/or Nutrient Delivery:  Start Oral Nutrition Supplement, Continue Current Diet  Nutrition Education/Counseling:  No recommendation at this time   Coordination of Nutrition Care:  No recommendation at this time    Goals:  pt will consume greater than 50% of her meals and supplements       Nutrition Monitoring and Evaluation:    Food/Nutrient Intake Outcomes:  Diet Advancement/Tolerance, Food and Nutrient Intake  Physical Signs/Symptoms Outcomes:  Biochemical Data, Skin, Weight, Meal Time Behavior, Chewing or Swallowing     Discharge Planning:     Too soon to determine     Electronically signed by Haritha Vee RD, LD on 25/33/89 at 2:50 PM EST    Contact: [phone]

## 2020-11-21 LAB
ALBUMIN SERPL-MCNC: 2.9 GM/DL (ref 3.4–5)
ANION GAP SERPL CALCULATED.3IONS-SCNC: 9 MMOL/L (ref 4–16)
BASOPHILS ABSOLUTE: 0 K/CU MM
BASOPHILS RELATIVE PERCENT: 0.2 % (ref 0–1)
BUN BLDV-MCNC: 42 MG/DL (ref 6–23)
CALCIUM SERPL-MCNC: 8.1 MG/DL (ref 8.3–10.6)
CHLORIDE BLD-SCNC: 103 MMOL/L (ref 99–110)
CO2: 26 MMOL/L (ref 21–32)
CREAT SERPL-MCNC: 0.9 MG/DL (ref 0.6–1.1)
D DIMER: 4691 NG/ML(DDU)
DIFFERENTIAL TYPE: ABNORMAL
EOSINOPHILS ABSOLUTE: 0 K/CU MM
EOSINOPHILS RELATIVE PERCENT: 0 % (ref 0–3)
GFR AFRICAN AMERICAN: >60 ML/MIN/1.73M2
GFR NON-AFRICAN AMERICAN: >60 ML/MIN/1.73M2
GLUCOSE BLD-MCNC: 114 MG/DL (ref 70–99)
HCT VFR BLD CALC: 28.6 % (ref 37–47)
HEMOGLOBIN: 8.5 GM/DL (ref 12.5–16)
HIGH SENSITIVE C-REACTIVE PROTEIN: 55.5 MG/L
IMMATURE NEUTROPHIL %: 1.3 % (ref 0–0.43)
LYMPHOCYTES ABSOLUTE: 0.7 K/CU MM
LYMPHOCYTES RELATIVE PERCENT: 3.6 % (ref 24–44)
MCH RBC QN AUTO: 24 PG (ref 27–31)
MCHC RBC AUTO-ENTMCNC: 29.7 % (ref 32–36)
MCV RBC AUTO: 80.8 FL (ref 78–100)
MONOCYTES ABSOLUTE: 1.2 K/CU MM
MONOCYTES RELATIVE PERCENT: 6.1 % (ref 0–4)
NUCLEATED RBC %: 0 %
PDW BLD-RTO: 16.9 % (ref 11.7–14.9)
PHOSPHORUS: 2.1 MG/DL (ref 2.5–4.9)
PLATELET # BLD: 390 K/CU MM (ref 140–440)
PMV BLD AUTO: 10.9 FL (ref 7.5–11.1)
POTASSIUM SERPL-SCNC: 3.7 MMOL/L (ref 3.5–5.1)
PRO-BNP: ABNORMAL PG/ML
PROCALCITONIN: 0.2
RBC # BLD: 3.54 M/CU MM (ref 4.2–5.4)
SEGMENTED NEUTROPHILS ABSOLUTE COUNT: 17.7 K/CU MM
SEGMENTED NEUTROPHILS RELATIVE PERCENT: 88.8 % (ref 36–66)
SODIUM BLD-SCNC: 138 MMOL/L (ref 135–145)
TOTAL IMMATURE NEUTOROPHIL: 0.25 K/CU MM
TOTAL NUCLEATED RBC: 0 K/CU MM
WBC # BLD: 20 K/CU MM (ref 4–10.5)

## 2020-11-21 PROCEDURE — 1200000000 HC SEMI PRIVATE

## 2020-11-21 PROCEDURE — 6360000002 HC RX W HCPCS: Performed by: INTERNAL MEDICINE

## 2020-11-21 PROCEDURE — 85379 FIBRIN DEGRADATION QUANT: CPT

## 2020-11-21 PROCEDURE — 85025 COMPLETE CBC W/AUTO DIFF WBC: CPT

## 2020-11-21 PROCEDURE — 6370000000 HC RX 637 (ALT 250 FOR IP): Performed by: INTERNAL MEDICINE

## 2020-11-21 PROCEDURE — 94640 AIRWAY INHALATION TREATMENT: CPT

## 2020-11-21 PROCEDURE — 6370000000 HC RX 637 (ALT 250 FOR IP): Performed by: HOSPITALIST

## 2020-11-21 PROCEDURE — 2580000003 HC RX 258: Performed by: INTERNAL MEDICINE

## 2020-11-21 PROCEDURE — 86141 C-REACTIVE PROTEIN HS: CPT

## 2020-11-21 PROCEDURE — 6360000002 HC RX W HCPCS: Performed by: NURSE PRACTITIONER

## 2020-11-21 PROCEDURE — 2700000000 HC OXYGEN THERAPY PER DAY

## 2020-11-21 PROCEDURE — 2580000003 HC RX 258: Performed by: HOSPITALIST

## 2020-11-21 PROCEDURE — 80069 RENAL FUNCTION PANEL: CPT

## 2020-11-21 PROCEDURE — 94761 N-INVAS EAR/PLS OXIMETRY MLT: CPT

## 2020-11-21 PROCEDURE — C9113 INJ PANTOPRAZOLE SODIUM, VIA: HCPCS | Performed by: NURSE PRACTITIONER

## 2020-11-21 PROCEDURE — 99233 SBSQ HOSP IP/OBS HIGH 50: CPT | Performed by: INTERNAL MEDICINE

## 2020-11-21 PROCEDURE — 2580000003 HC RX 258: Performed by: NURSE PRACTITIONER

## 2020-11-21 PROCEDURE — 83880 ASSAY OF NATRIURETIC PEPTIDE: CPT

## 2020-11-21 PROCEDURE — 84145 PROCALCITONIN (PCT): CPT

## 2020-11-21 RX ORDER — OXYCODONE AND ACETAMINOPHEN 7.5; 325 MG/1; MG/1
1 TABLET ORAL EVERY 6 HOURS SCHEDULED
Status: DISCONTINUED | OUTPATIENT
Start: 2020-11-21 | End: 2020-11-23 | Stop reason: HOSPADM

## 2020-11-21 RX ADMIN — RIVAROXABAN 20 MG: 20 TABLET, FILM COATED ORAL at 17:39

## 2020-11-21 RX ADMIN — BUSPIRONE HYDROCHLORIDE 5 MG: 5 TABLET ORAL at 09:41

## 2020-11-21 RX ADMIN — SODIUM CHLORIDE 8 MG/HR: 9 INJECTION, SOLUTION INTRAVENOUS at 09:42

## 2020-11-21 RX ADMIN — Medication 1 TABLET: at 09:40

## 2020-11-21 RX ADMIN — CEFEPIME HYDROCHLORIDE 2 G: 2 INJECTION, POWDER, FOR SOLUTION INTRAVENOUS at 00:09

## 2020-11-21 RX ADMIN — ROSUVASTATIN CALCIUM 10 MG: 5 TABLET, COATED ORAL at 21:15

## 2020-11-21 RX ADMIN — ZINC SULFATE 220 MG (50 MG) CAPSULE 50 MG: CAPSULE at 09:42

## 2020-11-21 RX ADMIN — OXYCODONE HYDROCHLORIDE AND ACETAMINOPHEN 1 TABLET: 7.5; 325 TABLET ORAL at 12:34

## 2020-11-21 RX ADMIN — ALPRAZOLAM 0.5 MG: 0.5 TABLET ORAL at 06:04

## 2020-11-21 RX ADMIN — SODIUM CHLORIDE 8 MG/HR: 9 INJECTION, SOLUTION INTRAVENOUS at 21:16

## 2020-11-21 RX ADMIN — METOPROLOL TARTRATE 50 MG: 50 TABLET, FILM COATED ORAL at 21:15

## 2020-11-21 RX ADMIN — GUAIFENESIN 200 MG: 100 SOLUTION ORAL at 06:04

## 2020-11-21 RX ADMIN — METHYLPREDNISOLONE SODIUM SUCCINATE 40 MG: 40 INJECTION, POWDER, FOR SOLUTION INTRAMUSCULAR; INTRAVENOUS at 09:42

## 2020-11-21 RX ADMIN — ALPRAZOLAM 0.5 MG: 0.5 TABLET ORAL at 21:15

## 2020-11-21 RX ADMIN — ASPIRIN 81 MG: 81 TABLET, FILM COATED ORAL at 09:46

## 2020-11-21 RX ADMIN — ALBUTEROL SULFATE 2 PUFF: 90 AEROSOL, METERED RESPIRATORY (INHALATION) at 15:49

## 2020-11-21 RX ADMIN — ALBUTEROL SULFATE 2 PUFF: 90 AEROSOL, METERED RESPIRATORY (INHALATION) at 07:40

## 2020-11-21 RX ADMIN — CYANOCOBALAMIN TAB 1000 MCG 1000 MCG: 1000 TAB at 09:42

## 2020-11-21 RX ADMIN — CEFEPIME HYDROCHLORIDE 2 G: 2 INJECTION, POWDER, FOR SOLUTION INTRAVENOUS at 12:34

## 2020-11-21 RX ADMIN — GUAIFENESIN 200 MG: 100 SOLUTION ORAL at 09:42

## 2020-11-21 RX ADMIN — SODIUM CHLORIDE, PRESERVATIVE FREE 10 ML: 5 INJECTION INTRAVENOUS at 09:42

## 2020-11-21 RX ADMIN — SODIUM CHLORIDE, PRESERVATIVE FREE 10 ML: 5 INJECTION INTRAVENOUS at 21:16

## 2020-11-21 RX ADMIN — OXYCODONE HYDROCHLORIDE AND ACETAMINOPHEN 1 TABLET: 7.5; 325 TABLET ORAL at 17:39

## 2020-11-21 RX ADMIN — GUAIFENESIN 200 MG: 100 SOLUTION ORAL at 21:16

## 2020-11-21 RX ADMIN — TRAZODONE HYDROCHLORIDE 50 MG: 50 TABLET ORAL at 21:15

## 2020-11-21 RX ADMIN — TIOTROPIUM BROMIDE INHALATION SPRAY 2 PUFF: 3.12 SPRAY, METERED RESPIRATORY (INHALATION) at 07:43

## 2020-11-21 RX ADMIN — ALPRAZOLAM 0.5 MG: 0.5 TABLET ORAL at 12:34

## 2020-11-21 RX ADMIN — METOPROLOL TARTRATE 50 MG: 50 TABLET, FILM COATED ORAL at 09:42

## 2020-11-21 RX ADMIN — OXYCODONE HYDROCHLORIDE AND ACETAMINOPHEN 1 TABLET: 5; 325 TABLET ORAL at 03:14

## 2020-11-21 RX ADMIN — BUSPIRONE HYDROCHLORIDE 5 MG: 5 TABLET ORAL at 17:39

## 2020-11-21 RX ADMIN — ALBUTEROL SULFATE 2 PUFF: 90 AEROSOL, METERED RESPIRATORY (INHALATION) at 11:37

## 2020-11-21 RX ADMIN — Medication 2000 UNITS: at 09:40

## 2020-11-21 RX ADMIN — AMIODARONE HYDROCHLORIDE 200 MG: 200 TABLET ORAL at 09:42

## 2020-11-21 RX ADMIN — BUSPIRONE HYDROCHLORIDE 5 MG: 5 TABLET ORAL at 21:15

## 2020-11-21 ASSESSMENT — PAIN SCALES - GENERAL
PAINLEVEL_OUTOF10: 6
PAINLEVEL_OUTOF10: 6
PAINLEVEL_OUTOF10: 5

## 2020-11-21 ASSESSMENT — PAIN DESCRIPTION - DESCRIPTORS: DESCRIPTORS: ACHING;DISCOMFORT

## 2020-11-21 ASSESSMENT — PAIN DESCRIPTION - ONSET: ONSET: ON-GOING

## 2020-11-21 ASSESSMENT — PAIN DESCRIPTION - PROGRESSION: CLINICAL_PROGRESSION: GRADUALLY WORSENING

## 2020-11-21 ASSESSMENT — PAIN DESCRIPTION - LOCATION: LOCATION: GENERALIZED

## 2020-11-21 ASSESSMENT — PAIN DESCRIPTION - FREQUENCY: FREQUENCY: INTERMITTENT

## 2020-11-21 ASSESSMENT — PAIN DESCRIPTION - PAIN TYPE: TYPE: CHRONIC PAIN

## 2020-11-21 NOTE — PROGRESS NOTES
Nephrology Progress Note  11/21/2020 11:14 AM        Subjective:   Admit Date: 11/15/2020  PCP: Freddie Daniel MD    Interval History: no major event     Diet: some    ROS:  No sob - no overt confusion this  am , low ? UOP , with PPI drip     Data:     Current meds:    rivaroxaban  20 mg Oral Daily    methylPREDNISolone  40 mg Intravenous Daily    folic acid-pyridoxine-cyancobalamin  1 tablet Oral Daily    metoprolol tartrate  50 mg Oral BID    rosuvastatin  10 mg Oral Nightly    lisinopril  5 mg Oral Daily    ALPRAZolam  0.5 mg Oral Q8H    guaiFENesin  200 mg Oral Q6H    cefepime  2 g Intravenous Q12H    acetaminophen  650 mg Rectal Once    zinc sulfate  50 mg Oral Daily    sodium chloride flush  10 mL Intravenous 2 times per day    aspirin  81 mg Oral Daily    albuterol sulfate HFA  2 puff Inhalation 4x daily    tiotropium  2 puff Inhalation Daily    traZODone  50 mg Oral Nightly    vitamin B-12  1,000 mcg Oral Daily    vitamin D  2 capsule Oral Daily    busPIRone  5 mg Oral TID    amiodarone  200 mg Oral Daily      pantoprozole (PROTONIX) infusion 8 mg/hr (11/21/20 0942)         I/O last 3 completed shifts: In: 180 [P.O.:180]  Out: 475 [Urine:475]    CBC:   Recent Labs     11/19/20 0520 11/20/20  0420 11/21/20  0440   WBC 18.5* 20.9* 20.0*   HGB 9.7* 8.7* 8.5*    351 390          Recent Labs     11/19/20 0520 11/20/20  0420 11/21/20  0440    139 138   K 3.8 3.8 3.7    105 103   CO2 25 26 26   BUN 56* 49* 42*   CREATININE 1.1 0.9 0.9   GLUCOSE 127* 138* 114*       Lab Results   Component Value Date    CALCIUM 8.1 (L) 11/21/2020    PHOS 2.1 (L) 11/21/2020       Objective:     Vitals: BP (!) 122/91   Pulse 68   Temp 98.2 °F (36.8 °C) (Oral)   Resp 25   Ht 5' 7\" (1.702 m)   Wt 146 lb 9.7 oz (66.5 kg)   SpO2 94%   BMI 22.96 kg/m²     General appearance:   Thin , no ac distress  HEENT:  ++ conj pallor- O2 per NC 1 l/ min   Neck:  supple  Lungs:  coarse crackles b/l on posterior exam   Heart:  irregular  Abdomen: soft  Extremities:  No edema   Has alonzo       Problem List :         Impression :     1. NICOLASA- recovering will d/with nurse about accurate UOP measurement   2. COVID 19- minimal o2  Now   3. Unde;lying HTN/ Ch a. Fib etc   4. Anemia with PPI drip     Recommendation/Plan  :     1. Watch UOP  2. Po food/ fluid   3. Daily accurate wt if possible   4. She is on emp abx so has risk for  NICOLASA , iatrogenic  and nosocomial complication  5.  Follow clinically and bio chemically       Alejandra Gonzalez MD

## 2020-11-21 NOTE — PROGRESS NOTES
BP (!) 122/91   Pulse 68   Temp 98.2 °F (36.8 °C) (Oral)   Resp 25   Ht 5' 7\" (1.702 m)   Wt 146 lb 9.7 oz (66.5 kg)   SpO2 94%   BMI 22.96 kg/m²       Intake/Output Summary (Last 24 hours) at 11/21/2020 0948  Last data filed at 11/20/2020 1759  Gross per 24 hour   Intake 180 ml   Output 475 ml   Net -295 ml       Medications:   Scheduled Meds:   rivaroxaban  20 mg Oral Daily    methylPREDNISolone  40 mg Intravenous Daily    folic acid-pyridoxine-cyancobalamin  1 tablet Oral Daily    metoprolol tartrate  50 mg Oral BID    rosuvastatin  10 mg Oral Nightly    lisinopril  5 mg Oral Daily    ALPRAZolam  0.5 mg Oral Q8H    guaiFENesin  200 mg Oral Q6H    cefepime  2 g Intravenous Q12H    acetaminophen  650 mg Rectal Once    zinc sulfate  50 mg Oral Daily    sodium chloride flush  10 mL Intravenous 2 times per day    aspirin  81 mg Oral Daily    albuterol sulfate HFA  2 puff Inhalation 4x daily    tiotropium  2 puff Inhalation Daily    traZODone  50 mg Oral Nightly    vitamin B-12  1,000 mcg Oral Daily    vitamin D  2 capsule Oral Daily    busPIRone  5 mg Oral TID    amiodarone  200 mg Oral Daily      Infusions:   pantoprozole (PROTONIX) infusion 8 mg/hr (11/21/20 0942)      PRN Meds:  fentanNYL, diazePAM, sodium chloride, sodium chloride flush, acetaminophen **OR** acetaminophen, polyethylene glycol, labetalol, oxyCODONE-acetaminophen       Physical Exam:  Vitals:    11/21/20 0927   BP: (!) 122/91   Pulse: 68   Resp: 25   Temp: 98.2 °F (36.8 °C)   SpO2: 94%        General: AAO, NAD  Chest: Nontender  Cardiac: First and Second Heart Sounds are Normal, No Murmurs or Gallops noted  Lungs:Clear to auscultation and percussion. Abdomen: Soft, NT, ND, +BS  Extremities: No clubbing, no edema  Vascular:  Equal 2+ peripheral pulses.         Lab Data:  CBC:   Recent Labs     11/19/20  0520 11/20/20  0420 11/21/20  0440   WBC 18.5* 20.9* 20.0*   HGB 9.7* 8.7* 8.5*   HCT 32.2* 29.4* 28.6*   MCV 78.9 79.7 80.8    351 390     BMP:   Recent Labs     11/19/20  0520 11/20/20  0420 11/21/20  0440    139 138   K 3.8 3.8 3.7    105 103   CO2 25 26 26   PHOS 2.6 2.3* 2.1*   BUN 56* 49* 42*   CREATININE 1.1 0.9 0.9     LIVER PROFILE:   Recent Labs     11/19/20  0520 11/20/20  0420   AST 45* 33   ALT 43* 37   BILIDIR 0.2 0.2   BILITOT 0.3 0.4   ALKPHOS 76 76     PT/INR: No results for input(s): PROTIME, INR in the last 72 hours. APTT: No results for input(s): APTT in the last 72 hours. BNP:  No results for input(s): BNP in the last 72 hours.       Assessment:  Patient Active Problem List    Diagnosis Date Noted    Transaminitis 11/17/2020    Acute hypoxemic respiratory failure due to COVID-19 [facility]) 11/15/2020    Chronic hypoxemic respiratory failure ([address] 09/09/2020    Symptomatic anemia 03/24/2020    Wide-complex tachycardia ([address] 02/10/2020    Gram-negative pneumonia ([address] 66/06/7237    Acute systolic heart failure ([address] 02/10/2020    Pneumonia of right upper lobe due to infectious organism     Chronic fatigue 07/08/2019    Intractable low back pain 06/11/2019    Back pain 06/09/2019    Shortness of breath 04/25/2019    Former smoker     Acute exacerbation of chronic obstructive pulmonary disease (COPD) ([address] 12/13/2018    COPD, severe ([address] 10/24/2017    Blood loss anemia 08/01/2017    Takotsubo syndrome 05/01/2017    Panic attack 11/18/2016    Lumbar spinal stenosis 01/01/2015    Vitamin B12 deficiency 12/01/2014    Osteoporosis 03/16/2012    Depression     Migraine 10/06/2010    Rheumatoid Arthritis     Hypertension     Hyperlipidemia     Fibromyalgia     Chronic pain syndrome        Electronically signed by Seymour Alvarez PA-C on 11/21/2020 at 9:48 AM

## 2020-11-21 NOTE — PROGRESS NOTES
HOSPITALIST    Updated son Jorge A Angela. He is at work now, and will try calling after 3 pm.    DC when precert available.

## 2020-11-21 NOTE — PROGRESS NOTES
Hospitalist Progress Note      Name:  Velma Arnett /Age/Sex: 1940  ([de-identified] y.o. female)   MRN & CSN:  [id number] & [id number] Admission Date/Time: 11/15/2020 12:14 PM   Location:  -A PCP: Marima Torres Day: 7      Assessment and Plan:       -on 2 L today - compared to 4 L yesterday  -schedule her usual medication Percoet 7.5 mg  4 times a day - per staff she is constantly on call light asking for something to sleep  -Anticipate return to skilled care at [facility]. Pre-CERT to be started on   -HGB 8.5 today. On PPI drip. Xarelto has been started for A. fib and possible TIA. Seems to be tolerating it so far. Continue anticoagulation      Assessment and plan       Acute on chronic resp failure  Covid pneumonia  Decadron --changed to Solu-Medrol  -continue  Had 2 doses of remdesivir, it was stopped  Oxygen support    Suspected bacterial superinfection  -Cefepime, vancomycin, and Flagyl added  -continue     Facial droop   Neg CT and CTA for acute pathology  Patient deemed as not a TPA candidate  MRI pending -could not tolerated so far  Neurology consult  Head CT repeated -no acute event    Paroxysmal A.  Fib   H/o Chronic Cardiomyopathy    ECHO 19 LVEF 55% and down to 20% on 2/10/20   Repeat ECHO with EF 50-55% with moderate MR  ASA, Held Xarelto for GI bleed   Continue Toprol XL  Cardiology recommendations appreciated  -GI consult to see if she can possibly tolerate Xarelto     H/o of GI bleed   PPI, monitor H&H     CKD stage III   IV Lasix ordered , appreciate consult with Dr. Montiel Pill  Avoid nephrotoxins, monitor  Spironolactone on hold      COPD with no exacerbation -continue with inhalers     Lung nodule  OP FU with Pulm     Hypertension  Rheumatoid arthritis  Depression  Fibromyalgia  Chronic respiratory failure on home oxygen  Chronic systolic CHF      Echo noted    Lung nodule on CTA noted    D-dimer 2912    Severe COPD per Dr. Venice Driscoll 3 years ago      Urine cx with Enterobacter    Cefepime: has had 4 days so far        Rheum note 2019: Marcelo Jain off of all her meds. She self d/stella Arava and Humira. Had significant anemia requiring blood transfusion. So cannot use Arava. Had pneumonia so will not use Humira. Can continue prednisone from pulmonologist. Mesha Wright on quitting tobacco.       Lovenox dose is 30 mg twice a day    I spoke to her son on 11/18 as well as 11/17    Subjective:     Saturday, November 21-her speech seems better to me today    11/20-she was talking better today, but has been confused as yesterday. Urine cx with Enterobacter     Cefepime: has had 4 days so far        11/20 - on 4 L today  -CXR better today  -spoke with RN - patient is a feed. Confused today  -left message for CM - re dc planning.      She is on a proton pump inhibitor drip today     11/19 - on 4 L oxygen NC today, better than yesterday -speech garbled at times-repeat head CT negative for acute event    11/19 - responded to me, but speech was difficult to understand    Summary for 11/18-doing better today-respiratory status is much more stable, and mental status is better      Summary for 11/17-patient was in critical condition this morning, with respiratory rate going up into the 40s on several occasions. Temperature went up to 102.7. Additionally, patient is on Percocet 7.5 mg 4 times a day and Xanax 0.5 mg 3 times a day -and has been somewhat unable to take them since admission -spoke to pulmonary, this may have contributed to tachypnea. By the evening respiratory rate was in the 30s. She does have underlying severe COPD and is at high risk.  -Discussed with pulmonary today, and decided to give her plasma      Wednesday, November 18-was doing much better today    Tuesday:    Patient was extremely tachypneic today.   When I walked in the room she did verbally acknowledge my presence, but she was much more confused 0.5 mg Oral Q8H    guaiFENesin  200 mg Oral Q6H    cefepime  2 g Intravenous Q12H    acetaminophen  650 mg Rectal Once    zinc sulfate  50 mg Oral Daily    sodium chloride flush  10 mL Intravenous 2 times per day    aspirin  81 mg Oral Daily    albuterol sulfate HFA  2 puff Inhalation 4x daily    tiotropium  2 puff Inhalation Daily    traZODone  50 mg Oral Nightly    vitamin B-12  1,000 mcg Oral Daily    vitamin D  2 capsule Oral Daily    busPIRone  5 mg Oral TID    amiodarone  200 mg Oral Daily      Infusions:    pantoprozole (PROTONIX) infusion 8 mg/hr (11/20/20 0958)     PRN Meds: fentanNYL, 12.5 mcg, Q1H PRN  diazePAM, 2.5 mg, Q4H PRN  sodium chloride, 30 mL, PRN  sodium chloride flush, 10 mL, PRN  acetaminophen, 650 mg, Q6H PRN    Or  acetaminophen, 650 mg, Q6H PRN  polyethylene glycol, 17 g, Daily PRN  labetalol, 10 mg, Q10 Min PRN  oxyCODONE-acetaminophen, 1 tablet, Q6H PRN          Electronically signed by Enzo Rojas MD on 11/21/2020 at 9:16 AM

## 2020-11-21 NOTE — PROGRESS NOTES
father and another family member; Kidney Disease in her son; Stroke in her mother. Allergies   Allergen Reactions    Ativan [Lorazepam] Other (See Comments)     Violent, thrashing and combative. She has lacerations and bruising, had to be tied down.  Etanercept Other (See Comments)     No response    Humira [Adalimumab]     Prochlorperazine Edisylate Other (See Comments)     \"Compazine\"   Involuntary Muscle Spasms     Remicade [Infliximab Injection]     Doxycycline Other (See Comments)     Stomach pains     Social History:    Reviewed; no changes    Objective:   PHYSICAL EXAM:        VITALS:  BP (!) 122/91   Pulse 68   Temp 98.2 °F (36.8 °C) (Oral)   Resp 20   Ht 5' 7\" (1.702 m)   Wt 146 lb 9.7 oz (66.5 kg)   SpO2 97%   BMI 22.96 kg/m²     24HR INTAKE/OUTPUT:      Intake/Output Summary (Last 24 hours) at 11/21/2020 1211  Last data filed at 11/20/2020 1759  Gross per 24 hour   Intake 180 ml   Output 475 ml   Net -295 ml       CONSTITUTIONAL:  Awake. LUNGS:  decreased breath sounds, occ basilar crackles. CARDIOVASCULAR:  normal S1 and S2 and negative JVD  ABD:Abdomen soft, non-tender. BS normal. No masses,  No organomegaly  NEURO:Alert. DATA:    CBC:  Recent Labs     11/19/20 0520 11/20/20 0420 11/21/20  0440   WBC 18.5* 20.9* 20.0*   RBC 4.08* 3.69* 3.54*   HGB 9.7* 8.7* 8.5*   HCT 32.2* 29.4* 28.6*    351 390   MCV 78.9 79.7 80.8   MCH 23.8* 23.6* 24.0*   MCHC 30.1* 29.6* 29.7*   RDW 16.9* 16.8* 16.9*   SEGSPCT 94.3* 92.9* 88.8*      BMP:  Recent Labs     11/19/20 0520 11/20/20 0420 11/21/20  0440    139 138   K 3.8 3.8 3.7    105 103   CO2 25 26 26   BUN 56* 49* 42*   CREATININE 1.1 0.9 0.9   CALCIUM 8.1* 8.1* 8.1*   GLUCOSE 127* 138* 114*      ABG:  No results for input(s): PH, PO2ART, TNF9BVT, HCO3, BEART, O2SAT in the last 72 hours.   Lab Results   Component Value Date    PROBNP 20,453 (H) 11/21/2020    PROBNP 59,185 (H) 11/19/2020    PROBNP >70,000 (H) 11/18/2020 No results found for: [facility]    Radiology Review:  Pertinent images / reports were reviewed as a part of this visit. Assessment:     Patient Active Problem List   Diagnosis    Rheumatoid Arthritis    Hypertension    Hyperlipidemia    Fibromyalgia    Migraine    Chronic pain syndrome    Depression    Osteoporosis    Vitamin B12 deficiency    Lumbar spinal stenosis    Panic attack    Takotsubo syndrome    Blood loss anemia    COPD, severe (HCC)    Acute exacerbation of chronic obstructive pulmonary disease (COPD) ([address]    Former smoker    Shortness of breath    Back pain    Intractable low back pain    Chronic fatigue    Pneumonia of right upper lobe due to infectious organism    Wide-complex tachycardia (HCC)    Gram-negative pneumonia (HCC)    Acute systolic heart failure (HCC)    Symptomatic anemia    Chronic hypoxemic respiratory failure (HCC)    Acute hypoxemic respiratory failure due to COVID-19 (HCC)    Transaminitis       Plan:   1. Overall the patient is better. 2. [address] Activity.   Christina Mayer MD  11/21/2020  12:11 PM

## 2020-11-21 NOTE — PROGRESS NOTES
440 K/CU MM    MPV 10.9 7.5 - 11.1 FL    Differential Type AUTOMATED DIFFERENTIAL     Segs Relative 88.8 (H) 36 - 66 %    Lymphocytes % 3.6 (L) 24 - 44 %    Monocytes % 6.1 (H) 0 - 4 %    Eosinophils % 0.0 0 - 3 %    Basophils % 0.2 0 - 1 %    Segs Absolute 17.7 K/CU MM    Lymphocytes Absolute 0.7 K/CU MM    Monocytes Absolute 1.2 K/CU MM    Eosinophils Absolute 0.0 K/CU MM    Basophils Absolute 0.0 K/CU MM    Nucleated RBC % 0.0 %    Total Nucleated RBC 0.0 K/CU MM    Total Immature Neutrophil 0.25 K/CU MM    Immature Neutrophil % 1.3 (H) 0 - 0.43 %   D-Dimer, Quantitative    Collection Time: 11/21/20  4:40 AM   Result Value Ref Range    D-Dimer, Quant 4691 (H) <230 NG/mL(DDU)   Renal Function Panel    Collection Time: 11/21/20  4:40 AM   Result Value Ref Range    Sodium 138 135 - 145 MMOL/L    Potassium 3.7 3.5 - 5.1 MMOL/L    Chloride 103 99 - 110 mMol/L    CO2 26 21 - 32 MMOL/L    Anion Gap 9 4 - 16    BUN 42 (H) 6 - 23 MG/DL    CREATININE 0.9 0.6 - 1.1 MG/DL    Glucose 114 (H) 70 - 99 MG/DL    Calcium 8.1 (L) 8.3 - 10.6 MG/DL    GFR Non-African American >60 >60 mL/min/1.73m2    GFR African American >60 >60 mL/min/1.73m2    Alb 2.9 (L) 3.4 - 5.0 GM/DL    Phosphorus 2.1 (L) 2.5 - 4.9 MG/DL   C-Reactive Protein    Collection Time: 11/21/20  4:40 AM   Result Value Ref Range    CRP, High Sensitivity 55.5 mg/L   Brain Natriuretic Peptide    Collection Time: 11/21/20  4:40 AM   Result Value Ref Range    Pro-BNP 20,453 (H) <300 PG/ML     CULTURE results: Invalid input(s): BLOOD CULTURE,  URINE CULTURE, SURGICAL CULTURE    Diagnosis:  Patient Active Problem List   Diagnosis    Rheumatoid Arthritis    Hypertension    Hyperlipidemia    Fibromyalgia    Migraine    Chronic pain syndrome    Depression    Osteoporosis    Vitamin B12 deficiency    Lumbar spinal stenosis    Panic attack    Takotsubo syndrome    Blood loss anemia    COPD, severe (HCC)    Acute exacerbation of chronic obstructive pulmonary disease

## 2020-11-22 ENCOUNTER — APPOINTMENT (OUTPATIENT)
Dept: CT IMAGING | Age: 80
DRG: 871 | End: 2020-11-22
Payer: MEDICARE

## 2020-11-22 LAB
ALBUMIN SERPL-MCNC: 2.9 GM/DL (ref 3.4–5)
ANION GAP SERPL CALCULATED.3IONS-SCNC: 9 MMOL/L (ref 4–16)
BASOPHILS ABSOLUTE: 0.1 K/CU MM
BASOPHILS RELATIVE PERCENT: 0.3 % (ref 0–1)
BUN BLDV-MCNC: 35 MG/DL (ref 6–23)
CALCIUM SERPL-MCNC: 8.7 MG/DL (ref 8.3–10.6)
CHLORIDE BLD-SCNC: 99 MMOL/L (ref 99–110)
CO2: 25 MMOL/L (ref 21–32)
CREAT SERPL-MCNC: 0.9 MG/DL (ref 0.6–1.1)
CULTURE: NORMAL
CULTURE: NORMAL
D DIMER: 4901 NG/ML(DDU)
DIFFERENTIAL TYPE: ABNORMAL
EOSINOPHILS ABSOLUTE: 0 K/CU MM
EOSINOPHILS RELATIVE PERCENT: 0 % (ref 0–3)
GFR AFRICAN AMERICAN: >60 ML/MIN/1.73M2
GFR NON-AFRICAN AMERICAN: >60 ML/MIN/1.73M2
GLUCOSE BLD-MCNC: 98 MG/DL (ref 70–99)
HCT VFR BLD CALC: 33.1 % (ref 37–47)
HEMOGLOBIN: 10 GM/DL (ref 12.5–16)
HIGH SENSITIVE C-REACTIVE PROTEIN: 42 MG/L
IMMATURE NEUTROPHIL %: 1.8 % (ref 0–0.43)
LYMPHOCYTES ABSOLUTE: 1.4 K/CU MM
LYMPHOCYTES RELATIVE PERCENT: 5.9 % (ref 24–44)
Lab: NORMAL
Lab: NORMAL
MCH RBC QN AUTO: 24.5 PG (ref 27–31)
MCHC RBC AUTO-ENTMCNC: 30.2 % (ref 32–36)
MCV RBC AUTO: 81.1 FL (ref 78–100)
MONOCYTES ABSOLUTE: 1.9 K/CU MM
MONOCYTES RELATIVE PERCENT: 7.9 % (ref 0–4)
NUCLEATED RBC %: 0 %
PDW BLD-RTO: 17.2 % (ref 11.7–14.9)
PHOSPHORUS: 2.3 MG/DL (ref 2.5–4.9)
PLATELET # BLD: 588 K/CU MM (ref 140–440)
PMV BLD AUTO: 11 FL (ref 7.5–11.1)
POTASSIUM SERPL-SCNC: 4.6 MMOL/L (ref 3.5–5.1)
PROCALCITONIN: 0.17
RBC # BLD: 4.08 M/CU MM (ref 4.2–5.4)
SEGMENTED NEUTROPHILS ABSOLUTE COUNT: 19.9 K/CU MM
SEGMENTED NEUTROPHILS RELATIVE PERCENT: 84.1 % (ref 36–66)
SODIUM BLD-SCNC: 133 MMOL/L (ref 135–145)
SPECIMEN: NORMAL
SPECIMEN: NORMAL
TOTAL IMMATURE NEUTOROPHIL: 0.43 K/CU MM
TOTAL NUCLEATED RBC: 0 K/CU MM
WBC # BLD: 23.6 K/CU MM (ref 4–10.5)

## 2020-11-22 PROCEDURE — 6370000000 HC RX 637 (ALT 250 FOR IP): Performed by: INTERNAL MEDICINE

## 2020-11-22 PROCEDURE — 6360000002 HC RX W HCPCS: Performed by: INTERNAL MEDICINE

## 2020-11-22 PROCEDURE — 85379 FIBRIN DEGRADATION QUANT: CPT

## 2020-11-22 PROCEDURE — 2580000003 HC RX 258: Performed by: INTERNAL MEDICINE

## 2020-11-22 PROCEDURE — 94761 N-INVAS EAR/PLS OXIMETRY MLT: CPT

## 2020-11-22 PROCEDURE — 80069 RENAL FUNCTION PANEL: CPT

## 2020-11-22 PROCEDURE — 2580000003 HC RX 258: Performed by: HOSPITALIST

## 2020-11-22 PROCEDURE — 1200000000 HC SEMI PRIVATE

## 2020-11-22 PROCEDURE — 86141 C-REACTIVE PROTEIN HS: CPT

## 2020-11-22 PROCEDURE — 6370000000 HC RX 637 (ALT 250 FOR IP): Performed by: HOSPITALIST

## 2020-11-22 PROCEDURE — 2700000000 HC OXYGEN THERAPY PER DAY

## 2020-11-22 PROCEDURE — 70450 CT HEAD/BRAIN W/O DYE: CPT

## 2020-11-22 PROCEDURE — 84145 PROCALCITONIN (PCT): CPT

## 2020-11-22 PROCEDURE — 94640 AIRWAY INHALATION TREATMENT: CPT

## 2020-11-22 PROCEDURE — 85025 COMPLETE CBC W/AUTO DIFF WBC: CPT

## 2020-11-22 RX ADMIN — Medication 1 TABLET: at 09:53

## 2020-11-22 RX ADMIN — OXYCODONE HYDROCHLORIDE AND ACETAMINOPHEN 1 TABLET: 7.5; 325 TABLET ORAL at 05:39

## 2020-11-22 RX ADMIN — OXYCODONE HYDROCHLORIDE AND ACETAMINOPHEN 1 TABLET: 7.5; 325 TABLET ORAL at 13:08

## 2020-11-22 RX ADMIN — ASPIRIN 81 MG: 81 TABLET, FILM COATED ORAL at 09:53

## 2020-11-22 RX ADMIN — CYANOCOBALAMIN TAB 1000 MCG 1000 MCG: 1000 TAB at 09:53

## 2020-11-22 RX ADMIN — BUSPIRONE HYDROCHLORIDE 5 MG: 5 TABLET ORAL at 09:53

## 2020-11-22 RX ADMIN — CEFEPIME HYDROCHLORIDE 2 G: 2 INJECTION, POWDER, FOR SOLUTION INTRAVENOUS at 13:07

## 2020-11-22 RX ADMIN — ALPRAZOLAM 0.5 MG: 0.5 TABLET ORAL at 22:30

## 2020-11-22 RX ADMIN — ALBUTEROL SULFATE 2 PUFF: 90 AEROSOL, METERED RESPIRATORY (INHALATION) at 07:50

## 2020-11-22 RX ADMIN — ALBUTEROL SULFATE 2 PUFF: 90 AEROSOL, METERED RESPIRATORY (INHALATION) at 11:30

## 2020-11-22 RX ADMIN — BUSPIRONE HYDROCHLORIDE 5 MG: 5 TABLET ORAL at 13:08

## 2020-11-22 RX ADMIN — METOPROLOL TARTRATE 50 MG: 50 TABLET, FILM COATED ORAL at 22:34

## 2020-11-22 RX ADMIN — OXYCODONE HYDROCHLORIDE AND ACETAMINOPHEN 1 TABLET: 7.5; 325 TABLET ORAL at 18:16

## 2020-11-22 RX ADMIN — Medication 2000 UNITS: at 09:53

## 2020-11-22 RX ADMIN — BUSPIRONE HYDROCHLORIDE 5 MG: 5 TABLET ORAL at 22:30

## 2020-11-22 RX ADMIN — LISINOPRIL 5 MG: 5 TABLET ORAL at 09:53

## 2020-11-22 RX ADMIN — METHYLPREDNISOLONE SODIUM SUCCINATE 40 MG: 40 INJECTION, POWDER, FOR SOLUTION INTRAMUSCULAR; INTRAVENOUS at 09:53

## 2020-11-22 RX ADMIN — AMIODARONE HYDROCHLORIDE 200 MG: 200 TABLET ORAL at 09:53

## 2020-11-22 RX ADMIN — POTASSIUM & SODIUM PHOSPHATES POWDER PACK 280-160-250 MG 250 MG: 280-160-250 PACK at 13:08

## 2020-11-22 RX ADMIN — GUAIFENESIN 200 MG: 100 SOLUTION ORAL at 18:16

## 2020-11-22 RX ADMIN — ALBUTEROL SULFATE 2 PUFF: 90 AEROSOL, METERED RESPIRATORY (INHALATION) at 15:53

## 2020-11-22 RX ADMIN — TRAZODONE HYDROCHLORIDE 50 MG: 50 TABLET ORAL at 22:30

## 2020-11-22 RX ADMIN — RIVAROXABAN 20 MG: 20 TABLET, FILM COATED ORAL at 18:16

## 2020-11-22 RX ADMIN — TIOTROPIUM BROMIDE INHALATION SPRAY 2 PUFF: 3.12 SPRAY, METERED RESPIRATORY (INHALATION) at 07:53

## 2020-11-22 RX ADMIN — ZINC SULFATE 220 MG (50 MG) CAPSULE 50 MG: CAPSULE at 09:53

## 2020-11-22 RX ADMIN — ALPRAZOLAM 0.5 MG: 0.5 TABLET ORAL at 13:08

## 2020-11-22 RX ADMIN — SODIUM CHLORIDE, PRESERVATIVE FREE 10 ML: 5 INJECTION INTRAVENOUS at 09:54

## 2020-11-22 RX ADMIN — OXYCODONE HYDROCHLORIDE AND ACETAMINOPHEN 1 TABLET: 7.5; 325 TABLET ORAL at 00:29

## 2020-11-22 RX ADMIN — GUAIFENESIN 200 MG: 100 SOLUTION ORAL at 22:30

## 2020-11-22 RX ADMIN — ALPRAZOLAM 0.5 MG: 0.5 TABLET ORAL at 04:36

## 2020-11-22 RX ADMIN — POTASSIUM & SODIUM PHOSPHATES POWDER PACK 280-160-250 MG 250 MG: 280-160-250 PACK at 22:30

## 2020-11-22 RX ADMIN — CEFEPIME HYDROCHLORIDE 2 G: 2 INJECTION, POWDER, FOR SOLUTION INTRAVENOUS at 00:29

## 2020-11-22 RX ADMIN — METOPROLOL TARTRATE 50 MG: 50 TABLET, FILM COATED ORAL at 09:53

## 2020-11-22 RX ADMIN — ROSUVASTATIN CALCIUM 10 MG: 5 TABLET, COATED ORAL at 22:30

## 2020-11-22 RX ADMIN — SODIUM CHLORIDE, PRESERVATIVE FREE 10 ML: 5 INJECTION INTRAVENOUS at 22:31

## 2020-11-22 RX ADMIN — GUAIFENESIN 200 MG: 100 SOLUTION ORAL at 09:53

## 2020-11-22 ASSESSMENT — PAIN DESCRIPTION - PROGRESSION
CLINICAL_PROGRESSION: GRADUALLY WORSENING
CLINICAL_PROGRESSION: GRADUALLY WORSENING

## 2020-11-22 ASSESSMENT — PAIN DESCRIPTION - PAIN TYPE
TYPE: CHRONIC PAIN
TYPE: CHRONIC PAIN

## 2020-11-22 ASSESSMENT — PAIN DESCRIPTION - DESCRIPTORS
DESCRIPTORS: ACHING
DESCRIPTORS: DISCOMFORT

## 2020-11-22 ASSESSMENT — PAIN DESCRIPTION - FREQUENCY
FREQUENCY: INTERMITTENT
FREQUENCY: INTERMITTENT

## 2020-11-22 ASSESSMENT — PAIN DESCRIPTION - LOCATION
LOCATION: GENERALIZED
LOCATION: GENERALIZED

## 2020-11-22 ASSESSMENT — PAIN SCALES - GENERAL
PAINLEVEL_OUTOF10: 6

## 2020-11-22 ASSESSMENT — PAIN DESCRIPTION - ONSET
ONSET: ON-GOING
ONSET: ON-GOING

## 2020-11-22 NOTE — PROGRESS NOTES
This nurse asked patient if she was finished with her water bottle so it could be thrown away. Patient states \"No I need it so I can throw it to get attention. \" Patient educated on appropriate behavior and reinforced using the call light.

## 2020-11-22 NOTE — PROGRESS NOTES
Daily Progress Note    No change from cardiac stand  Sinus rate is stable  Overall improved  Being treated for covid  Use diuretics as needed    Pt. Awake, less confused today  HR stable, NSR in the 70 range, BP stable  No CP, Improving SOB slowly- sating well on 1 L O2     Covid PNA    Per primary    Improving    Tx. Per ID     PAF with possible TIA    On amio, lopressor    Rate stable and sinus    On xarelto- watch for GI bleed    CTA head and neck neg. Unable to tolerate MRI     Acute on Chronic HFpEF    EF 50-55% on echo    BNP very elevated-improved significantly    Renal following-diuretics per renal    Good UOP    Cont. Med. Tx.      Per GI recs to stop plavix  Will cont. To follow-Pt. Is stable from cardiac standpoint-hopefully can D/C soon     Echo-11/16/20  Summary   This is a limited echocardiogram.   Left ventricular systolic function is normal.   Ejection fraction is visually estimated at 50-55%.   Moderate aortic regurgitation; PHT: 336 msec.   Moderate mitral regurgitation.   Small mobile calcification attached to the posterior mitral valve chordae   tendinae.   No evidence of any pericardial effusion.     PAST MEDICAL HISTORY:  The patient sees Dr. Luc Multani for her COPD, history  of having heart failure with apical ballooning that improved, history of  heart catheterization in 2017, found to have nonobstructive coronary  artery disease present, hypertension, hyperlipidemia, fibromyalgia,  atrial flutter, 2:1 conduction noted, and she was on anticoagulation for  that.     PAST SURGICAL HISTORY:  Gallbladder surgery, hysterectomy done, and  appendectomy.     SOCIAL HISTORY:  She does not smoke, does not drink.  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     ALLERGIES:  HUMIRA, REMICADE, LORAZEPAM, _____, COMPAZINE, and  DOXYCYCLINE.     MEDICATIONS AT HOME:  She was on iron sulfate, Zoloft, Claritin, Lasix,  Xanax, aspirin, amiodarone, Toprol 25 mg b.i.d., and Aldactone.       Objective:   BP (!) 154/97 Pulse 70   Temp 98 °F (36.7 °C) (Oral)   Resp 22   Ht 5' 7\" (1.702 m)   Wt 146 lb 9.7 oz (66.5 kg)   SpO2 92%   BMI 22.96 kg/m²       Intake/Output Summary (Last 24 hours) at 11/22/2020 0831  Last data filed at 11/22/2020 0110  Gross per 24 hour   Intake 220 ml   Output 300 ml   Net -80 ml       Medications:   Scheduled Meds:   oxyCODONE-acetaminophen  1 tablet Oral 4 times per day    rivaroxaban  20 mg Oral Daily    methylPREDNISolone  40 mg Intravenous Daily    folic acid-pyridoxine-cyancobalamin  1 tablet Oral Daily    metoprolol tartrate  50 mg Oral BID    rosuvastatin  10 mg Oral Nightly    lisinopril  5 mg Oral Daily    ALPRAZolam  0.5 mg Oral Q8H    guaiFENesin  200 mg Oral Q6H    cefepime  2 g Intravenous Q12H    acetaminophen  650 mg Rectal Once    zinc sulfate  50 mg Oral Daily    sodium chloride flush  10 mL Intravenous 2 times per day    aspirin  81 mg Oral Daily    albuterol sulfate HFA  2 puff Inhalation 4x daily    tiotropium  2 puff Inhalation Daily    traZODone  50 mg Oral Nightly    vitamin B-12  1,000 mcg Oral Daily    vitamin D  2 capsule Oral Daily    busPIRone  5 mg Oral TID    amiodarone  200 mg Oral Daily      Infusions:   pantoprozole (PROTONIX) infusion 8 mg/hr (11/21/20 2116)      PRN Meds:  fentanNYL, diazePAM, sodium chloride, sodium chloride flush, acetaminophen **OR** acetaminophen, polyethylene glycol, labetalol, oxyCODONE-acetaminophen       Physical Exam:  Vitals:    11/22/20 0752   BP:    Pulse:    Resp: 22   Temp:    SpO2: 92%        General: AAO, NAD  Chest: Nontender  Cardiac: First and Second Heart Sounds are Normal, No Murmurs or Gallops noted  Lungs:Clear to auscultation and percussion. Abdomen: Soft, NT, ND, +BS  Extremities: No clubbing, no edema  Vascular:  Equal 2+ peripheral pulses.         Lab Data:  CBC:   Recent Labs     11/20/20  0420 11/21/20  0440 11/22/20  0444   WBC 20.9* 20.0* 23.6*   HGB 8.7* 8.5* 10.0*   HCT 29.4* 28.6* 33.1*   MCV 79.7 80.8 81.1    390 588*     BMP:   Recent Labs     11/20/20  0420 11/21/20  0440 11/22/20  0444    138 133*   K 3.8 3.7 4.6    103 99   CO2 26 26 25   PHOS 2.3* 2.1* 2.3*   BUN 49* 42* 35*   CREATININE 0.9 0.9 0.9     LIVER PROFILE:   Recent Labs     11/20/20  0420   AST 33   ALT 37   BILIDIR 0.2   BILITOT 0.4   ALKPHOS 76     PT/INR: No results for input(s): PROTIME, INR in the last 72 hours. APTT: No results for input(s): APTT in the last 72 hours. BNP:  No results for input(s): BNP in the last 72 hours.       Assessment:  Patient Active Problem List    Diagnosis Date Noted    Transaminitis 11/17/2020    Acute hypoxemic respiratory failure due to COVID-19 [facility]) 11/15/2020    Chronic hypoxemic respiratory failure ([address] 09/09/2020    Symptomatic anemia 03/24/2020    Wide-complex tachycardia ([address] 02/10/2020    Gram-negative pneumonia ([address] 49/88/3853    Acute systolic heart failure ([address] 02/10/2020    Pneumonia of right upper lobe due to infectious organism     Chronic fatigue 07/08/2019    Intractable low back pain 06/11/2019    Back pain 06/09/2019    Shortness of breath 04/25/2019    Former smoker     Acute exacerbation of chronic obstructive pulmonary disease (COPD) ([address] 12/13/2018    COPD, severe ([address] 10/24/2017    Blood loss anemia 08/01/2017    Takotsubo syndrome 05/01/2017    Panic attack 11/18/2016    Lumbar spinal stenosis 01/01/2015    Vitamin B12 deficiency 12/01/2014    Osteoporosis 03/16/2012    Depression     Migraine 10/06/2010    Rheumatoid Arthritis     Hypertension     Hyperlipidemia     Fibromyalgia     Chronic pain syndrome        Electronically signed by Antonette Panda PA-C on 11/22/2020 at 8:31 AM

## 2020-11-22 NOTE — PROGRESS NOTES
NEUROLOGY NOTE  DR. Vlad Villalobos MD.  -------------------------------------------------  Subjective:    Doing better. Denies any new symptoms. Denies headache nausea vomiting dizziness    Denies numbness weakness extremities    Denies blurring of vision double vision    Objective:    BP (!) 150/57   Pulse 72   Temp 97.9 °F (36.6 °C) (Oral)   Resp 20   Ht 5' 7\" (1.702 m)   Wt 146 lb 9.7 oz (66.5 kg)   SpO2 90%   BMI 22.96 kg/m²   HEENT nl      Neuro exam    Alert Oriented  X 2  Follow simple commands  Mildly confused  EOMI Pupils 3 mm vianney  5/5 all 4 extremities      RADIOLOGY  -----------------    Ct Head Wo Contrast    Result Date: 11/19/2020  EXAMINATION: CT OF THE HEAD WITHOUT CONTRAST  11/19/2020 6:22 pm TECHNIQUE: CT of the head was performed without the administration of intravenous contrast. Dose modulation, iterative reconstruction, and/or weight based adjustment of the mA/kV was utilized to reduce the radiation dose to as low as reasonably achievable. COMPARISON: 11/15/2020 HISTORY: ORDERING SYSTEM PROVIDED HISTORY: dysarthria, rule out CVA TECHNOLOGIST PROVIDED HISTORY: Reason for exam:->dysarthria, rule out CVA Has a \"code stroke\" or \"stroke alert\" been called? ->No Reason for Exam: ams FINDINGS: BRAIN/VENTRICLES: There is no acute intracranial hemorrhage, mass effect or midline shift. No abnormal extra-axial fluid collection. The gray-white differentiation is maintained without evidence of an acute infarct. There is no evidence of hydrocephalus. Stable diffuse parenchymal volume loss with moderate chronic white matter microvascular ischemic changes. Chronic lacunar infarct in the left thalamus. ORBITS: The visualized portion of the orbits demonstrate no acute abnormality. SINUSES: Paranasal sinuses are well aerated. Trace right mastoid effusion is present. No left mastoid effusion is evident. SOFT TISSUES/SKULL:  No acute abnormality of the visualized skull or soft tissues.      1. No Findings were discussed with Dr. Kelly Porter on 11/15/2020 at 12:39 pm.     Xr Chest Portable    Result Date: 11/20/2020  EXAMINATION: ONE XRAY VIEW OF THE CHEST 11/20/2020 8:36 am COMPARISON: 11/17/2020 HISTORY: ORDERING SYSTEM PROVIDED HISTORY: SOB TECHNOLOGIST PROVIDED HISTORY: Reason for exam:->SOB Reason for Exam: sob Follow-up exam FINDINGS: Improving bilateral airspace opacities. Right PICC line placement with the tip projecting over the mid SVC. No pneumothorax or pleural effusion. Stable cardiac silhouette. The osseous structures are stable. Improving bilateral airspace opacities. Xr Chest Portable    Result Date: 11/17/2020  EXAMINATION: ONE XRAY VIEW OF THE CHEST 11/17/2020 12:15 pm COMPARISON: 11/15/2020 HISTORY: ORDERING SYSTEM PROVIDED HISTORY: fever 102.7 TECHNOLOGIST PROVIDED HISTORY: Reason for exam:->fever 102.7 Reason for Exam: fever 102.7 Acuity: Unknown Type of Exam: Unknown FINDINGS: The heart size is stable. Bilateral pulmonary opacities again noted. No pneumothorax. No definite pleural effusion. Persistent extensive bilateral pulmonary opacities could represent multifocal bacterial or atypical pneumonia     Xr Chest Portable    Result Date: 11/15/2020  EXAMINATION: ONE XRAY VIEW OF THE CHEST 11/15/2020 12:35 pm COMPARISON: 03/24/2020. HISTORY: ORDERING SYSTEM PROVIDED HISTORY: other, stroke alert TECHNOLOGIST PROVIDED HISTORY: Reason for exam:->other, stroke alert FINDINGS: The cardiac silhouette appears magnified. The aorta is uncoiled and atherosclerotic. There are bilateral new areas of airspace opacities, most notably affecting the left midlung field with scarring/atelectatic changes seen in the bilateral lower lung fields. Redemonstration of deformity and increased density seen along the anterolateral aspect of the right 5th rib is redemonstrated. There is redemonstration of dextroscoliosis of the thoracic spine.      Bilateral patchy areas of airspace opacities with scarring/atelectatic changes seen at the bilateral lower lung fields, which may reflect multifocal pneumonia in the correct clinical setting. Follow-up imaging resolution is recommended. Cta Neck W Contrast    Result Date: 11/15/2020  EXAMINATION: CTA OF THE HEAD WITH CONTRAST; CTA OF THE NECK 11/15/2020 12:41 pm; 11/15/2020 12:40 pm: TECHNIQUE: CTA of the head/brain was performed with the administration of intravenous contrast. Multiplanar reformatted images are provided for review. MIP images are provided for review. Dose modulation, iterative reconstruction, and/or weight based adjustment of the mA/kV was utilized to reduce the radiation dose to as low as reasonably achievable.; CTA of the neck was performed with the administration of intravenous contrast. Multiplanar reformatted images are provided for review. MIP images are provided for review. Stenosis of the internal carotid arteries measured using NASCET criteria. Dose modulation, iterative reconstruction, and/or weight based adjustment of the mA/kV was utilized to reduce the radiation dose to as low as reasonably achievable. COMPARISON: None. HISTORY: ORDERING SYSTEM PROVIDED HISTORY: stroke alert TECHNOLOGIST PROVIDED HISTORY: Has a \"code stroke\" or \"stroke alert\" been called? ->Yes Reason for exam:->stroke alert Reason for Exam: stroke alert, AMS Acuity: Acute Type of Exam: Initial FINDINGS: CTA NECK: AORTIC ARCH/ARCH VESSELS: No focal stenosis of the visualized innominate or subclavian arteries. The left subclavian artery is partially obscured due to in flowing contrast. CAROTID ARTERIES: No focal stenosis of the common carotid arteries. There is a retropharyngeal course of the common carotid arteries. Atherosclerosis of the proximal internal carotid arteries bilaterally. No evidence of a flow limiting stenosis of the internal carotid arteries by NASCET criteria. VERTEBRAL ARTERIES: No flow limiting stenosis is seen of the vertebral arteries. SOFT TISSUES: There is a 7 mm nodule within the right lung apex. BONES: No acute osseous abnormality is seen. Severe degenerative changes of the cervical spine are noted. CTA HEAD: ANTERIOR CIRCULATION: No significant stenosis of the intracranial internal carotid, anterior cerebral, or middle cerebral arteries. No aneurysm. POSTERIOR CIRCULATION: No significant stenosis of the vertebral, basilar, or posterior cerebral arteries. No aneurysm. OTHER: No dural venous sinus thrombosis on this non-dedicated study. BRAIN: No mass effect or midline shift. 1. No flow limiting stenosis of the cervical carotid/vertebral arteries. 2. No focal stenosis of the gxveid-ob-Pzkdmu. 3. There is a 7 mm nodule within the right lung apex, which was not visualized on the CTA from 02/09/2020. Recommendations as below. RECOMMENDATIONS: 7.0 mm solid pulmonary nodule within the upper lobe detected on incomplete chest CT. Recommend a non-contrast Chest CT at 6-12 months, then consider an additional non-contrast Chest CT at 18-24 months. These guidelines do not apply to immunocompromised patients and patients with cancer. Follow up in patients with significant comorbidities as clinically warranted. For lung cancer screening, adhere to Lung-RADS guidelines. Reference: Radiology. 2017; 284(1):228-43. Cta Head W Contrast    Result Date: 11/15/2020  EXAMINATION: CTA OF THE HEAD WITH CONTRAST; CTA OF THE NECK 11/15/2020 12:41 pm; 11/15/2020 12:40 pm: TECHNIQUE: CTA of the head/brain was performed with the administration of intravenous contrast. Multiplanar reformatted images are provided for review. MIP images are provided for review. Dose modulation, iterative reconstruction, and/or weight based adjustment of the mA/kV was utilized to reduce the radiation dose to as low as reasonably achievable.; CTA of the neck was performed with the administration of intravenous contrast. Multiplanar reformatted images are provided for review.   MIP images are provided for review. Stenosis of the internal carotid arteries measured using NASCET criteria. Dose modulation, iterative reconstruction, and/or weight based adjustment of the mA/kV was utilized to reduce the radiation dose to as low as reasonably achievable. COMPARISON: None. HISTORY: ORDERING SYSTEM PROVIDED HISTORY: stroke alert TECHNOLOGIST PROVIDED HISTORY: Has a \"code stroke\" or \"stroke alert\" been called? ->Yes Reason for exam:->stroke alert Reason for Exam: stroke alert, AMS Acuity: Acute Type of Exam: Initial FINDINGS: CTA NECK: AORTIC ARCH/ARCH VESSELS: No focal stenosis of the visualized innominate or subclavian arteries. The left subclavian artery is partially obscured due to in flowing contrast. CAROTID ARTERIES: No focal stenosis of the common carotid arteries. There is a retropharyngeal course of the common carotid arteries. Atherosclerosis of the proximal internal carotid arteries bilaterally. No evidence of a flow limiting stenosis of the internal carotid arteries by NASCET criteria. VERTEBRAL ARTERIES: No flow limiting stenosis is seen of the vertebral arteries. SOFT TISSUES: There is a 7 mm nodule within the right lung apex. BONES: No acute osseous abnormality is seen. Severe degenerative changes of the cervical spine are noted. CTA HEAD: ANTERIOR CIRCULATION: No significant stenosis of the intracranial internal carotid, anterior cerebral, or middle cerebral arteries. No aneurysm. POSTERIOR CIRCULATION: No significant stenosis of the vertebral, basilar, or posterior cerebral arteries. No aneurysm. OTHER: No dural venous sinus thrombosis on this non-dedicated study. BRAIN: No mass effect or midline shift. 1. No flow limiting stenosis of the cervical carotid/vertebral arteries. 2. No focal stenosis of the xyerzk-rb-Weylkj. 3. There is a 7 mm nodule within the right lung apex, which was not visualized on the CTA from 02/09/2020. Recommendations as below.  RECOMMENDATIONS: 7.0 mm solid pulmonary nodule within the upper lobe detected on incomplete chest CT. Recommend a non-contrast Chest CT at 6-12 months, then consider an additional non-contrast Chest CT at 18-24 months. These guidelines do not apply to immunocompromised patients and patients with cancer. Follow up in patients with significant comorbidities as clinically warranted. For lung cancer screening, adhere to Lung-RADS guidelines. Reference: Radiology. 2017; 284(1):228-43.        LAB RESULTS  --------------------    Recent Results (from the past 24 hour(s))   CBC Auto Differential    Collection Time: 11/21/20  4:40 AM   Result Value Ref Range    WBC 20.0 (H) 4.0 - 10.5 K/CU MM    RBC 3.54 (L) 4.2 - 5.4 M/CU MM    Hemoglobin 8.5 (L) 12.5 - 16.0 GM/DL    Hematocrit 28.6 (L) 37 - 47 %    MCV 80.8 78 - 100 FL    MCH 24.0 (L) 27 - 31 PG    MCHC 29.7 (L) 32.0 - 36.0 %    RDW 16.9 (H) 11.7 - 14.9 %    Platelets 618 110 - 748 K/CU MM    MPV 10.9 7.5 - 11.1 FL    Differential Type AUTOMATED DIFFERENTIAL     Segs Relative 88.8 (H) 36 - 66 %    Lymphocytes % 3.6 (L) 24 - 44 %    Monocytes % 6.1 (H) 0 - 4 %    Eosinophils % 0.0 0 - 3 %    Basophils % 0.2 0 - 1 %    Segs Absolute 17.7 K/CU MM    Lymphocytes Absolute 0.7 K/CU MM    Monocytes Absolute 1.2 K/CU MM    Eosinophils Absolute 0.0 K/CU MM    Basophils Absolute 0.0 K/CU MM    Nucleated RBC % 0.0 %    Total Nucleated RBC 0.0 K/CU MM    Total Immature Neutrophil 0.25 K/CU MM    Immature Neutrophil % 1.3 (H) 0 - 0.43 %   D-Dimer, Quantitative    Collection Time: 11/21/20  4:40 AM   Result Value Ref Range    D-Dimer, Quant 4691 (H) <230 NG/mL(DDU)   Renal Function Panel    Collection Time: 11/21/20  4:40 AM   Result Value Ref Range    Sodium 138 135 - 145 MMOL/L    Potassium 3.7 3.5 - 5.1 MMOL/L    Chloride 103 99 - 110 mMol/L    CO2 26 21 - 32 MMOL/L    Anion Gap 9 4 - 16    BUN 42 (H) 6 - 23 MG/DL    CREATININE 0.9 0.6 - 1.1 MG/DL    Glucose 114 (H) 70 - 99 MG/DL    Calcium 8.1 (L) 8.3 - 10.6 MG/DL    GFR Non-African American >60 >60 mL/min/1.73m2    GFR African American >60 >60 mL/min/1.73m2    Alb 2.9 (L) 3.4 - 5.0 GM/DL    Phosphorus 2.1 (L) 2.5 - 4.9 MG/DL   C-Reactive Protein    Collection Time: 11/21/20  4:40 AM   Result Value Ref Range    CRP, High Sensitivity 55.5 mg/L   Brain Natriuretic Peptide    Collection Time: 11/21/20  4:40 AM   Result Value Ref Range    Pro-BNP 20,453 (H) <300 PG/ML   Procalcitonin    Collection Time: 11/21/20  5:45 PM   Result Value Ref Range    Procalcitonin 0.200          Medical problems    Patient Active Problem List:     Rheumatoid Arthritis     Hypertension     Hyperlipidemia     Fibromyalgia     Migraine     Chronic pain syndrome     Depression     Osteoporosis     Vitamin B12 deficiency     Lumbar spinal stenosis     Panic attack     Takotsubo syndrome     Blood loss anemia     COPD, severe (HCC)     Acute exacerbation of chronic obstructive pulmonary disease (COPD) (HCC)     Former smoker     Shortness of breath     Back pain     Intractable low back pain     Chronic fatigue     Pneumonia of right upper lobe due to infectious organism     Wide-complex tachycardia (HCC)     Gram-negative pneumonia (HCC)     Acute systolic heart failure (HCC)     Symptomatic anemia     Chronic hypoxemic respiratory failure (HCC)     Acute hypoxemic respiratory failure due to COVID-19 (HCC)     Transaminitis      ASSESSMENT:  ---------------------    TIA r/o cardio carotid embolic event     Hypoxic encephalopathy     Covid Pneumonia     Hx GI bleed     Hx PAF     PLAN:     CT brain as above     CTA head neck neg     C doppler neg     Echo neg     B 12 folate TSH nl    Elevated homocysteine level add foltx     Asa plavix     Consult GI on if xarolto can be started for A fib-TIA     Discussed plan of care with pts nurse.              Electronically signed by Lu Cooper MD on 11/21/2020 at 9:45 PM

## 2020-11-22 NOTE — PROGRESS NOTES
++ conj pallor, with O2 per NC   Neck:  supple  Lungs:  Course BS b/l   Heart:  irregular  Abdomen: soft  Extremities:  No edema     Has alonzo       Problem List :         Impression :     1. NICOLASA- recovering ? UOP  2. COVID 19- minimal o2 - confusion  3. Multiple comorbid d z- a.fib/ HTN- anemia etc     Recommendation/Plan  :     1. Manual BP if possible as she is in COVID floor  2. Po food/fluid]  3. Watch UOP and  Watch  for alonzo clogged  4. Follow clinically  5.        Keiko Stokes MD

## 2020-11-22 NOTE — PROGRESS NOTES
[address] [place] [address]  HOSPITALIST PROGRESS NOTE                       Name:  Erich Marte /Age/Sex: 1940  ([de-identified] y.o. female)   MRN & CSN:  [id number] & [id number] Admission Date/Time: 11/15/2020 12:14 PM   Location:  [address] [place] [address] Attending:  Francia Wilde MD                                                  DEBBIE Marte is a [de-identified] y.o. female who was admitted on 11/15 with acute on chronic respiratory failure    SUBJECTIVE  On 2 liters [place] oxygen when seen, denies shortness of breath, wants to be discharged    10 point review of systems reviewed and negative unless noted above. ALLERGIES:   Allergies   Allergen Reactions    Ativan [Lorazepam] Other (See Comments)     Violent, thrashing and combative. She has lacerations and bruising, had to be tied down.  Etanercept Other (See Comments)     No response    Humira [Adalimumab]     Prochlorperazine Edisylate Other (See Comments)     \"Compazine\"   Involuntary Muscle Spasms     Remicade [Infliximab Injection]     Doxycycline Other (See Comments)     Stomach pains       PCP: Izzy Bhatt MD    PAST MEDICAL HISTORY, SURGICAL HISTORY, SOCIAL HISTORY and  HOME MEDICATIONS all reviewed. OBJECTIVE  Vitals:    20 0401 20 0750 20 0752 20 0953   BP: (!) 154/97   (!) 154/69   Pulse: 70      Resp:     Temp: 98 °F (36.7 °C)      TempSrc: Oral      SpO2: 91% 93% 92%    Weight:       Height:           PHYSICAL EXAM   GEN Awake female, sitting upright in bed in no apparent distress. EYES Pupils are equally round. No scleral erythema, discharge, or conjunctivitis. HENT Mucous membranes are moist. Oral pharynx without exudates, no evidence of thrush. NECK Supple, no apparent thyromegaly or masses. RESP Unlabored respiration, bilateral coarse breath sounds  CARDIO/VASC S1/S2 auscultated. Regular rate without appreciable murmurs, rubs, or gallops. No JVD or carotid bruits.  Peripheral pulses equal bilaterally and palpable  GI Abdomen is soft without significant tenderness, masses, or guarding. Bowel sounds are normoactive. Rectal exam deferred.  No costovertebral angle tenderness. Normal appearing external genitalia. HEME/LYMPH No palpable cervical lymphadenopathy and no hepatosplenomegaly. No petechiae or ecchymoses. MSK Spontaneous movement of all extremities. No gross joint deformities. SKIN Normal coloration, warm, dry. NEURO Cranial nerves appear grossly intact, normal speech, no lateralizing weakness. PSYCH Awake, alert, oriented x 4. Affect appropriate. INTAKE: In: 220 [P.O.:220]  Out: 300   OUTPUT: In: 220   Out: 300 [Urine:300]    LABS  Recent Labs     11/20/20 0420 11/21/20 0440 11/22/20 0444   WBC 20.9* 20.0* 23.6*   HGB 8.7* 8.5* 10.0*   HCT 29.4* 28.6* 33.1*    390 588*      Recent Labs     11/20/20 0420 11/21/20 0440 11/22/20 0444    138 133*   K 3.8 3.7 4.6    103 99   CO2 26 26 25   PHOS 2.3* 2.1* 2.3*   BUN 49* 42* 35*   CREATININE 0.9 0.9 0.9     Recent Labs     11/20/20 0420   AST 33   ALT 37   BILIDIR 0.2   BILITOT 0.4   ALKPHOS 76     No results for input(s): INR in the last 72 hours. No results for input(s): CKTOTAL, CKMB, CKMBINDEX, TROPONINT in the last 72 hours.        Abnormal labs for today noted      Imaging:     ECHO:    Microbiology:  Blood culture:    Urine culture:    Sputum culture:    Procedures done this admission:    MEDS  Scheduled Meds:   oxyCODONE-acetaminophen  1 tablet Oral 4 times per day    rivaroxaban  20 mg Oral Daily    methylPREDNISolone  40 mg Intravenous Daily    folic acid-pyridoxine-cyancobalamin  1 tablet Oral Daily    metoprolol tartrate  50 mg Oral BID    rosuvastatin  10 mg Oral Nightly    lisinopril  5 mg Oral Daily    ALPRAZolam  0.5 mg Oral Q8H    guaiFENesin  200 mg Oral Q6H    cefepime  2 g Intravenous Q12H    acetaminophen  650 mg Rectal Once    zinc sulfate  50 mg Oral Daily    sodium chloride flush  10 mL Intravenous 2 times per day    aspirin  81 mg Oral Daily    albuterol sulfate HFA  2 puff Inhalation 4x daily    tiotropium  2 puff Inhalation Daily    traZODone  50 mg Oral Nightly    vitamin B-12  1,000 mcg Oral Daily    vitamin D  2 capsule Oral Daily    busPIRone  5 mg Oral TID    amiodarone  200 mg Oral Daily     Continuous Infusions:   pantoprozole (PROTONIX) infusion 8 mg/hr (11/21/20 2116)     PRN Meds:fentanNYL, diazePAM, sodium chloride, sodium chloride flush, acetaminophen **OR** acetaminophen, polyethylene glycol, labetalol, oxyCODONE-acetaminophen        ASSESSMENT and [facility] Day: 8     Acute on chronic resp failure  Covid pneumonia  Decadron --changed to Solu-Medrol 11/17 with end date of 11/25  Had 2 doses of remdesivir, it was stopped  Oxygen support     Suspected bacterial superinfection  -Cefepime, vancomycin, and Flagyl added 11/17 -continue with end date of 11/23 per ID, noted worsening leukocytosis which could be due to steroids     Facial droop   Neg CT and CTA for acute pathology  Patient deemed as not a TPA candidate  MRI pending -could not tolerated so far  Neurology consult  Head CT repeated 11/19-no acute event     Paroxysmal A.  Fib   H/o Chronic Cardiomyopathy    ECHO 12/31/19 LVEF 55% and down to 20% on 2/10/20   Repeat ECHO with EF 50-55% with moderate MR  ASA, Held Xarelto for GI bleed   Continue Toprol XL  Cardiology recommendations appreciated  11/19-GI consult to see if she can possibly tolerate Xarelto     H/o of GI bleed   PPI, monitor H&H     CKD stage III   IV Lasix ordered 11/17, appreciate consult with Dr. Davon Gentile  Avoid nephrotoxins, monitor  Spironolactone on hold      COPD with no exacerbation -continue with inhalers     Lung nodule  OP FU with Pulm     Hypertension  Rheumatoid arthritis  Depression  Fibromyalgia  Chronic respiratory failure on home oxygen  Chronic systolic CHF        Echo noted     Lung nodule on CTA noted     D-dimer 4894     Severe COPD per Dr. Jayla Byers 3 years ago            Medically stable for discharge- awaiting precert                    Disp:     Diet DIET GENERAL; Dysphagia Minced and Moist  Dietary Nutrition Supplements: Standard High Calorie Oral Supplement   DVT Prophylaxis [] Lovenox, []  Heparin, [] SCDs, [] Ambulation   GI Prophylaxis [] PPI,  [] H2 Blocker,  [] Carafate,  [] Diet/Tube Feeds   Code Status Full Code   Disposition Patient requires continued admission due to debility   CMS Level of Risk [] Low, [x] Moderate,[]  High  Patient's risk as above due to debility     TAYE CALDWELL MD 11/22/2020 10:02 AM

## 2020-11-23 VITALS
BODY MASS INDEX: 23.18 KG/M2 | SYSTOLIC BLOOD PRESSURE: 146 MMHG | DIASTOLIC BLOOD PRESSURE: 62 MMHG | OXYGEN SATURATION: 94 % | HEIGHT: 67 IN | WEIGHT: 147.7 LBS | RESPIRATION RATE: 21 BRPM | TEMPERATURE: 98.2 F | HEART RATE: 70 BPM

## 2020-11-23 LAB
ALBUMIN SERPL-MCNC: 2.9 GM/DL (ref 3.4–5)
AMMONIA: 24 UMOL/L (ref 11–51)
ANION GAP SERPL CALCULATED.3IONS-SCNC: 9 MMOL/L (ref 4–16)
BASOPHILS ABSOLUTE: 0 K/CU MM
BASOPHILS RELATIVE PERCENT: 0.1 % (ref 0–1)
BUN BLDV-MCNC: 26 MG/DL (ref 6–23)
CALCIUM SERPL-MCNC: 8.6 MG/DL (ref 8.3–10.6)
CHLORIDE BLD-SCNC: 100 MMOL/L (ref 99–110)
CO2: 26 MMOL/L (ref 21–32)
CREAT SERPL-MCNC: 0.8 MG/DL (ref 0.6–1.1)
D DIMER: 4306 NG/ML(DDU)
DIFFERENTIAL TYPE: ABNORMAL
EOSINOPHILS ABSOLUTE: 0 K/CU MM
EOSINOPHILS RELATIVE PERCENT: 0 % (ref 0–3)
GFR AFRICAN AMERICAN: >60 ML/MIN/1.73M2
GFR NON-AFRICAN AMERICAN: >60 ML/MIN/1.73M2
GLUCOSE BLD-MCNC: 95 MG/DL (ref 70–99)
HCT VFR BLD CALC: 32.9 % (ref 37–47)
HEMOGLOBIN: 10 GM/DL (ref 12.5–16)
HIGH SENSITIVE C-REACTIVE PROTEIN: 47.8 MG/L
IMMATURE NEUTROPHIL %: 1.4 % (ref 0–0.43)
LYMPHOCYTES ABSOLUTE: 0.9 K/CU MM
LYMPHOCYTES RELATIVE PERCENT: 4.2 % (ref 24–44)
MCH RBC QN AUTO: 24.3 PG (ref 27–31)
MCHC RBC AUTO-ENTMCNC: 30.4 % (ref 32–36)
MCV RBC AUTO: 79.9 FL (ref 78–100)
MONOCYTES ABSOLUTE: 1.7 K/CU MM
MONOCYTES RELATIVE PERCENT: 8.1 % (ref 0–4)
NUCLEATED RBC %: 0 %
PDW BLD-RTO: 17.1 % (ref 11.7–14.9)
PHOSPHORUS: 2.4 MG/DL (ref 2.5–4.9)
PLATELET # BLD: 661 K/CU MM (ref 140–440)
PMV BLD AUTO: 11 FL (ref 7.5–11.1)
POTASSIUM SERPL-SCNC: 4.2 MMOL/L (ref 3.5–5.1)
PROCALCITONIN: 0.12
RBC # BLD: 4.12 M/CU MM (ref 4.2–5.4)
SEGMENTED NEUTROPHILS ABSOLUTE COUNT: 18.4 K/CU MM
SEGMENTED NEUTROPHILS RELATIVE PERCENT: 86.2 % (ref 36–66)
SODIUM BLD-SCNC: 135 MMOL/L (ref 135–145)
TOTAL IMMATURE NEUTOROPHIL: 0.3 K/CU MM
TOTAL NUCLEATED RBC: 0 K/CU MM
WBC # BLD: 21.4 K/CU MM (ref 4–10.5)

## 2020-11-23 PROCEDURE — 6360000002 HC RX W HCPCS: Performed by: INTERNAL MEDICINE

## 2020-11-23 PROCEDURE — 6370000000 HC RX 637 (ALT 250 FOR IP): Performed by: INTERNAL MEDICINE

## 2020-11-23 PROCEDURE — 94640 AIRWAY INHALATION TREATMENT: CPT

## 2020-11-23 PROCEDURE — 86141 C-REACTIVE PROTEIN HS: CPT

## 2020-11-23 PROCEDURE — 2700000000 HC OXYGEN THERAPY PER DAY

## 2020-11-23 PROCEDURE — 84145 PROCALCITONIN (PCT): CPT

## 2020-11-23 PROCEDURE — 97530 THERAPEUTIC ACTIVITIES: CPT

## 2020-11-23 PROCEDURE — 6370000000 HC RX 637 (ALT 250 FOR IP): Performed by: HOSPITALIST

## 2020-11-23 PROCEDURE — 80069 RENAL FUNCTION PANEL: CPT

## 2020-11-23 PROCEDURE — 80053 COMPREHEN METABOLIC PANEL: CPT

## 2020-11-23 PROCEDURE — 2580000003 HC RX 258: Performed by: INTERNAL MEDICINE

## 2020-11-23 PROCEDURE — 85379 FIBRIN DEGRADATION QUANT: CPT

## 2020-11-23 PROCEDURE — 2580000003 HC RX 258: Performed by: HOSPITALIST

## 2020-11-23 PROCEDURE — 82140 ASSAY OF AMMONIA: CPT

## 2020-11-23 PROCEDURE — 99232 SBSQ HOSP IP/OBS MODERATE 35: CPT | Performed by: INTERNAL MEDICINE

## 2020-11-23 PROCEDURE — 94761 N-INVAS EAR/PLS OXIMETRY MLT: CPT

## 2020-11-23 PROCEDURE — 85025 COMPLETE CBC W/AUTO DIFF WBC: CPT

## 2020-11-23 RX ORDER — ZINC SULFATE 50(220)MG
50 CAPSULE ORAL DAILY
Qty: 30 CAPSULE | Refills: 3 | COMMUNITY
Start: 2020-11-24

## 2020-11-23 RX ORDER — PANTOPRAZOLE SODIUM 40 MG/1
40 TABLET, DELAYED RELEASE ORAL 2 TIMES DAILY
Qty: 90 TABLET | Refills: 0 | Status: ON HOLD | OUTPATIENT
Start: 2020-11-23 | End: 2021-01-07 | Stop reason: ALTCHOICE

## 2020-11-23 RX ORDER — POLYETHYLENE GLYCOL 3350 17 G/17G
17 POWDER, FOR SOLUTION ORAL DAILY PRN
Qty: 527 G | Refills: 1 | Status: SHIPPED | OUTPATIENT
Start: 2020-11-23 | End: 2020-12-23

## 2020-11-23 RX ORDER — LISINOPRIL 5 MG/1
5 TABLET ORAL DAILY
Qty: 30 TABLET | Refills: 3 | Status: ON HOLD | OUTPATIENT
Start: 2020-11-24 | End: 2021-01-07 | Stop reason: ALTCHOICE

## 2020-11-23 RX ORDER — CEFUROXIME AXETIL 250 MG/1
250 TABLET ORAL 2 TIMES DAILY
Qty: 8 TABLET | Refills: 0 | Status: SHIPPED | OUTPATIENT
Start: 2020-11-23 | End: 2020-11-27

## 2020-11-23 RX ORDER — PANTOPRAZOLE SODIUM 40 MG/10ML
40 INJECTION, POWDER, LYOPHILIZED, FOR SOLUTION INTRAVENOUS 2 TIMES DAILY
Status: DISCONTINUED | OUTPATIENT
Start: 2020-11-23 | End: 2020-11-23 | Stop reason: HOSPADM

## 2020-11-23 RX ORDER — ALPRAZOLAM 0.5 MG/1
0.5 TABLET ORAL EVERY 8 HOURS PRN
Qty: 14 TABLET | Refills: 0 | Status: SHIPPED | OUTPATIENT
Start: 2020-11-23 | End: 2020-11-28

## 2020-11-23 RX ORDER — DEXAMETHASONE 6 MG/1
6 TABLET ORAL
Qty: 3 TABLET | Refills: 0 | Status: SHIPPED | OUTPATIENT
Start: 2020-11-23 | End: 2020-11-26

## 2020-11-23 RX ORDER — OXYCODONE HYDROCHLORIDE AND ACETAMINOPHEN 5; 325 MG/1; MG/1
1 TABLET ORAL EVERY 6 HOURS PRN
Qty: 14 TABLET | Refills: 0 | Status: SHIPPED | OUTPATIENT
Start: 2020-11-23 | End: 2020-11-28

## 2020-11-23 RX ORDER — METOPROLOL TARTRATE 50 MG/1
50 TABLET, FILM COATED ORAL 2 TIMES DAILY
Qty: 60 TABLET | Refills: 3 | Status: ON HOLD | OUTPATIENT
Start: 2020-11-23 | End: 2021-01-10 | Stop reason: HOSPADM

## 2020-11-23 RX ADMIN — METHYLPREDNISOLONE SODIUM SUCCINATE 40 MG: 40 INJECTION, POWDER, FOR SOLUTION INTRAMUSCULAR; INTRAVENOUS at 09:11

## 2020-11-23 RX ADMIN — CYANOCOBALAMIN TAB 1000 MCG 1000 MCG: 1000 TAB at 09:12

## 2020-11-23 RX ADMIN — ALBUTEROL SULFATE 2 PUFF: 90 AEROSOL, METERED RESPIRATORY (INHALATION) at 08:51

## 2020-11-23 RX ADMIN — OXYCODONE HYDROCHLORIDE AND ACETAMINOPHEN 1 TABLET: 7.5; 325 TABLET ORAL at 05:06

## 2020-11-23 RX ADMIN — SODIUM CHLORIDE, PRESERVATIVE FREE 10 ML: 5 INJECTION INTRAVENOUS at 09:12

## 2020-11-23 RX ADMIN — BUSPIRONE HYDROCHLORIDE 5 MG: 5 TABLET ORAL at 13:34

## 2020-11-23 RX ADMIN — BUSPIRONE HYDROCHLORIDE 5 MG: 5 TABLET ORAL at 09:12

## 2020-11-23 RX ADMIN — ALPRAZOLAM 0.5 MG: 0.5 TABLET ORAL at 05:06

## 2020-11-23 RX ADMIN — ASPIRIN 81 MG: 81 TABLET, FILM COATED ORAL at 09:12

## 2020-11-23 RX ADMIN — OXYCODONE HYDROCHLORIDE AND ACETAMINOPHEN 1 TABLET: 7.5; 325 TABLET ORAL at 13:34

## 2020-11-23 RX ADMIN — ALBUTEROL SULFATE 2 PUFF: 90 AEROSOL, METERED RESPIRATORY (INHALATION) at 15:40

## 2020-11-23 RX ADMIN — POTASSIUM & SODIUM PHOSPHATES POWDER PACK 280-160-250 MG 250 MG: 280-160-250 PACK at 09:11

## 2020-11-23 RX ADMIN — ALPRAZOLAM 0.5 MG: 0.5 TABLET ORAL at 13:34

## 2020-11-23 RX ADMIN — LISINOPRIL 5 MG: 5 TABLET ORAL at 09:12

## 2020-11-23 RX ADMIN — Medication 2000 UNITS: at 09:12

## 2020-11-23 RX ADMIN — ZINC SULFATE 220 MG (50 MG) CAPSULE 50 MG: CAPSULE at 09:12

## 2020-11-23 RX ADMIN — TIOTROPIUM BROMIDE INHALATION SPRAY 2 PUFF: 3.12 SPRAY, METERED RESPIRATORY (INHALATION) at 08:51

## 2020-11-23 RX ADMIN — CEFEPIME HYDROCHLORIDE 2 G: 2 INJECTION, POWDER, FOR SOLUTION INTRAVENOUS at 13:34

## 2020-11-23 RX ADMIN — AMIODARONE HYDROCHLORIDE 200 MG: 200 TABLET ORAL at 09:12

## 2020-11-23 RX ADMIN — Medication 1 TABLET: at 09:12

## 2020-11-23 RX ADMIN — OXYCODONE HYDROCHLORIDE AND ACETAMINOPHEN 1 TABLET: 7.5; 325 TABLET ORAL at 00:42

## 2020-11-23 RX ADMIN — ALBUTEROL SULFATE 2 PUFF: 90 AEROSOL, METERED RESPIRATORY (INHALATION) at 11:35

## 2020-11-23 RX ADMIN — GUAIFENESIN 200 MG: 100 SOLUTION ORAL at 09:12

## 2020-11-23 RX ADMIN — GUAIFENESIN 200 MG: 100 SOLUTION ORAL at 05:05

## 2020-11-23 RX ADMIN — METOPROLOL TARTRATE 50 MG: 50 TABLET, FILM COATED ORAL at 09:12

## 2020-11-23 RX ADMIN — CEFEPIME HYDROCHLORIDE 2 G: 2 INJECTION, POWDER, FOR SOLUTION INTRAVENOUS at 00:42

## 2020-11-23 ASSESSMENT — PAIN SCALES - GENERAL
PAINLEVEL_OUTOF10: 5
PAINLEVEL_OUTOF10: 6
PAINLEVEL_OUTOF10: 5

## 2020-11-23 NOTE — PROGRESS NOTES
Pt transported to [facility]-. Bedside report given to Giselle. Neuro check done at bedside. Pt also developing a hematoma on the left side of her forehead.

## 2020-11-23 NOTE — PROGRESS NOTES
Patient had an unwitnessed fall. She was found down on the ground. Pt placed back into bed. Not visible injuries noted at this time. Bed alarm on. Kenia MENCHACA notified.

## 2020-11-23 NOTE — DISCHARGE SUMMARY
Discharge Summary    Name:  Shiraz Barksdale /Age/Sex: 1940  ([de-identified] y.o. female)   MRN & CSN:  [id number] & [id number] Admission Date/Time: 11/15/2020 12:14 PM   Attending:  Nuris Sainz MD Discharging Physician: Kami Woodward MD     HPI and Hospital Course:   Shiraz Barksdale is a [de-identified] y.o.  female  who presents with Altered Mental Status (left side weakness) and Extremity Weakness (left side)    HPI- as per H and [address] 67- son updated about discharge     Acute on chronic resp failure-on [place] oxygen  Covid pneumonia  Decadron --changed to Solu-Medrol  with end date of -3 more days of decadron at discharge  Had 2 doses of remdesivir which was stopped by ID  -oxygen requirement down 3 liters       Suspected bacterial superinfection  -Cefepime, vancomycin, and Flagyl added  -continue with end date of  per ID, noted worsening leukocytosis which could be due to steroids- improved today- few more days of ceftin at discharge- will repeat labs at [facility]     Facial droop   Neg CT and CTA for acute pathology  Patient deemed as not a TPA candidate  Seen by neuro- with input noted  Head CT repeated -no acute event     Paroxysmal A. Fib   H/o Chronic Cardiomyopathy    ECHO 19 LVEF 55% and down to 20% on 2/10/20   Repeat ECHO with EF 50-55% with moderate MR  Continue Toprol XL and xarelto  Cardiology recommendations appreciated        H/o of GI bleed   PPI, monitor H&H- to be discharged on PPI per GI- okay for xarelto and ASA     CKD stage III   -stable, nephrology input appreciated      COPD with no exacerbation -continue with inhalers     Lung nodule  OP FU with Pulm     Hypertension  Rheumatoid arthritis  Depression  Fibromyalgia  Chronic respiratory failure on home oxygen  Chronic systolic CHF        Hemodynamically for discharge to [facility]    The patient expressed appropriate understanding of and agreement with the discharge recommendations, medications, and plan.      Consults INCRUSE ELLIPTA 62.5 MCG/INH AEPB  INHALE 1 PUFF VIA ORAL INHALATION ONCE DAILY             ipratropium-albuterol (DUONEB) 0.5-2.5 (3) MG/3ML SOLN nebulizer solution  Inhale 3 mLs into the lungs every 4 hours (while awake)             lisinopril (PRINIVIL;ZESTRIL) 5 MG tablet  Take 1 tablet by mouth daily             loratadine (CLARITIN) 10 MG capsule  Take 10 mg by mouth daily             metoprolol tartrate (LOPRESSOR) 50 MG tablet  Take 1 tablet by mouth 2 times daily             oxyCODONE-acetaminophen (PERCOCET) 5-325 MG per tablet  Take 1 tablet by mouth every 6 hours as needed for Pain for up to 5 days. OXYGEN  Inhale 2 L/min into the lungs nightly             pantoprazole (PROTONIX) 40 MG tablet  Take 1 tablet by mouth 2 times daily             polyethylene glycol (GLYCOLAX) 17 g packet  Take 17 g by mouth daily as needed for Constipation             PROAIR  (90 Base) MCG/ACT inhaler  INHALE 2 PUFFS BY ORAL INHALATION EVERY 4 TO 6 HOURS AS NEEDED             rivaroxaban (XARELTO) 20 MG TABS tablet  Take 1 tablet by mouth daily             sertraline (ZOLOFT) 25 MG tablet  Take 25 mg by mouth daily             tiotropium (SPIRIVA RESPIMAT) 2.5 MCG/ACT AERS inhaler  Inhale 2 puffs into the lungs daily             traZODone (DESYREL) 50 MG tablet  TAKE 1 TABLET BY MOUTH NIGHTLY             triamcinolone (NASACORT ALLERGY 24HR) 55 MCG/ACT nasal inhaler  2 sprays by Each Nostril route daily             vitamin B-12 (CYANOCOBALAMIN) 1000 MCG tablet  Take 1,000 mcg by mouth daily. zinc sulfate (ZINCATE) 220 (50 Zn) MG capsule  Take 1 capsule by mouth daily                 Objective Findings at Discharge:   BP (!) 146/62   Pulse 70   Temp 98.2 °F (36.8 °C) (Oral)   Resp 21   Ht 5' 7\" (1.702 m)   Wt 147 lb 11.2 oz (67 kg)   SpO2 94%   BMI 23.13 kg/m²            PHYSICAL EXAM   GEN Awake female, laying in bed in no apparent distress. EYES Pupils are equally round.   No scleral discharge  HENT Atraumatic and symmetric head  NECK No apparent thyromegaly  RESP Symmetric chest movement   CARDIO/VASC Peripheral pulses equal bilaterally and palpable. GI Abdomen is not distended. Rectal exam deferred.  Jeter catheter is not present. HEME/LYMPH No petechiae or ecchymoses. MSK Spontaneous movement of BL upper extremities  SKIN Normal coloration, warm, dry. NEURO Cranial nerves appear grossly intact  PSYCH Awake, alert.     BMP/CBC  Recent Labs     11/21/20  0440 11/22/20  0444 11/23/20  0515    133* 135   K 3.7 4.6 4.2    99 100   CO2 26 25 26   BUN 42* 35* 26*   CREATININE 0.9 0.9 0.8   WBC 20.0* 23.6* 21.4*   HCT 28.6* 33.1* 32.9*    588* 661*     SIGNIFICANT IMAGING AND LABS:      Discharge Time of 32  minutes    Electronically signed by Christen East MD on 11/23/2020 at 4:16 PM

## 2020-11-23 NOTE — PROGRESS NOTES
START 1000      STOP   1030  OVER THE PHONE WITH RN, PT CONFUSED COULD NOT [id number] [place] [address] Gastroenterology        Progress Note       2020  10:37 AM    Patient:    Boaz Reynoso  : 1940   [de-identified] y.o. MRN: [id number]  Admitted: 11/15/2020 12:14 PM ATT: Fidela Canavan, MD   [id number]  AdmitDx: Facial droop [R29.810]  Acute hypoxemic respiratory failure due to COVID-19 ([address] [U07.1, J96.01]  COVID-19 [U07.1]  PCP: Rose Horta MD      Due to the current efforts to prevent transmission of COVID-19 and also the need to preserve PPE for other caregivers, a face-to-face encounter with the patient was not performed. That being said, all relevant records and diagnostic tests were reviewed, including laboratory results and imaging. Please reference any relevant documentation elsewhere. Care will be coordinated with the primary service. SUBJECTIVE:  Chart reviewed, events noted  Patient confused, Neuro consulted. No gross bleeding reported over the weekend. Hgb stable. On 4 L NC.   ROS:Unable to obtain     OBJECTIVE:Deferred to hospitalist exam         BP (!) 146/62   Pulse 70   Temp 98.5 °F (36.9 °C) (Oral)   Resp 23   Ht 5' 7\" (1.702 m)   Wt 147 lb 11.2 oz (67 kg)   SpO2 95%   BMI 23.13 kg/m²   CBC:   Recent Labs     20  0440 20  0444 20  0515   WBC 20.0* 23.6* 21.4*   HGB 8.5* 10.0* 10.0*    588* 661*     BMP:    Recent Labs     20  0440 20  0444 20  0515    133* 135   K 3.7 4.6 4.2    99 100   CO2 26 25 26   BUN 42* 35* 26*   CREATININE 0.9 0.9 0.8   GLUCOSE 114* 98 95     Magnesium:   Lab Results   Component Value Date    MG 2.0 2020     Hepatic: No results for input(s): AST, ALT, ALB, BILITOT, ALKPHOS in the last 72 hours. INR: No results for input(s): INR in the last 72 hours.       Intake/Output Summary (Last 24 hours) at 2020 1037  Last data filed at 11/23/2020 0911  Gross per 24 hour   Intake 10 ml   Output --   Net 10 ml         Medications    Scheduled Medications:    potassium & sodium phosphates  1 packet Oral 4x Daily    potassium & sodium phosphates  1 packet Oral 4x Daily    oxyCODONE-acetaminophen  1 tablet Oral 4 times per day    rivaroxaban  20 mg Oral Daily    methylPREDNISolone  40 mg Intravenous Daily    folic acid-pyridoxine-cyancobalamin  1 tablet Oral Daily    metoprolol tartrate  50 mg Oral BID    rosuvastatin  10 mg Oral Nightly    lisinopril  5 mg Oral Daily    ALPRAZolam  0.5 mg Oral Q8H    guaiFENesin  200 mg Oral Q6H    cefepime  2 g Intravenous Q12H    acetaminophen  650 mg Rectal Once    zinc sulfate  50 mg Oral Daily    sodium chloride flush  10 mL Intravenous 2 times per day    aspirin  81 mg Oral Daily    albuterol sulfate HFA  2 puff Inhalation 4x daily    tiotropium  2 puff Inhalation Daily    traZODone  50 mg Oral Nightly    vitamin B-12  1,000 mcg Oral Daily    vitamin D  2 capsule Oral Daily    busPIRone  5 mg Oral TID    amiodarone  200 mg Oral Daily     PRN Medications: diazePAM, sodium chloride, sodium chloride flush, acetaminophen **OR** acetaminophen, polyethylene glycol, labetalol, oxyCODONE-acetaminophen  Infusions:    pantoprozole (PROTONIX) infusion 8 mg/hr (11/21/20 2116)           Patient Active Problem List   Diagnosis Code    Rheumatoid Arthritis M19.90    Hypertension I10    Hyperlipidemia E78.5    Fibromyalgia M79.7    Migraine G43.909    Chronic pain syndrome G89.4    Depression F32.9    Osteoporosis M81.0    Vitamin B12 deficiency E53.8    Lumbar spinal stenosis M48.061    Panic attack F41.0    Takotsubo syndrome I51.81    Blood loss anemia D50.0    COPD, severe (HCC) J44.9    Acute exacerbation of chronic obstructive pulmonary disease (COPD) ([facility] J44.1    Former smoker Z87.891    Shortness of breath R06.02    Back pain M54.9    Intractable low back pain M54.5    Chronic fatigue R53.82    Pneumonia of right upper lobe due to infectious organism J18.9    Wide-complex tachycardia (HCC) I47.2    Gram-negative pneumonia (HCC) M08.4    Acute systolic heart failure (HCC) I50.21    Symptomatic anemia D64.9    Chronic hypoxemic respiratory failure (HCC) J96.11    Acute hypoxemic respiratory failure due to COVID-19 (HCC) U07.1, J96.01    Transaminitis R74.01     ASSESSMENT/RECOMMENDATIONS:     Anemia:  GI work up 3/2020 unremarkable  No gross bleeding over weekend    Hgb 10.0  Recommend CBC daily  Recommend PPI BID  Okay for patient to receive ASA 81 mg and Xarelto, advised to stop Plavix    Will sign off. Call for gross bleeding or decline in hgb. Discussed plan of care with RN     Patient clinical, biochemical, and radiological information discussed with Dr. Ana María Torres. He agrees with the assessment and plan.      Chris Vargas, [facility]  11/23/2020  10:37 AM      D/w nursing staff  D/w consultants  D/W SHARMILA rasmussen    Hb stable  CPM    Alan VERDUGO

## 2020-11-23 NOTE — PROGRESS NOTES
tissues. 1. No acute intracranial abnormality. 2. Stable diffuse parenchymal volume loss with moderate chronic white matter microvascular ischemic changes and chronic lacunar infarct in the left thalamus. 3. Trace right mastoid effusion. Ct Head Wo Contrast    Result Date: 11/15/2020  EXAMINATION: CT OF THE HEAD WITHOUT CONTRAST  11/15/2020 12:21 pm TECHNIQUE: CT of the head was performed without the administration of intravenous contrast. Dose modulation, iterative reconstruction, and/or weight based adjustment of the mA/kV was utilized to reduce the radiation dose to as low as reasonably achievable. COMPARISON: 06/06/2017. HISTORY: ORDERING SYSTEM PROVIDED HISTORY: stroke alert TECHNOLOGIST PROVIDED HISTORY: Has a \"code stroke\" or \"stroke alert\" been called? ->Yes Reason for exam:->stroke alert Reason for Exam: stroke alert, AMS Acuity: Acute Type of Exam: Initial FINDINGS: Motion degrades images limiting evaluation. BRAIN/VENTRICLES: There is no acute intracranial hemorrhage, mass effect or midline shift. No abnormal extra-axial fluid collection. The gray-white differentiation is maintained without evidence of an acute infarct. There is no evidence of hydrocephalus. There are areas of hypoattenuation in the periventricular and subcortical white matter, which is nonspecific, but may represent chronic microvasvular ischemic change. There is prominence of the ventricles and sulci due to global parenchymal volume loss. There appears to be a chronic lacunar infarct within the left thalamus. ORBITS: The visualized portion of the orbits demonstrate no acute abnormality. SINUSES: The visualized paranasal sinuses demonstrate no acute abnormality. Trace right mastoid effusion. SOFT TISSUES/SKULL:  No acute abnormality of the visualized skull or soft tissues. 1. No acute intracranial abnormality. 2. Mild global parenchymal volume loss with moderate chronic microvascular ischemic changes.  3. Trace right mastoid effusion. Findings were discussed with Dr. Nato Brown on 11/15/2020 at 12:39 pm.     Xr Chest Portable    Result Date: 11/20/2020  EXAMINATION: ONE XRAY VIEW OF THE CHEST 11/20/2020 8:36 am COMPARISON: 11/17/2020 HISTORY: ORDERING SYSTEM PROVIDED HISTORY: SOB TECHNOLOGIST PROVIDED HISTORY: Reason for exam:->SOB Reason for Exam: sob Follow-up exam FINDINGS: Improving bilateral airspace opacities. Right PICC line placement with the tip projecting over the mid SVC. No pneumothorax or pleural effusion. Stable cardiac silhouette. The osseous structures are stable. Improving bilateral airspace opacities. Xr Chest Portable    Result Date: 11/17/2020  EXAMINATION: ONE XRAY VIEW OF THE CHEST 11/17/2020 12:15 pm COMPARISON: 11/15/2020 HISTORY: ORDERING SYSTEM PROVIDED HISTORY: fever 102.7 TECHNOLOGIST PROVIDED HISTORY: Reason for exam:->fever 102.7 Reason for Exam: fever 102.7 Acuity: Unknown Type of Exam: Unknown FINDINGS: The heart size is stable. Bilateral pulmonary opacities again noted. No pneumothorax. No definite pleural effusion. Persistent extensive bilateral pulmonary opacities could represent multifocal bacterial or atypical pneumonia     Xr Chest Portable    Result Date: 11/15/2020  EXAMINATION: ONE XRAY VIEW OF THE CHEST 11/15/2020 12:35 pm COMPARISON: 03/24/2020. HISTORY: ORDERING SYSTEM PROVIDED HISTORY: other, stroke alert TECHNOLOGIST PROVIDED HISTORY: Reason for exam:->other, stroke alert FINDINGS: The cardiac silhouette appears magnified. The aorta is uncoiled and atherosclerotic. There are bilateral new areas of airspace opacities, most notably affecting the left midlung field with scarring/atelectatic changes seen in the bilateral lower lung fields. Redemonstration of deformity and increased density seen along the anterolateral aspect of the right 5th rib is redemonstrated. There is redemonstration of dextroscoliosis of the thoracic spine.      Bilateral patchy areas of airspace opacities with scarring/atelectatic changes seen at the bilateral lower lung fields, which may reflect multifocal pneumonia in the correct clinical setting. Follow-up imaging resolution is recommended. Cta Neck W Contrast    Result Date: 11/15/2020  EXAMINATION: CTA OF THE HEAD WITH CONTRAST; CTA OF THE NECK 11/15/2020 12:41 pm; 11/15/2020 12:40 pm: TECHNIQUE: CTA of the head/brain was performed with the administration of intravenous contrast. Multiplanar reformatted images are provided for review. MIP images are provided for review. Dose modulation, iterative reconstruction, and/or weight based adjustment of the mA/kV was utilized to reduce the radiation dose to as low as reasonably achievable.; CTA of the neck was performed with the administration of intravenous contrast. Multiplanar reformatted images are provided for review. MIP images are provided for review. Stenosis of the internal carotid arteries measured using NASCET criteria. Dose modulation, iterative reconstruction, and/or weight based adjustment of the mA/kV was utilized to reduce the radiation dose to as low as reasonably achievable. COMPARISON: None. HISTORY: ORDERING SYSTEM PROVIDED HISTORY: stroke alert TECHNOLOGIST PROVIDED HISTORY: Has a \"code stroke\" or \"stroke alert\" been called? ->Yes Reason for exam:->stroke alert Reason for Exam: stroke alert, AMS Acuity: Acute Type of Exam: Initial FINDINGS: CTA NECK: AORTIC ARCH/ARCH VESSELS: No focal stenosis of the visualized innominate or subclavian arteries. The left subclavian artery is partially obscured due to in flowing contrast. CAROTID ARTERIES: No focal stenosis of the common carotid arteries. There is a retropharyngeal course of the common carotid arteries. Atherosclerosis of the proximal internal carotid arteries bilaterally. No evidence of a flow limiting stenosis of the internal carotid arteries by NASCET criteria.  VERTEBRAL ARTERIES: No flow limiting stenosis is seen of the vertebral arteries. SOFT TISSUES: There is a 7 mm nodule within the right lung apex. BONES: No acute osseous abnormality is seen. Severe degenerative changes of the cervical spine are noted. CTA HEAD: ANTERIOR CIRCULATION: No significant stenosis of the intracranial internal carotid, anterior cerebral, or middle cerebral arteries. No aneurysm. POSTERIOR CIRCULATION: No significant stenosis of the vertebral, basilar, or posterior cerebral arteries. No aneurysm. OTHER: No dural venous sinus thrombosis on this non-dedicated study. BRAIN: No mass effect or midline shift. 1. No flow limiting stenosis of the cervical carotid/vertebral arteries. 2. No focal stenosis of the [id number]. 3. There is a 7 mm nodule within the right lung apex, which was not visualized on the CTA from 02/09/2020. Recommendations as below. RECOMMENDATIONS: 7.0 mm solid pulmonary nodule within the upper lobe detected on incomplete chest CT. Recommend a non-contrast Chest CT at 6-12 months, then consider an additional non-contrast Chest CT at 18-24 months. These guidelines do not apply to immunocompromised patients and patients with cancer. Follow up in patients with significant comorbidities as clinically warranted. For lung cancer screening, adhere to Lung-RADS guidelines. Reference: Radiology. 2017; 284(1):228-43. Cta Head W Contrast    Result Date: 11/15/2020  EXAMINATION: CTA OF THE HEAD WITH CONTRAST; CTA OF THE NECK 11/15/2020 12:41 pm; 11/15/2020 12:40 pm: TECHNIQUE: CTA of the head/brain was performed with the administration of intravenous contrast. Multiplanar reformatted images are provided for review. MIP images are provided for review.  Dose modulation, iterative reconstruction, and/or weight based adjustment of the mA/kV was utilized to reduce the radiation dose to as low as reasonably achievable.; CTA of the neck was performed with the administration of intravenous contrast. Multiplanar reformatted images are provided for review. MIP images are provided for review. Stenosis of the internal carotid arteries measured using NASCET criteria. Dose modulation, iterative reconstruction, and/or weight based adjustment of the mA/kV was utilized to reduce the radiation dose to as low as reasonably achievable. COMPARISON: None. HISTORY: ORDERING SYSTEM PROVIDED HISTORY: stroke alert TECHNOLOGIST PROVIDED HISTORY: Has a \"code stroke\" or \"stroke alert\" been called? ->Yes Reason for exam:->stroke alert Reason for Exam: stroke alert, AMS Acuity: Acute Type of Exam: Initial FINDINGS: CTA NECK: AORTIC ARCH/ARCH VESSELS: No focal stenosis of the visualized innominate or subclavian arteries. The left subclavian artery is partially obscured due to in flowing contrast. CAROTID ARTERIES: No focal stenosis of the common carotid arteries. There is a retropharyngeal course of the common carotid arteries. Atherosclerosis of the proximal internal carotid arteries bilaterally. No evidence of a flow limiting stenosis of the internal carotid arteries by NASCET criteria. VERTEBRAL ARTERIES: No flow limiting stenosis is seen of the vertebral arteries. SOFT TISSUES: There is a 7 mm nodule within the right lung apex. BONES: No acute osseous abnormality is seen. Severe degenerative changes of the cervical spine are noted. CTA HEAD: ANTERIOR CIRCULATION: No significant stenosis of the intracranial internal carotid, anterior cerebral, or middle cerebral arteries. No aneurysm. POSTERIOR CIRCULATION: No significant stenosis of the vertebral, basilar, or posterior cerebral arteries. No aneurysm. OTHER: No dural venous sinus thrombosis on this non-dedicated study. BRAIN: No mass effect or midline shift. 1. No flow limiting stenosis of the cervical carotid/vertebral arteries. 2. No focal stenosis of the [id number]-[id number]-Qjcngp. 3. There is a 7 mm nodule within the right lung apex, which was not visualized on the CTA from 02/09/2020. Recommendations as below. RECOMMENDATIONS: 7.0 mm solid pulmonary nodule within the upper lobe detected on incomplete chest CT. Recommend a non-contrast Chest CT at 6-12 months, then consider an additional non-contrast Chest CT at 18-24 months. These guidelines do not apply to immunocompromised patients and patients with cancer. Follow up in patients with significant comorbidities as clinically warranted. For lung cancer screening, adhere to Lung-RADS guidelines. Reference: Radiology. 2017; 284(1):228-43.        LAB RESULTS  --------------------    Recent Results (from the past 24 hour(s))   CBC Auto Differential    Collection Time: 11/22/20  4:44 AM   Result Value Ref Range    WBC 23.6 (H) 4.0 - 10.5 K/CU MM    RBC 4.08 (L) 4.2 - 5.4 M/CU MM    Hemoglobin 10.0 (L) 12.5 - 16.0 GM/DL    Hematocrit 33.1 (L) 37 - 47 %    MCV 81.1 78 - 100 FL    MCH 24.5 (L) 27 - 31 PG    MCHC 30.2 (L) 32.0 - 36.0 %    RDW 17.2 (H) 11.7 - 14.9 %    Platelets 670 (H) 154 - 440 K/CU MM    MPV 11.0 7.5 - 11.1 FL    Differential Type AUTOMATED DIFFERENTIAL     Segs Relative 84.1 (H) 36 - 66 %    Lymphocytes % 5.9 (L) 24 - 44 %    Monocytes % 7.9 (H) 0 - 4 %    Eosinophils % 0.0 0 - 3 %    Basophils % 0.3 0 - 1 %    Segs Absolute 19.9 K/CU MM    Lymphocytes Absolute 1.4 K/CU MM    Monocytes Absolute 1.9 K/CU MM    Eosinophils Absolute 0.0 K/CU MM    Basophils Absolute 0.1 K/CU MM    Nucleated RBC % 0.0 %    Total Nucleated RBC 0.0 K/CU MM    Total Immature Neutrophil 0.43 K/CU MM    Immature Neutrophil % 1.8 (H) 0 - 0.43 %   D-Dimer, Quantitative    Collection Time: 11/22/20  4:44 AM   Result Value Ref Range    D-Dimer, Quant 4901 (H) <230 NG/mL(DDU)   Renal Function Panel    Collection Time: 11/22/20  4:44 AM   Result Value Ref Range    Sodium 133 (L) 135 - 145 MMOL/L    Potassium 4.6 3.5 - 5.1 MMOL/L    Chloride 99 99 - 110 mMol/L    CO2 25 21 - 32 MMOL/L    Anion Gap 9 4 - 16    BUN 35 (H) 6 - 23 MG/DL    CREATININE 0.9 0.6 - 1.1 MG/DL    Glucose 98 70 - 99 MG/DL Calcium 8.7 8.3 - 10.6 MG/DL    GFR Non-African American >60 >60 mL/min/1.73m2    GFR African American >60 >60 mL/min/1.73m2    Alb 2.9 (L) 3.4 - 5.0 GM/DL    Phosphorus 2.3 (L) 2.5 - 4.9 MG/DL   C-Reactive Protein    Collection Time: 11/22/20  4:44 AM   Result Value Ref Range    CRP, High Sensitivity 42.0 mg/L   Procalcitonin    Collection Time: 11/22/20  4:44 AM   Result Value Ref Range    Procalcitonin 0.171          Medical problems    Patient Active Problem List:     Rheumatoid Arthritis     Hypertension     Hyperlipidemia     Fibromyalgia     Migraine     Chronic pain syndrome     Depression     Osteoporosis     Vitamin B12 deficiency     Lumbar spinal stenosis     Panic attack     Takotsubo syndrome     Blood loss anemia     COPD, severe (HCC)     Acute exacerbation of chronic obstructive pulmonary disease (COPD) ([facility] [address]     Former smoker     Shortness of breath     Back pain     Intractable low back pain     Chronic fatigue     Pneumonia of right upper lobe due to infectious organism     Wide-complex tachycardia (HCC)     Gram-negative pneumonia (HCC)     Acute systolic heart failure (HCC)     Symptomatic anemia     Chronic hypoxemic respiratory failure (HCC)     Acute hypoxemic respiratory failure due to COVID-19 ([facility] [address]     Transaminitis      ASSESSMENT:  ---------------------    TIA r/o cardio carotid embolic event     Hypoxic encephalopathy     Covid Pneumonia     Hx GI bleed     Hx PAF     PLAN:     CT brain as above     CTA head neck neg     C doppler neg     Echo neg     B 12 folate TSH nl    Elevated homocysteine level add foltx     Asa 81 mg     Off plavix     Pt on Xarolto     Discussed plan of care with pts nurse.     Pt s Nurse on Tele [facility] visit states pt is stable.              Electronically signed by Gema Garduno MD on 11/22/2020 at 7:56 PM

## 2020-11-23 NOTE — CARE COORDINATION
Spoke with Dr Medina Session and pt remains ready for discharge, awaiting prior auth to [facility]. VM left for Gali to check on prior auth. Also whiteboard message to therapy requesting updated notes for prior auth. 1115 Therapy updated in , spoke with Reynaldo Mike at [facility], she is hoping to have prior auth completed today, she will call this CM once done. 1630 Call from Reynaldo Mike at [facility], insurance approved admission today. Dr Medina Session discharged pt. Set up with QCT for 1730, nursing and family updated and agreeabl ewith plan.

## 2020-11-23 NOTE — PROGRESS NOTES
Occupational Therapy  Occupational Therapy Treatment Note    Date:  2020   Room:  1401/0486-O    Roge Quintanilla :   1940   MRN: [id number] Admission Date: 11/15/2020     Diagnosis:  The primary encounter diagnosis was Facial droop. A diagnosis of COVID-19 was also pertinent to this visit. Restrictions/Precautions:    Restrictions/Precautions  Restrictions/Precautions: General Precautions, Fall Risk                 Communication with other providers:  PT.    Subjective:  Patient states:  \"okay\" very agreeable  Pain:   Location, Type, Intensity (0/10 to 10/10):  No c/o    Objective:    Orientation: ox self and city only ; re-orients to place easily, but initially believes she is [facility]  Observation: Pt received in bed. Alert and cooperative and watching cartoons  Objective Measures:  Catheter, VSS , nasal cannula    Treatment, including education:  Therapeutic Activity Training:   Therapeutic activity training was instructed today. Cues were given for safety, sequence, UE/LE placement, visual cues, and balance. Activities performed today included bed mobility training, sup-sit, sit-stand, SPT. Supine to sit: ModA. sequential cues to roll onto R side c bed flat but using rail, then lift assistance to upright trunk from sidelying    Sitting:  Supervision EOb ~10 mins    Sock donning: DEP while seated EOB    Transfers: Jane to stand  from EOB 3 reps c cue for push-off bed. Reduced extension of knee and overall trunk posture, needs steadying assistance and tactile cues for posture up to RW    Standing: Jane and VC/TC for upright posture and to maximize knee extension. Knees appear tight and cannot achieve full extension. Stands 3 separate reps at [facility] for ~20-30 seconds each    Gait: unable    Sit to supine:  ModA for BLE and sequential cues    Pt declined other ADL participation and was greatly fatigued with EOB sitting and standing.      Assessment / Impression:      Patient's tolerance of

## 2020-11-23 NOTE — PROGRESS NOTES
- 440 K/CU MM    MPV 11.0 7.5 - 11.1 FL    Differential Type AUTOMATED DIFFERENTIAL     Segs Relative 86.2 (H) 36 - 66 %    Lymphocytes % 4.2 (L) 24 - 44 %    Monocytes % 8.1 (H) 0 - 4 %    Eosinophils % 0.0 0 - 3 %    Basophils % 0.1 0 - 1 %    Segs Absolute 18.4 K/CU MM    Lymphocytes Absolute 0.9 K/CU MM    Monocytes Absolute 1.7 K/CU MM    Eosinophils Absolute 0.0 K/CU MM    Basophils Absolute 0.0 K/CU MM    Nucleated RBC % 0.0 %    Total Nucleated RBC 0.0 K/CU MM    Total Immature Neutrophil 0.30 K/CU MM    Immature Neutrophil % 1.4 (H) 0 - 0.43 %   D-Dimer, Quantitative    Collection Time: 11/23/20  5:15 AM   Result Value Ref Range    D-Dimer, Quant 4306 (H) <230 NG/mL(DDU)   Renal Function Panel    Collection Time: 11/23/20  5:15 AM   Result Value Ref Range    Sodium 135 135 - 145 MMOL/L    Potassium 4.2 3.5 - 5.1 MMOL/L    Chloride 100 99 - 110 mMol/L    CO2 26 21 - 32 MMOL/L    Anion Gap 9 4 - 16    BUN 26 (H) 6 - 23 MG/DL    CREATININE 0.8 0.6 - 1.1 MG/DL    Glucose 95 70 - 99 MG/DL    Calcium 8.6 8.3 - 10.6 MG/DL    GFR Non-African American >60 >60 mL/min/1.73m2    GFR African American >60 >60 mL/min/1.73m2    Alb 2.9 (L) 3.4 - 5.0 GM/DL    Phosphorus 2.4 (L) 2.5 - 4.9 MG/DL   C-Reactive Protein    Collection Time: 11/23/20  5:15 AM   Result Value Ref Range    CRP, High Sensitivity 47.8 mg/L     CULTURE results: Invalid input(s): BLOOD CULTURE,  URINE CULTURE, SURGICAL CULTURE    Diagnosis:  Patient Active Problem List   Diagnosis    Rheumatoid Arthritis    Hypertension    Hyperlipidemia    Fibromyalgia    Migraine    Chronic pain syndrome    Depression    Osteoporosis    Vitamin B12 deficiency    Lumbar spinal stenosis    Panic attack    Takotsubo syndrome    Blood loss anemia    COPD, severe (HCC)    Acute exacerbation of chronic obstructive pulmonary disease (COPD) (HCC)    Former smoker    Shortness of breath    Back pain    Intractable low back pain    Chronic fatigue    Pneumonia of right upper lobe due to infectious organism    Wide-complex tachycardia (HCC)    Gram-negative pneumonia (HCC)    Acute systolic heart failure (HCC)    Symptomatic anemia    Chronic hypoxemic respiratory failure (HCC)    Acute hypoxemic respiratory failure due to COVID-19 ([facility] [address]    Transaminitis       Active Problems  Principal Problem:    Acute hypoxemic respiratory failure due to COVID-19 [facility])  Active Problems:    Transaminitis  Resolved Problems:    * No resolved hospital problems. *      Impression and plan   Summary and rationale: Patient is a [de-identified] y.o.  female coronary artery disease, COPD diagnosis viral sepsis secondary to COVID-19. Suspect CHF exacerbation, improved BNP with diuresis.  Clinical status: Improving on less oxygen. More awake. Leukocytosis may be due to corticosteroids: Solu-Medrol. CRP not on a downward trend   Urine culture: Enterobacter cloacae, unable to tell if this is a true infection. Absence of pyuria does not categorically rule out a UTI.    Therapeutic:  o Ongoing antibiotics:  cefepime 11/17-(plan to treat for 7 days, tentative end date 11/23/2020),   o Antiviral agent: remdesivir 11/16-17  o Anti-inflammatory agents:  - Dexamethasone: 11/16-17  - Solu-Medrol: 11/17-(tentative end date: 11/25)  o Completed antibiotics: d/c Vancomycin 11/17-20;metronidazole 11/17-20  o Convalescent plasma receipt: 1 unit  11/17  o Other agents:    Diagnostic: Trend CRP and procalcitonin   F/u:   Other:      Electronically signed by: Electronically signed by Ezequiel Mendez MD on 11/23/2020 at 10:47 AM

## 2020-11-23 NOTE — PROGRESS NOTES
Daily Progress Note    Stable from cardiac stand  Keep on cardiac meds   covid pneumonia improving  Continue supportive care  PAFIb and TIA keep on [facility] for now       Pt. Awake, less confused today  HR stable, NSR in the 80 range, BP stable  No CP, SOB stable  Had fall last night- hematoma noted to forehead     Covid PNA    Per primary    Improving    Tx. Per ID     PAF with possible TIA    On amio, lopressor    Rate stable and sinus    On xarelto- watch for GI bleed-CT head neg. Last night    CTA head and neck neg. Unable to tolerate MRI     Acute on Chronic HFpEF    EF 50-55% on echo    BNP very elevated-improved significantly    Renal following-diuretics per renal    Good UOP    Cont. Med. Tx.      Will cont. To follow-Pt.  Is stable from cardiac standpoint-hopefully can D/C soon     Echo-11/16/20  Summary   This is a limited echocardiogram.   Left ventricular systolic function is normal.   Ejection fraction is visually estimated at 50-55%.   Moderate aortic regurgitation; PHT: 336 msec.   Moderate mitral regurgitation.   Small mobile calcification attached to the posterior mitral valve chordae   tendinae.   No evidence of any pericardial effusion.     PAST MEDICAL HISTORY:  The patient sees Dr. Lundberg for her COPD, history  of having heart failure with apical ballooning that improved, history of  heart catheterization in 2017, found to have nonobstructive coronary  artery disease present, hypertension, hyperlipidemia, fibromyalgia,  atrial flutter, 2:1 conduction noted, and she was on anticoagulation for  that.     PAST SURGICAL HISTORY:  Gallbladder surgery, hysterectomy done, and  appendectomy.     SOCIAL HISTORY:  She does not smoke, does not drink.  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     ALLERGIES:  HUMIRA, REMICADE, LORAZEPAM, _____, COMPAZINE, and  DOXYCYCLINE.     MEDICATIONS AT HOME:  She was on iron sulfate, Zoloft, Claritin, Lasix,  Xanax, aspirin, amiodarone, Toprol 25 mg b.i.d., and 32.9*   MCV 80.8 81.1 79.9    588* 661*     BMP:   Recent Labs     11/21/20  0440 11/22/20  0444 11/23/20  0515    133* 135   K 3.7 4.6 4.2    99 100   CO2 26 25 26   PHOS 2.1* 2.3* 2.4*   BUN 42* 35* 26*   CREATININE 0.9 0.9 0.8     LIVER PROFILE: No results for input(s): AST, ALT, LIPASE, BILIDIR, BILITOT, ALKPHOS in the last 72 hours. Invalid input(s): AMYLASE,  ALB  PT/INR: No results for input(s): PROTIME, INR in the last 72 hours. APTT: No results for input(s): APTT in the last 72 hours. BNP:  No results for input(s): BNP in the last 72 hours.       Assessment:  Patient Active Problem List    Diagnosis Date Noted    Transaminitis 11/17/2020    Acute hypoxemic respiratory failure due to COVID-19 [facility]) 11/15/2020    Chronic hypoxemic respiratory failure ([address] 09/09/2020    Symptomatic anemia 03/24/2020    Wide-complex tachycardia ([address] 02/10/2020    Gram-negative pneumonia ([facility] [address] 33/33/1654    Acute systolic heart failure ([facility] [address] 02/10/2020    Pneumonia of right upper lobe due to infectious organism     Chronic fatigue 07/08/2019    Intractable low back pain 06/11/2019    Back pain 06/09/2019    Shortness of breath 04/25/2019    Former smoker     Acute exacerbation of chronic obstructive pulmonary disease (COPD) ([address] 12/13/2018    COPD, severe ([address] 10/24/2017    Blood loss anemia 08/01/2017    Takotsubo syndrome 05/01/2017    Panic attack 11/18/2016    Lumbar spinal stenosis 01/01/2015    Vitamin B12 deficiency 12/01/2014    Osteoporosis 03/16/2012    Depression     Migraine 10/06/2010    Rheumatoid Arthritis     Hypertension     Hyperlipidemia     Fibromyalgia     Chronic pain syndrome        Electronically signed by Satnam Leary PA-C on 11/23/2020 at 8:11 AM

## 2020-11-23 NOTE — PROGRESS NOTES
Pulmonary and Critical Care  Progress Note    Subjective: The patient has improved. WBC down. Shortness of breath has improved. Chest pain none. Addressing respiratory complaints Patient is negative for  hemoptysis and cyanosis. CONSTITUTIONAL:  negative for fevers and chills. Past Medical History:     has a past medical history of Acute DVT of right tibial vein (HCC), Acute exacerbation of chronic obstructive pulmonary disease (COPD) ([address] Anemia, Arthritis, Atrial fibrillation ([address] CHF (congestive heart failure) ([address] Chronic hypoxemic respiratory failure (HCC), Chronic pain syndrome, Clostridium difficile colitis, COPD, severe ([address] Depression, Fibromyalgia, Former smoker, Herniated cervical disc, Herpes zoster ophthalmicus, Hx of blood clots, Hyperlipidemia, Hypertension, Kidney disease, L3 vertebral fracture ([address] Lumbar spinal stenosis, Migraine, Nocturnal hypoxemia, Osteoporosis, Rheumatoid arthritis(714.0), S/P cardiac catheterization, Shortness of breath, Takotsubo syndrome, TMJ dysfunction, Tobacco abuse, and Vitamin B12 deficiency. has a past surgical history that includes Cholecystectomy (1966); devyn and bso (cervix removed) (1991); Wrist fusion (Left, 2000); Elbow surgery (Right); Appendectomy (1966); vertebroplasty (10/2010); Foot surgery (1986); Toe Surgery (Left, 4/25/2013); Cardiac catheterization (05/21/2017); pr colonoscopy flx dx w/collj spec when pfrmd (N/A, 10/2/2018); Fixation Kyphoplasty (06/12/2019); Hysterectomy; Upper gastrointestinal endoscopy (N/A, 3/11/2020); and Colonoscopy (N/A, 3/11/2020). reports that she quit smoking about 11 months ago. Her smoking use included cigarettes. She started smoking about 58 years ago. She has a 41.25 pack-year smoking history. She has never used smokeless tobacco. She reports that she does not drink alcohol or use drugs.   Family history:  family history includes Arthritis in her mother; Cancer in an other family member; Emphysema in her father; Heart Disease in her father and another family member; Kidney Disease in her son; Stroke in her mother. Allergies   Allergen Reactions    Ativan [Lorazepam] Other (See Comments)     Violent, thrashing and combative. She has lacerations and bruising, had to be tied down.  Etanercept Other (See Comments)     No response    Humira [Adalimumab]     Prochlorperazine Edisylate Other (See Comments)     \"Compazine\"   Involuntary Muscle Spasms     Remicade [Infliximab Injection]     Doxycycline Other (See Comments)     Stomach pains     Social History:    Reviewed; no changes    Objective:   PHYSICAL EXAM:        VITALS:  BP (!) 146/62   Pulse 70   Temp 98.5 °F (36.9 °C) (Oral)   Resp 23   Ht 5' 7\" (1.702 m)   Wt 147 lb 11.2 oz (67 kg)   SpO2 95%   BMI 23.13 kg/m²     24HR INTAKE/OUTPUT:      Intake/Output Summary (Last 24 hours) at 11/23/2020 1100  Last data filed at 11/23/2020 0911  Gross per 24 hour   Intake 10 ml   Output --   Net 10 ml       CONSTITUTIONAL:  awake, alert, cooperative, no apparent distress, and appears stated age  LUNGS:  decreased breath sounds, occ basilar crackles. CARDIOVASCULAR:  normal S1 and S2 and negative JVD  ABD:Abdomen soft, non-tender. BS normal. No masses,  No organomegaly  NEURO:Alert and oriented x3. Gait normal. Reflexes and motor strength normal and symmetric. Cranial nerves 2-12 and sensation grossly intact.   DATA:    CBC:  Recent Labs     11/21/20 0440 11/22/20 0444 11/23/20 0515   WBC 20.0* 23.6* 21.4*   RBC 3.54* 4.08* 4.12*   HGB 8.5* 10.0* 10.0*   HCT 28.6* 33.1* 32.9*    588* 661*   MCV 80.8 81.1 79.9   MCH 24.0* 24.5* 24.3*   MCHC 29.7* 30.2* 30.4*   RDW 16.9* 17.2* 17.1*   SEGSPCT 88.8* 84.1* 86.2*      BMP:  Recent Labs     11/21/20 0440 11/22/20 0444 11/23/20  0515    133* 135   K 3.7 4.6 4.2    99 100   CO2 26 25 26   BUN 42* 35* 26*   CREATININE 0.9 0.9 0.8   CALCIUM 8.1* 8.7 8.6   GLUCOSE 114* 98 95      ABG:  No results for input(s): PH, PO2ART, HPY2LCL, HCO3, BEART, O2SAT in the last 72 hours. Lab Results   Component Value Date    PROBNP 20,453 (H) 11/21/2020    PROBNP 92,339 (H) 11/19/2020    PROBNP >70,000 (H) 11/18/2020     No results found for: [facility]    Radiology Review:  Pertinent images / reports were reviewed as a part of this visit. Assessment:     Patient Active Problem List   Diagnosis    Rheumatoid Arthritis    Hypertension    Hyperlipidemia    Fibromyalgia    Migraine    Chronic pain syndrome    Depression    Osteoporosis    Vitamin B12 deficiency    Lumbar spinal stenosis    Panic attack    Takotsubo syndrome    Blood loss anemia    COPD, severe (HCC)    Acute exacerbation of chronic obstructive pulmonary disease (COPD) [address]    Former smoker    Shortness of breath    Back pain    Intractable low back pain    Chronic fatigue    Pneumonia of right upper lobe due to infectious organism    Wide-complex tachycardia (HCC)    Gram-negative pneumonia (HCC)    Acute systolic heart failure (HCC)    Symptomatic anemia    Chronic hypoxemic respiratory failure (HCC)    Acute hypoxemic respiratory failure due to COVID-19 (HCC)    Transaminitis       Plan:   1. Overall the patient has improved. 2. Inc. activity.       Akilah Castrejon MD  11/23/2020  11:00 AM

## 2020-11-23 NOTE — PROGRESS NOTES
Nephrology Progress Note  11/23/2020 10:24 AM  Subjective:      Interval History: Serena Lu is a [de-identified] y.o. female  Appear doing ok and wants try get dc        Data:   Scheduled Meds:   potassium & sodium phosphates  1 packet Oral 4x Daily    potassium & sodium phosphates  1 packet Oral 4x Daily    oxyCODONE-acetaminophen  1 tablet Oral 4 times per day    rivaroxaban  20 mg Oral Daily    methylPREDNISolone  40 mg Intravenous Daily    folic acid-pyridoxine-cyancobalamin  1 tablet Oral Daily    metoprolol tartrate  50 mg Oral BID    rosuvastatin  10 mg Oral Nightly    lisinopril  5 mg Oral Daily    ALPRAZolam  0.5 mg Oral Q8H    guaiFENesin  200 mg Oral Q6H    cefepime  2 g Intravenous Q12H    acetaminophen  650 mg Rectal Once    zinc sulfate  50 mg Oral Daily    sodium chloride flush  10 mL Intravenous 2 times per day    aspirin  81 mg Oral Daily    albuterol sulfate HFA  2 puff Inhalation 4x daily    tiotropium  2 puff Inhalation Daily    traZODone  50 mg Oral Nightly    vitamin B-12  1,000 mcg Oral Daily    vitamin D  2 capsule Oral Daily    busPIRone  5 mg Oral TID    amiodarone  200 mg Oral Daily     Continuous Infusions:   pantoprozole (PROTONIX) infusion 8 mg/hr (11/21/20 2116)           CBC:   Recent Labs     11/21/20  0440 11/22/20  0444 11/23/20  0515   WBC 20.0* 23.6* 21.4*   HGB 8.5* 10.0* 10.0*    588* 661*     BMP:    Recent Labs     11/21/20  0440 11/22/20  0444 11/23/20  0515    133* 135   K 3.7 4.6 4.2    99 100   CO2 26 25 26   BUN 42* 35* 26*   CREATININE 0.9 0.9 0.8   GLUCOSE 114* 98 95       Renal Labs  Albumin:    Lab Results   Component Value Date    LABALBU 2.9 11/23/2020     Calcium:    Lab Results   Component Value Date    CALCIUM 8.6 11/23/2020     Phosphorus:    Lab Results   Component Value Date    PHOS 2.4 11/23/2020     U/A:    Lab Results   Component Value Date    NITRU NEGATIVE 11/17/2020    COLORU STRAW 11/17/2020    WBCUA 1 11/17/2020 RBCUA 55 11/17/2020    MUCUS RARE 11/17/2020    TRICHOMONAS NONE SEEN 11/17/2020    BACTERIA NEGATIVE 11/17/2020    CLARITYU CLEAR 11/17/2020    SPECGRAV 1.010 11/17/2020    UROBILINOGEN NORMAL 11/17/2020    BILIRUBINUR NEGATIVE 11/17/2020    BLOODU MODERATE 11/17/2020    KETUA NEGATIVE 11/17/2020    AMORPHOUS RARE 02/06/2020           Objective:   I/O: No intake/output data recorded. Vitals: BP (!) 146/62   Pulse 70   Temp 98.5 °F (36.9 °C) (Oral)   Resp 23   Ht 5' 7\" (1.702 m)   Wt 147 lb 11.2 oz (67 kg)   SpO2 95%   BMI 23.13 kg/m²   General appearance: awake weak  HEENT: Head: Normal, normocephalic, atraumatic.   Neck: supple, symmetrical, trachea midline  Lungs: diminished breath sounds bilaterally  Heart: S1, S2 normal  Abdomen: abnormal findings:  soft nt  Extremities: edema trace  Neurologic: Mental status: alertness: alert        Assessment and Plan:      IMP:  1 arf from atn/contrast  2 covid +  3 chronic pain with confusion and facial droop  4 hypoxia with pulm congestion  5 hx a fib with MR and AR with anemia  6 htn    Plan     1 renal to baseline monitor  2 no fever treating in covid  3 affect stable and monitor po diet  4 o2 appear stable  5 hr stable  6 bp controlled overall  7 na stable  No change from renal               Molly Carbajal MD

## 2020-11-23 NOTE — PROGRESS NOTES
[facility]  HOSPITALIST PROGRESS NOTE                       Name:  Velma Arnett /Age/Sex: 1940  ([de-identified] y.o. female)   MRN & CSN:  [id number] & [id number] Admission Date/Time: 11/15/2020 12:14 PM   Location:  [address] [place] [address] Attending:  Joie Eric MD                                                  HPI  Velma Arnett is a [de-identified] y.o. female who was admitted on 11/15 with acute on chronic respiratory failure    SUBJECTIVE  Awake, sitting up on bed, on NC oxygen, denies shortness of breath. No fever    10 point review of systems reviewed and negative unless noted above. ALLERGIES:   Allergies   Allergen Reactions    Ativan [Lorazepam] Other (See Comments)     Violent, thrashing and combative. She has lacerations and bruising, had to be tied down.  Etanercept Other (See Comments)     No response    Humira [Adalimumab]     Prochlorperazine Edisylate Other (See Comments)     \"Compazine\"   Involuntary Muscle Spasms     Remicade [Infliximab Injection]     Doxycycline Other (See Comments)     Stomach pains       PCP: Michela Gray MD    PAST MEDICAL HISTORY, SURGICAL HISTORY, SOCIAL HISTORY and  HOME MEDICATIONS all reviewed. OBJECTIVE  Vitals:    20 2234 20 0705 20 0800 20 0854   BP: (!) 149/81 129/60 (!) 146/62    Pulse: 82  70    Resp:      Temp:  98.5 °F (36.9 °C)     TempSrc:  Oral     SpO2:  96%  95%   Weight:       Height:           PHYSICAL EXAM   GEN Awake female, sitting upright in bed in no apparent distress. EYES Pupils are equally round. No scleral erythema, discharge, or conjunctivitis. HENT Mucous membranes are moist. Oral pharynx without exudates, no evidence of thrush. NECK Supple, no apparent thyromegaly or masses. RESP Unlabored respiration, bilateral coarse breath sounds  CARDIO/VASC S1/S2 auscultated. Regular rate without appreciable murmurs, rubs, or gallops. No JVD or carotid bruits.  Peripheral pulses equal bilaterally and palpable  GI Abdomen is soft without significant tenderness, masses, or guarding. Bowel sounds are normoactive. Rectal exam deferred.  No costovertebral angle tenderness. Normal appearing external genitalia. HEME/LYMPH No palpable cervical lymphadenopathy and no hepatosplenomegaly. No petechiae or ecchymoses. MSK Spontaneous movement of all extremities. No gross joint deformities. SKIN Normal coloration, warm, dry. NEURO Cranial nerves appear grossly intact, normal speech, no lateralizing weakness. PSYCH Awake, alert, oriented x 4. Affect appropriate. INTAKE: In: 10 [I.V.:10]  Out: -   OUTPUT: In: 10   Out: -     LABS  Recent Labs     11/21/20 0440 11/22/20  0444 11/23/20  0515   WBC 20.0* 23.6* 21.4*   HGB 8.5* 10.0* 10.0*   HCT 28.6* 33.1* 32.9*    588* 661*      Recent Labs     11/21/20 0440 11/22/20 0444 11/23/20  0515    133* 135   K 3.7 4.6 4.2    99 100   CO2 26 25 26   PHOS 2.1* 2.3* 2.4*   BUN 42* 35* 26*   CREATININE 0.9 0.9 0.8     No results for input(s): AST, ALT, ALB, BILIDIR, BILITOT, ALKPHOS in the last 72 hours. No results for input(s): INR in the last 72 hours. No results for input(s): CKTOTAL, CKMB, CKMBINDEX, TROPONINT in the last 72 hours.        Abnormal labs for today noted      Imaging:     ECHO:    Microbiology:  Blood culture:    Urine culture:    Sputum culture:    Procedures done this admission:    MEDS  Scheduled Meds:   potassium & sodium phosphates  1 packet Oral 4x Daily    oxyCODONE-acetaminophen  1 tablet Oral 4 times per day    rivaroxaban  20 mg Oral Daily    methylPREDNISolone  40 mg Intravenous Daily    folic acid-pyridoxine-cyancobalamin  1 tablet Oral Daily    metoprolol tartrate  50 mg Oral BID    rosuvastatin  10 mg Oral Nightly    lisinopril  5 mg Oral Daily    ALPRAZolam  0.5 mg Oral Q8H    guaiFENesin  200 mg Oral Q6H    cefepime  2 g Intravenous Q12H    acetaminophen  650 mg Rectal Once    zinc sulfate  50 mg Oral Daily Minced and Moist  Dietary Nutrition Supplements: Standard High Calorie Oral Supplement   DVT Prophylaxis [] Lovenox, []  Heparin, [] SCDs, [] Ambulation   GI Prophylaxis [] PPI,  [] H2 Blocker,  [] Carafate,  [] Diet/Tube Feeds   Code Status Full Code   Disposition Patient requires continued admission due to debility   CMS Level of Risk [] Low, [x] Moderate,[]  High  Patient's risk as above due to debility     TAYE CALDWELL MD 11/23/2020 10:09 AM

## 2020-11-23 NOTE — PROGRESS NOTES
Assistance: Independent(mod I with rolling walker)  Transfer Assistance: [phone] term goals  Time Frame for Long term goals :  In one week:  Long term goal 1: Pt will complete all bed mobility with SBAx1  Long term goal 2: Pt will complete sit <> stand transfers with CGAx1  Long term goal 3: Pt will ambulate 50 feet with minAx1 with LRAD  Long term goal 4: Pt will independently complete 3 sets of 10 reps of BLE AROM exercises in available and allowed ROM    Electronically signed by:    Eric Reyna, PT  11/23/2020, 10:09 AM

## 2020-11-24 LAB
EKG ATRIAL RATE: 81 BPM
EKG DIAGNOSIS: NORMAL
EKG P AXIS: 53 DEGREES
EKG P-R INTERVAL: 188 MS
EKG Q-T INTERVAL: 450 MS
EKG QRS DURATION: 148 MS
EKG QTC CALCULATION (BAZETT): 522 MS
EKG R AXIS: 114 DEGREES
EKG T AXIS: 50 DEGREES
EKG VENTRICULAR RATE: 81 BPM

## 2020-12-01 ENCOUNTER — APPOINTMENT (OUTPATIENT)
Dept: CT IMAGING | Age: 80
DRG: 871 | End: 2020-12-01
Payer: MEDICARE

## 2020-12-01 ENCOUNTER — APPOINTMENT (OUTPATIENT)
Dept: GENERAL RADIOLOGY | Age: 80
DRG: 871 | End: 2020-12-01
Payer: MEDICARE

## 2020-12-01 ENCOUNTER — HOSPITAL ENCOUNTER (INPATIENT)
Age: 80
LOS: 5 days | Discharge: OTHER INSTITUTION | DRG: 871 | End: 2020-12-06
Attending: EMERGENCY MEDICINE | Admitting: STUDENT IN AN ORGANIZED HEALTH CARE EDUCATION/TRAINING PROGRAM
Payer: MEDICARE

## 2020-12-01 PROBLEM — J18.9 PNA (PNEUMONIA): Status: ACTIVE | Noted: 2020-12-01

## 2020-12-01 LAB
ALBUMIN SERPL-MCNC: 2.6 GM/DL (ref 3.4–5)
ALP BLD-CCNC: 77 IU/L (ref 40–128)
ALT SERPL-CCNC: 12 U/L (ref 10–40)
ANION GAP SERPL CALCULATED.3IONS-SCNC: 9 MMOL/L (ref 4–16)
AST SERPL-CCNC: 20 IU/L (ref 15–37)
BASOPHILS ABSOLUTE: 0 K/CU MM
BASOPHILS RELATIVE PERCENT: 0.2 % (ref 0–1)
BILIRUB SERPL-MCNC: 0.5 MG/DL (ref 0–1)
BUN BLDV-MCNC: 14 MG/DL (ref 6–23)
CALCIUM SERPL-MCNC: 9 MG/DL (ref 8.3–10.6)
CHLORIDE BLD-SCNC: 101 MMOL/L (ref 99–110)
CO2: 29 MMOL/L (ref 21–32)
CREAT SERPL-MCNC: 0.6 MG/DL (ref 0.6–1.1)
DIFFERENTIAL TYPE: ABNORMAL
EOSINOPHILS ABSOLUTE: 0 K/CU MM
EOSINOPHILS RELATIVE PERCENT: 0.1 % (ref 0–3)
GFR AFRICAN AMERICAN: >60 ML/MIN/1.73M2
GFR NON-AFRICAN AMERICAN: >60 ML/MIN/1.73M2
GLUCOSE BLD-MCNC: 106 MG/DL (ref 70–99)
HCT VFR BLD CALC: 31.7 % (ref 37–47)
HEMOGLOBIN: 9.5 GM/DL (ref 12.5–16)
IMMATURE NEUTROPHIL %: 0.4 % (ref 0–0.43)
LACTATE: 0.9 MMOL/L (ref 0.4–2)
LYMPHOCYTES ABSOLUTE: 1.4 K/CU MM
LYMPHOCYTES RELATIVE PERCENT: 8.2 % (ref 24–44)
MAGNESIUM: 1.7 MG/DL (ref 1.8–2.4)
MCH RBC QN AUTO: 25.7 PG (ref 27–31)
MCHC RBC AUTO-ENTMCNC: 30 % (ref 32–36)
MCV RBC AUTO: 85.7 FL (ref 78–100)
MONOCYTES ABSOLUTE: 1.4 K/CU MM
MONOCYTES RELATIVE PERCENT: 8 % (ref 0–4)
NUCLEATED RBC %: 0 %
PDW BLD-RTO: 20.1 % (ref 11.7–14.9)
PLATELET # BLD: 460 K/CU MM (ref 140–440)
PMV BLD AUTO: 9.9 FL (ref 7.5–11.1)
POTASSIUM SERPL-SCNC: 3.3 MMOL/L (ref 3.5–5.1)
PRO-BNP: ABNORMAL PG/ML
RBC # BLD: 3.7 M/CU MM (ref 4.2–5.4)
SARS-COV-2, NAAT: NOT DETECTED
SEGMENTED NEUTROPHILS ABSOLUTE COUNT: 13.9 K/CU MM
SEGMENTED NEUTROPHILS RELATIVE PERCENT: 83.1 % (ref 36–66)
SODIUM BLD-SCNC: 139 MMOL/L (ref 135–145)
SOURCE: NORMAL
TOTAL IMMATURE NEUTOROPHIL: 0.07 K/CU MM
TOTAL NUCLEATED RBC: 0 K/CU MM
TOTAL PROTEIN: 5.7 GM/DL (ref 6.4–8.2)
TROPONIN T: <0.01 NG/ML
WBC # BLD: 16.8 K/CU MM (ref 4–10.5)

## 2020-12-01 PROCEDURE — 87040 BLOOD CULTURE FOR BACTERIA: CPT

## 2020-12-01 PROCEDURE — 71045 X-RAY EXAM CHEST 1 VIEW: CPT

## 2020-12-01 PROCEDURE — U0002 COVID-19 LAB TEST NON-CDC: HCPCS

## 2020-12-01 PROCEDURE — 36415 COLL VENOUS BLD VENIPUNCTURE: CPT

## 2020-12-01 PROCEDURE — 96365 THER/PROPH/DIAG IV INF INIT: CPT

## 2020-12-01 PROCEDURE — 93005 ELECTROCARDIOGRAM TRACING: CPT | Performed by: EMERGENCY MEDICINE

## 2020-12-01 PROCEDURE — 72125 CT NECK SPINE W/O DYE: CPT

## 2020-12-01 PROCEDURE — 1200000000 HC SEMI PRIVATE

## 2020-12-01 PROCEDURE — 99285 EMERGENCY DEPT VISIT HI MDM: CPT

## 2020-12-01 PROCEDURE — 85025 COMPLETE CBC W/AUTO DIFF WBC: CPT

## 2020-12-01 PROCEDURE — 6360000002 HC RX W HCPCS: Performed by: EMERGENCY MEDICINE

## 2020-12-01 PROCEDURE — 83880 ASSAY OF NATRIURETIC PEPTIDE: CPT

## 2020-12-01 PROCEDURE — 96366 THER/PROPH/DIAG IV INF ADDON: CPT

## 2020-12-01 PROCEDURE — 81001 URINALYSIS AUTO W/SCOPE: CPT

## 2020-12-01 PROCEDURE — 83735 ASSAY OF MAGNESIUM: CPT

## 2020-12-01 PROCEDURE — 84484 ASSAY OF TROPONIN QUANT: CPT

## 2020-12-01 PROCEDURE — 70450 CT HEAD/BRAIN W/O DYE: CPT

## 2020-12-01 PROCEDURE — 80053 COMPREHEN METABOLIC PANEL: CPT

## 2020-12-01 PROCEDURE — 83605 ASSAY OF LACTIC ACID: CPT

## 2020-12-01 PROCEDURE — 2580000003 HC RX 258: Performed by: EMERGENCY MEDICINE

## 2020-12-01 RX ORDER — LISINOPRIL 10 MG/1
5 TABLET ORAL DAILY
Status: DISCONTINUED | OUTPATIENT
Start: 2020-12-02 | End: 2020-12-06 | Stop reason: HOSPADM

## 2020-12-01 RX ORDER — SODIUM CHLORIDE 0.9 % (FLUSH) 0.9 %
10 SYRINGE (ML) INJECTION EVERY 12 HOURS SCHEDULED
Status: DISCONTINUED | OUTPATIENT
Start: 2020-12-01 | End: 2020-12-06 | Stop reason: HOSPADM

## 2020-12-01 RX ORDER — AMIODARONE HYDROCHLORIDE 200 MG/1
200 TABLET ORAL DAILY
Status: DISCONTINUED | OUTPATIENT
Start: 2020-12-02 | End: 2020-12-06 | Stop reason: HOSPADM

## 2020-12-01 RX ORDER — IPRATROPIUM BROMIDE AND ALBUTEROL SULFATE 2.5; .5 MG/3ML; MG/3ML
1 SOLUTION RESPIRATORY (INHALATION)
Status: DISCONTINUED | OUTPATIENT
Start: 2020-12-02 | End: 2020-12-02 | Stop reason: SDUPTHER

## 2020-12-01 RX ORDER — ASPIRIN 81 MG/1
81 TABLET, CHEWABLE ORAL DAILY
Status: DISCONTINUED | OUTPATIENT
Start: 2020-12-02 | End: 2020-12-06 | Stop reason: HOSPADM

## 2020-12-01 RX ORDER — LANOLIN ALCOHOL/MO/W.PET/CERES
1000 CREAM (GRAM) TOPICAL DAILY
Status: DISCONTINUED | OUTPATIENT
Start: 2020-12-02 | End: 2020-12-06 | Stop reason: HOSPADM

## 2020-12-01 RX ORDER — SERTRALINE HYDROCHLORIDE 25 MG/1
25 TABLET, FILM COATED ORAL DAILY
Status: DISCONTINUED | OUTPATIENT
Start: 2020-12-02 | End: 2020-12-06 | Stop reason: HOSPADM

## 2020-12-01 RX ORDER — IPRATROPIUM BROMIDE AND ALBUTEROL SULFATE 2.5; .5 MG/3ML; MG/3ML
3 SOLUTION RESPIRATORY (INHALATION) EVERY 4 HOURS PRN
Status: DISCONTINUED | OUTPATIENT
Start: 2020-12-01 | End: 2020-12-02

## 2020-12-01 RX ORDER — FERROUS SULFATE 325(65) MG
325 TABLET ORAL
Status: DISCONTINUED | OUTPATIENT
Start: 2020-12-02 | End: 2020-12-06 | Stop reason: HOSPADM

## 2020-12-01 RX ORDER — SODIUM CHLORIDE 0.9 % (FLUSH) 0.9 %
10 SYRINGE (ML) INJECTION PRN
Status: DISCONTINUED | OUTPATIENT
Start: 2020-12-01 | End: 2020-12-06 | Stop reason: HOSPADM

## 2020-12-01 RX ORDER — POLYETHYLENE GLYCOL 3350 17 G/17G
17 POWDER, FOR SOLUTION ORAL DAILY PRN
Status: DISCONTINUED | OUTPATIENT
Start: 2020-12-01 | End: 2020-12-06 | Stop reason: HOSPADM

## 2020-12-01 RX ORDER — CETIRIZINE HYDROCHLORIDE 10 MG/1
10 TABLET ORAL DAILY
Status: DISCONTINUED | OUTPATIENT
Start: 2020-12-02 | End: 2020-12-06 | Stop reason: HOSPADM

## 2020-12-01 RX ORDER — TRAZODONE HYDROCHLORIDE 50 MG/1
50 TABLET ORAL NIGHTLY
Status: DISCONTINUED | OUTPATIENT
Start: 2020-12-01 | End: 2020-12-06 | Stop reason: HOSPADM

## 2020-12-01 RX ORDER — MAGNESIUM SULFATE IN WATER 40 MG/ML
2 INJECTION, SOLUTION INTRAVENOUS ONCE
Status: DISCONTINUED | OUTPATIENT
Start: 2020-12-01 | End: 2020-12-06 | Stop reason: HOSPADM

## 2020-12-01 RX ORDER — ONDANSETRON 2 MG/ML
4 INJECTION INTRAMUSCULAR; INTRAVENOUS EVERY 6 HOURS PRN
Status: DISCONTINUED | OUTPATIENT
Start: 2020-12-01 | End: 2020-12-02

## 2020-12-01 RX ORDER — POTASSIUM CHLORIDE 20 MEQ/1
40 TABLET, EXTENDED RELEASE ORAL PRN
Status: DISCONTINUED | OUTPATIENT
Start: 2020-12-01 | End: 2020-12-06 | Stop reason: HOSPADM

## 2020-12-01 RX ORDER — METOPROLOL TARTRATE 50 MG/1
50 TABLET, FILM COATED ORAL 2 TIMES DAILY
Status: DISCONTINUED | OUTPATIENT
Start: 2020-12-01 | End: 2020-12-06 | Stop reason: HOSPADM

## 2020-12-01 RX ORDER — POTASSIUM CHLORIDE 7.45 MG/ML
10 INJECTION INTRAVENOUS PRN
Status: DISCONTINUED | OUTPATIENT
Start: 2020-12-01 | End: 2020-12-06 | Stop reason: HOSPADM

## 2020-12-01 RX ORDER — PANTOPRAZOLE SODIUM 40 MG/1
40 TABLET, DELAYED RELEASE ORAL
Status: DISCONTINUED | OUTPATIENT
Start: 2020-12-02 | End: 2020-12-06 | Stop reason: HOSPADM

## 2020-12-01 RX ORDER — BUDESONIDE AND FORMOTEROL FUMARATE DIHYDRATE 80; 4.5 UG/1; UG/1
2 AEROSOL RESPIRATORY (INHALATION) 2 TIMES DAILY
Status: DISCONTINUED | OUTPATIENT
Start: 2020-12-01 | End: 2020-12-06 | Stop reason: HOSPADM

## 2020-12-01 RX ORDER — BUSPIRONE HYDROCHLORIDE 5 MG/1
5 TABLET ORAL 3 TIMES DAILY
Status: DISCONTINUED | OUTPATIENT
Start: 2020-12-01 | End: 2020-12-06 | Stop reason: HOSPADM

## 2020-12-01 RX ORDER — ZINC SULFATE 50(220)MG
50 CAPSULE ORAL DAILY
Status: DISCONTINUED | OUTPATIENT
Start: 2020-12-02 | End: 2020-12-06 | Stop reason: HOSPADM

## 2020-12-01 RX ORDER — ATORVASTATIN CALCIUM 10 MG/1
10 TABLET, FILM COATED ORAL EVERY EVENING
Status: DISCONTINUED | OUTPATIENT
Start: 2020-12-01 | End: 2020-12-06 | Stop reason: HOSPADM

## 2020-12-01 RX ORDER — ACETAMINOPHEN 650 MG/1
650 SUPPOSITORY RECTAL EVERY 6 HOURS PRN
Status: DISCONTINUED | OUTPATIENT
Start: 2020-12-01 | End: 2020-12-06 | Stop reason: HOSPADM

## 2020-12-01 RX ORDER — ACETAMINOPHEN 325 MG/1
650 TABLET ORAL EVERY 6 HOURS PRN
Status: DISCONTINUED | OUTPATIENT
Start: 2020-12-01 | End: 2020-12-06 | Stop reason: HOSPADM

## 2020-12-01 RX ADMIN — VANCOMYCIN HYDROCHLORIDE 1250 MG: 5 INJECTION, POWDER, LYOPHILIZED, FOR SOLUTION INTRAVENOUS at 20:30

## 2020-12-01 RX ADMIN — CEFEPIME 2 G: 2 INJECTION, POWDER, FOR SOLUTION INTRAVENOUS at 18:35

## 2020-12-01 NOTE — ED PROVIDER NOTES
Emergency Department Encounter    Patient: Nael Beltran  MRN: [id number]  : 1940  Date of Evaluation: 2020  ED Provider:  [facility]    Triage Chief Complaint:   Fatigue; Fever; and Positive For Covid-19      [place]:  Nael Beltran is a [de-identified] y.o. female that presents to the emergency department for evaluation. Patient presents from [facility] and is brought in via EMS. Report is initially provided by paramedics who state that they were dispatched to the patient to transport her to our hospital for evaluation of fatigue. Additional report is obtained from patient's nurse at [facility], Juliana Phillips. Juliana Phillips states that this morning, she got report that patient had fallen out of bed. When staff found her, patient was next to her bed. Throughout the day, patient has been increasingly fatigued. Patient came off of the COVID-19 unit on 2020. She has not been eating or drinking much. Juliana Phillips checked patient's temperature in the morning and it was 100 °F.  Tylenol was given and temperature improved to 98.7 °F.  Care provider at [facility] ordered labs, white blood cell count was elevated at 19.8. Patient has had a productive cough with yellow-green sputum. At this point, facility determined that patient would need evaluation at the hospital so patient was transferred. Patient notes that she does experience some shortness of breath. He is currently on supplemental oxygen via nasal cannula. Denies abdominal pain, nausea, vomiting, diarrhea, constipation, hematochezia, melena. Denies chest pain or pleuritic pain. Denies syncope, dizziness, lightheadedness, numbness, tingling, weakness, paresthesias, focal deficits. No known precipitating, modifying, alleviating features. Additional history is difficult to elicit as patient is a poor historian.     ROS - see HPI, below listed is current ROS at time of my eval:  A complete 10 point review of systems was performed and is as dictated above, otherwise negative. Past Medical History:   Diagnosis Date    Acute DVT of right tibial vein ([address] 07/2017    ER imaging: distal right tibial vein DVT; no anticoag for bleeding/ anemia    Acute exacerbation of chronic obstructive pulmonary disease (COPD) ([address] 12/13/2018    Anemia     Arthritis     Atrial fibrillation (HCC)     CHF (congestive heart failure) (HCC)     Chronic hypoxemic respiratory failure (HCC) 9/9/2020    Chronic pain syndrome     Low back pain; lumbar disc disease    Clostridium difficile colitis 09/20/2017    COPD, severe ([address] 10/24/2017    Depression     Fibromyalgia     Former smoker     Quit 1/2019    Herniated cervical disc 5-1998    Herpes zoster ophthalmicus 3/2012    Hx of blood clots     Hyperlipidemia     Hypertension     Kidney disease     L3 vertebral fracture ([address] 2010    vertebroplasty    Lumbar spinal stenosis 2015    moderately severe by MRI    Migraine     Nocturnal hypoxemia 5/21/2019    Osteoporosis 2010    Fragility Fx L3    Rheumatoid arthritis(714.0) 1976    Dr Esme Amaro    S/P cardiac catheterization 05/2017    patent coronaries;  Takotsubo; najeeb Ahmed    Shortness of breath 4/25/2019    Takotsubo syndrome 05/2017    TMJ dysfunction     Tobacco abuse 12/13/2018    Vitamin B12 deficiency 12/2014    B12 level 199       Past Surgical History:   Procedure Laterality Date    APPENDECTOMY  1966    CARDIAC CATHETERIZATION  05/21/2017    CHOLECYSTECTOMY  1966    w/ appendectomy    COLONOSCOPY N/A 3/11/2020    COLONOSCOPY WITH BIOPSY performed by Rubi Smith MD at [facility] Right     FIXATION KYPHOPLASTY  06/12/2019    L4 kyphoplasty ; Marilee Balling    FOOT SURGERY  1986    HYSTERECTOMY      WI COLONOSCOPY FLX DX W/COLLJ SPEC WHEN PFRMD N/A 10/2/2018    COLONOSCOPY DIAGNOSTIC OR SCREENING performed by Mike Butler MD at [address] [place] [address] TOE SURGERY Left 4/25/2013    Deboo;    [facility] GASTROINTESTINAL ENDOSCOPY N/A 3/11/2020    EGD BIOPSY performed by Jaelyn Kay MD at [place] [address] VERTEBROPLASTY  10/2010    L3    WRIST FUSION Left 2000       Family History   Problem Relation Age of Onset    Heart Disease Father          FROM MI   Connye Sole Emphysema Father     Arthritis Mother     Stroke Mother     Heart Disease Other         family history    Cancer Other         family history -- larynx    Kidney Disease Son        Social History     Socioeconomic History    Marital status:       Spouse name: Shiv Gilliam Number of children: 2    Years of education: 15    Highest education level: Not on file   Occupational History    Occupation: retired   Social Needs    Financial resource strain: Not on file    Food insecurity     Worry: Not on file     Inability: Not on file   [employer] needs     Medical: Not on file     Non-medical: Not on file   Tobacco Use    Smoking status: Former Smoker     Packs/day: 0.75     Years: 55.00     Pack years: 41.25     Types: Cigarettes     Start date: 1962     Last attempt to quit: 2019     Years since quittin.9    Smokeless tobacco: Never Used   Substance and Sexual Activity    Alcohol use: No     Alcohol/week: 0.0 standard drinks    Drug use: No    Sexual activity: Not Currently   Lifestyle    Physical activity     Days per week: Not on file     Minutes per session: Not on file    Stress: Not on file   Relationships    Social connections     Talks on phone: Not on file     Gets together: Not on file     Attends [employer] service: Not on file     Active member of club or organization: Not on file     Attends meetings of clubs or organizations: Not on file     Relationship status: Not on file    Intimate partner violence     Fear of current or ex partner: Not on file     Emotionally abused: Not on file     Physically abused: Not on file     Forced sexual activity: Not on file   Other Topics Concern    Not on file (GLYCOLAX) packet 17 g  17 g Oral Daily PRN [facility], DO        rivaroxaban (XARELTO) tablet 20 mg  20 mg Oral Daily [facility]        sertraline (ZOLOFT) tablet 25 mg  25 mg Oral Daily [facility],         traZODone (DESYREL) tablet 50 mg  50 mg Oral Nightly [facility]        vitamin B-12 (CYANOCOBALAMIN) tablet 1,000 mcg  1,000 mcg Oral Daily [facility], DO        zinc sulfate (ZINCATE) capsule 50 mg  50 mg Oral Daily [facility],         sodium chloride flush 0.9 % injection 10 mL  10 mL Intravenous 2 times per day [facility]   10 mL at 12/02/20 0010    sodium chloride flush 0.9 % injection 10 mL  10 mL Intravenous PRN [facility],         acetaminophen (TYLENOL) tablet 650 mg  650 mg Oral Q6H PRN [facility]        Or    acetaminophen (TYLENOL) suppository 650 mg  650 mg Rectal Q6H PRN [facility]        vancomycin 1000 mg IVPB in 250 mL D5W addavial  15 mg/kg Intravenous Q12H Adam Rivera,         cefepime (MAXIPIME) 2 g IVPB minibag  2 g Intravenous Q8H Adam Rivera,         bisacodyl (DULCOLAX) EC tablet 5 mg  5 mg Oral Daily PRN [facility], DO         Current Outpatient Medications   Medication Sig Dispense Refill    rivaroxaban (XARELTO) 20 MG TABS tablet Take 1 tablet by mouth daily 30 tablet 1    lisinopril (PRINIVIL;ZESTRIL) 5 MG tablet Take 1 tablet by mouth daily 30 tablet 3    metoprolol tartrate (LOPRESSOR) 50 MG tablet Take 1 tablet by mouth 2 times daily 60 tablet 3    polyethylene glycol (GLYCOLAX) 17 g packet Take 17 g by mouth daily as needed for Constipation 527 g 1    tiotropium (SPIRIVA RESPIMAT) 2.5 MCG/ACT AERS inhaler Inhale 2 puffs into the lungs daily 30 Inhaler 0    zinc sulfate (ZINCATE) 220 (50 Zn) MG capsule Take 1 capsule by mouth daily 30 capsule 3    pantoprazole (PROTONIX) 40 MG tablet Take 1 tablet by mouth 2 times daily 90 tablet 0    ferrous sulfate (IRON 325) 325 (65 Fe) MG tablet Remicade [Infliximab Injection]     Doxycycline Other (See Comments)     Stomach pains       Nursing Notes Reviewed    Physical Exam:  Triage VS:    ED Triage Vitals [12/01/20 1654]   Enc Vitals Group      BP (!) 156/76      Pulse 85      Resp 20      Temp 99 °F (37.2 °C)      Temp src       SpO2 97 %     My pulse ox interpretation is - normal    General appearance:  Patient is awake, alert, oriented only to self. Following commands and answering questions. No drooling, stridor, hoarseness, tripoding. Patient does have a 7-10 word conversational dyspnea. She is on supplemental oxygen via nasal cannula. Skin:  Warm. Dry. Intact. No rashes, petechiae, purpura. Eye:  Pupils are equal, round, reactive. Extraocular movements intact. No nystagmus. No gaze deviation. Head, ears, nose, mouth and throat:  Head is normocephalic, atraumatic. No external masses or lesions. No nasal drainage. Pharynx is clear, non-erythematous. Airway is patent. Mucous membranes are dry  Neck:  Supple. No nuchal rigidity. Trachea midline. No masses, thyromegaly or lymphadenopathy. No JVD. No carotid thrills or bruits. Extremity:  No clubbing, cyanosis, or edema. No joint swelling. Normal muscle tone. Full range of motion in extremities. Heart:  Regular rate and sinus rhythm. Audible S1 & S2. No audible murmurs, rubs, or gallops. Perfusion:  Symmetric peripheral pulses. Brisk capillary refill. Respiratory:  Lungs clear to auscultation bilaterally. No rales, rhonchi or wheezes. Respirations nonlabored. No appreciable chest or abdominal retractions. No accessory muscle use. No signs of thoracic trauma. No flail segments. Abdominal:  Soft. Nontender. Non distended. Normal bowel sounds. No midline pulsatile abdominal masses, thrills or bruits. Back:  No CVA tenderness to palpation. No midline tenderness to palpation. Neurological:  Alert, oriented only to self. No focal or lateralizing neurological deficits. Psychiatric: Cooperative.       *I have reviewed and interpreted all of the currently available results from this visit*    Labs  Results for orders placed or performed during the hospital encounter of 12/01/20   CBC Auto Differential   Result Value Ref Range    WBC 16.8 (H) 4.0 - 10.5 K/CU MM    RBC 3.70 (L) 4.2 - 5.4 M/CU MM    Hemoglobin 9.5 (L) 12.5 - 16.0 GM/DL    Hematocrit 31.7 (L) 37 - 47 %    MCV 85.7 78 - 100 FL    MCH 25.7 (L) 27 - 31 PG    MCHC 30.0 (L) 32.0 - 36.0 %    RDW 20.1 (H) 11.7 - 14.9 %    Platelets 587 (H) 033 - 440 K/CU MM    MPV 9.9 7.5 - 11.1 FL    Differential Type AUTOMATED DIFFERENTIAL     Segs Relative 83.1 (H) 36 - 66 %    Lymphocytes % 8.2 (L) 24 - 44 %    Monocytes % 8.0 (H) 0 - 4 %    Eosinophils % 0.1 0 - 3 %    Basophils % 0.2 0 - 1 %    Segs Absolute 13.9 K/CU MM    Lymphocytes Absolute 1.4 K/CU MM    Monocytes Absolute 1.4 K/CU MM    Eosinophils Absolute 0.0 K/CU MM    Basophils Absolute 0.0 K/CU MM    Nucleated RBC % 0.0 %    Total Nucleated RBC 0.0 K/CU MM    Total Immature Neutrophil 0.07 K/CU MM    Immature Neutrophil % 0.4 0 - 0.43 %   Lactic Acid, Plasma   Result Value Ref Range    Lactate 0.9 0.4 - 2.0 mMOL/L   Comprehensive Metabolic Panel w/ Reflex to MG   Result Value Ref Range    Sodium 139 135 - 145 MMOL/L    Potassium 3.3 (L) 3.5 - 5.1 MMOL/L    Chloride 101 99 - 110 mMol/L    CO2 29 21 - 32 MMOL/L    BUN 14 6 - 23 MG/DL    CREATININE 0.6 0.6 - 1.1 MG/DL    Glucose 106 (H) 70 - 99 MG/DL    Calcium 9.0 8.3 - 10.6 MG/DL    Alb 2.6 (L) 3.4 - 5.0 GM/DL    Total Protein 5.7 (L) 6.4 - 8.2 GM/DL    Total Bilirubin 0.5 0.0 - 1.0 MG/DL    ALT 12 10 - 40 U/L    AST 20 15 - 37 IU/L    Alkaline Phosphatase 77 40 - 128 IU/L    GFR Non-African American >60 >60 mL/min/1.73m2    GFR African American >60 >60 mL/min/1.73m2    Anion Gap 9 4 - 16   Troponin   Result Value Ref Range    Troponin T <0.010 <0.01 NG/ML   COVID-19    Specimen: Nasopharyngeal Swab   Result Value Ref Range Source THROAT     SARS-CoV-2, NAAT NOT DETECTED    Brain Natriuretic Peptide   Result Value Ref Range    Pro-BNP 12,038 (H) <300 PG/ML   Magnesium   Result Value Ref Range    Magnesium 1.7 (L) 1.8 - 2.4 mg/dl   EKG 12 Lead   Result Value Ref Range    Ventricular Rate 83 BPM    Atrial Rate 83 BPM    P-R Interval 184 ms    QRS Duration 144 ms    Q-T Interval 444 ms    QTc Calculation (Bazett) 521 ms    P Axis 51 degrees    R Axis 114 degrees    T Axis -57 degrees    Diagnosis       Normal sinus rhythm  Right bundle branch block  Left posterior fascicular block  Bifascicular block  T wave abnormality, consider inferolateral ischemia  Abnormal ECG  When compared with ECG of 15-NOV-2020 12:48,  Right bundle branch block has replaced Nonspecific intraventricular block          Radiographs:  Ct Head Wo Contrast    Result Date: 12/1/2020  EXAMINATION: CT OF THE HEAD WITHOUT CONTRAST; CT OF THE CERVICAL SPINE WITHOUT CONTRAST 12/1/2020 6:55 pm; 12/1/2020 6:56 pm TECHNIQUE: CT of the head was performed without the administration of intravenous contrast. Dose modulation, iterative reconstruction, and/or weight based adjustment of the mA/kV was utilized to reduce the radiation dose to as low as reasonably achievable.; CT of the cervical spine was performed without the administration of intravenous contrast. Multiplanar reformatted images are provided for review. Dose modulation, iterative reconstruction, and/or weight based adjustment of the mA/kV was utilized to reduce the radiation dose to as low as reasonably achievable. COMPARISON: 11/22/2020 CT head HISTORY: ORDERING SYSTEM PROVIDED HISTORY: fall TECHNOLOGIST PROVIDED HISTORY: Reason for exam:->fall Has a \"code stroke\" or \"stroke alert\" been called? ->No Reason for Exam: fall, head/neck pain. Acuity: Acute Type of Exam: Initial Mechanism of Injury: fall, head/neck pain.  Relevant Medical/Surgical History: none; ORDERING SYSTEM PROVIDED HISTORY: fall TECHNOLOGIST PROVIDED Contrast    Result Date: 12/1/2020  EXAMINATION: CT OF THE HEAD WITHOUT CONTRAST; CT OF THE CERVICAL SPINE WITHOUT CONTRAST 12/1/2020 6:55 pm; 12/1/2020 6:56 pm TECHNIQUE: CT of the head was performed without the administration of intravenous contrast. Dose modulation, iterative reconstruction, and/or weight based adjustment of the mA/kV was utilized to reduce the radiation dose to as low as reasonably achievable.; CT of the cervical spine was performed without the administration of intravenous contrast. Multiplanar reformatted images are provided for review. Dose modulation, iterative reconstruction, and/or weight based adjustment of the mA/kV was utilized to reduce the radiation dose to as low as reasonably achievable. COMPARISON: 11/22/2020 CT head HISTORY: ORDERING SYSTEM PROVIDED HISTORY: fall TECHNOLOGIST PROVIDED HISTORY: Reason for exam:->fall Has a \"code stroke\" or \"stroke alert\" been called? ->No Reason for Exam: fall, head/neck pain. Acuity: Acute Type of Exam: Initial Mechanism of Injury: fall, head/neck pain. Relevant Medical/Surgical History: none; ORDERING SYSTEM PROVIDED HISTORY: fall TECHNOLOGIST PROVIDED HISTORY: Reason for exam:->fall Reason for Exam: fall, head/neck pain. Acuity: Acute Type of Exam: Initial Mechanism of Injury: no Relevant Medical/Surgical History: none FINDINGS: BRAIN/VENTRICLES: There is no acute intracranial hemorrhage, mass effect or midline shift. No abnormal extra-axial fluid collection. The gray-white differentiation is maintained without evidence of an acute infarct. There is prominence of the ventricles and sulci due to global parenchymal volume loss. There are nonspecific areas of hypoattenuation within the periventricular and subcortical white matter, which likely represent chronic microvascular ischemic change. ORBITS: The visualized portion of the orbits demonstrate no acute abnormality.  SINUSES: The visualized paranasal sinuses and mastoid air cells demonstrate no acute abnormality. SOFT TISSUES/SKULL: No acute abnormality of the visualized skull or soft tissues. CERVICAL SPINE: Bones and alignment: Head is tilted to the right with resultant moderate lateral curvature of the spine. On the sagittal images, mild-to-moderate anterior slippage of L 4 on L5 is present due in part to rotation. Mild anterior slippage of C5 on C6 is also present. No acute fracture identified. Degenerative changes: Severe interspace narrowing is present between C6 and C7 with endplate sclerosis and mild endplate bony spur spurring. Severe narrowing of the C5-C6 interspace is also present. Severe degenerative changes are also seen between the left lateral mass of C1 and the left body of C2. Severe facet arthropathy present. Paraspinal soft tissues: No paraspinal or epidural hematoma present. No acute intracranial abnormality. Moderate cerebral atrophy appropriate for age. Large amount of chronic ischemic change also age-appropriate. No significant change from the prior study. No acute abnormality of the cervical spine. Moderate spondylosis and severe facet arthropathy. Xr Chest Portable    Result Date: 12/1/2020  EXAMINATION: ONE XRAY VIEW OF THE CHEST 12/1/2020 5:37 pm COMPARISON: Chest radiograph 11/20/2020. HISTORY: ORDERING SYSTEM PROVIDED HISTORY: LEANNE TECHNOLOGIST PROVIDED HISTORY: Reason for exam:->LEANNE Reason for Exam: LEANNE Acuity: Unknown Type of Exam: Initial Additional signs and symptoms: unknown Relevant Medical/Surgical History: CAD, COPD, CHF FINDINGS: The cardiomediastinal silhouette is unchanged. Asymmetric elevation of the right hemidiaphragm. Patchy bibasilar pulmonary opacities again seen without significant change. No pneumothorax or pleural effusion. No acute osseous abnormality. Patchy non-specific bibasilar airspace opacities compatible with multifocal pneumonia. An atypical or viral etiologies not excluded.        EKG: (All EKG's are interpreted by myself in the absence of a cardiologist) EKG performed on 12/1/2020 at 1739 demonstrates ventricular rate of 83 bpm in sinus rhythm. There is right bundle branch block pattern. Left posterior fascicular block. Bifascicular block. Nonspecific T wave abnormality. EKG is compared to previous EKG performed on 11/15/2020 and right bundle branch block pattern has replaced nonspecific intraventricular block. MDM:  Patient was seen and evaluated in the emergency department by myself. A thorough history and physical exam were performed, prior medical records were reviewed. Upon arrival to the emergency department, patient's vital signs were noted. No tachycardia, tachypnea, hypoxia. Blood pressure is 156/76. Patient is afebrile. Patient was connected to continuous cardiopulmonary monitoring. Rhythm strip interpreted by myself demonstrating sinus rhythm. EKG was performed, interpreted by myself, as detailed above. Differential diagnoses and treatment plan were discussed. IV access was established. Pertinent labs were drawn and radiographic studies were performed, once results are available, they are reviewed by myself. Labs demonstrate leukocytosis white blood cell count 16.8. Patient does have normocytic anemia hemoglobin 9.5. No thrombocytopenia. Electrolytes are within normal limits apart from potassium which is hypokalemic at 3.3. Creatinine is within normal limits. No signs of kidney injury. Glucose is slightly evaded at 106. AST and ALT are unremarkable. Lactic acid 0.9. Troponin less than 0.010.  COVID-19 testing is not detected. proBNP is 12,038. Magnesium is 1.7. Chest x-ray radiology report reads patchy nonspecific bibasilar airspace opacities compatible with multifocal pneumonia. An atypical or viral etiology is not excluded. CT brain and cervical spine were obtained secondary to history of fall. Radiology report reads no acute intracranial abnormality.   Moderate cerebral atrophy appropriate for age. Large amount of chronic ischemic change also age-appropriate. No significant change from prior study. No acute abnormality of the cervical spine. Moderate spondylosis and severe facet arthropathy. On repeat evaluations, patient remains hemodynamically stable. Results of laboratory and radiographic data along with differential diagnoses and treatment plan were discussed with the patient. Patient presents with history of fever, fatigue, recent COVID-19 infection. Symptoms consistent with acute respiratory insufficiency and healthcare acquired pneumonia. Vancomycin and cefepime are initiated. Lactic acid is within normal limits. No signs of severe sepsis or septic shock. In addition, patient's BNP is elevated. She has had significant BNP elevation in the past.  Most recent echocardiogram is from March 2020 at which time left ventricular ejection fraction was 50 to 55%. In addition, patient is hypomagnesemic and hypokalemic. Magnesium and potassium were given. Given patient's symptoms, we recommend admission. Patient is agreeable. Case is discussed with admitting hospitalist who is agreeable with treatment plan and accepts admission. Patient is updated bedside. All questions were answered. Patient is currently in the emergency department, in hemodynamically stable condition, awaiting bed placement. Clinical Impressions:  1. Respiratory insufficiency    2. HCAP (healthcare-associated pneumonia)    3. Hypomagnesemia    4. Hypokalemia    5. History of 2019 novel coronavirus disease (COVID-19)        ED Provider Disposition  DISPOSITION  Admission      Comment: Please note this report has been produced using speech recognition software and may contain errors related to that system including errors in grammar, punctuation, and spelling, as well as words and phrases that may be inappropriate. Efforts were made to edit the dictations.        [address] [facility],   12/02/20 0040

## 2020-12-01 NOTE — CARE COORDINATION
Pt identified as potential readmission. Last admission 11/15 - 11/23 for Altered Mental Status (left side weakness) and Extremity Weakness (left side).       Pt here today from [address] home and is brought in via EMS. Report is initially provided by paramedics who state that they were dispatched to the patient to transport her to our hospital for evaluation of fatigue. Additional report is obtained from patient's nurse at [facility], Mercy Sheppard. Mercy Sheppard states that this morning, she got report that patient had fallen out of bed. When staff found her, patient was next to her bed. Throughout the day, patient has been increasingly fatigued. Patient came off of the COVID-19 unit on 11/30/2020. Labs resulted abnormal.    Pt is requiring readmission and there were no alternatives to admission to explore at this time.

## 2020-12-01 NOTE — ED NOTES
Nurse (ADJ) obtained 1st blood culture set, rainbow (extra green, red), lactic acid (plasma) from peripheral IV placed on underside of patient's left forearm at 1745. This phlebotomist trevon 2nd blood culture set from underside of patient's right forearm at 1800.       Mario Jessica  12/01/20 1805

## 2020-12-02 ENCOUNTER — APPOINTMENT (OUTPATIENT)
Dept: GENERAL RADIOLOGY | Age: 80
DRG: 871 | End: 2020-12-02
Payer: MEDICARE

## 2020-12-02 PROBLEM — G30.9 ALZHEIMER'S DISEASE (HCC): Status: ACTIVE | Noted: 2020-12-02

## 2020-12-02 PROBLEM — E43 SEVERE MALNUTRITION (HCC): Chronic | Status: ACTIVE | Noted: 2020-12-02

## 2020-12-02 PROBLEM — F02.80 ALZHEIMER'S DISEASE (HCC): Status: ACTIVE | Noted: 2020-12-02

## 2020-12-02 LAB
ADENOVIRUS DETECTION BY PCR: NOT DETECTED
ALBUMIN SERPL-MCNC: 2.8 GM/DL (ref 3.4–5)
ALP BLD-CCNC: 91 IU/L (ref 40–129)
ALT SERPL-CCNC: 12 U/L (ref 10–40)
ANION GAP SERPL CALCULATED.3IONS-SCNC: 20 MMOL/L (ref 4–16)
AST SERPL-CCNC: 28 IU/L (ref 15–37)
BANDED NEUTROPHILS ABSOLUTE COUNT: 1.46 K/CU MM
BANDED NEUTROPHILS RELATIVE PERCENT: 8 % (ref 5–11)
BASE EXCESS MIXED: 7 (ref 0–2.3)
BILIRUB SERPL-MCNC: 0.6 MG/DL (ref 0–1)
BORDETELLA PARAPERTUSSIS BY PCR: NOT DETECTED
BORDETELLA PERTUSSIS PCR: NOT DETECTED
BUN BLDV-MCNC: 11 MG/DL (ref 6–23)
CALCIUM SERPL-MCNC: 8.6 MG/DL (ref 8.3–10.6)
CARBON MONOXIDE, BLOOD: 1.8 % (ref 0–5)
CHLAMYDOPHILA PNEUMONIA PCR: NOT DETECTED
CHLORIDE BLD-SCNC: 97 MMOL/L (ref 99–110)
CO2 CONTENT: 32.6 MMOL/L (ref 19–24)
CO2: 19 MMOL/L (ref 21–32)
COMMENT: ABNORMAL
CORONAVIRUS 229E PCR: NOT DETECTED
CORONAVIRUS HKU1 PCR: NOT DETECTED
CORONAVIRUS NL63 PCR: NOT DETECTED
CORONAVIRUS OC43 PCR: NOT DETECTED
CREAT SERPL-MCNC: 0.5 MG/DL (ref 0.6–1.1)
DIFFERENTIAL TYPE: ABNORMAL
DOHLE BODIES: PRESENT
EKG ATRIAL RATE: 83 BPM
EKG DIAGNOSIS: NORMAL
EKG P AXIS: 51 DEGREES
EKG P-R INTERVAL: 184 MS
EKG Q-T INTERVAL: 444 MS
EKG QRS DURATION: 144 MS
EKG QTC CALCULATION (BAZETT): 521 MS
EKG R AXIS: 114 DEGREES
EKG T AXIS: -57 DEGREES
EKG VENTRICULAR RATE: 83 BPM
GFR AFRICAN AMERICAN: >60 ML/MIN/1.73M2
GFR NON-AFRICAN AMERICAN: >60 ML/MIN/1.73M2
GLUCOSE BLD-MCNC: 96 MG/DL (ref 70–99)
HCO3 ARTERIAL: 31.3 MMOL/L (ref 18–23)
HCT VFR BLD CALC: 37.8 % (ref 37–47)
HEMOGLOBIN: 10 GM/DL (ref 12.5–16)
HUMAN METAPNEUMOVIRUS PCR: NOT DETECTED
INFLUENZA A BY PCR: NOT DETECTED
INFLUENZA A H1 (2009) PCR: NOT DETECTED
INFLUENZA A H1 PANDEMIC PCR: NOT DETECTED
INFLUENZA A H3 PCR: NOT DETECTED
INFLUENZA B BY PCR: NOT DETECTED
LYMPHOCYTES ABSOLUTE: 0.7 K/CU MM
LYMPHOCYTES RELATIVE PERCENT: 4 % (ref 24–44)
MCH RBC QN AUTO: 24.9 PG (ref 27–31)
MCHC RBC AUTO-ENTMCNC: 26.5 % (ref 32–36)
MCV RBC AUTO: 94.3 FL (ref 78–100)
METHEMOGLOBIN ARTERIAL: 1 %
MONOCYTES ABSOLUTE: 1.1 K/CU MM
MONOCYTES RELATIVE PERCENT: 6 % (ref 0–4)
MYCOPLASMA PNEUMONIAE PCR: NOT DETECTED
O2 SATURATION: 96.3 % (ref 96–97)
PARAINFLUENZA 1 PCR: NOT DETECTED
PARAINFLUENZA 2 PCR: NOT DETECTED
PARAINFLUENZA 3 PCR: NOT DETECTED
PARAINFLUENZA 4 PCR: NOT DETECTED
PCO2 ARTERIAL: 42 MMHG (ref 32–45)
PDW BLD-RTO: 20.5 % (ref 11.7–14.9)
PH BLOOD: 7.48 (ref 7.34–7.45)
PLATELET # BLD: 427 K/CU MM (ref 140–440)
PMV BLD AUTO: 9.9 FL (ref 7.5–11.1)
PO2 ARTERIAL: 109 MMHG (ref 75–100)
POTASSIUM SERPL-SCNC: 3.8 MMOL/L (ref 3.5–5.1)
PROCALCITONIN: 0.17
RBC # BLD: 4.01 M/CU MM (ref 4.2–5.4)
RHINOVIRUS ENTEROVIRUS PCR: NOT DETECTED
RSV PCR: NOT DETECTED
SARS-COV-2: ABNORMAL
SEGMENTED NEUTROPHILS ABSOLUTE COUNT: 14.9 K/CU MM
SEGMENTED NEUTROPHILS RELATIVE PERCENT: 82 % (ref 36–66)
SODIUM BLD-SCNC: 136 MMOL/L (ref 135–145)
TOTAL PROTEIN: 5.7 GM/DL (ref 6.4–8.2)
TOXIC GRANULATION: PRESENT
WBC # BLD: 18.2 K/CU MM (ref 4–10.5)

## 2020-12-02 PROCEDURE — 80053 COMPREHEN METABOLIC PANEL: CPT

## 2020-12-02 PROCEDURE — 6370000000 HC RX 637 (ALT 250 FOR IP): Performed by: STUDENT IN AN ORGANIZED HEALTH CARE EDUCATION/TRAINING PROGRAM

## 2020-12-02 PROCEDURE — 87081 CULTURE SCREEN ONLY: CPT

## 2020-12-02 PROCEDURE — 0202U NFCT DS 22 TRGT SARS-COV-2: CPT

## 2020-12-02 PROCEDURE — 51702 INSERT TEMP BLADDER CATH: CPT

## 2020-12-02 PROCEDURE — 2580000003 HC RX 258: Performed by: STUDENT IN AN ORGANIZED HEALTH CARE EDUCATION/TRAINING PROGRAM

## 2020-12-02 PROCEDURE — 6360000002 HC RX W HCPCS: Performed by: INTERNAL MEDICINE

## 2020-12-02 PROCEDURE — 6360000002 HC RX W HCPCS: Performed by: STUDENT IN AN ORGANIZED HEALTH CARE EDUCATION/TRAINING PROGRAM

## 2020-12-02 PROCEDURE — 2000000000 HC ICU R&B

## 2020-12-02 PROCEDURE — 93010 ELECTROCARDIOGRAM REPORT: CPT | Performed by: INTERNAL MEDICINE

## 2020-12-02 PROCEDURE — 71045 X-RAY EXAM CHEST 1 VIEW: CPT

## 2020-12-02 PROCEDURE — 84443 ASSAY THYROID STIM HORMONE: CPT

## 2020-12-02 PROCEDURE — 84484 ASSAY OF TROPONIN QUANT: CPT

## 2020-12-02 PROCEDURE — 84132 ASSAY OF SERUM POTASSIUM: CPT

## 2020-12-02 PROCEDURE — 94640 AIRWAY INHALATION TREATMENT: CPT

## 2020-12-02 PROCEDURE — 85007 BL SMEAR W/DIFF WBC COUNT: CPT

## 2020-12-02 PROCEDURE — 82803 BLOOD GASES ANY COMBINATION: CPT

## 2020-12-02 PROCEDURE — 6370000000 HC RX 637 (ALT 250 FOR IP): Performed by: INTERNAL MEDICINE

## 2020-12-02 PROCEDURE — 36600 WITHDRAWAL OF ARTERIAL BLOOD: CPT

## 2020-12-02 PROCEDURE — 85027 COMPLETE CBC AUTOMATED: CPT

## 2020-12-02 PROCEDURE — 2100000000 HC CCU R&B

## 2020-12-02 PROCEDURE — 36415 COLL VENOUS BLD VENIPUNCTURE: CPT

## 2020-12-02 PROCEDURE — 2700000000 HC OXYGEN THERAPY PER DAY

## 2020-12-02 PROCEDURE — 94761 N-INVAS EAR/PLS OXIMETRY MLT: CPT

## 2020-12-02 PROCEDURE — 84145 PROCALCITONIN (PCT): CPT

## 2020-12-02 RX ORDER — IPRATROPIUM BROMIDE AND ALBUTEROL SULFATE 2.5; .5 MG/3ML; MG/3ML
3 SOLUTION RESPIRATORY (INHALATION) EVERY 4 HOURS PRN
Status: DISCONTINUED | OUTPATIENT
Start: 2020-12-02 | End: 2020-12-06 | Stop reason: HOSPADM

## 2020-12-02 RX ORDER — MIDAZOLAM HYDROCHLORIDE 2 MG/2ML
1 INJECTION, SOLUTION INTRAMUSCULAR; INTRAVENOUS ONCE
Status: COMPLETED | OUTPATIENT
Start: 2020-12-02 | End: 2020-12-02

## 2020-12-02 RX ORDER — METHYLPREDNISOLONE SODIUM SUCCINATE 40 MG/ML
40 INJECTION, POWDER, LYOPHILIZED, FOR SOLUTION INTRAMUSCULAR; INTRAVENOUS EVERY 12 HOURS
Status: DISCONTINUED | OUTPATIENT
Start: 2020-12-02 | End: 2020-12-06 | Stop reason: HOSPADM

## 2020-12-02 RX ORDER — ALBUTEROL SULFATE 90 UG/1
2 AEROSOL, METERED RESPIRATORY (INHALATION)
Status: DISCONTINUED | OUTPATIENT
Start: 2020-12-02 | End: 2020-12-06 | Stop reason: HOSPADM

## 2020-12-02 RX ORDER — IPRATROPIUM BROMIDE AND ALBUTEROL SULFATE 2.5; .5 MG/3ML; MG/3ML
3 SOLUTION RESPIRATORY (INHALATION)
Status: DISCONTINUED | OUTPATIENT
Start: 2020-12-02 | End: 2020-12-02

## 2020-12-02 RX ORDER — FUROSEMIDE 10 MG/ML
20 INJECTION INTRAMUSCULAR; INTRAVENOUS ONCE
Status: COMPLETED | OUTPATIENT
Start: 2020-12-02 | End: 2020-12-02

## 2020-12-02 RX ADMIN — MIDAZOLAM 1 MG: 1 INJECTION INTRAMUSCULAR; INTRAVENOUS at 16:43

## 2020-12-02 RX ADMIN — FUROSEMIDE 20 MG: 10 INJECTION, SOLUTION INTRAVENOUS at 10:26

## 2020-12-02 RX ADMIN — SODIUM CHLORIDE, PRESERVATIVE FREE 10 ML: 5 INJECTION INTRAVENOUS at 00:10

## 2020-12-02 RX ADMIN — CEFEPIME 2 G: 2 INJECTION, POWDER, FOR SOLUTION INTRAVENOUS at 04:00

## 2020-12-02 RX ADMIN — IPRATROPIUM BROMIDE 2 PUFF: 17 AEROSOL, METERED RESPIRATORY (INHALATION) at 19:18

## 2020-12-02 RX ADMIN — VANCOMYCIN HYDROCHLORIDE 1000 MG: 1 INJECTION, POWDER, LYOPHILIZED, FOR SOLUTION INTRAVENOUS at 14:20

## 2020-12-02 RX ADMIN — BUDESONIDE AND FORMOTEROL FUMARATE DIHYDRATE 2 PUFF: 80; 4.5 AEROSOL RESPIRATORY (INHALATION) at 07:46

## 2020-12-02 RX ADMIN — CEFEPIME 2 G: 2 INJECTION, POWDER, FOR SOLUTION INTRAVENOUS at 22:31

## 2020-12-02 RX ADMIN — METHYLPREDNISOLONE SODIUM SUCCINATE 40 MG: 40 INJECTION, POWDER, FOR SOLUTION INTRAMUSCULAR; INTRAVENOUS at 22:31

## 2020-12-02 RX ADMIN — CEFEPIME 2 G: 2 INJECTION, POWDER, FOR SOLUTION INTRAVENOUS at 10:25

## 2020-12-02 RX ADMIN — PANTOPRAZOLE SODIUM 40 MG: 40 TABLET, DELAYED RELEASE ORAL at 06:46

## 2020-12-02 RX ADMIN — BUSPIRONE HYDROCHLORIDE 5 MG: 5 TABLET ORAL at 14:20

## 2020-12-02 RX ADMIN — ALBUTEROL SULFATE 2 PUFF: 90 AEROSOL, METERED RESPIRATORY (INHALATION) at 15:51

## 2020-12-02 RX ADMIN — ALBUTEROL SULFATE 2 PUFF: 90 AEROSOL, METERED RESPIRATORY (INHALATION) at 19:17

## 2020-12-02 RX ADMIN — TIOTROPIUM BROMIDE INHALATION SPRAY 2 PUFF: 3.12 SPRAY, METERED RESPIRATORY (INHALATION) at 07:46

## 2020-12-02 RX ADMIN — SODIUM CHLORIDE, PRESERVATIVE FREE 10 ML: 5 INJECTION INTRAVENOUS at 10:25

## 2020-12-02 RX ADMIN — IPRATROPIUM BROMIDE 2 PUFF: 17 AEROSOL, METERED RESPIRATORY (INHALATION) at 15:50

## 2020-12-02 RX ADMIN — METHYLPREDNISOLONE SODIUM SUCCINATE 40 MG: 40 INJECTION, POWDER, FOR SOLUTION INTRAMUSCULAR; INTRAVENOUS at 12:39

## 2020-12-02 RX ADMIN — SODIUM CHLORIDE, PRESERVATIVE FREE 10 ML: 5 INJECTION INTRAVENOUS at 22:35

## 2020-12-02 ASSESSMENT — PAIN SCALES - GENERAL
PAINLEVEL_OUTOF10: 0

## 2020-12-02 NOTE — PROGRESS NOTES
[id number] [place] OF SPEECH/LANGUAGE PATHOLOGY    Isabel Villasenor  12/2/2020  [phone]    Attempted to see Isabel Villasenor for dysphagia evaluation. Pt transferring to COVID unit at this time. Will reattempt when pt is available for participation.     Nay Sheridan  12/2/2020  2:58 PM

## 2020-12-02 NOTE — CONSULTS
[address] [place] [address], [address] [facility]                                  CONSULTATION    PATIENT NAME: Vera Foss                     :        1940  MED REC NO:   [id number]                          ROOM:       36  ACCOUNT NO:   [de-identified]                           ADMIT DATE: 2020  PROVIDER:     Erasmo Hitchcock MD    CONSULT DATE:  2020    HISTORY OF PRESENT ILLNESS:  The patient is an 79-year-old lady with  multiple medical problems including COPD, hypertension, hyperlipidemia,  rheumatoid arthritis, who was recently discharged from the hospital when  she was admitted with COVID pneumonia. The patient went back to the  nursing home and was brought back to the emergency room because of  confusion. She is also having a temperature of 100 degrees Fahrenheit. She was having cough productive of greenish phlegm. She denies any  hemoptysis. She denies any nausea or vomiting. She denies any chest  pains. PAST MEDICAL HISTORY:  Significant for COPD, chronic respiratory  failure, on home oxygen, rheumatoid arthritis, hypertension, and  hyperlipidemia. PAST SURGICAL HISTORY:  Remarkable for vertebroplasty, upper endoscopy,  hysterectomy, colonoscopy, kyphoplasty, cholecystectomy, cardiac  catheterization and appendectomy. FAMILY HISTORY:  Reveals that her mother had arthritis and stroke. Her  father had coronary artery disease and COPD. SOCIAL HISTORY:  Reveals that she quit smoking last year, but used to  smoke 3/4th of a pack per day for 55 years prior to that. No history of  alcohol or drug abuse. MEDICATIONS:  Reviewed. ALLERGIES:  She is allergic to HUMIRA, REMICADE, ATIVAN, ETANERCEPT,  PROCHLORPERAZINE and DOXYCYCLINE. REVIEW OF SYSTEMS:  Unobtainable at this time as the patient is  pleasantly confused.     PHYSICAL EXAMINATION: GENERAL:  The patient is awake, but pleasantly confused, in no acute  respiratory distress. VITAL SIGNS:  Her blood pressure is 131/80 mmHg, pulse of 101 per minute  and respiratory rate of 16 per minute. She is afebrile. Her T-max was  100 degrees Fahrenheit. HEENT:  Reveals no JVD. No lymphadenopathy. NECK:  Supple. LUNGS:  Revealed bibasilar crackles. HEART:  Showed normal S1, S2. There was no S3 or S4 noted. ABDOMEN:  Benign. There is no evidence of any organomegaly. The bowel  sounds are present. NEUROLOGIC:  Reveals that she is pleasantly confused, but moving her  extremities. IMAGING DATA:  Her chest x-ray showed patchy bibasilar infiltrates. LABORATORY DATA:  Her CBC showed a white count of 16.8, hemoglobin 9.5,  hematocrit of 31.7. Her COVID-19 test was negative. Her proBNP was  12,038. Her electrolytes showed a sodium of 139, potassium 3.3,  chloride 101, carbon dioxide 29, BUN 14, creatinine 0.6. Her ABGs  showed a pH of 7.48, pCO2 of 42, pO2 of 109 with 96% saturation on 4  liters nasal cannula. IMPRESSION:  1.  Bibasilar pneumonia. 2.  Acute exacerbation of COPD. 3.  History of recent COVID pneumonia. 4.  Confusion most likely related to the above diagnosis. 5.  History of rheumatoid arthritis. PLAN:  1. Continue present antibiotic therapy. 2.  Sputum for culture and sensitivity. 3.  DuoNeb q.4 hours while awake. 4.  Solu-Medrol 40 q. 12 hours. 5.  Gentle diuresis. 6.  As per orders.         Kevin Fairbanks MD    D: 12/02/2020 11:44:12       T: 12/02/2020 14:58:35     /[email or web address]  Job#: [id number]     Doc#: [id number]    CC:

## 2020-12-02 NOTE — PROGRESS NOTES
She is sars cov 2 POSITIVE  For her elevated b/p will start on Hydralazine iv prn. MRI brain for her confusion  Further respiratory orders per pulmonary.   Brittany Steen MD  [facility]

## 2020-12-02 NOTE — H&P
History and Physical      Name:  Codie Ruff /Age/Sex: 1940  ([de-identified] y.o. female)   MRN & CSN:  [id number] & [id number] Admission Date/Time: 2020  4:52 PM   Location:  ED34/ED-34 PCP: Segun Gutierrez, [address] Day: 2    Assessment and Plan:   Codie Ruff is a [de-identified] y.o.  female  who presents with PNA (pneumonia)    1. Pneumonia, multifocal  2. Leukocytosis  3. Chronic respiratory failure   Admit to inpatient services   COVID-19 negative   Procalcitonin, Legionella, strep antigen, respiratory culture, and blood cultures   ED started patient on cefepime and vancomycin with consult to pharmacy, we will these at this time   DuoNebs   Continue home oxygen therapy   Portable chest x-ray in a.m.    4. Fall  5. Deconditioning   Patient with facial bruising, CT cervical spine and CT head shows no acute intracranial abnormality.  Consult PT/OT, up with assistance    6. Hypokalemia  7. Hypomagnesemia   Continue potassium and magnesium repletion started in ED   Potassium with reflex to magnesium at 2330   Continue to monitor with a.m. labs    8. Anemia, chronic, multifactorial   Improved from baseline, continue home medications   Monitor CBC and transfuse if hemoglobin less than 7    9.  CKD stage III   Currently at baseline   Continue to monitor with CMP    Other chronic medical conditions:     Paroxysmal A. fib: Continue home medications, patient was restarted on Xarelto at last admission   COPD, not currently in exacerbation: Continue home meds   Lung nodule: Continue outpatient follow-up with pulmonology   Hypertension: Continue home meds   Rheumatoid arthritis: Continue home meds   Depression: Continue home meds   Fibromyalgia: Continue home meds   HFrEF: Not currently in exacerbation, continue home meds   Osteoporosis: Continue home meds      Diet DIET CARDIAC;   DVT Prophylaxis [] Lovenox, []  Heparin, [] SCDs, [] Ambulation   GI Prophylaxis [] PPI,  [] H2 Blocker, [] Carafate,  [] Diet/Tube Feeds   Code Status Full Code   Disposition Patient requires continued admission due to PNA (pneumonia)   MDM [] Low, [] Moderate,[]  High  Patient's risk as above due to acuity of condition with potential for decompensation. History of Present Illness:     Chief Complaint: PNA (pneumonia)    Eddie Mckeon is a [de-identified] y.o.  female with family history as stated below and a PMH as stated above, who presents from [facility] home and brought in via ED. Patient is a poor historian and pleasantly demented so majority of history was provided by ED physician. Patient recently came out of the Covid unit at outside facility on 11/30/2020. Since then patient has not been eating or drinking very much and has had increased fatigue. Patient was found next to her bed this morning after she apparently fell out of bed. Patient also began running a fever of 100 °F that improved after Tylenol. Patient has been noted to have a productive cough with green sputum and it was decided by facility that patient should be brought in to the ED for further care. Patient is able to tell me that she has a bruise on her forehead and that she is having some mild shortness of breath. At this time she denies headache, chest pain, abdominal pain, nausea, vomiting, or diarrhea. Discussed case with ED provider. Review of Systems   Unable to perform ROS: Dementia       Objective:   No intake or output data in the 24 hours ending 12/02/20 0030   Vitals:   Vitals:    12/01/20 1654   BP: (!) 156/76   Pulse: 85   Resp: 20   Temp: 99 °F (37.2 °C)   SpO2: 97%     Physical Exam:   Physical Exam  Vitals signs and nursing note reviewed. Constitutional:       General: She is awake. She is not in acute distress. Appearance: Normal appearance. She is normal weight. She is not ill-appearing, toxic-appearing or diaphoretic. Interventions: She is not intubated. Nasal cannula in place. HENT:      Head: Atraumatic.  No raccoon eyes, Hernandez's sign, right periorbital erythema, left periorbital erythema or laceration. Right Ear: External ear normal.      Left Ear: External ear normal.      Nose: Nose normal. No rhinorrhea. Mouth/Throat:      Mouth: Mucous membranes are moist.   Eyes:      General: No scleral icterus. Extraocular Movements: Extraocular movements intact. Conjunctiva/sclera: Conjunctivae normal.      Pupils: Pupils are equal, round, and reactive to light. Neck:      Musculoskeletal: Neck supple. Cardiovascular:      Rate and Rhythm: Normal rate and regular rhythm. Pulses: Normal pulses. Heart sounds: Normal heart sounds. No murmur. No gallop. Pulmonary:      Effort: Pulmonary effort is normal. No tachypnea, bradypnea, accessory muscle usage or respiratory distress. She is not intubated. Breath sounds: Decreased air movement present. No decreased breath sounds, wheezing, rhonchi or rales. Abdominal:      General: Abdomen is flat. Bowel sounds are normal. There is no distension. Palpations: Abdomen is soft. Tenderness: There is no abdominal tenderness. There is no guarding or rebound. Negative signs include Peter's sign. Musculoskeletal: Normal range of motion. Right lower leg: No edema. Left lower leg: No edema. Comments: Multiple joint deformities most notably in DIP and PIPs consistent with severe rheumatoid arthritis   Skin:     General: Skin is warm and dry. Capillary Refill: Capillary refill takes less than 2 seconds. Neurological:      General: No focal deficit present. Mental Status: Mental status is at baseline. She is disoriented and confused. Cranial Nerves: Cranial nerves are intact. No cranial nerve deficit, dysarthria or facial asymmetry. Sensory: Sensation is intact. No sensory deficit. Motor: Motor function is intact. No weakness, tremor, seizure activity or pronator drift.    Psychiatric:         Attention and Perception: Attention and perception normal.         Mood and Affect: Mood normal. Mood is not anxious. Speech: Speech normal. Speech is not rapid and pressured or slurred. Behavior: Behavior normal. Behavior is not agitated. Behavior is cooperative. Cognition and Memory: Cognition is impaired. Memory is impaired. Past Medical History:      Past Medical History:   Diagnosis Date    Acute DVT of right tibial vein ([address] 07/2017    ER imaging: distal right tibial vein DVT; no anticoag for bleeding/ anemia    Acute exacerbation of chronic obstructive pulmonary disease (COPD) ([address] 12/13/2018    Anemia     Arthritis     Atrial fibrillation (HCC)     [facility]est[facility] failure[facility]     Chronic hypoxemic respiratory failure ([facility]) 9/9/2020    Chronic pain syndrome     Low back pain; lumbar disc disease    Clostridium difficile colitis 09/20/2017    COPD, severe ([address] 10/24/2017    Depression     Fibromyalgia     Former smoker     Quit 1/2019    Herniated cervical disc 5-1998    Herpes zoster ophthalmicus 3/2012    Hx of blood clots     Hyperlipidemia     Hypertension     Kidney disease     L3 vertebral fracture ([address] 2010    vertebroplasty    Lumbar spinal stenosis 2015    moderately severe by MRI    Migraine     Nocturnal hypoxemia 5/21/2019    Osteoporosis 2010    Fragility Fx L3    Rheumatoid arthritis(714.0) 1976    Dr Caitie Uriarte    S/P cardiac catheterization 05/2017    patent coronaries; Takotsubo; najeeb Ahmed    Shortness of breath 4/25/2019    Takotsubo syndrome 05/2017    TMJ dysfunction     Tobacco abuse 12/13/2018    Vitamin B12 deficiency 12/2014    B12 level 199     PSHX:  has a past surgical history that includes Cholecystectomy (1966); devyn and bso (cervix removed) (1991); Wrist fusion (Left, 2000); Elbow surgery (Right); Appendectomy (1966); vertebroplasty (10/2010); Foot surgery (1986); Toe Surgery (Left, 4/25/2013);  Cardiac catheterization (2017); pr colonoscopy flx dx w/collj spec when pfrmd (N/A, 10/2/2018); Fixation Kyphoplasty (2019); Hysterectomy; Upper gastrointestinal endoscopy (N/A, 3/11/2020); and Colonoscopy (N/A, 3/11/2020). Allergies: Allergies   Allergen Reactions    Ativan [Lorazepam] Other (See Comments)     Violent, thrashing and combative. She has lacerations and bruising, had to be tied down.  Etanercept Other (See Comments)     No response    Humira [Adalimumab]     Prochlorperazine Edisylate Other (See Comments)     \"Compazine\"   Involuntary Muscle Spasms     Remicade [Infliximab Injection]     Doxycycline Other (See Comments)     Stomach pains       FAM HX: family history includes Arthritis in her mother; Cancer in an other family member; Emphysema in her father; Heart Disease in her father and another family member; Kidney Disease in her son; Stroke in her mother. Soc HX:   Social History     Socioeconomic History    Marital status:       Spouse name: Mirella Aponte Number of children: 2    Years of education: 15    Highest education level: None   Occupational History    Occupation: retired   Social Needs    Financial resource strain: None    Food insecurity     Worry: None     Inability: None    Transportation needs     Medical: None     Non-medical: None   Tobacco Use    Smoking status: Former Smoker     Packs/day: 0.75     Years: 55.00     Pack years: 41.25     Types: Cigarettes     Start date: 1962     Last attempt to quit: 2019     Years since quittin.9    Smokeless tobacco: Never Used   Substance and Sexual Activity    Alcohol use: No     Alcohol/week: 0.0 standard drinks    Drug use: No    Sexual activity: Not Currently   Lifestyle    Physical activity     Days per week: None     Minutes per session: None    Stress: None   Relationships    Social connections     Talks on phone: None     Gets together: None     Attends Hinduism service: None Active member of club or organization: None     Attends meetings of clubs or organizations: None     Relationship status: None    Intimate partner violence     Fear of current or ex partner: None     Emotionally abused: None     Physically abused: None     Forced sexual activity: None   Other Topics Concern    None   Social History Narrative    None       Medications:   Medications:    magnesium sulfate  2 g Intravenous Once    amiodarone  200 mg Oral Daily    aspirin  81 mg Oral Daily    atorvastatin  10 mg Oral QPM    bacitracin-neomycin-polymyxin b-hydrocortisone   Topical BID    budesonide-formoterol  2 puff Inhalation BID    busPIRone  5 mg Oral TID    vitamin D  1 capsule Oral Daily    ferrous sulfate  325 mg Oral Daily with breakfast    Forteo  20 mcg Subcutaneous Daily    tiotropium  2 puff Inhalation Daily    lisinopril  5 mg Oral Daily    cetirizine  10 mg Oral Daily    metoprolol tartrate  50 mg Oral BID    pantoprazole  40 mg Oral BID AC    rivaroxaban  20 mg Oral Daily    sertraline  25 mg Oral Daily    traZODone  50 mg Oral Nightly    vitamin B-12  1,000 mcg Oral Daily    zinc sulfate  50 mg Oral Daily    sodium chloride flush  10 mL Intravenous 2 times per day    vancomycin  15 mg/kg Intravenous Q12H    cefepime  2 g Intravenous Q8H      Infusions:   PRN Meds: potassium chloride, 40 mEq, PRN    Or  potassium alternative oral replacement, 40 mEq, PRN    Or  potassium chloride, 10 mEq, PRN  ipratropium-albuterol, 3 mL, Q4H PRN  polyethylene glycol, 17 g, Daily PRN  sodium chloride flush, 10 mL, PRN  acetaminophen, 650 mg, Q6H PRN    Or  acetaminophen, 650 mg, Q6H PRN  bisacodyl, 5 mg, Daily PRN        Electronically signed by Mejia Sierra DO on 12/2/2020 at 12:30 AM      This dictation was created with voice recognition software.  While attempts have been made to review the dictation as it is transcribed, on occasion the spoken word can be misinterpreted by the technology leading to omissions or inappropriate words, phrases or sentences.

## 2020-12-02 NOTE — PROGRESS NOTES
Shantelle Jackson MD, [facility]                Internal Medicine Hospitalist             Daily Progress  Note   Subjective:     Chief Complaint   Patient presents with    Fatigue    Fever    Positive For Covid-19     Ms. Lopez Complains of being restless and confused. Objective:    BP (!) 183/77   Pulse 111   Temp 100 °F (37.8 °C) (Oral)   Resp (!) 56   Ht 5' 7\" (1.702 m)   Wt 129 lb 4.8 oz (58.7 kg)   SpO2 95%   BMI 20.25 kg/m²    No intake or output data in the 24 hours ending 12/02/20 0937   Physical Exam:  Heart:  Distant heart sounds. Lungs: Mostly clear to auscultation, decreased breath sounds at bases. No wheezes appreciated no crackles heard. Very poor air exchange. Abdomen: Soft, non distended. Bowel sounds appreciated. No obvious liver or spleen enlargement. Non tender, no rebound noted. Extremities: Non tender, no swelling noted, strength 5/5 both legs. CNS: Grossly intact can move all 4. Confused.      Labs:  CBC with Differential:    Lab Results   Component Value Date    WBC 18.2 12/02/2020    RBC 4.01 12/02/2020    HGB 10.0 12/02/2020    HCT 37.8 12/02/2020     12/02/2020    MCV 94.3 12/02/2020    MCH 24.9 12/02/2020    MCHC 26.5 12/02/2020    RDW 20.5 12/02/2020    SEGSPCT 83.1 12/01/2020    BANDSPCT 21 11/18/2020    LYMPHOPCT 8.2 12/01/2020    MONOPCT 8.0 12/01/2020    EOSPCT 2.0 05/07/2012    BASOPCT 0.2 12/01/2020    MONOSABS 1.4 12/01/2020    LYMPHSABS 1.4 12/01/2020    EOSABS 0.0 12/01/2020    BASOSABS 0.0 12/01/2020    DIFFTYPE AUTOMATED DIFFERENTIAL 12/01/2020     CMP:    Lab Results   Component Value Date     12/02/2020    K 3.8 12/02/2020    CL 97 12/02/2020    CO2 19 12/02/2020    BUN 11 12/02/2020    CREATININE 0.5 12/02/2020    GFRAA >60 12/02/2020    GFRAA >60 03/17/2012    AGRATIO 1.8 04/01/2014    LABGLOM >60 12/02/2020    LABGLOM 91 04/01/2014    GLUCOSE 96 12/02/2020    PROT 5.7 12/02/2020    PROT 7.6 03/04/2013    LABALBU 2.8 12/02/2020    CALCIUM 8.6 12/02/2020    BILITOT 0.6 12/02/2020    ALKPHOS 91 12/02/2020    AST 28 12/02/2020    ALT 12 12/02/2020     Recent Labs     12/01/20  1745   TROPONINT <0.010     Lab Results   Component Value Date    TSHHS 0.549 11/17/2020           vancomycin  1,000 mg Intravenous Q18H    magnesium sulfate  2 g Intravenous Once    amiodarone  200 mg Oral Daily    aspirin  81 mg Oral Daily    atorvastatin  10 mg Oral QPM    bacitracin-neomycin-polymyxin b-hydrocortisone   Topical BID    budesonide-formoterol  2 puff Inhalation BID    busPIRone  5 mg Oral TID    vitamin D  2,000 Units Oral Daily    ferrous sulfate  325 mg Oral Daily with breakfast    Teriparatide (Recombinant)  20 mcg Subcutaneous Daily    tiotropium  2 puff Inhalation Daily    lisinopril  5 mg Oral Daily    cetirizine  10 mg Oral Daily    metoprolol tartrate  50 mg Oral BID    pantoprazole  40 mg Oral BID AC    rivaroxaban  20 mg Oral Daily    sertraline  25 mg Oral Daily    traZODone  50 mg Oral Nightly    vitamin B-12  1,000 mcg Oral Daily    zinc sulfate  50 mg Oral Daily    sodium chloride flush  10 mL Intravenous 2 times per day    cefepime  2 g Intravenous Q8H         Assessment:       Patient Active Problem List    Diagnosis Date Noted    Alzheimer's disease ([facility] 12/02/2020    PNA (pneumonia) 12/01/2020    Transaminitis 11/17/2020    Acute hypoxemic respiratory failure due to COVID-19 ([facility] 11/15/2020    Chronic hypoxemic respiratory failure ([facility] 09/09/2020    Symptomatic anemia 03/24/2020    Wide-complex tachycardia ([address] 02/10/2020    Gram-negative pneumonia ([facility] 58/49/2967    Acute systolic heart failure ([facility] 02/10/2020    Pneumonia of right upper lobe due to infectious organism     Chronic fatigue 07/08/2019    Intractable low back pain 06/11/2019    Back pain 06/09/2019    Shortness of breath 04/25/2019    Former smoker     Acute exacerbation of chronic obstructive pulmonary disease (COPD) ([facility] 12/13/2018    COPD, severe ([address] 10/24/2017    Blood loss anemia 08/01/2017    Takotsubo syndrome 05/01/2017    Panic attack 11/18/2016    Lumbar spinal stenosis 01/01/2015    Vitamin B12 deficiency 12/01/2014    Osteoporosis 03/16/2012    Depression     Migraine 10/06/2010    Rheumatoid Arthritis     Hypertension     Hyperlipidemia     Fibromyalgia     Chronic pain syndrome        Plan:     Problems being addressed this admission:   Acute on Chronic Respiratory failure / PNA multifocal  Acute Encephalopathy  CKD 3 ? Anemia  Fall / Failure to thrive    Will transfer to ICU or step down and consult pulmonary. ABG  Showed a PO2 of 109 and so oxygen has been cut down. PCO2 was 42. I have asked the RN to give 20 mg of Lasix iv x 1. Her BNP was elevated. Will get resp dx panel. Droplet precautions. May need cardiology consult on cardorone. Check TSH  She is still confused but maybe has base line memory issues no elevated co2. Kidney functions doing good. Her Hb is better than before. Will trend. Hx of fall and initial CT head was negative for bleeds and has chronic ischemic changes order an MRI brain               Consultants:  Pulmonary    General Orders:  Repeat basic labs again in am.  I have explained to the patient and discussed with him/her the treatment plan.   Italia Frausto MD, [address]

## 2020-12-02 NOTE — ED NOTES
Report given to [facility] on [address] [facility].      Liliana Pan RN  12/02/20 2812

## 2020-12-02 NOTE — PROGRESS NOTES
Weight: 135 lbs; % Ideal Body Weight 95.8 %   · BMI: 20.2  · BMI Categories: Underweight (BMI less than 22) age over 72       Nutrition Diagnosis:   · Severe malnutrition, In context of acute illness or injury related to inadequate protein-energy intake as evidenced by poor intake prior to admission, weight loss greater than or equal to 5% in 1 month    Nutrition Interventions:   Food and/or Nutrient Delivery:  Continue Current Diet, Start Oral Nutrition Supplement  Nutrition Education/Counseling:  No recommendation at this time   Coordination of Nutrition Care:  Continue to monitor while inpatient, Feeding Assistance/Environment Change    Goals:  Pt will consume greater than 50% of her meals and oral supplements       Nutrition Monitoring and Evaluation:   Behavioral-Environmental Outcomes:  None Identified   Food/Nutrient Intake Outcomes:  Food and Nutrient Intake, Supplement Intake  Physical Signs/Symptoms Outcomes:  Biochemical Data, Chewing or Swallowing, GI Status, Meal Time Behavior, Nutrition Focused Physical Findings, Skin, Weight     Discharge Planning:    Continue Oral Nutrition Supplement     Electronically signed by Albino Arteaga RD, LD on 12/2/20 at 1:53 PM EST    Contact: [phone]

## 2020-12-02 NOTE — PROGRESS NOTES
[address] [facility]  consulted by Dr. Lor Griffin for monitoring and adjustment. Indication for treatment: Multifocal pneumonia  Goal trough: 15 mcg/mL     Pertinent Laboratory Values:   Temp Readings from Last 3 Encounters:   12/02/20 98 °F (36.7 °C) (Oral)   11/23/20 98.2 °F (36.8 °C) (Oral)   04/09/20 97 °F (36.1 °C) (Temporal)     Recent Labs     12/01/20  1745   WBC 16.8*   LACTATE 0.9     Recent Labs     12/01/20  1745   BUN 14   CREATININE 0.6     Estimated Creatinine Clearance: 69 mL/min (based on SCr of 0.6 mg/dL). No intake or output data in the 24 hours ending 12/02/20 0552    Pertinent Cultures:  Date    Source    Results  12/01   Covid-19   Negative  12/01   Strep pneumo/ Legionella Ordered  12/01   Respiratory   Ordered  12/01   Blood    Ordered  12/02   MRSA nasal screen  Ordered    Vancomycin level:   TROUGH:  No results for input(s): VANCOTROUGH in the last 72 hours. RANDOM:  No results for input(s): VANCORANDOM in the last 72 hours. Assessment:  WBC and temperature: elevated WBC; Afebrile  SCr, BUN, and urine output: WNL  Day(s) of therapy:1  Vancomycin level: To be collected    Plan:  Received Vancomycin 1250 mg x 1 in the ED  Start Vancomycin 1000 mg IVPB q18h  Trough prior to the 4th dose  Pharmacy will continue to monitor patient and adjust therapy as indicated     VANCOMYCIN TROUGH SCHEDULED FOR 12/04/20 @0230    Thank you for the consult.   Allie Graves RPh  12/2/2020 5:52 AM

## 2020-12-03 ENCOUNTER — APPOINTMENT (OUTPATIENT)
Dept: MRI IMAGING | Age: 80
DRG: 871 | End: 2020-12-03
Payer: MEDICARE

## 2020-12-03 LAB
ALBUMIN SERPL-MCNC: 2.7 GM/DL (ref 3.4–5)
ALP BLD-CCNC: 76 IU/L (ref 40–128)
ALT SERPL-CCNC: 11 U/L (ref 10–40)
ANION GAP SERPL CALCULATED.3IONS-SCNC: 13 MMOL/L (ref 4–16)
AST SERPL-CCNC: 19 IU/L (ref 15–37)
BASOPHILS ABSOLUTE: 0.1 K/CU MM
BASOPHILS RELATIVE PERCENT: 0.4 % (ref 0–1)
BILIRUB SERPL-MCNC: 0.4 MG/DL (ref 0–1)
BUN BLDV-MCNC: 22 MG/DL (ref 6–23)
CALCIUM SERPL-MCNC: 8 MG/DL (ref 8.3–10.6)
CHLORIDE BLD-SCNC: 98 MMOL/L (ref 99–110)
CO2: 28 MMOL/L (ref 21–32)
CREAT SERPL-MCNC: 0.7 MG/DL (ref 0.6–1.1)
DIFFERENTIAL TYPE: ABNORMAL
EOSINOPHILS ABSOLUTE: 0 K/CU MM
EOSINOPHILS RELATIVE PERCENT: 0.1 % (ref 0–3)
GFR AFRICAN AMERICAN: >60 ML/MIN/1.73M2
GFR NON-AFRICAN AMERICAN: >60 ML/MIN/1.73M2
GLUCOSE BLD-MCNC: 95 MG/DL (ref 70–99)
HCT VFR BLD CALC: 37 % (ref 37–47)
HEMOGLOBIN: 9.7 GM/DL (ref 12.5–16)
IMMATURE NEUTROPHIL %: 0.4 % (ref 0–0.43)
LYMPHOCYTES ABSOLUTE: 0.7 K/CU MM
LYMPHOCYTES RELATIVE PERCENT: 6.5 % (ref 24–44)
MAGNESIUM: 2.1 MG/DL (ref 1.8–2.4)
MCH RBC QN AUTO: 24.7 PG (ref 27–31)
MCHC RBC AUTO-ENTMCNC: 26.2 % (ref 32–36)
MCV RBC AUTO: 94.1 FL (ref 78–100)
MONOCYTES ABSOLUTE: 1.1 K/CU MM
MONOCYTES RELATIVE PERCENT: 9.6 % (ref 0–4)
NUCLEATED RBC %: 0 %
PDW BLD-RTO: 20.6 % (ref 11.7–14.9)
PHOSPHORUS: 3.5 MG/DL (ref 2.5–4.9)
PLATELET # BLD: 325 K/CU MM (ref 140–440)
PMV BLD AUTO: 10.2 FL (ref 7.5–11.1)
POTASSIUM SERPL-SCNC: 3.4 MMOL/L (ref 3.5–5.1)
PRO-BNP: ABNORMAL PG/ML
PROCALCITONIN: 0.28
RBC # BLD: 3.93 M/CU MM (ref 4.2–5.4)
SEGMENTED NEUTROPHILS ABSOLUTE COUNT: 9.3 K/CU MM
SEGMENTED NEUTROPHILS RELATIVE PERCENT: 83 % (ref 36–66)
SODIUM BLD-SCNC: 139 MMOL/L (ref 135–145)
TOTAL IMMATURE NEUTOROPHIL: 0.04 K/CU MM
TOTAL NUCLEATED RBC: 0 K/CU MM
TOTAL PROTEIN: 5.3 GM/DL (ref 6.4–8.2)
TROPONIN T: 0.03 NG/ML
TSH HIGH SENSITIVITY: 1.85 UIU/ML (ref 0.27–4.2)
WBC # BLD: 11.2 K/CU MM (ref 4–10.5)

## 2020-12-03 PROCEDURE — 97530 THERAPEUTIC ACTIVITIES: CPT

## 2020-12-03 PROCEDURE — 97166 OT EVAL MOD COMPLEX 45 MIN: CPT

## 2020-12-03 PROCEDURE — 84100 ASSAY OF PHOSPHORUS: CPT

## 2020-12-03 PROCEDURE — 83735 ASSAY OF MAGNESIUM: CPT

## 2020-12-03 PROCEDURE — 6360000002 HC RX W HCPCS: Performed by: INTERNAL MEDICINE

## 2020-12-03 PROCEDURE — 6370000000 HC RX 637 (ALT 250 FOR IP): Performed by: INTERNAL MEDICINE

## 2020-12-03 PROCEDURE — 6360000002 HC RX W HCPCS: Performed by: STUDENT IN AN ORGANIZED HEALTH CARE EDUCATION/TRAINING PROGRAM

## 2020-12-03 PROCEDURE — 87899 AGENT NOS ASSAY W/OPTIC: CPT

## 2020-12-03 PROCEDURE — 92610 EVALUATE SWALLOWING FUNCTION: CPT

## 2020-12-03 PROCEDURE — 97162 PT EVAL MOD COMPLEX 30 MIN: CPT

## 2020-12-03 PROCEDURE — 94761 N-INVAS EAR/PLS OXIMETRY MLT: CPT

## 2020-12-03 PROCEDURE — 80048 BASIC METABOLIC PNL TOTAL CA: CPT

## 2020-12-03 PROCEDURE — 93005 ELECTROCARDIOGRAM TRACING: CPT | Performed by: NURSE PRACTITIONER

## 2020-12-03 PROCEDURE — 2580000003 HC RX 258: Performed by: STUDENT IN AN ORGANIZED HEALTH CARE EDUCATION/TRAINING PROGRAM

## 2020-12-03 PROCEDURE — 80053 COMPREHEN METABOLIC PANEL: CPT

## 2020-12-03 PROCEDURE — 84145 PROCALCITONIN (PCT): CPT

## 2020-12-03 PROCEDURE — 2580000003 HC RX 258: Performed by: INTERNAL MEDICINE

## 2020-12-03 PROCEDURE — 2700000000 HC OXYGEN THERAPY PER DAY

## 2020-12-03 PROCEDURE — 85025 COMPLETE CBC W/AUTO DIFF WBC: CPT

## 2020-12-03 PROCEDURE — 84443 ASSAY THYROID STIM HORMONE: CPT

## 2020-12-03 PROCEDURE — 87449 NOS EACH ORGANISM AG IA: CPT

## 2020-12-03 PROCEDURE — 83880 ASSAY OF NATRIURETIC PEPTIDE: CPT

## 2020-12-03 PROCEDURE — 94664 DEMO&/EVAL PT USE INHALER: CPT

## 2020-12-03 PROCEDURE — 84484 ASSAY OF TROPONIN QUANT: CPT

## 2020-12-03 PROCEDURE — 70551 MRI BRAIN STEM W/O DYE: CPT

## 2020-12-03 PROCEDURE — 94640 AIRWAY INHALATION TREATMENT: CPT

## 2020-12-03 PROCEDURE — 1200000000 HC SEMI PRIVATE

## 2020-12-03 PROCEDURE — 6370000000 HC RX 637 (ALT 250 FOR IP): Performed by: STUDENT IN AN ORGANIZED HEALTH CARE EDUCATION/TRAINING PROGRAM

## 2020-12-03 PROCEDURE — 36592 COLLECT BLOOD FROM PICC: CPT

## 2020-12-03 RX ORDER — FUROSEMIDE 10 MG/ML
20 INJECTION INTRAMUSCULAR; INTRAVENOUS ONCE
Status: COMPLETED | OUTPATIENT
Start: 2020-12-03 | End: 2020-12-03

## 2020-12-03 RX ADMIN — IPRATROPIUM BROMIDE 2 PUFF: 17 AEROSOL, METERED RESPIRATORY (INHALATION) at 08:54

## 2020-12-03 RX ADMIN — ACETAMINOPHEN 650 MG: 325 TABLET ORAL at 11:56

## 2020-12-03 RX ADMIN — ATORVASTATIN CALCIUM 10 MG: 10 TABLET, FILM COATED ORAL at 17:54

## 2020-12-03 RX ADMIN — ALBUTEROL SULFATE 2 PUFF: 90 AEROSOL, METERED RESPIRATORY (INHALATION) at 08:52

## 2020-12-03 RX ADMIN — BUDESONIDE AND FORMOTEROL FUMARATE DIHYDRATE 2 PUFF: 80; 4.5 AEROSOL RESPIRATORY (INHALATION) at 19:02

## 2020-12-03 RX ADMIN — ALBUTEROL SULFATE 2 PUFF: 90 AEROSOL, METERED RESPIRATORY (INHALATION) at 15:55

## 2020-12-03 RX ADMIN — IPRATROPIUM BROMIDE 2 PUFF: 17 AEROSOL, METERED RESPIRATORY (INHALATION) at 12:20

## 2020-12-03 RX ADMIN — CEFEPIME 2 G: 2 INJECTION, POWDER, FOR SOLUTION INTRAVENOUS at 11:22

## 2020-12-03 RX ADMIN — ACETAMINOPHEN 650 MG: 325 TABLET ORAL at 17:54

## 2020-12-03 RX ADMIN — METHYLPREDNISOLONE SODIUM SUCCINATE 40 MG: 40 INJECTION, POWDER, FOR SOLUTION INTRAMUSCULAR; INTRAVENOUS at 23:44

## 2020-12-03 RX ADMIN — TRAZODONE HYDROCHLORIDE 50 MG: 50 TABLET ORAL at 21:03

## 2020-12-03 RX ADMIN — VANCOMYCIN HYDROCHLORIDE 1000 MG: 1 INJECTION, POWDER, LYOPHILIZED, FOR SOLUTION INTRAVENOUS at 09:27

## 2020-12-03 RX ADMIN — METHYLPREDNISOLONE SODIUM SUCCINATE 40 MG: 40 INJECTION, POWDER, FOR SOLUTION INTRAMUSCULAR; INTRAVENOUS at 11:22

## 2020-12-03 RX ADMIN — METOPROLOL TARTRATE 50 MG: 50 TABLET, FILM COATED ORAL at 21:03

## 2020-12-03 RX ADMIN — BUSPIRONE HYDROCHLORIDE 5 MG: 5 TABLET ORAL at 21:03

## 2020-12-03 RX ADMIN — IPRATROPIUM BROMIDE 2 PUFF: 17 AEROSOL, METERED RESPIRATORY (INHALATION) at 19:02

## 2020-12-03 RX ADMIN — ACETAMINOPHEN 650 MG: 325 TABLET ORAL at 23:44

## 2020-12-03 RX ADMIN — PANTOPRAZOLE SODIUM 40 MG: 40 TABLET, DELAYED RELEASE ORAL at 14:52

## 2020-12-03 RX ADMIN — SODIUM CHLORIDE, PRESERVATIVE FREE 10 ML: 5 INJECTION INTRAVENOUS at 09:27

## 2020-12-03 RX ADMIN — ALBUTEROL SULFATE 2 PUFF: 90 AEROSOL, METERED RESPIRATORY (INHALATION) at 12:18

## 2020-12-03 RX ADMIN — FUROSEMIDE 20 MG: 10 INJECTION, SOLUTION INTRAVENOUS at 11:56

## 2020-12-03 RX ADMIN — CEFEPIME 2 G: 2 INJECTION, POWDER, FOR SOLUTION INTRAVENOUS at 21:07

## 2020-12-03 RX ADMIN — CEFEPIME 2 G: 2 INJECTION, POWDER, FOR SOLUTION INTRAVENOUS at 02:31

## 2020-12-03 RX ADMIN — ALBUTEROL SULFATE 2 PUFF: 90 AEROSOL, METERED RESPIRATORY (INHALATION) at 19:02

## 2020-12-03 RX ADMIN — BUSPIRONE HYDROCHLORIDE 5 MG: 5 TABLET ORAL at 14:52

## 2020-12-03 RX ADMIN — IPRATROPIUM BROMIDE 2 PUFF: 17 AEROSOL, METERED RESPIRATORY (INHALATION) at 15:57

## 2020-12-03 RX ADMIN — SODIUM CHLORIDE, PRESERVATIVE FREE 10 ML: 5 INJECTION INTRAVENOUS at 21:03

## 2020-12-03 ASSESSMENT — PAIN SCALES - GENERAL
PAINLEVEL_OUTOF10: 0
PAINLEVEL_OUTOF10: 3
PAINLEVEL_OUTOF10: 3
PAINLEVEL_OUTOF10: 10
PAINLEVEL_OUTOF10: 0
PAINLEVEL_OUTOF10: 3
PAINLEVEL_OUTOF10: 0
PAINLEVEL_OUTOF10: 0
PAINLEVEL_OUTOF10: 3

## 2020-12-03 ASSESSMENT — PAIN DESCRIPTION - LOCATION
LOCATION: GENERALIZED
LOCATION: GENERALIZED

## 2020-12-03 ASSESSMENT — PAIN DESCRIPTION - FREQUENCY
FREQUENCY: CONTINUOUS
FREQUENCY: CONTINUOUS

## 2020-12-03 ASSESSMENT — PAIN DESCRIPTION - PROGRESSION: CLINICAL_PROGRESSION: GRADUALLY WORSENING

## 2020-12-03 ASSESSMENT — PAIN DESCRIPTION - DESCRIPTORS
DESCRIPTORS: DISCOMFORT
DESCRIPTORS: DISCOMFORT

## 2020-12-03 ASSESSMENT — PAIN DESCRIPTION - PAIN TYPE
TYPE: CHRONIC PAIN
TYPE: CHRONIC PAIN

## 2020-12-03 ASSESSMENT — PAIN DESCRIPTION - ONSET
ONSET: ON-GOING
ONSET: ON-GOING

## 2020-12-03 NOTE — PLAN OF CARE
PT resting comfortable in bed, appears to be asleep. PT repositioned for comfort. Bed in lowest locked position. Call light within reach.  Will continue to monitor for PT safety

## 2020-12-03 NOTE — CONSULTS
Consult completed. #22ga Nexiva Diffusics High Pressure Injectable PIV initiated to RUE hand and sterile dressing with SkinPrep & SwabCap applied. Pt tolerated well; Primary RN notified.

## 2020-12-03 NOTE — PROGRESS NOTES
Heart Disease in her father and another family member; Kidney Disease in her son; Stroke in her mother. Allergies   Allergen Reactions    Ativan [Lorazepam] Other (See Comments)     Violent, thrashing and combative. She has lacerations and bruising, had to be tied down.  Etanercept Other (See Comments)     No response    Humira [Adalimumab]     Prochlorperazine Edisylate Other (See Comments)     \"Compazine\"   Involuntary Muscle Spasms     Remicade [Infliximab Injection]     Doxycycline Other (See Comments)     Stomach pains     Social History:    Reviewed; no changes    Objective:   PHYSICAL EXAM:        VITALS:  /66   Pulse 89   Temp 97.6 °F (36.4 °C) (Oral)   Resp 29   Ht 5' 7\" (1.702 m)   Wt 125 lb 14.1 oz (57.1 kg)   SpO2 90%   BMI 19.72 kg/m²     24HR INTAKE/OUTPUT:      Intake/Output Summary (Last 24 hours) at 12/3/2020 1122  Last data filed at 12/3/2020 0600  Gross per 24 hour   Intake --   Output 225 ml   Net -225 ml       CONSTITUTIONAL:  awake, alert. LUNGS:  decreased breath sounds, occ basilar crackles. CARDIOVASCULAR:  normal S1 and S2 and negative JVD  ABD:Abdomen soft, non-tender. BS normal. No masses,  No organomegaly  NEURO:Alert and oriented x3. Gait normal. Reflexes and motor strength normal and symmetric. Cranial nerves 2-12 and sensation grossly intact.   DATA:    CBC:  Recent Labs     12/01/20 1745 12/02/20 0659 12/03/20  0600   WBC 16.8* 18.2* 11.2*   RBC 3.70* 4.01* 3.93*   HGB 9.5* 10.0* 9.7*   HCT 31.7* 37.8 37.0   * 427 325   MCV 85.7 94.3 94.1   MCH 25.7* 24.9* 24.7*   MCHC 30.0* 26.5* 26.2*   RDW 20.1* 20.5* 20.6*   SEGSPCT 83.1* 82.0* 83.0*   BANDSPCT  --  8  --       BMP:  Recent Labs     12/01/20 1745 12/02/20  0659 12/03/20  0600    136 139   K 3.3* 3.8 3.4*    97* 98*   CO2 29 19* 28   BUN 14 11 22   CREATININE 0.6 0.5* 0.7   CALCIUM 9.0 8.6 8.0*   GLUCOSE 106* 96 95      ABG:  Recent Labs     12/02/20  0930   PH 7.48*   PO2ART 109* ATZ5QVI 42.0   O2SAT 96.3     Lab Results   Component Value Date    PROBNP 33,145 (H) 12/03/2020    PROBNP 12,038 (H) 12/01/2020    PROBNP 20,453 (H) 11/21/2020     No results found for: CULTRESP    Radiology Review:  Pertinent images / reports were reviewed as a part of this visit. Assessment:     Patient Active Problem List   Diagnosis    Rheumatoid Arthritis    Hypertension    Hyperlipidemia    Fibromyalgia    Migraine    Chronic pain syndrome    Depression    Osteoporosis    Vitamin B12 deficiency    Lumbar spinal stenosis    Panic attack    Takotsubo syndrome    Blood loss anemia    COPD, severe (HCC)    Acute exacerbation of chronic obstructive pulmonary disease (COPD) ([address]    Former smoker    Shortness of breath    Back pain    Intractable low back pain    Chronic fatigue    Pneumonia of right upper lobe due to infectious organism    Wide-complex tachycardia (HCC)    Gram-negative pneumonia (HCC)    Acute systolic heart failure (HCC)    Symptomatic anemia    Chronic hypoxemic respiratory failure (HCC)    Acute hypoxemic respiratory failure due to COVID-19 (HCC)    Transaminitis    PNA (pneumonia)    Alzheimer's disease ([address]    Severe malnutrition ([address]       Plan:   1. Overall the patient has improved. 2. Inc. activity. 3. Gentle diuresis.     Jamal Spicer MD  12/3/2020  11:22 AM

## 2020-12-03 NOTE — DISCHARGE INSTR - COC
Continuity of Care Form    Patient Name: Velma Arnett   :  1940  MRN:  [id number]    Admit date:  2020  Discharge date:  2020    Code Status Order: Full Code   Advance Directives:      Admitting Physician:  Nurys Mendez DO  PCP: Michela Gray MD    Discharging Nurse: Sally Fabian RN  [address] Unit/Room#: -A  Discharging Unit Phone Number: [phone]    Emergency Contact:   Extended Emergency Contact Information  Primary Emergency Contact: sunny hameed  Home Phone: [phone]  Relation: Child  Preferred language: English   needed? No  Secondary Emergency Contact: maximino hameed  Home Phone: [phone]  Relation: Child  Preferred language: English   needed?  No    Past Surgical History:  Past Surgical History:   Procedure Laterality Date    APPENDECTOMY  1966    CARDIAC CATHETERIZATION  2017    CHOLECYSTECTOMY  1966    w/ appendectomy    COLONOSCOPY N/A 3/11/2020    COLONOSCOPY WITH BIOPSY performed by Jerri [facility]tholomew [facility] at [facility] Right     FIXATION KYPHOPLASTY  2019    L4 kyphoplasty ; [facility]    FOOT SURGERY  1986    HYSTERECTOMY      MT COLONOSCOPY FLX DX W/COLLJ SPEC WHEN PFRMD N/A 10/2/2018    COLONOSCOPY DIAGNOSTIC OR SCREENING performed by Bridget Hook MD at [address] [place] [address] SURGERY Left 2013    [place];     UPPER GASTROINTESTINAL ENDOSCOPY N/A 3/11/2020    EGD BIOPSY performed by Jerri Bartholomew MD at [place] [address] VERTEBROPLASTY  10/2010    L3    WRIST FUSION Left        Immunization History:   Immunization History   Administered Date(s) Administered    Influenza Vaccine, unspecified formulation 10/11/2013    Influenza Virus Vaccine 10/15/2014, 10/14/2015, 10/12/2016, 10/16/2017    Influenza, High Dose (Fluzone 65 yrs and older) 2018    Influenza, Triv, inactivated, subunit, adjuvanted, IM (Fluad 65 yrs and older) 10/04/2019   

## 2020-12-03 NOTE — PROGRESS NOTES
Speech Language Pathology  Facility/Department: [facility] CVICU   CLINICAL BEDSIDE SWALLOW EVALUATION    NAME: Roge Quintanilla  : 1940  MRN: [id number]    IMPRESSIONS: Roge Quintanilla was referred for a bedside swallow evaluation after being readmitted to [facility] with COVID PNA. Medical hx includes chronic respiratory failure, anemia, CKD, afib, COPD, rheumatoid arthritis. She was previously seen by SLP and recommended dysphagia II diet/thin liquids. Pt seen for evaluation seated upright in chair, alert, cooperative. Oral mechanism examination WFL. Upper and lower dentures were placed for evaluation. She accepted PO trials of thin liquids via tsp/cup/straw, puree, soft and regular solids. Oral stage WFL with slow/adequate mastication, intact AP transit and clearance. Mastication impacted primarily by poorly fitting dentures. Pharyngeal stage WFL with age-appropriate swallow initiation and laryngeal elevation. No s/s aspiration across all trials. Recommend dysphagia II diet/thin liquids. This is consistent with pt's report of baseline soft diet. No further acute SLP needs identified. ADMISSION DATE: 2020  ADMITTING DIAGNOSIS: has Rheumatoid Arthritis; Hypertension; Hyperlipidemia; Fibromyalgia; Migraine; Chronic pain syndrome; Depression; Osteoporosis; Vitamin B12 deficiency; Lumbar spinal stenosis; Panic attack; Takotsubo syndrome; Blood loss anemia; COPD, severe ([address] Acute exacerbation of chronic obstructive pulmonary disease (COPD) ([address] Former smoker; Shortness of breath; Back pain; Intractable low back pain; Chronic fatigue; Pneumonia of right upper lobe due to infectious organism; Wide-complex tachycardia ([address] Gram-negative pneumonia ([address] Acute systolic heart failure ([address] Symptomatic anemia; Chronic hypoxemic respiratory failure ([address] Acute hypoxemic respiratory failure due to COVID-19 [facility]); Transaminitis; PNA (pneumonia);  Alzheimer's disease ([address] and Severe malnutrition ([address] on their problem list.  ONSET DATE: this admission    Recent Chest Xray/CT of Chest: see chart    Date of Eval: 12/3/2020  Evaluating Therapist: Darline Shaikh    Current Diet level:  Current Diet : NPO  Current Liquid Diet : NPO      Primary Complaint  Patient Complaint: reports neck pain    Pain:  Pain Assessment  Pain Assessment: 0-10  Pain Level: 3  Patient's Stated Pain Goal: No pain  Pain Type: Chronic pain  Pain Location: Generalized  Pain Descriptors: Discomfort  Pain Frequency: Continuous  Pain Onset: On-going  Clinical Progression: Gradually worsening    Reason for Referral  Angle Ferrell was referred for a bedside swallow evaluation to assess the efficiency of her swallow function, identify signs and symptoms of aspiration and make recommendations regarding safe dietary consistencies, effective compensatory strategies, and safe eating environment. Impression  Dysphagia Diagnosis: Swallow function appears grossly intact  Dysphagia Outcome Severity Scale: Level 6: Within functional limits/Modified independence     Treatment Plan  Requires SLP Intervention: No  Duration/Frequency of Treatment: N/A          Recommended Diet and Intervention  Diet Solids Recommendation: Dysphagia Minced and Moist (Dysphagia II)  Liquid Consistency Recommendation: Thin  Recommended Form of Meds: PO          Compensatory Swallowing Strategies  Compensatory Swallowing Strategies: Upright as possible for all oral intake    Treatment/Goals  Short-term Goals  Timeframe for Short-term Goals: N/A    General  Chart Reviewed: Yes  Behavior/Cognition: Alert; Cooperative  Respiratory Status: O2 via nasual cannula  O2 Device: Nasal cannula  Communication Observation: Functional  Follows Directions: Simple  Dentition: Edentulous; Dentures bottom; Dentures top  Patient Positioning: Upright in chair  Baseline Vocal Quality: Normal  Prior Dysphagia History: last recommended dysphagia II diet/thin liquids  Consistencies Administered: Reg

## 2020-12-03 NOTE — PROGRESS NOTES
Occupational Therapy  [facility] OCCUPATIONAL THERAPY EVALUATION    History  [place]:  The primary encounter diagnosis was Respiratory insufficiency. Diagnoses of HCAP (healthcare-associated pneumonia), Hypomagnesemia, Hypokalemia, and History of 2019 novel coronavirus disease (COVID-19) were also pertinent to this visit. Restrictions:                           Communication with other providers: RN, PT    Subjective:  Patient states:  \"I don't think you are big enough to help me\"  Pain:  none  Patient goal:  improve function and safety    Occupational profile (relevant social history and personal factors):    Social/Functional History  Lives With: Alone  Type of Home: Assisted living  Home Layout: One level  Home Access: Level entry  Home Equipment: 4 wheeled walker  ADL Assistance: Needs assistance(assist with bathing, able to dress and toilet self)  Ambulation Assistance: Independent(with 4ww)  Transfer Assistance: Independent  Additional Comments: Has had a significant decline the last few months from getting sick and has not been moving much at all. Has been at the SNF    Examination of body systems (includes body structures/functions, activity/participation limitations):  · Orientation: WFL   · Cognition:  WFL   · Observation:  Received pt in bed. Alert and cooperative but anxious. pt is frail. 2L of O2  · Vision:  WFL   · Hearing:  WFL   · ROM:  WFL BUE  · Strength: 4/5 BUE  · Sensation:  Not tested    ADLs  Feeding: SBA    Grooming: SBA in seated    Dressing: UB Jane in seated LB MaxA    Bathing: UB Jane in seated LB MaxA    Toileting: MaxA    *Some ADL determined per observation of actual ADL performance, functional mobility, balance, activity tolerance, and cognition.      AM-PAC 6 click short form for inpatient daily activity:  Raw Score: 15  24/24 = unimpaired  23/24 = 1-20% impaired   20/24-22/24 = 21-40% impaired  15/24-19/24 = 41-59% impaired   10/24-14/24 = 60%-79% impaired  7/24-9/24 = 80%-99% impaired  6/24 = 100% impaired    Functional Mobility  Bed mobility:   Supine to sit: tactile cues for log roll to R side, Jane for trunk lift from sidelying    Sitting balance: supervision    Transfers:   Sit to stand: Jane for steadying, tactile cues from hand placement, 2 reps from EOB  Stand to sit: Jane for steadying, safety cues, one rep to EOB, one rep to chair    Ambulation:  Jane c RW 7' bed to chair. Unsteady and shaky. Activity tolerance  WFL c soft encouragement. She is very anxious. Assessment:  Assessment  Performance deficits / Impairments: Decreased cognition, Decreased functional mobility , Decreased high-level IADLs, Decreased ADL status, Decreased endurance, Decreased strength, Decreased safe awareness, Decreased balance, Decreased posture  Treatment Diagnosis: COVID-19  Prognosis: Good  REQUIRES OT FOLLOW UP: Yes  Discharge Recommendations: Subacute/Skilled Nursing Facility      Goals:  By d/c or goals met:     Pt will perform all bed mobility with CGA in prep for EOB/OOB activity. Pt will perform all functional transfers with CGA and appropriate use of LRD to bed, toilet, chair in prep for increased functional independence. Pt will perform UB ADLs with CGA to increase functional independence. Pt will perform LB ADLs with CGA to increase functional independence. Pt will perform all aspects of toileting with CGA to increase functional independence. Pt will participate in therex/therax c emphasis on strength, activity tolerance,  safety, DIDIER tasks. Plan:  Plan  Times per week: 2x      Recommendation for activity with nursing staff:  Nasim Kasper bed to chair c RW    Treatment today:    Therapeutic Activity Training:   Therapeutic activity training was instructed today. Cues were given for safety, sequence, UE/LE placement, visual cues, and balance.     Activities performed today included bed mobility training, sup-sit, sit-stand, short walk with walker  Education: Role of OT, OT POC, d/c needs, home safety    Safety: Left in chair with all needs in reach. chair alarm applied. Gait belt used for transfer and mobility. Time in:  1020  Time out:  1040  Timed treatment minutes:  8  Total treatment time:  20    Electronically signed by:    410Anca Tao, OTR/L, [place]   [id number]   1:42 PM, 12/3/2020

## 2020-12-03 NOTE — PROGRESS NOTES
Physical Therapy  [address] [place] [address], 1940, 2002/2002-A, 12/3/2020    History  [place]:  The primary encounter diagnosis was Respiratory insufficiency. Diagnoses of HCAP (healthcare-associated pneumonia), Hypomagnesemia, Hypokalemia, and History of 2019 novel coronavirus disease (COVID-19) were also pertinent to this visit. Patient  has a past medical history of Acute DVT of right tibial vein (HCC), Acute exacerbation of chronic obstructive pulmonary disease (COPD) ([address] Anemia, Arthritis, Atrial fibrillation ([address] CHF (congestive heart failure) ([address] Chronic hypoxemic respiratory failure (HCC), Chronic pain syndrome, Clostridium difficile colitis, COPD, severe ([address] Depression, Fibromyalgia, Former smoker, Herniated cervical disc, Herpes zoster ophthalmicus, Hx of blood clots, Hyperlipidemia, Hypertension, Kidney disease, L3 vertebral fracture ([address] Lumbar spinal stenosis, Migraine, Nocturnal hypoxemia, Osteoporosis, Rheumatoid arthritis(714.0), S/P cardiac catheterization, Shortness of breath, Takotsubo syndrome, TMJ dysfunction, Tobacco abuse, and Vitamin B12 deficiency. Patient  has a past surgical history that includes Cholecystectomy (1966); devyn and bso (cervix removed) (1991); Wrist fusion (Left, 2000); Elbow surgery (Right); Appendectomy (1966); vertebroplasty (10/2010); Foot surgery (1986); Toe Surgery (Left, 4/25/2013); Cardiac catheterization (05/21/2017); pr colonoscopy flx dx w/collj spec when pfrmd (N/A, 10/2/2018); Fixation Kyphoplasty (06/12/2019); Hysterectomy; Upper gastrointestinal endoscopy (N/A, 3/11/2020); and Colonoscopy (N/A, 3/11/2020). Subjective:  Patient states:  \"What am I going to be doing? \"  Pain:  Denies   Communication with other providers:  Handoff to RN, co-eval with Nirmal VAZQUEZ   Restrictions: droplet plus     Home Setup/Prior level of function  Social/Functional History  Lives With: Alone  Type of Home: Assisted living  Home Layout: One level  Home Access: Level entry  Home Equipment: 4 wheeled walker  ADL Assistance: Needs assistance(assist with bathing, able to dress and toilet self)  Ambulation Assistance: Independent(with 4ww)  Transfer Assistance: Independent  Additional Comments: Has had a significant decline the last few months from getting sick and has not been moving much at all. Has been at the SNF    Examination of body systems (includes body structures/functions, activity/participation limitations):  · Observation:  Supine in bed upon arrival. Cooperative with therapy though anxious. · Vision:  [employer] [place]  · [facility] [place]   · Cardiopulmonary:  2L O2, vitals stable throughout session. Musculoskeletal  · ROM R/L:  [facility] BLE[place]  · Strength R/L:  BLEs 4/5. Dec strength observed  in function and endurance. Mobility/treatment:  · Rolling L/R:  NT   · Supine to sit:  Cristina for small amount of trunk boost. Slow pace with HOB elevated ~45 deg   · Transfers:   · Sit to stand: (2x) from EOB Cristina for steadying. Cues for safe hand sequencing from bed to RW   · Stand to sit: rCistina to EOB and recliner. Cues for hand sequencing back to seat surface for greater control  · Step pivot: with RW Cristina for steadying   · Sitting balance:  SBA at EOB static with BUE support x ~5 minutes  · Standing balance:  CGA to Cristina at RW. Small amount of posterior sway/LOB   · Gait: ~5ft to recliner CGA to Cristina for steadying. Slow pace with fwd posture. Assistance required for AD management. Declined further distance due to fear and fatigue. · Educated pt on POC, role of PT, DME use, discharge. Cues provided to inc safety and indep with mobility     [facility] 6 Clicks Inpatient Mobility:  AM-PAC Inpatient Mobility Raw Score : 17    Safety: patient left in chair, call light within reach, RN notified, gait belt used. Assessment:  Pt is an [de-identified]year old female admitted with fatigue, fever, and positive covid-19.  Recommend return to subacute rehab once medically stable. At baseline she is indep with gross mobility but has had a decline in functional status since being sick the last few months. She is functioning below her baseline and would benefit from continued therapy to address her current deficits, dec potential fall risk, and restore function. Complexity: Moderate  Prognosis: Good, no significant barriers to participation at this time.    Plan Times per week: 2+/week, 1 week  Discharge Recommendations: Subacute/Skilled Nursing Facility  Equipment: continue to assess at next level of care     Goals:  Short term goals  Time Frame for Short term goals: 1 week  Short term goal 1: Pt will perform sit><supine SBA  Short term goal 2: Pt will transfer sit><stand SBA  Short term goal 3: Pt will transfer between surfaces SBA  Short term goal 4: Pt will ambulate 50ft with RW SBA       Treatment plan:  Bed mobility, transfers, balance, gait, TA, TX     Recommendations for NURSING mobility: stand step pivot with RW     Time:   Time in: 1020  Time out: 1040  Timed treatment minutes: 10  Total time: 20    Electronically signed by:    Renee Reis [id number]  12/3/2020, 12:58 PM

## 2020-12-03 NOTE — CARE COORDINATION
Patient is from [facility]; will contact admissions for status. Buck Vaz RN    1000 Contacted [facility]; spoke to Whit Schwartz. Patient lives in [address] [facility] but has been in [facility] since developing Covid. Patient will need precert to return. Sanam added for PT/OT to see ASAP. Buck Vaz RN    5821 Contacted [facility]; spoke to Whit Schwartz. They will initiate precert to return.  Buck Vaz RN

## 2020-12-04 LAB
ANION GAP SERPL CALCULATED.3IONS-SCNC: 13 MMOL/L (ref 4–16)
ANISOCYTOSIS: ABNORMAL
BASOPHILS ABSOLUTE: 0 K/CU MM
BASOPHILS RELATIVE PERCENT: 0.1 % (ref 0–1)
BUN BLDV-MCNC: 34 MG/DL (ref 6–23)
CALCIUM SERPL-MCNC: 8.1 MG/DL (ref 8.3–10.6)
CHLORIDE BLD-SCNC: 95 MMOL/L (ref 99–110)
CO2: 26 MMOL/L (ref 21–32)
CREAT SERPL-MCNC: 0.8 MG/DL (ref 0.6–1.1)
CULTURE: NORMAL
DIFFERENTIAL TYPE: ABNORMAL
DOSE AMOUNT: ABNORMAL
DOSE TIME: ABNORMAL
EKG ATRIAL RATE: 90 BPM
EKG DIAGNOSIS: NORMAL
EKG P AXIS: 60 DEGREES
EKG P-R INTERVAL: 204 MS
EKG Q-T INTERVAL: 486 MS
EKG QRS DURATION: 158 MS
EKG QTC CALCULATION (BAZETT): 594 MS
EKG R AXIS: 115 DEGREES
EKG T AXIS: -69 DEGREES
EKG VENTRICULAR RATE: 90 BPM
EOSINOPHILS ABSOLUTE: 0 K/CU MM
EOSINOPHILS RELATIVE PERCENT: 0 % (ref 0–3)
GFR AFRICAN AMERICAN: >60 ML/MIN/1.73M2
GFR NON-AFRICAN AMERICAN: >60 ML/MIN/1.73M2
GLUCOSE BLD-MCNC: 118 MG/DL (ref 70–99)
HCT VFR BLD CALC: 31.5 % (ref 37–47)
HEMOGLOBIN: 9.4 GM/DL (ref 12.5–16)
IMMATURE NEUTROPHIL %: 0.6 % (ref 0–0.43)
LEGIONELLA URINARY AG: NEGATIVE
LYMPHOCYTES ABSOLUTE: 0.7 K/CU MM
LYMPHOCYTES RELATIVE PERCENT: 5.6 % (ref 24–44)
Lab: NORMAL
MCH RBC QN AUTO: 25.5 PG (ref 27–31)
MCHC RBC AUTO-ENTMCNC: 29.8 % (ref 32–36)
MCV RBC AUTO: 85.4 FL (ref 78–100)
MONOCYTES ABSOLUTE: 0.4 K/CU MM
MONOCYTES RELATIVE PERCENT: 2.9 % (ref 0–4)
PDW BLD-RTO: 20.5 % (ref 11.7–14.9)
PLATELET # BLD: 329 K/CU MM (ref 140–440)
PLT MORPHOLOGY: ABNORMAL
PMV BLD AUTO: 10.3 FL (ref 7.5–11.1)
POLYCHROMASIA: ABNORMAL
POTASSIUM SERPL-SCNC: 3.4 MMOL/L (ref 3.5–5.1)
PRO-BNP: 4299 PG/ML
PROCALCITONIN: 0.2
RBC # BLD: 3.69 M/CU MM (ref 4.2–5.4)
SEGMENTED NEUTROPHILS ABSOLUTE COUNT: 11.1 K/CU MM
SEGMENTED NEUTROPHILS RELATIVE PERCENT: 90.8 % (ref 36–66)
SODIUM BLD-SCNC: 134 MMOL/L (ref 135–145)
SPECIMEN: NORMAL
STREP PNEUMONIAE ANTIGEN: NORMAL
TOTAL IMMATURE NEUTOROPHIL: 0.07 K/CU MM
TOXIC GRANULATION: PRESENT
VANCOMYCIN TROUGH: 27.6 UG/ML (ref 10–20)
WBC # BLD: 12.3 K/CU MM (ref 4–10.5)

## 2020-12-04 PROCEDURE — 83880 ASSAY OF NATRIURETIC PEPTIDE: CPT

## 2020-12-04 PROCEDURE — 6360000002 HC RX W HCPCS: Performed by: INTERNAL MEDICINE

## 2020-12-04 PROCEDURE — 6370000000 HC RX 637 (ALT 250 FOR IP): Performed by: INTERNAL MEDICINE

## 2020-12-04 PROCEDURE — 85025 COMPLETE CBC W/AUTO DIFF WBC: CPT

## 2020-12-04 PROCEDURE — 2580000003 HC RX 258: Performed by: INTERNAL MEDICINE

## 2020-12-04 PROCEDURE — 97530 THERAPEUTIC ACTIVITIES: CPT

## 2020-12-04 PROCEDURE — 2700000000 HC OXYGEN THERAPY PER DAY

## 2020-12-04 PROCEDURE — 84145 PROCALCITONIN (PCT): CPT

## 2020-12-04 PROCEDURE — 80048 BASIC METABOLIC PNL TOTAL CA: CPT

## 2020-12-04 PROCEDURE — 93010 ELECTROCARDIOGRAM REPORT: CPT | Performed by: INTERNAL MEDICINE

## 2020-12-04 PROCEDURE — 1200000000 HC SEMI PRIVATE

## 2020-12-04 PROCEDURE — 94640 AIRWAY INHALATION TREATMENT: CPT

## 2020-12-04 PROCEDURE — 80202 ASSAY OF VANCOMYCIN: CPT

## 2020-12-04 PROCEDURE — 94761 N-INVAS EAR/PLS OXIMETRY MLT: CPT

## 2020-12-04 PROCEDURE — 97535 SELF CARE MNGMENT TRAINING: CPT

## 2020-12-04 RX ORDER — FUROSEMIDE 10 MG/ML
20 INJECTION INTRAMUSCULAR; INTRAVENOUS ONCE
Status: COMPLETED | OUTPATIENT
Start: 2020-12-04 | End: 2020-12-04

## 2020-12-04 RX ADMIN — METHYLPREDNISOLONE SODIUM SUCCINATE 40 MG: 40 INJECTION, POWDER, FOR SOLUTION INTRAMUSCULAR; INTRAVENOUS at 23:08

## 2020-12-04 RX ADMIN — LISINOPRIL 5 MG: 10 TABLET ORAL at 09:38

## 2020-12-04 RX ADMIN — PANTOPRAZOLE SODIUM 40 MG: 40 TABLET, DELAYED RELEASE ORAL at 06:26

## 2020-12-04 RX ADMIN — IPRATROPIUM BROMIDE 2 PUFF: 17 AEROSOL, METERED RESPIRATORY (INHALATION) at 16:22

## 2020-12-04 RX ADMIN — CYANOCOBALAMIN TAB 1000 MCG 1000 MCG: 1000 TAB at 12:45

## 2020-12-04 RX ADMIN — CEFEPIME 2 G: 2 INJECTION, POWDER, FOR SOLUTION INTRAVENOUS at 03:10

## 2020-12-04 RX ADMIN — BUSPIRONE HYDROCHLORIDE 5 MG: 5 TABLET ORAL at 09:38

## 2020-12-04 RX ADMIN — ACETAMINOPHEN 650 MG: 325 TABLET ORAL at 07:48

## 2020-12-04 RX ADMIN — SODIUM CHLORIDE, PRESERVATIVE FREE 10 ML: 5 INJECTION INTRAVENOUS at 19:54

## 2020-12-04 RX ADMIN — ALBUTEROL SULFATE 2 PUFF: 90 AEROSOL, METERED RESPIRATORY (INHALATION) at 08:38

## 2020-12-04 RX ADMIN — METOPROLOL TARTRATE 50 MG: 50 TABLET, FILM COATED ORAL at 19:55

## 2020-12-04 RX ADMIN — PANTOPRAZOLE SODIUM 40 MG: 40 TABLET, DELAYED RELEASE ORAL at 15:49

## 2020-12-04 RX ADMIN — BUSPIRONE HYDROCHLORIDE 5 MG: 5 TABLET ORAL at 19:55

## 2020-12-04 RX ADMIN — FERROUS SULFATE TAB 325 MG (65 MG ELEMENTAL FE) 325 MG: 325 (65 FE) TAB at 06:26

## 2020-12-04 RX ADMIN — IPRATROPIUM BROMIDE 2 PUFF: 17 AEROSOL, METERED RESPIRATORY (INHALATION) at 21:07

## 2020-12-04 RX ADMIN — VANCOMYCIN HYDROCHLORIDE 1000 MG: 1 INJECTION, POWDER, LYOPHILIZED, FOR SOLUTION INTRAVENOUS at 03:04

## 2020-12-04 RX ADMIN — NEOMYCIN AND POLYMYXIN B SULFATES, BACITRACIN ZINC, AND HYDROCORTISONE: 3.5; 5000; 400; 1 OINTMENT TOPICAL at 12:20

## 2020-12-04 RX ADMIN — CEFEPIME 2 G: 2 INJECTION, POWDER, FOR SOLUTION INTRAVENOUS at 19:54

## 2020-12-04 RX ADMIN — AMIODARONE HYDROCHLORIDE 200 MG: 200 TABLET ORAL at 09:39

## 2020-12-04 RX ADMIN — ALBUTEROL SULFATE 2 PUFF: 90 AEROSOL, METERED RESPIRATORY (INHALATION) at 16:22

## 2020-12-04 RX ADMIN — ALBUTEROL SULFATE 2 PUFF: 90 AEROSOL, METERED RESPIRATORY (INHALATION) at 21:07

## 2020-12-04 RX ADMIN — METHYLPREDNISOLONE SODIUM SUCCINATE 40 MG: 40 INJECTION, POWDER, FOR SOLUTION INTRAMUSCULAR; INTRAVENOUS at 12:22

## 2020-12-04 RX ADMIN — Medication 2000 UNITS: at 12:44

## 2020-12-04 RX ADMIN — SODIUM CHLORIDE, PRESERVATIVE FREE 10 ML: 5 INJECTION INTRAVENOUS at 09:40

## 2020-12-04 RX ADMIN — CETIRIZINE HYDROCHLORIDE 10 MG: 10 TABLET, FILM COATED ORAL at 09:39

## 2020-12-04 RX ADMIN — ASPIRIN 81 MG CHEWABLE TABLET 81 MG: 81 TABLET CHEWABLE at 09:39

## 2020-12-04 RX ADMIN — ATORVASTATIN CALCIUM 10 MG: 10 TABLET, FILM COATED ORAL at 18:31

## 2020-12-04 RX ADMIN — IPRATROPIUM BROMIDE 2 PUFF: 17 AEROSOL, METERED RESPIRATORY (INHALATION) at 11:41

## 2020-12-04 RX ADMIN — FUROSEMIDE 20 MG: 10 INJECTION, SOLUTION INTRAVENOUS at 12:18

## 2020-12-04 RX ADMIN — ALBUTEROL SULFATE 2 PUFF: 90 AEROSOL, METERED RESPIRATORY (INHALATION) at 11:41

## 2020-12-04 RX ADMIN — ZINC SULFATE 220 MG (50 MG) CAPSULE 50 MG: CAPSULE at 12:45

## 2020-12-04 RX ADMIN — BUDESONIDE AND FORMOTEROL FUMARATE DIHYDRATE 2 PUFF: 80; 4.5 AEROSOL RESPIRATORY (INHALATION) at 08:39

## 2020-12-04 RX ADMIN — BUSPIRONE HYDROCHLORIDE 5 MG: 5 TABLET ORAL at 15:48

## 2020-12-04 RX ADMIN — TRAZODONE HYDROCHLORIDE 50 MG: 50 TABLET ORAL at 19:55

## 2020-12-04 RX ADMIN — SODIUM CHLORIDE, PRESERVATIVE FREE 10 ML: 5 INJECTION INTRAVENOUS at 12:19

## 2020-12-04 RX ADMIN — IPRATROPIUM BROMIDE 2 PUFF: 17 AEROSOL, METERED RESPIRATORY (INHALATION) at 08:39

## 2020-12-04 RX ADMIN — SERTRALINE 25 MG: 25 TABLET, FILM COATED ORAL at 09:39

## 2020-12-04 RX ADMIN — CEFEPIME 2 G: 2 INJECTION, POWDER, FOR SOLUTION INTRAVENOUS at 12:18

## 2020-12-04 RX ADMIN — RIVAROXABAN 20 MG: 20 TABLET, FILM COATED ORAL at 09:39

## 2020-12-04 RX ADMIN — BUDESONIDE AND FORMOTEROL FUMARATE DIHYDRATE 2 PUFF: 80; 4.5 AEROSOL RESPIRATORY (INHALATION) at 21:08

## 2020-12-04 RX ADMIN — VANCOMYCIN HYDROCHLORIDE 1000 MG: 1 INJECTION, POWDER, LYOPHILIZED, FOR SOLUTION INTRAVENOUS at 23:08

## 2020-12-04 RX ADMIN — METOPROLOL TARTRATE 50 MG: 50 TABLET, FILM COATED ORAL at 09:39

## 2020-12-04 ASSESSMENT — PAIN SCALES - GENERAL
PAINLEVEL_OUTOF10: 0
PAINLEVEL_OUTOF10: 8

## 2020-12-04 NOTE — PROGRESS NOTES
Hospitalist Progress Note      Name:  Shiraz Barksdale /Age/Sex: 1940  ([de-identified] y.o. female)   MRN & CSN:  [id number] & [id number] Admission Date/Time: 2020  4:52 PM   Location:  [facility] PCP: Sara Villa, [address] Day: [address] Course:   Shiraz Barksdale is a [de-identified] y.o. female who presented with sob    Assessment and Plan:      Acute respiratory failure with hypoxia secondary to covid viral pneumonia   Acute metabolic encephalopathy secondary to above virus infection   Chronic anemia of iron deficiency.  Chronic anxiety     Confusion continues  Talked to son on the phone who confirms she is at her baseline  Continue medications for covid treatment  wdw cm dc back to facility     Diet DIET GENERAL; Dysphagia Minced and Moist; Cardiac   DVT Prophylaxis [] Lovenox, []  Heparin, [] SCDs, []No VTE prophylaxis, patient ambulating   GI Prophylaxis [] PPI, [] H2 Blocker, [] No GI prophylaxis, patient is receiving diet/Tube Feeds   Code Status Full Code   Disposition Back to SNF/NH   MDM [] Low, [x] Moderate,[]  High  Patient's risk as above due to     [] One or more chronic illnesses with mild exacerbation progression    [x] Two or more stable chronic illnesses - chronic anxiety, anemia of chronic disease    [] Undiagnosed new problem with uncertain prognosis    [] Elective major surgery    []Prescription drug management       Subjective:     Pt S&E. Laying in bed in NAD  Not oriented to name or place but recognizes son's voice on phone. No reported fevers or chills     10-14 point ROS reviewed negative, unless as noted above    Objective:   No intake or output data in the 24 hours ending 20 1054   Vitals:   Vitals:    20 0838   BP:    Pulse:    Resp:    Temp:    SpO2: 95%     Physical Exam:    GEN Awake female, laying in bed in no apparent distress. Appears given age. EYES Pupils are equally round.   No scleral discharge  HENT Atraumatic and symmetric head  NECK No apparent thyromegaly  RESP Symmetric chest movement while on room air  CARDIO/VASC Peripheral pulses equal bilaterally and palpable. No peripheral edema. GI Abdomen is not distended. Rectal exam deferred.  Jeter catheter is not present. HEME/LYMPH No petechiae or ecchymoses. MSK Spontaneous movement of BL upper extremities  SKIN Normal coloration, warm, dry. NEURO Cranial nerves appear grossly intact  PSYCH Awake, alert.  Oriented to self     Medications:   Medications:    vancomycin  1,000 mg Intravenous Q18H    methylPREDNISolone  40 mg Intravenous Q12H    albuterol sulfate HFA  2 puff Inhalation Q4H WA    ipratropium  2 puff Inhalation Q4H WA    magnesium sulfate  2 g Intravenous Once    amiodarone  200 mg Oral Daily    aspirin  81 mg Oral Daily    atorvastatin  10 mg Oral QPM    bacitracin-neomycin-polymyxin b-hydrocortisone   Topical BID    budesonide-formoterol  2 puff Inhalation BID    busPIRone  5 mg Oral TID    vitamin D  2,000 Units Oral Daily    ferrous sulfate  325 mg Oral Daily with breakfast    Teriparatide (Recombinant)  20 mcg Subcutaneous Daily    lisinopril  5 mg Oral Daily    cetirizine  10 mg Oral Daily    metoprolol tartrate  50 mg Oral BID    pantoprazole  40 mg Oral BID AC    rivaroxaban  20 mg Oral Daily    sertraline  25 mg Oral Daily    traZODone  50 mg Oral Nightly    vitamin B-12  1,000 mcg Oral Daily    zinc sulfate  50 mg Oral Daily    sodium chloride flush  10 mL Intravenous 2 times per day    cefepime  2 g Intravenous Q8H      Infusions:   PRN Meds: hydrALAZINE (APRESOLINE) ivpb, 10 mg, Q4H PRN  ipratropium-albuterol, 3 mL, Q4H PRN  potassium chloride, 40 mEq, PRN    Or  potassium alternative oral replacement, 40 mEq, PRN    Or  potassium chloride, 10 mEq, PRN  polyethylene glycol, 17 g, Daily PRN  sodium chloride flush, 10 mL, PRN  acetaminophen, 650 mg, Q6H PRN    Or  acetaminophen, 650 mg, Q6H PRN  bisacodyl, 5 mg, Daily PRN          Electronically signed by Jose Perez DO on 12/4/2020 at 10:54 AM

## 2020-12-04 NOTE — PROGRESS NOTES
Physical Therapy    Physical Therapy Treatment Note  Name: Kelechi Shipman MRN: [id number] :   1940   Date:  2020   Admission Date: 2020 Room:  [address] [place] [address]   Restrictions/Precautions:        droplet plus   Communication with other providers:  OT, RN   Subjective:  Patient states:  \"I need cleaned up! \"   Pain:   Location, Type, Intensity (0/10 to 10/10): Denies   Objective:    Observation:    Supine in bed. Cooperative with therapy   Treatment, including education/measures:  Supine to sit: CGA for safety   Sit to supine: SBA for safety   Sit to stand: CGA for safety from EOB with VCs for hand sequencing to push from bed vs pull from RW. Performed 2x   Stand to sit:CGA for safety to EOB. Cues for reaching back for bed for support   Ambulation: CGA for ~5 side steps with RW. Minimal assistance for device management. Shuffled gait with slightly fwd posture. Educated pt on POC, role of PT, DME use. Assessment / Impression:    Patient's tolerance of treatment:  Good    Adverse Reaction: no  Significant change in status and impact:  no  Barriers to improvement:  Activity tolerance, strength, balance  Plan for Next Session:    Activity tolerance, strength, balance, gait ,transfers, bed mobility  Time in:  1205  Time out:  1215  Timed treatment minutes:  10  Total treatment time:  10    Previously filed items:  Social/Functional History  Lives With: Alone  Type of Home: Assisted living  Home Layout: One level  Home Access: Level entry  Home Equipment: 4 wheeled walker  ADL Assistance: Needs assistance(assist with bathing, able to dress and toilet self)  Ambulation Assistance: Independent(with 4ww)  Transfer Assistance: Independent  Additional Comments: Has had a significant decline the last few months from getting sick and has not been moving much at all.  Has been at the SNF  Short term goals  Time Frame for Short term goals: 1 week  Short term goal 1: Pt will perform sit><supine SBA  Short term goal 2: Pt will transfer sit><stand SBA  Short term goal 3: Pt will transfer between surfaces SBA  Short term goal 4: Pt will ambulate 50ft with RW SBA       Electronically signed by:    Chris Lewis [id number]  12/4/2020, 3:40 PM

## 2020-12-04 NOTE — PROGRESS NOTES
[facility]  consulted by Dr. Mindi Castro for monitoring and adjustment. Indication for treatment: COVID 19 pneumonia, possible secondary bacterial infection  Goal trough: 15 mcg/mL (AUC/DIONY 400-600)     Pertinent Laboratory Values:   Temp Readings from Last 3 Encounters:   12/04/20 97.8 °F (36.6 °C) (Oral)   11/23/20 98.2 °F (36.8 °C) (Oral)   04/09/20 97 °F (36.1 °C) (Temporal)     Recent Labs     12/01/20  1745 12/02/20  0659 12/03/20  0600 12/04/20  0600   WBC 16.8* 18.2* 11.2* 12.3*   LACTATE 0.9  --   --   --      Recent Labs     12/01/20  1745 12/02/20  0659 12/03/20  0600   BUN 14 11 22   CREATININE 0.6 0.5* 0.7     Estimated Creatinine Clearance: 58 mL/min (based on SCr of 0.7 mg/dL). No intake or output data in the 24 hours ending 12/04/20 1325    Pertinent Cultures:  Date    Source    Results  12/01   Covid-19   Negative  12/01   Strep pneumo/ Legionella Negative  12/01   Respiratory   Ordered  12/01   Blood    NGTD  12/02   MRSA nasal screen  Negative  12/2                             Resp panel                             COVID-19    Vancomycin level:   TROUGH:    Recent Labs     12/04/20  0800   VANCOTROUGH 27.6*     RANDOM:  No results for input(s): VANCORANDOM in the last 72 hours. Assessment:  · WBC and temperature: WBC steady around 12.3 (receiving methylprednisolone); pt remains afebrile  · SCr, BUN, and urine output:  SCR remains normal, no UOP data  · Day(s) of therapy: 3  · Vancomycin level: 12/4 - 27.6 (false trough, level drawn 5 hours post dose)    Plan:  · Trough rescheduled for tonight. · Renal trends are normal.  Will follow trends closely. · Continue Vancomycin 1000 mg q18h  · Nasal MRSA screen resulted negative, de-escalation may be considered. · Pharmacy will continue to monitor patient and adjust therapy as indicated     Charlie 12/4 @20:30    Thank you for the consult. Thony Yarbrough  12/4/2020 1:25 PM

## 2020-12-04 NOTE — PROGRESS NOTES
Pulmonary and Critical Care  Progress Note    Subjective: The patient is comfortable in bed. Shortness of breath better. Chest pain none. Addressing respiratory complaints Patient is negative for  hemoptysis and cyanosis. CONSTITUTIONAL:  negative for fevers and chills. Past Medical History:     has a past medical history of Acute DVT of right tibial vein (HCC), Acute exacerbation of chronic obstructive pulmonary disease (COPD) ([address] Anemia, Arthritis, Atrial fibrillation ([address] CHF (congestive heart failure) ([address] Chronic hypoxemic respiratory failure (HCC), Chronic pain syndrome, Clostridium difficile colitis, COPD, severe ([address] Depression, Fibromyalgia, Former smoker, Herniated cervical disc, Herpes zoster ophthalmicus, Hx of blood clots, Hyperlipidemia, Hypertension, Kidney disease, L3 vertebral fracture ([address] Lumbar spinal stenosis, Migraine, Nocturnal hypoxemia, Osteoporosis, Rheumatoid arthritis(714.0), S/P cardiac catheterization, Shortness of breath, Takotsubo syndrome, TMJ dysfunction, Tobacco abuse, and Vitamin B12 deficiency. has a past surgical history that includes Cholecystectomy (1966); devyn and bso (cervix removed) (1991); Wrist fusion (Left, 2000); Elbow surgery (Right); Appendectomy (1966); vertebroplasty (10/2010); Foot surgery (1986); Toe Surgery (Left, 4/25/2013); Cardiac catheterization (05/21/2017); pr colonoscopy flx dx w/collj spec when pfrmd (N/A, 10/2/2018); Fixation Kyphoplasty (06/12/2019); Hysterectomy; Upper gastrointestinal endoscopy (N/A, 3/11/2020); and Colonoscopy (N/A, 3/11/2020). reports that she quit smoking about a year ago. Her smoking use included cigarettes. She started smoking about 58 years ago. She has a 41.25 pack-year smoking history. She has never used smokeless tobacco. She reports that she does not drink alcohol or use drugs.   Family history:  family history includes Arthritis in her mother; Cancer in an other family member; Emphysema in her father; Heart Disease in her father and another family member; Kidney Disease in her son; Stroke in her mother. Allergies   Allergen Reactions    Ativan [Lorazepam] Other (See Comments)     Violent, thrashing and combative. She has lacerations and bruising, had to be tied down.  Etanercept Other (See Comments)     No response    Humira [Adalimumab]     Prochlorperazine Edisylate Other (See Comments)     \"Compazine\"   Involuntary Muscle Spasms     Remicade [Infliximab Injection]     Doxycycline Other (See Comments)     Stomach pains     Social History:    Reviewed; no changes    Objective:   PHYSICAL EXAM:        VITALS:  BP (!) 154/67   Pulse 76   Temp 97.8 °F (36.6 °C) (Oral)   Resp 16   Ht 5' 7\" (1.702 m)   Wt 125 lb 14.1 oz (57.1 kg)   SpO2 95%   BMI 19.72 kg/m²     24HR INTAKE/OUTPUT:    No intake or output data in the 24 hours ending 12/04/20 1116    CONSTITUTIONAL:  awake, alert. LUNGS:  decreased breath sounds, occ basilar crackles. CARDIOVASCULAR:  normal S1 and S2 and negative JVD  ABD:Abdomen soft, non-tender. BS normal. No masses,  No organomegaly  NEURO:Alert and oriented x3. Gait normal. Reflexes and motor strength normal and symmetric. Cranial nerves 2-12 and sensation grossly intact.   DATA:    CBC:  Recent Labs     12/02/20  0659 12/03/20  0600 12/04/20  0600   WBC 18.2* 11.2* 12.3*   RBC 4.01* 3.93* 3.69*   HGB 10.0* 9.7* 9.4*   HCT 37.8 37.0 31.5*    325 329   MCV 94.3 94.1 85.4   MCH 24.9* 24.7* 25.5*   MCHC 26.5* 26.2* 29.8*   RDW 20.5* 20.6* 20.5*   SEGSPCT 82.0* 83.0* 90.8*   BANDSPCT 8  --   --       BMP:  Recent Labs     12/01/20  1745 12/02/20  0659 12/03/20  0600    136 139   K 3.3* 3.8 3.4*    97* 98*   CO2 29 19* 28   BUN 14 11 22   CREATININE 0.6 0.5* 0.7   CALCIUM 9.0 8.6 8.0*   GLUCOSE 106* 96 95      ABG:  Recent Labs     12/02/20  0930   PH 7.48*   PO2ART 109*   WPR0LMV 42.0   O2SAT 96.3     Lab Results   Component Value Date    PROBNP 33,145

## 2020-12-04 NOTE — PROGRESS NOTES
Attempted to call report to 4E. 4E nurse stated 4E charge nurse to call back when nurse is available to take report.

## 2020-12-04 NOTE — PROGRESS NOTES
Comprehensive Nutrition Assessment    Type and Reason for Visit:  Reassess    Nutrition Recommendations/Plan:   Continue diet consistency as per speech therapy, minced and moist   Assist with meals, encourage intake  Resume oral nutrition supplement as accepted by patient  If unable to increase intake, consider alternate nutrition source    Nutrition Assessment:  Diet as per speech therapy minced and moist, poor fit dentures. Limited po data but recent meals 1-25%. Hx of reduced intake during last admits. Remains high nutrition risk at this time. Malnutrition Assessment:  Malnutrition Status:  Severe malnutrition    Context:  Acute Illness     Findings of the 6 clinical characteristics of malnutrition:  Energy Intake:  7 - 50% or less of estimated energy requirements for 5 or more days  Weight Loss:  7 - Greater than 5% over 1 month     Body Fat Loss:  Unable to assess     Muscle Mass Loss:  Unable to assess    Fluid Accumulation:  No significant fluid accumulation Extremities   Strength:  Not Performed    Estimated Daily Nutrient Needs:  Energy (kcal):  8265-5882 (30-35 kcal/kg); Weight Used for Energy Requirements:  Current     Protein (g):  71-83 (1.2-1.4 g/kg); Weight Used for Protein Requirements:  Current        Fluid (ml/day):  1875-0391 (1 ml/kcal);  Method Used for Fluid Requirements:  1 ml/kcal      Nutrition Related Findings:  SLP eval D2 diet, poor fit upper/lower dentures, remains on COVID unit      Wounds:  (excoriation, blisters to mouth/lips)       Current Nutrition Therapies:    DIET GENERAL; Dysphagia Minced and Moist; Cardiac    Anthropometric Measures:  · Height: 5' 7\" (170.2 cm)  · Current Body Weight: 125 lb 14.1 oz (57.1 kg)   · Admission Body Weight: 129 lb 4.8 oz (58.7 kg)    · Usual Body Weight: 147 lb 11.2 oz (67 kg)(11/15/20)     · Ideal Body Weight: 135 lbs; % Ideal Body Weight 95.8 %   · BMI: 19.7  · BMI Categories: Underweight (BMI less than 22) age over 72       Nutrition Diagnosis:   · Severe malnutrition, In context of acute illness or injury related to inadequate protein-energy intake as evidenced by poor intake prior to admission, weight loss greater than or equal to 5% in 1 month      Nutrition Interventions:   Food and/or Nutrient Delivery:  Continue Current Diet, Continue Oral Nutrition Supplement  Nutrition Education/Counseling:  No recommendation at this time   Coordination of Nutrition Care:  Continue to monitor while inpatient, Feeding Assistance/Environment Change    Goals:  Pt will consume greater than 50% of her meals and oral supplements       Nutrition Monitoring and Evaluation:   Behavioral-Environmental Outcomes:  None Identified   Food/Nutrient Intake Outcomes:  Diet Advancement/Tolerance, Food and Nutrient Intake  Physical Signs/Symptoms Outcomes:  Biochemical Data, Chewing or Swallowing, Skin, Weight, Meal Time Behavior     Discharge Planning:    Continue current diet     Electronically signed by Isabel Hickey RD, LD on 12/4/20 at 2:39 PM EST    Contact: [phone]

## 2020-12-04 NOTE — CARE COORDINATION
CM reviewed notes. Pt is awaiting a precert for pt to [facility] . CM called and spoke with Jonh Mendez providing her CM information for when precert comes through. CM called LILIANA Raman and plan is to return to [facility] home when discharged. Pending precert  Upon discharge pt will be going to SNF at [place] [id number]  Please fax H/P, D/S. Scripts, AVS, and Completed VERONICA to [phone]  Please call report to [phone]  Please call pts choice for transportation.  [facility] [phone] or [phone]

## 2020-12-04 NOTE — PROGRESS NOTES
Physician Progress Note      José Bacon  CSN #:                  [id number]  :                       1940  ADMIT DATE:       2020 4:52 PM  [address] [place] DATE:  RESPONDING  PROVIDER #:        Zeferino Mathis MD          QUERY TEXT:    Dear Hospitalist    Pt admitted with Pneumonia. Pt noted to have Multifocal pneumonia documented   . If possible, please document in the progress notes and discharge summary if   you are evaluating and/or treating any of the following: The medical record reflects the following:  Risk Factors: Recent Covid 19 documented, Pneumonia  Clinical Indicators: CBC 19.8-16.8, segs 83.1, LA 0.9, Tachypnea 56,   Tachycardia 111, Patient has had a productive cough with yellow-green sputum. Xray shows Patchy non-specific bibasilar airspace opacities compatible with   multifocal  pneumonia  Treatment: Procalcitonin, Legionella, strep antigen, respiratory culture, and   blood cultures,  ED started patient on cefepime and vancomycin, DuoNebs,    Continue home oxygen therapy, Portable chest x-ray in a.m. Options provided:  -- Gram negative pneumonia  -- Gram positive pneumonia  -- Bacterial pneumonia  -- Viral pneumonia  -- Aspiration pneumonia  -- Other - I will add my own diagnosis  -- Disagree - Not applicable / Not valid  -- Disagree - Clinically unable to determine / Unknown  -- Refer to Clinical Documentation Reviewer    PROVIDER RESPONSE TEXT:    This patient has viral pneumonia. Query created by: Lorri Lee on 2020 10:14 AM      QUERY TEXT:    Dear Hospitalist    Pt admitted with Pneumonia. Pt noted to have WBC 19.8-16. 8. If possible,   please document in the progress notes and discharge summary if you are   evaluating and /or treating any of the following:     The medical record reflects the following:  Risk Factors: Recent Covid 19 documented, Pneumonia  Clinical Indicators: CBC 19.8-16.8, segs 83.1, LA 0.9, Tachypnea 56,   Tachycardia 111, Patient has had a productive cough with yellow-green sputum. Xray shows Patchy non-specific bibasilar airspace opacities compatible with   multifocal  pneumonia  Treatment: Procalcitonin, Legionella, strep antigen, respiratory culture, and   blood cultures,  ED started patient on cefepime and vancomycin, DuoNebs,    Continue home oxygen therapy, Portable chest x-ray in a.m. Thank you Gema Piña RN, CDS ([phone])  Options provided:  -- Sepsis, present on admission  -- No Sepsis, pneumonia only  -- Sepsis was ruled out  -- Other - I will add my own diagnosis  -- Disagree - Not applicable / Not valid  -- Disagree - Clinically unable to determine / Unknown  -- Refer to Clinical Documentation Reviewer    PROVIDER RESPONSE TEXT:    This patient has sepsis which was present on admission.     Query created by: Brittaney Torres on 12/2/2020 10:09 AM      Electronically signed by:  Jose Kay MD 12/4/2020 7:22 AM

## 2020-12-04 NOTE — PROGRESS NOTES
Occupational Therapy  Occupational Therapy Treatment Note    Date:  2020   Room:  [id number]    Steph Javier :   1940   MRN: [id number] Admission Date: 2020     Diagnosis:  The primary encounter diagnosis was Respiratory insufficiency. Diagnoses of HCAP (healthcare-associated pneumonia), Hypomagnesemia, Hypokalemia, and History of 2019 novel coronavirus disease (COVID-19) were also pertinent to this visit. Restrictions/Precautions:                      Communication with other providers:  RN, PT. Subjective:  Patient states:  \"I need cleaned up\"  Pain:   Location, Type, Intensity (0/10 to 10/10):  none    Objective:    Orientation: WFL   Observation: Pt received in bed. Depend soiled. 2L of O2  Objective Measures:  n/a    Treatment, including education:  Therapeutic Activity Training:   Therapeutic activity training was instructed today. Cues were given for safety, sequence, UE/LE placement, visual cues, and balance. Activities performed today included bed mobility training, sup-sit, sit-stand, SPT. Bed mobility:  Supine to sit: CGA, cues via log roll to right side to sit  Sit to supine: SBA  Rolling: DIDIER c bed rail    Transfers:   Sit to stand: CGA from EOB 2 reps, safty cue each rep to push off of bed  Stand to sit: CGA to EOB c safety cue for UE reach back 2 reps    Gait: CGA c RW sidestepping EOB 5 feet. Self Care Training:   Self care training was performed today. Cues were given for safety, sequence, UE/LE placement, visual cues, and balance. Activities performed today included dressing, toileting, hand hygiene, and grooming. Dressing   UB: n/a   LB: ModA.  Pt able to hike up from knee level  one side of pull-up at a time after OT threaded     Toileting: DEP hygiene in supine and sidelying after bowl incontinence in bed in depnd     Assessment / Impression:      Patient's tolerance of treatment:  good   Significant change in status and impact:  Improved mobility and less anxiety  Barriers to improvement:  none  Recommendations: return to [facility] c OT and assistance from aides wt ADL    Plan for Next Session:    Cont POC addressing goals:    Goals:  By d/c or goals met:  Pt will perform all bed mobility with CGA in prep for EOB/OOB activity. Pt will perform all functional transfers with CGA and appropriate use of LRD to bed, toilet, chair in prep for increased functional independence. Pt will perform UB ADLs with CGA to increase functional independence. Pt will perform LB ADLs with CGA to increase functional independence. Pt will perform all aspects of toileting with CGA to increase functional independence. Pt will participate in therex/therax c emphasis on strength, activity tolerance,  safety, DIDIER tasks.       Safety: Left in bed  with all needs in reach. Bed alarm. Gait belt used for transfer and mobility. Time in:  1155  Time out:  1220  Timed treatment minutes:  25  Total treatment time:  25    Electronically signed by:     410Anca Tao, FELICIA/L, BRAWINKL   [id number]   2:19 PM, 12/4/2020      Previously filed values:

## 2020-12-05 LAB
ANION GAP SERPL CALCULATED.3IONS-SCNC: 11 MMOL/L (ref 4–16)
BASOPHILS ABSOLUTE: 0 K/CU MM
BASOPHILS RELATIVE PERCENT: 0 % (ref 0–1)
BUN BLDV-MCNC: 32 MG/DL (ref 6–23)
CALCIUM SERPL-MCNC: 8.3 MG/DL (ref 8.3–10.6)
CHLORIDE BLD-SCNC: 95 MMOL/L (ref 99–110)
CO2: 29 MMOL/L (ref 21–32)
CREAT SERPL-MCNC: 0.9 MG/DL (ref 0.6–1.1)
DIFFERENTIAL TYPE: ABNORMAL
EOSINOPHILS ABSOLUTE: 0 K/CU MM
EOSINOPHILS RELATIVE PERCENT: 0 % (ref 0–3)
GFR AFRICAN AMERICAN: >60 ML/MIN/1.73M2
GFR NON-AFRICAN AMERICAN: >60 ML/MIN/1.73M2
GLUCOSE BLD-MCNC: 122 MG/DL (ref 70–99)
HCT VFR BLD CALC: 34 % (ref 37–47)
HEMOGLOBIN: 9.5 GM/DL (ref 12.5–16)
IMMATURE NEUTROPHIL %: 0.8 % (ref 0–0.43)
LYMPHOCYTES ABSOLUTE: 0.8 K/CU MM
LYMPHOCYTES RELATIVE PERCENT: 7.3 % (ref 24–44)
MCH RBC QN AUTO: 25.1 PG (ref 27–31)
MCHC RBC AUTO-ENTMCNC: 27.9 % (ref 32–36)
MCV RBC AUTO: 89.7 FL (ref 78–100)
MONOCYTES ABSOLUTE: 0.5 K/CU MM
MONOCYTES RELATIVE PERCENT: 4.8 % (ref 0–4)
NUCLEATED RBC %: 0 %
PDW BLD-RTO: 20.5 % (ref 11.7–14.9)
PLATELET # BLD: 277 K/CU MM (ref 140–440)
PMV BLD AUTO: 10.1 FL (ref 7.5–11.1)
POTASSIUM SERPL-SCNC: 3.7 MMOL/L (ref 3.5–5.1)
PRO-BNP: 4702 PG/ML
RBC # BLD: 3.79 M/CU MM (ref 4.2–5.4)
REASON FOR REJECTION: NORMAL
REJECTED TEST: NORMAL
SEGMENTED NEUTROPHILS ABSOLUTE COUNT: 9.9 K/CU MM
SEGMENTED NEUTROPHILS RELATIVE PERCENT: 87.1 % (ref 36–66)
SODIUM BLD-SCNC: 135 MMOL/L (ref 135–145)
TOTAL IMMATURE NEUTOROPHIL: 0.09 K/CU MM
TOTAL NUCLEATED RBC: 0 K/CU MM
WBC # BLD: 11.3 K/CU MM (ref 4–10.5)

## 2020-12-05 PROCEDURE — 2700000000 HC OXYGEN THERAPY PER DAY

## 2020-12-05 PROCEDURE — 94640 AIRWAY INHALATION TREATMENT: CPT

## 2020-12-05 PROCEDURE — 80202 ASSAY OF VANCOMYCIN: CPT

## 2020-12-05 PROCEDURE — 6360000002 HC RX W HCPCS: Performed by: INTERNAL MEDICINE

## 2020-12-05 PROCEDURE — 80048 BASIC METABOLIC PNL TOTAL CA: CPT

## 2020-12-05 PROCEDURE — 85025 COMPLETE CBC W/AUTO DIFF WBC: CPT

## 2020-12-05 PROCEDURE — 2580000003 HC RX 258: Performed by: HOSPITALIST

## 2020-12-05 PROCEDURE — 76937 US GUIDE VASCULAR ACCESS: CPT

## 2020-12-05 PROCEDURE — 6370000000 HC RX 637 (ALT 250 FOR IP): Performed by: INTERNAL MEDICINE

## 2020-12-05 PROCEDURE — 1200000000 HC SEMI PRIVATE

## 2020-12-05 PROCEDURE — 85610 PROTHROMBIN TIME: CPT

## 2020-12-05 PROCEDURE — 94761 N-INVAS EAR/PLS OXIMETRY MLT: CPT

## 2020-12-05 PROCEDURE — 2580000003 HC RX 258: Performed by: INTERNAL MEDICINE

## 2020-12-05 PROCEDURE — 83880 ASSAY OF NATRIURETIC PEPTIDE: CPT

## 2020-12-05 PROCEDURE — 85379 FIBRIN DEGRADATION QUANT: CPT

## 2020-12-05 PROCEDURE — 6360000002 HC RX W HCPCS: Performed by: HOSPITALIST

## 2020-12-05 RX ADMIN — IPRATROPIUM BROMIDE 2 PUFF: 17 AEROSOL, METERED RESPIRATORY (INHALATION) at 22:31

## 2020-12-05 RX ADMIN — ALBUTEROL SULFATE 2 PUFF: 90 AEROSOL, METERED RESPIRATORY (INHALATION) at 12:25

## 2020-12-05 RX ADMIN — BUDESONIDE AND FORMOTEROL FUMARATE DIHYDRATE 2 PUFF: 80; 4.5 AEROSOL RESPIRATORY (INHALATION) at 08:20

## 2020-12-05 RX ADMIN — METHYLPREDNISOLONE SODIUM SUCCINATE 40 MG: 40 INJECTION, POWDER, FOR SOLUTION INTRAMUSCULAR; INTRAVENOUS at 13:33

## 2020-12-05 RX ADMIN — FERROUS SULFATE TAB 325 MG (65 MG ELEMENTAL FE) 325 MG: 325 (65 FE) TAB at 10:21

## 2020-12-05 RX ADMIN — BUSPIRONE HYDROCHLORIDE 5 MG: 5 TABLET ORAL at 15:00

## 2020-12-05 RX ADMIN — BUSPIRONE HYDROCHLORIDE 5 MG: 5 TABLET ORAL at 10:21

## 2020-12-05 RX ADMIN — ALBUTEROL SULFATE 2 PUFF: 90 AEROSOL, METERED RESPIRATORY (INHALATION) at 15:48

## 2020-12-05 RX ADMIN — LISINOPRIL 5 MG: 10 TABLET ORAL at 10:21

## 2020-12-05 RX ADMIN — ALBUTEROL SULFATE 2 PUFF: 90 AEROSOL, METERED RESPIRATORY (INHALATION) at 22:31

## 2020-12-05 RX ADMIN — AMIODARONE HYDROCHLORIDE 200 MG: 200 TABLET ORAL at 10:21

## 2020-12-05 RX ADMIN — METOPROLOL TARTRATE 50 MG: 50 TABLET, FILM COATED ORAL at 10:21

## 2020-12-05 RX ADMIN — METOPROLOL TARTRATE 50 MG: 50 TABLET, FILM COATED ORAL at 22:27

## 2020-12-05 RX ADMIN — TRAZODONE HYDROCHLORIDE 50 MG: 50 TABLET ORAL at 22:27

## 2020-12-05 RX ADMIN — ATORVASTATIN CALCIUM 10 MG: 10 TABLET, FILM COATED ORAL at 18:33

## 2020-12-05 RX ADMIN — IPRATROPIUM BROMIDE 2 PUFF: 17 AEROSOL, METERED RESPIRATORY (INHALATION) at 12:25

## 2020-12-05 RX ADMIN — RIVAROXABAN 20 MG: 20 TABLET, FILM COATED ORAL at 10:21

## 2020-12-05 RX ADMIN — ZINC SULFATE 220 MG (50 MG) CAPSULE 50 MG: CAPSULE at 10:21

## 2020-12-05 RX ADMIN — CEFTRIAXONE 1 G: 1 INJECTION, POWDER, FOR SOLUTION INTRAMUSCULAR; INTRAVENOUS at 17:19

## 2020-12-05 RX ADMIN — ALBUTEROL SULFATE 2 PUFF: 90 AEROSOL, METERED RESPIRATORY (INHALATION) at 08:20

## 2020-12-05 RX ADMIN — IPRATROPIUM BROMIDE 2 PUFF: 17 AEROSOL, METERED RESPIRATORY (INHALATION) at 15:48

## 2020-12-05 RX ADMIN — SODIUM CHLORIDE, PRESERVATIVE FREE 10 ML: 5 INJECTION INTRAVENOUS at 22:28

## 2020-12-05 RX ADMIN — PANTOPRAZOLE SODIUM 40 MG: 40 TABLET, DELAYED RELEASE ORAL at 17:20

## 2020-12-05 RX ADMIN — ASPIRIN 81 MG CHEWABLE TABLET 81 MG: 81 TABLET CHEWABLE at 10:21

## 2020-12-05 RX ADMIN — SERTRALINE 25 MG: 25 TABLET, FILM COATED ORAL at 10:22

## 2020-12-05 RX ADMIN — Medication 2000 UNITS: at 10:21

## 2020-12-05 RX ADMIN — BUSPIRONE HYDROCHLORIDE 5 MG: 5 TABLET ORAL at 22:27

## 2020-12-05 RX ADMIN — CETIRIZINE HYDROCHLORIDE 10 MG: 10 TABLET, FILM COATED ORAL at 10:21

## 2020-12-05 RX ADMIN — IPRATROPIUM BROMIDE 2 PUFF: 17 AEROSOL, METERED RESPIRATORY (INHALATION) at 08:20

## 2020-12-05 RX ADMIN — PANTOPRAZOLE SODIUM 40 MG: 40 TABLET, DELAYED RELEASE ORAL at 06:29

## 2020-12-05 RX ADMIN — BUDESONIDE AND FORMOTEROL FUMARATE DIHYDRATE 2 PUFF: 80; 4.5 AEROSOL RESPIRATORY (INHALATION) at 22:31

## 2020-12-05 RX ADMIN — CYANOCOBALAMIN TAB 1000 MCG 1000 MCG: 1000 TAB at 10:21

## 2020-12-05 RX ADMIN — CEFEPIME 2 G: 2 INJECTION, POWDER, FOR SOLUTION INTRAVENOUS at 13:32

## 2020-12-05 ASSESSMENT — PAIN SCALES - GENERAL
PAINLEVEL_OUTOF10: 0
PAINLEVEL_OUTOF10: 0

## 2020-12-05 NOTE — PROGRESS NOTES
placement for SNF.     Claudy Palomo MD  12/5/2020    Meds:   Meds:    vancomycin  1,000 mg Intravenous Q18H    methylPREDNISolone  40 mg Intravenous Q12H    albuterol sulfate HFA  2 puff Inhalation Q4H WA    ipratropium  2 puff Inhalation Q4H WA    magnesium sulfate  2 g Intravenous Once    amiodarone  200 mg Oral Daily    aspirin  81 mg Oral Daily    atorvastatin  10 mg Oral QPM    bacitracin-neomycin-polymyxin b-hydrocortisone   Topical BID    budesonide-formoterol  2 puff Inhalation BID    busPIRone  5 mg Oral TID    vitamin D  2,000 Units Oral Daily    ferrous sulfate  325 mg Oral Daily with breakfast    Teriparatide (Recombinant)  20 mcg Subcutaneous Daily    lisinopril  5 mg Oral Daily    cetirizine  10 mg Oral Daily    metoprolol tartrate  50 mg Oral BID    pantoprazole  40 mg Oral BID AC    rivaroxaban  20 mg Oral Daily    sertraline  25 mg Oral Daily    traZODone  50 mg Oral Nightly    vitamin B-12  1,000 mcg Oral Daily    zinc sulfate  50 mg Oral Daily    sodium chloride flush  10 mL Intravenous 2 times per day    cefepime  2 g Intravenous Q8H      Infusions:   PRN Meds: hydrALAZINE (APRESOLINE) ivpb, 10 mg, Q4H PRN  ipratropium-albuterol, 3 mL, Q4H PRN  potassium chloride, 40 mEq, PRN    Or  potassium alternative oral replacement, 40 mEq, PRN    Or  potassium chloride, 10 mEq, PRN  polyethylene glycol, 17 g, Daily PRN  sodium chloride flush, 10 mL, PRN  acetaminophen, 650 mg, Q6H PRN    Or  acetaminophen, 650 mg, Q6H PRN  bisacodyl, 5 mg, Daily PRN        Data/Labs:     Recent Labs     12/03/20  0600 12/04/20  0600 12/05/20  0700   WBC 11.2* 12.3* 11.3*   HGB 9.7* 9.4* 9.5*   HCT 37.0 31.5* 34.0*    329 277      Recent Labs     12/03/20  0600 12/04/20  0600 12/05/20  0700    134* 135   K 3.4* 3.4* 3.7   CL 98* 95* 95*   CO2 28 26 29   PHOS 3.5  --   --    BUN 22 34* 32*   CREATININE 0.7 0.8 0.9     Recent Labs     12/03/20  0600   AST 19   ALT 11   BILITOT 0.4 ALKPHOS 76     No results for input(s): INR in the last 72 hours. Recent Labs     12/03/20  0600   TROPONINT 0.025*     HgBA1c:   Lab Results   Component Value Date    LABA1C 5.4 10/25/2013     CALCIUM:  8.3/29 (12/05 0700)    I/O last 3 completed shifts:   In: 200 [P.O.:200]  Out: 1050 [Urine:1050]    Intake/Output Summary (Last 24 hours) at 12/5/2020 1220  Last data filed at 12/5/2020 0657  Gross per 24 hour   Intake 200 ml   Output 1050 ml   Net -850 ml

## 2020-12-05 NOTE — PROGRESS NOTES
Pulmonary and Critical Care  Progress Note    Subjective: The patient is better. Shortness of breath is better. Chest pain none. Addressing respiratory complaints Patient is negative for  hemoptysis and cyanosis. CONSTITUTIONAL:  negative for fevers and chills. Past Medical History:     has a past medical history of Acute DVT of right tibial vein (HCC), Acute exacerbation of chronic obstructive pulmonary disease (COPD) ([address] Anemia, Arthritis, Atrial fibrillation ([address] CHF (congestive heart failure) ([address] Chronic hypoxemic respiratory failure (HCC), Chronic pain syndrome, Clostridium difficile colitis, COPD, severe ([address] Depression, Fibromyalgia, Former smoker, Herniated cervical disc, Herpes zoster ophthalmicus, Hx of blood clots, Hyperlipidemia, Hypertension, Kidney disease, L3 vertebral fracture ([address] Lumbar spinal stenosis, Migraine, Nocturnal hypoxemia, Osteoporosis, Rheumatoid arthritis(714.0), S/P cardiac catheterization, Shortness of breath, Takotsubo syndrome, TMJ dysfunction, Tobacco abuse, and Vitamin B12 deficiency. has a past surgical history that includes Cholecystectomy (1966); devyn and bso (cervix removed) (1991); Wrist fusion (Left, 2000); Elbow surgery (Right); Appendectomy (1966); vertebroplasty (10/2010); Foot surgery (1986); Toe Surgery (Left, 4/25/2013); Cardiac catheterization (05/21/2017); pr colonoscopy flx dx w/collj spec when pfrmd (N/A, 10/2/2018); Fixation Kyphoplasty (06/12/2019); Hysterectomy; Upper gastrointestinal endoscopy (N/A, 3/11/2020); and Colonoscopy (N/A, 3/11/2020). reports that she quit smoking about a year ago. Her smoking use included cigarettes. She started smoking about 58 years ago. She has a 41.25 pack-year smoking history. She has never used smokeless tobacco. She reports that she does not drink alcohol or use drugs.   Family history:  family history includes Arthritis in her mother; Cancer in an other family member; Emphysema in her father; Heart Disease in her father and another family member; Kidney Disease in her son; Stroke in her mother. Allergies   Allergen Reactions    Ativan [Lorazepam] Other (See Comments)     Violent, thrashing and combative. She has lacerations and bruising, had to be tied down.  Etanercept Other (See Comments)     No response    Humira [Adalimumab]     Prochlorperazine Edisylate Other (See Comments)     \"Compazine\"   Involuntary Muscle Spasms     Remicade [Infliximab Injection]     Doxycycline Other (See Comments)     Stomach pains     Social History:    Reviewed; no changes    Objective:   PHYSICAL EXAM:        VITALS:  /63   Pulse 90   Temp 97.6 °F (36.4 °C) (Oral)   Resp 16   Ht 5' 7\" (1.702 m)   Wt 125 lb 14.1 oz (57.1 kg)   SpO2 97%   BMI 19.72 kg/m²     24HR INTAKE/OUTPUT:      Intake/Output Summary (Last 24 hours) at 12/5/2020 1319  Last data filed at 12/5/2020 0657  Gross per 24 hour   Intake 200 ml   Output 1050 ml   Net -850 ml       CONSTITUTIONAL:  awake, alert. LUNGS:  decreased breath sounds, occ basilar crackles. CARDIOVASCULAR:  normal S1 and S2 and negative JVD  ABD:Abdomen soft, non-tender. BS normal. No masses,  No organomegaly  NEURO:Alert and oriented x3. Gait normal. Reflexes and motor strength normal and symmetric. Cranial nerves 2-12 and sensation grossly intact.   DATA:    CBC:  Recent Labs     12/03/20  0600 12/04/20  0600 12/05/20  0700   WBC 11.2* 12.3* 11.3*   RBC 3.93* 3.69* 3.79*   HGB 9.7* 9.4* 9.5*   HCT 37.0 31.5* 34.0*    329 277   MCV 94.1 85.4 89.7   MCH 24.7* 25.5* 25.1*   MCHC 26.2* 29.8* 27.9*   RDW 20.6* 20.5* 20.5*   SEGSPCT 83.0* 90.8* 87.1*      BMP:  Recent Labs     12/03/20  0600 12/04/20  0600 12/05/20  0700    134* 135   K 3.4* 3.4* 3.7   CL 98* 95* 95*   CO2 28 26 29   BUN 22 34* 32*   CREATININE 0.7 0.8 0.9   CALCIUM 8.0* 8.1* 8.3   GLUCOSE 95 118* 122*      ABG:  No results for input(s): PH, PO2ART, KCL1NXB, HCO3, BEART, O2SAT in the last 72

## 2020-12-06 VITALS
TEMPERATURE: 97.9 F | HEIGHT: 67 IN | HEART RATE: 67 BPM | WEIGHT: 125.88 LBS | DIASTOLIC BLOOD PRESSURE: 70 MMHG | BODY MASS INDEX: 19.76 KG/M2 | OXYGEN SATURATION: 96 % | RESPIRATION RATE: 16 BRPM | SYSTOLIC BLOOD PRESSURE: 150 MMHG

## 2020-12-06 PROCEDURE — 2700000000 HC OXYGEN THERAPY PER DAY

## 2020-12-06 PROCEDURE — 6360000002 HC RX W HCPCS: Performed by: HOSPITALIST

## 2020-12-06 PROCEDURE — 2580000003 HC RX 258: Performed by: INTERNAL MEDICINE

## 2020-12-06 PROCEDURE — 94640 AIRWAY INHALATION TREATMENT: CPT

## 2020-12-06 PROCEDURE — 94761 N-INVAS EAR/PLS OXIMETRY MLT: CPT

## 2020-12-06 PROCEDURE — 6360000002 HC RX W HCPCS: Performed by: INTERNAL MEDICINE

## 2020-12-06 PROCEDURE — 80053 COMPREHEN METABOLIC PANEL: CPT

## 2020-12-06 PROCEDURE — 6370000000 HC RX 637 (ALT 250 FOR IP): Performed by: INTERNAL MEDICINE

## 2020-12-06 PROCEDURE — 2580000003 HC RX 258: Performed by: HOSPITALIST

## 2020-12-06 RX ORDER — CEFDINIR 300 MG/1
300 CAPSULE ORAL 2 TIMES DAILY
Qty: 8 CAPSULE | Refills: 0 | DISCHARGE
Start: 2020-12-06 | End: 2020-12-10

## 2020-12-06 RX ORDER — PREDNISONE 10 MG/1
TABLET ORAL
Qty: 20 TABLET | Refills: 0 | Status: ON HOLD | DISCHARGE
Start: 2020-12-06 | End: 2021-01-07 | Stop reason: ALTCHOICE

## 2020-12-06 RX ADMIN — SODIUM CHLORIDE, PRESERVATIVE FREE 10 ML: 5 INJECTION INTRAVENOUS at 11:42

## 2020-12-06 RX ADMIN — IPRATROPIUM BROMIDE 2 PUFF: 17 AEROSOL, METERED RESPIRATORY (INHALATION) at 12:35

## 2020-12-06 RX ADMIN — PANTOPRAZOLE SODIUM 40 MG: 40 TABLET, DELAYED RELEASE ORAL at 06:26

## 2020-12-06 RX ADMIN — RIVAROXABAN 20 MG: 20 TABLET, FILM COATED ORAL at 11:44

## 2020-12-06 RX ADMIN — IPRATROPIUM BROMIDE 2 PUFF: 17 AEROSOL, METERED RESPIRATORY (INHALATION) at 09:20

## 2020-12-06 RX ADMIN — CYANOCOBALAMIN TAB 1000 MCG 1000 MCG: 1000 TAB at 11:43

## 2020-12-06 RX ADMIN — METHYLPREDNISOLONE SODIUM SUCCINATE 40 MG: 40 INJECTION, POWDER, FOR SOLUTION INTRAMUSCULAR; INTRAVENOUS at 01:03

## 2020-12-06 RX ADMIN — BUSPIRONE HYDROCHLORIDE 5 MG: 5 TABLET ORAL at 13:37

## 2020-12-06 RX ADMIN — PANTOPRAZOLE SODIUM 40 MG: 40 TABLET, DELAYED RELEASE ORAL at 18:23

## 2020-12-06 RX ADMIN — CETIRIZINE HYDROCHLORIDE 10 MG: 10 TABLET, FILM COATED ORAL at 11:43

## 2020-12-06 RX ADMIN — METHYLPREDNISOLONE SODIUM SUCCINATE 40 MG: 40 INJECTION, POWDER, FOR SOLUTION INTRAMUSCULAR; INTRAVENOUS at 11:42

## 2020-12-06 RX ADMIN — ZINC SULFATE 220 MG (50 MG) CAPSULE 50 MG: CAPSULE at 11:42

## 2020-12-06 RX ADMIN — BUDESONIDE AND FORMOTEROL FUMARATE DIHYDRATE 2 PUFF: 80; 4.5 AEROSOL RESPIRATORY (INHALATION) at 09:19

## 2020-12-06 RX ADMIN — AMIODARONE HYDROCHLORIDE 200 MG: 200 TABLET ORAL at 11:43

## 2020-12-06 RX ADMIN — CEFTRIAXONE 1 G: 1 INJECTION, POWDER, FOR SOLUTION INTRAMUSCULAR; INTRAVENOUS at 11:57

## 2020-12-06 RX ADMIN — SERTRALINE 25 MG: 25 TABLET, FILM COATED ORAL at 11:44

## 2020-12-06 RX ADMIN — FERROUS SULFATE TAB 325 MG (65 MG ELEMENTAL FE) 325 MG: 325 (65 FE) TAB at 11:44

## 2020-12-06 RX ADMIN — METOPROLOL TARTRATE 50 MG: 50 TABLET, FILM COATED ORAL at 11:43

## 2020-12-06 RX ADMIN — ALBUTEROL SULFATE 2 PUFF: 90 AEROSOL, METERED RESPIRATORY (INHALATION) at 12:33

## 2020-12-06 RX ADMIN — ASPIRIN 81 MG CHEWABLE TABLET 81 MG: 81 TABLET CHEWABLE at 11:43

## 2020-12-06 RX ADMIN — ATORVASTATIN CALCIUM 10 MG: 10 TABLET, FILM COATED ORAL at 18:24

## 2020-12-06 RX ADMIN — LISINOPRIL 5 MG: 10 TABLET ORAL at 11:42

## 2020-12-06 RX ADMIN — ALBUTEROL SULFATE 2 PUFF: 90 AEROSOL, METERED RESPIRATORY (INHALATION) at 09:18

## 2020-12-06 RX ADMIN — BUSPIRONE HYDROCHLORIDE 5 MG: 5 TABLET ORAL at 11:44

## 2020-12-06 ASSESSMENT — PAIN DESCRIPTION - PROGRESSION
CLINICAL_PROGRESSION: GRADUALLY WORSENING

## 2020-12-06 NOTE — DISCHARGE SUMMARY
Hospital Medicine  DISCHARGE SUMMARY  Date: 12/6/2020  Name: Roge Quintanilla  MRN: [id number]  YOB: 1940     Patient's PCP: Miriam Wall MD  Admit Date: 12/1/2020   Discharge Date: 12/6/2020  Admitting Physician: Spenser Vitale DO  Discharge Physician: Claudy Palomo MD      Discharge Diagnoses:  1. Acute on Chronic Respiratory failure possibly due to COVID-19 vs CHF vs COPD vs bacterial PNA  2. Acute metabolic encephalopathy  3. Anemia  4. Fall / Failure to thrive           · Paroxysmal A. fib:   · COPD, not currently in exacerbation  · Lung nodule  · Hypertension  · Rheumatoid arthritis  · Depression  · Fibromyalgia  · HFrEF  · Osteoporosis    HPI:    Chief Complaint   Patient presents with    Fatigue    Fever    Positive For Calebe Jeanna is a [de-identified] y.o.  female with family history as stated below and a PMH as stated above, who presents from [facility] home and brought in via ED. Patient is a poor historian and pleasantly demented so majority of history was provided by ED physician. Patient recently came out of the Covid unit at outside facility on 11/30/2020. Since then patient has not been eating or drinking very much and has had increased fatigue. Patient was found next to her bed this morning after she apparently fell out of bed. Patient also began running a fever of 100 °F that improved after Tylenol. Patient has been noted to have a productive cough with green sputum and it was decided by facility that patient should be brought in to the ED for further care. Patient is able to tell me that she has a bruise on her forehead and that she is having some mild shortness of breath. At this time she denies headache, chest pain, abdominal pain, nausea, vomiting, or diarrhea. Hospital Course  Patient came in for shortness of breath. She was also feeling fatigued and weak. She had acute on chronic respiratory failure. Exact cause was not entirely clear.  It may have been mouth daily     atorvastatin 10 MG tablet  Commonly known as:  LIPITOR  TAKE 1 TABLET BY MOUTH EVERY EVENING     bacitracin-neomycin-polymyxin b-hydrocortisone 1 % ointment     Breo Ellipta 100-25 MCG/INH Aepb inhaler  Generic drug:  fluticasone-vilanterol  INHALE 1 PUFF INTO THE LUNGS DAILY AFTER INHALATION THEN GARGLE     busPIRone 5 MG tablet  Commonly known as:  BUSPAR     ferrous sulfate 325 (65 Fe) MG tablet  Commonly known as:  IRON 325     Forteo 600 MCG/2.4ML Soln injection  Generic drug:  teriparatide (recombinant)  INJECT 0.08MLS INTO THE SKIN DAILY     Incruse Ellipta 62.5 MCG/INH Aepb  Generic drug:  Umeclidinium Bromide  INHALE 1 PUFF VIA ORAL INHALATION ONCE DAILY     ipratropium-albuterol 0.5-2.5 (3) MG/3ML Soln nebulizer solution  Commonly known as:  DUONEB  Inhale 3 mLs into the lungs every 4 hours (while awake)     lisinopril 5 MG tablet  Commonly known as:  PRINIVIL;ZESTRIL  Take 1 tablet by mouth daily     loratadine 10 MG capsule  Commonly known as:  CLARITIN     metoprolol tartrate 50 MG tablet  Commonly known as:  LOPRESSOR  Take 1 tablet by mouth 2 times daily     OXYGEN     pantoprazole 40 MG tablet  Commonly known as:  Protonix  Take 1 tablet by mouth 2 times daily     polyethylene glycol 17 g packet  Commonly known as:  GLYCOLAX  Take 17 g by mouth daily as needed for Constipation     ProAir  (90 Base) MCG/ACT inhaler  Generic drug:  albuterol sulfate HFA  INHALE 2 PUFFS BY ORAL INHALATION EVERY 4 TO 6 HOURS AS NEEDED     rivaroxaban 20 MG Tabs tablet  Commonly known as:  XARELTO  Take 1 tablet by mouth daily     sertraline 25 MG tablet  Commonly known as:  ZOLOFT     tiotropium 2.5 MCG/ACT Aers inhaler  Commonly known as:  SPIRIVA RESPIMAT  Inhale 2 puffs into the lungs daily     traZODone 50 MG tablet  Commonly known as:  DESYREL  TAKE 1 TABLET BY MOUTH NIGHTLY     triamcinolone 55 MCG/ACT nasal inhaler  Commonly known as:  Nasacort Allergy 24HR  2 sprays by Each Nostril route daily     vitamin B-12 1000 MCG tablet  Commonly known as:  CYANOCOBALAMIN     vitamin D 50 MCG (2000 UT) Caps capsule     zinc sulfate 220 (50 Zn) MG capsule  Commonly known as:  ZINCATE  Take 1 capsule by mouth daily           Where to Get Your Medications      Information about where to get these medications is not yet available    Ask your nurse or doctor about these medications  · cefdinir 300 MG capsule  · predniSONE 10 MG tablet         Patient Instructions: Follow-up With  Details  Why  Contact Info   [address]      [address] [place] [address]   Mali Garcia MD  Schedule an appointment as soon as possible for a visit in 1 week  for follow up of your hospital stay  [address] [place] [id number]  [phone]   Jon Ospina MD  Schedule an appointment as soon as possible for a visit in 2 weeks  to check on your breathing  [address] [place] [address] [address]  [phone]         Medications: see computerized discharge medication list  Activity: activity as tolerated  Diet:  DIET GENERAL; Dysphagia Minced and Moist; Cardiac  Respiratory: continue to wear [facility]  Disposition: SNF  Discharged Condition: Stable    The patient was seen and examined on day of discharge and this discharge summary is in conjunction with any daily progress note from day of discharge. Time spent on discharge is 33 minutes in the examination, evaluation, counseling and review of medications and discharge plan.     Ryan Agudelo MD

## 2020-12-06 NOTE — CARE COORDINATION
TC to Gali at [facility] to check status of referral.  Pt has been approved to admit. Sent PS to Dr. Martir Blount to update.

## 2020-12-09 LAB
CULTURE: NORMAL
CULTURE: NORMAL
Lab: NORMAL
Lab: NORMAL
SPECIMEN: NORMAL
SPECIMEN: NORMAL

## 2020-12-28 LAB
BASOPHILS ABSOLUTE: ABNORMAL
BASOPHILS RELATIVE PERCENT: 0.5 %
BUN BLDV-MCNC: 12 MG/DL
CALCIUM SERPL-MCNC: 8.1 MG/DL
CHLORIDE BLD-SCNC: 107 MMOL/L
CO2: 29 MMOL/L
CREAT SERPL-MCNC: 0.7 MG/DL
EOSINOPHILS ABSOLUTE: 0.2 /ΜL
EOSINOPHILS RELATIVE PERCENT: 3 %
GFR CALCULATED: 81
GLUCOSE BLD-MCNC: 97 MG/DL
HCT VFR BLD CALC: 23.4 % (ref 36–46)
HEMOGLOBIN: 7.3 G/DL (ref 12–16)
LYMPHOCYTES ABSOLUTE: 1.5 /ΜL
LYMPHOCYTES RELATIVE PERCENT: 27.6 %
MCH RBC QN AUTO: 27.4 PG
MCHC RBC AUTO-ENTMCNC: 31.3 G/DL
MCV RBC AUTO: 87.7 FL
MONOCYTES ABSOLUTE: 0.6 /ΜL
MONOCYTES RELATIVE PERCENT: 11.4 %
NEUTROPHILS ABSOLUTE: 3.2 /ΜL
NEUTROPHILS RELATIVE PERCENT: 57.5 %
PLATELET # BLD: 302 K/ΜL
PMV BLD AUTO: ABNORMAL FL
POTASSIUM SERPL-SCNC: 3.8 MMOL/L
RBC # BLD: 2.67 10^6/ΜL
SODIUM BLD-SCNC: 143 MMOL/L
WBC # BLD: 5.5 10^3/ML

## 2020-12-30 ENCOUNTER — HOSPITAL ENCOUNTER (EMERGENCY)
Age: 80
Discharge: HOME | End: 2020-12-31
Attending: EMERGENCY MEDICINE
Payer: MEDICARE

## 2020-12-30 DIAGNOSIS — D64.9 ANEMIA, UNSPECIFIED TYPE: Primary | ICD-10-CM

## 2020-12-30 DIAGNOSIS — R53.83 OTHER FATIGUE: ICD-10-CM

## 2020-12-30 LAB
ALBUMIN SERPL-MCNC: 2.6 GM/DL (ref 3.4–5)
ALP BLD-CCNC: 100 IU/L (ref 40–129)
ALT SERPL-CCNC: 12 U/L (ref 10–40)
ANION GAP SERPL CALCULATED.3IONS-SCNC: 5 MMOL/L (ref 4–16)
APTT: 37.9 SECONDS (ref 25.1–37.1)
AST SERPL-CCNC: 29 IU/L (ref 15–37)
BASOPHILS ABSOLUTE: 0 K/CU MM
BASOPHILS RELATIVE PERCENT: 0.5 % (ref 0–1)
BILIRUB SERPL-MCNC: 0.1 MG/DL (ref 0–1)
BUN BLDV-MCNC: 16 MG/DL (ref 6–23)
CALCIUM SERPL-MCNC: 8.4 MG/DL (ref 8.3–10.6)
CHLORIDE BLD-SCNC: 106 MMOL/L (ref 99–110)
CO2: 29 MMOL/L (ref 21–32)
CREAT SERPL-MCNC: 0.8 MG/DL (ref 0.6–1.1)
DIFFERENTIAL TYPE: ABNORMAL
EOSINOPHILS ABSOLUTE: 0.3 K/CU MM
EOSINOPHILS RELATIVE PERCENT: 3.6 % (ref 0–3)
GFR AFRICAN AMERICAN: >60 ML/MIN/1.73M2
GFR NON-AFRICAN AMERICAN: >60 ML/MIN/1.73M2
GLUCOSE BLD-MCNC: 98 MG/DL (ref 70–99)
HCT VFR BLD CALC: 21.3 % (ref 36–46)
HCT VFR BLD CALC: 25.5 % (ref 37–47)
HEMOGLOBIN: 6.7 G/DL (ref 12–16)
HEMOGLOBIN: 7.2 GM/DL (ref 12.5–16)
IMMATURE NEUTROPHIL %: 1.5 % (ref 0–0.43)
INR BLD: 1.86 INDEX
LYMPHOCYTES ABSOLUTE: 2.4 K/CU MM
LYMPHOCYTES RELATIVE PERCENT: 30.6 % (ref 24–44)
MCH RBC QN AUTO: 27.4 PG (ref 27–31)
MCHC RBC AUTO-ENTMCNC: 28.2 % (ref 32–36)
MCV RBC AUTO: 97 FL (ref 78–100)
MONOCYTES ABSOLUTE: 0.8 K/CU MM
MONOCYTES RELATIVE PERCENT: 10.3 % (ref 0–4)
NUCLEATED RBC %: 0.3 %
PDW BLD-RTO: 22.5 % (ref 11.7–14.9)
PLATELET # BLD: 455 K/CU MM (ref 140–440)
PMV BLD AUTO: 10.3 FL (ref 7.5–11.1)
POTASSIUM SERPL-SCNC: 4.5 MMOL/L (ref 3.5–5.1)
PROTHROMBIN TIME: 22.6 SECONDS (ref 11.7–14.5)
RBC # BLD: 2.63 M/CU MM (ref 4.2–5.4)
REASON FOR REJECTION: NORMAL
REJECTED TEST: NORMAL
SEGMENTED NEUTROPHILS ABSOLUTE COUNT: 4.2 K/CU MM
SEGMENTED NEUTROPHILS RELATIVE PERCENT: 53.5 % (ref 36–66)
SODIUM BLD-SCNC: 140 MMOL/L (ref 135–145)
TOTAL IMMATURE NEUTOROPHIL: 0.12 K/CU MM
TOTAL NUCLEATED RBC: 0 K/CU MM
TOTAL PROTEIN: 4.5 GM/DL (ref 6.4–8.2)
WBC # BLD: 7.9 K/CU MM (ref 4–10.5)

## 2020-12-30 PROCEDURE — 96361 HYDRATE IV INFUSION ADD-ON: CPT

## 2020-12-30 PROCEDURE — 86922 COMPATIBILITY TEST ANTIGLOB: CPT

## 2020-12-30 PROCEDURE — P9016 RBC LEUKOCYTES REDUCED: HCPCS

## 2020-12-30 PROCEDURE — 80053 COMPREHEN METABOLIC PANEL: CPT

## 2020-12-30 PROCEDURE — 85730 THROMBOPLASTIN TIME PARTIAL: CPT

## 2020-12-30 PROCEDURE — 86901 BLOOD TYPING SEROLOGIC RH(D): CPT

## 2020-12-30 PROCEDURE — 86850 RBC ANTIBODY SCREEN: CPT

## 2020-12-30 PROCEDURE — 96360 HYDRATION IV INFUSION INIT: CPT

## 2020-12-30 PROCEDURE — 36430 TRANSFUSION BLD/BLD COMPNT: CPT

## 2020-12-30 PROCEDURE — 86900 BLOOD TYPING SEROLOGIC ABO: CPT

## 2020-12-30 PROCEDURE — 6370000000 HC RX 637 (ALT 250 FOR IP): Performed by: EMERGENCY MEDICINE

## 2020-12-30 PROCEDURE — 85610 PROTHROMBIN TIME: CPT

## 2020-12-30 PROCEDURE — 99285 EMERGENCY DEPT VISIT HI MDM: CPT

## 2020-12-30 PROCEDURE — 85025 COMPLETE CBC W/AUTO DIFF WBC: CPT

## 2020-12-30 RX ORDER — 0.9 % SODIUM CHLORIDE 0.9 %
20 INTRAVENOUS SOLUTION INTRAVENOUS ONCE
Status: COMPLETED | OUTPATIENT
Start: 2020-12-30 | End: 2020-12-31

## 2020-12-30 RX ORDER — ACETAMINOPHEN 325 MG/1
650 TABLET ORAL ONCE
Status: COMPLETED | OUTPATIENT
Start: 2020-12-30 | End: 2020-12-30

## 2020-12-30 RX ADMIN — ACETAMINOPHEN 650 MG: 325 TABLET ORAL at 23:39

## 2020-12-30 ASSESSMENT — PAIN SCALES - GENERAL: PAINLEVEL_OUTOF10: 10

## 2020-12-30 NOTE — ED PROVIDER NOTES
respiratory failure ([facility] 9/9/2020    Chronic pain syndrome     Low back pain; lumbar disc disease    Clostridium difficile colitis 09/20/2017    COPD, severe ([address] 10/24/2017    Depression     Fibromyalgia     Former smoker     Quit 1/2019    Herniated cervical disc 5-1998    Herpes zoster ophthalmicus 3/2012    Hx of blood clots     Hyperlipidemia     Hypertension     Kidney disease     L3 vertebral fracture ([address] 2010    vertebroplasty    Lumbar spinal stenosis 2015    moderately severe by MRI    Migraine     Nocturnal hypoxemia 5/21/2019    Osteoporosis 2010    Fragility Fx L3    Rheumatoid arthritis(714.0) 1976    Dr Lorie Kwon S/P cardiac catheterization 05/2017    patent coronaries; Takotsubo; najeeb Ahmed    Shortness of breath 4/25/2019    Takotsubo syndrome 05/2017    TMJ dysfunction     Tobacco abuse 12/13/2018    Vitamin B12 deficiency 12/2014    B12 level 199       CURRENT MEDICATIONS  [unfilled]    ALLERGIES  Allergies   Allergen Reactions    Ativan [Lorazepam] Other (See Comments)     Violent, thrashing and combative. She has lacerations and bruising, had to be tied down.      Etanercept Other (See Comments)     No response    Humira [Adalimumab]     Prochlorperazine Edisylate Other (See Comments)     \"Compazine\"   Involuntary Muscle Spasms     Remicade [Infliximab Injection]     Doxycycline Other (See Comments)     Stomach pains       SURGICAL HISTORY  Past Surgical History:   Procedure Laterality Date    APPENDECTOMY  1966    CARDIAC CATHETERIZATION  05/21/2017    CHOLECYSTECTOMY  1966    w/ appendectomy    COLONOSCOPY N/A 3/11/2020    COLONOSCOPY WITH BIOPSY performed by Francoise Goddard MD at [address] Right     FIXATION KYPHOPLASTY  06/12/2019    L4 kyphoplasty ; Alis Smoker    FOOT SURGERY  1986    HYSTERECTOMY      IA COLONOSCOPY FLX DX W/COLLJ SPEC WHEN PFRMD N/A 10/2/2018    COLONOSCOPY DIAGNOSTIC OR SCREENING performed by Georges Crouch Ania Bell MD at [facility]    TOE SURGERY Left 2013    Deboo;    Emile UPPER GASTROINTESTINAL ENDOSCOPY N/A 3/11/2020    EGD BIOPSY performed by Neal Villalta MD at [place] [address] VERTEBROPLASTY  10/2010    L3    WRIST FUSION Left        FAMILY HISTORY  Family History   Problem Relation Age of Onset    Heart Disease Father          FROM MI   Baptiste Emphysema Father     Arthritis Mother     Stroke Mother     Heart Disease Other         family history    Cancer Other         family history -- larynx    Kidney Disease Son        SOCIAL HISTORY  Social History     Socioeconomic History    Marital status:       Spouse name: Leanne Dakin Number of children: 2    Years of education: 15    Highest education level: Not on file   Occupational History    Occupation: retired   Social Needs    Financial resource strain: Not on file    Food insecurity     Worry: Not on file     Inability: Not on file   [facility] [place] [facility] needs     Medical: Not on file     Non-medical: Not on file   Tobacco Use    Smoking status: Former Smoker     Packs/day: 0.75     Years: 55.00     Pack years: 41.25     Types: Cigarettes     Start date: 1962     Quit date: 2019     Years since quittin.0    Smokeless tobacco: Never Used   Substance and Sexual Activity    Alcohol use: No     Alcohol/week: 0.0 standard drinks    Drug use: No    Sexual activity: Not Currently   Lifestyle    Physical activity     Days per week: Not on file     Minutes per session: Not on file    Stress: Not on file   Relationships    Social connections     Talks on phone: Not on file     Gets together: Not on file     Attends Christian service: Not on file     Active member of club or organization: Not on file     Attends meetings of clubs or organizations: Not on file     Relationship status: Not on file    Intimate partner violence     Fear of current or ex partner: Not on file     Emotionally abused: Not on file     Physically abused: Not on file     Forced sexual activity: Not on file   Other Topics Concern    Not on file   Social History Narrative    Not on file         **Past medical, family and social histories, and nursing notes reviewed and verified by me**      PHYSICAL EXAM  VITAL SIGNS:   ED Triage Vitals [12/30/20 1752]   Enc Vitals Group      /66      Pulse 76      Resp 16      Temp 98.8 °F (37.1 °C)      Temp Source Oral      SpO2 100 %      Weight 125 lb (56.7 kg)      Height 5' 7\" (1.702 m)      Head Circumference       Peak Flow       Pain Score       Pain Loc       Pain Edu? Excl. in [address]? Vitals during ED course were reviewed and are as charted. Constitutional: Minimal distress, Non-toxic appearance  Eyes: Conjunctiva normal, No discharge  HENT: Normocephalic, Atraumatic, bilateral external ears normal, posterior oropharynx is nonerythematous and without exudate, uvula is midline, no trismus, no \"hot potato voice\" or dysphonia, oropharynx moist  Neck: Supple, no stridor, no grossly visible or palpable masses  Cardiovascular: Regular rate and rhythm, No murmurs, No rubs, No gallops  Pulmonary/Chest: Normal breath sounds, No respiratory distress or accessory muscle use, No wheezing, crackles or rhonchi. Abdomen: Soft, nondistended and nonrigid, No tenderness or peritoneal signs, No masses, normal bowel sounds  Back: No midline point tenderness, No paraspinous muscle tenderness.  No CVA tenderness  Extremities: No gross deformities, no edema, no tenderness  Neurologic: Normal motor function, Normal sensory function, No focal deficits  Skin: Warm, Dry, No erythema, No rash, No cyanosis, No mottling  Lymphatic: No lymphadenopathy in the following location(s): cervical  Psychiatric: Alert and oriented x3, Affect normal              RADIOLOGY/PROCEDURES/LABS/MEDICATIONS ADMINISTERED:    I have reviewed and interpreted all of the currently available lab results from this visit (if applicable):  Results for orders placed or performed during the hospital encounter of 12/30/20   CBC Auto Differential   Result Value Ref Range    WBC 7.9 4.0 - 10.5 K/CU MM    RBC 2.63 (L) 4.2 - 5.4 M/CU MM    Hemoglobin 7.2 (L) 12.5 - 16.0 GM/DL    Hematocrit 25.5 (L) 37 - 47 %    MCV 97.0 78 - 100 FL    MCH 27.4 27 - 31 PG    MCHC 28.2 (L) 32.0 - 36.0 %    RDW 22.5 (H) 11.7 - 14.9 %    Platelets 928 (H) 850 - 440 K/CU MM    MPV 10.3 7.5 - 11.1 FL    Differential Type AUTOMATED DIFFERENTIAL     Segs Relative 53.5 36 - 66 %    Lymphocytes % 30.6 24 - 44 %    Monocytes % 10.3 (H) 0 - 4 %    Eosinophils % 3.6 (H) 0 - 3 %    Basophils % 0.5 0 - 1 %    Segs Absolute 4.2 K/CU MM    Lymphocytes Absolute 2.4 K/CU MM    Monocytes Absolute 0.8 K/CU MM    Eosinophils Absolute 0.3 K/CU MM    Basophils Absolute 0.0 K/CU MM    Nucleated RBC % 0.3 %    Total Nucleated RBC 0.0 K/CU MM    Total Immature Neutrophil 0.12 K/CU MM    Immature Neutrophil % 1.5 (H) 0 - 0.43 %   Protime-INR   Result Value Ref Range    Protime 22.6 (H) 11.7 - 14.5 SECONDS    INR 1.86 INDEX   APTT   Result Value Ref Range    aPTT 37.9 (H) 25.1 - 37.1 SECONDS   SPECIMEN REJECTION   Result Value Ref Range    Rejected Test CMPX     Reason for Rejection UNABLE TO PERFORM TESTING:    Comprehensive Metabolic Panel w/ Reflex to MG   Result Value Ref Range    Sodium 140 135 - 145 MMOL/L    Potassium 4.5 3.5 - 5.1 MMOL/L    Chloride 106 99 - 110 mMol/L    CO2 29 21 - 32 MMOL/L    BUN 16 6 - 23 MG/DL    CREATININE 0.8 0.6 - 1.1 MG/DL    Glucose 98 70 - 99 MG/DL    Calcium 8.4 8.3 - 10.6 MG/DL    Alb 2.6 (L) 3.4 - 5.0 GM/DL    Total Protein 4.5 (L) 6.4 - 8.2 GM/DL    Total Bilirubin 0.1 0.0 - 1.0 MG/DL    ALT 12 10 - 40 U/L    AST 29 15 - 37 IU/L    Alkaline Phosphatase 100 40 - 129 IU/L    GFR Non-African American >60 >60 mL/min/1.73m2    GFR African American >60 >60 mL/min/1.73m2    Anion Gap 5 4 - 16   TYPE AND SCREEN   Result Value Ref Range    ABO/Rh B NEGATIVE Antibody Screen NEGATIVE     Unit Number [id number]     Component LEUKO-POOR RED CELLS     Unit Divison 00     Status ISSUED     Transfusion Status OK TO TRANSFUSE     Crossmatch Result COMPATIBLE           ABNORMAL LABS:  Labs Reviewed   CBC WITH AUTO DIFFERENTIAL - Abnormal; Notable for the following components:       Result Value    RBC 2.63 (*)     Hemoglobin 7.2 (*)     Hematocrit 25.5 (*)     MCHC 28.2 (*)     RDW 22.5 (*)     Platelets 159 (*)     Monocytes % 10.3 (*)     Eosinophils % 3.6 (*)     Immature Neutrophil % 1.5 (*)     All other components within normal limits   PROTIME-INR - Abnormal; Notable for the following components:    Protime 22.6 (*)     All other components within normal limits   APTT - Abnormal; Notable for the following components:    aPTT 37.9 (*)     All other components within normal limits   COMPREHENSIVE METABOLIC PANEL W/ REFLEX TO MG FOR LOW K - Abnormal; Notable for the following components:    Alb 2.6 (*)     Total Protein 4.5 (*)     All other components within normal limits   SPECIMEN REJECTION   TYPE AND SCREEN   PREPARE RBC (CROSSMATCH)         IMAGING STUDIES ORDERED:  VITAL SIGNS PER TRANSFUSION PROTOCOL  VERIFY INFORMED CONSENT  TRANSFUSION REACTION MANAGEMENT      No orders to display         MEDICATIONS ADMINISTERED:  Medications   0.9 % sodium chloride bolus (20 mLs Intravenous New Bag 12/31/20 0004)   acetaminophen (TYLENOL) tablet 650 mg (650 mg Oral Given 12/30/20 2339)         COURSE & MEDICAL DECISION MAKING  Last vitals: /77   Pulse 72   Temp 97.5 °F (36.4 °C)   Resp 21   Ht 5' 7\" (1.702 m)   Wt 125 lb (56.7 kg)   SpO2 100%   BMI 19.58 kg/m²     [de-identified]year-old female with chronic anemia who has hemoglobin around 7 and complaints of generalized fatigue. Was sent here for transfusion. No evidence to suggest active bleeding. She denies any symptoms or complaints other than fatigue. She was transfused 1 unit of PRBCs.   She did not have any evidence of any acute reactions to this. Additional workup and treatment in the ED as documented above. Patient reassured and will be discharged home. I have explained to the patient in appropriate terminology our work-up in the ED and their diagnosis. I have also given anticipatory guidance and expectant management of their condition as an outpatient as per my custom. The patient was given clear discharge and follow-up instructions including return to the ER immediately for worsening concerns. The patient has been advised to follow-up with their primary care physician and/or referred physician in the next two to three days or sooner if worsening and to return to the ER immediately as above with any concerns. I provided the patient counseling with regard to my customary list of strict return precautions as well as return precautions specific to the cause for today's emergency department visit. The patient will return under these provided conditions, but should also return for new concerns or further worsening. Pt and/or family understand and agree with plan. Clinical Impression:  1. Anemia, unspecified type    2. Other fatigue        Disposition referral (if applicable):  Inna Pickett MD  [facility]  [address] [place] [address] [phone]    Schedule an appointment as soon as possible for a visit       [facility] Emergency Department  [facility] [id number]  [phone]    If symptoms worsen      Disposition medications (if applicable):  New Prescriptions    No medications on file       ED Provider Disposition Time  DISPOSITION            Electronically signed by: Kiel Duvall M.D., 12/31/2020 12:45 AM      This dictation was created with voice recognition software.  While attempts have been made to review the dictation as it is transcribed, on occasion the spoken word can be misinterpreted by the technology leading to omissions or inappropriate words, phrases or sentences.         Emiliano Jenkins MD  12/31/20 5710

## 2020-12-30 NOTE — ED NOTES
Bed: ED-14  Expected date: 12/30/20  Expected time:   Means of arrival:   Comments:  Ra [address], [facility]  12/30/20 1424

## 2020-12-31 VITALS
TEMPERATURE: 97.5 F | OXYGEN SATURATION: 100 % | DIASTOLIC BLOOD PRESSURE: 70 MMHG | BODY MASS INDEX: 19.62 KG/M2 | HEIGHT: 67 IN | WEIGHT: 125 LBS | HEART RATE: 66 BPM | SYSTOLIC BLOOD PRESSURE: 132 MMHG | RESPIRATION RATE: 24 BRPM

## 2020-12-31 PROCEDURE — 2580000003 HC RX 258: Performed by: EMERGENCY MEDICINE

## 2020-12-31 RX ADMIN — SODIUM CHLORIDE 20 ML: 9 INJECTION, SOLUTION INTRAVENOUS at 00:04

## 2020-12-31 NOTE — ED NOTES
QCT at bedside, given report and paperwork.  Care transferred at this time     Jamarcus Oconnor RN  12/31/20 7828

## 2020-12-31 NOTE — ED NOTES
Lunch coverage for primary RN. Transfusion completed by Mechelle Garcia. Dr. Magno Rdoarte notified.       Meng Walker RN  12/31/20 7840

## 2021-01-05 ENCOUNTER — OFFICE VISIT (OUTPATIENT)
Dept: PULMONOLOGY | Age: 81
End: 2021-01-05
Payer: MEDICARE

## 2021-01-05 VITALS — WEIGHT: 128 LBS | HEART RATE: 72 BPM | OXYGEN SATURATION: 98 % | BODY MASS INDEX: 20.57 KG/M2 | HEIGHT: 66 IN

## 2021-01-05 DIAGNOSIS — R06.02 SHORTNESS OF BREATH: ICD-10-CM

## 2021-01-05 DIAGNOSIS — J96.11 CHRONIC HYPOXEMIC RESPIRATORY FAILURE (HCC): ICD-10-CM

## 2021-01-05 DIAGNOSIS — J12.82 PNEUMONIA DUE TO COVID-19 VIRUS: ICD-10-CM

## 2021-01-05 DIAGNOSIS — J44.9 COPD, SEVERE (HCC): Primary | ICD-10-CM

## 2021-01-05 DIAGNOSIS — U07.1 PNEUMONIA DUE TO COVID-19 VIRUS: ICD-10-CM

## 2021-01-05 PROCEDURE — 4040F PNEUMOC VAC/ADMIN/RCVD: CPT | Performed by: INTERNAL MEDICINE

## 2021-01-05 PROCEDURE — 3023F SPIROM DOC REV: CPT | Performed by: INTERNAL MEDICINE

## 2021-01-05 PROCEDURE — G8926 SPIRO NO PERF OR DOC: HCPCS | Performed by: INTERNAL MEDICINE

## 2021-01-05 PROCEDURE — G8399 PT W/DXA RESULTS DOCUMENT: HCPCS | Performed by: INTERNAL MEDICINE

## 2021-01-05 PROCEDURE — 1111F DSCHRG MED/CURRENT MED MERGE: CPT | Performed by: INTERNAL MEDICINE

## 2021-01-05 PROCEDURE — 99213 OFFICE O/P EST LOW 20 MIN: CPT | Performed by: INTERNAL MEDICINE

## 2021-01-05 PROCEDURE — G8484 FLU IMMUNIZE NO ADMIN: HCPCS | Performed by: INTERNAL MEDICINE

## 2021-01-05 PROCEDURE — G8420 CALC BMI NORM PARAMETERS: HCPCS | Performed by: INTERNAL MEDICINE

## 2021-01-05 PROCEDURE — 1090F PRES/ABSN URINE INCON ASSESS: CPT | Performed by: INTERNAL MEDICINE

## 2021-01-05 PROCEDURE — G8427 DOCREV CUR MEDS BY ELIG CLIN: HCPCS | Performed by: INTERNAL MEDICINE

## 2021-01-05 PROCEDURE — 1123F ACP DISCUSS/DSCN MKR DOCD: CPT | Performed by: INTERNAL MEDICINE

## 2021-01-05 PROCEDURE — 1036F TOBACCO NON-USER: CPT | Performed by: INTERNAL MEDICINE

## 2021-01-05 NOTE — PROGRESS NOTES
SUBJECTIVE:  Chief Complaint: COVID-19 pneumonia, severe COPD, chronic hypoxemic respiratory failure, shortness of breath, generalized weakness  Since I last saw Collette Das she was hospitalized at [facility] on 2 separate occasions in November and early December for COVID-19 pneumonia and complications from this infection including acute on chronic hypoxemic respiratory failure. She states that she is still short of breath but her major complaint is severe weakness. She is not expectorating purulent sputum. She states that she is not getting her nebulized bronchodilators because of Covid precautions at [facility]. She continues on oxygen 24 hours a day. She continues on Advair Diskus, Incruse and albuterol MDI. She denies hemoptysis or chest pain. She is not having any fever presently      ROS:  Constitution:  HEENT: Negative for ear, throat pain  Cardiovascular: Negative for chest pain, syncope, edema  Pulmonary: See HPI  Musculoskeletal: Negative for DVT, myalgias, arthralgias    OBJECTIVE:  Pulse 72   Ht 5' 6\" (1.676 m)   Wt 128 lb (58.1 kg)   SpO2 98%   BMI 20.66 kg/m²      Physical Exam:  Constitutional:  She appears chronically ill, wearing nasal oxygen but not in marked respiratory distress at rest, emotionally labile  Neck:  Supple, No palpable lymphadenopathy, No JVD  Cardiovascular:  S1, S2 Normal, Regular rhythm, no murmurs or gallops, No pericardial  rubs. Pulmonary: Breath sounds are generally diminished throughout all lung areas with a few scattered rhonchi in the mid to lower lung fields.   No wheezing is noted and there is no basilar rales  Abdomen: Not examined  Extremities: no edema, No DVT  Neurologic: Oriented x3, No focal deficits    Radiology: Portable chest x-ray in 12/2/2020 showed stable right lower lobe peripheral scarring with suspected calcification and stable diffuse interstitial prominence suggesting association with chronic interstitial lung disease and no definite evidence of overlying lung consolidation  PFT: Office spirometry last obtained on 7/29/2019 demonstrated a moderate obstructive defect with no significant response to bronchodilators      Echocardiogram: 11/16/2020   This is a limited echocardiogram.   Left ventricular systolic function is normal.   Ejection fraction is visually estimated at 50-55%. Moderate aortic regurgitation; PHT: 336 msec. Moderate mitral regurgitation. Small mobile calcification attached to the posterior mitral valve chordae   tendinae. No evidence of any pericardial effusion  ASSESSMENT:    1. COPD, severe ([address]    2. Chronic hypoxemic respiratory failure (HCC)    3. Shortness of breath    4. Pneumonia due to COVID-19 virus          PLAN:   I will make no change in her current bronchodilator therapy. I told her that her recovery could be another 4 to 8 weeks and that she should expect persistent shortness of breath. I also encouraged her to get the COVID-19 vaccine when available. I will continue to follow her    We have discussed the need to maintain yearly flu immunization, pneumococcal vaccination. We have discussed Coronavirus precaution including handwashing practice, wiping items touched in public such as gas pumps, door handles, shopping carts, etc. Self monitoring for infection - fever, chills, cough, SOB. Should they develop symptoms they should call office for further instructions. Return in about 4 months (around 5/5/2021) for Recheck for COPD, Recheck for Shortness of Breath, chronic hypoxemic respiratory failure. This dictation was performed with a verbal recognition program and it was checked for errors. It is possible that there are still dictated errors within this office note. Any errors should be brought immediately to my attention for correction. All efforts were made to ensure that this office note is accurate.

## 2021-01-06 ENCOUNTER — APPOINTMENT (OUTPATIENT)
Dept: GENERAL RADIOLOGY | Age: 81
DRG: 368 | End: 2021-01-06
Payer: MEDICARE

## 2021-01-06 ENCOUNTER — HOSPITAL ENCOUNTER (INPATIENT)
Age: 81
LOS: 5 days | Discharge: NURSING, REHAB OR LONG-TERM CARE | DRG: 368 | End: 2021-01-11
Attending: EMERGENCY MEDICINE | Admitting: STUDENT IN AN ORGANIZED HEALTH CARE EDUCATION/TRAINING PROGRAM
Payer: MEDICARE

## 2021-01-06 DIAGNOSIS — G89.4 CHRONIC PAIN SYNDROME: ICD-10-CM

## 2021-01-06 DIAGNOSIS — K92.2 GASTROINTESTINAL HEMORRHAGE, UNSPECIFIED GASTROINTESTINAL HEMORRHAGE TYPE: Primary | ICD-10-CM

## 2021-01-06 DIAGNOSIS — D68.9 COAGULOPATHY (HCC): ICD-10-CM

## 2021-01-06 DIAGNOSIS — D53.9 MACROCYTIC ANEMIA: ICD-10-CM

## 2021-01-06 LAB
ALBUMIN SERPL-MCNC: 2.7 GM/DL (ref 3.4–5)
ALP BLD-CCNC: 64 IU/L (ref 40–129)
ALT SERPL-CCNC: 6 U/L (ref 10–40)
ANION GAP SERPL CALCULATED.3IONS-SCNC: 6 MMOL/L (ref 4–16)
AST SERPL-CCNC: 11 IU/L (ref 15–37)
BASOPHILS ABSOLUTE: 0 K/CU MM
BASOPHILS RELATIVE PERCENT: 0.5 % (ref 0–1)
BILIRUB SERPL-MCNC: 0.1 MG/DL (ref 0–1)
BUN BLDV-MCNC: 22 MG/DL (ref 6–23)
CALCIUM SERPL-MCNC: 8.4 MG/DL (ref 8.3–10.6)
CHLORIDE BLD-SCNC: 103 MMOL/L (ref 99–110)
CO2: 31 MMOL/L (ref 21–32)
CREAT SERPL-MCNC: 0.7 MG/DL (ref 0.6–1.1)
DIFFERENTIAL TYPE: ABNORMAL
EOSINOPHILS ABSOLUTE: 0.1 K/CU MM
EOSINOPHILS RELATIVE PERCENT: 0.8 % (ref 0–3)
GFR AFRICAN AMERICAN: >60 ML/MIN/1.73M2
GFR NON-AFRICAN AMERICAN: >60 ML/MIN/1.73M2
GLUCOSE BLD-MCNC: 93 MG/DL (ref 70–99)
HCT VFR BLD CALC: 17.8 % (ref 37–47)
HEMOGLOBIN: 5.1 GM/DL (ref 12.5–16)
IMMATURE NEUTROPHIL %: 1.7 % (ref 0–0.43)
INR BLD: 2.85 INDEX
LYMPHOCYTES ABSOLUTE: 1.9 K/CU MM
LYMPHOCYTES RELATIVE PERCENT: 21.7 % (ref 24–44)
MCH RBC QN AUTO: 28.7 PG (ref 27–31)
MCHC RBC AUTO-ENTMCNC: 28.7 % (ref 32–36)
MCV RBC AUTO: 100 FL (ref 78–100)
MONOCYTES ABSOLUTE: 0.9 K/CU MM
MONOCYTES RELATIVE PERCENT: 10.2 % (ref 0–4)
NUCLEATED RBC %: 0.3 %
PDW BLD-RTO: 21.3 % (ref 11.7–14.9)
PLATELET # BLD: 350 K/CU MM (ref 140–440)
PMV BLD AUTO: 10.5 FL (ref 7.5–11.1)
POTASSIUM SERPL-SCNC: 3.6 MMOL/L (ref 3.5–5.1)
PROTHROMBIN TIME: 34.9 SECONDS (ref 11.7–14.5)
RBC # BLD: 1.78 M/CU MM (ref 4.2–5.4)
SARS-COV-2, NAAT: NOT DETECTED
SEGMENTED NEUTROPHILS ABSOLUTE COUNT: 5.7 K/CU MM
SEGMENTED NEUTROPHILS RELATIVE PERCENT: 65.1 % (ref 36–66)
SODIUM BLD-SCNC: 140 MMOL/L (ref 135–145)
SOURCE: NORMAL
TOTAL IMMATURE NEUTOROPHIL: 0.15 K/CU MM
TOTAL NUCLEATED RBC: 0 K/CU MM
TOTAL PROTEIN: 4.6 GM/DL (ref 6.4–8.2)
WBC # BLD: 8.8 K/CU MM (ref 4–10.5)

## 2021-01-06 PROCEDURE — 6360000002 HC RX W HCPCS: Performed by: STUDENT IN AN ORGANIZED HEALTH CARE EDUCATION/TRAINING PROGRAM

## 2021-01-06 PROCEDURE — 86922 COMPATIBILITY TEST ANTIGLOB: CPT

## 2021-01-06 PROCEDURE — U0002 COVID-19 LAB TEST NON-CDC: HCPCS

## 2021-01-06 PROCEDURE — 86901 BLOOD TYPING SEROLOGIC RH(D): CPT

## 2021-01-06 PROCEDURE — 6370000000 HC RX 637 (ALT 250 FOR IP): Performed by: STUDENT IN AN ORGANIZED HEALTH CARE EDUCATION/TRAINING PROGRAM

## 2021-01-06 PROCEDURE — C9113 INJ PANTOPRAZOLE SODIUM, VIA: HCPCS | Performed by: STUDENT IN AN ORGANIZED HEALTH CARE EDUCATION/TRAINING PROGRAM

## 2021-01-06 PROCEDURE — 6360000002 HC RX W HCPCS

## 2021-01-06 PROCEDURE — 99284 EMERGENCY DEPT VISIT MOD MDM: CPT

## 2021-01-06 PROCEDURE — 93005 ELECTROCARDIOGRAM TRACING: CPT | Performed by: STUDENT IN AN ORGANIZED HEALTH CARE EDUCATION/TRAINING PROGRAM

## 2021-01-06 PROCEDURE — 85610 PROTHROMBIN TIME: CPT

## 2021-01-06 PROCEDURE — 2140000000 HC CCU INTERMEDIATE R&B

## 2021-01-06 PROCEDURE — 36415 COLL VENOUS BLD VENIPUNCTURE: CPT

## 2021-01-06 PROCEDURE — 94640 AIRWAY INHALATION TREATMENT: CPT

## 2021-01-06 PROCEDURE — 85025 COMPLETE CBC W/AUTO DIFF WBC: CPT

## 2021-01-06 PROCEDURE — 71046 X-RAY EXAM CHEST 2 VIEWS: CPT

## 2021-01-06 PROCEDURE — 80053 COMPREHEN METABOLIC PANEL: CPT

## 2021-01-06 PROCEDURE — 36430 TRANSFUSION BLD/BLD COMPNT: CPT

## 2021-01-06 PROCEDURE — 86850 RBC ANTIBODY SCREEN: CPT

## 2021-01-06 PROCEDURE — 86900 BLOOD TYPING SEROLOGIC ABO: CPT

## 2021-01-06 RX ORDER — UMECLIDINIUM 62.5 UG/1
AEROSOL, POWDER ORAL PRN
COMMUNITY

## 2021-01-06 RX ORDER — METOPROLOL TARTRATE 5 MG/5ML
2.5 INJECTION INTRAVENOUS
Status: ACTIVE | OUTPATIENT
Start: 2021-01-06 | End: 2021-01-06

## 2021-01-06 RX ORDER — SODIUM CHLORIDE 0.9 % (FLUSH) 0.9 %
10 SYRINGE (ML) INJECTION PRN
Status: DISCONTINUED | OUTPATIENT
Start: 2021-01-06 | End: 2021-01-11 | Stop reason: HOSPADM

## 2021-01-06 RX ORDER — SODIUM CHLORIDE 0.9 % (FLUSH) 0.9 %
10 SYRINGE (ML) INJECTION EVERY 12 HOURS SCHEDULED
Status: DISCONTINUED | OUTPATIENT
Start: 2021-01-06 | End: 2021-01-11 | Stop reason: HOSPADM

## 2021-01-06 RX ORDER — TRAZODONE HYDROCHLORIDE 50 MG/1
50 TABLET ORAL NIGHTLY
Status: DISCONTINUED | OUTPATIENT
Start: 2021-01-06 | End: 2021-01-11 | Stop reason: HOSPADM

## 2021-01-06 RX ORDER — ONDANSETRON 2 MG/ML
4 INJECTION INTRAMUSCULAR; INTRAVENOUS EVERY 6 HOURS PRN
Status: DISCONTINUED | OUTPATIENT
Start: 2021-01-06 | End: 2021-01-11 | Stop reason: HOSPADM

## 2021-01-06 RX ORDER — AMIODARONE HYDROCHLORIDE 200 MG/1
200 TABLET ORAL DAILY
Status: DISCONTINUED | OUTPATIENT
Start: 2021-01-06 | End: 2021-01-11 | Stop reason: HOSPADM

## 2021-01-06 RX ORDER — FLUTICASONE PROPIONATE 50 MCG
2 SPRAY, SUSPENSION (ML) NASAL DAILY
Status: DISCONTINUED | OUTPATIENT
Start: 2021-01-07 | End: 2021-01-11 | Stop reason: HOSPADM

## 2021-01-06 RX ORDER — MORPHINE SULFATE 4 MG/ML
2 INJECTION, SOLUTION INTRAMUSCULAR; INTRAVENOUS ONCE
Status: COMPLETED | OUTPATIENT
Start: 2021-01-06 | End: 2021-01-06

## 2021-01-06 RX ORDER — IPRATROPIUM BROMIDE AND ALBUTEROL SULFATE 2.5; .5 MG/3ML; MG/3ML
3 SOLUTION RESPIRATORY (INHALATION)
Status: DISCONTINUED | OUTPATIENT
Start: 2021-01-06 | End: 2021-01-07

## 2021-01-06 RX ORDER — PANTOPRAZOLE SODIUM 40 MG/10ML
40 INJECTION, POWDER, LYOPHILIZED, FOR SOLUTION INTRAVENOUS ONCE
Status: COMPLETED | OUTPATIENT
Start: 2021-01-06 | End: 2021-01-06

## 2021-01-06 RX ORDER — ACETAMINOPHEN 650 MG/1
650 SUPPOSITORY RECTAL EVERY 6 HOURS PRN
Status: DISCONTINUED | OUTPATIENT
Start: 2021-01-06 | End: 2021-01-11 | Stop reason: HOSPADM

## 2021-01-06 RX ORDER — CETIRIZINE HYDROCHLORIDE 10 MG/1
10 TABLET ORAL DAILY
Status: DISCONTINUED | OUTPATIENT
Start: 2021-01-07 | End: 2021-01-11 | Stop reason: HOSPADM

## 2021-01-06 RX ORDER — SODIUM CHLORIDE 9 MG/ML
10 INJECTION INTRAVENOUS EVERY 12 HOURS
Status: DISCONTINUED | OUTPATIENT
Start: 2021-01-06 | End: 2021-01-08

## 2021-01-06 RX ORDER — ACETAMINOPHEN 325 MG/1
650 TABLET ORAL EVERY 6 HOURS PRN
Status: DISCONTINUED | OUTPATIENT
Start: 2021-01-06 | End: 2021-01-11 | Stop reason: HOSPADM

## 2021-01-06 RX ORDER — BUDESONIDE AND FORMOTEROL FUMARATE DIHYDRATE 160; 4.5 UG/1; UG/1
2 AEROSOL RESPIRATORY (INHALATION) 2 TIMES DAILY
Status: DISCONTINUED | OUTPATIENT
Start: 2021-01-06 | End: 2021-01-11 | Stop reason: HOSPADM

## 2021-01-06 RX ORDER — SODIUM CHLORIDE 9 MG/ML
INJECTION, SOLUTION INTRAVENOUS CONTINUOUS
Status: DISCONTINUED | OUTPATIENT
Start: 2021-01-06 | End: 2021-01-08

## 2021-01-06 RX ORDER — BUSPIRONE HYDROCHLORIDE 5 MG/1
5 TABLET ORAL 3 TIMES DAILY
Status: DISCONTINUED | OUTPATIENT
Start: 2021-01-07 | End: 2021-01-11 | Stop reason: HOSPADM

## 2021-01-06 RX ORDER — ATORVASTATIN CALCIUM 10 MG/1
10 TABLET, FILM COATED ORAL EVERY EVENING
Status: DISCONTINUED | OUTPATIENT
Start: 2021-01-06 | End: 2021-01-11 | Stop reason: HOSPADM

## 2021-01-06 RX ORDER — SERTRALINE HYDROCHLORIDE 25 MG/1
25 TABLET, FILM COATED ORAL DAILY
Status: DISCONTINUED | OUTPATIENT
Start: 2021-01-07 | End: 2021-01-11 | Stop reason: HOSPADM

## 2021-01-06 RX ORDER — OXYCODONE HYDROCHLORIDE AND ACETAMINOPHEN 5; 325 MG/1; MG/1
1 TABLET ORAL EVERY 6 HOURS PRN
Status: COMPLETED | OUTPATIENT
Start: 2021-01-06 | End: 2021-01-10

## 2021-01-06 RX ORDER — SODIUM CHLORIDE 9 MG/ML
INJECTION, SOLUTION INTRAVENOUS PRN
Status: DISCONTINUED | OUTPATIENT
Start: 2021-01-06 | End: 2021-01-11 | Stop reason: HOSPADM

## 2021-01-06 RX ORDER — FERROUS SULFATE 325(65) MG
325 TABLET ORAL
Status: DISCONTINUED | OUTPATIENT
Start: 2021-01-07 | End: 2021-01-11 | Stop reason: HOSPADM

## 2021-01-06 RX ORDER — PANTOPRAZOLE SODIUM 40 MG/10ML
40 INJECTION, POWDER, LYOPHILIZED, FOR SOLUTION INTRAVENOUS EVERY 12 HOURS
Status: DISCONTINUED | OUTPATIENT
Start: 2021-01-07 | End: 2021-01-08

## 2021-01-06 RX ORDER — ALBUTEROL SULFATE 90 UG/1
2 AEROSOL, METERED RESPIRATORY (INHALATION) EVERY 4 HOURS PRN
Status: DISCONTINUED | OUTPATIENT
Start: 2021-01-06 | End: 2021-01-07

## 2021-01-06 RX ADMIN — PANTOPRAZOLE SODIUM 40 MG: 40 INJECTION, POWDER, FOR SOLUTION INTRAVENOUS at 20:28

## 2021-01-06 RX ADMIN — MORPHINE SULFATE 2 MG: 4 INJECTION, SOLUTION INTRAMUSCULAR; INTRAVENOUS at 20:28

## 2021-01-06 RX ADMIN — BUDESONIDE AND FORMOTEROL FUMARATE DIHYDRATE 2 PUFF: 160; 4.5 AEROSOL RESPIRATORY (INHALATION) at 21:20

## 2021-01-06 RX ADMIN — ALBUTEROL SULFATE 2 PUFF: 90 AEROSOL, METERED RESPIRATORY (INHALATION) at 21:22

## 2021-01-06 ASSESSMENT — ENCOUNTER SYMPTOMS
CHEST TIGHTNESS: 0
BLOOD IN STOOL: 0
VOMITING: 0
COLOR CHANGE: 0
RHINORRHEA: 1
SHORTNESS OF BREATH: 0
DIARRHEA: 0
ANAL BLEEDING: 0
ABDOMINAL PAIN: 0
BACK PAIN: 1
SORE THROAT: 0
WHEEZING: 0
NAUSEA: 0
COUGH: 0

## 2021-01-06 ASSESSMENT — PAIN SCALES - GENERAL
PAINLEVEL_OUTOF10: 9
PAINLEVEL_OUTOF10: 0

## 2021-01-06 NOTE — ED NOTES
Bed: H-08  Expected date: 1/6/21  Expected time:   Means of arrival:   Comments:  jaison Issa.  ERNIE Gomez  01/06/21 [id number]

## 2021-01-06 NOTE — ED PROVIDER NOTES
EMERGENCY DEPARTMENT ENCOUNTER      PCP: Norma Mcdermott MD    CHIEF COMPLAINT    Chief Complaint   Patient presents with    Other     low hgb       Of note, this patient was also evaluated by the attending physician, Dr. Rox Rice. HPI    Earline Romano is a [de-identified] y.o. female who presents with fatigue for several days. Patient states that her \"blood is low\". She has a history of anemia and states she recently had a transfusion which did not improve her fatigue. She notes no spontaneous bruising, bleeding, petechiae. She does admit to dark appearing stools but also states she takes iron supplementation and attributes dark stools to this. No gross hematuria or blood in stools.       REVIEW OF SYSTEMS    Constitutional:  Denies fever, chills, weight loss or weakness   HENT:  Denies sore throat or ear pain   Cardiovascular:  Denies chest pain, palpitations   Respiratory:  Denies cough or shortness of breath    GI:  Denies abdominal pain, nausea, vomiting, or diarrhea  :  Denies any urinary symptoms   Musculoskeletal:  Denies back pain  Skin:  Denies rash  Neurologic:  Denies headache, focal weakness or sensory changes   Endocrine:  Denies polyuria or polydypsia   Lymphatic:  Denies swollen glands     All other review of systems are negative  See HPI and nursing notes for additional information     PAST MEDICAL AND SURGICAL HISTORY    Past Medical History:   Diagnosis Date    Acute DVT of right tibial vein ([address] 07/2017    ER imaging: distal right tibial vein DVT; no anticoag for bleeding/ anemia    Acute exacerbation of chronic obstructive pulmonary disease (COPD) ([address] 12/13/2018    Anemia     Arthritis     Atrial fibrillation ([address]     CHF (congestive heart failure) (HCC)     Chronic hypoxemic respiratory failure (HCC) 9/9/2020    Chronic pain syndrome     Low back pain; lumbar disc disease    Clostridium difficile colitis 09/20/2017    COPD, severe ([address] 10/24/2017    Depression     Fibromyalgia     Former smoker     Quit 1/2019    Herniated cervical disc 5-1998    Herpes zoster ophthalmicus 3/2012    Hx of blood clots     Hyperlipidemia     Hypertension     Kidney disease     L3 vertebral fracture ([address] 2010    vertebroplasty    Lumbar spinal stenosis 2015    moderately severe by MRI    Migraine     Nocturnal hypoxemia 5/21/2019    Osteoporosis 2010    Fragility Fx L3    Pneumonia due to COVID-19 virus 1/5/2021    Rheumatoid arthritis(714.0) 1976    Dr Mari Junior S/P cardiac catheterization 05/2017    patent coronaries;  Takotsubo; najeeb Ahmed    Shortness of breath 4/25/2019    Takotsubo syndrome 05/2017    TMJ dysfunction     Tobacco abuse 12/13/2018    Vitamin B12 deficiency 12/2014    B12 level 199     Past Surgical History:   Procedure Laterality Date   [facility] CARDIAC CATHETERIZATION  05/21/2017    CHOLECYSTECTOMY  1966    w/ appendectomy    COLONOSCOPY N/A 3/11/2020    COLONOSCOPY WITH BIOPSY performed [facility]hir [facility] Right     FIXATION KYPHOPLASTY  06/12/2019    L4 kyphoplasty ; [facility]    FOOT SURGERY  1986    HYSTERECTOMY      NV COLONOSCOPY FLX DX W/COLLJ SPEC WHEN PFRMD N/A 10/2/2018    COLONOSCOPY DIAGNOSTIC OR SCREENING performed by Joann Keys MD at [address] [place] [address] TOE SURGERY Left 4/25/2013    Deboo;     UPPER GASTROINTESTINAL ENDOSCOPY N/A 3/11/2020    EGD BIOPSY performed by Sudhir Parada MD at [place] [address] VERTEBROPLASTY  10/2010    L3    WRIST FUSION Left 2000       CURRENT MEDICATIONS    Current Outpatient Rx   Medication Sig Dispense Refill    predniSONE (DELTASONE) 10 MG tablet Take 40mg (4 tablets) for 2 days, Then 30mg (3 tablets) for 2 days, Then 20mg (2 tablets) for 2 days, Then 10mg (1 tablet) for 2 days 20 tablet 0    rivaroxaban (XARELTO) 20 MG TABS tablet Take 1 tablet by mouth daily 30 tablet 1    lisinopril (PRINIVIL;ZESTRIL) 5 MG tablet Take 1 tablet by mouth daily 30 tablet 3    metoprolol tartrate (LOPRESSOR) 50 MG tablet Take 1 tablet by mouth 2 times daily 60 tablet 3    tiotropium (SPIRIVA RESPIMAT) 2.5 MCG/ACT AERS inhaler Inhale 2 puffs into the lungs daily 30 Inhaler 0    zinc sulfate (ZINCATE) 220 (50 Zn) MG capsule Take 1 capsule by mouth daily 30 capsule 3    pantoprazole (PROTONIX) 40 MG tablet Take 1 tablet by mouth 2 times daily 90 tablet 0    ferrous sulfate (IRON 325) 325 (65 Fe) MG tablet Take 325 mg by mouth daily (with breakfast)      sertraline (ZOLOFT) 25 MG tablet Take 25 mg by mouth daily      loratadine (CLARITIN) 10 MG capsule Take 10 mg by mouth daily      bacitracin-neomycin-polymyxin b-hydrocortisone 1 % ointment Apply topically 2 times daily Apply topically 2 times daily.       busPIRone (BUSPAR) 5 MG tablet Take 5 mg by mouth 3 times daily      aspirin 81 MG chewable tablet Take 1 tablet by mouth daily 30 tablet 0    amiodarone (CORDARONE) 200 MG tablet Take 1 tablet by mouth daily 30 tablet 0    OXYGEN Inhale 2 L/min into the lungs nightly      FORTEO 600 MCG/2.4ML SOLN injection INJECT 0.08MLS INTO THE SKIN DAILY 2.4 mL 5    ipratropium-albuterol (DUONEB) 0.5-2.5 (3) MG/3ML SOLN nebulizer solution Inhale 3 mLs into the lungs every 4 hours (while awake) 360 mL 2    atorvastatin (LIPITOR) 10 MG tablet TAKE 1 TABLET BY MOUTH EVERY EVENING 90 tablet 4    traZODone (DESYREL) 50 MG tablet TAKE 1 TABLET BY MOUTH NIGHTLY 30 tablet 5    triamcinolone (NASACORT ALLERGY 24HR) 55 MCG/ACT nasal inhaler 2 sprays by Each Nostril route daily 1 Inhaler 3    Cholecalciferol (VITAMIN D) 2000 units CAPS capsule Take 1 capsule by mouth daily      PROAIR  (90 Base) MCG/ACT inhaler INHALE 2 PUFFS BY ORAL INHALATION EVERY 4 TO 6 HOURS AS NEEDED 8.5 g 10    BREO ELLIPTA 100-25 MCG/INH AEPB inhaler INHALE 1 PUFF INTO THE LUNGS DAILY AFTER INHALATION THEN GARGLE 1 each 11    INCRUSE ELLIPTA 62.5 MCG/INH AEPB INHALE 1 PUFF VIA ORAL INHALATION ONCE DAILY 1 each 11    vitamin B-12 (CYANOCOBALAMIN) 1000 MCG tablet Take 1,000 mcg by mouth daily. ALLERGIES    Allergies   Allergen Reactions    Ativan [Lorazepam] Other (See Comments)     Violent, thrashing and combative. She has lacerations and bruising, had to be tied down.  Etanercept Other (See Comments)     No response    Humira [Adalimumab]     Prochlorperazine Edisylate Other (See Comments)     \"Compazine\"   Involuntary Muscle Spasms     Remicade [Infliximab Injection]     Doxycycline Other (See Comments)     Stomach pains       SOCIAL AND FAMILY HISTORY    Social History     Socioeconomic History    Marital status:       Spouse name: Xenia Logan Number of children: 2    Years of education: 15    Highest education level: None   Occupational History    Occupation: retired   Social Needs    Financial resource strain: None    Food insecurity     Worry: None     Inability: None    Transportation needs     Medical: None     Non-medical: None   Tobacco Use    Smoking status: Former Smoker     Packs/day: 0.75     Years: 55.00     Pack years: 41.25     Types: Cigarettes     Start date: 1962     Quit date: 2019     Years since quittin.0    Smokeless tobacco: Never Used   Substance and Sexual Activity    Alcohol use: No     Alcohol/week: 0.0 standard drinks    Drug use: No    Sexual activity: Not Currently   Lifestyle    Physical activity     Days per week: None     Minutes per session: None    Stress: None   Relationships    Social connections     Talks on phone: None     Gets together: None     Attends Bahai service: None     Active member of club or organization: None     Attends meetings of clubs or organizations: None     Relationship status: None    Intimate partner violence     Fear of current or ex partner: None     Emotionally abused: None     Physically abused: None     Forced sexual activity: None   Other Topics Concern  None   Social History Narrative    None     Family History   Problem Relation Age of Onset    Heart Disease Father          FROM MI    Emphysema Father     Arthritis Mother     Stroke Mother     Heart Disease Other         family history    Cancer Other         family history -- larynx    Kidney Disease Son          PHYSICAL EXAM    VITAL SIGNS: BP (!) 127/59   Pulse 74   Temp 98.9 °F (37.2 °C) (Oral)   Resp 18   Ht 5' 6\" (1.676 m)   SpO2 100%   BMI 20.66 kg/m²    Constitutional:  Well developed, Well nourished. No distress. patient has pale appearing skin tone. HENT:  Normocephalic, Atraumatic, PERRL. EOMI. Sclera clear. palpebral conjunctiva pale appearing. Bulbar conjunctiva normal, No discharge. Neck/Lymphatics: supple, no JVD, no swollen nodes  Cardiovascular:   RRR,  no murmurs/rubs/gallops. No JVD      Respiratory:  Nonlabored breathing. Normal breath sounds, No wheezing  Abdomen: Bowel sounds normal, Soft, No tenderness, no masses. Musculoskeletal:    There is no edema, asymmetry, or calf / thigh tenderness bilaterally. No cyanosis. No cool or pale-appearing limb. Distal cap refill and pulses intact bilateral upper and lower extremities  Bilateral upper and lower extremity ROM intact without pain or obvious deficit  Integument:  Warm, Dry  Neurologic: Alert & oriented , No focal deficits noted. Cranial nerves II through XII grossly intact. Normal gross motor coordination & motor strength bilateral upper and lower extremities  Sensation intact.   Psychiatric:  Affect normal, Mood normal.       Labs:  Results for orders placed or performed during the hospital encounter of 21   CBC Auto Differential   Result Value Ref Range    WBC 8.8 4.0 - 10.5 K/CU MM    RBC 1.78 (L) 4.2 - 5.4 M/CU MM    Hemoglobin 5.1 (LL) 12.5 - 16.0 GM/DL    Hematocrit 17.8 (LL) 37 - 47 %    .0 78 - 100 FL    MCH 28.7 27 - 31 PG    MCHC 28.7 (L) 32.0 - 36.0 %    RDW 21.3 (H) 11.7 - 14.9 hemorrhoids, masses, signs of abscess, fissures. - rectal exam is negative for masses or tenderness. Sphincter tone intact. No gross blood. Stool is dark black. No prostate nodules or enlargement. Bedside Guiac testing done and results were immediately positive. ( was positive ).    ______________________________________________________________________        ED COURSE & MEDICAL DECISION MAKING         -----------------------------------------------------------      CRITICAL CARE NOTE:  There was a high probability of clinically significant life-threatening deterioration of the patient's condition requiring my urgent intervention due to anemia. Immediate bedside evaluation, lab eval and close monitoring was done. I received verification from lab via telephone of patient's low hemoglobin level and I immediately ordered packed red blood cells. I was not able to do rectal exam as patient was in the hallway in the emergency department. Unknown exact cause of patient's anemia. Assement of the patient, including exam, was done simultaneously during critical care actions. Total critical care time is at least  30 minutes. This includes vital sign monitoring, pulse oximetry monitoring, telemetry monitoring, clinical response to the IV medications, reviewing the nursing notes, consultation time, dictation/documentation time, and interpretation of the lab work. This time excludes family discussion time and time spent performing separately billable procedure(s) (see below for more details)      ----------------------------------------------------------                   6118 - I discussed Pt case with patient's gastroenterologist, Dr. Virgie العراقي. He recommends Protonix IV, clear fluid diet, n.p.o. after midnight, hold Xarelto, Covid 19 rapid test.  He recommends admission to hospitalist service and he will consult. 1910-I discussed patient case with hospitalist, Dr. Jez Frederick.   He agrees to admit patient. Clinical  IMPRESSION    1. Gastrointestinal hemorrhage, unspecified gastrointestinal hemorrhage type    2. Anemia, unspecified type        Pt admitted. Comment: Please note this report has been produced using speech recognition software and may contain errors related to that system including errors in grammar, punctuation, and spelling, as well as words and phrases that may be inappropriate. If there are any questions or concerns please feel free to contact the dictating provider for clarification.          Deya Peace  01/06/21 7250

## 2021-01-06 NOTE — ED NOTES
[address] [facility] [address] Sandra Montiel  01/06/21 1800  [address] [place] [address] repaged Dr Sandra Montiel  01/06/21 1824  [address] rep[address] Dr Sandra Montiel  01/06/21 1850  1855 Dr Robison Force returned call      Patricia Traore  01/06/21 1858

## 2021-01-07 ENCOUNTER — ANESTHESIA (OUTPATIENT)
Dept: ENDOSCOPY | Age: 81
DRG: 368 | End: 2021-01-07
Payer: MEDICARE

## 2021-01-07 ENCOUNTER — APPOINTMENT (OUTPATIENT)
Dept: GENERAL RADIOLOGY | Age: 81
DRG: 368 | End: 2021-01-07
Payer: MEDICARE

## 2021-01-07 ENCOUNTER — APPOINTMENT (OUTPATIENT)
Dept: CT IMAGING | Age: 81
DRG: 368 | End: 2021-01-07
Payer: MEDICARE

## 2021-01-07 ENCOUNTER — ANESTHESIA EVENT (OUTPATIENT)
Dept: ENDOSCOPY | Age: 81
DRG: 368 | End: 2021-01-07
Payer: MEDICARE

## 2021-01-07 VITALS — OXYGEN SATURATION: 98 % | SYSTOLIC BLOOD PRESSURE: 135 MMHG | DIASTOLIC BLOOD PRESSURE: 56 MMHG

## 2021-01-07 LAB
ALBUMIN SERPL-MCNC: 2.6 GM/DL (ref 3.4–5)
ALP BLD-CCNC: 65 IU/L (ref 40–129)
ALT SERPL-CCNC: 6 U/L (ref 10–40)
ANION GAP SERPL CALCULATED.3IONS-SCNC: 7 MMOL/L (ref 4–16)
AST SERPL-CCNC: 14 IU/L (ref 15–37)
BILIRUB SERPL-MCNC: 0.8 MG/DL (ref 0–1)
BUN BLDV-MCNC: 17 MG/DL (ref 6–23)
CALCIUM SERPL-MCNC: 8.1 MG/DL (ref 8.3–10.6)
CHLORIDE BLD-SCNC: 103 MMOL/L (ref 99–110)
CO2: 28 MMOL/L (ref 21–32)
CREAT SERPL-MCNC: 0.7 MG/DL (ref 0.6–1.1)
EKG ATRIAL RATE: 74 BPM
EKG DIAGNOSIS: NORMAL
EKG P AXIS: 48 DEGREES
EKG P-R INTERVAL: 204 MS
EKG Q-T INTERVAL: 468 MS
EKG QRS DURATION: 144 MS
EKG QTC CALCULATION (BAZETT): 519 MS
EKG R AXIS: 110 DEGREES
EKG T AXIS: -24 DEGREES
EKG VENTRICULAR RATE: 74 BPM
GFR AFRICAN AMERICAN: >60 ML/MIN/1.73M2
GFR NON-AFRICAN AMERICAN: >60 ML/MIN/1.73M2
GLUCOSE BLD-MCNC: 86 MG/DL (ref 70–99)
HCT VFR BLD CALC: 28.1 % (ref 37–47)
HCT VFR BLD CALC: 29.5 % (ref 37–47)
HEMOGLOBIN: 8.7 GM/DL (ref 12.5–16)
HEMOGLOBIN: 9 GM/DL (ref 12.5–16)
IRON: 88 UG/DL (ref 37–145)
MCH RBC QN AUTO: 28 PG (ref 27–31)
MCHC RBC AUTO-ENTMCNC: 30.5 % (ref 32–36)
MCV RBC AUTO: 91.6 FL (ref 78–100)
PCT TRANSFERRIN: 36 % (ref 10–44)
PDW BLD-RTO: 20.1 % (ref 11.7–14.9)
PLATELET # BLD: 338 K/CU MM (ref 140–440)
PMV BLD AUTO: 10.4 FL (ref 7.5–11.1)
POTASSIUM SERPL-SCNC: 3.8 MMOL/L (ref 3.5–5.1)
RBC # BLD: 3.22 M/CU MM (ref 4.2–5.4)
SODIUM BLD-SCNC: 138 MMOL/L (ref 135–145)
TOTAL IRON BINDING CAPACITY: 243 UG/DL (ref 250–450)
TOTAL PROTEIN: 4.3 GM/DL (ref 6.4–8.2)
UNSATURATED IRON BINDING CAPACITY: 155 UG/DL (ref 110–370)
WBC # BLD: 9.8 K/CU MM (ref 4–10.5)

## 2021-01-07 PROCEDURE — 3700000001 HC ADD 15 MINUTES (ANESTHESIA): Performed by: INTERNAL MEDICINE

## 2021-01-07 PROCEDURE — 2580000003 HC RX 258: Performed by: INTERNAL MEDICINE

## 2021-01-07 PROCEDURE — 1200000000 HC SEMI PRIVATE

## 2021-01-07 PROCEDURE — 3700000000 HC ANESTHESIA ATTENDED CARE: Performed by: INTERNAL MEDICINE

## 2021-01-07 PROCEDURE — 6360000002 HC RX W HCPCS: Performed by: INTERNAL MEDICINE

## 2021-01-07 PROCEDURE — 0DJ08ZZ INSPECTION OF UPPER INTESTINAL TRACT, VIA NATURAL OR ARTIFICIAL OPENING ENDOSCOPIC: ICD-10-PCS | Performed by: INTERNAL MEDICINE

## 2021-01-07 PROCEDURE — 74018 RADEX ABDOMEN 1 VIEW: CPT

## 2021-01-07 PROCEDURE — 86901 BLOOD TYPING SEROLOGIC RH(D): CPT

## 2021-01-07 PROCEDURE — 83540 ASSAY OF IRON: CPT

## 2021-01-07 PROCEDURE — 2709999900 HC NON-CHARGEABLE SUPPLY: Performed by: INTERNAL MEDICINE

## 2021-01-07 PROCEDURE — 85018 HEMOGLOBIN: CPT

## 2021-01-07 PROCEDURE — 6360000002 HC RX W HCPCS: Performed by: STUDENT IN AN ORGANIZED HEALTH CARE EDUCATION/TRAINING PROGRAM

## 2021-01-07 PROCEDURE — 94640 AIRWAY INHALATION TREATMENT: CPT

## 2021-01-07 PROCEDURE — 74176 CT ABD & PELVIS W/O CONTRAST: CPT

## 2021-01-07 PROCEDURE — 94761 N-INVAS EAR/PLS OXIMETRY MLT: CPT

## 2021-01-07 PROCEDURE — 83550 IRON BINDING TEST: CPT

## 2021-01-07 PROCEDURE — 86850 RBC ANTIBODY SCREEN: CPT

## 2021-01-07 PROCEDURE — 93010 ELECTROCARDIOGRAM REPORT: CPT | Performed by: INTERNAL MEDICINE

## 2021-01-07 PROCEDURE — P9016 RBC LEUKOCYTES REDUCED: HCPCS

## 2021-01-07 PROCEDURE — 85027 COMPLETE CBC AUTOMATED: CPT

## 2021-01-07 PROCEDURE — 6370000000 HC RX 637 (ALT 250 FOR IP): Performed by: INTERNAL MEDICINE

## 2021-01-07 PROCEDURE — 86927 PLASMA FRESH FROZEN: CPT

## 2021-01-07 PROCEDURE — 80053 COMPREHEN METABOLIC PANEL: CPT

## 2021-01-07 PROCEDURE — 3609017100 HC EGD: Performed by: INTERNAL MEDICINE

## 2021-01-07 PROCEDURE — 2580000003 HC RX 258: Performed by: STUDENT IN AN ORGANIZED HEALTH CARE EDUCATION/TRAINING PROGRAM

## 2021-01-07 PROCEDURE — 36415 COLL VENOUS BLD VENIPUNCTURE: CPT

## 2021-01-07 PROCEDURE — 6360000002 HC RX W HCPCS: Performed by: PHYSICIAN ASSISTANT

## 2021-01-07 PROCEDURE — P9017 PLASMA 1 DONOR FRZ W/IN 8 HR: HCPCS

## 2021-01-07 PROCEDURE — 2700000000 HC OXYGEN THERAPY PER DAY

## 2021-01-07 PROCEDURE — 85014 HEMATOCRIT: CPT

## 2021-01-07 PROCEDURE — 6370000000 HC RX 637 (ALT 250 FOR IP): Performed by: STUDENT IN AN ORGANIZED HEALTH CARE EDUCATION/TRAINING PROGRAM

## 2021-01-07 PROCEDURE — 86900 BLOOD TYPING SEROLOGIC ABO: CPT

## 2021-01-07 PROCEDURE — 36430 TRANSFUSION BLD/BLD COMPNT: CPT

## 2021-01-07 PROCEDURE — 2580000003 HC RX 258: Performed by: ANESTHESIOLOGY

## 2021-01-07 PROCEDURE — C9113 INJ PANTOPRAZOLE SODIUM, VIA: HCPCS | Performed by: INTERNAL MEDICINE

## 2021-01-07 RX ORDER — OXYCODONE HYDROCHLORIDE AND ACETAMINOPHEN 5; 325 MG/1; MG/1
1 TABLET ORAL EVERY 6 HOURS PRN
Status: ON HOLD | COMMUNITY
End: 2021-01-10 | Stop reason: SDUPTHER

## 2021-01-07 RX ORDER — OMEPRAZOLE 20 MG/1
20 CAPSULE, DELAYED RELEASE ORAL 2 TIMES DAILY
Status: ON HOLD | COMMUNITY
End: 2021-01-11 | Stop reason: HOSPADM

## 2021-01-07 RX ORDER — MIRTAZAPINE 30 MG/1
30 TABLET, FILM COATED ORAL NIGHTLY
COMMUNITY

## 2021-01-07 RX ORDER — HYDROXYZINE HYDROCHLORIDE 50 MG/ML
50 INJECTION, SOLUTION INTRAMUSCULAR ONCE
Status: COMPLETED | OUTPATIENT
Start: 2021-01-07 | End: 2021-01-08

## 2021-01-07 RX ORDER — SODIUM CHLORIDE 9 MG/ML
INJECTION, SOLUTION INTRAVENOUS PRN
Status: DISCONTINUED | OUTPATIENT
Start: 2021-01-07 | End: 2021-01-11 | Stop reason: HOSPADM

## 2021-01-07 RX ORDER — LISINOPRIL 5 MG/1
5 TABLET ORAL DAILY
COMMUNITY

## 2021-01-07 RX ORDER — ALPRAZOLAM 0.5 MG/1
0.5 TABLET ORAL 3 TIMES DAILY PRN
Status: ON HOLD | COMMUNITY
End: 2021-01-10 | Stop reason: HOSPADM

## 2021-01-07 RX ORDER — SUCRALFATE 1 G/1
1 TABLET ORAL 3 TIMES DAILY
COMMUNITY

## 2021-01-07 RX ORDER — ALBUTEROL SULFATE 90 UG/1
2 AEROSOL, METERED RESPIRATORY (INHALATION) 4 TIMES DAILY
Status: DISCONTINUED | OUTPATIENT
Start: 2021-01-07 | End: 2021-01-11 | Stop reason: HOSPADM

## 2021-01-07 RX ORDER — SODIUM CHLORIDE, SODIUM LACTATE, POTASSIUM CHLORIDE, CALCIUM CHLORIDE 600; 310; 30; 20 MG/100ML; MG/100ML; MG/100ML; MG/100ML
INJECTION, SOLUTION INTRAVENOUS CONTINUOUS
Status: DISCONTINUED | OUTPATIENT
Start: 2021-01-07 | End: 2021-01-08

## 2021-01-07 RX ORDER — ALBUTEROL SULFATE 90 UG/1
2 AEROSOL, METERED RESPIRATORY (INHALATION) EVERY 4 HOURS PRN
COMMUNITY

## 2021-01-07 RX ORDER — MORPHINE SULFATE 2 MG/ML
2 INJECTION, SOLUTION INTRAMUSCULAR; INTRAVENOUS EVERY 4 HOURS PRN
Status: DISCONTINUED | OUTPATIENT
Start: 2021-01-07 | End: 2021-01-11 | Stop reason: HOSPADM

## 2021-01-07 RX ORDER — POLYETHYLENE GLYCOL 3350 17 G/17G
17 POWDER, FOR SOLUTION ORAL PRN
COMMUNITY

## 2021-01-07 RX ADMIN — SODIUM CHLORIDE, POTASSIUM CHLORIDE, SODIUM LACTATE AND CALCIUM CHLORIDE: 600; 310; 30; 20 INJECTION, SOLUTION INTRAVENOUS at 15:11

## 2021-01-07 RX ADMIN — IPRATROPIUM BROMIDE AND ALBUTEROL SULFATE 3 ML: .5; 3 SOLUTION RESPIRATORY (INHALATION) at 11:16

## 2021-01-07 RX ADMIN — FERROUS SULFATE TAB 325 MG (65 MG ELEMENTAL FE) 325 MG: 325 (65 FE) TAB at 08:30

## 2021-01-07 RX ADMIN — TRAZODONE HYDROCHLORIDE 50 MG: 50 TABLET ORAL at 20:23

## 2021-01-07 RX ADMIN — ATORVASTATIN CALCIUM 10 MG: 10 TABLET, FILM COATED ORAL at 17:16

## 2021-01-07 RX ADMIN — BUSPIRONE HYDROCHLORIDE 5 MG: 5 TABLET ORAL at 20:22

## 2021-01-07 RX ADMIN — CETIRIZINE HYDROCHLORIDE 10 MG: 10 TABLET, FILM COATED ORAL at 08:31

## 2021-01-07 RX ADMIN — BUDESONIDE AND FORMOTEROL FUMARATE DIHYDRATE 2 PUFF: 160; 4.5 AEROSOL RESPIRATORY (INHALATION) at 19:36

## 2021-01-07 RX ADMIN — BUSPIRONE HYDROCHLORIDE 5 MG: 5 TABLET ORAL at 17:07

## 2021-01-07 RX ADMIN — MORPHINE SULFATE 2 MG: 2 INJECTION, SOLUTION INTRAMUSCULAR; INTRAVENOUS at 17:16

## 2021-01-07 RX ADMIN — ONDANSETRON 4 MG: 2 INJECTION INTRAMUSCULAR; INTRAVENOUS at 20:22

## 2021-01-07 RX ADMIN — SODIUM CHLORIDE: 9 INJECTION, SOLUTION INTRAVENOUS at 08:26

## 2021-01-07 RX ADMIN — FLUTICASONE PROPIONATE 2 SPRAY: 50 SPRAY, METERED NASAL at 17:09

## 2021-01-07 RX ADMIN — SERTRALINE HYDROCHLORIDE 25 MG: 25 TABLET ORAL at 08:31

## 2021-01-07 RX ADMIN — METOPROLOL TARTRATE 25 MG: 25 TABLET, FILM COATED ORAL at 20:23

## 2021-01-07 RX ADMIN — ONDANSETRON 4 MG: 2 INJECTION INTRAMUSCULAR; INTRAVENOUS at 08:14

## 2021-01-07 RX ADMIN — TIOTROPIUM BROMIDE INHALATION SPRAY 2 PUFF: 3.12 SPRAY, METERED RESPIRATORY (INHALATION) at 11:17

## 2021-01-07 RX ADMIN — MORPHINE SULFATE 2 MG: 2 INJECTION, SOLUTION INTRAMUSCULAR; INTRAVENOUS at 02:42

## 2021-01-07 RX ADMIN — SODIUM CHLORIDE, PRESERVATIVE FREE 10 ML: 5 INJECTION INTRAVENOUS at 20:23

## 2021-01-07 RX ADMIN — SODIUM CHLORIDE: 9 INJECTION, SOLUTION INTRAVENOUS at 01:55

## 2021-01-07 RX ADMIN — MORPHINE SULFATE 2 MG: 2 INJECTION, SOLUTION INTRAMUSCULAR; INTRAVENOUS at 08:07

## 2021-01-07 RX ADMIN — ALBUTEROL SULFATE 2 PUFF: 90 AEROSOL, METERED RESPIRATORY (INHALATION) at 15:30

## 2021-01-07 RX ADMIN — PANTOPRAZOLE SODIUM 40 MG: 40 INJECTION, POWDER, LYOPHILIZED, FOR SOLUTION INTRAVENOUS at 17:12

## 2021-01-07 RX ADMIN — BUDESONIDE AND FORMOTEROL FUMARATE DIHYDRATE 2 PUFF: 160; 4.5 AEROSOL RESPIRATORY (INHALATION) at 11:18

## 2021-01-07 RX ADMIN — METOPROLOL TARTRATE 25 MG: 25 TABLET, FILM COATED ORAL at 17:07

## 2021-01-07 RX ADMIN — ALBUTEROL SULFATE 2 PUFF: 90 AEROSOL, METERED RESPIRATORY (INHALATION) at 19:35

## 2021-01-07 RX ADMIN — AMIODARONE HYDROCHLORIDE 200 MG: 200 TABLET ORAL at 08:31

## 2021-01-07 RX ADMIN — SODIUM CHLORIDE, POTASSIUM CHLORIDE, SODIUM LACTATE AND CALCIUM CHLORIDE: 600; 310; 30; 20 INJECTION, SOLUTION INTRAVENOUS at 13:14

## 2021-01-07 ASSESSMENT — PAIN SCALES - GENERAL
PAINLEVEL_OUTOF10: 0
PAINLEVEL_OUTOF10: 2
PAINLEVEL_OUTOF10: 9

## 2021-01-07 ASSESSMENT — ENCOUNTER SYMPTOMS: SHORTNESS OF BREATH: 1

## 2021-01-07 NOTE — ANESTHESIA PRE PROCEDURE
Department of Anesthesiology  Preprocedure Note       Name:  Yaya Thompson   Age:  [de-identified] y.o.  :  1940                                          MRN:  [id number]         Date:  2021      Surgeon: Luh Glover):  Alix Palacios MD    Procedure: Procedure(s):  EGD ESOPHAGOGASTRODUODENOSCOPY    Medications prior to admission:   Prior to Admission medications    Medication Sig Start Date End Date Taking? Authorizing Provider   Triamcinolone Acetonide (NASACORT ALLERGY 24HR NA) 55 mcg by Nasal route daily   Yes Historical Provider, MD   ALPRAZolam (XANAX) 0.5 MG tablet Take 0.5 mg by mouth 3 times daily as needed for Sleep. Yes Historical Provider, MD   sucralfate (CARAFATE) 1 GM tablet Take 1 g by mouth 3 times daily   Yes Historical Provider, MD   lisinopril (PRINIVIL;ZESTRIL) 5 MG tablet Take 5 mg by mouth daily   Yes Historical Provider, MD   mirtazapine (REMERON) 30 MG tablet Take 30 mg by mouth nightly   Yes Historical Provider, MD   fluticasone-salmeterol (ADVAIR) 250-50 MCG/DOSE AEPB Inhale 1 puff into the lungs every 12 hours   Yes Historical Provider, MD   omeprazole (PRILOSEC) 20 MG delayed release capsule Take 20 mg by mouth 2 times daily   Yes Historical Provider, MD   albuterol sulfate HFA (VENTOLIN HFA) 108 (90 Base) MCG/ACT inhaler Inhale 2 puffs into the lungs every 4 hours as needed for Wheezing   Yes Historical Provider, MD   Ipratropium-Albuterol (COMBIVENT RESPIMAT IN) Inhale 1 puff into the lungs every 6 hours as needed   Yes Historical Provider, MD   oxyCODONE-acetaminophen (PERCOCET) 5-325 MG per tablet Take 1 tablet by mouth every 6 hours as needed for Pain.    Yes Historical Provider, MD   polyethylene glycol (GLYCOLAX) 17 GM/SCOOP powder Take 17 g by mouth as needed   Yes Historical Provider, MD   sodium chloride (OCEAN, BABY AYR) 0.65 % nasal spray 1 spray by Nasal route as needed for Congestion   Yes Historical Provider, MD Umeclidinium Bromide (INCRUSE ELLIPTA) 62.5 MCG/INH AEPB Inhale into the lungs   Yes Historical Provider, MD   rivaroxaban (XARELTO) 20 MG TABS tablet Take 1 tablet by mouth daily 11/23/20  Yes Yassine Call MD   metoprolol tartrate (LOPRESSOR) 50 MG tablet Take 1 tablet by mouth 2 times daily 11/23/20  Yes Yassine Call MD   zinc sulfate (ZINCATE) 220 (50 Zn) MG capsule Take 1 capsule by mouth daily 11/24/20  Yes Yassine Call MD   ferrous sulfate (IRON 325) 325 (65 Fe) MG tablet Take 325 mg by mouth daily (with breakfast)   Yes Historical Provider, MD   busPIRone (BUSPAR) 5 MG tablet Take 5 mg by mouth 3 times daily   Yes Historical Provider, MD   amiodarone (CORDARONE) 200 MG tablet Take 1 tablet by mouth daily 2/17/20  Yes Orvel Severs, MD   FORTEO 600 MCG/2.4ML SOLN injection INJECT 0.08MLS INTO THE SKIN DAILY 1/6/20  Yes Sabine Morton MD   traZODone (DESYREL) 50 MG tablet TAKE 1 TABLET BY MOUTH NIGHTLY 10/22/19  Yes Srikanth Lewis MD   Cholecalciferol (VITAMIN D) 2000 units CAPS capsule Take 1 capsule by mouth daily   Yes Historical Provider, MD   loratadine (CLARITIN) 10 MG capsule Take 10 mg by mouth daily    Historical Provider, MD   OXYGEN Inhale 2 L/min into the lungs nightly    Historical Provider, MD   atorvastatin (LIPITOR) 10 MG tablet TAKE 1 TABLET BY MOUTH EVERY EVENING 11/4/19   Sabine Morton MD       Current medications:    Current Facility-Administered Medications   Medication Dose Route Frequency Provider Last Rate Last Admin    morphine (PF) injection 2 mg  2 mg Intravenous Q4H PRN Mar Jon PA-C   2 mg at 01/07/21 0807    0.9 % sodium chloride infusion   Intravenous PRN Lesli Joe PA-C        0.9 % sodium chloride infusion   Intravenous PRN Gavin Mcarthur MD        metoprolol tartrate (LOPRESSOR) tablet 25 mg  25 mg Oral BID Marie Kelly MD  lactated ringers infusion   Intravenous Continuous Bella Guzman  mL/hr at 01/07/21 1314 New Bag at 01/07/21 1314    0.9 % sodium chloride infusion   Intravenous PRN Rinku Olivier DO        amiodarone (CORDARONE) tablet 200 mg  200 mg Oral Daily Rinku Olivier DO        atorvastatin (LIPITOR) tablet 10 mg  10 mg Oral QPM Rinku Olivier DO        budesonide-formoterol (SYMBICORT) 160-4.5 MCG/ACT inhaler 2 puff  2 puff Inhalation BID Rinku Olivier DO   2 puff at 01/07/21 1118    busPIRone (BUSPAR) tablet 5 mg  5 mg Oral TID Rinku Olivier DO        ferrous sulfate (IRON 325) tablet 325 mg  325 mg Oral Daily with breakfast Rinku Olivier DO        Forteo SOLN 20 mcg (patient supplied med)  20 mcg Subcutaneous Daily Rinku Olivier DO        tiotropium (SPIRIVA RESPIMAT) 2.5 MCG/ACT inhaler 2 puff  2 puff Inhalation Daily Rinku Olivier DO   2 puff at 01/07/21 1117    ipratropium-albuterol (DUONEB) nebulizer solution 3 mL  3 mL Inhalation Q4H WA Adam Rivera, DO   3 mL at 01/07/21 1116    cetirizine (ZYRTEC) tablet 10 mg  10 mg Oral Daily Rinku Olivier,         albuterol sulfate  (90 Base) MCG/ACT inhaler 2 puff  2 puff Inhalation Q4H PRN Rinku Olivier DO   2 puff at 01/06/21 2122    sertraline (ZOLOFT) tablet 25 mg  25 mg Oral Daily Rinku Olivier DO        traZODone (DESYREL) tablet 50 mg  50 mg Oral Nightly Adam Rivera,         fluticasone (FLONASE) 50 MCG/ACT nasal spray 2 spray  2 spray Each Nostril Daily Rinku Olivier DO        sodium chloride flush 0.9 % injection 10 mL  10 mL Intravenous 2 times per day Rinku Olivier,         sodium chloride flush 0.9 % injection 10 mL  10 mL Intravenous PRN Rinku Olivier DO        [facility]etron [facility] injection 4 mg  4 mg Intravenous Q6H PRN Rinku Olivier DO   4 mg at 01/07/21 0814    acetaminophen (TYLENOL) tablet 650 mg  650 mg Oral Q6H PRN Rinku Olivier DO        Or  acetaminophen (TYLENOL) suppository 650 mg  650 mg Rectal Q6H PRN Alisha Ponto, DO        0.9 % sodium chloride infusion   Intravenous Continuous Ayanna Wade  mL/hr at 01/07/21 0826 New Bag at 01/07/21 0826    pantoprazole (PROTONIX) injection 40 mg  40 mg Intravenous Q12H Alisha Mariscal, DO        And    sodium chloride (PF) 0.9 % injection 10 mL  10 mL Intravenous Q12H Adam Rivera DO        oxyCODONE-acetaminophen (PERCOCET) 5-325 MG per tablet 1 tablet  1 tablet Oral Q6H PRN Brito Ponfracisco, DO           Allergies: Allergies   Allergen Reactions    Ativan [Lorazepam] Other (See Comments)     Violent, thrashing and combative. She has lacerations and bruising, had to be tied down.      Etanercept Other (See Comments)     No response    Humira [Adalimumab]     Prochlorperazine Edisylate Other (See Comments)     \"Compazine\"   Involuntary Muscle Spasms     Remicade [Infliximab Injection]     Doxycycline Other (See Comments)     Stomach pains       Problem List:    Patient Active Problem List   Diagnosis Code    Rheumatoid Arthritis M19.90    Hypertension I10    Hyperlipidemia E78.5    Fibromyalgia M79.7    Migraine G43.909    Chronic pain syndrome G89.4    Depression F32.9    Osteoporosis M81.0    Vitamin B12 deficiency E53.8    Lumbar spinal stenosis M48.061    Panic attack F41.0    Takotsubo syndrome I51.81    Blood loss anemia D50.0    COPD, severe ([facility]) J44.9    Acute exacerbation of chronic obstructive pulmonary disease (COPD) ([facility]) J44.1    Former smoker Z87.891    Shortness of breath R06.02    Back pain M54.9    Intractable low back pain M54.5    Chronic fatigue R53.82    Pneumonia of right upper lobe due to infectious organism J18.9    Wide-complex tachycardia ([facility]) I47.2    Gram-negative pneumonia ([facility]) G04.0    Acute systolic heart failure ([facility]) I50.21    Symptomatic anemia D64.9    Chronic hypoxemic respiratory failure ([facility]) J96.11  Acute hypoxemic respiratory failure due to COVID-19 (HCC) U07.1, J96.01    Transaminitis R74.01    PNA (pneumonia) J18.9    Alzheimer's disease ([facility]) G30.9, F02.80    Severe malnutrition ([address] E43    Pneumonia due to COVID-19 virus U07.1, J12.82    GI bleed K92.2       Past Medical History:        Diagnosis Date    Acute DVT of right tibial vein ([address] 07/2017    ER imaging: distal right tibial vein DVT; no anticoag for bleeding/ anemia    Acute exacerbation of chronic obstructive pulmonary disease (COPD) ([address] 12/13/2018    Anemia     Arthritis     Atrial fibrillation ([facility])     CHF (congestive heart failure) ([facility])     Chronic hypoxemic respiratory failure ([facility]) 9/9/2020    Chronic pain syndrome     Low back pain; lumbar disc disease    Clostridium difficile colitis 09/20/2017    COPD, severe ([address] 10/24/2017    Depression     Fibromyalgia     Former smoker     Quit 1/2019    Herniated cervical disc 5-1998    Herpes zoster ophthalmicus 3/2012    Hx of blood clots     Hyperlipidemia     Hypertension     Kidney disease     L3 vertebral fracture ([address] 2010    vertebroplasty    Lumbar spinal stenosis 2015    moderately severe by MRI    Migraine     Nocturnal hypoxemia 5/21/2019    Osteoporosis 2010    Fragility Fx L3    Pneumonia due to COVID-19 virus 1/5/2021    Rheumatoid arthritis(714.0) 1976    Dr Marquis Bishop S/P cardiac catheterization 05/2017    patent coronaries;  Takotsubo; najeeb Ahmed    Shortness of breath 4/25/2019    Takotsubo syndrome 05/2017    TMJ dysfunction     Tobacco abuse 12/13/2018    Vitamin B12 deficiency 12/2014    B12 level 199       Past Surgical History:        Procedure Laterality Date    APPENDECTOMY  1966    CARDIAC CATHETERIZATION  05/21/2017    CHOLECYSTECTOMY  1966    w/ appendectomy    COLONOSCOPY N/A 3/11/2020    COLONOSCOPY WITH BIOPSY performed [facility]  FIXATION KYPHOPLASTY  2019    L4 kyphoplasty ; Erica Newell    FOOT SURGERY  1986    HYSTERECTOMY      NV COLONOSCOPY FLX DX W/COLLJ SPEC WHEN PFRMD N/A 10/2/2018    COLONOSCOPY DIAGNOSTIC OR SCREENING performed by Shahid Deluca MD at [address] [place] [address] TOE SURGERY Left 2013    Deboo;    Geetha Sahu UPPER GASTROINTESTINAL ENDOSCOPY N/A 3/11/2020    EGD BIOPSY performed by Ashley Lopez MD at [place] [address] VERTEBROPLASTY  10/2010    L3    WRIST FUSION Left        Social History:    Social History     Tobacco Use    Smoking status: Former Smoker     Packs/day: 0.75     Years: 55.00     Pack years: 41.25     Types: Cigarettes     Start date: 1962     Quit date: 2019     Years since quittin.0    Smokeless tobacco: Never Used   Substance Use Topics    Alcohol use: No     Alcohol/week: 0.0 standard drinks                                Counseling given: Not Answered      Vital Signs (Current):   Vitals:    21 0645 21 0805 21 0910 21 1100   BP: 139/68 (!) 156/89  131/70   Pulse: 71 72  72   Resp:  18  18   Temp:  37 °C (98.6 °F)  36.8 °C (98.3 °F)   TempSrc:  Axillary  Oral   SpO2:  95%     Weight:       Height:   5' 6\" (1.676 m)                                               BP Readings from Last 3 Encounters:   21 131/70   20 132/70   20 (!) 150/70       NPO Status: Time of last liquid consumption: 07                        Time of last solid consumption: 07                        Date of last liquid consumption: 21                        Date of last solid food consumption: 21    BMI:   Wt Readings from Last 3 Encounters:   21 138 lb 14.2 oz (63 kg)   21 128 lb (58.1 kg)   20 125 lb (56.7 kg)     Body mass index is 22.42 kg/m².     CBC:   Lab Results   Component Value Date    WBC 9.8 2021    RBC 3.22 2021    HGB 9.0 2021    HCT 29.5 2021    MCV 91.6 2021 RDW 20.1 01/07/2021     01/07/2021       CMP:   Lab Results   Component Value Date     01/07/2021    K 3.8 01/07/2021     01/07/2021    CO2 28 01/07/2021    BUN 17 01/07/2021    CREATININE 0.7 01/07/2021    GFRAA >60 01/07/2021    GFRAA >60 03/17/2012    AGRATIO 1.6 11/03/2020    LABGLOM >60 01/07/2021    LABGLOM 91 04/01/2014    GLUCOSE 86 01/07/2021    PROT 4.3 01/07/2021    PROT 5.4 11/03/2020    CALCIUM 8.1 01/07/2021    BILITOT 0.8 01/07/2021    ALKPHOS 65 01/07/2021    AST 14 01/07/2021    ALT 6 01/07/2021       POC Tests: No results for input(s): POCGLU, POCNA, POCK, POCCL, POCBUN, POCHEMO, POCHCT in the last 72 hours.     Coags:   Lab Results   Component Value Date    PROTIME 34.9 01/06/2021    PROTIME 13.1 11/26/2010    INR 2.85 01/06/2021    APTT 37.9 12/30/2020       HCG (If Applicable): No results found for: PREGTESTUR, PREGSERUM, HCG, HCGQUANT     ABGs:   Lab Results   Component Value Date    PO2ART 109 12/02/2020    ORS6VKZ 42.0 12/02/2020    EDN4GRI 31.3 12/02/2020        Type & Screen (If Applicable):  No results found for: LABABO, [address]    Drug/Infectious Status (If Applicable):  Lab Results   Component Value Date    HEPCAB NON REACTIVE 11/18/2020       COVID-19 Screening (If Applicable):   Lab Results   Component Value Date    COVID19 NOT DETECTED 01/06/2021    COVID19 DETECTED BY PCR 12/02/2020         Anesthesia Evaluation    Airway: Mallampati: III        Dental:    (+) edentulous  Comment: Pt with history of jaw dislocating    Pulmonary:normal exam    (+) COPD:  shortness of breath:                            ROS comment: Home O2   Cardiovascular:    (+) hypertension:, CHF:,                   Neuro/Psych:   (+) headaches: migraine headaches, psychiatric history:            GI/Hepatic/Renal:             Endo/Other:    (+) : arthritis:., .                 Abdominal:           Vascular:                                      Anesthesia Plan      MAC     ASA 4 Induction: intravenous.                           NATALIYA Garcia - CRNA   1/7/2021

## 2021-01-07 NOTE — PROGRESS NOTES
[address] [place] [address]  HOSPITALIST PROGRESS NOTE                       Name:  Christiana Hwang /Age/Sex: 1940  ([de-identified] y.o. female)   MRN & CSN:  [id number] & [id number] Admission Date/Time: 2021  2:34 PM   Location:  [facility]/3116-A Attending:  Boaz Morales MD                                                  HPI  Christiana Hwang is a [de-identified] y.o. female who presents with severe anemia    SUBJECTIVE  Has NG tube which was placed last night and reports making her very uncomfortable. Reports dark stools but also says on iron pills    10 point review of systems reviewed and negative unless noted above. ALLERGIES:   Allergies   Allergen Reactions    Ativan [Lorazepam] Other (See Comments)     Violent, thrashing and combative. She has lacerations and bruising, had to be tied down.  Etanercept Other (See Comments)     No response    Humira [Adalimumab]     Prochlorperazine Edisylate Other (See Comments)     \"Compazine\"   Involuntary Muscle Spasms     Remicade [Infliximab Injection]     Doxycycline Other (See Comments)     Stomach pains       PCP: Jose Jane MD    PAST MEDICAL HISTORY, SURGICAL HISTORY, SOCIAL HISTORY and  HOME MEDICATIONS all reviewed. OBJECTIVE  Vitals:    21 0630 21 0645 21 0805 21 0910   BP: (!) 149/61 139/68 (!) 156/89    Pulse: 72 71 72    Resp:   18    Temp: 98 °F (36.7 °C)  98.6 °F (37 °C)    TempSrc:   Axillary    SpO2:   95%    Weight:       Height:    5' 6\" (1.676 m)       PHYSICAL EXAM   GEN Awake female, sitting upright in bed in no apparent distress. EYES Pupils are equally round. No scleral erythema, discharge, or conjunctivitis. HENT Mucous membranes are moist. Oral pharynx without exudates, no evidence of thrush. NECK Supple, no apparent thyromegaly or masses. RESP Clear to auscultation, no wheezes, rales or rhonchi. Symmetric chest movement  CARDIO/VASC S1/S2 auscultated. Regular rate without appreciable murmurs, rubs, or gallops.  No JVD or carotid bruits. Peripheral pulses equal bilaterally and palpable. No peripheral edema. GI Abdomen is soft without significant tenderness, masses, or guarding. Bowel sounds are normoactive. Rectal exam deferred.  No costovertebral angle tenderness. Normal appearing external genitalia. HEME/LYMPH No palpable cervical lymphadenopathy and no hepatosplenomegaly. No petechiae or ecchymoses. MSK Spontaneous movement of all extremities. No gross joint deformities. SKIN Normal coloration, warm, dry. NEURO Cranial nerves appear grossly intact, normal speech, no lateralizing weakness. PSYCH Awake, alert, oriented x 4. Affect appropriate. INTAKE: In: 400 [Blood:400]  Out: 650   OUTPUT: In: 400   Out: 650 [Urine:450]    LABS  Recent Labs     01/06/21  1540 01/07/21  0658   WBC 8.8 9.8   HGB 5.1* 9.0*   HCT 17.8* 29.5*    338      Recent Labs     01/06/21  1540 01/07/21  0658    138   K 3.6 3.8    103   CO2 31 28   BUN 22 17   CREATININE 0.7 0.7     Recent Labs     01/06/21  1540 01/07/21  0658   AST 11* 14*   ALT 6* 6*   BILITOT 0.1 0.8   ALKPHOS 64 65     Recent Labs     01/06/21  1540   INR 2.85     No results for input(s): CKTOTAL, CKMB, CKMBINDEX, TROPONINT in the last 72 hours.        Abnormal labs for today noted      Imaging:     ECHO:    Microbiology:  Blood culture:    Urine culture:    Sputum culture:    Procedures done this admission:    MEDS  Scheduled Meds:   amiodarone  200 mg Oral Daily    atorvastatin  10 mg Oral QPM    budesonide-formoterol  2 puff Inhalation BID    busPIRone  5 mg Oral TID    ferrous sulfate  325 mg Oral Daily with breakfast    Teriparatide (Recombinant)  20 mcg Subcutaneous Daily    tiotropium  2 puff Inhalation Daily    ipratropium-albuterol  3 mL Inhalation Q4H WA    cetirizine  10 mg Oral Daily    sertraline  25 mg Oral Daily    traZODone  50 mg Oral Nightly    fluticasone  2 spray Each Nostril Daily    sodium chloride flush  10 mL Intravenous 2 times per day    pantoprazole  40 mg Intravenous Q12H    And    sodium chloride (PF)  10 mL Intravenous Q12H     Continuous Infusions:   sodium chloride      sodium chloride      sodium chloride 150 mL/hr at 01/07/21 0826     PRN Meds:morphine, sodium chloride, sodium chloride, albuterol sulfate HFA, sodium chloride flush, ondansetron, acetaminophen **OR** acetaminophen, oxyCODONE-acetaminophen        ASSESSMENT and PLAN  Hospital Day: 2    1-Severe anemia with hemoglobin of 5.1 improved to 9 after transfusion- received 3 units of PRBC- concerns of GIB- on PPI and GI consult pending  -on xarelto for atrial fib- will get CT to rule out retroperitoneal bleed  2-PAF- AC on hold, rate controlled- consult cardio for input on AC  3-Debility- ECF resident       Other chronic medical conditions:  · Severe COPD with chronic respiratory failure on home O2: No exacerbation  · CKD stage III: Stable, continue holding nephrotoxic agents  · Hypertension  · Rheumatoid arthritis  · Fibromyalgia  · Depression with anxiety  · Osteoporosis      Updated son ABDIAZIZ Child     Disp:     Diet Diet NPO, After Midnight   DVT Prophylaxis [] Lovenox, []  Heparin, [] SCDs, [] Ambulation   GI Prophylaxis [] PPI,  [] H2 Blocker,  [] Carafate,  [] Diet/Tube Feeds   Code Status Full Code   Disposition Patient requires continued admission due to severe anemia   CMS Level of Risk [] Low, [x] Moderate,[]  High  Patient's risk as above due to severe anemia     TAYE CALDWELL MD 1/7/2021 9:31 AM

## 2021-01-07 NOTE — PROGRESS NOTES
Patient report given to charge rn from 3N, patient had emesis episode x1, bed and gown cleaned. Transport picked up patient from endo room 2 with belonging, monitor and chart in bed.

## 2021-01-07 NOTE — ED NOTES
0559 paged hospitalist     Manuel Landry  01/06/21 1107 [place] returned call      Manuel Landry  01/06/21 190

## 2021-01-07 NOTE — OP NOTE
Operative Note      Patient: Satinder Spears  YOB: 1940  MRN: [id number]    Date of Procedure: 1/7/2021    [facility]      BRIEF OP REPORT:    Impression:    1) Alexa Maida ericka with clot, no active bled Hiatal Hernia    2) Normal stomach   3) normal duodenum        Suggest:   1) PPI DRIP for 24 more hours    2) Soft diet   3) Proceed with 2 units of FFP,   Can start anticoagulation after 3 days     Full EGD/COLONOSCOPY report available by going to \"chart review\" then \"procedures\" then  \"EGD/Colonoscopy\"  then \"View Endoscopy Report\"   Electronically signed by Francoise Goddard MD on 1/7/2021 at 1:45 PM

## 2021-01-07 NOTE — H&P
History and Physical      Name:  Paul Hyman /Age/Sex: 1940  ([de-identified] y.o. female)   MRN & CSN:  [id number] & [id number] Admission Date/Time: 2021  2:34 PM   Location:  [facility] PCP: Ayush Resendiz, 29 minh [place]ette Day: 1    Assessment and Plan:   Paul Hyman is a [de-identified] y.o.  female  who presents with GI bleed    1. Acute on chronic anemia of GI bleed, symptomatic   Admit to stepdown unit with telemetry and continuous pulse ox   Hbg 5.1 with last hgb 7.2   FOBT positive in ED  [facility] consulted Dr. Kenyon Pichardo, who recommends clear liquid diet with n.p.o. to start at midnight, IV Protonix, continue with 2 units PRBCs, and fluids. Plan on scope in a.m.   NS at 175 mL an hour   Continue blood transfusion of 2 units PRBCs started in ED   H&H every 6 hours, H&H posttransfusion, iron and TIBC, COVID-19 rapid test to prep for surgery    2. History of nonischemic cardiomyopathy secondary to Takotsubo syndrome  3. Paroxysmal A. fib   Last echo done on 11/15/2020 shows LV function normal, EF at 50 to 55% with moderate aortic and mitral regurg   We are currently holding aspirin, rivaroxaban, lisinopril, and metoprolol tartrate secondary to GI bleed. We will continue amiodarone. We will continue to monitor and add blood pressure medications as needed judiciously in case patient becomes hemodynamically unstable from above   Given we are holding all patients BP meds I will add on 1 dose of Lopressor 2.5 mg once as needed for breakthrough hypertension. With the hold precautions of: Do not administer unless systolic blood pressure less than 160. Hold if BP less than 70. Please call physician if needing to use prior to administration for review of vital signs given GI bleed. This is only to be used for temporary breakthrough hypertension. 4. Malnutrition   Dietary consult    5. Peripheral nodular opacity, bilateral lungs   Stable since 2020 but increased since 2019.   Recommend outpatient follow-up with CT chest and pulmonology    Other chronic medical conditions:  Continue all home meds except stated above or contraindicated.  Severe COPD with chronic respiratory failure on home O2: No exacerbation   CKD stage III: Stable, continue holding nephrotoxic agents   Hypertension   Rheumatoid arthritis   Fibromyalgia   Depression with anxiety   Osteoporosis   Hyperlipidemia    Diet Diet NPO, After Midnight  DIET CLEAR LIQUID;  Diet NPO, After Midnight   DVT Prophylaxis [] Lovenox, []  Heparin, [x] SCDs, [] Ambulation   GI Prophylaxis [x] PPI,  [] H2 Blocker,  [] Carafate,  [] Diet/Tube Feeds   Code Status Full Code   Disposition Patient requires continued admission due to GI bleed   MDM [] Low, [] Moderate,[x]  High  Patient's risk as above due to acuity of condition with potential for decompensation. History of Present Illness:     Chief Complaint: GI bleed    Beecher Spurling is a [de-identified] y.o.  female with family history and past medical history as stated below, who presents with generalized weakness, fatigue, and chills for the past several days. Patient states she has been doing \"blood tests\" and [facility] home to monitor her hemoglobin. Patient states she has a history of GI bleed and chronic anemia secondary to this. Patient states she has had multiple blood transfusions with the last transfusion being 12/30/2020 while in the ED. Patient was noted to have a hemoglobin of 6.7 then and was given 1 unit PRBCs which arose to 7.2. [facility]. And does endorse dark tarry stools that are worse from her baseline when she takes her iron supplements. Patient states they did a test and stated she had blood in her stool. Patient states that she has shortness of breath that is slightly increased from baseline. Patient states she has severe COPD and is on home oxygen. Patient denies cough but does endorse postnasal drip.   Patient has additional complaints of low back pain and neck pain which she attributes to her rheumatoid arthritis and osteoporosis. Patient states that the bed is uncomfortable and she has no pillow. Patient is requesting her oxycodone that she takes for this. Otherwise patient denies fevers, chest pain, abdominal pain, nausea, vomiting, diarrhea, bright red blood per rectum, dysuria, frequency, or hematuria. Discussed case with ED provider. Review of Systems   Constitutional: Positive for chills and fatigue. Negative for appetite change, diaphoresis and fever. HENT: Positive for postnasal drip and rhinorrhea. Negative for congestion and sore throat. Eyes: Negative for visual disturbance. Respiratory: Negative for cough, chest tightness, shortness of breath and wheezing. Cardiovascular: Negative for chest pain, palpitations and leg swelling. Gastrointestinal: Negative for abdominal pain, anal bleeding, blood in stool, diarrhea, nausea and vomiting. Increased dark tarry stools but no overt bright red blood per rectum. Does endorse hemorrhoids. Genitourinary: Negative for dysuria, frequency, hematuria and urgency. Musculoskeletal: Positive for back pain and neck pain. Negative for arthralgias. Skin: Negative for color change, pallor and rash. Neurological: Positive for weakness (Generalized). Negative for dizziness, seizures, syncope, facial asymmetry, speech difficulty, light-headedness, numbness and headaches. Psychiatric/Behavioral: Negative for confusion. Objective:   No intake or output data in the 24 hours ending 01/06/21 1958   Vitals:   Vitals:    01/06/21 1820   BP:    Pulse:    Resp:    Temp: 98.9 °F (37.2 °C)   SpO2:      Physical Exam:   Physical Exam  Vitals signs and nursing note reviewed. Constitutional:       General: She is awake. She is not in acute distress. Appearance: Normal appearance. She is normal weight. She is not ill-appearing, toxic-appearing or diaphoretic. DVT; no anticoag for bleeding/ anemia    Acute exacerbation of chronic obstructive pulmonary disease (COPD) ([facility] [address] 12/13/2018    Anemia     Arthritis     Atrial fibrillation (HCC)     CH[facility]congestive heart failure) ([facility])     Chronic hypoxemic respiratory failure ([facility]) 9/9/2020    Chronic pain syndrome     Low back pain; lumbar disc disease    Clostridium difficile colitis 09/20/2017    COPD, severe ([address] 10/24/2017    Depression     Fibromyalgia     Former smoker     Quit 1/2019    Herniated cervical disc 5-1998    Herpes zoster ophthalmicus 3/2012    Hx of blood clots     Hyperlipidemia     Hypertension     Kidney disease     L3 vertebral fracture ([facility] [address] 2010    vertebroplasty    Lumbar spinal stenosis 2015    moderately severe by MRI    Migraine     Nocturnal hypoxemia 5/21/2019    Osteoporosis 2010    Fragility Fx L3    Pneumonia due to COVID-19 virus 1/5/2021    Rheumatoid arthritis(714.0) 1976    Dr David Bettencourt    S/P cardiac catheterization 05/2017    patent coronaries; Takotsubo; najeeb Ahmed    Shortness of breath 4/25/2019    Takotsubo syndrome 05/2017    TMJ dysfunction     Tobacco abuse 12/13/2018    Vitamin B12 deficiency 12/2014    B12 level 199     PSHX:  has a past surgical history that includes Cholecystectomy (1966); devyn and bso (cervix removed) (1991); Wrist fusion (Left, 2000); Elbow surgery (Right); Appendectomy (1966); vertebroplasty (10/2010); Foot surgery (1986); Toe Surgery (Left, 4/25/2013); Cardiac catheterization (05/21/2017); pr colonoscopy flx dx w/collj spec when pfrmd (N/A, 10/2/2018); Fixation Kyphoplasty (06/12/2019); Hysterectomy; Upper gastrointestinal endoscopy (N/A, 3/11/2020); and Colonoscopy (N/A, 3/11/2020). Allergies: Allergies   Allergen Reactions    Ativan [Lorazepam] Other (See Comments)     Violent, thrashing and combative. She has lacerations and bruising, had to be tied down.      Etanercept Other (See Comments)     No response  Humira [Adalimumab]     Prochlorperazine Edisylate Other (See Comments)     \"Compazine\"   Involuntary Muscle Spasms     Remicade [Infliximab Injection]     Doxycycline Other (See Comments)     Stomach pains       FAM HX: family history includes Arthritis in her mother; Cancer in an other family member; Emphysema in her father; Heart Disease in her father and another family member; Kidney Disease in her son; Stroke in her mother. Soc HX:   Social History     Socioeconomic History    Marital status:       Spouse name: Dannielle Rodriguez Number of children: 2    Years of education: 15    Highest education level: None   Occupational History    Occupation: retired   Social Needs    Financial resource strain: None    Food insecurity     Worry: None     Inability: None    Transportation needs     Medical: None     Non-medical: None   Tobacco Use    Smoking status: Former Smoker     Packs/day: 0.75     Years: 55.00     Pack years: 41.25     Types: Cigarettes     Start date: 1962     Quit date: 2019     Years since quittin.0    Smokeless tobacco: Never Used   Substance and Sexual Activity    Alcohol use: No     Alcohol/week: 0.0 standard drinks    Drug use: No    Sexual activity: Not Currently   Lifestyle    Physical activity     Days per week: None     Minutes per session: None    Stress: None   Relationships    Social connections     Talks on phone: None     Gets together: None     Attends Hindu service: None     Active member of club or organization: None     Attends meetings of clubs or organizations: None     Relationship status: None    Intimate partner violence     Fear of current or ex partner: None     Emotionally abused: None     Physically abused: None     Forced sexual activity: None   Other Topics Concern    None   Social History Narrative    None       Medications:   Medications:    pantoprazole  40 mg Intravenous Once    morphine  2 mg Intravenous Once   [facility] amiodarone  200 mg Oral Daily    atorvastatin  10 mg Oral QPM    budesonide-formoterol  2 puff Inhalation BID    busPIRone  5 mg Oral TID    [START ON 1/7/2021] ferrous sulfate  325 mg Oral Daily with breakfast    Forteo  20 mcg Subcutaneous Daily    [START ON 1/7/2021] tiotropium  2 puff Inhalation Daily    ipratropium-albuterol  3 mL Inhalation Q4H WA    cetirizine  10 mg Oral Daily    OXYGEN  2 L/min Inhalation Nightly    sertraline  25 mg Oral Daily    traZODone  50 mg Oral Nightly    fluticasone  2 spray Each Nostril Daily    sodium chloride flush  10 mL Intravenous 2 times per day    [START ON 1/7/2021] pantoprazole  40 mg Intravenous Q12H    And    sodium chloride (PF)  10 mL Intravenous Q12H      Infusions:    sodium chloride      sodium chloride       PRN Meds:     sodium chloride, , PRN      albuterol sulfate HFA, 2 puff, Q4H PRN      sodium chloride flush, 10 mL, PRN      ondansetron, 4 mg, Q6H PRN      acetaminophen, 650 mg, Q6H PRN    Or      acetaminophen, 650 mg, Q6H PRN      oxyCODONE-acetaminophen, 1 tablet, Q6H PRN        Electronically signed by Aminta Siemens, DO on 1/6/2021 at 7:58 PM      This dictation was created with voice recognition software. While attempts have been made to review the dictation as it is transcribed, on occasion the spoken word can be misinterpreted by the technology leading to omissions or inappropriate words, phrases or sentences.

## 2021-01-07 NOTE — CONSULTS
Comprehensive Nutrition Assessment    Type and Reason for Visit:  Initial, Consult(poor intake/appetite 5 or more days)    Nutrition Recommendations/Plan:   · Advance to a Low Fiber Diet as timely as able  · Clear liquid and standard high leighton oral nutrition supplements available/appropriate as diet advances    Nutrition Assessment:  Pt known to RD with severe malnutrition, admitted with GI bleed. NPO, plan for EGD today. NGT to suction. Will continue to follow as high nutrition risk and order oral supplements, once diet advanced. Malnutrition Assessment:  Malnutrition Status:  Severe malnutrition    Context:  Chronic Illness     Findings of the 6 clinical characteristics of malnutrition:  Energy Intake:   75% or less estimated energy requirements for 1 month or longer  Weight Loss:  Mild weight loss     Body Fat Loss:   Severe body fat loss Orbital   Muscle Mass Loss:   Severe muscle mass loss Temples (temporalis), Clavicles (pectoralis & deltoids)  Fluid Accumulation:  No significant fluid accumulation Extremities   Strength:  Not Performed    Estimated Daily Nutrient Needs:  Energy (kcal):  9252-6908 (25-30 kcal/kg); Weight Used for Energy Requirements:  Current     Protein (g):  76-88 (1.2-1.4 g/kg); Weight Used for Protein Requirements:  Current        Fluid (ml/day):  7562-8887 (1 ml/kcal);  Method Used for Fluid Requirements:  1 ml/kcal      Nutrition Related Findings:  Hgb 9      Wounds:  None       Current Nutrition Therapies:    Diet NPO, After Midnight    Anthropometric Measures:  · Height: 5' 6\" (167.6 cm)  · Current Body Weight: 138 lb 14.2 oz (63 kg)   · Admission Body Weight: 138 lb 14.2 oz (63 kg)    · Usual Body Weight: 149 lb 9.6 oz (67.9 kg)(1/16/20)     · Ideal Body Weight: 130 lbs; % Ideal Body Weight 106.8 %   · BMI: 22.4  · BMI Categories: Normal Weight (BMI 22.0 to 24.9) age over 72       Nutrition Diagnosis:   · Severe malnutrition, In context of chronic illness related to inadequate protein-energy intake as evidenced by NPO or clear liquid status due to medical condition, poor intake prior to admission, weight loss, severe muscle loss, severe loss of subcutaneous fat    Nutrition Interventions:   Food and/or Nutrient Delivery:  Continue NPO  Nutrition Education/Counseling:  No recommendation at this time   Coordination of Nutrition Care:  Continue to monitor while inpatient    Goals:  Pt will tolerate a source of nutrition within 24 hr       Nutrition Monitoring and Evaluation:   Behavioral-Environmental Outcomes:  None Identified   Food/Nutrient Intake Outcomes:  Diet Advancement/Tolerance  Physical Signs/Symptoms Outcomes:  Biochemical Data, GI Status, Nausea or Vomiting, Meal Time Behavior, Nutrition Focused Physical Findings, Weight     Discharge Planning:     Too soon to determine     Electronically signed by Sahara Issa RD, LD on 1/7/21 at 11:56 AM EST    Contact: [phone]

## 2021-01-07 NOTE — ED PROVIDER NOTES
I independently examined and evaluated Ericka Doan. HPI:  In brief, patient is a [de-identified] y.o. female that presents to the emergency department for evaluation. Patient is brought in via EMS and report is initially provided by paramedics who state that they were dispatched to the patient secondary to low hemoglobin. Routine labs were obtained yesterday and today, hemoglobin was noted to be low. Patient has been fatigued over the past couple days. She was here approximately 1 week ago and underwent blood transfusion at that time as well. Patient does have reported history of anemia. She has noticed dark, tarry stools. Denies hematochezia. Denies hematemesis, coffee-ground emesis, diarrhea, constipation. Denies fevers, chills, diaphoresis. Denies any weight loss. Denies any changes in diet. Denies any new medications or medical diagnoses. Patient does take Xarelto, is unsure why. Denies chest pain or pleuritic pain. No known precipitating, modifying, alleviating features. Physical Exam:  Triage VS:    ED Triage Vitals   Enc Vitals Group      BP 01/06/21 1719 (!) 141/59      Pulse 01/06/21 1739 74      Resp 01/06/21 1740 18      Temp 01/06/21 1820 98.9 °F (37.2 °C)      Temp Source 01/06/21 1820 Oral      SpO2 01/06/21 1719 100 % 2 L oxygen via NC      Weight --       Height 01/06/21 1739 5' 6\" (1.676 m)     My pulse ox interpretation is - baseline    General appearance:  Patient is awake, alert, oriented. Following commands and answering questions. GCS is 15. Non-toxic in appearance. Skin:  Warm. Dry. Intact. No rashes, petechiae, purpura. Eye:  Pupils are equal, round, reactive. Extraocular movements intact. No nystagmus. No gaze deviation. Patient does have conjunctival pallor. Head, ears, nose, mouth and throat:  Head is normocephalic, atraumatic. No external masses or lesions. No nasal drainage. Pharynx is clear, non-erythematous. Airway is patent. Mucous membranes are dry.   Neck: myself. Labs demonstrate no leukocytosis. Patient does have macrocytic anemia hemoglobin 5.1. No thrombocytopenia. Electrolytes are within normal limits. No signs of kidney injury. Glucose within normal limits. AST and ALT are unremarkable. INR is 2.85. PT is 34.9. Chest x-ray radiology report reads no acute process. Similar peripheral nodular opacities of the bilateral lower lungs since 12/2/2020 increased since 4/12/2019. On repeat evaluations, patient remains hemodynamically stable. Fecal occult blood testing is positive. 40 mg intravenous Protonix is given. Patient also requests pain medications due to chronic osteoarthritic pain. 2 mg intravenous is provided. Secondary to hemoglobin of 5.1, we recommend transfusion of packed red blood cells through peripheral IV. Risk, benefits, alternatives are discussed with the patient. All questions were answered. Patient is agreeable to blood transfusion. Informed, written consent is obtained and placed in the patient file. Blood transfusion is initiated in the emergency department. On close monitoring, patient has no transfusion associated reactions. Patient tolerates procedure well without any complications. Case discussed with gastroenterology on-call who is agreeable to consultation. Recommends no acute intervention. Recommends medical management. Given patient's symptoms, we recommend admission. Patient is agreeable. Case discussed with admitting hospitalist who is agreeable with treatment plan excepts admission. Patient is updated bedside. All questions were answered. Transfusion is performed in the emergency department. Patient does take Xarelto. Remains hemodynamically stable. Xarelto will be held. Reversal will be held for now. Patient is currently in the emergency department, in hemodynamically stable condition, awaiting admission orders and bed placement. Clinical Impressions:  1.  Gastrointestinal hemorrhage, unspecified gastrointestinal hemorrhage type    2. Macrocytic anemia    3. Xarelto induced coagulopathy ([facility] [address]        Procedures:  Transfusion of packed red blood cells through peripheral IV. Critical Care Note:  Total critical care time provided today was 32 minutes. This excludes seperately billable procedures and family discussion time. Critical care time provided for obtaining history, conducting a physical exam, performing and monitoring interventions, ordering, collecting and interpreting tests, and establishing medical decision-making. There was a potential for life/limb threatening pathology requiring close evaluation and intervention with concern for patient decompensation. All diagnostic, treatment, and disposition decisions were made by myself in conjunction with the advanced practice provider. For all further details of the patient's emergency department visit, please see the advanced practice provider's documentation. Comment: Please note this report has been produced using speech recognition software and may contain errors related to that system including errors in grammar, punctuation, and spelling, as well as words and phrases that may be inappropriate. If there are any questions or concerns please feel free to contact the dictating provider for clarification.        [address] [facility],   01/06/21 2010

## 2021-01-07 NOTE — PROGRESS NOTES
Awaiting plasma per Dr. Arron Estrella before going down to endo. Notified endo and awaiting plasma to come to floor.  Francisca Grijalva RN

## 2021-01-07 NOTE — CONSULTS
12/13/2018    Vitamin B12 deficiency 12/2014    B12 level 199    who presented to [facility] ED with hgb 5.1. Reports black stools x 1 week, attributes to Iron supplement she started 1 week ago. Denies hematochezia. Reports increased fatigue over the past month. Poor appetite for the past 2 months with nausea, smell of food repulsive, denies vomiting, dysphagia. Reports occasional heartburn, hx of GERD. Reports high anxiety since death of her  one year ago. NG tube placed in ED. Last BM 2 days ago     History of EGD 3/11/2020  1) Small Hiatal Hernia On EGD, Otherwise normal, Biopsy for Celiac and H Pylori negative, mild gastritis in antrum per pathology     History of colonoscopy 3/11/2020   Mild sigmoid diverticulosdis, Grade II Hemorrhoids, Biospy of asc colon for Colitis done, negative for colitis      No ASA   Hx of NSAID use several years ago.  Denies current NSAIDs,   No Anti-platelet therapy per EMR     Past Smoker  Denies alcohol intake    Past Medical History:        Diagnosis Date    Acute DVT of right tibial vein ([address] 07/2017    ER imaging: distal right tibial vein DVT; no anticoag for bleeding/ anemia    Acute exacerbation of chronic obstructive pulmonary disease (COPD) ([address] 12/13/2018    Anemia     Arthritis     Atrial fibrillation (HCC)     CHF (congestive heart failure) (HCC)     Chronic hypoxemic respiratory failure (HCC) 9/9/2020    Chronic pain syndrome     Low back pain; lumbar disc disease    Clostridium difficile colitis 09/20/2017    COPD, severe ([address] 10/24/2017    Depression     Fibromyalgia     Former smoker     Quit 1/2019    Herniated cervical disc 5-1998    Herpes zoster ophthalmicus 3/2012    Hx of blood clots     Hyperlipidemia     Hypertension     Kidney disease     L3 vertebral fracture ([address] 2010    vertebroplasty    Lumbar spinal stenosis 2015    moderately severe by MRI    Migraine     Nocturnal hypoxemia 5/21/2019    Osteoporosis 2010    Fragility Fx L3    Pneumonia due to COVID-19 virus 1/5/2021    Rheumatoid arthritis(714.0) 1976    Dr Corinne Brook S/P cardiac catheterization 05/2017    patent coronaries;  Takotsubo; humberto Bonilla    Shortness of breath 4/25/2019    Takotsubo syndrome 05/2017    TMJ dysfunction     Tobacco abuse 12/13/2018    Vitamin B12 deficiency 12/2014    B12 level 199       Past Surgical History:        Procedure Laterality Date    APPENDECTOMY  1966    CARDIAC CATHETERIZATION  05/21/2017    CHOLECYSTECTOMY  1966    w/ appendectomy    COLONOSCOPY N/A 3/11/2020    COLONOSCOPY WITH BIOPSY performed [facility] Andrew [facility]     FIX[facility]PHOPLASTY  06/12/2019    [facility]    FOOT SURGERY  1986    HYSTERECTOMY      GA COLONOSCOPY FLX DX W/COLLJ SPEC WHEN PFRMD N/A 10/2/2018    COLONOSCOPY DIAGNOSTIC OR SCREENING performed by Seb Espana MD at [address] [place] [address]    TOE SURGERY Left 4/25/2013    Deboo;    Nirmala Officer UPPER GASTROINTESTINAL ENDOSCOPY N/A 3/11/2020    EGD BIOPSY performed by Andrew Sanchez MD at [place] [address] VERTEBROPLASTY  10/2010    L3    WRIST FUSION Left 2000       Current Medications:    Medications    Scheduled Medications:    amiodarone  200 mg Oral Daily    atorvastatin  10 mg Oral QPM    budesonide-formoterol  2 puff Inhalation BID    busPIRone  5 mg Oral TID    ferrous sulfate  325 mg Oral Daily with breakfast    Teriparatide (Recombinant)  20 mcg Subcutaneous Daily    tiotropium  2 puff Inhalation Daily    ipratropium-albuterol  3 mL Inhalation Q4H WA    cetirizine  10 mg Oral Daily    sertraline  25 mg Oral Daily    traZODone  50 mg Oral Nightly    fluticasone  2 spray Each Nostril Daily    sodium chloride flush  10 mL Intravenous 2 times per day    pantoprazole  40 mg Intravenous Q12H    And    sodium chloride (PF)  10 mL Intravenous Q12H     PRN Medications: morphine, sodium chloride, sodium chloride, albuterol sulfate HFA, sodium chloride flush, ondansetron, acetaminophen **OR** acetaminophen, oxyCODONE-acetaminophen      Allergies:  Ativan [lorazepam], Etanercept, Humira [adalimumab], Prochlorperazine edisylate, Remicade [infliximab injection], and Doxycycline    Social History:   TOBACCO:   reports that she quit smoking about 12 months ago. Her smoking use included cigarettes. She started smoking about 58 years ago. She has a 41.25 pack-year smoking history. She has never used smokeless tobacco.  ETOH:   reports no history of alcohol use. Family History:       Problem Relation Age of Onset    Heart Disease Father          FROM MI   Georgianna Olszewski Emphysema Father     Arthritis Mother     Stroke Mother     Heart Disease Other         family history    Cancer Other         family history -- larynx    Kidney Disease Son        No family history of colon cancer, Crohn's disease, or ulcerative colitis. REVIEW OF SYSTEMS:    The positive ROS will be identified in bold, otherwise ROS are negative     CONSTITUTIONAL:  Neg   Recent weight changes, fatigue, fever, chills or night sweats  EYES:  Neg  Blurriness, earing, itching or acute change in vision  EARS:  Neg  hearing loss, tinnitus, vertigo, discharge or earache. NOSE:  Neg  Rhinorrhea, sneezing, itching, allergy or epistaxis  MOUTH/THROAT:  Neg  bleeding gums, hoarseness or sore throat. RESPIRATORY:   Neg SOB, wheeze, cough, sputum, hemoptysis or bronchitis  CARDIOVASCULAR:  Neg chest pain, palpitations, dyspnea on exertion, orthopnea, paroxysmal nocturnal dyspnea or edema  GASTROINTESTINAL:  SEE HPI  GENITOURINARY:  Neg  Urinary frequency, hesitancy, urgency, polyuria, dysuria, hematuria, nocturia, or incontinence. HEMATOLOGIC/LYMPHATIC:  Neg  Anemia, bleeding tendency  MUSCULOSKELETAL:    New myalgias, bone pain, joint pain, swelling or stiffness and has had change in gait.   NEUROLOGICAL:  Neg  Loss of Consciousness, memory loss, forgetfulness, periods of confusion, difficulty concentrating, seizures, decline in intellect, nervousness, insomina, aphasia or dysarthria. SKIN:  Neg  skin or hair changes, and has no itching, rashes, or sores. PSYCHIATRIC:  Neg depression, personality changes, anxiety. ENDOCRINE:  Neg polydipsia, polyuria, abnormal weight changes, heat /cold intolerance.   ALL/IMM:  Neg reactions to drugs other than listed    PHYSICAL EXAM:      Vitals:    BP (!) 149/61   Pulse 72   Temp 98 °F (36.7 °C)   Resp 18   Ht 5' 6\" (1.676 m)   Wt 138 lb 14.2 oz (63 kg)   SpO2 100%   BMI 22.42 kg/m²     General Appearance:    Alert, cooperative, anxious, appears stated age   HEENT:    Normocephalic, atraumatic, Conjunctiva clear, Lips, mucosa, and tongue normal; teeth and gums normal   Neck:   Supple, symmetrical, trachea midline   Lungs:     Clear to auscultation bilaterally, respirations unlabored   Chest Wall:    No tenderness or deformity    Heart:    Regular rate and rhythm, S1 and S2 normal, no murmur, rub   or gallop   Abdomen:     Soft, non-tender, bowel sounds active all four quadrants,     no masses, no organomegaly, no ascites    Rectal:    Deferred   Extremities:   Extremities normal, atraumatic, no cyanosis or edema   Pulses:   2+ and symmetric all extremities   Skin:   Skin color, texture, turgor normal, no rashes or lesions   Lymph nodes:   No abnormality   Neurologic:   No focal deficits, moving all four extremities      DATA:    ABGs:   Lab Results   Component Value Date    PO2ART 109 12/02/2020    DCP9TAC 42.0 12/02/2020     CBC:   Recent Labs     01/06/21  1540   WBC 8.8   HGB 5.1*        BMP:    Recent Labs     01/06/21  1540      K 3.6      CO2 31   BUN 22   CREATININE 0.7   GLUCOSE 93     Magnesium:   Lab Results   Component Value Date    MG 2.1 12/03/2020     Hepatic:   Recent Labs     01/06/21  1540   AST 11*   ALT 6*   BILITOT 0.1   ALKPHOS 64     No results for input(s): LIPASE, AMYLASE in the last 72 PUD  Hgb 5.1 on admission, hgb 9.0 s/p 2 units PRBC's  Continue Protonix 40 mg IV BID   Serial H&H, transfuse <7.0  NPO   Plan for EGD today  Platelets 908, INR 3.01, Rapid Covid negative  Consent signed, in chart       Discussed plan of care with patient and Shira KLEIN     Patient clinical, biochemical, and radiological information discussed with Dr. Norma Segovia. He agrees with the assessment and plan. Tameka Cyr, SALEEM  1/7/2021  6:58 AM     Melanotic stools  aute on Chronic anemia  Coagulopathic  Will EGD  FFP if needed    I have seen and examined this patient personally, and independently of the nurse practitioner. The plan was developed mutually at the time of the visit with the patient. Sofy Lopez and myself have spoken with patient, nursing staff and provided written and verbal instructions .     The above note has been reviewed and I agree with the Assessment,  Diagnosis, and Treatment plan as suggested by [facility] gastroenterology

## 2021-01-07 NOTE — ED NOTES
Attempted to call report for room 3116. Notified the nurse was currently transferring pt. To ICU. Will call back.      Rodger Lopez  01/06/21 [id number]

## 2021-01-08 ENCOUNTER — APPOINTMENT (OUTPATIENT)
Dept: GENERAL RADIOLOGY | Age: 81
DRG: 368 | End: 2021-01-08
Payer: MEDICARE

## 2021-01-08 LAB
ALBUMIN SERPL-MCNC: 2.9 GM/DL (ref 3.4–5)
ALP BLD-CCNC: 71 IU/L (ref 40–128)
ALT SERPL-CCNC: 8 U/L (ref 10–40)
ANION GAP SERPL CALCULATED.3IONS-SCNC: 9 MMOL/L (ref 4–16)
ANION GAP SERPL CALCULATED.3IONS-SCNC: 9 MMOL/L (ref 4–16)
AST SERPL-CCNC: 18 IU/L (ref 15–37)
BILIRUB SERPL-MCNC: 0.7 MG/DL (ref 0–1)
BUN BLDV-MCNC: 15 MG/DL (ref 6–23)
BUN BLDV-MCNC: 16 MG/DL (ref 6–23)
CALCIUM SERPL-MCNC: 7.7 MG/DL (ref 8.3–10.6)
CALCIUM SERPL-MCNC: 7.8 MG/DL (ref 8.3–10.6)
CHLORIDE BLD-SCNC: 101 MMOL/L (ref 99–110)
CHLORIDE BLD-SCNC: 98 MMOL/L (ref 99–110)
CO2: 29 MMOL/L (ref 21–32)
CO2: 30 MMOL/L (ref 21–32)
COMPONENT: NORMAL
CREAT SERPL-MCNC: 0.8 MG/DL (ref 0.6–1.1)
CREAT SERPL-MCNC: 0.8 MG/DL (ref 0.6–1.1)
EKG ATRIAL RATE: 100 BPM
EKG DIAGNOSIS: NORMAL
EKG P AXIS: 27 DEGREES
EKG P-R INTERVAL: 200 MS
EKG Q-T INTERVAL: 374 MS
EKG QRS DURATION: 152 MS
EKG QTC CALCULATION (BAZETT): 482 MS
EKG R AXIS: 125 DEGREES
EKG T AXIS: -27 DEGREES
EKG VENTRICULAR RATE: 100 BPM
GFR AFRICAN AMERICAN: >60 ML/MIN/1.73M2
GFR AFRICAN AMERICAN: >60 ML/MIN/1.73M2
GFR NON-AFRICAN AMERICAN: >60 ML/MIN/1.73M2
GFR NON-AFRICAN AMERICAN: >60 ML/MIN/1.73M2
GLUCOSE BLD-MCNC: 109 MG/DL (ref 70–99)
GLUCOSE BLD-MCNC: 84 MG/DL (ref 70–99)
GLUCOSE BLD-MCNC: 97 MG/DL (ref 70–99)
HCT VFR BLD CALC: 21.7 % (ref 37–47)
HCT VFR BLD CALC: 32.8 % (ref 37–47)
HCT VFR BLD CALC: 33.4 % (ref 37–47)
HCT VFR BLD CALC: 33.9 % (ref 37–47)
HEMOGLOBIN: 10.3 GM/DL (ref 12.5–16)
HEMOGLOBIN: 10.4 GM/DL (ref 12.5–16)
HEMOGLOBIN: 10.5 GM/DL (ref 12.5–16)
HEMOGLOBIN: 6.4 GM/DL (ref 12.5–16)
INR BLD: 0.98 INDEX
MAGNESIUM: 1.7 MG/DL (ref 1.8–2.4)
MCH RBC QN AUTO: 28.2 PG (ref 27–31)
MCH RBC QN AUTO: 28.8 PG (ref 27–31)
MCHC RBC AUTO-ENTMCNC: 31 % (ref 32–36)
MCHC RBC AUTO-ENTMCNC: 31.4 % (ref 32–36)
MCV RBC AUTO: 91.1 FL (ref 78–100)
MCV RBC AUTO: 91.6 FL (ref 78–100)
PDW BLD-RTO: 19.9 % (ref 11.7–14.9)
PDW BLD-RTO: 20.1 % (ref 11.7–14.9)
PLATELET # BLD: 321 K/CU MM (ref 140–440)
PLATELET # BLD: 326 K/CU MM (ref 140–440)
PMV BLD AUTO: 10.2 FL (ref 7.5–11.1)
PMV BLD AUTO: 10.5 FL (ref 7.5–11.1)
POTASSIUM SERPL-SCNC: 3.2 MMOL/L (ref 3.5–5.1)
POTASSIUM SERPL-SCNC: 3.5 MMOL/L (ref 3.5–5.1)
PRO-BNP: 7027 PG/ML
PRO-BNP: ABNORMAL PG/ML
PROTHROMBIN TIME: 11.8 SECONDS (ref 11.7–14.5)
RBC # BLD: 3.58 M/CU MM (ref 4.2–5.4)
RBC # BLD: 3.72 M/CU MM (ref 4.2–5.4)
SODIUM BLD-SCNC: 137 MMOL/L (ref 135–145)
SODIUM BLD-SCNC: 139 MMOL/L (ref 135–145)
STATUS: NORMAL
TOTAL PROTEIN: 4.9 GM/DL (ref 6.4–8.2)
TRANSFUSION STATUS: NORMAL
TROPONIN T: 0.02 NG/ML
UNIT DIVISION: 0
UNIT NUMBER: NORMAL
WBC # BLD: 10.7 K/CU MM (ref 4–10.5)
WBC # BLD: 10.8 K/CU MM (ref 4–10.5)

## 2021-01-08 PROCEDURE — 93005 ELECTROCARDIOGRAM TRACING: CPT | Performed by: PHYSICIAN ASSISTANT

## 2021-01-08 PROCEDURE — 6360000002 HC RX W HCPCS: Performed by: INTERNAL MEDICINE

## 2021-01-08 PROCEDURE — 93010 ELECTROCARDIOGRAM REPORT: CPT | Performed by: INTERNAL MEDICINE

## 2021-01-08 PROCEDURE — 80048 BASIC METABOLIC PNL TOTAL CA: CPT

## 2021-01-08 PROCEDURE — 2580000003 HC RX 258: Performed by: INTERNAL MEDICINE

## 2021-01-08 PROCEDURE — 6360000002 HC RX W HCPCS: Performed by: NURSE PRACTITIONER

## 2021-01-08 PROCEDURE — 83735 ASSAY OF MAGNESIUM: CPT

## 2021-01-08 PROCEDURE — 85610 PROTHROMBIN TIME: CPT

## 2021-01-08 PROCEDURE — 2700000000 HC OXYGEN THERAPY PER DAY

## 2021-01-08 PROCEDURE — 94660 CPAP INITIATION&MGMT: CPT

## 2021-01-08 PROCEDURE — C9113 INJ PANTOPRAZOLE SODIUM, VIA: HCPCS | Performed by: INTERNAL MEDICINE

## 2021-01-08 PROCEDURE — 84484 ASSAY OF TROPONIN QUANT: CPT

## 2021-01-08 PROCEDURE — 83880 ASSAY OF NATRIURETIC PEPTIDE: CPT

## 2021-01-08 PROCEDURE — 6370000000 HC RX 637 (ALT 250 FOR IP): Performed by: INTERNAL MEDICINE

## 2021-01-08 PROCEDURE — 71045 X-RAY EXAM CHEST 1 VIEW: CPT

## 2021-01-08 PROCEDURE — 6360000002 HC RX W HCPCS: Performed by: PHYSICIAN ASSISTANT

## 2021-01-08 PROCEDURE — 85027 COMPLETE CBC AUTOMATED: CPT

## 2021-01-08 PROCEDURE — 94640 AIRWAY INHALATION TREATMENT: CPT

## 2021-01-08 PROCEDURE — 82962 GLUCOSE BLOOD TEST: CPT

## 2021-01-08 PROCEDURE — P9016 RBC LEUKOCYTES REDUCED: HCPCS

## 2021-01-08 PROCEDURE — 93308 TTE F-UP OR LMTD: CPT

## 2021-01-08 PROCEDURE — 2580000003 HC RX 258: Performed by: NURSE PRACTITIONER

## 2021-01-08 PROCEDURE — 94761 N-INVAS EAR/PLS OXIMETRY MLT: CPT

## 2021-01-08 PROCEDURE — 6370000000 HC RX 637 (ALT 250 FOR IP): Performed by: HOSPITALIST

## 2021-01-08 PROCEDURE — 1200000000 HC SEMI PRIVATE

## 2021-01-08 PROCEDURE — 85018 HEMOGLOBIN: CPT

## 2021-01-08 PROCEDURE — 6360000002 HC RX W HCPCS: Performed by: HOSPITALIST

## 2021-01-08 PROCEDURE — 6370000000 HC RX 637 (ALT 250 FOR IP): Performed by: PHYSICIAN ASSISTANT

## 2021-01-08 PROCEDURE — C9113 INJ PANTOPRAZOLE SODIUM, VIA: HCPCS | Performed by: NURSE PRACTITIONER

## 2021-01-08 PROCEDURE — 80053 COMPREHEN METABOLIC PANEL: CPT

## 2021-01-08 PROCEDURE — 85014 HEMATOCRIT: CPT

## 2021-01-08 PROCEDURE — 36415 COLL VENOUS BLD VENIPUNCTURE: CPT

## 2021-01-08 RX ORDER — HYDROXYZINE PAMOATE 25 MG/1
25 CAPSULE ORAL 3 TIMES DAILY PRN
Status: DISCONTINUED | OUTPATIENT
Start: 2021-01-08 | End: 2021-01-11 | Stop reason: HOSPADM

## 2021-01-08 RX ORDER — FUROSEMIDE 10 MG/ML
40 INJECTION INTRAMUSCULAR; INTRAVENOUS 3 TIMES DAILY
Status: DISCONTINUED | OUTPATIENT
Start: 2021-01-08 | End: 2021-01-08

## 2021-01-08 RX ORDER — FUROSEMIDE 10 MG/ML
40 INJECTION INTRAMUSCULAR; INTRAVENOUS ONCE
Status: COMPLETED | OUTPATIENT
Start: 2021-01-08 | End: 2021-01-08

## 2021-01-08 RX ORDER — SODIUM CHLORIDE 9 MG/ML
INJECTION, SOLUTION INTRAVENOUS PRN
Status: DISCONTINUED | OUTPATIENT
Start: 2021-01-08 | End: 2021-01-11 | Stop reason: HOSPADM

## 2021-01-08 RX ORDER — FUROSEMIDE 10 MG/ML
40 INJECTION INTRAMUSCULAR; INTRAVENOUS 2 TIMES DAILY
Status: DISCONTINUED | OUTPATIENT
Start: 2021-01-08 | End: 2021-01-10

## 2021-01-08 RX ADMIN — METOPROLOL TARTRATE 25 MG: 25 TABLET, FILM COATED ORAL at 10:12

## 2021-01-08 RX ADMIN — ALBUTEROL SULFATE 2 PUFF: 90 AEROSOL, METERED RESPIRATORY (INHALATION) at 21:08

## 2021-01-08 RX ADMIN — ATORVASTATIN CALCIUM 10 MG: 10 TABLET, FILM COATED ORAL at 17:45

## 2021-01-08 RX ADMIN — PANTOPRAZOLE SODIUM 40 MG: 40 INJECTION, POWDER, LYOPHILIZED, FOR SOLUTION INTRAVENOUS at 04:51

## 2021-01-08 RX ADMIN — FUROSEMIDE 40 MG: 10 INJECTION, SOLUTION INTRAMUSCULAR; INTRAVENOUS at 17:45

## 2021-01-08 RX ADMIN — FLUTICASONE PROPIONATE 2 SPRAY: 50 SPRAY, METERED NASAL at 10:13

## 2021-01-08 RX ADMIN — BUSPIRONE HYDROCHLORIDE 5 MG: 5 TABLET ORAL at 22:05

## 2021-01-08 RX ADMIN — FUROSEMIDE 40 MG: 10 INJECTION, SOLUTION INTRAMUSCULAR; INTRAVENOUS at 10:30

## 2021-01-08 RX ADMIN — BUDESONIDE AND FORMOTEROL FUMARATE DIHYDRATE 2 PUFF: 160; 4.5 AEROSOL RESPIRATORY (INHALATION) at 21:09

## 2021-01-08 RX ADMIN — FUROSEMIDE 40 MG: 10 INJECTION, SOLUTION INTRAMUSCULAR; INTRAVENOUS at 05:22

## 2021-01-08 RX ADMIN — NITROGLYCERIN 0.5 INCH: 20 OINTMENT TOPICAL at 05:22

## 2021-01-08 RX ADMIN — OXYCODONE HYDROCHLORIDE AND ACETAMINOPHEN 1 TABLET: 5; 325 TABLET ORAL at 19:15

## 2021-01-08 RX ADMIN — ALBUTEROL SULFATE 2 PUFF: 90 AEROSOL, METERED RESPIRATORY (INHALATION) at 11:40

## 2021-01-08 RX ADMIN — METOPROLOL TARTRATE 25 MG: 25 TABLET, FILM COATED ORAL at 22:05

## 2021-01-08 RX ADMIN — SERTRALINE HYDROCHLORIDE 25 MG: 25 TABLET ORAL at 10:12

## 2021-01-08 RX ADMIN — HYDROXYZINE PAMOATE 25 MG: 25 CAPSULE ORAL at 10:30

## 2021-01-08 RX ADMIN — BUDESONIDE AND FORMOTEROL FUMARATE DIHYDRATE 2 PUFF: 160; 4.5 AEROSOL RESPIRATORY (INHALATION) at 08:07

## 2021-01-08 RX ADMIN — BUSPIRONE HYDROCHLORIDE 5 MG: 5 TABLET ORAL at 13:41

## 2021-01-08 RX ADMIN — HYDROXYZINE PAMOATE 25 MG: 25 CAPSULE ORAL at 17:45

## 2021-01-08 RX ADMIN — AMIODARONE HYDROCHLORIDE 200 MG: 200 TABLET ORAL at 10:12

## 2021-01-08 RX ADMIN — BUSPIRONE HYDROCHLORIDE 5 MG: 5 TABLET ORAL at 10:13

## 2021-01-08 RX ADMIN — ONDANSETRON 4 MG: 2 INJECTION INTRAMUSCULAR; INTRAVENOUS at 14:53

## 2021-01-08 RX ADMIN — HYDROXYZINE HYDROCHLORIDE 50 MG: 50 INJECTION, SOLUTION INTRAMUSCULAR at 00:34

## 2021-01-08 RX ADMIN — TRAZODONE HYDROCHLORIDE 50 MG: 50 TABLET ORAL at 22:04

## 2021-01-08 RX ADMIN — SODIUM CHLORIDE, PRESERVATIVE FREE 10 ML: 5 INJECTION INTRAVENOUS at 22:06

## 2021-01-08 RX ADMIN — SODIUM CHLORIDE 8 MG/HR: 9 INJECTION, SOLUTION INTRAVENOUS at 23:37

## 2021-01-08 RX ADMIN — CETIRIZINE HYDROCHLORIDE 10 MG: 10 TABLET, FILM COATED ORAL at 10:13

## 2021-01-08 RX ADMIN — ALBUTEROL SULFATE 2 PUFF: 90 AEROSOL, METERED RESPIRATORY (INHALATION) at 08:04

## 2021-01-08 RX ADMIN — SODIUM CHLORIDE 8 MG/HR: 9 INJECTION, SOLUTION INTRAVENOUS at 11:46

## 2021-01-08 RX ADMIN — ALBUTEROL SULFATE 2 PUFF: 90 AEROSOL, METERED RESPIRATORY (INHALATION) at 15:48

## 2021-01-08 RX ADMIN — TIOTROPIUM BROMIDE INHALATION SPRAY 2 PUFF: 3.12 SPRAY, METERED RESPIRATORY (INHALATION) at 08:07

## 2021-01-08 NOTE — PROGRESS NOTES
[address] [place] [address]  HOSPITALIST PROGRESS NOTE                       Name:  Ruthann Hebert /Age/Sex: 1940  ([de-identified] y.o. female)   MRN & CSN:  [id number] & [id number] Admission Date/Time: 2021  2:34 PM   Location:  [facility]/3116-A Attending:  Nadeen Head MD                                                  HPI  Ruthann Hebert is a [de-identified] y.o. female who presents with severe anemia    SUBJECTIVE  Became short of breath last night and noted to be in pulmonary edema. Now on bipap    10 point review of systems reviewed and negative unless noted above. ALLERGIES:   Allergies   Allergen Reactions    Ativan [Lorazepam] Other (See Comments)     Violent, thrashing and combative. She has lacerations and bruising, had to be tied down.  Etanercept Other (See Comments)     No response    Humira [Adalimumab]     Prochlorperazine Edisylate Other (See Comments)     \"Compazine\"   Involuntary Muscle Spasms     Remicade [Infliximab Injection]     Doxycycline Other (See Comments)     Stomach pains       PCP: Ilene Young MD    PAST MEDICAL HISTORY, SURGICAL HISTORY, SOCIAL HISTORY and  HOME MEDICATIONS all reviewed. OBJECTIVE  Vitals:    21 0505 21 0534 21 0802 21 0809   BP: (!) 175/127      Pulse: 95      Resp: 23 (!) 38 22 22   Temp:       TempSrc:       SpO2: 93%   95%   Weight:       Height:           PHYSICAL EXAM   GEN Awake female, sitting upright in bed in no apparent distress. EYES Pupils are equally round. No scleral erythema, discharge, or conjunctivitis. HENT Mucous membranes are moist. Oral pharynx without exudates, no evidence of thrush. NECK Supple, no apparent thyromegaly or masses. RESP Clear to auscultation, no wheezes, rales or rhonchi. Symmetric chest movement  CARDIO/VASC S1/S2 auscultated. Regular rate without appreciable murmurs, rubs, or gallops. No JVD or carotid bruits. Peripheral pulses equal bilaterally and palpable. No peripheral edema.   GI Abdomen is soft without significant tenderness, masses, or guarding. Bowel sounds are normoactive. Rectal exam deferred.  No costovertebral angle tenderness. Normal appearing external genitalia. HEME/LYMPH No palpable cervical lymphadenopathy and no hepatosplenomegaly. No petechiae or ecchymoses. MSK Spontaneous movement of all extremities. No gross joint deformities. SKIN Normal coloration, warm, dry. NEURO Cranial nerves appear grossly intact, normal speech, no lateralizing weakness. PSYCH Awake, alert, oriented x 4. Affect appropriate. INTAKE: In: 662.5 [Blood:662.5]  Out: -   OUTPUT: In: 662.5   Out: -     LABS  Recent Labs     01/06/21  1540 01/07/21  0658 01/07/21 2011 01/08/21  0159   WBC 8.8 9.8  --   --    HGB 5.1* 9.0* 8.7* 6.4*   HCT 17.8* 29.5* 28.1* 21.7*    338  --   --       Recent Labs     01/06/21  1540 01/07/21  0658    138   K 3.6 3.8    103   CO2 31 28   BUN 22 17   CREATININE 0.7 0.7     Recent Labs     01/06/21  1540 01/07/21  0658   AST 11* 14*   ALT 6* 6*   BILITOT 0.1 0.8   ALKPHOS 64 65     Recent Labs     01/06/21  1540   INR 2.85     No results for input(s): CKTOTAL, CKMB, CKMBINDEX, TROPONINT in the last 72 hours.        Abnormal labs for today noted      Imaging:     ECHO:    Microbiology:  Blood culture:    Urine culture:    Sputum culture:    Procedures done this admission:    MEDS  Scheduled Meds:   furosemide  40 mg Intravenous TID    metoprolol tartrate  25 mg Oral BID    albuterol sulfate HFA  2 puff Inhalation 4x daily    amiodarone  200 mg Oral Daily    atorvastatin  10 mg Oral QPM    budesonide-formoterol  2 puff Inhalation BID    busPIRone  5 mg Oral TID    ferrous sulfate  325 mg Oral Daily with breakfast    Teriparatide (Recombinant)  20 mcg Subcutaneous Daily    tiotropium  2 puff Inhalation Daily    cetirizine  10 mg Oral Daily    sertraline  25 mg Oral Daily    traZODone  50 mg Oral Nightly    fluticasone  2 spray Each Nostril Daily  sodium chloride flush  10 mL Intravenous 2 times per day     Continuous Infusions:   sodium chloride      pantoprozole (PROTONIX) infusion      sodium chloride      sodium chloride      sodium chloride       PRN Meds:sodium chloride, morphine, sodium chloride, sodium chloride, sodium chloride, sodium chloride flush, ondansetron, acetaminophen **OR** acetaminophen, oxyCODONE-acetaminophen        ASSESSMENT and PLAN  Hospital Day: 3    1-Severe anemia with hemoglobin of 5.1on presentation-- concerns of GIB- on PPI and GI consulted-s/p EGD showing juliane barajas tear clot- noted last H/H 6.2- await repeat one and transfuse to keep >7  -on xarelto for atrial fib- CT with no bleed- noted GI recommendation  2-PAF- AC on hold, rate controlled- consult cardio for input on AC  3-Acute hypoxic respiratory failure from acute diastolic HF- volume overload- lasix 40mg IV TID for today- take off bipap  4-Debility- ECF resident       Other chronic medical conditions:  · Severe COPD with chronic respiratory failure on home O2: No exacerbation  · CKD stage III: Stable, continue holding nephrotoxic agents  · Hypertension  · Rheumatoid arthritis  · Fibromyalgia  · Depression with anxiety  · Osteoporosis      Updated son ABDIAZIZ Child- yesterday     Disp:     Diet DIET DENTAL SOFT;   DVT Prophylaxis [] Lovenox, []  Heparin, [] SCDs, [] Ambulation   GI Prophylaxis [] PPI,  [] H2 Blocker,  [] Carafate,  [] Diet/Tube Feeds   Code Status Full Code   Disposition Patient requires continued admission due to severe anemia   CMS Level of Risk [] Low, [x] Moderate,[]  High  Patient's risk as above due to severe anemia     TAYE CALDWELL MD 1/8/2021 9:51 AM

## 2021-01-08 NOTE — PROGRESS NOTES
Fibromyalgia M79.7    Migraine G43.909    Chronic pain syndrome G89.4    Depression F32.9    Osteoporosis M81.0    Vitamin B12 deficiency E53.8    Lumbar spinal stenosis M48.061    Panic attack F41.0    Takotsubo syndrome I51.81    Blood loss anemia D50.0    COPD, severe (HCC) J44.9    Acute exacerbation of chronic obstructive pulmonary disease (COPD) (HCC) J44.1    Former smoker Z87.891    Shortness of breath R06.02    Back pain M54.9    Intractable low back pain M54.5    Chronic fatigue R53.82    Pneumonia of right upper lobe due to infectious organism J18.9    Wide-complex tachycardia (HCC) I47.2    Gram-negative pneumonia (HCC) L32.1    Acute systolic heart failure (HCC) I50.21    Symptomatic anemia D64.9    Chronic hypoxemic respiratory failure (HCC) J96.11    Acute hypoxemic respiratory failure due to COVID-19 (HCC) U07.1, J96.01    Transaminitis R74.01    PNA (pneumonia) J18.9    Alzheimer's disease (HCC) G30.9, F02.80    Severe malnutrition (HCC) E43    Pneumonia due to COVID-19 virus U07.1, J12.82    GI bleed K92.2       ASSESSMENT AND RECOMMENDATIONS:    Flash pulmonary edema     GI bleed:  Anemia- acute on chronic  EGD yesterday (1/7/2021) with Jinx Nest tear, large clot, no active bleed  Hgb 6.4 s/p 3 units PRBCs, 2 units FFP, 1 additional unit PRBC ordered for today   Recommend Protonix gtt x 24 hours, then IVP BID   Serial H&H, transfuse <7.0  No gross blood loss noted  Advance to soft diet   Can restart anticoagulation therapy in 2 days   Stable from GI standpoint, will sign off for now, follow up outpatient, call if further needs arise    Dr. Jamarcus Preston covering for weekend needs     Discussed plan of care with patient and Shira KLEIN     Patient clinical, biochemical, and radiological information discussed with Dr. Katina Bee. He agrees with the assessment and plan.      Eduardo Jack CNP  1/8/2021  7:47 AM     No more bleed  Stable  Keep HB more than 7  Advance diet  PPI drip for one more day    I have seen and examined this patient personally, and independently of the nurse practitioner. The plan was developed mutually at the time of the visit with the patient. Leeroy Darby and myself have spoken with patient, nursing staff and provided written and verbal instructions .     The above note has been reviewed and I agree with the Assessment,  Diagnosis, and Treatment plan as suggested by [facility] gastroenterology

## 2021-01-08 NOTE — SIGNIFICANT EVENT
Significant event documentation:    Reason for call/time of call:   Difficulty breathing    Physical Exam:   Vitals:    01/08/21 0505   BP: (!) 175/127   Pulse: 95   Resp: 23   Temp:    SpO2: 93%      Mental status: A&Ox3, no lateralizing weakness  CV:tachycardic  Lungs: Crackles bilaterally, JVD present, respiratory distress    Interventions/orders placed:  RR called due to difficulty breathing after receiving 1 unit of blood. Patient was not hypoxic during episode. Dr. James Hernandez present as well. Patient was receiving 150 cc of fluid an hour all night also. Patient has crackles and JVD on exam, concerning for acute pulmonary edema vs transfusion associated lung injury. CXR reviewed at bedside which was concerning for pulmonary edema. Blood products stopped, fluids held, nitro paste added, 40 mg of lasix added, patient was placed on bipap. CBC, BMP, troponin stat added. EKG changes with no ST elevations. Cardiology is on case.     Nery Corley PA-C  Hospitalist

## 2021-01-08 NOTE — PROGRESS NOTES
Daily Progress Note    Patient went into respiratory distress last night  Received lasix feeling better on BIPAP  Stop IVF  Recheck labs not sure if dilutional or blood loss  EGD showed juliane barajas tear  She may need a  Csope  Hold off on [facility] [place] for now due to anemia   Hx of PAFIB keep on meds-she is in sinus now  Keep on lasix 40mg bid and check echo   Elevated trop due to anemia no ACS        Pt. Awake, alert on BiPAP today on exam  HR stable, NSR, BP stable  No CP, SOB improved from earlier today    GI bleed    Hgb 5.1 on admission    GI following    EGD done and Esther Almonte tear noted    Holding [facility] [place] for now    S/p transfusion    Hgb dropped again-6.4 today- will need another unit of blood    Acute on Chronic HFpEF    EF preserved on last check in November    Acute onset- rapid called early this am-on BIPAP now and doing better    Stopped fluids    Started on Lasix TID for now    Likely exacerbated by anemia    Med. Tx. For now    PAF    NSR this am    On amio and lopressor    No AC for now d/t GI bleed    CHADS-VASc is 7 with Hx of HFrEF and TIAs so ideally would be on when able- per GI note can restart in 3 days    Will watch Hgb and see    Will cont. To follow    Echo-11/16/20  Summary   This is a limited echocardiogram.   Left ventricular systolic function is normal.   Ejection fraction is visually estimated at 50-55%. Moderate aortic regurgitation; PHT: 336 msec. Moderate mitral regurgitation. Small mobile calcification attached to the posterior mitral valve chordae   tendinae. No evidence of any pericardial effusion. PAST MEDICAL HISTORY:  COVID in 11/2020, history of atrial fibrillation,  paroxysmal, history of TIAs, history of Takotsubo syndrome present. Her  last heart catheterization was in 2017, found to have left main was  patent. LAD, circ, ramus, and RCA they all had mild disease noted.   She  has paroxysmal atrial fibrillation with 2:1 block present.     The patient sees Dr. Rob Henning for her COPD. History of having _____  ballooning with heart failure. Heart catheterization in 2017,  nonobstructive coronary artery disease present. Hypertension,  hyperlipidemia, fibromyalgia, atrial flutter, on anticoagulation for  that.     PAST SURGICAL HISTORY:  Gallbladder surgery, hysterectomy done, and  appendectomy.     SOCIAL HISTORY:  She does not smoke, does not drink. 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     ALLERGIES:  REMICADE, ATIVAN, ETANERCEPT, COMPAZINE, and DOXYCYCLINE.     MEDICATIONS AT HOME:  She is on Xanax, Carafate, lisinopril, Remeron,  Xarelto, Lopressor, BuSpar, amiodarone.   Objective:   BP (!) 175/127   Pulse 95   Temp 98.2 °F (36.8 °C) (Oral)   Resp 22   Ht 5' 6\" (1.676 m)   Wt 143 lb 4.8 oz (65 kg)   SpO2 95%   BMI 23.13 kg/m²       Intake/Output Summary (Last 24 hours) at 1/8/2021 8346  Last data filed at 1/8/2021 0500  Gross per 24 hour   Intake 662.5 ml   Output --   Net 662.5 ml       Medications:   Scheduled Meds:   furosemide  40 mg Intravenous TID    metoprolol tartrate  25 mg Oral BID    albuterol sulfate HFA  2 puff Inhalation 4x daily    amiodarone  200 mg Oral Daily    atorvastatin  10 mg Oral QPM    budesonide-formoterol  2 puff Inhalation BID    busPIRone  5 mg Oral TID    ferrous sulfate  325 mg Oral Daily with breakfast    Teriparatide (Recombinant)  20 mcg Subcutaneous Daily    tiotropium  2 puff Inhalation Daily    cetirizine  10 mg Oral Daily    sertraline  25 mg Oral Daily    traZODone  50 mg Oral Nightly    fluticasone  2 spray Each Nostril Daily    sodium chloride flush  10 mL Intravenous 2 times per day    pantoprazole  40 mg Intravenous Q12H    And    sodium chloride (PF)  10 mL Intravenous Q12H      Infusions:   sodium chloride      sodium chloride      sodium chloride      sodium chloride        PRN Meds:  sodium chloride, morphine, sodium chloride, sodium chloride, sodium chloride, sodium chloride flush, ondansetron, acetaminophen **OR** acetaminophen, oxyCODONE-acetaminophen       Physical Exam:  Vitals:    01/08/21 0809   BP:    Pulse:    Resp: 22   Temp:    SpO2: 95%        General: AAO, NAD  Chest: Nontender  Cardiac: First and Second Heart Sounds are Normal, No Murmurs or Gallops noted  Lungs:Clear to auscultation and percussion. Abdomen: Soft, NT, ND, +BS  Extremities: No clubbing, no edema  Vascular:  Equal 2+ peripheral pulses. Lab Data:  CBC:   Recent Labs     01/06/21  1540 01/07/21  0658 01/07/21 2011 01/08/21  0159   WBC 8.8 9.8  --   --    HGB 5.1* 9.0* 8.7* 6.4*   HCT 17.8* 29.5* 28.1* 21.7*   .0 91.6  --   --     338  --   --      BMP:   Recent Labs     01/06/21  1540 01/07/21  0658    138   K 3.6 3.8    103   CO2 31 28   BUN 22 17   CREATININE 0.7 0.7     LIVER PROFILE:   Recent Labs     01/06/21  1540 01/07/21  0658   AST 11* 14*   ALT 6* 6*   BILITOT 0.1 0.8   ALKPHOS 64 65     PT/INR:   Recent Labs     01/06/21  1540   PROTIME 34.9*   INR 2.85     APTT: No results for input(s): APTT in the last 72 hours. BNP:  No results for input(s): BNP in the last 72 hours.       Assessment:  Patient Active Problem List    Diagnosis Date Noted    GI bleed 01/06/2021    Pneumonia due to COVID-19 virus 01/05/2021    Alzheimer's disease ([address] 12/02/2020    Severe malnutrition ([address] 12/02/2020    PNA (pneumonia) 12/01/2020    Transaminitis 11/17/2020    Acute hypoxemic respiratory failure due to COVID-19 ([address] 11/15/2020    Chronic hypoxemic respiratory failure ([address] 09/09/2020    Symptomatic anemia 03/24/2020    Wide-complex tachycardia ([address] 02/10/2020    Gram-negative pneumonia ([address] 92/74/2806    Acute systolic heart failure ([facility] 02/10/2020    Pneumonia of right upper lobe due to infectious organism     Chronic fatigue 07/08/2019    Intractable low back pain 06/11/2019    Back pain 06/09/2019    Shortness of breath 04/25/2019    Former smoker     Acute exacerbation of chronic obstructive pulmonary disease (COPD) ([facility] 12/13/2018    COPD, severe ([facility] 10/24/2017    Blood loss anemia 08/01/2017    Takotsubo syndrome 05/01/2017    Panic attack 11/18/2016    Lumbar spinal stenosis 01/01/2015    Vitamin B12 deficiency 12/01/2014    Osteoporosis 03/16/2012    Depression     Migraine 10/06/2010    Rheumatoid Arthritis     Hypertension     Hyperlipidemia     Fibromyalgia     Chronic pain syndrome        Electronically signed by Angel Reilly PA-C on 1/8/2021 at 8:42 AM

## 2021-01-08 NOTE — FLOWSHEET NOTE
Patient was not in good spirit at the time of visit. Patient looked sad and tearful. Patient talked about her pain and illness. Also, shared the emotional pain caused by visitors' limitation in healthcare setting during this time of COVID-19. Patient misses her family. Rev. Skyla, [address]       01/08/21 1049   Encounter Summary   Referral/Consult From: [address] [facility]   Continue Visiting Yes   Complexity of Encounter Low   Length of Encounter 15 minutes   Spiritual Assessment Completed Yes   Routine   Type Initial   Assessment Tearful;Loneliness; Sad   Intervention Active listening;Explored feelings, thoughts, concerns;Explored coping resources;Nurtured hope;Prayer   Outcome Comfort;Expressed gratitude;Encouraged   Spiritual/Adventist   Type Spiritual support

## 2021-01-08 NOTE — CARE COORDINATION
CM spoke with pt for d/c planning. Pt states that she is from AL at [facility] and plans to return. She has a walker, wc, and O2. Pt has a PCP, insurance, and transportation. CM called Marcella to verify. She verified that pt is from their AL and will need a Neg COVID test to return. D/c plan is to return to [address] [place] [address] whenever she is medically ready. PT WILL NEED A RAPID COVID PRIOR TO D/C. THEY WILL NOT TAKE HER WITHOUT A NEG COVID.   TE

## 2021-01-09 LAB
ABO/RH: NORMAL
ALBUMIN SERPL-MCNC: 2.8 GM/DL (ref 3.4–5)
ANION GAP SERPL CALCULATED.3IONS-SCNC: 11 MMOL/L (ref 4–16)
ANTIBODY SCREEN: NEGATIVE
BUN BLDV-MCNC: 13 MG/DL (ref 6–23)
CALCIUM SERPL-MCNC: 7.8 MG/DL (ref 8.3–10.6)
CHLORIDE BLD-SCNC: 96 MMOL/L (ref 99–110)
CO2: 30 MMOL/L (ref 21–32)
COMPONENT: NORMAL
CREAT SERPL-MCNC: 0.8 MG/DL (ref 0.6–1.1)
CROSSMATCH RESULT: NORMAL
GFR AFRICAN AMERICAN: >60 ML/MIN/1.73M2
GFR NON-AFRICAN AMERICAN: >60 ML/MIN/1.73M2
GLUCOSE BLD-MCNC: 87 MG/DL (ref 70–99)
HCT VFR BLD CALC: 36.9 % (ref 37–47)
HEMOGLOBIN: 11.3 GM/DL (ref 12.5–16)
MCH RBC QN AUTO: 28.3 PG (ref 27–31)
MCHC RBC AUTO-ENTMCNC: 30.6 % (ref 32–36)
MCV RBC AUTO: 92.5 FL (ref 78–100)
PDW BLD-RTO: 19.2 % (ref 11.7–14.9)
PHOSPHORUS: 3.7 MG/DL (ref 2.5–4.9)
PLATELET # BLD: 345 K/CU MM (ref 140–440)
PMV BLD AUTO: 10.4 FL (ref 7.5–11.1)
POTASSIUM SERPL-SCNC: 3.4 MMOL/L (ref 3.5–5.1)
RBC # BLD: 3.99 M/CU MM (ref 4.2–5.4)
SODIUM BLD-SCNC: 137 MMOL/L (ref 135–145)
STATUS: NORMAL
TRANSFUSION STATUS: NORMAL
UNIT DIVISION: 0
UNIT NUMBER: NORMAL
WBC # BLD: 11.3 K/CU MM (ref 4–10.5)

## 2021-01-09 PROCEDURE — 85018 HEMOGLOBIN: CPT

## 2021-01-09 PROCEDURE — 6370000000 HC RX 637 (ALT 250 FOR IP): Performed by: INTERNAL MEDICINE

## 2021-01-09 PROCEDURE — C9113 INJ PANTOPRAZOLE SODIUM, VIA: HCPCS | Performed by: NURSE PRACTITIONER

## 2021-01-09 PROCEDURE — 85027 COMPLETE CBC AUTOMATED: CPT

## 2021-01-09 PROCEDURE — 6360000002 HC RX W HCPCS: Performed by: HOSPITALIST

## 2021-01-09 PROCEDURE — 85014 HEMATOCRIT: CPT

## 2021-01-09 PROCEDURE — 2580000003 HC RX 258: Performed by: NURSE PRACTITIONER

## 2021-01-09 PROCEDURE — 2700000000 HC OXYGEN THERAPY PER DAY

## 2021-01-09 PROCEDURE — 6370000000 HC RX 637 (ALT 250 FOR IP): Performed by: HOSPITALIST

## 2021-01-09 PROCEDURE — 36415 COLL VENOUS BLD VENIPUNCTURE: CPT

## 2021-01-09 PROCEDURE — 1200000000 HC SEMI PRIVATE

## 2021-01-09 PROCEDURE — 80069 RENAL FUNCTION PANEL: CPT

## 2021-01-09 PROCEDURE — C9113 INJ PANTOPRAZOLE SODIUM, VIA: HCPCS | Performed by: HOSPITALIST

## 2021-01-09 PROCEDURE — 2580000003 HC RX 258: Performed by: INTERNAL MEDICINE

## 2021-01-09 PROCEDURE — 6360000002 HC RX W HCPCS: Performed by: INTERNAL MEDICINE

## 2021-01-09 PROCEDURE — 94761 N-INVAS EAR/PLS OXIMETRY MLT: CPT

## 2021-01-09 PROCEDURE — 94640 AIRWAY INHALATION TREATMENT: CPT

## 2021-01-09 PROCEDURE — 6360000002 HC RX W HCPCS: Performed by: NURSE PRACTITIONER

## 2021-01-09 RX ORDER — PANTOPRAZOLE SODIUM 40 MG/10ML
40 INJECTION, POWDER, LYOPHILIZED, FOR SOLUTION INTRAVENOUS 2 TIMES DAILY
Status: DISCONTINUED | OUTPATIENT
Start: 2021-01-09 | End: 2021-01-11 | Stop reason: HOSPADM

## 2021-01-09 RX ORDER — LISINOPRIL 5 MG/1
5 TABLET ORAL DAILY
Status: DISCONTINUED | OUTPATIENT
Start: 2021-01-09 | End: 2021-01-11 | Stop reason: HOSPADM

## 2021-01-09 RX ORDER — POTASSIUM CHLORIDE 20 MEQ/1
40 TABLET, EXTENDED RELEASE ORAL 2 TIMES DAILY WITH MEALS
Status: DISCONTINUED | OUTPATIENT
Start: 2021-01-09 | End: 2021-01-11 | Stop reason: HOSPADM

## 2021-01-09 RX ADMIN — LISINOPRIL 5 MG: 5 TABLET ORAL at 13:18

## 2021-01-09 RX ADMIN — ONDANSETRON 4 MG: 2 INJECTION INTRAMUSCULAR; INTRAVENOUS at 06:06

## 2021-01-09 RX ADMIN — OXYCODONE HYDROCHLORIDE AND ACETAMINOPHEN 1 TABLET: 5; 325 TABLET ORAL at 15:31

## 2021-01-09 RX ADMIN — METOPROLOL TARTRATE 25 MG: 25 TABLET, FILM COATED ORAL at 08:16

## 2021-01-09 RX ADMIN — BUSPIRONE HYDROCHLORIDE 5 MG: 5 TABLET ORAL at 08:16

## 2021-01-09 RX ADMIN — SODIUM CHLORIDE, PRESERVATIVE FREE 10 ML: 5 INJECTION INTRAVENOUS at 08:17

## 2021-01-09 RX ADMIN — ALBUTEROL SULFATE 2 PUFF: 90 AEROSOL, METERED RESPIRATORY (INHALATION) at 08:56

## 2021-01-09 RX ADMIN — BUDESONIDE AND FORMOTEROL FUMARATE DIHYDRATE 2 PUFF: 160; 4.5 AEROSOL RESPIRATORY (INHALATION) at 20:45

## 2021-01-09 RX ADMIN — TIOTROPIUM BROMIDE INHALATION SPRAY 2 PUFF: 3.12 SPRAY, METERED RESPIRATORY (INHALATION) at 08:57

## 2021-01-09 RX ADMIN — METOPROLOL TARTRATE 25 MG: 25 TABLET, FILM COATED ORAL at 23:10

## 2021-01-09 RX ADMIN — SODIUM CHLORIDE 8 MG/HR: 9 INJECTION, SOLUTION INTRAVENOUS at 08:22

## 2021-01-09 RX ADMIN — POTASSIUM CHLORIDE 40 MEQ: 1500 TABLET, EXTENDED RELEASE ORAL at 18:37

## 2021-01-09 RX ADMIN — BUDESONIDE AND FORMOTEROL FUMARATE DIHYDRATE 2 PUFF: 160; 4.5 AEROSOL RESPIRATORY (INHALATION) at 08:55

## 2021-01-09 RX ADMIN — BUSPIRONE HYDROCHLORIDE 5 MG: 5 TABLET ORAL at 13:18

## 2021-01-09 RX ADMIN — FUROSEMIDE 40 MG: 10 INJECTION, SOLUTION INTRAMUSCULAR; INTRAVENOUS at 18:37

## 2021-01-09 RX ADMIN — BUSPIRONE HYDROCHLORIDE 5 MG: 5 TABLET ORAL at 23:10

## 2021-01-09 RX ADMIN — PANTOPRAZOLE SODIUM 40 MG: 40 INJECTION, POWDER, FOR SOLUTION INTRAVENOUS at 13:18

## 2021-01-09 RX ADMIN — CETIRIZINE HYDROCHLORIDE 10 MG: 10 TABLET, FILM COATED ORAL at 08:16

## 2021-01-09 RX ADMIN — AMIODARONE HYDROCHLORIDE 200 MG: 200 TABLET ORAL at 08:16

## 2021-01-09 RX ADMIN — ALBUTEROL SULFATE 2 PUFF: 90 AEROSOL, METERED RESPIRATORY (INHALATION) at 11:55

## 2021-01-09 RX ADMIN — PANTOPRAZOLE SODIUM 40 MG: 40 INJECTION, POWDER, FOR SOLUTION INTRAVENOUS at 23:10

## 2021-01-09 RX ADMIN — FLUTICASONE PROPIONATE 2 SPRAY: 50 SPRAY, METERED NASAL at 08:16

## 2021-01-09 RX ADMIN — SERTRALINE HYDROCHLORIDE 25 MG: 25 TABLET ORAL at 08:16

## 2021-01-09 RX ADMIN — SODIUM CHLORIDE, PRESERVATIVE FREE 10 ML: 5 INJECTION INTRAVENOUS at 23:11

## 2021-01-09 RX ADMIN — POTASSIUM CHLORIDE 40 MEQ: 1500 TABLET, EXTENDED RELEASE ORAL at 13:18

## 2021-01-09 RX ADMIN — HYDROXYZINE PAMOATE 25 MG: 25 CAPSULE ORAL at 23:10

## 2021-01-09 RX ADMIN — TRAZODONE HYDROCHLORIDE 50 MG: 50 TABLET ORAL at 23:09

## 2021-01-09 RX ADMIN — ALBUTEROL SULFATE 2 PUFF: 90 AEROSOL, METERED RESPIRATORY (INHALATION) at 20:45

## 2021-01-09 RX ADMIN — OXYCODONE HYDROCHLORIDE AND ACETAMINOPHEN 1 TABLET: 5; 325 TABLET ORAL at 08:15

## 2021-01-09 RX ADMIN — ATORVASTATIN CALCIUM 10 MG: 10 TABLET, FILM COATED ORAL at 18:37

## 2021-01-09 RX ADMIN — FUROSEMIDE 40 MG: 10 INJECTION, SOLUTION INTRAMUSCULAR; INTRAVENOUS at 08:16

## 2021-01-09 ASSESSMENT — PAIN DESCRIPTION - PAIN TYPE: TYPE: ACUTE PAIN

## 2021-01-09 ASSESSMENT — PAIN DESCRIPTION - PROGRESSION: CLINICAL_PROGRESSION: NOT CHANGED

## 2021-01-09 ASSESSMENT — PAIN DESCRIPTION - ORIENTATION: ORIENTATION: MID

## 2021-01-09 ASSESSMENT — PAIN SCALES - GENERAL
PAINLEVEL_OUTOF10: 5
PAINLEVEL_OUTOF10: 6

## 2021-01-09 ASSESSMENT — PAIN DESCRIPTION - ONSET: ONSET: ON-GOING

## 2021-01-09 NOTE — PROGRESS NOTES
Daily Progress Note     patient is awake alert feeling better  Heart rate is stable and BP stable  GI  Bleed stable  Off  AC for now due to massive bleeding  PAFIB keep on meds she is in sinus  Correct K  Dyspnea due to too much fluids   HTN      Summary   Left ventricular systolic function is normal.   Ejection fraction is visually estimated at 50-55%. Moderate left ventricular hypertrophy. Prominent Eustachian valve. Moderate aortic regurgitation; PHT: 362 msec. Sclerotic, but non-stenotic aortic valve. Moderate mitral regurgitation. MAC noted   No evidence of any pericardial effusion.       PAST MEDICAL HISTORY:  COVID in 11/2020, history of atrial fibrillation,  paroxysmal, history of TIAs, history of Takotsubo syndrome present. Lesle Shallow  last heart catheterization was in 2017, found to have left main was  patent.  LAD, circ, ramus, and RCA they all had mild disease noted.  She  has paroxysmal atrial fibrillation with 2:1 block present.     The patient sees Dr. Carey Cisse for her COPD.  History of having _____  ballooning with heart failure.  Heart catheterization in 2017,  nonobstructive coronary artery disease present.  Hypertension,  hyperlipidemia, fibromyalgia, atrial flutter, on anticoagulation for  that.     PAST SURGICAL HISTORY:  Gallbladder surgery, hysterectomy done, and  appendectomy.     SOCIAL HISTORY:  She does not smoke, does not drink.  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     ALLERGIES:  REMICADE, ATIVAN, ETANERCEPT, COMPAZINE, and DOXYCYCLINE  Objective:   BP (!) 151/72   Pulse 73   Temp 99.1 °F (37.3 °C) (Oral)   Resp 17   Ht 5' 6\" (1.676 m)   Wt 143 lb 4.8 oz (65 kg)   SpO2 94%   BMI 23.13 kg/m²       Intake/Output Summary (Last 24 hours) at 1/9/2021 1212  Last data filed at 1/9/2021 0817  Gross per 24 hour   Intake 323 ml   Output 1600 ml   Net -1277 ml       Medications:   Scheduled Meds:   pantoprazole  40 mg Intravenous BID    potassium chloride  40 mEq Oral BID WC    furosemide  40 mg Intravenous BID    metoprolol tartrate  25 mg Oral BID    albuterol sulfate HFA  2 puff Inhalation 4x daily    amiodarone  200 mg Oral Daily    atorvastatin  10 mg Oral QPM    budesonide-formoterol  2 puff Inhalation BID    busPIRone  5 mg Oral TID    ferrous sulfate  325 mg Oral Daily with breakfast    Teriparatide (Recombinant)  20 mcg Subcutaneous Daily    tiotropium  2 puff Inhalation Daily    cetirizine  10 mg Oral Daily    sertraline  25 mg Oral Daily    traZODone  50 mg Oral Nightly    fluticasone  2 spray Each Nostril Daily    sodium chloride flush  10 mL Intravenous 2 times per day      Infusions:   sodium chloride      sodium chloride      sodium chloride      sodium chloride        PRN Meds:  sodium chloride, hydrOXYzine, morphine, sodium chloride, sodium chloride, sodium chloride, sodium chloride flush, ondansetron, acetaminophen **OR** acetaminophen, oxyCODONE-acetaminophen       Physical Exam:  Vitals:    01/09/21 1100   BP:    Pulse:    Resp: 17   Temp:    SpO2:         General: awake alert   Chest: Nontender  Cardiac: sinus   Lungs:Clear to auscultation and percussion. Abdomen: Soft, NT, ND, +BS  Extremities: sinus no edema   Vascular:  Equal 2+ peripheral pulses. Lab Data:  CBC:   Recent Labs     01/07/21  0658 01/07/21  0658 01/08/21  0953 01/08/21  1546 01/08/21  2033   WBC 9.8  --  10.7* 10.8*  --    HGB 9.0*   < > 10.5* 10.3* 10.4*   HCT 29.5*   < > 33.9* 32.8* 33.4*   MCV 91.6  --  91.1 91.6  --      --  321 326  --     < > = values in this interval not displayed.      BMP:   Recent Labs     01/08/21  0953 01/08/21  1546 01/09/21  0511    137 137   K 3.5 3.2* 3.4*    98* 96*   CO2 29 30 30   PHOS  --   --  3.7   BUN 16 15 13   CREATININE 0.8 0.8 0.8     LIVER PROFILE:   Recent Labs     01/06/21  1540 01/07/21  0658 01/08/21  0953   AST 11* 14* 18   ALT 6* 6* 8*   BILITOT 0.1 0.8 0.7   ALKPHOS 64 65 71     PT/INR:   Recent Labs 01/06/21  1540 01/08/21  0953   PROTIME 34.9* 11.8   INR 2.85 0.98     APTT: No results for input(s): APTT in the last 72 hours. BNP:  No results for input(s): BNP in the last 72 hours.       Assessment:  Patient Active Problem List    Diagnosis Date Noted    GI bleed 01/06/2021    Pneumonia due to COVID-19 virus 01/05/2021    Alzheimer's disease ([address] 12/02/2020    Severe malnutrition ([address] 12/02/2020    PNA (pneumonia) 12/01/2020    Transaminitis 11/17/2020    Acute hypoxemic respiratory failure due to COVID-19 ([address] 11/15/2020    Chronic hypoxemic respiratory failure ([address] 09/09/2020    Symptomatic anemia 03/24/2020    Wide-complex tachycardia ([address] 02/10/2020    Gram-negative pneumonia ([address] 94/03/6430    Acute systolic heart failure ([address] 02/10/2020    Pneumonia of right upper lobe due to infectious organism     Chronic fatigue 07/08/2019    Intractable low back pain 06/11/2019    Back pain 06/09/2019    Shortness of breath 04/25/2019    Former smoker     Acute exacerbation of chronic obstructive pulmonary disease (COPD) ([address] 12/13/2018    COPD, severe ([address] 10/24/2017    Blood loss anemia 08/01/2017    Takotsubo syndrome 05/01/2017    Panic attack 11/18/2016    Lumbar spinal stenosis 01/01/2015    Vitamin B12 deficiency 12/01/2014    Osteoporosis 03/16/2012    Depression     Migraine 10/06/2010    Rheumatoid Arthritis     Hypertension     Hyperlipidemia     Fibromyalgia     Chronic pain syndrome        Kai Aldridge MD 1/9/2021 12:12 PM

## 2021-01-09 NOTE — PROGRESS NOTES
[address] [place] [address]  HOSPITALIST PROGRESS NOTE                       Name:  Davis Turner /Age/Sex: 1940  ([de-identified] y.o. female)   MRN & CSN:  [id number] & [id number] Admission Date/Time: 2021  2:34 PM   Location:  [facility]/3116-A Attending:  La Nena Reynolds MD                                                  HPI  Davis Turner is a [de-identified] y.o. female who presents with severe anemia    SUBJECTIVE  No shortness of breath, chest pain, on [place] oxygen    10 point review of systems reviewed and negative unless noted above. ALLERGIES:   Allergies   Allergen Reactions    Ativan [Lorazepam] Other (See Comments)     Violent, thrashing and combative. She has lacerations and bruising, had to be tied down.  Etanercept Other (See Comments)     No response    Humira [Adalimumab]     Prochlorperazine Edisylate Other (See Comments)     \"Compazine\"   Involuntary Muscle Spasms     Remicade [Infliximab Injection]     Doxycycline Other (See Comments)     Stomach pains       PCP: Shirley Kaminski MD    PAST MEDICAL HISTORY, SURGICAL HISTORY, SOCIAL HISTORY and  HOME MEDICATIONS all reviewed. OBJECTIVE  Vitals:    21 0500 21 0600 21 0700 21 0807   BP: (!) 143/60 (!) 147/70  (!) 151/72   Pulse:  65 63 73   Resp:    18   Temp:    99.1 °F (37.3 °C)   TempSrc:    Oral   SpO2:    94%   Weight:       Height:           PHYSICAL EXAM   GEN Awake female, sitting upright in bed in no apparent distress. EYES Pupils are equally round. No scleral erythema, discharge, or conjunctivitis. HENT Mucous membranes are moist. Oral pharynx without exudates, no evidence of thrush. NECK Supple, no apparent thyromegaly or masses. RESP Clear to auscultation, no wheezes, rales or rhonchi. Symmetric chest movement  CARDIO/VASC S1/S2 auscultated. Regular rate without appreciable murmurs, rubs, or gallops. No JVD or carotid bruits. Peripheral pulses equal bilaterally and palpable. No peripheral edema.   GI Abdomen is soft tiotropium  2 puff Inhalation Daily    cetirizine  10 mg Oral Daily    sertraline  25 mg Oral Daily    traZODone  50 mg Oral Nightly    fluticasone  2 spray Each Nostril Daily    sodium chloride flush  10 mL Intravenous 2 times per day     Continuous Infusions:   sodium chloride      pantoprozole (PROTONIX) infusion 8 mg/hr (01/09/21 0822)    sodium chloride      sodium chloride      sodium chloride       PRN Meds:sodium chloride, hydrOXYzine, morphine, sodium chloride, sodium chloride, sodium chloride, sodium chloride flush, ondansetron, acetaminophen **OR** acetaminophen, oxyCODONE-acetaminophen        ASSESSMENT and PLAN  Hospital Day: 4    1-Severe anemia with hemoglobin of 5.1on presentation-- concerns of GIB- on PPI and GI consulted-s/p EGD showing juliane barajas tear clot- noted last H/H 6.2- await repeat one and transfuse to keep >7  -was on xarelto for atrial fib- CT with no bleed- noted GI recommendation  2-PAF- AC on hold, rate controlled- consult cardio for input on AC  3-Acute hypoxic respiratory failure from acute diastolic HF- volume overload- improved with lasix  4-Debility- ECF resident       Other chronic medical conditions:  · Severe COPD with chronic respiratory failure on home O2: No exacerbation  · CKD stage III: Stable, continue holding nephrotoxic agents  · Hypertension  · Rheumatoid arthritis  · Fibromyalgia  · Depression with anxiety  · Osteoporosis      Potential discharge in am if stable      Disp:     Diet DIET DENTAL SOFT;   DVT Prophylaxis [] Lovenox, []  Heparin, [] SCDs, [] Ambulation   GI Prophylaxis [] PPI,  [] H2 Blocker,  [] Carafate,  [] Diet/Tube Feeds   Code Status Full Code   Disposition Patient requires continued admission due to severe anemia   CMS Level of Risk [] Low, [x] Moderate,[]  High  Patient's risk as above due to severe anemia     TAYE CALDWELL MD 1/9/2021 10:52 AM

## 2021-01-10 LAB
ALBUMIN SERPL-MCNC: 2.7 GM/DL (ref 3.4–5)
ANION GAP SERPL CALCULATED.3IONS-SCNC: 14 MMOL/L (ref 4–16)
BUN BLDV-MCNC: 11 MG/DL (ref 6–23)
CALCIUM SERPL-MCNC: 7.7 MG/DL (ref 8.3–10.6)
CHLORIDE BLD-SCNC: 96 MMOL/L (ref 99–110)
CO2: 25 MMOL/L (ref 21–32)
CREAT SERPL-MCNC: 0.8 MG/DL (ref 0.6–1.1)
GFR AFRICAN AMERICAN: >60 ML/MIN/1.73M2
GFR NON-AFRICAN AMERICAN: >60 ML/MIN/1.73M2
GLUCOSE BLD-MCNC: 81 MG/DL (ref 70–99)
HCT VFR BLD CALC: 33.5 % (ref 37–47)
HEMOGLOBIN: 10.1 GM/DL (ref 12.5–16)
MAGNESIUM: 1.7 MG/DL (ref 1.8–2.4)
MCH RBC QN AUTO: 28 PG (ref 27–31)
MCHC RBC AUTO-ENTMCNC: 30.1 % (ref 32–36)
MCV RBC AUTO: 92.8 FL (ref 78–100)
PDW BLD-RTO: 18.2 % (ref 11.7–14.9)
PHOSPHORUS: 3.1 MG/DL (ref 2.5–4.9)
PLATELET # BLD: 320 K/CU MM (ref 140–440)
PMV BLD AUTO: 10.2 FL (ref 7.5–11.1)
POTASSIUM SERPL-SCNC: 3.5 MMOL/L (ref 3.5–5.1)
POTASSIUM SERPL-SCNC: 4.2 MMOL/L (ref 3.5–5.1)
PRO-BNP: 4050 PG/ML
RBC # BLD: 3.61 M/CU MM (ref 4.2–5.4)
SODIUM BLD-SCNC: 135 MMOL/L (ref 135–145)
TROPONIN T: <0.01 NG/ML
WBC # BLD: 9 K/CU MM (ref 4–10.5)

## 2021-01-10 PROCEDURE — 93005 ELECTROCARDIOGRAM TRACING: CPT | Performed by: HOSPITALIST

## 2021-01-10 PROCEDURE — 6360000002 HC RX W HCPCS: Performed by: INTERNAL MEDICINE

## 2021-01-10 PROCEDURE — 6370000000 HC RX 637 (ALT 250 FOR IP): Performed by: INTERNAL MEDICINE

## 2021-01-10 PROCEDURE — 83880 ASSAY OF NATRIURETIC PEPTIDE: CPT

## 2021-01-10 PROCEDURE — 36415 COLL VENOUS BLD VENIPUNCTURE: CPT

## 2021-01-10 PROCEDURE — C9113 INJ PANTOPRAZOLE SODIUM, VIA: HCPCS | Performed by: HOSPITALIST

## 2021-01-10 PROCEDURE — 80069 RENAL FUNCTION PANEL: CPT

## 2021-01-10 PROCEDURE — 2700000000 HC OXYGEN THERAPY PER DAY

## 2021-01-10 PROCEDURE — 94640 AIRWAY INHALATION TREATMENT: CPT

## 2021-01-10 PROCEDURE — 6370000000 HC RX 637 (ALT 250 FOR IP): Performed by: HOSPITALIST

## 2021-01-10 PROCEDURE — 85027 COMPLETE CBC AUTOMATED: CPT

## 2021-01-10 PROCEDURE — 6360000002 HC RX W HCPCS: Performed by: HOSPITALIST

## 2021-01-10 PROCEDURE — 94761 N-INVAS EAR/PLS OXIMETRY MLT: CPT

## 2021-01-10 PROCEDURE — 83735 ASSAY OF MAGNESIUM: CPT

## 2021-01-10 PROCEDURE — 84132 ASSAY OF SERUM POTASSIUM: CPT

## 2021-01-10 PROCEDURE — 1200000000 HC SEMI PRIVATE

## 2021-01-10 PROCEDURE — 84484 ASSAY OF TROPONIN QUANT: CPT

## 2021-01-10 PROCEDURE — 2580000003 HC RX 258: Performed by: INTERNAL MEDICINE

## 2021-01-10 RX ORDER — FLUTICASONE FUROATE AND VILANTEROL TRIFENATATE 100; 25 UG/1; UG/1
1 POWDER RESPIRATORY (INHALATION) DAILY
Qty: 1 EACH | Refills: 11 | Status: SHIPPED | OUTPATIENT
Start: 2021-01-10

## 2021-01-10 RX ORDER — ALPRAZOLAM 0.5 MG/1
0.5 TABLET ORAL 3 TIMES DAILY PRN
Qty: 12 TABLET | Refills: 0 | Status: CANCELLED | OUTPATIENT
Start: 2021-01-10 | End: 2021-01-15

## 2021-01-10 RX ORDER — POTASSIUM CHLORIDE 750 MG/1
10 TABLET, EXTENDED RELEASE ORAL DAILY
Qty: 30 TABLET | Refills: 0 | Status: SHIPPED | OUTPATIENT
Start: 2021-01-10

## 2021-01-10 RX ORDER — HYDROXYZINE PAMOATE 25 MG/1
25 CAPSULE ORAL 3 TIMES DAILY PRN
Qty: 30 CAPSULE | Refills: 0 | Status: SHIPPED | OUTPATIENT
Start: 2021-01-10 | End: 2021-01-24

## 2021-01-10 RX ORDER — TORSEMIDE 20 MG/1
20 TABLET ORAL DAILY
Qty: 30 TABLET | Refills: 3 | Status: SHIPPED | OUTPATIENT
Start: 2021-01-10

## 2021-01-10 RX ORDER — TORSEMIDE 20 MG/1
20 TABLET ORAL DAILY
Status: DISCONTINUED | OUTPATIENT
Start: 2021-01-10 | End: 2021-01-11 | Stop reason: HOSPADM

## 2021-01-10 RX ORDER — OXYCODONE HYDROCHLORIDE AND ACETAMINOPHEN 5; 325 MG/1; MG/1
1 TABLET ORAL EVERY 6 HOURS PRN
Qty: 14 TABLET | Refills: 0 | Status: SHIPPED | OUTPATIENT
Start: 2021-01-10 | End: 2021-01-13

## 2021-01-10 RX ORDER — POTASSIUM CHLORIDE 7.45 MG/ML
20 INJECTION INTRAVENOUS ONCE
Status: COMPLETED | OUTPATIENT
Start: 2021-01-10 | End: 2021-01-10

## 2021-01-10 RX ORDER — PANTOPRAZOLE SODIUM 20 MG/1
40 TABLET, DELAYED RELEASE ORAL 2 TIMES DAILY
Qty: 60 TABLET | Refills: 1 | Status: SHIPPED | OUTPATIENT
Start: 2021-01-10

## 2021-01-10 RX ADMIN — PANTOPRAZOLE SODIUM 40 MG: 40 INJECTION, POWDER, FOR SOLUTION INTRAVENOUS at 09:50

## 2021-01-10 RX ADMIN — SODIUM CHLORIDE, PRESERVATIVE FREE 10 ML: 5 INJECTION INTRAVENOUS at 20:34

## 2021-01-10 RX ADMIN — ALBUTEROL SULFATE 2 PUFF: 90 AEROSOL, METERED RESPIRATORY (INHALATION) at 16:19

## 2021-01-10 RX ADMIN — CETIRIZINE HYDROCHLORIDE 10 MG: 10 TABLET, FILM COATED ORAL at 09:51

## 2021-01-10 RX ADMIN — ALBUTEROL SULFATE 2 PUFF: 90 AEROSOL, METERED RESPIRATORY (INHALATION) at 19:31

## 2021-01-10 RX ADMIN — BUDESONIDE AND FORMOTEROL FUMARATE DIHYDRATE 2 PUFF: 160; 4.5 AEROSOL RESPIRATORY (INHALATION) at 08:14

## 2021-01-10 RX ADMIN — OXYCODONE HYDROCHLORIDE AND ACETAMINOPHEN 1 TABLET: 5; 325 TABLET ORAL at 01:09

## 2021-01-10 RX ADMIN — METOPROLOL TARTRATE 25 MG: 25 TABLET, FILM COATED ORAL at 20:34

## 2021-01-10 RX ADMIN — BUSPIRONE HYDROCHLORIDE 5 MG: 5 TABLET ORAL at 09:52

## 2021-01-10 RX ADMIN — LISINOPRIL 5 MG: 5 TABLET ORAL at 09:51

## 2021-01-10 RX ADMIN — TIOTROPIUM BROMIDE INHALATION SPRAY 2 PUFF: 3.12 SPRAY, METERED RESPIRATORY (INHALATION) at 08:13

## 2021-01-10 RX ADMIN — SERTRALINE HYDROCHLORIDE 25 MG: 25 TABLET ORAL at 09:51

## 2021-01-10 RX ADMIN — ALBUTEROL SULFATE 2 PUFF: 90 AEROSOL, METERED RESPIRATORY (INHALATION) at 12:27

## 2021-01-10 RX ADMIN — MORPHINE SULFATE 2 MG: 2 INJECTION, SOLUTION INTRAMUSCULAR; INTRAVENOUS at 11:39

## 2021-01-10 RX ADMIN — HYDROXYZINE PAMOATE 25 MG: 25 CAPSULE ORAL at 20:33

## 2021-01-10 RX ADMIN — BUDESONIDE AND FORMOTEROL FUMARATE DIHYDRATE 2 PUFF: 160; 4.5 AEROSOL RESPIRATORY (INHALATION) at 19:31

## 2021-01-10 RX ADMIN — ATORVASTATIN CALCIUM 10 MG: 10 TABLET, FILM COATED ORAL at 17:47

## 2021-01-10 RX ADMIN — AMIODARONE HYDROCHLORIDE 200 MG: 200 TABLET ORAL at 09:50

## 2021-01-10 RX ADMIN — TORSEMIDE 20 MG: 20 TABLET ORAL at 09:51

## 2021-01-10 RX ADMIN — METOPROLOL TARTRATE 25 MG: 25 TABLET, FILM COATED ORAL at 09:51

## 2021-01-10 RX ADMIN — BUSPIRONE HYDROCHLORIDE 5 MG: 5 TABLET ORAL at 20:33

## 2021-01-10 RX ADMIN — ONDANSETRON 4 MG: 2 INJECTION INTRAMUSCULAR; INTRAVENOUS at 06:28

## 2021-01-10 RX ADMIN — ALBUTEROL SULFATE 2 PUFF: 90 AEROSOL, METERED RESPIRATORY (INHALATION) at 08:12

## 2021-01-10 RX ADMIN — POTASSIUM CHLORIDE 20 MEQ: 7.46 INJECTION, SOLUTION INTRAVENOUS at 11:30

## 2021-01-10 RX ADMIN — POTASSIUM CHLORIDE 40 MEQ: 1500 TABLET, EXTENDED RELEASE ORAL at 09:51

## 2021-01-10 RX ADMIN — ONDANSETRON 4 MG: 2 INJECTION INTRAMUSCULAR; INTRAVENOUS at 16:46

## 2021-01-10 RX ADMIN — TRAZODONE HYDROCHLORIDE 50 MG: 50 TABLET ORAL at 20:33

## 2021-01-10 RX ADMIN — PANTOPRAZOLE SODIUM 40 MG: 40 INJECTION, POWDER, FOR SOLUTION INTRAVENOUS at 20:33

## 2021-01-10 RX ADMIN — BUSPIRONE HYDROCHLORIDE 5 MG: 5 TABLET ORAL at 13:19

## 2021-01-10 ASSESSMENT — PAIN SCALES - GENERAL
PAINLEVEL_OUTOF10: 2
PAINLEVEL_OUTOF10: 9
PAINLEVEL_OUTOF10: 6
PAINLEVEL_OUTOF10: 4

## 2021-01-10 ASSESSMENT — PAIN DESCRIPTION - PAIN TYPE
TYPE: CHRONIC PAIN
TYPE: CHRONIC PAIN

## 2021-01-10 ASSESSMENT — PAIN DESCRIPTION - LOCATION
LOCATION: BACK;GENERALIZED
LOCATION: BACK

## 2021-01-10 NOTE — PROGRESS NOTES
[address] [place] [address]  HOSPITALIST PROGRESS NOTE                       Name:  Yaya Thompson /Age/Sex: 1940  ([de-identified] y.o. female)   MRN & CSN:  [id number] & [id number] Admission Date/Time: 2021  2:34 PM   Location:  3116/3116-A Attending:  Olman Mei MD                                                  HPI  Yaya Thompson is a [de-identified] y.o. female who presents with severe anemia    SUBJECTIVE  No shortness of breath, no chest pain. On [place] oxygen    10 point review of systems reviewed and negative unless noted above. ALLERGIES:   Allergies   Allergen Reactions    Ativan [Lorazepam] Other (See Comments)     Violent, thrashing and combative. She has lacerations and bruising, had to be tied down.  Etanercept Other (See Comments)     No response    Humira [Adalimumab]     Prochlorperazine Edisylate Other (See Comments)     \"Compazine\"   Involuntary Muscle Spasms     Remicade [Infliximab Injection]     Doxycycline Other (See Comments)     Stomach pains       PCP: Allison Hernández MD    PAST MEDICAL HISTORY, SURGICAL HISTORY, SOCIAL HISTORY and  HOME MEDICATIONS all reviewed. OBJECTIVE  Vitals:    21 1100 21 1700 21 2309 01/10/21 0400   BP:  134/70 (!) 126/56 (!) 119/51   Pulse:  73 73 61   Resp: 17 18 18 18   Temp:   98.2 °F (36.8 °C) 98.2 °F (36.8 °C)   TempSrc:   Oral Oral   SpO2:       Weight:       Height:           PHYSICAL EXAM   GEN Awake female, sitting upright in bed in no apparent distress. EYES Pupils are equally round. No scleral erythema, discharge, or conjunctivitis. HENT Mucous membranes are moist. Oral pharynx without exudates, no evidence of thrush. NECK Supple, no apparent thyromegaly or masses. RESP Clear to auscultation, no wheezes, rales or rhonchi. Symmetric chest movement  CARDIO/VASC S1/S2 auscultated. Regular rate without appreciable murmurs, rubs, or gallops. No JVD or carotid bruits. Peripheral pulses equal bilaterally and palpable.  No peripheral edema.  GI Abdomen is soft without significant tenderness, masses, or guarding. Bowel sounds are normoactive. Rectal exam deferred.  No costovertebral angle tenderness. Normal appearing external genitalia. HEME/LYMPH No palpable cervical lymphadenopathy and no hepatosplenomegaly. No petechiae or ecchymoses. MSK Spontaneous movement of all extremities. No gross joint deformities. SKIN Normal coloration, warm, dry. NEURO Cranial nerves appear grossly intact, normal speech, no lateralizing weakness. PSYCH Awake, alert, oriented x 4. Affect appropriate.     INTAKE: In: -   Out: 4000   OUTPUT: In: -   Out: 4000 [Urine:4000]    LABS  Recent Labs     01/08/21  1546 01/08/21  2033 01/09/21  1216 01/10/21  0653   WBC 10.8*  --  11.3* 9.0   HGB 10.3* 10.4* 11.3* 10.1*   HCT 32.8* 33.4* 36.9* 33.5*     --  345 320      Recent Labs     01/08/21  1546 01/09/21  0511 01/10/21  0448    137 135   K 3.2* 3.4* 3.5   CL 98* 96* 96*   CO2 30 30 25   PHOS  --  3.7 3.1   BUN 15 13 11   CREATININE 0.8 0.8 0.8     Recent Labs     01/08/21  0953   AST 18   ALT 8*   BILITOT 0.7   ALKPHOS 71     Recent Labs     01/08/21  0953   INR 0.98     Recent Labs     01/08/21  0953   TROPONINT 0.016*          Abnormal labs for today noted      Imaging:     ECHO:    Microbiology:  Blood culture:    Urine culture:    Sputum culture:    Procedures done this admission:    MEDS  Scheduled Meds:   torsemide  20 mg Oral Daily    pantoprazole  40 mg Intravenous BID    potassium chloride  40 mEq Oral BID WC    lisinopril  5 mg Oral Daily    metoprolol tartrate  25 mg Oral BID    albuterol sulfate HFA  2 puff Inhalation 4x daily    amiodarone  200 mg Oral Daily    atorvastatin  10 mg Oral QPM    budesonide-formoterol  2 puff Inhalation BID    busPIRone  5 mg Oral TID    ferrous sulfate  325 mg Oral Daily with breakfast    Teriparatide (Recombinant)  20 mcg Subcutaneous Daily    tiotropium  2 puff Inhalation Daily    cetirizine  10 mg Oral Daily    sertraline  25 mg Oral Daily    traZODone  50 mg Oral Nightly    fluticasone  2 spray Each Nostril Daily    sodium chloride flush  10 mL Intravenous 2 times per day     Continuous Infusions:   sodium chloride      sodium chloride      sodium chloride      sodium chloride       PRN Meds:sodium chloride, hydrOXYzine, morphine, sodium chloride, sodium chloride, sodium chloride, sodium chloride flush, ondansetron, acetaminophen **OR** acetaminophen        ASSESSMENT and PLAN  Hospital Day: 5    1-Severe anemia with hemoglobin of 5.1on presentation-- concerns of GIB- on PPI and GI consulted-s/p EGD showing juliane barajas tear clot- noted last H/H 6.2- await repeat one and transfuse to keep >7  -was on xarelto for atrial fib- CT with no bleed- noted GI recommendation  2-PAF- AC on hold, rate controlled- consult cardio for input on AC  3-Acute hypoxic respiratory failure from acute diastolic HF- volume overload- improved with lasix- switched to torsemide  4-Debility- ECF resident  5-Prolonged QT noted on EKG-which appears persistent- replace K, check mag- await cardio as she is on amiodarone       Other chronic medical conditions:  · Severe COPD with chronic respiratory failure on home O2: No exacerbation  · CKD stage III: Stable, continue holding nephrotoxic agents  · Hypertension  · Rheumatoid arthritis  · Fibromyalgia  · Depression with anxiety  · Osteoporosis    Discharge later today or tomorrow to ECF      Disp:     Diet DIET DENTAL SOFT;   DVT Prophylaxis [] Lovenox, []  Heparin, [] SCDs, [] Ambulation   GI Prophylaxis [] PPI,  [] H2 Blocker,  [] Carafate,  [] Diet/Tube Feeds   Code Status Full Code   Disposition Patient requires continued admission due to severe anemia   CMS Level of Risk [] Low, [x] Moderate,[]  High  Patient's risk as above due to severe anemia     TAYE CALDWELL MD 1/10/2021 9:32 AM

## 2021-01-10 NOTE — PROGRESS NOTES
Daily Progress Note     patient is awake alert  Feeing better  Remain in sinus rate is stable  Hx of PAFIB off AC for now due to anemia   GI bleed stable   Home tomorrow if stable     Summary   Left ventricular systolic function is normal.   Ejection fraction is visually estimated at 50-55%.   Moderate left ventricular hypertrophy.   Prominent Eustachian valve.   Moderate aortic regurgitation; PHT: 362 msec.   Sclerotic, but non-stenotic aortic valve.   Moderate mitral regurgitation.   MAC noted   No evidence of any pericardial effusion.        PAST MEDICAL HISTORY:  COVID in 11/2020, history of atrial fibrillation,  paroxysmal, history of TIAs, history of Takotsubo syndrome present. Bob Mendez  last heart catheterization was in 2017, found to have left main was  patent.  LAD, circ, ramus, and RCA they all had mild disease noted.  She  has paroxysmal atrial fibrillation with 2:1 block present.     The patient sees Dr. Madhav Carlson for her COPD.  History of having _____  ballooning with heart failure.  Heart catheterization in 2017,  nonobstructive coronary artery disease present.  Hypertension,  hyperlipidemia, fibromyalgia, atrial flutter, on anticoagulation for  that.     PAST SURGICAL HISTORY:  Gallbladder surgery, hysterectomy done, and  appendectomy.     SOCIAL HISTORY:  She does not smoke, does not drink.  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     ALLERGIES:  REMICADE, ATIVAN, ETANERCEPT, COMPAZINE, and DOXYCYCLINE    Objective:   BP (!) 122/55   Pulse 70   Temp 98.4 °F (36.9 °C) (Oral)   Resp 16   Ht 5' 6\" (1.676 m)   Wt 143 lb 4.8 oz (65 kg)   SpO2 97%   BMI 23.13 kg/m²       Intake/Output Summary (Last 24 hours) at 1/10/2021 1130  Last data filed at 1/9/2021 2159  Gross per 24 hour   Intake --   Output 4000 ml   Net -4000 ml       Medications:   Scheduled Meds:   torsemide  20 mg Oral Daily    potassium chloride  20 mEq Intravenous Once    pantoprazole  40 mg Intravenous BID    potassium chloride  40 mEq Oral BID WC    lisinopril  5 mg Oral Daily    metoprolol tartrate  25 mg Oral BID    albuterol sulfate HFA  2 puff Inhalation 4x daily    amiodarone  200 mg Oral Daily    atorvastatin  10 mg Oral QPM    budesonide-formoterol  2 puff Inhalation BID    busPIRone  5 mg Oral TID    ferrous sulfate  325 mg Oral Daily with breakfast    Teriparatide (Recombinant)  20 mcg Subcutaneous Daily    tiotropium  2 puff Inhalation Daily    cetirizine  10 mg Oral Daily    sertraline  25 mg Oral Daily    traZODone  50 mg Oral Nightly    fluticasone  2 spray Each Nostril Daily    sodium chloride flush  10 mL Intravenous 2 times per day      Infusions:   sodium chloride      sodium chloride      sodium chloride      sodium chloride        PRN Meds:  sodium chloride, hydrOXYzine, morphine, sodium chloride, sodium chloride, sodium chloride, sodium chloride flush, ondansetron, acetaminophen **OR** acetaminophen       Physical Exam:  Vitals:    01/10/21 0930   BP: (!) 122/55   Pulse: 70   Resp: 16   Temp: 98.4 °F (36.9 °C)   SpO2: 97%        General: awake alert   Chest: Nontender  Cardiac: sinus   Lungs:Clear to auscultation and percussion. Abdomen: Soft, NT, ND, +BS  Extremities: no edema   Vascular:  Equal 2+ peripheral pulses. Lab Data:  CBC:   Recent Labs     01/08/21  1546 01/08/21  2033 01/09/21  1216 01/10/21  0653   WBC 10.8*  --  11.3* 9.0   HGB 10.3* 10.4* 11.3* 10.1*   HCT 32.8* 33.4* 36.9* 33.5*   MCV 91.6  --  92.5 92.8     --  345 320     BMP:   Recent Labs     01/08/21  1546 01/09/21  0511 01/10/21  0448    137 135   K 3.2* 3.4* 3.5   CL 98* 96* 96*   CO2 30 30 25   PHOS  --  3.7 3.1   BUN 15 13 11   CREATININE 0.8 0.8 0.8     LIVER PROFILE:   Recent Labs     01/08/21  0953   AST 18   ALT 8*   BILITOT 0.7   ALKPHOS 71     PT/INR:   Recent Labs     01/08/21  0953   PROTIME 11.8   INR 0.98     APTT: No results for input(s): APTT in the last 72 hours.   BNP:  No results for input(s): BNP in the last 72 hours.       Assessment:  Patient Active Problem List    Diagnosis Date Noted    GI bleed 01/06/2021    Pneumonia due to COVID-19 virus 01/05/2021    Alzheimer's disease ([address] 12/02/2020    Severe malnutrition ([address] 12/02/2020    PNA (pneumonia) 12/01/2020    Transaminitis 11/17/2020    Acute hypoxemic respiratory failure due to COVID-19 ([address] 11/15/2020    Chronic hypoxemic respiratory failure ([address] 09/09/2020    Symptomatic anemia 03/24/2020    Wide-complex tachycardia ([address] 02/10/2020    Gram-negative pneumonia ([address] 25/50/0235    Acute systolic heart failure ([address] 02/10/2020    Pneumonia of right upper lobe due to infectious organism     Chronic fatigue 07/08/2019    Intractable low back pain 06/11/2019    Back pain 06/09/2019    Shortness of breath 04/25/2019    Former smoker     Acute exacerbation of chronic obstructive pulmonary disease (COPD) ([address] 12/13/2018    COPD, severe ([address] 10/24/2017    Blood loss anemia 08/01/2017    Takotsubo syndrome 05/01/2017    Panic attack 11/18/2016    Lumbar spinal stenosis 01/01/2015    Vitamin B12 deficiency 12/01/2014    Osteoporosis 03/16/2012    Depression     Migraine 10/06/2010    Rheumatoid Arthritis     Hypertension     Hyperlipidemia     Fibromyalgia     Chronic pain syndrome        Ava Conde MD 1/10/2021 11:30 AM

## 2021-01-11 VITALS
HEART RATE: 70 BPM | OXYGEN SATURATION: 94 % | BODY MASS INDEX: 23.03 KG/M2 | SYSTOLIC BLOOD PRESSURE: 128 MMHG | TEMPERATURE: 98.4 F | WEIGHT: 143.3 LBS | HEIGHT: 66 IN | DIASTOLIC BLOOD PRESSURE: 58 MMHG | RESPIRATION RATE: 16 BRPM

## 2021-01-11 LAB
EKG ATRIAL RATE: 67 BPM
EKG DIAGNOSIS: NORMAL
EKG P AXIS: 29 DEGREES
EKG P-R INTERVAL: 184 MS
EKG Q-T INTERVAL: 564 MS
EKG QRS DURATION: 148 MS
EKG QTC CALCULATION (BAZETT): 595 MS
EKG R AXIS: 116 DEGREES
EKG T AXIS: 4 DEGREES
EKG VENTRICULAR RATE: 67 BPM
SARS-COV-2, NAAT: NOT DETECTED
SOURCE: NORMAL

## 2021-01-11 PROCEDURE — U0002 COVID-19 LAB TEST NON-CDC: HCPCS

## 2021-01-11 PROCEDURE — 93010 ELECTROCARDIOGRAM REPORT: CPT | Performed by: INTERNAL MEDICINE

## 2021-01-11 PROCEDURE — 6370000000 HC RX 637 (ALT 250 FOR IP): Performed by: INTERNAL MEDICINE

## 2021-01-11 PROCEDURE — C9113 INJ PANTOPRAZOLE SODIUM, VIA: HCPCS | Performed by: HOSPITALIST

## 2021-01-11 PROCEDURE — 6360000002 HC RX W HCPCS: Performed by: HOSPITALIST

## 2021-01-11 PROCEDURE — 2700000000 HC OXYGEN THERAPY PER DAY

## 2021-01-11 PROCEDURE — 6370000000 HC RX 637 (ALT 250 FOR IP): Performed by: HOSPITALIST

## 2021-01-11 PROCEDURE — 94640 AIRWAY INHALATION TREATMENT: CPT

## 2021-01-11 PROCEDURE — 2580000003 HC RX 258: Performed by: INTERNAL MEDICINE

## 2021-01-11 PROCEDURE — 94761 N-INVAS EAR/PLS OXIMETRY MLT: CPT

## 2021-01-11 RX ORDER — OXYCODONE HYDROCHLORIDE AND ACETAMINOPHEN 5; 325 MG/1; MG/1
1 TABLET ORAL EVERY 4 HOURS PRN
Status: DISCONTINUED | OUTPATIENT
Start: 2021-01-11 | End: 2021-01-11 | Stop reason: HOSPADM

## 2021-01-11 RX ADMIN — PANTOPRAZOLE SODIUM 40 MG: 40 INJECTION, POWDER, FOR SOLUTION INTRAVENOUS at 10:56

## 2021-01-11 RX ADMIN — BUDESONIDE AND FORMOTEROL FUMARATE DIHYDRATE 2 PUFF: 160; 4.5 AEROSOL RESPIRATORY (INHALATION) at 07:41

## 2021-01-11 RX ADMIN — SODIUM CHLORIDE, PRESERVATIVE FREE 10 ML: 5 INJECTION INTRAVENOUS at 10:57

## 2021-01-11 RX ADMIN — LISINOPRIL 5 MG: 5 TABLET ORAL at 10:56

## 2021-01-11 RX ADMIN — ALBUTEROL SULFATE 2 PUFF: 90 AEROSOL, METERED RESPIRATORY (INHALATION) at 07:40

## 2021-01-11 RX ADMIN — CETIRIZINE HYDROCHLORIDE 10 MG: 10 TABLET, FILM COATED ORAL at 10:56

## 2021-01-11 RX ADMIN — FERROUS SULFATE TAB 325 MG (65 MG ELEMENTAL FE) 325 MG: 325 (65 FE) TAB at 10:55

## 2021-01-11 RX ADMIN — TORSEMIDE 20 MG: 20 TABLET ORAL at 10:55

## 2021-01-11 RX ADMIN — BUSPIRONE HYDROCHLORIDE 5 MG: 5 TABLET ORAL at 10:56

## 2021-01-11 RX ADMIN — AMIODARONE HYDROCHLORIDE 200 MG: 200 TABLET ORAL at 10:56

## 2021-01-11 RX ADMIN — METOPROLOL TARTRATE 25 MG: 25 TABLET, FILM COATED ORAL at 10:55

## 2021-01-11 RX ADMIN — OXYCODONE HYDROCHLORIDE AND ACETAMINOPHEN 1 TABLET: 5; 325 TABLET ORAL at 10:55

## 2021-01-11 RX ADMIN — TIOTROPIUM BROMIDE INHALATION SPRAY 2 PUFF: 3.12 SPRAY, METERED RESPIRATORY (INHALATION) at 07:42

## 2021-01-11 NOTE — PROGRESS NOTES
Comprehensive Nutrition Assessment    Type and Reason for Visit:  Reassess    Nutrition Recommendations/Plan:   · Continue Soft, Low Fiber Diet post D/C  · Begin standard high leighton oral nutrition supplement tid, between meals    Nutrition Assessment:  GI bleed, severe anemia, pulmonary edema and severe malnutrition. Pt is feeding self and tolerating Dental Soft Diet per Flowsheets. D/C is pending. Will order standard high leighton oral supplement with meals for remainder of stay and continue to follow as high nutrition risk. Malnutrition Assessment:  Malnutrition Status:  Severe malnutrition    Context:  Chronic Illness     Findings of the 6 clinical characteristics of malnutrition:  Energy Intake:   75% or less estimated energy requirements for 1 month or longer  Weight Loss:   Mild weight loss (specify amount and time period)     Body Fat Loss: Severe body fat loss Orbital   Muscle Mass Loss:   Severe muscle mass loss Temples (temporalis), Clavicles (pectoralis & deltoids)  Fluid Accumulation:  No significant fluid accumulation Extremities   Strength:  Not Performed    Estimated Daily Nutrient Needs:  Energy (kcal):  4877-1139 (25-30 kcal/kg); Weight Used for Energy Requirements:  Current     Protein (g):  76-88 (1.2-1.4 g/kg); Weight Used for Protein Requirements:  Current        Fluid (ml/day):  1872-0178 (1 ml/kcal);  Method Used for Fluid Requirements:  1 ml/kcal      Wounds:  None       Current Nutrition Therapies:    DIET DENTAL SOFT;    Anthropometric Measures:  · Height: 5' 6\" (167.6 cm)  · Current Body Weight: 143 lb 4.8 oz (65 kg)   · Admission Body Weight: 138 lb 14.2 oz (63 kg)    · Usual Body Weight: 143 lb 4.8 oz (65 kg)     · Ideal Body Weight: 130 lbs; % Ideal Body Weight 106.8 %   · BMI: 23.1  · BMI Categories: Normal Weight (BMI 22.0 to 24.9) age over 72       Nutrition Diagnosis:   · Severe malnutrition, In context of chronic illness related to inadequate protein-energy intake as evidenced by poor intake prior to admission, weight loss, severe muscle loss, severe loss of subcutaneous fat    Nutrition Interventions:   Food and/or Nutrient Delivery:  Continue Current Diet, Start Oral Nutrition Supplement  Nutrition Education/Counseling:  No recommendation at this time   Coordination of Nutrition Care:  Continue to monitor while inpatient    Goals:  Pt will consume greater than 75% of her meals and supplements       Nutrition Monitoring and Evaluation:   Behavioral-Environmental Outcomes:  None Identified   Food/Nutrient Intake Outcomes:  Food and Nutrient Intake, Supplement Intake  Physical Signs/Symptoms Outcomes:  Biochemical Data, GI Status, Nausea or Vomiting, Meal Time Behavior, Nutrition Focused Physical Findings, Weight     Discharge Planning:    Continue Oral Nutrition Supplement     Electronically signed by Ryan Krishnan RD, LD on 1/11/21 at 9:10 AM EST    Contact: [phone]

## 2021-01-11 NOTE — DISCHARGE SUMMARY
Discharge Summary    Name:  Georgia Guzmán /Age/Sex: 1940  ([de-identified] y.o. female)   MRN & CSN:  [id number] & [id number] Admission Date/Time: 2021  2:34 PM   Attending:  Ca Dumont MD Discharging Physician: Yamel Coyle MD     HPI and Hospital Course:   Georgia Guzmán is a [de-identified] y.o.  female  who presented with severe anemia    HPI- as per H and [place]  1-Severe anemia with hemoglobin of 5.1on presentation-- concerns of GIB- on PPI and GI consulted-s/p EGD showing juliane barajas tear clot-post-transfusion H/H stable  -was on xarelto for atrial fib- CT with no bleed- noted GI recommendation to resume after 3 days of scope. Ordered weekly H/Hx3 weeks at [facility]  2-PAF- rate controlled, to resume xarelto per GI recommendation. 3-Acute hypoxic respiratory failure from acute diastolic HF- volume overload- improved with lasix- switched to torsemide  4-Debility- ECF resident  5-Prolonged QT noted on EKG-which appears persistent/chronic- on amiodarone- cardio following- replace K and Mag         Other chronic medical conditions:  · Severe COPD with chronic respiratory failure on home O2: No exacerbation  · CKD stage III: Stable, continue holding nephrotoxic agents  · Hypertension  · Rheumatoid arthritis  · Fibromyalgia  · Depression with anxiety  · Osteoporosis    Addendum: cardio recommending to hold off xarelto for now- hence stopped    The patient expressed appropriate understanding of and agreement with the discharge recommendations, medications, and plan.      Consults this admission:  IP CONSULT TO GI  IP CONSULT TO HOSPITALIST  IP CONSULT TO GI  IP CONSULT TO DIETITIAN  IP CONSULT TO SOCIAL WORK  IP CONSULT TO CARDIOLOGY    Discharge Instruction:   Follow up appointments:  Primary care physician:  within 2 weeks    Diet:  General/cardiac/ADA/as tolerated  Activity: {discharge activity: as tolerated  Disposition: Discharged to:   []Home, []HHC, [x]SNF, []Acute Rehab, []Hospice   Condition on polyethylene glycol (GLYCOLAX) 17 GM/SCOOP powder  Take 17 g by mouth as needed             potassium chloride (KLOR-CON M) 10 MEQ extended release tablet  Take 1 tablet by mouth daily             sodium chloride (OCEAN, BABY AYR) 0.65 % nasal spray  1 spray by Nasal route as needed for Congestion             sucralfate (CARAFATE) 1 GM tablet  Take 1 g by mouth 3 times daily             torsemide (DEMADEX) 20 MG tablet  Take 1 tablet by mouth daily             traZODone (DESYREL) 50 MG tablet  TAKE 1 TABLET BY MOUTH NIGHTLY             Triamcinolone Acetonide (NASACORT ALLERGY 24HR NA)  55 mcg by Nasal route daily             Umeclidinium Bromide (INCRUSE ELLIPTA) 62.5 MCG/INH AEPB  Inhale into the lungs             zinc sulfate (ZINCATE) 220 (50 Zn) MG capsule  Take 1 capsule by mouth daily                 Objective Findings at Discharge:   BP (!) 117/53   Pulse 64   Temp 98.3 °F (36.8 °C) (Oral)   Resp 18   Ht 5' 6\" (1.676 m)   Wt 143 lb 4.8 oz (65 kg)   SpO2 99%   BMI 23.13 kg/m²            PHYSICAL EXAM   GEN Awake female, laying in bed in no apparent distress. EYES Pupils are equally round. No scleral discharge  HENT Atraumatic and symmetric head  NECK No apparent thyromegaly  RESP Symmetric chest movement   CARDIO/VASC Peripheral pulses equal bilaterally and palpable. No peripheral edema. GI Abdomen is not distended. Rectal exam deferred.  Jeter catheter is not present. HEME/LYMPH No petechiae or ecchymoses. MSK Spontaneous movement of BL upper extremities  SKIN Normal coloration, warm, dry. NEURO Cranial nerves appear grossly intact  PSYCH Awake, alert.     BMP/CBC  Recent Labs     01/08/21  1546 01/08/21  2033 01/09/21  0511 01/09/21  1216 01/10/21  0448 01/10/21  0653 01/10/21  1701     --  137  --  135  --   --    K 3.2*  --  3.4*  --  3.5  --  4.2   CL 98*  --  96*  --  96*  --   --    CO2 30  --  30  --  25  --   --    BUN 15  --  13  --  11  --   --    CREATININE 0.8  --  0.8 --  0.8  --   --    WBC 10.8*  --   --  11.3*  --  9.0  --    HCT 32.8* 33.4*  --  36.9*  --  33.5*  --      --   --  345  --  320  --      SIGNIFICANT IMAGING AND LABS:      Discharge Time of 32 minutes    Electronically signed by Brad Julio MD on 1/11/2021 at 7:37 AM

## 2021-01-11 NOTE — CARE COORDINATION
CM rec'd call from [facility], [facility] admissions, who questioned if pt should return to skilled instead of AL. CM reviewed chart and therapy has not seen her this admission. Pt was transferred from 3N room 3116 today to 1N. CM met w/ pt to discuss possible rehab. Pt states she has not ambulated in a week and feels that she will not be able to stand at the side of the bed. Pt is in agreement for discharge to [facility] at [facility]. CM updated [facility] with information. [facility] stated that pt will not require a precert due to [facility] currently on waiver. Transport set up w/ Mohsen Boos for 1pm. Updated primary nurse, Christopher of new discharge plan. Packet prepared and placed in soft chart. Patient will be going to [facility] bed 323.

## 2021-01-11 NOTE — DISCHARGE INSTR - COC
Continuity of Care Form    Patient Name: Ne Lam   :  1940  MRN:  [id number]    Admit date:  2021  Discharge date:  ***    Code Status Order: Full Code   Advance Directives:   Advance Care Flowsheet Documentation       Date/Time Healthcare Directive Type of Healthcare Directive Copy in [address] Agent's Name Healthcare Agent's Phone Number    21 1311  No, patient does not have an advance directive for healthcare treatment  --  No, copy requested from medical records  --  --  --    21 6984  Other (Comment) pt states grady Bonilla is POA. states copy at [facility] of  for health care; Other (Comment) pt states amy Michel, copy requested from [facility] power of   Jaswant Lewis 72  --            Admitting Physician:  Aminta Siemens, DO  PCP: Declan Dubon MD    Discharging Nurse: [facility] Unit/Room#: 3116/3116-A  Discharging Unit Phone Number: ***    Emergency Contact:   Extended Emergency Contact Information  Primary Emergency Contact: sunny hameed  Home Phone: [phone]  Relation: Child  Preferred language: English   needed? No  Secondary Emergency Contact: maximino hameed  Home Phone: [phone]  Relation: Child  Preferred language: English   needed?  No    Past Surgical History:  Past Surgical History:   Procedure Laterality Date    APPENDECTOMY  1966    CARDIAC CATHETERIZATION  2017    CHOLECYSTECTOMY  1966    w/ appendectomy    COLONOSCOPY N/A 3/11/2020    COLONOSCOPY WITH BIOPSY performed by Jean Carlos Renae MD at [facility] Right     FIXATION KYPHOPLASTY  2019    L4 kyphoplasty ; [facility]    FOOT SURGERY  1986    HYSTERECTOMY      PA COLONOSCOPY FLX DX W/COLLJ SPEC WHEN PFRMD N/A 10/2/2018    COLONOSCOPY DIAGNOSTIC OR SCREENING performed by Martin Bills MD at [facility] 182    TOE SURGERY Left 2013 [place];     UPPER GASTROINTESTINAL ENDOSCOPY N/A 3/11/2020    EGD BIOPSY performed by Gavin Mcarthur MD at [facility] 145 N/A 1/7/2021    EGD DIAGNOSTIC ONLY performed by Gavin Mcarthur MD at [facility] ENDOSCOPY    VERTEBROPLASTY  10/2010    L3    WRIST FUSION Left 2000       Immunization History:   Immunization History   Administered Date(s) Administered    Influenza Vaccine, unspecified formulation 10/11/2013    Influenza Virus Vaccine 10/15/2014, 10/14/2015, 10/12/2016, 10/16/2017    Influenza, High Dose (Fluzone 65 yrs and older) 09/17/2018    Influenza, Triv, inactivated, subunit, adjuvanted, IM (Fluad 65 yrs and older) 10/04/2019    Pneumococcal Conjugate 13-valent (Occhydw75) 12/17/2014    Pneumococcal Polysaccharide ([id number]) 09/26/2008    Td, unspecified formulation 08/04/2016    Tdap (Boostrix, Adacel) 08/04/2016       Active Problems:  Patient Active Problem List   Diagnosis Code    Rheumatoid Arthritis M19.90    Hypertension I10    Hyperlipidemia E78.5    Fibromyalgia M79.7    Migraine G43.909    Chronic pain syndrome G89.4    Depression F32.9    Osteoporosis M81.0    Vitamin B12 deficiency E53.8    Lumbar spinal stenosis M48.061    Panic attack F41.0    Takotsubo syndrome I51.81    Blood loss anemia D50.0    COPD, severe (HCC) J44.9    Acute exacerbation of chronic obstructive pulmonary disease (COPD) ([address] J44.1    Former smoker Z87.891    Shortness of breath R06.02    Back pain M54.9    Intractable low back pain M54.5    Chronic fatigue R53.82    Pneumonia of right upper lobe due to infectious organism J18.9    Wide-complex tachycardia (HCC) I47.2    Gram-negative pneumonia (HCC) B29.0    Acute systolic heart failure (HCC) I50.21    Symptomatic anemia D64.9    Chronic hypoxemic respiratory failure (HCC) J96.11    Acute hypoxemic respiratory failure due to COVID-19 (HCC) U07.1, J96.01    Transaminitis R74.01    PNA (pneumonia) J18.9    Alzheimer's disease ([address] G30.9, F02.80    Severe malnutrition ([facility] E43    Pneumonia due to COVID-19 virus U07.1, J12.82    GI bleed K92.2       Isolation/Infection:   Isolation            No Isolation          Patient Infection Status       Infection Onset Added Last Indicated Last Indicated By Review Planned Expiration Resolved Resolved By    None active    Resolved    COVID-19 20 Respiratory Panel, Molecular, with COVID-19 (Restricted: peds pts or suitable admitted adults)   20     COVID-19 Rule Out 20 Respiratory Panel, Molecular, with COVID-19 (Restricted: peds pts or suitable admitted adults) (Ordered)   20 Rule-Out Test Resulted    COVID-19 Rule Out 20 COVID-19 (Ordered)   20 Rule-Out Test Resulted    COVID-19 11/15/20 11/15/20 11/15/20 COVID-19   20     COVID-19 Rule Out 11/15/20 11/15/20 11/15/20 COVID-19 (Ordered)   11/15/20 Rule-Out Test Resulted    INFLUENZA 02/09/20 02/10/20 02/09/20 RESP Disease Panel PCR (Ordered)   03/10/20     C-diff Rule Out  19 C difficile Molecular/PCR (Ordered)   20 Herbert Dumont, RN            Nurse Assessment:  Last Vital Signs: BP (!) 117/53   Pulse 64   Temp 98.3 °F (36.8 °C) (Oral)   Resp 18   Ht 5' 6\" (1.676 m)   Wt 143 lb 4.8 oz (65 kg)   SpO2 99%   BMI 23.13 kg/m²     Last documented pain score (0-10 scale): Pain Level: 4  Last Weight:   Wt Readings from Last 1 Encounters:   21 143 lb 4.8 oz (65 kg)     Mental Status:  {IP PT MENTAL STATUS::::0}    IV Access:  { VERONICA IV ACCESS:[id number]:::0}    Nursing Mobility/ADLs:  Walking   {[facility] DME ADLs:407724178:::0}  Transfer  {[facility] DME ADLs:225207850:::0}  Bathing  {[facility] DME ADLs:044335338:::0}  Dressing  {[facility] DME ADLs:192064583:::0}  Toileting  {[facility] DME ADLs:830579153:::0}  Feeding  {[facility] DME ADLs:707817034:::0}  Med Admin  {[facility] DME ADLs:879594125:::0}  Med Delivery   { VERONICA MED Delivery:074529167:::0}    Wound Care Documentation and Therapy:        Elimination:  Continence: Bowel: {YES / FH:34690}  Bladder: {YES / PP:65914}  Urinary Catheter: {Urinary Catheter:556151883:::0}   Colostomy/Ileostomy/Ileal Conduit: {YES / E}       Date of Last BM: ***    Intake/Output Summary (Last 24 hours) at 2021 0734  Last data filed at 1/10/2021 1921  Gross per 24 hour   Intake --   Output 1800 ml   Net -1800 ml     I/O last 3 completed shifts:  In: -   Out: 1800 [Urine:1800]    Safety Concerns:     [employer] Safety Concerns:251708075:::0}    Impairments/Disabilities:      [employer] Impairments/Disabilities:512196026:::0}    Patient's personal belongings (please select all that are sent with patient):  {[facility] DME Belongings:556601522:::0}    RN SIGNATURE:  {Esignature:323255309:::0}    CASE MANAGEMENT/SOCIAL WORK SECTION    Inpatient Status Date: ***    Readmission Risk Assessment Score:  Readmission Risk              Risk of Unplanned Readmission:        44           Discharging to Facility/ Agency   Name:   Address:  Phone:  Fax:    Dialysis Facility (if applicable)   Name:  Address:  Dialysis Schedule:  Phone:  Fax:    / signature: {Esignature:490816446:::0}    PHYSICIAN SECTION  Nutrition Therapy:  Current Nutrition Therapy:   - Oral Diet:  General    Routes of Feeding: Oral  Liquids: No Restrictions  Daily Fluid Restriction: no  Last Modified Barium Swallow with Video (Video Swallowing Test): not done    Treatments at the Time of Hospital Discharge:   Respiratory Treatments: duoneb prn  Oxygen Therapy:  is on oxygen at 2 L/min per nasal cannula.   Ventilator:    - No ventilator support    Rehab Therapies: Physical Therapy and Occupational Therapy  Weight Bearing Status/Restrictions: No weight bearing restirctions  Other Medical Equipment (for information only, NOT a DME order):  wheelchair  Other Treatments:     Prognosis: Good    Condition at Discharge: Stable    Rehab Potential (if transferring to Rehab): Good    Recommended Labs or Other Treatments After Discharge: CBC weekly every Monday-x3 weeks, then if hemoglobin stable, every two weeksx3  -BMP in a week    Physician Certification: I certify the above information and transfer of Shar Pickens  is necessary for the continuing treatment of the diagnosis listed and that she requires [facility] for greater 30 days.      Update Admission H&P: No change in H&P    PHYSICIAN SIGNATURE:  Electronically signed by Fonda Goldberg, MD on 1/11/21 at 7:36 AM EST

## 2021-01-11 NOTE — PROGRESS NOTES
Daily Progress Note     patient is  Awake alert  Moved to [facility]  Anemia stable  Hx of APFIB-hold off AC for now due to anemia  Ok to d/c from cardiac stand  Keep on current meds  Start on [facility] [place] as outpatient in few weeks if no bleeding--has hx of PAFIB and TIA In past       Summary   Left ventricular systolic function is normal.   Ejection fraction is visually estimated at 50-55%.   Moderate left ventricular hypertrophy.   Prominent Eustachian valve.   Moderate aortic regurgitation; PHT: 362 msec.   Sclerotic, but non-stenotic aortic valve.   Moderate mitral regurgitation.   MAC noted   No evidence of any pericardial effusion.        PAST MEDICAL HISTORY:  COVID in 11/2020, history of atrial fibrillation,  paroxysmal, history of TIAs, history of Takotsubo syndrome present. Monet Unger  last heart catheterization was in 2017, found to have left main was  patent.  LAD, circ, ramus, and RCA they all had mild disease noted.  She  has paroxysmal atrial fibrillation with 2:1 block present.     The patient sees Dr. Jg Walters for her COPD.  History of having _____  ballooning with heart failure.  Heart catheterization in 2017,  nonobstructive coronary artery disease present.  Hypertension,  hyperlipidemia, fibromyalgia, atrial flutter, on anticoagulation for  that.     PAST SURGICAL HISTORY:  Gallbladder surgery, hysterectomy done, and  appendectomy.     SOCIAL HISTORY:  She does not smoke, does not drink.  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     ALLERGIES:  REMICADE, ATIVAN, ETANERCEPT, COMPAZINE, and DOXYCYCLINE       Objective:   BP (!) 117/53   Pulse 64   Temp 98.3 °F (36.8 °C) (Oral)   Resp 18   Ht 5' 6\" (1.676 m)   Wt 143 lb 4.8 oz (65 kg)   SpO2 94%   BMI 23.13 kg/m²       Intake/Output Summary (Last 24 hours) at 1/11/2021 0952  Last data filed at 1/10/2021 1921  Gross per 24 hour   Intake --   Output 1800 ml   Net -1800 ml       Medications:   Scheduled Meds:   torsemide  20 mg Oral Daily    pantoprazole  40 mg input(s): APTT in the last 72 hours. BNP:  No results for input(s): BNP in the last 72 hours.       Assessment:  Patient Active Problem List    Diagnosis Date Noted    GI bleed 01/06/2021    Pneumonia due to COVID-19 virus 01/05/2021    Alzheimer's disease ([address] 12/02/2020    Severe malnutrition ([address] 12/02/2020    PNA (pneumonia) 12/01/2020    Transaminitis 11/17/2020    Acute hypoxemic respiratory failure due to COVID-19 ([address] 11/15/2020    Chronic hypoxemic respiratory failure ([address] 09/09/2020    Symptomatic anemia 03/24/2020    Wide-complex tachycardia ([address] 02/10/2020    Gram-negative pneumonia ([address] 64/81/7173    Acute systolic heart failure ([address] 02/10/2020    Pneumonia of right upper lobe due to infectious organism     Chronic fatigue 07/08/2019    Intractable low back pain 06/11/2019    Back pain 06/09/2019    Shortness of breath 04/25/2019    Former smoker     Acute exacerbation of chronic obstructive pulmonary disease (COPD) ([address] 12/13/2018    COPD, severe ([address] 10/24/2017    Blood loss anemia 08/01/2017    Takotsubo syndrome 05/01/2017    Panic attack 11/18/2016    Lumbar spinal stenosis 01/01/2015    Vitamin B12 deficiency 12/01/2014    Osteoporosis 03/16/2012    Depression     Migraine 10/06/2010    Rheumatoid Arthritis     Hypertension     Hyperlipidemia     Fibromyalgia     Chronic pain syndrome        Evie Antony MD 1/11/2021 9:52 AM

## 2021-01-11 NOTE — DISCHARGE SUMMARY
Discharge instructions provided to Patient. Patient verbalized understanding with no further questions at this time. Pt discharged to [facility] via [facility]fa Gonzalez.  Electronically signed by Elizabeth Gomez LPN on 1/04/3880 at 5:17 PM

## 2021-01-20 NOTE — PROGRESS NOTES
Physician Progress Note      Tatiana Chopra  [facility] #:                  [id number]  :                       1940  ADMIT DATE:       2021 2:34 PM  Sam Mast DATE:        2021 1:32 PM  RESPONDING  PROVIDER #:        Henrique ACLDWELL MD          QUERY TEXT:    Dear hospitalist,    Patient admitted with GI bleeding, noted to have acute on chronic anemia. If   possible, please document in progress notes and discharge summary the cause of   the GI bleeding: The medical record reflects the following:  Risk Factors: Chronic illness  Clinical Indicators: Patient admitted for GI bleed and acute on chronic   anemia. In H&P Assessment plan mentioned as Acute on chronic anemia of GI   bleed, symptomatic with  hgb 7.2 and blood transfusion of 2 units PRBCs   started in ED. Her gastroenterology consult note documented as GI bleed:   Anemia- acute on chronic  may be s/t gastritis, PUD. and Continued Protonix 40   mg IV BID. Subsequently her EGD study noted Herbie Scrivener ericka with clot, no   active bleed and Hiatal Hernia. In discharge summary hospital course stated   Severe anemia with hemoglobin of 5.1on presentation-- conc  Treatment: admission, GI consult, EGD, monitor, Labs. transfusion of 2 units   PRBC's. Options provided:  -- GI hemorrhage due to Herbie Scrivener tear  -- GI hemorrhage due to gastritis  -- GI hemorrhage due to other source  -- GI hemorrhage due to unclear source  -- GI bleeding due to *** (GI site) malignancy  -- Other - I will add my own diagnosis  -- Disagree - Not applicable / Not valid  -- Disagree - Clinically unable to determine / Unknown  -- Refer to Clinical Documentation Reviewer    PROVIDER RESPONSE TEXT:    This patient has GI hemorrhage due to Herbie Scrivener tear.     Query created by: Ricky Fleischer on 2021 3:34 PM      Electronically signed by:  Leroy Read MD 2021 9:27 AM

## 2021-04-07 NOTE — PROGRESS NOTES
Discharged per W/C per [facility] transport with belongings. Condition as documented. Size Of Lesion In Cm: 0.6

## 2021-04-13 LAB
BASOPHILS ABSOLUTE: 0.1 /ΜL
BASOPHILS RELATIVE PERCENT: 0.7 %
BUN BLDV-MCNC: 20 MG/DL
CALCIUM SERPL-MCNC: 9.1 MG/DL
CHLORIDE BLD-SCNC: 98 MMOL/L
CO2: 33 MMOL/L
CREAT SERPL-MCNC: 1 MG/DL
EOSINOPHILS ABSOLUTE: 0.1 /ΜL
EOSINOPHILS RELATIVE PERCENT: 2.5 %
GFR CALCULATED: 53
GLUCOSE BLD-MCNC: 117 MG/DL
HCT VFR BLD CALC: 38.7 % (ref 36–46)
HEMOGLOBIN: 12.3 G/DL (ref 12–16)
LYMPHOCYTES ABSOLUTE: 2 /ΜL
LYMPHOCYTES RELATIVE PERCENT: 33.3 %
MCH RBC QN AUTO: 27.7 PG
MCHC RBC AUTO-ENTMCNC: 31.8 G/DL
MCV RBC AUTO: 87.2 FL
MONOCYTES ABSOLUTE: 0.5 /ΜL
MONOCYTES RELATIVE PERCENT: 9.1 %
NEUTROPHILS ABSOLUTE: 3.2 /ΜL
NEUTROPHILS RELATIVE PERCENT: 54.4 %
PLATELET # BLD: 231 K/ΜL
PMV BLD AUTO: 8.5 FL
POTASSIUM SERPL-SCNC: 3.7 MMOL/L
RBC # BLD: 4.44 10^6/ΜL
SODIUM BLD-SCNC: 139 MMOL/L
WBC # BLD: 5.9 10^3/ML

## 2021-04-20 LAB
HCT VFR BLD CALC: 39.9 % (ref 36–46)
HEMOGLOBIN: 12.9 G/DL (ref 12–16)

## 2021-04-27 LAB — TSH SERPL DL<=0.05 MIU/L-ACNC: 2.28 UIU/ML

## 2021-05-03 ENCOUNTER — OFFICE VISIT (OUTPATIENT)
Dept: PULMONOLOGY | Age: 81
End: 2021-05-03
Payer: MEDICARE

## 2021-05-03 VITALS
HEIGHT: 66 IN | WEIGHT: 140 LBS | DIASTOLIC BLOOD PRESSURE: 80 MMHG | OXYGEN SATURATION: 96 % | SYSTOLIC BLOOD PRESSURE: 140 MMHG | HEART RATE: 70 BPM | BODY MASS INDEX: 22.5 KG/M2

## 2021-05-03 DIAGNOSIS — J44.9 COPD, SEVERE (HCC): Primary | ICD-10-CM

## 2021-05-03 DIAGNOSIS — J96.11 CHRONIC HYPOXEMIC RESPIRATORY FAILURE (HCC): ICD-10-CM

## 2021-05-03 DIAGNOSIS — R06.02 SHORTNESS OF BREATH: ICD-10-CM

## 2021-05-03 PROCEDURE — 99213 OFFICE O/P EST LOW 20 MIN: CPT | Performed by: INTERNAL MEDICINE

## 2021-05-03 NOTE — PROGRESS NOTES
SUBJECTIVE:  Chief Complaint: Severe COPD, chronic hypoxemic respiratory failure, shortness of breath, history of COVID-19 pneumonia  Rachele Nieves has slowly improved following her COVID-19 pneumonia this past November or December. She remains on oxygen 24 hours a day. She has had no recent bronchitic exacerbation and continues on Advair Diskus, Incruse and albuterol MDI. More recently she has received the [facility] COVID-19 vaccination. She denies hemoptysis or chest pain. She is short of breath with minimal exertion but this has not changed much over the past year      ROS:  Constitution:  HEENT: Negative for ear, throat pain  Cardiovascular: Negative for chest pain, syncope, edema  Pulmonary: See HPI  Musculoskeletal: Negative for DVT, myalgias, arthralgias    OBJECTIVE:  BP (!) 140/80   Pulse 70   Ht 5' 6\" (1.676 m)   Wt 140 lb (63.5 kg)   SpO2 96%   BMI 22.60 kg/m²      Physical Exam:  Constitutional:  She appears well developed and well-nourished. Wearing nasal oxygen  Neck:  Supple, No palpable lymphadenopathy, No JVD  Cardiovascular:  S1, S2 Normal, Regular rhythm, no murmurs or gallops, No pericardial  rubs. Pulmonary: Diminished breath sounds throughout all lung fields with scattered rhonchi and rales in the lower lung fields more so on the right than left. No wheezing is noted  Abdomen: Not examined  Extremities: no edema, No DVT  Neurologic: Oriented x3, No focal deficits    Radiology: Chest x-ray on 1/8/2021 showed significant interval worsening of diffuse airspace opacities with small effusions. Both infection and edema can have this appearance. She states that she had a more recent chest x-ray at [facility] but those results are not available to me  PFT: Office spirometry last obtained on 7/29/2019 demonstrated a moderate obstructive defect with no significant response to bronchodilators        ASSESSMENT:    1. COPD, severe ([address]    2. Chronic hypoxemic respiratory failure (HCC)    3. Shortness of breath          PLAN:   I will make no change in her current bronchodilator therapy. I may want to consider repeating her pulmonary function test in near future but will hold off at this time. I did encourage her to continue wearing oxygen 24 hours a day. I will continue to follow her    We have discussed the need to maintain yearly flu immunization, pneumococcal vaccination. We have discussed Coronavirus precaution including handwashing practice, wiping items touched in public such as gas pumps, door handles, shopping carts, etc. Self monitoring for infection - fever, chills, cough, SOB. Should they develop symptoms they should call office for further instructions. Return in about 4 months (around 9/3/2021) for Recheck for COPD, Recheck for Shortness of Breath, chronic hypoxemic respiratory failure. This dictation was performed with a verbal recognition program and it was checked for errors. It is possible that there are still dictated errors within this office note. Any errors should be brought immediately to my attention for correction. All efforts were made to ensure that this office note is accurate.

## 2021-05-04 ENCOUNTER — HOSPITAL ENCOUNTER (OUTPATIENT)
Age: 81
Setting detail: SPECIMEN
Discharge: HOME | End: 2021-05-04
Payer: MEDICARE

## 2021-05-04 LAB
AST SERPL-CCNC: 17 IU/L (ref 15–37)
C-REACTIVE PROTEIN, HIGH SENSITIVITY: 3 MG/L
CREAT SERPL-MCNC: 1.1 MG/DL (ref 0.6–1.1)
GFR AFRICAN AMERICAN: 58 ML/MIN/1.73M2
GFR NON-AFRICAN AMERICAN: 48 ML/MIN/1.73M2
HCT VFR BLD CALC: 43.5 % (ref 37–47)
HEMOGLOBIN: 12.6 GM/DL (ref 12.5–16)
MCH RBC QN AUTO: 27.9 PG (ref 27–31)
MCHC RBC AUTO-ENTMCNC: 29 % (ref 32–36)
MCV RBC AUTO: 96.2 FL (ref 78–100)
PDW BLD-RTO: 15.4 % (ref 11.7–14.9)
PLATELET # BLD: 203 K/CU MM (ref 140–440)
PMV BLD AUTO: 10.6 FL (ref 7.5–11.1)
RBC # BLD: 4.52 M/CU MM (ref 4.2–5.4)
WBC # BLD: 6.5 K/CU MM (ref 4–10.5)

## 2021-05-04 PROCEDURE — 86140 C-REACTIVE PROTEIN: CPT

## 2021-05-04 PROCEDURE — 36415 COLL VENOUS BLD VENIPUNCTURE: CPT

## 2021-05-04 PROCEDURE — 85027 COMPLETE CBC AUTOMATED: CPT

## 2021-05-04 PROCEDURE — 84450 TRANSFERASE (AST) (SGOT): CPT

## 2021-05-04 PROCEDURE — 82565 ASSAY OF CREATININE: CPT

## 2021-05-18 ENCOUNTER — HOSPITAL ENCOUNTER (OUTPATIENT)
Age: 81
Setting detail: SPECIMEN
Discharge: HOME | End: 2021-05-18
Payer: MEDICARE

## 2021-05-18 LAB
HCT VFR BLD CALC: 46 % (ref 37–47)
HEMOGLOBIN: 13.7 GM/DL (ref 12.5–16)

## 2021-05-18 PROCEDURE — 36415 COLL VENOUS BLD VENIPUNCTURE: CPT

## 2021-05-18 PROCEDURE — 85014 HEMATOCRIT: CPT

## 2021-05-18 PROCEDURE — 85018 HEMOGLOBIN: CPT

## 2021-06-03 ENCOUNTER — HOSPITAL ENCOUNTER (OUTPATIENT)
Age: 81
Setting detail: SPECIMEN
Discharge: HOME | End: 2021-06-03
Payer: MEDICARE

## 2021-06-03 LAB
ANION GAP SERPL CALCULATED.3IONS-SCNC: 9 MMOL/L (ref 4–16)
BUN BLDV-MCNC: 25 MG/DL (ref 6–23)
CALCIUM SERPL-MCNC: 9.1 MG/DL (ref 8.3–10.6)
CHLORIDE BLD-SCNC: 97 MMOL/L (ref 99–110)
CO2: 34 MMOL/L (ref 21–32)
CREAT SERPL-MCNC: 1 MG/DL (ref 0.6–1.1)
GFR AFRICAN AMERICAN: >60 ML/MIN/1.73M2
GFR NON-AFRICAN AMERICAN: 53 ML/MIN/1.73M2
GLUCOSE BLD-MCNC: 111 MG/DL (ref 70–99)
POTASSIUM SERPL-SCNC: 3.9 MMOL/L (ref 3.5–5.1)
SODIUM BLD-SCNC: 140 MMOL/L (ref 135–145)

## 2021-06-03 PROCEDURE — 36415 COLL VENOUS BLD VENIPUNCTURE: CPT

## 2021-06-03 PROCEDURE — 80048 BASIC METABOLIC PNL TOTAL CA: CPT

## 2021-06-08 ENCOUNTER — TELEPHONE (OUTPATIENT)
Dept: PULMONOLOGY | Age: 81
End: 2021-06-08

## 2021-06-08 NOTE — TELEPHONE ENCOUNTER
Pt called in stating they are starting breathing treatments and she was wondering if you wanted her back on them. If so she will need them called in.   Please advise

## 2021-06-09 ENCOUNTER — HOSPITAL ENCOUNTER (OUTPATIENT)
Age: 81
Setting detail: SPECIMEN
Discharge: HOME | End: 2021-06-09
Payer: MEDICARE

## 2021-06-09 LAB
HCT VFR BLD CALC: 46 % (ref 37–47)
HEMOGLOBIN: 14 GM/DL (ref 12.5–16)

## 2021-06-09 PROCEDURE — 36415 COLL VENOUS BLD VENIPUNCTURE: CPT

## 2021-06-09 PROCEDURE — 85018 HEMOGLOBIN: CPT

## 2021-06-09 PROCEDURE — 85014 HEMATOCRIT: CPT

## 2021-06-10 RX ORDER — ALBUTEROL SULFATE 2.5 MG/3ML
2.5 SOLUTION RESPIRATORY (INHALATION) EVERY 6 HOURS PRN
COMMUNITY
End: 2021-06-10 | Stop reason: SDUPTHER

## 2021-06-14 RX ORDER — ALBUTEROL SULFATE 2.5 MG/3ML
2.5 SOLUTION RESPIRATORY (INHALATION) EVERY 6 HOURS PRN
Qty: 120 EACH | Refills: 11 | Status: SHIPPED | OUTPATIENT
Start: 2021-06-14 | End: 2021-06-14 | Stop reason: SDUPTHER

## 2021-06-14 RX ORDER — ALBUTEROL SULFATE 2.5 MG/3ML
2.5 SOLUTION RESPIRATORY (INHALATION) EVERY 6 HOURS PRN
Qty: 120 EACH | Refills: 11 | Status: SHIPPED | OUTPATIENT
Start: 2021-06-14

## 2021-07-02 NOTE — ED NOTES
Waiting on bed      [facility]  12/01/20 2025 soft/nontender/no distention

## 2021-07-20 ENCOUNTER — HOSPITAL ENCOUNTER (OUTPATIENT)
Age: 81
Setting detail: SPECIMEN
Discharge: HOME | End: 2021-07-20
Payer: MEDICARE

## 2021-07-20 LAB
HCT VFR BLD CALC: 39.9 % (ref 37–47)
HEMOGLOBIN: 12 GM/DL (ref 12.5–16)

## 2021-07-20 PROCEDURE — 85014 HEMATOCRIT: CPT

## 2021-07-20 PROCEDURE — 85018 HEMOGLOBIN: CPT

## 2021-07-20 PROCEDURE — 36415 COLL VENOUS BLD VENIPUNCTURE: CPT

## 2021-07-21 ENCOUNTER — HOSPITAL ENCOUNTER (OUTPATIENT)
Age: 81
Setting detail: SPECIMEN
Discharge: HOME | End: 2021-07-21

## 2021-07-21 PROCEDURE — G0328 FECAL BLOOD SCRN IMMUNOASSAY: HCPCS

## 2021-07-23 ENCOUNTER — HOSPITAL ENCOUNTER (OUTPATIENT)
Age: 81
Setting detail: SPECIMEN
Discharge: HOME | End: 2021-07-23
Payer: MEDICARE

## 2021-07-23 LAB
BASOPHILS ABSOLUTE: 0.1 K/CU MM
BASOPHILS RELATIVE PERCENT: 0.8 % (ref 0–1)
DIFFERENTIAL TYPE: ABNORMAL
EOSINOPHILS ABSOLUTE: 0.1 K/CU MM
EOSINOPHILS RELATIVE PERCENT: 1.4 % (ref 0–3)
HCT VFR BLD CALC: 42.2 % (ref 37–47)
HEMOCCULT SP1 STL QL: NEGATIVE
HEMOGLOBIN: 12.9 GM/DL (ref 12.5–16)
IMMATURE NEUTROPHIL %: 0.6 % (ref 0–0.43)
LYMPHOCYTES ABSOLUTE: 1.5 K/CU MM
LYMPHOCYTES RELATIVE PERCENT: 16.7 % (ref 24–44)
MCH RBC QN AUTO: 30.3 PG (ref 27–31)
MCHC RBC AUTO-ENTMCNC: 30.6 % (ref 32–36)
MCV RBC AUTO: 99.1 FL (ref 78–100)
MONOCYTES ABSOLUTE: 0.5 K/CU MM
MONOCYTES RELATIVE PERCENT: 5.8 % (ref 0–4)
NUCLEATED RBC %: 0 %
PDW BLD-RTO: 14.9 % (ref 11.7–14.9)
PLATELET # BLD: 301 K/CU MM (ref 140–440)
PMV BLD AUTO: 10.6 FL (ref 7.5–11.1)
RBC # BLD: 4.26 M/CU MM (ref 4.2–5.4)
SEGMENTED NEUTROPHILS ABSOLUTE COUNT: 6.6 K/CU MM
SEGMENTED NEUTROPHILS RELATIVE PERCENT: 74.7 % (ref 36–66)
TOTAL IMMATURE NEUTOROPHIL: 0.05 K/CU MM
TOTAL NUCLEATED RBC: 0 K/CU MM
WBC # BLD: 8.9 K/CU MM (ref 4–10.5)

## 2021-07-23 PROCEDURE — 36415 COLL VENOUS BLD VENIPUNCTURE: CPT

## 2021-07-23 PROCEDURE — G0328 FECAL BLOOD SCRN IMMUNOASSAY: HCPCS

## 2021-07-23 PROCEDURE — 85025 COMPLETE CBC W/AUTO DIFF WBC: CPT

## 2021-07-24 LAB
HEMOCCULT SP1 STL QL: NEGATIVE
OCCULT BLOOD 2: NEGATIVE
OCCULT BLOOD 3: NEGATIVE

## 2021-08-03 ENCOUNTER — HOSPITAL ENCOUNTER (OUTPATIENT)
Age: 81
Setting detail: SPECIMEN
Discharge: HOME | End: 2021-08-03
Payer: MEDICARE

## 2021-08-03 LAB
AST SERPL-CCNC: 14 IU/L (ref 15–37)
C-REACTIVE PROTEIN, HIGH SENSITIVITY: 64.7 MG/L
CHOLESTEROL: 172 MG/DL
CREAT SERPL-MCNC: 0.8 MG/DL (ref 0.6–1.1)
GFR AFRICAN AMERICAN: >60 ML/MIN/1.73M2
GFR NON-AFRICAN AMERICAN: >60 ML/MIN/1.73M2
HCT VFR BLD CALC: 41.2 % (ref 37–47)
HDLC SERPL-MCNC: 41 MG/DL
HEMOGLOBIN: 12.6 GM/DL (ref 12.5–16)
LDL CHOLESTEROL DIRECT: 80 MG/DL
MCH RBC QN AUTO: 30.8 PG (ref 27–31)
MCHC RBC AUTO-ENTMCNC: 30.6 % (ref 32–36)
MCV RBC AUTO: 100.7 FL (ref 78–100)
PDW BLD-RTO: 14.8 % (ref 11.7–14.9)
PLATELET # BLD: 203 K/CU MM (ref 140–440)
PMV BLD AUTO: 10.7 FL (ref 7.5–11.1)
RBC # BLD: 4.09 M/CU MM (ref 4.2–5.4)
TRIGL SERPL-MCNC: 335 MG/DL
TSH HIGH SENSITIVITY: 3.73 UIU/ML (ref 0.27–4.2)
WBC # BLD: 7.2 K/CU MM (ref 4–10.5)

## 2021-08-03 PROCEDURE — 82565 ASSAY OF CREATININE: CPT

## 2021-08-03 PROCEDURE — 36415 COLL VENOUS BLD VENIPUNCTURE: CPT

## 2021-08-03 PROCEDURE — 80061 LIPID PANEL: CPT

## 2021-08-03 PROCEDURE — 85027 COMPLETE CBC AUTOMATED: CPT

## 2021-08-03 PROCEDURE — 83721 ASSAY OF BLOOD LIPOPROTEIN: CPT

## 2021-08-03 PROCEDURE — 84443 ASSAY THYROID STIM HORMONE: CPT

## 2021-08-03 PROCEDURE — 84450 TRANSFERASE (AST) (SGOT): CPT

## 2021-08-03 PROCEDURE — 86140 C-REACTIVE PROTEIN: CPT

## 2021-08-17 ENCOUNTER — HOSPITAL ENCOUNTER (OUTPATIENT)
Age: 81
Setting detail: SPECIMEN
Discharge: HOME | End: 2021-08-17
Payer: MEDICARE

## 2021-08-17 LAB
HCT VFR BLD CALC: 42.3 % (ref 37–47)
HEMOGLOBIN: 12.3 GM/DL (ref 12.5–16)

## 2021-08-17 PROCEDURE — 85018 HEMOGLOBIN: CPT

## 2021-08-17 PROCEDURE — 85014 HEMATOCRIT: CPT

## 2021-08-17 PROCEDURE — 36415 COLL VENOUS BLD VENIPUNCTURE: CPT

## 2021-08-31 ENCOUNTER — HOSPITAL ENCOUNTER (OUTPATIENT)
Age: 81
Setting detail: SPECIMEN
Discharge: HOME | End: 2021-08-31
Payer: MEDICARE

## 2021-08-31 LAB
HCT VFR BLD CALC: 41.6 % (ref 37–47)
HEMOGLOBIN: 12.1 GM/DL (ref 12.5–16)

## 2021-08-31 PROCEDURE — 36415 COLL VENOUS BLD VENIPUNCTURE: CPT

## 2021-08-31 PROCEDURE — 85018 HEMOGLOBIN: CPT

## 2021-08-31 PROCEDURE — 85014 HEMATOCRIT: CPT

## 2021-09-16 ENCOUNTER — OFFICE VISIT (OUTPATIENT)
Dept: PULMONOLOGY | Age: 81
End: 2021-09-16
Payer: MEDICARE

## 2021-09-16 VITALS
HEIGHT: 66 IN | OXYGEN SATURATION: 93 % | HEART RATE: 66 BPM | DIASTOLIC BLOOD PRESSURE: 80 MMHG | WEIGHT: 140 LBS | SYSTOLIC BLOOD PRESSURE: 115 MMHG | BODY MASS INDEX: 22.5 KG/M2

## 2021-09-16 DIAGNOSIS — J12.82 PNEUMONIA DUE TO COVID-19 VIRUS: ICD-10-CM

## 2021-09-16 DIAGNOSIS — R06.02 SHORTNESS OF BREATH: ICD-10-CM

## 2021-09-16 DIAGNOSIS — U07.1 PNEUMONIA DUE TO COVID-19 VIRUS: ICD-10-CM

## 2021-09-16 DIAGNOSIS — J44.9 COPD, SEVERE (HCC): Primary | ICD-10-CM

## 2021-09-16 DIAGNOSIS — J96.11 CHRONIC HYPOXEMIC RESPIRATORY FAILURE (HCC): ICD-10-CM

## 2021-09-16 DIAGNOSIS — Z87.891 FORMER SMOKER: ICD-10-CM

## 2021-09-16 PROCEDURE — 99213 OFFICE O/P EST LOW 20 MIN: CPT | Performed by: INTERNAL MEDICINE

## 2021-09-16 RX ORDER — UMECLIDINIUM 62.5 UG/1
AEROSOL, POWDER ORAL
COMMUNITY

## 2021-09-16 NOTE — PROGRESS NOTES
SUBJECTIVE:  Chief Complaint: Severe COPD, chronic hypoxemic respiratory failure, shortness of breath, history of COVID-19 pneumonia  Amie Ayers has done fairly well since I last saw her. She has had no recent bronchitic infections and denies worsening shortness of breath. She does continue on Advair Diskus, Incruse and albuterol MDI and uses albuterol per nebulizer infrequently but sometimes in the evening hours. She did have COVID-19 pneumonia in November 2020 and has recovered from that but states that it took her about 2 months. She did receive both Pfizer COVID-19 vaccinations with her last on 2/18/2021  She continues on oxygen 24 hours a day and continues get a good clinical benefit from its use with improved daytime energy. ROS:  Constitution:  HEENT: Negative for ear, throat pain  Cardiovascular: Negative for chest pain, syncope, edema  Pulmonary: See HPI  Musculoskeletal: Negative for DVT, myalgias, arthralgias    OBJECTIVE:  /80   Pulse 66   Ht 5' 6\" (1.676 m)   Wt 140 lb (63.5 kg)   SpO2 93%   BMI 22.60 kg/m²      Physical Exam:  Constitutional:  She appears well developed and well-nourished. Neck:  Supple, No palpable lymphadenopathy, No JVD  Cardiovascular:  S1, S2 Normal, Regular rhythm, no murmurs or gallops, No pericardial  rubs. Pulmonary: Diminished breath sounds bilaterally with a few wheezes noted mostly at the left lung base and an isolated rhonchi at the right lung base. No pleural rubs are noted  Abdomen: Not examined  Extremities: no edema, No DVT  Neurologic: Oriented x3, No focal deficits    Radiology: Chest x-ray on 1/18/2021 showed significant interval worsening of diffuse bilateral airspace opacities with small effusions.   She states that she has had more recent x-rays done at [facility] but those results are not available to me at this time  PFT: Office spirometry on 7/29/2019 demonstrated a moderate obstructive defect with no significant response to bronchodilators      Echocardiogram: Limited echo on 1/8/2021  Left ventricular systolic function is normal.   Ejection fraction is visually estimated at 50-55%. Moderate left ventricular hypertrophy. Prominent Eustachian valve. Moderate aortic regurgitation; PHT: 362 msec. Sclerotic, but non-stenotic aortic valve. Moderate mitral regurgitation. MAC noted   No evidence of any pericardial effusion. ASSESSMENT:    1. COPD, severe ([address]    2. Chronic hypoxemic respiratory failure (HCC)    3. Pneumonia due to COVID-19 virus    4. Shortness of breath    5. Former smoker          PLAN:   I will make no change in her current bronchodilator therapy. I would like to repeat her pulmonary function tests later this fall. I also mentioned that she would be a candidate for the booster COVID-19 vaccination when it became available later this year. I will continue to follow her    We have discussed the need to maintain yearly flu immunization, pneumococcal vaccination. We have discussed Coronavirus precaution including handwashing practice, wiping items touched in public such as gas pumps, door handles, shopping carts, etc. Self monitoring for infection - fever, chills, cough, SOB. Should they develop symptoms they should call office for further instructions. Return in about 2 months (around 11/16/2021) for Recheck for COPD, Recheck for Shortness of Breath, chronic hypoxemic respiratory failure. This dictation was performed with a verbal recognition program and it was checked for errors. It is possible that there are still dictated errors within this office note. Any errors should be brought immediately to my attention for correction. All efforts were made to ensure that this office note is accurate.

## 2021-09-21 ENCOUNTER — HOSPITAL ENCOUNTER (OUTPATIENT)
Age: 81
Setting detail: SPECIMEN
Discharge: HOME | End: 2021-09-21
Payer: MEDICARE

## 2021-09-21 LAB
HCT VFR BLD CALC: 45.5 % (ref 37–47)
HEMOGLOBIN: 13.4 GM/DL (ref 12.5–16)

## 2021-09-21 PROCEDURE — 85018 HEMOGLOBIN: CPT

## 2021-09-21 PROCEDURE — 85014 HEMATOCRIT: CPT

## 2021-09-21 PROCEDURE — 36415 COLL VENOUS BLD VENIPUNCTURE: CPT

## 2021-10-12 ENCOUNTER — TELEPHONE (OUTPATIENT)
Dept: OTHER | Facility: CLINIC | Age: 81
End: 2021-10-12

## 2021-10-12 NOTE — TELEPHONE ENCOUNTER
Call made to patient to offer enrollment into the [place] [id number] program with [facility]. No answer, message left on answering machine with this writers contact information.

## 2021-10-19 ENCOUNTER — HOSPITAL ENCOUNTER (OUTPATIENT)
Age: 81
Setting detail: SPECIMEN
Discharge: HOME | End: 2021-10-19
Payer: MEDICARE

## 2021-10-19 LAB
HCT VFR BLD CALC: 42.7 % (ref 37–47)
HEMOGLOBIN: 12.5 GM/DL (ref 12.5–16)

## 2021-10-19 PROCEDURE — 85018 HEMOGLOBIN: CPT

## 2021-10-19 PROCEDURE — 36415 COLL VENOUS BLD VENIPUNCTURE: CPT

## 2021-10-19 PROCEDURE — 85014 HEMATOCRIT: CPT

## 2021-10-20 ENCOUNTER — TELEPHONE (OUTPATIENT)
Dept: OTHER | Facility: CLINIC | Age: 81
End: 2021-10-20

## 2021-10-20 NOTE — TELEPHONE ENCOUNTER
Call made to patient to offer enrollment into the [place] [id number] program with [facility]. No answer, message left on answering machine with this writers contact information.

## 2022-03-16 NOTE — CONSULTS
[address] [place] [address]                                  CONSULTATION    PATIENT NAME: Kriss Gibson                     :        1940  MED REC NO:   [id number]                          ROOM:       [id number]  ACCOUNT NO:   [de-identified]                           ADMIT DATE: 2020  PROVIDER:     Coreen Potter MD    CONSULT DATE:  2020    INDICATION:  Heart failure. HISTORY OF PRESENT ILLNESS:  This is a 70-year-old female patient who  came into the hospital with having anemia present. She states she was  brought to the hospital by the EMT with significant weakness present. She was admitted at that time, she had poor appetite, short of breath  present and weakness present and she was found to have anemia. She was  also hypotensive. Her hemoglobin was 7, potassium was 6.0 and  creatinine had increased to 2.3, creatinine was normal.  She received  fluids and she is doing better right now. She came in yesterday. She  denies any fevers and chills, no cough and she did receive some blood. I think her hemoglobin is 7.9 today and her creatinine was 2.1 when she  came in. She denies any chest pain, but just has some generalized  weakness and shortness of breath present. The patient had an echo done back in 2020. Her LV dysfunction was  present. At that time, her EF was around 20% to 30%, possible  takotsubo. She has had an echo in 2019. At that time, her echo was  normal and we thought this was secondary to takotsubo from her   passing away and she was under stress back in 2020. The patient has a history of coronary artery disease. She had a heart  catheterization done back in , left main was patent; LAD, circ and  ramus all had mild disease noted, found to have apical ballooning was  noted and takotsubo was noted.     When she was here back in the hospital back in 2020, I think her   had passed away just at that time and she had wide-complex  rhythm at that time and she was in possible atrial fibrillation, the 2:1  block noted. I believe her EF had dropped because of the stress back in  02/2020 when her  passed away, prior to that her echo was normal.    PAST MEDICAL HISTORY:  She sees Dr. Rosalia Mcdermott. She has a history of COPD,  apical ballooning history with heart failure, and primary care physician  is Dr. Shelton Knox. She had a heart catheterization in 2017,  nonobstructive coronary artery disease present, hypertension,  hyperlipidemia, COPD, fibromyalgia and she had atrial flutter 2:1 on  last admission and she was placed on anticoagulation at that time. She  was in atrial flutter with RVR at that time; therefore, she was started  on anticoagulation. PAST SURGICAL HISTORY:  She has history of gallbladder surgery, appendix  removed. SOCIAL HISTORY:  Does not smoke, does not drink. She is a former  smoker. ALLERGIES:  ATIVAN, HUMIRA, REMICADE and DOXYCYCLINE. MEDICATIONS:  She was on Deltasone, BuSpar, amiodarone 200 mg a day,  aspirin, lisinopril 5 mg a day, Toprol 25 b.i.d., Aldactone, Lasix,  Xarelto 10 mg once a day and Plavix. PHYSICAL EXAMINATION:  GENERAL:  The patient is awake, alert and answering questions. VITAL SIGNS:  Temperature is afebrile, pulse is 63, sinus rhythm, blood  pressure 111/53. HEENT:  Head is normocephalic and atraumatic. Pupils are equal and  reactive. CHEST:  Equal expansion. LUNGS:  Clear to auscultation. No wheezing or rhonchi. HEART:  Regular rhythm. ABDOMEN:  Soft and nontender. Bowel sounds are present. No  hepatomegaly or guarding appreciated. EXTREMITIES:  No cyanosis or clubbing noted. NEUROLOGIC:  Cranial nerves are grossly intact.     LABORATORY DATA:  Creatinine is down from 2.1 to 1.5, which is in normal  range, troponins are slightly elevated, LFTs are normal and CBC is  normal.    Chest x-ray, no acute 114

## 2024-01-06 NOTE — TELEPHONE ENCOUNTER
10 min PTA patients finger got caught in lazy susan in the kitchen. Nail is mostly detached. Bleeding when compression isnt applied. Patient tolerated initial part of triage and then pulled hand from bandage, now inconsolable.          Pt called stating she was confused about her prednisone. A tapering course was called in but a day later there was more prednisone (40mg) that she picked up. We looked through the cart and noticed she was given 40mg in the office and that was accidentally sent to her pharmacy. I stated to just finish the tapering course and not to worry about the other.

## 2024-02-13 NOTE — DISCHARGE SUMMARY
Continued Stay SW/CM Assessment/Plan of Care Note       Active Substitute Decision Maker (SDM)       AMAYA ORTEZ A Health Care Agent 1 - Significant other   Primary Phone: [phone] (Mobile)                 Progress note:  Referral sent to [facility] as patient and POA wanted to have an informational hospice meeting.    See SW/CM flowsheets for other objective data.    Disposition Recommendations:  SW/CM recommendation for discharge: Home, Hospice    Prior To Hospitalization:    Living Situation: Alone and residing at Apartment    .  Support Systems: Significant other   Home Devices/Equipment: None     Mobility Assist Devices: None   Type of Service Prior to Hospitalization: None        Patient/Family discharge goal (s):  Home, Hospice            Latrice Paige MD, [address]   Internal Medicine Hospitalist  Discharge Summary    Nina Barnes  :  1940  MRN:  [id number]    Admit date:  2020  Discharge date:  2020    Admitting Physician:  Harsha Ortiz MD    Discharge Diagnoses:    Patient Active Problem List   Diagnosis    Rheumatoid Arthritis    Hypertension    Hyperlipidemia    Fibromyalgia    Migraine    Chronic pain syndrome    Depression    Osteoporosis    Vitamin B12 deficiency    Lumbar spinal stenosis    Panic attack    Elevated troponin    Takotsubo syndrome    Blood loss anemia    COPD, severe ([address]    Acute exacerbation of chronic obstructive pulmonary disease (COPD) ([address]    Former smoker    Shortness of breath    Nocturnal hypoxemia    Back pain    Intractable low back pain    Chronic fatigue    Acute on chronic respiratory failure with hypoxia (HCC)    Pneumonia of right upper lobe due to infectious organism ([address]    Acute on chronic respiratory failure with hypoxia and hypercapnia (HCC)    Wide-complex tachycardia (HCC)    Gram-negative pneumonia (HCC)    Acute systolic heart failure ([address]       Admission Condition:  fair    Discharged Condition:  stable    Hospital Course:     (copied from admission history)  Nina Barnes is a 78 y.o.  female  who presents with shortness of breath, anxiety, and palpitations that has been going on since Monday. Patient states he lost  on 2020 then buried him last Monday. Patient states she has been anxious since then. Patient was found to be hypoxic in the 70's, Tachypneic in the 50, Tachycardic in the 170, and  while in ED. She was put on Heated nasal canula and started on Amiodarone and Cardizem in ED for possible Takutsubo. Hx of COPD, DVT, RA, Fibromyalgia, and osteoporosis. 20- I saw her today and she is doing much better. Has been seen by her other consultants and will check with them if OK to dc. She does have chronic pain syndrome. INHALATION THEN GARGLE     CITRACAL + D PO     Forteo 600 MCG/2.4ML Soln injection  Generic drug:  teriparatide (recombinant)  INJECT 0.08MLS INTO THE SKIN DAILY     furosemide 40 MG tablet  Commonly known as:  LASIX  Take 1 tablet by mouth 2 times daily     Incruse Ellipta 62.5 MCG/INH Aepb  Generic drug:  Umeclidinium Bromide  INHALE 1 PUFF VIA ORAL INHALATION ONCE DAILY     ipratropium-albuterol 0.5-2.5 (3) MG/3ML Soln nebulizer solution  Commonly known as:  DUONEB  Inhale 3 mLs into the lungs every 4 hours (while awake)     NONFORMULARY     omeprazole 40 MG delayed release capsule  Commonly known as:  PRILOSEC  Take 1 capsule by mouth every morning (before breakfast)     OXYGEN     potassium chloride 10 MEQ extended release tablet  Commonly known as:  KLOR-CON  Take 1 tablet by mouth daily     ProAir  (90 Base) MCG/ACT inhaler  Generic drug:  albuterol sulfate HFA  INHALE 2 PUFFS BY ORAL INHALATION EVERY 4 TO 6 HOURS AS NEEDED     traZODone 50 MG tablet  Commonly known as:  DESYREL  TAKE 1 TABLET BY MOUTH NIGHTLY     vitamin B-12 1000 MCG tablet  Commonly known as:  CYANOCOBALAMIN     vitamin D 50 MCG (2000 UT) Caps capsule        STOP taking these medications    cholestyramine 4 g packet  Commonly known as:  Questran     dicyclomine 10 MG capsule  Commonly known as:  Bentyl     DULoxetine 60 MG extended release capsule  Commonly known as:  CYMBALTA     famotidine 20 MG tablet  Commonly known as:  PEPCID     guaiFENesin 600 MG extended release tablet  Commonly known as:  MUCINEX     guaiFENesin-dextromethorphan 100-10 MG/5ML syrup  Commonly known as:  ROBITUSSIN DM     losartan 100 MG tablet  Commonly known as:  COZAAR     metoprolol tartrate 50 MG tablet  Commonly known as:  LOPRESSOR     ondansetron 4 MG disintegrating tablet  Commonly known as:  ZOFRAN-ODT        ASK your doctor about these medications    triamcinolone 55 MCG/ACT nasal inhaler  Commonly known as:  Nasacort Allergy 24HR  2 sprays by STANDARD (2 VW) [[id number]] Collected: 02/15/20 1525     Order Status: Completed Specimen: Chest Updated: 02/15/20 1703     Narrative:       EXAMINATION:  TWO XRAY VIEWS OF THE CHEST    2/15/2020 3:18 pm    COMPARISON:  02/11/2020    HISTORY:  ORDERING SYSTEM PROVIDED HISTORY: dyspnea  TECHNOLOGIST PROVIDED HISTORY:  Reason for exam:->dyspnea  Reason for Exam: dyspnea  Acuity: Unknown  Type of Exam: Unknown  Additional signs and symptoms: cough  Relevant Medical/Surgical History: COPD, bronchitis    FINDINGS:  The heart is stable in size and configuration. Atherosclerotic changes of  thoracic aorta are noted. The pulmonary vasculature is normal.  Old healed  left rib fractures are noted. Chronic deformity of the anterior aspect right  5th rib is redemonstrated. Levocurvature lower thoracic spine is evident. Eventration of the right hemidiaphragm is seen. There is some linear  scarring in the lung bases. Impression:       No acute cardiopulmonary disease. XR CHEST PORTABLE [[id number]] Collected: 02/11/20 0730     Order Status: Completed Updated: 02/11/20 0734     Narrative:       EXAMINATION:  ONE XRAY VIEW OF THE CHEST    2/11/2020 5:38 am    COMPARISON:  02/09/2020    HISTORY:  ORDERING SYSTEM PROVIDED HISTORY: Bibasilar pneumonia  TECHNOLOGIST PROVIDED HISTORY:  Reason for exam:->Bibasilar pneumonia  Reason for Exam: bibasilar pneumonia  Acuity: Unknown  Type of Exam: Unknown    FINDINGS:  Bibasilar airspace disease, mildly improved at the left lung base. The chest  is otherwise similar in appearance. S shaped scoliotic deformity. Atherosclerotic calcifications are identified in the aortic knob. No  pneumothorax is identified. No new infiltrate is seen. Degenerative changes  are seen in the shoulders. Impression:       Again identified is bibasilar airspace disease, mildly improved at the left  lung base otherwise similar in appearance. No new infiltrate.      CTA CHEST W CONTRAST on  oxycodone also. PHYSICAL EXAMINATION:  GENERAL:  The patient is awake and alert, in moderate respiratory  distress, on Vapotherm. She is tachycardic. VITAL SIGNS:  Blood pressure is 100s, heart rate is in 170s. LUNGS:  Decreased breath sounds present. Diffuse wheezing and rhonchi  present. HEART:  Tachycardic. ABDOMEN:  Soft and nontender. EXTREMITIES:  No swelling present. NEUROLOGIC:  She is awake and alert. LABORATORY DATA:  Labs are pending. IMPRESSION:  This is a 22-year-old female patient with a history of  nonischemic cardiomyopathy; however, her EF did improve. She comes in  with having respiratory distress. She is also tachycardic. The patient  was placed on Vapotherm at this time. From a cardiac stand at this  time, I will place her on Cardizem drip IV when she remains more stable  switch her back to p.o. Lopressor and also load her with amiodarone and  correct her mag. Gases are pending at this time. Prognosis is guarded. Further recommendations will be based on the hospital course. She did  have a CT of the chest done back in December. I think she had right  upper lobe pneumonia was noted at that time.            Lavon Narayanan MD     D: 2020 16:00:37       T: 2020 18:25:06     [email or web address]  Job#: [id number]     Doc#: [id number]     CC:               Last signed by: Dennys Oneal MD at 2/10/2020  7:01 AM     Jj Tomlinson MD   Physician   Pulmonology   Consults   Signed   Date of Service:  2/10/2020 12:34 PM               Signed            Show:Clear all  [x]Manual[]Template[]Copied    Added by:  [x]Mateusz Null MD    []Ronny for details                    [address], [address]     PATIENT NAME: Natan Pearce                     :        1940  MED REC NO:   [id number]                          ROOM:       2106  ACCOUNT NO: Takotsubo syndrome with reduced ejection fraction. PLAN:  Vapotherm oxygenation will be slowly tapered as tolerated. She  has been started on broad-spectrum antibiotics to cover  hospital-acquired pneumonia. She also will be continued on aerosolized  bronchodilators and IV corticosteroids. She will need a complete  cardiac workup. I will continue to follow her. Despina Opitz, MD     D: 02/10/2020 12:30:50       T: 02/10/2020 12:34:30     [email or web address]  Job#: [id number]     Doc#: [id number]     CC:               Last signed by: Raúl Grimes MD at 2/10/2020  2:47 PM         Disposition:   Home. All the above has been explained to patient and or family. Patient would need F/U with his/her PCP in 7 days. Explained that it is the patients responsibility to have blood work or radiology testing done as an out patient. He/She has to take the discharge papers from the hospital for out patient follow up visit with the PCP/Physician. Time spent  >30 minutes.   Signed:  Colleen Canela MD, Larisa Kauffman  2/16/2020, 12:59 PM

## 2025-03-11 NOTE — CARE COORDINATION
Ambulatory Care Coordination Note  CM Risk Score: 15  Sandoval Mortality Risk Score: 16.97    ACC: Farhana Subramanian RN    Summary Note: Met with pt following pcp ov on 11/13. Pt reports she is doing well and has continued to be smoke free, pt praised for this great accomplishment. Denies needs at this time, advised pt cc would continue to follow and to call cc or pcp office if any needs arise, voices understanding. Care Coordination Interventions    Program Enrollment:  Complex Care  Referral from Primary Care Provider:  No  Suggested Interventions and Community Resources  Fall Risk Prevention: In Process  Smoking Cessation: In Process  Zone Management Tools: In Process         Goals Addressed             Most Recent     Wellness Goal   On track (11/13/2017)             Patient Self-Management Goal for Health Maintenance  Goal: I will chose a goal related to tobacco cessation:  I will continue to remain smoke free. Barriers: lack of motivation and stress  Plan for overcoming my barriers: education and  trying to quit with her  Confidence: 6/10  Anticipated Goal Completion Date: 11/20/17            Prior to Admission medications    Medication Sig Start Date End Date Taking? Authorizing Provider   PROAIR  (90 Base) MCG/ACT inhaler INHALE 2 PUFFS BY ORAL INHALATION EVERY 4 TO 6 HOURS AS NEEDED 10/27/17   Juan Green MD   esomeprazole (NEXIUM) 40 MG delayed release capsule Take 40 mg by mouth every morning (before breakfast)    Historical Provider, MD   oxyCODONE-acetaminophen (PERCOCET) 7.5-325 MG per tablet One half tablet three times daily prn .     Historical Provider, MD   ranitidine (ZANTAC) 300 MG tablet Take 1 tablet by mouth nightly 10/27/17   Phillip Escudero MD   losartan (COZAAR) 50 MG tablet Take 1 tablet by mouth daily 10/23/17   Phillip Escudero MD   ARIPiprazole (ABILIFY) 2 MG tablet Take 1 tablet by mouth daily 10/19/17   Phillip Escudero MD   busPIRone (BUSPAR) 10 MG tablet Take 1 tablet by mouth 3 times daily 10/19/17   Kyara Escobedo MD   fluticasone [facility]) 50 MCG/ACT nasal spray 1 spray by Nasal route daily 10/1/17   Layton Lackey MD   atorvastatin (LIPITOR) 10 MG tablet Take 1 tablet by mouth every evening 9/11/17   Kyara Escobedo MD   furosemide (LASIX) 40 MG tablet Take 1 tablet by mouth 2 times daily 8/22/17   Kyara Escobedo MD   potassium chloride (KLOR-CON M) 10 MEQ extended release tablet Take 10 mEq by mouth 2 times daily    Historical Provider, MD   metoprolol tartrate (LOPRESSOR) 50 MG tablet Take 1 tablet by mouth 2 times daily 6/22/17   Kyara Escobedo MD   mometasone-formoterol [facility]) 200-5 MCG/ACT inhaler Inhale 2 puffs into the lungs every 12 hours 5/12/17   Suzie Barr MD   Umeclidinium Bromide (INCRUSE ELLIPTA) 62.5 MCG/INH AEPB Inhale 1 applicator into the lungs daily 5/12/17   Suzie Barr MD   vitamin B-12 (CYANOCOBALAMIN) 1000 MCG tablet Take 1,000 mcg by mouth daily. Historical Provider, MD   DULoxetine (CYMBALTA) 60 MG capsule Take 60 mg by mouth daily. Historical Provider, MD   Calcium Citrate-Vitamin D (CITRACAL + D PO) Take 600 mg by mouth 2 times daily.     Historical Provider, MD       Future Appointments  Date Time Provider Ijeoma Bradford   11/28/2017 2:15 PM Francisco Morrell MD [facility]   12/12/2017 11:00 AM Kyara Escobedo MD [facility]   1/25/2018 2:45 PM Suzie Barr MD [facility] Detail Level: Detailed Quality 226: Preventive Care And Screening: Tobacco Use: Screening And Cessation Intervention: Patient screened for tobacco use and is an ex/non-smoker

## (undated) DEVICE — Z DISCONTINUED (USE MFG CAT MVABO)  TUBING GAS SAMPLING STD 6.5 FT FEMALE CONN SMRT CAPNOLINE

## (undated) DEVICE — FORCEPS BX L240CM JAW DIA2.8MM L CAP W/ NDL MIC MESH TOOTH